# Patient Record
Sex: MALE | Race: WHITE | NOT HISPANIC OR LATINO | URBAN - METROPOLITAN AREA
[De-identification: names, ages, dates, MRNs, and addresses within clinical notes are randomized per-mention and may not be internally consistent; named-entity substitution may affect disease eponyms.]

---

## 2017-03-14 ENCOUNTER — OUTPATIENT (OUTPATIENT)
Dept: OUTPATIENT SERVICES | Facility: HOSPITAL | Age: 77
LOS: 1 days | Discharge: HOME | End: 2017-03-14

## 2017-06-27 DIAGNOSIS — I25.10 ATHEROSCLEROTIC HEART DISEASE OF NATIVE CORONARY ARTERY WITHOUT ANGINA PECTORIS: ICD-10-CM

## 2020-03-09 NOTE — ASU PATIENT PROFILE, ADULT - PMH
CHF (congestive heart failure)    COPD (chronic obstructive pulmonary disease)    Diabetes mellitus    Lymphedema of both lower extremities

## 2020-03-10 ENCOUNTER — OUTPATIENT (OUTPATIENT)
Dept: OUTPATIENT SERVICES | Facility: HOSPITAL | Age: 80
LOS: 1 days | Discharge: HOME | End: 2020-03-10

## 2020-03-10 VITALS
DIASTOLIC BLOOD PRESSURE: 72 MMHG | RESPIRATION RATE: 18 BRPM | WEIGHT: 220.02 LBS | OXYGEN SATURATION: 86 % | TEMPERATURE: 97 F | HEART RATE: 99 BPM | HEIGHT: 68 IN | SYSTOLIC BLOOD PRESSURE: 157 MMHG

## 2020-03-10 VITALS
HEART RATE: 99 BPM | SYSTOLIC BLOOD PRESSURE: 200 MMHG | WEIGHT: 220.02 LBS | HEIGHT: 68 IN | OXYGEN SATURATION: 86 % | TEMPERATURE: 97 F | RESPIRATION RATE: 18 BRPM | DIASTOLIC BLOOD PRESSURE: 96 MMHG

## 2020-03-10 DIAGNOSIS — Z90.5 ACQUIRED ABSENCE OF KIDNEY: Chronic | ICD-10-CM

## 2020-03-10 LAB — GLUCOSE BLDC GLUCOMTR-MCNC: 173 MG/DL — HIGH (ref 70–99)

## 2020-03-17 DIAGNOSIS — Z87.891 PERSONAL HISTORY OF NICOTINE DEPENDENCE: ICD-10-CM

## 2020-03-17 DIAGNOSIS — H25.11 AGE-RELATED NUCLEAR CATARACT, RIGHT EYE: ICD-10-CM

## 2020-03-17 DIAGNOSIS — Z53.09 PROCEDURE AND TREATMENT NOT CARRIED OUT BECAUSE OF OTHER CONTRAINDICATION: ICD-10-CM

## 2020-03-17 DIAGNOSIS — I50.9 HEART FAILURE, UNSPECIFIED: ICD-10-CM

## 2020-03-17 DIAGNOSIS — J44.9 CHRONIC OBSTRUCTIVE PULMONARY DISEASE, UNSPECIFIED: ICD-10-CM

## 2020-03-17 DIAGNOSIS — H40.10X0 UNSPECIFIED OPEN-ANGLE GLAUCOMA, STAGE UNSPECIFIED: ICD-10-CM

## 2020-03-17 DIAGNOSIS — E11.9 TYPE 2 DIABETES MELLITUS WITHOUT COMPLICATIONS: ICD-10-CM

## 2020-05-01 PROBLEM — E11.9 TYPE 2 DIABETES MELLITUS WITHOUT COMPLICATIONS: Chronic | Status: ACTIVE | Noted: 2020-03-10

## 2020-05-01 PROBLEM — J44.9 CHRONIC OBSTRUCTIVE PULMONARY DISEASE, UNSPECIFIED: Chronic | Status: ACTIVE | Noted: 2020-03-10

## 2020-05-01 PROBLEM — Z00.00 ENCOUNTER FOR PREVENTIVE HEALTH EXAMINATION: Status: ACTIVE | Noted: 2020-05-01

## 2020-05-01 PROBLEM — I89.0 LYMPHEDEMA, NOT ELSEWHERE CLASSIFIED: Chronic | Status: ACTIVE | Noted: 2020-03-10

## 2020-05-01 PROBLEM — I50.9 HEART FAILURE, UNSPECIFIED: Chronic | Status: ACTIVE | Noted: 2020-03-10

## 2020-05-05 ENCOUNTER — APPOINTMENT (OUTPATIENT)
Dept: UROLOGY | Facility: CLINIC | Age: 80
End: 2020-05-05
Payer: MEDICARE

## 2020-05-05 DIAGNOSIS — N19 UNSPECIFIED KIDNEY FAILURE: ICD-10-CM

## 2020-05-05 PROCEDURE — 99203 OFFICE O/P NEW LOW 30 MIN: CPT | Mod: 95

## 2020-05-05 NOTE — ASSESSMENT
[FreeTextEntry1] : elevated creatinine can be due to dm nephropathy, vascular compromise, obstruction or combination.  Important do do sonogram to check bladder emptying and to check for hydronephrosis .  I stressed the need to do this and they are agreeable.  Uti clinically improving on cipro

## 2020-05-05 NOTE — REVIEW OF SYSTEMS
[Shortness Of Breath] : shortness of breath [Fever] : no fever [Chest Pain] : no chest pain [Cough] : no cough [Constipation] : no constipation [Diarrhea] : no diarrhea [Dysuria] : no dysuria [Confused] : no confusion [FreeTextEntry1] : on home oxygen

## 2020-05-05 NOTE — HISTORY OF PRESENT ILLNESS
[Home] : at home, [unfilled] , at the time of the visit. [Self] : self [Verbal consent obtained from patient] : the patient, [unfilled] [Other Location: e.g. Home (Enter Location, City,State)___] : at [unfilled] [Family Member] : family member [FreeTextEntry3] : 5/5/20 at 1800 [FreeTextEntry1] : phone number [phone]\par email of [email or web address] Pts daughter translated\par \par 80 year old male with worsening creatinine now 4.1.  He claims good urinary stream, nocturia X 1.  He has urinary frequency and urgency with incontinence.  No hematuria, no dysuria, no flank pain.  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  He take lasix for fluid retention. He is a diabetic on insulin\par \par recently he did have dysuria and foul smelling urine and was treated with cipro with resolution of dysuria.

## 2020-05-05 NOTE — REASON FOR VISIT
[Initial Visit ___] : [unfilled] is here today for an initial visit  for [unfilled] [Family Member] : family member [Source: ______] : History obtained from [unfilled]

## 2020-05-05 NOTE — PHYSICAL EXAM
[General Appearance - In No Acute Distress] : no acute distress [] : no respiratory distress [Costovertebral Angle Tenderness] : no ~M costovertebral angle tenderness [Oriented To Time, Place, And Person] : oriented to person, place, and time [FreeTextEntry1] : poorly ambulatory

## 2020-06-30 ENCOUNTER — APPOINTMENT (OUTPATIENT)
Dept: UROLOGY | Facility: CLINIC | Age: 80
End: 2020-06-30

## 2020-12-22 ENCOUNTER — INPATIENT (INPATIENT)
Facility: HOSPITAL | Age: 80
LOS: 18 days | Discharge: NURSING, REHAB OR LONG-TERM CARE | End: 2021-01-10
Attending: INTERNAL MEDICINE | Admitting: INTERNAL MEDICINE
Payer: MEDICARE

## 2020-12-22 VITALS
OXYGEN SATURATION: 100 % | HEART RATE: 94 BPM | HEIGHT: 68 IN | SYSTOLIC BLOOD PRESSURE: 130 MMHG | DIASTOLIC BLOOD PRESSURE: 60 MMHG | TEMPERATURE: 98 F | RESPIRATION RATE: 16 BRPM

## 2020-12-22 DIAGNOSIS — Z90.5 ACQUIRED ABSENCE OF KIDNEY: Chronic | ICD-10-CM

## 2020-12-22 PROCEDURE — 99285 EMERGENCY DEPT VISIT HI MDM: CPT | Mod: CS

## 2020-12-22 NOTE — ED ADULT TRIAGE NOTE - CHIEF COMPLAINT QUOTE
BIBEMS from [facility] for "Active GI Bleeding." Pt refused to answer questions, and NH reported Maroon colored stooled.

## 2020-12-23 LAB
ALBUMIN SERPL ELPH-MCNC: 3.9 G/DL — SIGNIFICANT CHANGE UP (ref 3.5–5.2)
ALP SERPL-CCNC: 62 U/L — SIGNIFICANT CHANGE UP (ref 30–115)
ALT FLD-CCNC: 7 U/L — SIGNIFICANT CHANGE UP (ref 0–41)
ANION GAP SERPL CALC-SCNC: 18 MMOL/L — HIGH (ref 7–14)
APTT BLD: 26.7 SEC — LOW (ref 27–39.2)
AST SERPL-CCNC: 13 U/L — SIGNIFICANT CHANGE UP (ref 0–41)
BASOPHILS # BLD AUTO: 0.06 K/UL — SIGNIFICANT CHANGE UP (ref 0–0.2)
BASOPHILS # BLD AUTO: 0.08 K/UL — SIGNIFICANT CHANGE UP (ref 0–0.2)
BASOPHILS NFR BLD AUTO: 0.5 % — SIGNIFICANT CHANGE UP (ref 0–1)
BASOPHILS NFR BLD AUTO: 0.5 % — SIGNIFICANT CHANGE UP (ref 0–1)
BILIRUB DIRECT SERPL-MCNC: <0.2 MG/DL — SIGNIFICANT CHANGE UP (ref 0–0.2)
BILIRUB INDIRECT FLD-MCNC: SIGNIFICANT CHANGE UP MG/DL (ref 0.2–1.2)
BILIRUB SERPL-MCNC: <0.2 MG/DL — SIGNIFICANT CHANGE UP (ref 0.2–1.2)
BLD GP AB SCN SERPL QL: SIGNIFICANT CHANGE UP
BUN SERPL-MCNC: 108 MG/DL — CRITICAL HIGH (ref 10–20)
CALCIUM SERPL-MCNC: 9.2 MG/DL — SIGNIFICANT CHANGE UP (ref 8.5–10.1)
CHLORIDE SERPL-SCNC: 101 MMOL/L — SIGNIFICANT CHANGE UP (ref 98–110)
CO2 SERPL-SCNC: 18 MMOL/L — SIGNIFICANT CHANGE UP (ref 17–32)
CREAT SERPL-MCNC: 4.4 MG/DL — CRITICAL HIGH (ref 0.7–1.5)
EOSINOPHIL # BLD AUTO: 0.56 K/UL — SIGNIFICANT CHANGE UP (ref 0–0.7)
EOSINOPHIL # BLD AUTO: 0.89 K/UL — HIGH (ref 0–0.7)
EOSINOPHIL NFR BLD AUTO: 4.2 % — SIGNIFICANT CHANGE UP (ref 0–8)
EOSINOPHIL NFR BLD AUTO: 5.9 % — SIGNIFICANT CHANGE UP (ref 0–8)
GLUCOSE BLDC GLUCOMTR-MCNC: 106 MG/DL — HIGH (ref 70–99)
GLUCOSE BLDC GLUCOMTR-MCNC: 128 MG/DL — HIGH (ref 70–99)
GLUCOSE SERPL-MCNC: 87 MG/DL — SIGNIFICANT CHANGE UP (ref 70–99)
HCT VFR BLD CALC: 22.7 % — LOW (ref 42–52)
HCT VFR BLD CALC: 25.3 % — LOW (ref 42–52)
HGB BLD-MCNC: 7.1 G/DL — LOW (ref 14–18)
HGB BLD-MCNC: 7.7 G/DL — LOW (ref 14–18)
IMM GRANULOCYTES NFR BLD AUTO: 0.5 % — HIGH (ref 0.1–0.3)
IMM GRANULOCYTES NFR BLD AUTO: 0.7 % — HIGH (ref 0.1–0.3)
INR BLD: 1.13 RATIO — SIGNIFICANT CHANGE UP (ref 0.65–1.3)
LACTATE SERPL-SCNC: 1.1 MMOL/L — SIGNIFICANT CHANGE UP (ref 0.7–2)
LIDOCAIN IGE QN: 48 U/L — SIGNIFICANT CHANGE UP (ref 7–60)
LYMPHOCYTES # BLD AUTO: 19.2 % — LOW (ref 20.5–51.1)
LYMPHOCYTES # BLD AUTO: 2.88 K/UL — SIGNIFICANT CHANGE UP (ref 1.2–3.4)
LYMPHOCYTES # BLD AUTO: 2.9 K/UL — SIGNIFICANT CHANGE UP (ref 1.2–3.4)
LYMPHOCYTES # BLD AUTO: 21.7 % — SIGNIFICANT CHANGE UP (ref 20.5–51.1)
MCHC RBC-ENTMCNC: 27.7 PG — SIGNIFICANT CHANGE UP (ref 27–31)
MCHC RBC-ENTMCNC: 28.4 PG — SIGNIFICANT CHANGE UP (ref 27–31)
MCHC RBC-ENTMCNC: 30.4 G/DL — LOW (ref 32–37)
MCHC RBC-ENTMCNC: 31.3 G/DL — LOW (ref 32–37)
MCV RBC AUTO: 90.8 FL — SIGNIFICANT CHANGE UP (ref 80–94)
MCV RBC AUTO: 91 FL — SIGNIFICANT CHANGE UP (ref 80–94)
MONOCYTES # BLD AUTO: 1.1 K/UL — HIGH (ref 0.1–0.6)
MONOCYTES # BLD AUTO: 1.31 K/UL — HIGH (ref 0.1–0.6)
MONOCYTES NFR BLD AUTO: 8.3 % — SIGNIFICANT CHANGE UP (ref 1.7–9.3)
MONOCYTES NFR BLD AUTO: 8.7 % — SIGNIFICANT CHANGE UP (ref 1.7–9.3)
NEUTROPHILS # BLD AUTO: 8.58 K/UL — HIGH (ref 1.4–6.5)
NEUTROPHILS # BLD AUTO: 9.82 K/UL — HIGH (ref 1.4–6.5)
NEUTROPHILS NFR BLD AUTO: 64.6 % — SIGNIFICANT CHANGE UP (ref 42.2–75.2)
NEUTROPHILS NFR BLD AUTO: 65.2 % — SIGNIFICANT CHANGE UP (ref 42.2–75.2)
NRBC # BLD: 0 /100 WBCS — SIGNIFICANT CHANGE UP (ref 0–0)
NRBC # BLD: 0 /100 WBCS — SIGNIFICANT CHANGE UP (ref 0–0)
PLATELET # BLD AUTO: 299 K/UL — SIGNIFICANT CHANGE UP (ref 130–400)
PLATELET # BLD AUTO: 370 K/UL — SIGNIFICANT CHANGE UP (ref 130–400)
POTASSIUM SERPL-MCNC: 5 MMOL/L — SIGNIFICANT CHANGE UP (ref 3.5–5)
POTASSIUM SERPL-SCNC: 5 MMOL/L — SIGNIFICANT CHANGE UP (ref 3.5–5)
PROT SERPL-MCNC: 7.4 G/DL — SIGNIFICANT CHANGE UP (ref 6–8)
PROTHROM AB SERPL-ACNC: 13 SEC — HIGH (ref 9.95–12.87)
RBC # BLD: 2.5 M/UL — LOW (ref 4.7–6.1)
RBC # BLD: 2.78 M/UL — LOW (ref 4.7–6.1)
RBC # FLD: 13.5 % — SIGNIFICANT CHANGE UP (ref 11.5–14.5)
RBC # FLD: 13.5 % — SIGNIFICANT CHANGE UP (ref 11.5–14.5)
SARS-COV-2 RNA SPEC QL NAA+PROBE: SIGNIFICANT CHANGE UP
SODIUM SERPL-SCNC: 137 MMOL/L — SIGNIFICANT CHANGE UP (ref 135–146)
TROPONIN T SERPL-MCNC: 0.06 NG/ML — CRITICAL HIGH
TROPONIN T SERPL-MCNC: 0.07 NG/ML — CRITICAL HIGH
WBC # BLD: 13.27 K/UL — HIGH (ref 4.8–10.8)
WBC # BLD: 15.08 K/UL — HIGH (ref 4.8–10.8)
WBC # FLD AUTO: 13.27 K/UL — HIGH (ref 4.8–10.8)
WBC # FLD AUTO: 15.08 K/UL — HIGH (ref 4.8–10.8)

## 2020-12-23 PROCEDURE — 93010 ELECTROCARDIOGRAM REPORT: CPT

## 2020-12-23 PROCEDURE — 99223 1ST HOSP IP/OBS HIGH 75: CPT

## 2020-12-23 PROCEDURE — 71045 X-RAY EXAM CHEST 1 VIEW: CPT | Mod: 26

## 2020-12-23 RX ORDER — PANTOPRAZOLE SODIUM 20 MG/1
40 TABLET, DELAYED RELEASE ORAL ONCE
Refills: 0 | Status: COMPLETED | OUTPATIENT
Start: 2020-12-23 | End: 2020-12-23

## 2020-12-23 RX ORDER — BUDESONIDE AND FORMOTEROL FUMARATE DIHYDRATE 160; 4.5 UG/1; UG/1
2 AEROSOL RESPIRATORY (INHALATION)
Refills: 0 | Status: DISCONTINUED | OUTPATIENT
Start: 2020-12-23 | End: 2021-01-10

## 2020-12-23 RX ORDER — LANOLIN ALCOHOL/MO/W.PET/CERES
3 CREAM (GRAM) TOPICAL AT BEDTIME
Refills: 0 | Status: DISCONTINUED | OUTPATIENT
Start: 2020-12-23 | End: 2021-01-10

## 2020-12-23 RX ORDER — ASCORBIC ACID 60 MG
500 TABLET,CHEWABLE ORAL DAILY
Refills: 0 | Status: DISCONTINUED | OUTPATIENT
Start: 2020-12-23 | End: 2021-01-10

## 2020-12-23 RX ORDER — PANTOPRAZOLE SODIUM 20 MG/1
8 TABLET, DELAYED RELEASE ORAL
Qty: 80 | Refills: 0 | Status: DISCONTINUED | OUTPATIENT
Start: 2020-12-23 | End: 2021-01-06

## 2020-12-23 RX ORDER — NYSTATIN CREAM 100000 [USP'U]/G
1 CREAM TOPICAL
Refills: 0 | Status: DISCONTINUED | OUTPATIENT
Start: 2020-12-23 | End: 2021-01-10

## 2020-12-23 RX ORDER — SODIUM BICARBONATE 1 MEQ/ML
650 SYRINGE (ML) INTRAVENOUS EVERY 12 HOURS
Refills: 0 | Status: DISCONTINUED | OUTPATIENT
Start: 2020-12-23 | End: 2021-01-10

## 2020-12-23 RX ORDER — HYDRALAZINE HCL 50 MG
0 TABLET ORAL
Qty: 0 | Refills: 0 | DISCHARGE

## 2020-12-23 RX ORDER — FUROSEMIDE 40 MG
20 TABLET ORAL DAILY
Refills: 0 | Status: DISCONTINUED | OUTPATIENT
Start: 2020-12-23 | End: 2020-12-23

## 2020-12-23 RX ORDER — SOD SULF/SODIUM/NAHCO3/KCL/PEG
4000 SOLUTION, RECONSTITUTED, ORAL ORAL ONCE
Refills: 0 | Status: COMPLETED | OUTPATIENT
Start: 2020-12-23 | End: 2020-12-23

## 2020-12-23 RX ORDER — CHLORHEXIDINE GLUCONATE 213 G/1000ML
1 SOLUTION TOPICAL
Refills: 0 | Status: DISCONTINUED | OUTPATIENT
Start: 2020-12-23 | End: 2021-01-10

## 2020-12-23 RX ORDER — ALBUTEROL 90 UG/1
2 AEROSOL, METERED ORAL EVERY 6 HOURS
Refills: 0 | Status: DISCONTINUED | OUTPATIENT
Start: 2020-12-23 | End: 2020-12-25

## 2020-12-23 RX ORDER — ALBUTEROL 90 UG/1
0 AEROSOL, METERED ORAL
Qty: 0 | Refills: 0 | DISCHARGE

## 2020-12-23 RX ORDER — SENNA PLUS 8.6 MG/1
2 TABLET ORAL AT BEDTIME
Refills: 0 | Status: DISCONTINUED | OUTPATIENT
Start: 2020-12-23 | End: 2020-12-23

## 2020-12-23 RX ORDER — DONEPEZIL HYDROCHLORIDE 10 MG/1
5 TABLET, FILM COATED ORAL AT BEDTIME
Refills: 0 | Status: DISCONTINUED | OUTPATIENT
Start: 2020-12-23 | End: 2021-01-10

## 2020-12-23 RX ORDER — INSULIN GLARGINE 100 [IU]/ML
19 INJECTION, SOLUTION SUBCUTANEOUS AT BEDTIME
Refills: 0 | Status: DISCONTINUED | OUTPATIENT
Start: 2020-12-23 | End: 2020-12-25

## 2020-12-23 RX ORDER — MONTELUKAST 4 MG/1
10 TABLET, CHEWABLE ORAL DAILY
Refills: 0 | Status: DISCONTINUED | OUTPATIENT
Start: 2020-12-23 | End: 2021-01-10

## 2020-12-23 RX ORDER — HYDRALAZINE HCL 50 MG
50 TABLET ORAL EVERY 12 HOURS
Refills: 0 | Status: DISCONTINUED | OUTPATIENT
Start: 2020-12-23 | End: 2021-01-09

## 2020-12-23 RX ADMIN — NYSTATIN CREAM 1 APPLICATION(S): 100000 CREAM TOPICAL at 18:19

## 2020-12-23 RX ADMIN — PANTOPRAZOLE SODIUM 10 MG/HR: 20 TABLET, DELAYED RELEASE ORAL at 02:12

## 2020-12-23 RX ADMIN — PANTOPRAZOLE SODIUM 40 MILLIGRAM(S): 20 TABLET, DELAYED RELEASE ORAL at 02:00

## 2020-12-23 RX ADMIN — ALBUTEROL 2 PUFF(S): 90 AEROSOL, METERED ORAL at 20:50

## 2020-12-23 RX ADMIN — MONTELUKAST 10 MILLIGRAM(S): 4 TABLET, CHEWABLE ORAL at 11:29

## 2020-12-23 RX ADMIN — Medication 650 MILLIGRAM(S): at 18:18

## 2020-12-23 RX ADMIN — Medication 4000 MILLILITER(S): at 14:35

## 2020-12-23 RX ADMIN — INSULIN GLARGINE 19 UNIT(S): 100 INJECTION, SOLUTION SUBCUTANEOUS at 21:57

## 2020-12-23 RX ADMIN — Medication 3 MILLIGRAM(S): at 21:58

## 2020-12-23 RX ADMIN — DONEPEZIL HYDROCHLORIDE 5 MILLIGRAM(S): 10 TABLET, FILM COATED ORAL at 21:58

## 2020-12-23 RX ADMIN — Medication 20 MILLIGRAM(S): at 21:58

## 2020-12-23 RX ADMIN — Medication 500 MILLIGRAM(S): at 11:29

## 2020-12-23 RX ADMIN — PANTOPRAZOLE SODIUM 10 MG/HR: 20 TABLET, DELAYED RELEASE ORAL at 14:34

## 2020-12-23 NOTE — ED PROVIDER NOTE - ATTENDING CONTRIBUTION TO CARE
Hx per chart and NH, patient is unable to provide history.     81 yo M, hx of advanced alzheimer's dementia, HTN, COPD, DM II, sent from [facility] for rectal bleeding. Patient was noted to have blood in his diaper today with increasing amounts. Is normally on daily asa, was not given today due to this.     No fever noted, no change in PO. Patient does not provide a hx of abdominal pain however on exam grimaces with abdominal palpation.    Patient looks to be in no distress, no pallor, no tachycardia, hypotension, has gross burgandy colored blood in diaper, no visible hemorrhoids.    Will get labs, CTA given active bleeding, reassess.

## 2020-12-23 NOTE — ED PROVIDER NOTE - OBJECTIVE STATEMENT
81 y/o M with PMH CHF, alzheimer's, COPD, DM on insulin, hx nephrectomy and ESRD on retacrit, anemia of chronic disease, full code presents from [facility] for burgundy stool found in diaper x approximately 14 hrs.  no hx prior. was on asa, was stopped today 2/2 blood in stool   no fever/change in activity/change in mental status/n/v/d.

## 2020-12-23 NOTE — H&P ADULT - ATTENDING COMMENTS
Mr Douglas is a 81 yo Man from [facility] with medical history of HF with unknown EF, Alzheimer's dementia, COPD not on home O2, T2DM on insulin, hx right nephrectomy and ESRD on retacrit, anemia of chronic disease, admitted for melena.    #Acute on chronic anemia secondary to UGIB vs LGIB  Complicated with weight loss of 20lbs and poor appetite; Differentials are PUD vs AVM vs colon/gastric cancer; less likely diverticular bleed  Had two BMs in ED with clots  Hgb 7.7 on admission, baseline ~9; currently hemodynamically stable  NPO for now  Maintain 2 large bore IV  c/w Protonix drip and hold aspirin  Maintain Active type and screen  keep Hgb > 8  GI consult noted   2 units PRBC - repeat CBC   Golytely and Dulcolax  NPO after MN  Plan for Colonoscopy tomorrow     # Leukocytosis  WBC 13.27, immature granulocyte 0.7%, afebrile  CXR has no focal consolidation or opacities  Will continue to trend; no need for abx for now  Check UA and blood culture if patient becomes febrile    #Troponemia  Possibly secondary to ESRD  No chest pain, EKG NSR with incomplete RBBB  Will trend 2 more sets of cardiac biomarkers  No need for tele    #Asthma/COPD not on home O2  CXR no consolidation, no wheezing on exam  c/w albuterol, Symbicort (in lieu of Breo) and Singulair  Keep O2 between 88 - 92%    #T2DM  c/w basal insulin 19 units; hold bolus insulin as patient is NPO    #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    #Acute on chronic anemia secondary to UGIB vs LGIB  Complicated with weight loss of 20lbs and poor appetite; Differentials are PUD vs AVM vs colon/gastric cancer; less likely diverticular bleed  Had two BMs in ED with clots  Hgb 7.7 on admission, baseline ~9; currently hemodynamically stable  NPO for now  Maintain 2 large bore IV  c/w Protonix drip and hold aspirin  Maintain Active type and screen  keep Hgb > 8  GI consult noted   2 units PRBC - repeat CBC   Golytely and Dulcolax  NPO after MN  Plan for Colonoscopy tomorrow     # Leukocytosis  WBC 13.27, immature granulocyte 0.7%, afebrile  CXR has no focal consolidation or opacities  Will continue to trend; no need for abx for now  Check UA and blood culture if patient becomes febrile    #Troponemia  Possibly secondary to ESRD  No chest pain, EKG NSR with incomplete RBBB  Will trend 2 more sets of cardiac biomarkers  No need for tele    #Asthma/COPD not on home O2  CXR no consolidation, no wheezing on exam  c/w albuterol, Symbicort (in lieu of Breo) and Singulair  Keep O2 between 88 - 92%    #T2DM  c/w basal insulin 19 units; hold bolus insulin as patient is NPO    #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  I agree with the above history, physical, and plan which I have reviewed and edited where appropriate.

## 2020-12-23 NOTE — ED PROVIDER NOTE - NS ED ROS FT
Review of Systems    Constitutional: (-) fever   Cardiovascular:  (-) syncope  Respiratory: (-) cough, (-) shortness of breath  Gastrointestinal: (-) vomiting, (-) diarrhea (+)black/bloody stools  Integumentary: (-) rash, (-) edema  Neurological: , (-) altered mental status  Hematologic: (-) easy bruising

## 2020-12-23 NOTE — H&P ADULT - NSHPPHYSICALEXAM_GEN_ALL_CORE
GENERAL: NAD, lying in bed comfortably  HEAD: Atraumatic, Normocephalic  EYES: EOMI, PERRLA, conjunctiva pink and cornea white  ENT: Normal external ears and nose, no discharges; moist mucous membranes, no erythema on posterior oropharynx  NECK: Supple, nontender to palpation; no JVD  CHEST/LUNG: Clear to auscultation bilaterally; No rales, rhonchi, wheezing, or rubs. Unlabored respirations  HEART: Regular rate and rhythm; No murmurs, rubs, or gallops  ABDOMEN: Soft, nontender, nondistended; no rebound tenderness, no guarding; no hepatomegaly; normoactive/hyperactive/hypoactive bowel sounds  EXTREMITIES:  2+ Peripheral Pulses, brisk capillary refill. No clubbing, cyanosis, or petal edema  NERVOUS SYSTEM: Alert and oriented to person, time, place and situation, speech clear. No focal deficits   MSK: FROM all 4 extremities, full and equal strength  SKIN: No rashes or lesions Suboptimal physical exam due to patient being incorporative    GENERAL: NAD, lying in bed comfortably  HEAD: Atraumatic, Normocephalic  EYES: EOMI, PERRLA, conjunctiva pink and cornea white  ENT: Normal external ears and nose, no discharges; moist mucous membranes  NECK: Supple, nontender to palpation; no JVD  CHEST/LUNG: Clear to auscultation bilaterally; No rales, rhonchi, wheezing, or rubs. Unlabored respirations  HEART: Regular rate and rhythm; No murmurs, rubs, or gallops  ABDOMEN: Soft, nondistended, questionable tenderness as patient yelled when I palpate his stomach but he denies pain; no rebound tenderness, mild guarding; no hepatomegaly; normoactive bowel sounds  EXTREMITIES:  2+ Peripheral Pulses, brisk capillary refill. No clubbing, cyanosis, or petal edema; hyperpigmentated skin changes on bilateral LE  NERVOUS SYSTEM: Unable to assess as patient refused to answer questions  MSK: FROM all 4 extremities, full and equal strength  SKIN: No rashes or lesions

## 2020-12-23 NOTE — ED PROVIDER NOTE - PROGRESS NOTE DETAILS
Brian: case signed out to Dr. Hurd spoke to wife to inform her of  being in ER. She requests I speak to her daughter to get questions answered, Carmelina, who will call back.    Spoke to Daughter Carmelina who gives phone consent for blood transfusion. States PMD and Nephrologist is Dr Yanira Vasquez [phone]. Will call and discuss our desire to get a bleeding scan PALEVY: cancelled CTA 2/2 ESRD and hx nephrectomy, with GFR< 15. paged nephro to consult on this, pending callback. alternatively luz maria rpt CBC prior to 1U PRBC, will see if any significant drop. pending GI. JAYSON: cancelled CTA 2/2 ESRD and hx nephrectomy, with GFR< 15. paged nephro to consult on this, pending callback (call back expected from Dr Saba). In addition, luz maria rpt CBC prior to 1U PRBC, will see if any significant drop. pending GI. Vickey: Dr Vasquez called back. Discussed case. She will inform Dr Saba to see patient in hospital

## 2020-12-23 NOTE — ED PROVIDER NOTE - CLINICAL SUMMARY MEDICAL DECISION MAKING FREE TEXT BOX
81 yo M, hx of advanced alzheimer's dementia, HTN, COPD, DM II, anemia secondary to ESRD, nephrectomy,  sent from [facility] for rectal bleeding. Patient was noted to have blood in his diaper today with increasing amounts. Is normally on daily asa, was not given today due to this. CTA canceled given renal issues with poor eGFR. HGB low for him (his baseline is 9). EKG, labs reviewed. T&S/T&C ordered for one unit. Paged patients Nephro to see if they would do dialysis if we did bleeding scan, however did not get call back, and bleeding scan canceled given nephrectomy history. Called GI, discussed results including the repeat CBC results. Pt already given protonix. They will emergently consult on patient. To admit for GI Bleed. 79 yo M, hx of advanced alzheimer's dementia, HTN, COPD, DM II, anemia secondary to ESRD, nephrectomy,  sent from [facility] for rectal bleeding. Patient was noted to have blood in his diaper today with increasing amounts. Is normally on daily asa, was not given today due to this. CTA canceled given renal issues with poor eGFR. HGB low for him (his baseline is 9). EKG, labs reviewed. T&S/T&C ordered for one unit. Paged patients Nephro to see if they would do dialysis if we did bleeding scan, however did not get call back, and bleeding scan canceled given nephrectomy history. Called GI, discussed results including the repeat CBC results. Trop elevated likely demand ischemia. Pt already given protonix. They will emergently consult on patient. To admit for GI Bleed.

## 2020-12-23 NOTE — CONSULT NOTE ADULT - SUBJECTIVE AND OBJECTIVE BOX
Gastroenterology Consultation:    Patient is a 80y old  Male who presents with a chief complaint of blood in stool (23 Dec 2020 08:01)      Admitted on: 12-23-20  HPI:  80 years old male from [facility] with PMHx of HF with unknown EF, HTN, anxiety, Alzheimer's dementia, COPD not on home O2, T2DM on insulin, hx right nephrectomy and ESRD not on HD, anemia of chronic disease, brought in by EMS with a complaint of burgundy stool found in diaper for the past 2 days. Patient is a poor historian so history was taken from chart review and NH nurse Irlanda. Patient has no prior episode. Patient has been on Aspirin and was stopped due to maroon coloured stool. He also has lost 20 lbs in one month and has poor appetite. Patient did not complains of any abdominal pain, nausea/vomiting, diarrhea or constipation. The stool was described as large amount of dark tarry stool. A stool guaiac test was positive. Nurse Irlanda denies any fever/chills, cough, chest pain, shortness of breath, or any urinary symptoms.       T(C): 36.6 (23 Dec 2020 06:36), Max: 36.6 (23 Dec 2020 06:36)  T(F): 97.8 (23 Dec 2020 06:36), Max: 97.8 (23 Dec 2020 06:36)  HR: 81 (23 Dec 2020 06:36) (81 - 94)  BP: 138/63 (23 Dec 2020 06:36) (130/60 - 144/68)  RR: 20 (23 Dec 2020 06:36) (16 - 20)  SpO2: 98% (23 Dec 2020 06:36) (98% - 100%)        Prior records Reviewed (Y/N): Y  History obtained from person other than patient (Y/N): Y, chart    Prior EGD: None in system   Prior Colonoscopy: None in system       PAST MEDICAL & SURGICAL HISTORY:  CHF (congestive heart failure)    Lymphedema of both lower extremities    COPD (chronic obstructive pulmonary disease)    Diabetes mellitus    H/O right nephrectomy  20 yrs ago        FAMILY HISTORY:  No pertinent family history in first degree relatives        Social History:  Tobacco: N  Alcohol: N  Drugs: N    Home Medications:  albuterol 90 mcg/inh inhalation aerosol: 2 puff(s) inhaled every 4 hours (23 Dec 2020 08:48)  Aricept 5 mg oral tablet: 1 tab(s) orally once a day (at bedtime) (23 Dec 2020 08:48)  Breo Ellipta 200 mcg-25 mcg/inh inhalation powder: 1 puff(s) inhaled once a day (23 Dec 2020 08:48)  busPIRone 5 mg oral tablet: 1 tab(s) orally once a day (23 Dec 2020 08:48)  ferrous sulfate 325 mg (65 mg elemental iron) oral tablet: 1 tab(s) orally once a day (23 Dec 2020 08:48)  furosemide 20 mg oral tablet: 1 tab(s) orally once a day (23 Dec 2020 08:48)  hydrALAZINE 50 mg oral tablet: 1 tab(s) orally every 12 hours (23 Dec 2020 08:48)  Lantus 100 units/mL subcutaneous solution: 19 unit(s) subcutaneous once a day (at bedtime) (23 Dec 2020 08:48)  Melatonin 3 mg oral tablet: 1 tab(s) orally once a day (at bedtime) (23 Dec 2020 08:48)  montelukast 10 mg oral tablet: 1 tab(s) orally once a day (23 Dec 2020 08:48)  nitroglycerin 0.3 mg sublingual tablet: 1 tab(s) sublingual every 5 minutes no more than 3 doses (23 Dec 2020 08:48)  NovoLOG FlexPen 100 units/mL injectable solution: 6 unit(s) injectable 3 times a day (before meals) (23 Dec 2020 08:48)  nystatin 100,000 units/g topical cream: Apply topically to affected area 2 times a day (23 Dec 2020 08:48)  Protonix 40 mg oral delayed release tablet: 1 tab(s) orally 2 times a day (23 Dec 2020 08:48)  Retacrit 4000 units/mL preservative-free injectable solution: 4000 unit(s) injectable once a week. hold if Hgb &gt; 10 (23 Dec 2020 08:48)  Senna 8.6 mg oral tablet: 2 tab(s) orally once a day (at bedtime), As Needed (23 Dec 2020 08:48)  Tylenol 325 mg oral tablet: 2 tab(s) orally 2 times a day, As Needed (23 Dec 2020 08:48)  Vitamin C 500 mg oral tablet: 1 tab(s) orally once a day (23 Dec 2020 08:48)    MEDICATIONS  (STANDING):  ALBUTerol    90 MICROgram(s) HFA Inhaler 2 Puff(s) Inhalation every 6 hours  ascorbic acid 500 milliGRAM(s) Oral daily  budesonide 160 MICROgram(s)/formoterol 4.5 MICROgram(s) Inhaler 2 Puff(s) Inhalation two times a day  busPIRone 5 milliGRAM(s) Oral <User Schedule>  chlorhexidine 4% Liquid 1 Application(s) Topical <User Schedule>  donepezil 5 milliGRAM(s) Oral at bedtime  furosemide    Tablet 20 milliGRAM(s) Oral daily  hydrALAZINE 50 milliGRAM(s) Oral every 12 hours  insulin glargine Injectable (LANTUS) 19 Unit(s) SubCutaneous at bedtime  melatonin 3 milliGRAM(s) Oral at bedtime  montelukast 10 milliGRAM(s) Oral daily  nystatin Cream 1 Application(s) Topical two times a day  pantoprazole Infusion 8 mG/Hr (10 mL/Hr) IV Continuous <Continuous>  sodium bicarbonate 650 milliGRAM(s) Oral every 12 hours    MEDICATIONS  (PRN):      Allergies  No Known Allergies      Review of Systems:   Constitutional:  No Fever, No Chills  ENT/Mouth:  No Hearing Changes,  No Difficulty Swallowing  Eyes:  No Eye Pain, No Vision Changes  Cardiovascular:  No Chest Pain, No Palpitations  Respiratory:  No Cough, No Dyspnea  Gastrointestinal:  As described in HPI  Musculoskeletal:  No Joint Swelling, No Back Pain  Skin:  No Skin Lesions, No Jaundice  Neuro:  No Syncope, No Dizziness  Heme/Lymph:  No Bruising, No Bleeding.          Physical Examination:  T(C): 36.6 (12-23-20 @ 06:36), Max: 36.6 (12-23-20 @ 06:36)  HR: 81 (12-23-20 @ 06:36) (81 - 94)  BP: 138/63 (12-23-20 @ 06:36) (130/60 - 144/68)  RR: 20 (12-23-20 @ 06:36) (16 - 20)  SpO2: 98% (12-23-20 @ 06:36) (98% - 100%)  Height (cm): 172.7 (12-22-20 @ 23:56)      Constitutional: No acute distress.  Eyes:. Conjunctivae are clear, Sclera is non-icteric.  Ears Nose and Throat: The external ears are normal appearing,  Oral mucosa is pink and moist.  Respiratory:  No signs of respiratory distress. Lung sounds are clear bilaterally.  Cardiovascular:  S1 S2, Regular rate and rhythm.  GI: Abdomen is soft, symmetric, and non-tender without distention. There are no visible lesions. Bowel sounds are present and normoactive in all four quadrants. No masses, hepatomegaly, or splenomegaly are noted.   Neuro: No Tremor, No involuntary movements  Skin: No rashes, No Jaundice.          Data: (reviewed by attending)                        7.1    13.27 )-----------( 299      ( 23 Dec 2020 04:55 )             22.7     Hgb Trend:  7.1  12-23-20 @ 04:55  7.7  12-23-20 @ 02:00      12-23    137  |  101  |  108<HH>  ----------------------------<  87  5.0   |  18  |  4.4<HH>    Ca    9.2      23 Dec 2020 02:00    TPro  7.4  /  Alb  3.9  /  TBili  <0.2  /  DBili  <0.2  /  AST  13  /  ALT  7   /  AlkPhos  62  12-23    Liver panel trend:  TBili <0.2   /   AST 13   /   ALT 7   /   AlkP 62   /   Tptn 7.4   /   Alb 3.9    /   DBili <0.2      12-23      PT/INR - ( 23 Dec 2020 02:00 )   PT: 13.00 sec;   INR: 1.13 ratio         PTT - ( 23 Dec 2020 02:00 )  PTT:26.7 sec

## 2020-12-23 NOTE — H&P ADULT - NSHPREVIEWOFSYSTEMS_GEN_ALL_CORE
Constitutional: No fever, chills, malaise.  Eyes: No visual changes, eye pain or discharge. No Photophobia  ENMT: No hearing changes, pain, discharge or infections. No neck pain or stiffness.   Cardiac: No SOB, chest pain, palpitations.  Respiratory: No cough or respiratory distress. No hemoptysis.   GI: No nausea, vomiting, diarrhea or abdominal pain.  : No dysuria, frequency or burning. No Discharge  MS: No myalgia, muscle weakness, joint pain or back pain.  Neuro: No headache or weakness. No LOC.  Skin: No skin rash.  Except as documented in the HPI, all other systems are negative.

## 2020-12-23 NOTE — CONSULT NOTE ADULT - SUBJECTIVE AND OBJECTIVE BOX
[place] [facility] INITIAL CONSULT NOTE  --------------------------------------------------------------------------------  HPI:  80 years old male from [facility] with PMHx of HF with unknown EF, HTN, anxiety, Alzheimer's dementia, COPD not on home O2, T2DM on insulin, hx right nephrectomy/CKD V non-dialysis dependent baseline creatinine 3.3-4, anemia of chronic disease on retacrit, brought in by EMS with a complaint of burgundy stool found in diaper for the past 2 days. Patient is a poor historian so history was taken from daughter. Patient has no prior episode. Patient has been on Aspirin and was stopped due to melena. He also has lost 20 lbs in one month and has poor appetite. Patient did not complains of any abdominal pain, nausea/vomiting, diarrhea or constipation. The stool was described as large amount of dark tarry stool. A stool guaiac test was positive.    PAST HISTORY  --------------------------------------------------------------------------------  PAST MEDICAL & SURGICAL HISTORY:  CHF (congestive heart failure)  Lymphedema of both lower extremities  COPD (chronic obstructive pulmonary disease)  Diabetes mellitus  H/O right nephrectomy 20 yrs ago    FAMILY HISTORY:  No pertinent family history in first degree relatives    SOCIAL HISTORY:  No current ETOH, tobacco, or illicit drug use    ALLERGIES & MEDICATIONS  --------------------------------------------------------------------------------  Allergies    No Known Allergies    Standing Inpatient Medications  ALBUTerol    90 MICROgram(s) HFA Inhaler 2 Puff(s) Inhalation every 6 hours  ascorbic acid 500 milliGRAM(s) Oral daily  budesonide 160 MICROgram(s)/formoterol 4.5 MICROgram(s) Inhaler 2 Puff(s) Inhalation two times a day  busPIRone 5 milliGRAM(s) Oral <User Schedule>  chlorhexidine 4% Liquid 1 Application(s) Topical <User Schedule>  donepezil 5 milliGRAM(s) Oral at bedtime  furosemide    Tablet 20 milliGRAM(s) Oral daily  hydrALAZINE 50 milliGRAM(s) Oral every 12 hours  insulin glargine Injectable (LANTUS) 19 Unit(s) SubCutaneous at bedtime  melatonin 3 milliGRAM(s) Oral at bedtime  montelukast 10 milliGRAM(s) Oral daily  nystatin Cream 1 Application(s) Topical two times a day  pantoprazole Infusion 8 mG/Hr IV Continuous <Continuous>  sodium bicarbonate 650 milliGRAM(s) Oral every 12 hours    REVIEW OF SYSTEMS  --------------------------------------------------------------------------------  Gen: No fevers/chills  Skin: No rashes  Head/Eyes/Ears/Mouth: No headache; No sinus pain/discomfort, sore throat  Respiratory: No dyspnea, cough, wheezing, hemoptysis  CV: No chest pain, PND, orthopnea  GI: No abdominal pain, diarrhea, constipation, nausea, vomiting  : No increased frequency, dysuria, hematuria, nocturia  All other systems were reviewed and are negative, except as noted.    VITALS/PHYSICAL EXAM  --------------------------------------------------------------------------------  T(C): 36.6 (12-23-20 @ 06:36), Max: 36.6 (12-23-20 @ 06:36)  HR: 81 (12-23-20 @ 06:36) (81 - 94)  BP: 138/63 (12-23-20 @ 06:36) (130/60 - 144/68)  RR: 20 (12-23-20 @ 06:36) (16 - 20)  SpO2: 98% (12-23-20 @ 06:36) (98% - 100%)  Height (cm): 172.7 (12-22-20 @ 23:56)    Physical Exam:  	Gen: NAD  	Pulm: CTA B/L  	CV: RRR, S1S2  	Back: No CVA tenderness; no sacral edema  	Abd: +BS, soft, nontender/nondistended  	: No suprapubic tenderness  	LE: Warm, trace edema    LABS/STUDIES  --------------------------------------------------------------------------------              7.1    13.27 >-----------<  299      [12-23-20 @ 04:55]              22.7     137  |  101  |  108  ----------------------------<  87      [12-23-20 @ 02:00]  5.0   |  18  |  4.4        Ca     9.2     [12-23-20 @ 02:00]    TPro  7.4  /  Alb  3.9  /  TBili  <0.2  /  DBili  <0.2  /  AST  13  /  ALT  7   /  AlkPhos  62  [12-23-20 @ 02:00]    PT/INR: PT 13.00, INR 1.13       [12-23-20 @ 02:00]  PTT: 26.7       [12-23-20 @ 02:00]    Troponin 0.07      [12-23-20 @ 02:00]    Creatinine Trend:  SCr 4.4 [12-23 @ 02:00]

## 2020-12-23 NOTE — ED PROVIDER NOTE - CARE PLAN
Principal Discharge DX:	GI bleed   Principal Discharge DX:	GI bleed  Secondary Diagnosis:	Anemia  Secondary Diagnosis:	Troponin level elevated

## 2020-12-23 NOTE — CHART NOTE - NSCHARTNOTEFT_GEN_A_CORE
Patient is refusing golytely. I spoke to patient and explained the importance for the colonoscopy tomorrow and patient is still refusing.

## 2020-12-23 NOTE — ED ADULT NURSE REASSESSMENT NOTE - NS ED NURSE REASSESS COMMENT FT1
patient a/ox3 at bedside on cardiac monitor, vitals WNL. patient received 1 unit of PRBC. patient had one bloody dark/maroon soft BM, at beginning of this nurses shift prior to PRBC. patient has protonix drip running .patient denies any complaints or discomfort will continue to assess and monitor
Pt noted with active rectal bleeding dark red in color, denies any pain. MD made aware, awaiting  for CT abd and blood transfusion once blood is ready. As per PA for BT once repet CBC resulted. Will monitor.

## 2020-12-23 NOTE — CONSULT NOTE ADULT - ASSESSMENT
1. Mild ASHLEE in setting of GI bleed, likely pre-renal azotemia  2. CKD V non-dialysis dependent, baseline creatinine 3.3-4  3. GI bleed  4. HTN    Plan:    No urgent indication for renal replacement therapy  Colonoscopy tomorrow  Agree with transfusion  Can give IV Lasix if evidence of volume overload with transfusion  Daily BMP  No NSAIDS  No IV contrast

## 2020-12-23 NOTE — ED ADULT NURSE NOTE - INTERVENTIONS DEFINITIONS
[facility] to call system/Call bell, personal items and telephone within reach/Instruct patient to call for assistance

## 2020-12-23 NOTE — H&P ADULT - NSHPLABSRESULTS_GEN_ALL_CORE
7.1    13.27 )-----------( 299      ( 23 Dec 2020 04:55 )             22.7       12-23    137  |  101  |  108<HH>  ----------------------------<  87  5.0   |  18  |  4.4<HH>    Ca    9.2      23 Dec 2020 02:00    TPro  7.4  /  Alb  3.9  /  TBili  <0.2  /  DBili  <0.2  /  AST  13  /  ALT  7   /  AlkPhos  62  12-23        PT/INR - ( 23 Dec 2020 02:00 )   PT: 13.00 sec;   INR: 1.13 ratio      PTT - ( 23 Dec 2020 02:00 )  PTT:26.7 sec    Lactate Trend  12-23 @ 02:00 Lactate:1.1       CARDIAC MARKERS ( 23 Dec 2020 02:00 )  x     / 0.07 ng/mL / x     / x     / x

## 2020-12-23 NOTE — H&P ADULT - HISTORY OF PRESENT ILLNESS
80 years old male from [facility] with PMHx of HF with unknown EF, HTN, anxiety, Alzheimer's dementia, COPD not on home O2, T2DM on insulin, hx right nephrectomy and ESRD on retacrit, anemia of chronic disease, brought in by EMS with a complaint of burgundy stool found in diaper for the past 2 days. Patient is a poor historian so history was taken from chart review and NH nurse Irlanda. Patient has no prior episode. Patient has been on Aspirin and was stopped due to melena. He also has lost 20 lbs in one month and has poor appetite. Patient did not complains of any abdominal pain, nausea/vomiting, diarrhea or constipation. The stool was described as large amount of dark tarry stool. A stool guaiac test was positive. Nurse Irlanda denies any fever/chills, cough, chest pain, shortness of breath, or any urinary symptoms.       T(C): 36.6 (23 Dec 2020 06:36), Max: 36.6 (23 Dec 2020 06:36)  T(F): 97.8 (23 Dec 2020 06:36), Max: 97.8 (23 Dec 2020 06:36)  HR: 81 (23 Dec 2020 06:36) (81 - 94)  BP: 138/63 (23 Dec 2020 06:36) (130/60 - 144/68)  RR: 20 (23 Dec 2020 06:36) (16 - 20)  SpO2: 98% (23 Dec 2020 06:36) (98% - 100%)

## 2020-12-23 NOTE — CONSULT NOTE ADULT - ASSESSMENT
80 years old male from [facility] with PMHx of HF with unknown EF, HTN, anxiety, Alzheimer's dementia, COPD not on home O2, T2DM on insulin, hx right nephrectomy and ESRD not on HD, anemia of chronic disease, brought in by EMS with a complaint of burgundy stool found in diaper for the past 2 days. Patient is a poor historian so history was taken from chart review and NH nurse Irlanda. Patient has no prior episode. Patient has been on Aspirin and was stopped due to maroon coloured stool. He also has lost 20 lbs in one month and has poor appetite. Patient did not complains of any abdominal pain, nausea/vomiting, diarrhea or constipation. The stool was described as large amount of dark tarry stool. A stool guaiac test was positive. Nurse Irlanda denies any fever/chills, cough, chest pain, shortness of breath, or any urinary symptoms.       # LGIB : diverticular bleed vs AVM   - VS stable  - hgb: 7.1 on admission, baseline hgb: 9.4  - maroon coloured stool    Rec:  - 2 UPRBC  - Monitor CBC  - active type and screen  - keep hgb>8  - monitor electrolyte and correct accordingly  - clear liquid diet  - Golytely 4 L start at 2pm  - Dulcolax 20 mg po at 10 pm  - tap water enema at 10 pm and tomorrow morning  - NPO after midnight  - colonoscopy tomorrow

## 2020-12-23 NOTE — ED PROVIDER NOTE - PHYSICAL EXAMINATION
PHYSICAL EXAM:    GENERAL: Alert, appears stated age, well appearing, non-toxic  SKIN: Warm, pink and dry. MMM.   HEAD: NC, AT, no step offs   EYE: Normal lids/conjunctiva  ENT: Normal hearing, patent oropharynx  NECK: +supple. No meningismus  Pulm: Bilateral BS, normal resp effort, no wheezes, stridor, or retractions  CV: RRR, no M/R/G, 2+and = radial pulses  Abd: soft, non-tender, non-distended, no rebound/guarding.   RECTAL: anorectal area without hemorrhoids/other lesions. +burgundy stool/blood with clots in diaper.   Neuro: Alert cognitively impaired. moving all extremities.

## 2020-12-23 NOTE — H&P ADULT - NSICDXPASTMEDICALHX_GEN_ALL_CORE_FT
PAST MEDICAL HISTORY:  CHF (congestive heart failure)     COPD (chronic obstructive pulmonary disease)     Diabetes mellitus     Lymphedema of both lower extremities

## 2020-12-23 NOTE — H&P ADULT - ASSESSMENT
80 years old male from [facility] with PMHx of HF with unknown EF, Alzheimer's dementia, COPD not on home O2, T2DM on insulin, hx right nephrectomy and ESRD on retacrit, anemia of chronic disease, admitted for melena.    # Acute on chronic anemia secondary to UGIB vs LGIB  - Complicated with weight loss of 20lbs and poor appetite; Differentials are PUD vs AVM vs colon/gastric cancer; less likely diverticular bleed  - Had two BMs in ED with clots  - Hgb 7.7 on admission, baseline ~9; currently hemodynamically stable  - Start Protonix drip and hold aspirin  - Maintain 2 large bore IV, continue gentle IVF  - NPO for now  - Active type and screen, keep Hgb > 8; will give one unit of PRBCs  - GI consult    # Leukocytosis  - WBC 13.27, immature granulocyte 0.7%, afebrile  - CXR has no focal consolidation or opacities  - Will continue to trend; no need for abx for now  - Check UA and blood culture if patient becomes febrile    # COPD not on home O2      # T2DM      # ESRD with left solitary kidney not on HD  - Follow up with Dr. Saba      DVT ppx:  GI ppx:  Diet:  Activity:  Ambulatory status:  Lines:  Code status:  Dispo: 80 years old male from [facility] with PMHx of HF with unknown EF, Alzheimer's dementia, COPD not on home O2, T2DM on insulin, hx right nephrectomy and ESRD on retacrit, anemia of chronic disease, admitted for melena.    # Acute on chronic anemia secondary to UGIB vs LGIB  - Complicated with weight loss of 20lbs and poor appetite; Differentials are PUD vs AVM vs colon/gastric cancer; less likely diverticular bleed  - Had two BMs in ED with clots  - Hgb 7.7 on admission, baseline ~9; currently hemodynamically stable  - Start Protonix drip and hold aspirin  - Maintain 2 large bore IV, continue gentle IVF  - NPO for now  - Active type and screen, keep Hgb > 8; will give one unit of PRBCs  - GI consult    # Leukocytosis  - WBC 13.27, immature granulocyte 0.7%, afebrile  - CXR has no focal consolidation or opacities  - Will continue to trend; no need for abx for now  - Check UA and blood culture if patient becomes febrile    # Troponemia  - Possibly secondary to ESRD  - No chest pain, EKG NSR with incomplete RBBB  - Will trend 2 more sets of cardiac biomarkers  - No need for tele    # Asthma/COPD not on home O2  - CXR no consolidation, no wheezing on exam  - Continue albuterol, Symbicort (in lieu of Breo) and Singulair  - Keep O2 between 88 - 92%    # T2DM  - Continue basal insulin 19 units; hold bolus insulin as patient is NPO    # ESRD with left solitary 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 Saba      DVT ppx: SCDs  GI ppx: Protonix gtt  Diet: NPO for now  Activity: Ambulate as tolerated  Ambulatory status: Ambulate without assistance  Lines: Peripheral IVs  Code status: FULL CODE  Dispo: acute

## 2020-12-24 LAB
ALBUMIN SERPL ELPH-MCNC: 3.6 G/DL — SIGNIFICANT CHANGE UP (ref 3.5–5.2)
ALP SERPL-CCNC: 63 U/L — SIGNIFICANT CHANGE UP (ref 30–115)
ALT FLD-CCNC: 7 U/L — SIGNIFICANT CHANGE UP (ref 0–41)
ANION GAP SERPL CALC-SCNC: 16 MMOL/L — HIGH (ref 7–14)
AST SERPL-CCNC: 12 U/L — SIGNIFICANT CHANGE UP (ref 0–41)
BASOPHILS # BLD AUTO: 0.07 K/UL — SIGNIFICANT CHANGE UP (ref 0–0.2)
BASOPHILS # BLD AUTO: 0.08 K/UL — SIGNIFICANT CHANGE UP (ref 0–0.2)
BASOPHILS NFR BLD AUTO: 0.5 % — SIGNIFICANT CHANGE UP (ref 0–1)
BASOPHILS NFR BLD AUTO: 0.6 % — SIGNIFICANT CHANGE UP (ref 0–1)
BILIRUB SERPL-MCNC: 0.2 MG/DL — SIGNIFICANT CHANGE UP (ref 0.2–1.2)
BUN SERPL-MCNC: 107 MG/DL — CRITICAL HIGH (ref 10–20)
CALCIUM SERPL-MCNC: 8.6 MG/DL — SIGNIFICANT CHANGE UP (ref 8.5–10.1)
CHLORIDE SERPL-SCNC: 104 MMOL/L — SIGNIFICANT CHANGE UP (ref 98–110)
CO2 SERPL-SCNC: 18 MMOL/L — SIGNIFICANT CHANGE UP (ref 17–32)
CREAT SERPL-MCNC: 4.3 MG/DL — CRITICAL HIGH (ref 0.7–1.5)
EOSINOPHIL # BLD AUTO: 0.4 K/UL — SIGNIFICANT CHANGE UP (ref 0–0.7)
EOSINOPHIL # BLD AUTO: 0.89 K/UL — HIGH (ref 0–0.7)
EOSINOPHIL NFR BLD AUTO: 3.2 % — SIGNIFICANT CHANGE UP (ref 0–8)
EOSINOPHIL NFR BLD AUTO: 6.9 % — SIGNIFICANT CHANGE UP (ref 0–8)
GLUCOSE BLDC GLUCOMTR-MCNC: 106 MG/DL — HIGH (ref 70–99)
GLUCOSE BLDC GLUCOMTR-MCNC: 149 MG/DL — HIGH (ref 70–99)
GLUCOSE SERPL-MCNC: 95 MG/DL — SIGNIFICANT CHANGE UP (ref 70–99)
HCT VFR BLD CALC: 25.7 % — LOW (ref 42–52)
HCT VFR BLD CALC: 26 % — LOW (ref 42–52)
HCT VFR BLD CALC: 28.3 % — LOW (ref 42–52)
HGB BLD-MCNC: 8.3 G/DL — LOW (ref 14–18)
HGB BLD-MCNC: 8.4 G/DL — LOW (ref 14–18)
HGB BLD-MCNC: 9.1 G/DL — LOW (ref 14–18)
IMM GRANULOCYTES NFR BLD AUTO: 0.5 % — HIGH (ref 0.1–0.3)
IMM GRANULOCYTES NFR BLD AUTO: 0.5 % — HIGH (ref 0.1–0.3)
LYMPHOCYTES # BLD AUTO: 16.7 % — LOW (ref 20.5–51.1)
LYMPHOCYTES # BLD AUTO: 18.5 % — LOW (ref 20.5–51.1)
LYMPHOCYTES # BLD AUTO: 2.07 K/UL — SIGNIFICANT CHANGE UP (ref 1.2–3.4)
LYMPHOCYTES # BLD AUTO: 2.39 K/UL — SIGNIFICANT CHANGE UP (ref 1.2–3.4)
MAGNESIUM SERPL-MCNC: 2 MG/DL — SIGNIFICANT CHANGE UP (ref 1.8–2.4)
MCHC RBC-ENTMCNC: 27.5 PG — SIGNIFICANT CHANGE UP (ref 27–31)
MCHC RBC-ENTMCNC: 27.6 PG — SIGNIFICANT CHANGE UP (ref 27–31)
MCHC RBC-ENTMCNC: 27.7 PG — SIGNIFICANT CHANGE UP (ref 27–31)
MCHC RBC-ENTMCNC: 31.9 G/DL — LOW (ref 32–37)
MCHC RBC-ENTMCNC: 32.2 G/DL — SIGNIFICANT CHANGE UP (ref 32–37)
MCHC RBC-ENTMCNC: 32.7 G/DL — SIGNIFICANT CHANGE UP (ref 32–37)
MCV RBC AUTO: 84.3 FL — SIGNIFICANT CHANGE UP (ref 80–94)
MCV RBC AUTO: 86.3 FL — SIGNIFICANT CHANGE UP (ref 80–94)
MCV RBC AUTO: 86.4 FL — SIGNIFICANT CHANGE UP (ref 80–94)
MONOCYTES # BLD AUTO: 1.03 K/UL — HIGH (ref 0.1–0.6)
MONOCYTES # BLD AUTO: 1.11 K/UL — HIGH (ref 0.1–0.6)
MONOCYTES NFR BLD AUTO: 8.3 % — SIGNIFICANT CHANGE UP (ref 1.7–9.3)
MONOCYTES NFR BLD AUTO: 8.6 % — SIGNIFICANT CHANGE UP (ref 1.7–9.3)
NEUTROPHILS # BLD AUTO: 8.4 K/UL — HIGH (ref 1.4–6.5)
NEUTROPHILS # BLD AUTO: 8.72 K/UL — HIGH (ref 1.4–6.5)
NEUTROPHILS NFR BLD AUTO: 65 % — SIGNIFICANT CHANGE UP (ref 42.2–75.2)
NEUTROPHILS NFR BLD AUTO: 70.7 % — SIGNIFICANT CHANGE UP (ref 42.2–75.2)
NRBC # BLD: 0 /100 WBCS — SIGNIFICANT CHANGE UP (ref 0–0)
PHOSPHATE SERPL-MCNC: 4.7 MG/DL — SIGNIFICANT CHANGE UP (ref 2.1–4.9)
PLATELET # BLD AUTO: 273 K/UL — SIGNIFICANT CHANGE UP (ref 130–400)
PLATELET # BLD AUTO: 278 K/UL — SIGNIFICANT CHANGE UP (ref 130–400)
PLATELET # BLD AUTO: 302 K/UL — SIGNIFICANT CHANGE UP (ref 130–400)
POTASSIUM SERPL-MCNC: 4.5 MMOL/L — SIGNIFICANT CHANGE UP (ref 3.5–5)
POTASSIUM SERPL-SCNC: 4.5 MMOL/L — SIGNIFICANT CHANGE UP (ref 3.5–5)
PROT SERPL-MCNC: 6.5 G/DL — SIGNIFICANT CHANGE UP (ref 6–8)
RBC # BLD: 3.01 M/UL — LOW (ref 4.7–6.1)
RBC # BLD: 3.05 M/UL — LOW (ref 4.7–6.1)
RBC # BLD: 3.28 M/UL — LOW (ref 4.7–6.1)
RBC # FLD: 14.3 % — SIGNIFICANT CHANGE UP (ref 11.5–14.5)
RBC # FLD: 14.4 % — SIGNIFICANT CHANGE UP (ref 11.5–14.5)
RBC # FLD: 14.7 % — HIGH (ref 11.5–14.5)
SARS-COV-2 IGG SERPL QL IA: NEGATIVE — SIGNIFICANT CHANGE UP
SARS-COV-2 IGM SERPL IA-ACNC: <0.1 INDEX — SIGNIFICANT CHANGE UP
SODIUM SERPL-SCNC: 138 MMOL/L — SIGNIFICANT CHANGE UP (ref 135–146)
TROPONIN T SERPL-MCNC: 0.07 NG/ML — CRITICAL HIGH
WBC # BLD: 12.36 K/UL — HIGH (ref 4.8–10.8)
WBC # BLD: 12.93 K/UL — HIGH (ref 4.8–10.8)
WBC # BLD: 13.94 K/UL — HIGH (ref 4.8–10.8)
WBC # FLD AUTO: 12.36 K/UL — HIGH (ref 4.8–10.8)
WBC # FLD AUTO: 12.93 K/UL — HIGH (ref 4.8–10.8)
WBC # FLD AUTO: 13.94 K/UL — HIGH (ref 4.8–10.8)

## 2020-12-24 PROCEDURE — 99233 SBSQ HOSP IP/OBS HIGH 50: CPT

## 2020-12-24 PROCEDURE — 99232 SBSQ HOSP IP/OBS MODERATE 35: CPT

## 2020-12-24 RX ADMIN — Medication 5 MILLIGRAM(S): at 05:41

## 2020-12-24 RX ADMIN — Medication 650 MILLIGRAM(S): at 05:41

## 2020-12-24 RX ADMIN — Medication 500 MILLIGRAM(S): at 11:46

## 2020-12-24 RX ADMIN — PANTOPRAZOLE SODIUM 10 MG/HR: 20 TABLET, DELAYED RELEASE ORAL at 07:36

## 2020-12-24 RX ADMIN — DONEPEZIL HYDROCHLORIDE 5 MILLIGRAM(S): 10 TABLET, FILM COATED ORAL at 22:05

## 2020-12-24 RX ADMIN — INSULIN GLARGINE 19 UNIT(S): 100 INJECTION, SOLUTION SUBCUTANEOUS at 22:05

## 2020-12-24 RX ADMIN — Medication 50 MILLIGRAM(S): at 17:01

## 2020-12-24 RX ADMIN — Medication 650 MILLIGRAM(S): at 17:01

## 2020-12-24 RX ADMIN — PANTOPRAZOLE SODIUM 10 MG/HR: 20 TABLET, DELAYED RELEASE ORAL at 20:06

## 2020-12-24 RX ADMIN — NYSTATIN CREAM 1 APPLICATION(S): 100000 CREAM TOPICAL at 17:02

## 2020-12-24 RX ADMIN — Medication 50 MILLIGRAM(S): at 05:41

## 2020-12-24 RX ADMIN — MONTELUKAST 10 MILLIGRAM(S): 4 TABLET, CHEWABLE ORAL at 11:46

## 2020-12-24 RX ADMIN — Medication 3 MILLIGRAM(S): at 22:05

## 2020-12-24 RX ADMIN — BUDESONIDE AND FORMOTEROL FUMARATE DIHYDRATE 2 PUFF(S): 160; 4.5 AEROSOL RESPIRATORY (INHALATION) at 08:29

## 2020-12-24 NOTE — PROGRESS NOTE ADULT - ATTENDING COMMENTS
80M with hisotry of CHF, Alzheimer's, COPD, DM, and ESRD not on HD who was sent from [facility] for rectal bleeding.    # Lower GI Bleed   - ASA stopped.   -Hgb o/a 7.1 (baseline 8.4), transfused 2U per GI.  Now Hgb 8.4  Had 2 bloody BMs today (12/24).  Patient agreed for colonscopy when asked this morning, saying - "You are the Doctor. If that's what you think is best"  Family is also on board if it comes to colonscopy.  But Patient refused prep overnight and hence colonoscopy deferred from today.   GI is ok with monitoring Hemoglobin closely for now. Monitor Hb Q12. Goal >7.  If has significant bleeding can reach out to GI over long weekend for an urgent scope.  Clear liquid diet for now?    # DM   - Lantus, SSI    #ESRD (not on HD):  Nephro on board.   Currently stable around 107/4.3    # Alzheimer's   - Continue home donepezil   - Home melatonin    # COPD   - Continue home albuterol, montelukast    #Leukocytosis:  unclear etiology. monitor.    GI PPx: Protonix  VTE PPx: held for bleeding  Diet: NPO - CLD?    Dispo: Acute

## 2020-12-24 NOTE — PROGRESS NOTE ADULT - ASSESSMENT
80 years old male from [facility] with PMHx of HF with unknown EF, HTN, anxiety, Alzheimer's dementia, COPD not on home O2, T2DM on insulin, hx right nephrectomy and ESRD not on HD, anemia of chronic disease, brought in by EMS with a complaint of burgundy stool found in diaper for the past 2 days. Patient is a poor historian so history was taken from chart review and NH nurse Irlanda. Patient has no prior episode. Patient has been on Aspirin and was stopped due to maroon coloured stool. He also has lost 20 lbs in one month and has poor appetite. Patient did not complains of any abdominal pain, nausea/vomiting, diarrhea or constipation. The stool was described as large amount of dark tarry stool. A stool guaiac test was positive. Nurse Irlanda denies any fever/chills, cough, chest pain, shortness of breath, or any urinary symptoms.       # LGIB : diverticular bleed vs AVM   - VS stable  - hgb: 7.1 on admission, baseline hgb: 9.4  - hgb 8.4 today after 2UPRBC  -No BM overnight as per staff  -Patient refused to have the colonoscopy prep, patient also is not cooperative with exam, no BM since last night      Rec:  - Monitor CBC  - active type and screen  - keep hgb>8  - monitor electrolyte and correct accordingly  - Conservative management, as patient is not agreeable with endoscopic procedure

## 2020-12-24 NOTE — PROGRESS NOTE ADULT - SUBJECTIVE AND OBJECTIVE BOX
Gastroenterology progress note:     Patient is a 80y old  Male who presents with a chief complaint of blood in stool (23 Dec 2020 12:11)       Admitted on: 12-23-20    We are following the patient for: LGIB     Interval History:  Patient refused to have the colonoscopy prep, patient also is not cooperative with exam, no BM since last night        PAST MEDICAL & SURGICAL HISTORY:  CHF (congestive heart failure)    Lymphedema of both lower extremities    COPD (chronic obstructive pulmonary disease)    Diabetes mellitus    H/O right nephrectomy  20 yrs ago        MEDICATIONS  (STANDING):  ALBUTerol    90 MICROgram(s) HFA Inhaler 2 Puff(s) Inhalation every 6 hours  ascorbic acid 500 milliGRAM(s) Oral daily  budesonide 160 MICROgram(s)/formoterol 4.5 MICROgram(s) Inhaler 2 Puff(s) Inhalation two times a day  busPIRone 5 milliGRAM(s) Oral <User Schedule>  chlorhexidine 4% Liquid 1 Application(s) Topical <User Schedule>  donepezil 5 milliGRAM(s) Oral at bedtime  hydrALAZINE 50 milliGRAM(s) Oral every 12 hours  insulin glargine Injectable (LANTUS) 19 Unit(s) SubCutaneous at bedtime  melatonin 3 milliGRAM(s) Oral at bedtime  montelukast 10 milliGRAM(s) Oral daily  nystatin Cream 1 Application(s) Topical two times a day  pantoprazole Infusion 8 mG/Hr (10 mL/Hr) IV Continuous <Continuous>  sodium bicarbonate 650 milliGRAM(s) Oral every 12 hours    MEDICATIONS  (PRN):      Allergies  No Known Allergies      Review of Systems:   Constitutional: Ne Fever, No chills, No weakness  ENT: No visual changes, No throat pain  Cardiovascular:  No Chest Pain, No Palpitations  Respiratory:  No Cough, No Dyspnea  Gastrointestinal:  As described in HPI  Neurological: No numbness or weakness  Skin: No rash, no itching    Physical Examination:  T(C): 36.1 (12-24-20 @ 09:00), Max: 36.1 (12-23-20 @ 12:32)  HR: 97 (12-24-20 @ 09:00) (82 - 97)  BP: 132/60 (12-24-20 @ 09:00) (129/65 - 167/76)  RR: 18 (12-24-20 @ 09:00) (18 - 20)  SpO2: 98% (12-23-20 @ 17:30) (97% - 98%)  Weight (kg): 75.2 (12-23-20 @ 12:32)    12-23-20 @ 07:01  -  12-24-20 @ 07:00  --------------------------------------------------------  IN: 0 mL / OUT: 250 mL / NET: -250 mL      Constitutional: No acute distress.  Respiratory:  No signs of respiratory distress. Lung sounds are clear bilaterally.  Cardiovascular:  S1 S2, Regular rate and rhythm.  Abdominal: Abdomen is soft, symmetric, and non-tender without distention. There are no visible lesions. Bowel sounds are present and normoactive in all four quadrants. No masses, hepatomegaly, or splenomegaly are noted.   Skin: No rashes, No Jaundice.  Neurology: Non-focal  Skin: No rash, No excoriation  Vascular: No varicose vein, No cyanosis, No edema        Data: (reviewed by attending)                        8.4    12.36 )-----------( 278      ( 24 Dec 2020 04:30 )             25.7     Hgb trend:  8.4  12-24-20 @ 04:30  9.1  12-23-20 @ 22:21  7.1  12-23-20 @ 04:55  7.7  12-23-20 @ 02:00        12-24    138  |  104  |  107<HH>  ----------------------------<  95  4.5   |  18  |  4.3<HH>    Ca    8.6      24 Dec 2020 04:30  Phos  4.7     12-24  Mg     2.0     12-24    TPro  6.5  /  Alb  3.6  /  TBili  0.2  /  DBili  x   /  AST  12  /  ALT  7   /  AlkPhos  63  12-24    Liver panel trend:  TBili 0.2   /   AST 12   /   ALT 7   /   AlkP 63   /   Tptn 6.5   /   Alb 3.6    /   DBili --      12-24  TBili <0.2   /   AST 13   /   ALT 7   /   AlkP 62   /   Tptn 7.4   /   Alb 3.9    /   DBili <0.2      12-23      PT/INR - ( 23 Dec 2020 02:00 )   PT: 13.00 sec;   INR: 1.13 ratio         PTT - ( 23 Dec 2020 02:00 )  PTT:26.7 sec       Radiology: (reviewed by attending)

## 2020-12-24 NOTE — PROGRESS NOTE ADULT - SUBJECTIVE AND OBJECTIVE BOX
[facility] NEPHROLOGY FOLLOW UP NOTE  --------------------------------------------------------------------------------  24 hour events/subjective: Patient examined. Appears comfortable.    PAST HISTORY  --------------------------------------------------------------------------------  No significant changes to PMH, PSH, FHx, SHx, unless otherwise noted    ALLERGIES & MEDICATIONS  --------------------------------------------------------------------------------  Allergies    No Known Allergies    Intolerances      Standing Inpatient Medications  ALBUTerol    90 MICROgram(s) HFA Inhaler 2 Puff(s) Inhalation every 6 hours  ascorbic acid 500 milliGRAM(s) Oral daily  budesonide 160 MICROgram(s)/formoterol 4.5 MICROgram(s) Inhaler 2 Puff(s) Inhalation two times a day  busPIRone 5 milliGRAM(s) Oral <User Schedule>  chlorhexidine 4% Liquid 1 Application(s) Topical <User Schedule>  donepezil 5 milliGRAM(s) Oral at bedtime  hydrALAZINE 50 milliGRAM(s) Oral every 12 hours  insulin glargine Injectable (LANTUS) 19 Unit(s) SubCutaneous at bedtime  melatonin 3 milliGRAM(s) Oral at bedtime  montelukast 10 milliGRAM(s) Oral daily  nystatin Cream 1 Application(s) Topical two times a day  pantoprazole Infusion 8 mG/Hr IV Continuous <Continuous>  sodium bicarbonate 650 milliGRAM(s) Oral every 12 hours    PRN Inpatient Medications      VITALS/PHYSICAL EXAM  --------------------------------------------------------------------------------  T(C): 36.1 (12-24-20 @ 09:00), Max: 36.1 (12-23-20 @ 12:32)  HR: 97 (12-24-20 @ 09:00) (82 - 97)  BP: 132/60 (12-24-20 @ 09:00) (129/65 - 167/76)  RR: 18 (12-24-20 @ 09:00) (18 - 20)  SpO2: 98% (12-23-20 @ 17:30) (97% - 98%)  Wt(kg): --  Height (cm): 172.7 (12-22-20 @ 23:56)  Weight (kg): 75.2 (12-23-20 @ 12:32)  BMI (kg/m2): 25.2 (12-23-20 @ 12:32)  BSA (m2): 1.89 (12-23-20 @ 12:32)      12-23-20 @ 07:01  -  12-24-20 @ 07:00  --------------------------------------------------------  IN: 0 mL / OUT: 250 mL / NET: -250 mL    12-24-20 @ 07:01  -  12-24-20 @ 11:07  --------------------------------------------------------  IN: 0 mL / OUT: 200 mL / NET: -200 mL      Physical Exam:  	Gen: NAD  	Pulm: CTA B/L  	CV: RRR, S1S2  	Abd: +BS, soft, nontender/nondistended  	: No suprapubic tenderness  	LE: Warm, trace edema    LABS/STUDIES  --------------------------------------------------------------------------------              8.4    12.36 >-----------<  278      [12-24-20 @ 04:30]              25.7     138  |  104  |  107  ----------------------------<  95      [12-24-20 @ 04:30]  4.5   |  18  |  4.3        Ca     8.6     [12-24-20 @ 04:30]      Mg     2.0     [12-24-20 @ 04:30]      Phos  4.7     [12-24-20 @ 04:30]    TPro  6.5  /  Alb  3.6  /  TBili  0.2  /  DBili  x   /  AST  12  /  ALT  7   /  AlkPhos  63  [12-24-20 @ 04:30]    PT/INR: PT 13.00, INR 1.13       [12-23-20 @ 02:00]  PTT: 26.7       [12-23-20 @ 02:00]    Troponin 0.07      [12-23-20 @ 22:21]    Creatinine Trend:  SCr 4.3 [12-24 @ 04:30]  SCr 4.4 [12-23 @ 02:00]

## 2020-12-24 NOTE — PROGRESS NOTE ADULT - ASSESSMENT
1. Mild ASHLEE in setting of GI bleed, likely pre-renal azotemia  2. CKD V non-dialysis dependent, baseline creatinine 3.3-4  3. GI bleed  4. HTN    Plan:    No urgent indication for renal replacement therapy  Colonoscopy cancelled because patient refused prep  Transfuse as needed  Can give IV Lasix if evidence of volume overload with transfusion  Daily BMP  No NSAIDS  No IV contrast

## 2020-12-24 NOTE — PROGRESS NOTE ADULT - SUBJECTIVE AND OBJECTIVE BOX
SUBJECTIVE:    Patient is a 80y old  Male who presents with a chief complaint of blood in stool (24 Dec 2020 09:10)    Currently admitted to medicine with the primary diagnosis of GI bleed    Today is hospital day 1d. This morning he is resting comfortably in bed and reports no new issues or overnight events.     PAST MEDICAL & SURGICAL HISTORY  PAST MEDICAL & SURGICAL HISTORY:  CHF (congestive heart failure)    Lymphedema of both lower extremities    COPD (chronic obstructive pulmonary disease)    Diabetes mellitus    H/O right nephrectomy  20 yrs ago      SOCIAL HISTORY:    ALLERGIES:  No Known Allergies    MEDICATIONS:  STANDING MEDICATIONS  ALBUTerol    90 MICROgram(s) HFA Inhaler 2 Puff(s) Inhalation every 6 hours  ascorbic acid 500 milliGRAM(s) Oral daily  budesonide 160 MICROgram(s)/formoterol 4.5 MICROgram(s) Inhaler 2 Puff(s) Inhalation two times a day  busPIRone 5 milliGRAM(s) Oral <User Schedule>  chlorhexidine 4% Liquid 1 Application(s) Topical <User Schedule>  donepezil 5 milliGRAM(s) Oral at bedtime  hydrALAZINE 50 milliGRAM(s) Oral every 12 hours  insulin glargine Injectable (LANTUS) 19 Unit(s) SubCutaneous at bedtime  melatonin 3 milliGRAM(s) Oral at bedtime  montelukast 10 milliGRAM(s) Oral daily  nystatin Cream 1 Application(s) Topical two times a day  pantoprazole Infusion 8 mG/Hr IV Continuous <Continuous>  sodium bicarbonate 650 milliGRAM(s) Oral every 12 hours    PRN MEDICATIONS    VITALS:   T(F): 97  HR: 97  BP: 132/60  RR: 18  SpO2: 98%    LABS:                        8.4    12.36 )-----------( 278      ( 24 Dec 2020 04:30 )             25.7     12-24    138  |  104  |  107<HH>  ----------------------------<  95  4.5   |  18  |  4.3<HH>    Ca    8.6      24 Dec 2020 04:30  Phos  4.7     12-24  Mg     2.0     12-24    TPro  6.5  /  Alb  3.6  /  TBili  0.2  /  DBili  x   /  AST  12  /  ALT  7   /  AlkPhos  63  12-24    PT/INR - ( 23 Dec 2020 02:00 )   PT: 13.00 sec;   INR: 1.13 ratio         PTT - ( 23 Dec 2020 02:00 )  PTT:26.7 sec      Troponin T, Serum: 0.07 ng/mL <HH> (12-23-20 @ 22:21)  Troponin T, Serum: 0.06 ng/mL <HH> (12-23-20 @ 17:39)      CARDIAC MARKERS ( 23 Dec 2020 22:21 )  x     / 0.07 ng/mL / x     / x     / x      CARDIAC MARKERS ( 23 Dec 2020 17:39 )  x     / 0.06 ng/mL / x     / x     / x      CARDIAC MARKERS ( 23 Dec 2020 02:00 )  x     / 0.07 ng/mL / x     / x     / x          RADIOLOGY:    PHYSICAL EXAM:  GEN: Sitting comfortably in bed. Smiling, in good spirits. No acute distress.  HEENT: Atraumatic, PERRL  LUNGS: Breathing comfortably on room air  HEART: Normal rate, regular rhythm  ABD: Soft, non-tender, non-distended. Bowel sounds present  EXT: Warm and well-perfused  NEURO: AAOX3    ASSESSMENT AND PLAN:  80M with hisotry of CHF, Alzheimer's, COPD, DM, and ESRD not on HD who was [facility].    # GI Bleed - ASA stopped. No fever or mental alteration from baseline. Hgb 7.1 (baseline 8.4), transfused 2U per GI.   - Patient refused prep overnight and colonoscopy postponed.   - Trend CBC.    # Troponinemia - Likely components of demand ischemia from anemia and decreased clearance in setting of ESRD. No acute signs/symptoms.    # DM   - Lantus    # Alzheimer's   - Continue home donepezil   - Home melatonin    # COPD   - Continue home albuterol, montelukast SUBJECTIVE:    Patient is a 80y old  Male who presents with a chief complaint of blood in stool (24 Dec 2020 09:10)    Currently admitted to medicine with the primary diagnosis of GI bleed    Today is hospital day 1d. This morning he is resting comfortably in bed and reports no new issues or overnight events.     PAST MEDICAL & SURGICAL HISTORY  PAST MEDICAL & SURGICAL HISTORY:  CHF (congestive heart failure)    Lymphedema of both lower extremities    COPD (chronic obstructive pulmonary disease)    Diabetes mellitus    H/O right nephrectomy  20 yrs ago      SOCIAL HISTORY:    ALLERGIES:  No Known Allergies    MEDICATIONS:  STANDING MEDICATIONS  ALBUTerol    90 MICROgram(s) HFA Inhaler 2 Puff(s) Inhalation every 6 hours  ascorbic acid 500 milliGRAM(s) Oral daily  budesonide 160 MICROgram(s)/formoterol 4.5 MICROgram(s) Inhaler 2 Puff(s) Inhalation two times a day  busPIRone 5 milliGRAM(s) Oral <User Schedule>  chlorhexidine 4% Liquid 1 Application(s) Topical <User Schedule>  donepezil 5 milliGRAM(s) Oral at bedtime  hydrALAZINE 50 milliGRAM(s) Oral every 12 hours  insulin glargine Injectable (LANTUS) 19 Unit(s) SubCutaneous at bedtime  melatonin 3 milliGRAM(s) Oral at bedtime  montelukast 10 milliGRAM(s) Oral daily  nystatin Cream 1 Application(s) Topical two times a day  pantoprazole Infusion 8 mG/Hr IV Continuous <Continuous>  sodium bicarbonate 650 milliGRAM(s) Oral every 12 hours    PRN MEDICATIONS    VITALS:   T(F): 97  HR: 97  BP: 132/60  RR: 18  SpO2: 98%    LABS:                        8.4    12.36 )-----------( 278      ( 24 Dec 2020 04:30 )             25.7     12-24    138  |  104  |  107<HH>  ----------------------------<  95  4.5   |  18  |  4.3<HH>    Ca    8.6      24 Dec 2020 04:30  Phos  4.7     12-24  Mg     2.0     12-24    TPro  6.5  /  Alb  3.6  /  TBili  0.2  /  DBili  x   /  AST  12  /  ALT  7   /  AlkPhos  63  12-24    PT/INR - ( 23 Dec 2020 02:00 )   PT: 13.00 sec;   INR: 1.13 ratio         PTT - ( 23 Dec 2020 02:00 )  PTT:26.7 sec      Troponin T, Serum: 0.07 ng/mL <HH> (12-23-20 @ 22:21)  Troponin T, Serum: 0.06 ng/mL <HH> (12-23-20 @ 17:39)      CARDIAC MARKERS ( 23 Dec 2020 22:21 )  x     / 0.07 ng/mL / x     / x     / x      CARDIAC MARKERS ( 23 Dec 2020 17:39 )  x     / 0.06 ng/mL / x     / x     / x      CARDIAC MARKERS ( 23 Dec 2020 02:00 )  x     / 0.07 ng/mL / x     / x     / x          RADIOLOGY:    PHYSICAL EXAM:  GEN: Sitting comfortably in bed. Smiling, in good spirits. No acute distress.  HEENT: Atraumatic, PERRL  LUNGS: Breathing comfortably on room air  HEART: Normal rate, regular rhythm  ABD: Soft, non-tender, non-distended. Bowel sounds present  EXT: Warm and well-perfused  NEURO: AAOX3    ASSESSMENT AND PLAN:  80M with hisotry of CHF, Alzheimer's, COPD, DM, and ESRD not on HD who was sent from [facility] for rectal bleeding.    # GI Bleed - ASA stopped. No fever or mental alteration from baseline. Hgb 7.1 (baseline 8.4), transfused 2U per GI.   - Patient refused prep overnight and colonoscopy postponed. Had another bloody bowel movement this morning. JAMAAL by GI negative. Plan to follow up PM CBC, CLD if stable. Plan to be discussed with family.   - Holding ASA.    # Troponinemia - Likely components of demand ischemia from anemia and decreased clearance in setting of ESRD. No acute signs/symptoms.    # DM   - Lantus, SSI    # Alzheimer's   - Continue home donepezil   - Home melatonin    # COPD   - Continue home albuterol, montelukast    GI PPx: Protonix  VTE PPx: held for bleeding  Diet: NPO    Dispo: Acute   SUBJECTIVE:    Patient is a 80y old  Male who presents with a chief complaint of blood in stool (24 Dec 2020 09:10)    Currently admitted to medicine with the primary diagnosis of GI bleed    Today is hospital day 1d. This morning he is resting comfortably in bed and reports no new issues or overnight events.     PAST MEDICAL & SURGICAL HISTORY  PAST MEDICAL & SURGICAL HISTORY:  CHF (congestive heart failure)    Lymphedema of both lower extremities    COPD (chronic obstructive pulmonary disease)    Diabetes mellitus    H/O right nephrectomy  20 yrs ago      SOCIAL HISTORY:    ALLERGIES:  No Known Allergies    MEDICATIONS:  STANDING MEDICATIONS  ALBUTerol    90 MICROgram(s) HFA Inhaler 2 Puff(s) Inhalation every 6 hours  ascorbic acid 500 milliGRAM(s) Oral daily  budesonide 160 MICROgram(s)/formoterol 4.5 MICROgram(s) Inhaler 2 Puff(s) Inhalation two times a day  busPIRone 5 milliGRAM(s) Oral <User Schedule>  chlorhexidine 4% Liquid 1 Application(s) Topical <User Schedule>  donepezil 5 milliGRAM(s) Oral at bedtime  hydrALAZINE 50 milliGRAM(s) Oral every 12 hours  insulin glargine Injectable (LANTUS) 19 Unit(s) SubCutaneous at bedtime  melatonin 3 milliGRAM(s) Oral at bedtime  montelukast 10 milliGRAM(s) Oral daily  nystatin Cream 1 Application(s) Topical two times a day  pantoprazole Infusion 8 mG/Hr IV Continuous <Continuous>  sodium bicarbonate 650 milliGRAM(s) Oral every 12 hours    PRN MEDICATIONS    VITALS:   T(F): 97  HR: 97  BP: 132/60  RR: 18  SpO2: 98%    LABS:                        8.4    12.36 )-----------( 278      ( 24 Dec 2020 04:30 )             25.7     12-24    138  |  104  |  107<HH>  ----------------------------<  95  4.5   |  18  |  4.3<HH>    Ca    8.6      24 Dec 2020 04:30  Phos  4.7     12-24  Mg     2.0     12-24    TPro  6.5  /  Alb  3.6  /  TBili  0.2  /  DBili  x   /  AST  12  /  ALT  7   /  AlkPhos  63  12-24    PT/INR - ( 23 Dec 2020 02:00 )   PT: 13.00 sec;   INR: 1.13 ratio         PTT - ( 23 Dec 2020 02:00 )  PTT:26.7 sec      Troponin T, Serum: 0.07 ng/mL <HH> (12-23-20 @ 22:21)  Troponin T, Serum: 0.06 ng/mL <HH> (12-23-20 @ 17:39)      CARDIAC MARKERS ( 23 Dec 2020 22:21 )  x     / 0.07 ng/mL / x     / x     / x      CARDIAC MARKERS ( 23 Dec 2020 17:39 )  x     / 0.06 ng/mL / x     / x     / x      CARDIAC MARKERS ( 23 Dec 2020 02:00 )  x     / 0.07 ng/mL / x     / x     / x          RADIOLOGY:    PHYSICAL EXAM:  GEN: Sitting comfortably in bed. Smiling, in good spirits. No acute distress.  HEENT: Atraumatic, PERRL  LUNGS: Breathing comfortably on room air  HEART: Normal rate, regular rhythm  ABD: Soft, non-tender, non-distended. Bowel sounds present  EXT: Warm and well-perfused  NEURO: AAOX3    ASSESSMENT AND PLAN:  80M with hisotry of CHF, Alzheimer's, COPD, DM, and ESRD not on HD who was sent from [facility] for rectal bleeding.    # GI Bleed - ASA stopped. No fever or mental alteration from baseline. Hgb 7.1 (baseline 8.4), transfused 2U per GI.   - Patient refused prep overnight and colonoscopy postponed. Had another bloody bowel movement this morning. JAMAAL by GI negative. Plan to follow up PM CBC, CLD if stable. Plan discussed with daughter and healthcare proxy Aron Nguyen and she is amenable.   - Holding ASA.    # Troponinemia - Likely components of demand ischemia from anemia and decreased clearance in setting of ESRD. No acute signs/symptoms.    # DM   - Lantus, SSI    # Alzheimer's   - Continue home donepezil   - Home melatonin    # COPD   - Continue home albuterol, montelukast    GI PPx: Protonix  VTE PPx: held for bleeding  Diet: NPO    Dispo: Acute

## 2020-12-25 LAB
ALBUMIN SERPL ELPH-MCNC: 3.6 G/DL — SIGNIFICANT CHANGE UP (ref 3.5–5.2)
ALP SERPL-CCNC: 63 U/L — SIGNIFICANT CHANGE UP (ref 30–115)
ALT FLD-CCNC: 7 U/L — SIGNIFICANT CHANGE UP (ref 0–41)
ANION GAP SERPL CALC-SCNC: 16 MMOL/L — HIGH (ref 7–14)
AST SERPL-CCNC: 13 U/L — SIGNIFICANT CHANGE UP (ref 0–41)
BASOPHILS # BLD AUTO: 0.06 K/UL — SIGNIFICANT CHANGE UP (ref 0–0.2)
BASOPHILS NFR BLD AUTO: 0.4 % — SIGNIFICANT CHANGE UP (ref 0–1)
BILIRUB SERPL-MCNC: 0.2 MG/DL — SIGNIFICANT CHANGE UP (ref 0.2–1.2)
BUN SERPL-MCNC: 99 MG/DL — CRITICAL HIGH (ref 10–20)
CALCIUM SERPL-MCNC: 8.5 MG/DL — SIGNIFICANT CHANGE UP (ref 8.5–10.1)
CALCIUM SERPL-MCNC: 8.9 MG/DL — SIGNIFICANT CHANGE UP (ref 8.4–10.5)
CHLORIDE SERPL-SCNC: 107 MMOL/L — SIGNIFICANT CHANGE UP (ref 98–110)
CO2 SERPL-SCNC: 18 MMOL/L — SIGNIFICANT CHANGE UP (ref 17–32)
CREAT SERPL-MCNC: 4.1 MG/DL — CRITICAL HIGH (ref 0.7–1.5)
EOSINOPHIL # BLD AUTO: 0.65 K/UL — SIGNIFICANT CHANGE UP (ref 0–0.7)
EOSINOPHIL NFR BLD AUTO: 4.6 % — SIGNIFICANT CHANGE UP (ref 0–8)
GLUCOSE BLDC GLUCOMTR-MCNC: 130 MG/DL — HIGH (ref 70–99)
GLUCOSE BLDC GLUCOMTR-MCNC: 148 MG/DL — HIGH (ref 70–99)
GLUCOSE BLDC GLUCOMTR-MCNC: 81 MG/DL — SIGNIFICANT CHANGE UP (ref 70–99)
GLUCOSE SERPL-MCNC: 65 MG/DL — LOW (ref 70–99)
HCT VFR BLD CALC: 24.1 % — LOW (ref 42–52)
HGB BLD-MCNC: 7.9 G/DL — LOW (ref 14–18)
IMM GRANULOCYTES NFR BLD AUTO: 0.5 % — HIGH (ref 0.1–0.3)
LYMPHOCYTES # BLD AUTO: 17 % — LOW (ref 20.5–51.1)
LYMPHOCYTES # BLD AUTO: 2.41 K/UL — SIGNIFICANT CHANGE UP (ref 1.2–3.4)
MAGNESIUM SERPL-MCNC: 2 MG/DL — SIGNIFICANT CHANGE UP (ref 1.8–2.4)
MCHC RBC-ENTMCNC: 28.3 PG — SIGNIFICANT CHANGE UP (ref 27–31)
MCHC RBC-ENTMCNC: 32.8 G/DL — SIGNIFICANT CHANGE UP (ref 32–37)
MCV RBC AUTO: 86.4 FL — SIGNIFICANT CHANGE UP (ref 80–94)
MONOCYTES # BLD AUTO: 1.52 K/UL — HIGH (ref 0.1–0.6)
MONOCYTES NFR BLD AUTO: 10.7 % — HIGH (ref 1.7–9.3)
NEUTROPHILS # BLD AUTO: 9.45 K/UL — HIGH (ref 1.4–6.5)
NEUTROPHILS NFR BLD AUTO: 66.8 % — SIGNIFICANT CHANGE UP (ref 42.2–75.2)
NRBC # BLD: 0 /100 WBCS — SIGNIFICANT CHANGE UP (ref 0–0)
PLATELET # BLD AUTO: 293 K/UL — SIGNIFICANT CHANGE UP (ref 130–400)
POTASSIUM SERPL-MCNC: 4.5 MMOL/L — SIGNIFICANT CHANGE UP (ref 3.5–5)
POTASSIUM SERPL-SCNC: 4.5 MMOL/L — SIGNIFICANT CHANGE UP (ref 3.5–5)
PROT SERPL-MCNC: 6.4 G/DL — SIGNIFICANT CHANGE UP (ref 6–8)
PTH-INTACT FLD-MCNC: 54 PG/ML — SIGNIFICANT CHANGE UP (ref 15–65)
RBC # BLD: 2.79 M/UL — LOW (ref 4.7–6.1)
RBC # FLD: 14.6 % — HIGH (ref 11.5–14.5)
SODIUM SERPL-SCNC: 141 MMOL/L — SIGNIFICANT CHANGE UP (ref 135–146)
WBC # BLD: 14.16 K/UL — HIGH (ref 4.8–10.8)
WBC # FLD AUTO: 14.16 K/UL — HIGH (ref 4.8–10.8)

## 2020-12-25 PROCEDURE — 99233 SBSQ HOSP IP/OBS HIGH 50: CPT

## 2020-12-25 RX ORDER — INSULIN GLARGINE 100 [IU]/ML
13 INJECTION, SOLUTION SUBCUTANEOUS AT BEDTIME
Refills: 0 | Status: DISCONTINUED | OUTPATIENT
Start: 2020-12-25 | End: 2021-01-03

## 2020-12-25 RX ORDER — ALBUTEROL 90 UG/1
1 AEROSOL, METERED ORAL EVERY 8 HOURS
Refills: 0 | Status: DISCONTINUED | OUTPATIENT
Start: 2020-12-25 | End: 2021-01-10

## 2020-12-25 RX ADMIN — NYSTATIN CREAM 1 APPLICATION(S): 100000 CREAM TOPICAL at 17:10

## 2020-12-25 RX ADMIN — Medication 3 MILLIGRAM(S): at 21:10

## 2020-12-25 RX ADMIN — Medication 650 MILLIGRAM(S): at 17:10

## 2020-12-25 RX ADMIN — NYSTATIN CREAM 1 APPLICATION(S): 100000 CREAM TOPICAL at 05:02

## 2020-12-25 RX ADMIN — DONEPEZIL HYDROCHLORIDE 5 MILLIGRAM(S): 10 TABLET, FILM COATED ORAL at 21:10

## 2020-12-25 RX ADMIN — PANTOPRAZOLE SODIUM 10 MG/HR: 20 TABLET, DELAYED RELEASE ORAL at 21:09

## 2020-12-25 RX ADMIN — Medication 50 MILLIGRAM(S): at 17:10

## 2020-12-25 RX ADMIN — Medication 5 MILLIGRAM(S): at 05:02

## 2020-12-25 RX ADMIN — INSULIN GLARGINE 13 UNIT(S): 100 INJECTION, SOLUTION SUBCUTANEOUS at 21:16

## 2020-12-25 RX ADMIN — BUDESONIDE AND FORMOTEROL FUMARATE DIHYDRATE 2 PUFF(S): 160; 4.5 AEROSOL RESPIRATORY (INHALATION) at 21:10

## 2020-12-25 RX ADMIN — Medication 650 MILLIGRAM(S): at 05:02

## 2020-12-25 RX ADMIN — BUDESONIDE AND FORMOTEROL FUMARATE DIHYDRATE 2 PUFF(S): 160; 4.5 AEROSOL RESPIRATORY (INHALATION) at 07:30

## 2020-12-25 RX ADMIN — PANTOPRAZOLE SODIUM 10 MG/HR: 20 TABLET, DELAYED RELEASE ORAL at 11:53

## 2020-12-25 RX ADMIN — MONTELUKAST 10 MILLIGRAM(S): 4 TABLET, CHEWABLE ORAL at 11:35

## 2020-12-25 RX ADMIN — CHLORHEXIDINE GLUCONATE 1 APPLICATION(S): 213 SOLUTION TOPICAL at 05:02

## 2020-12-25 RX ADMIN — Medication 50 MILLIGRAM(S): at 05:02

## 2020-12-25 RX ADMIN — ALBUTEROL 2 PUFF(S): 90 AEROSOL, METERED ORAL at 08:51

## 2020-12-25 RX ADMIN — Medication 500 MILLIGRAM(S): at 11:34

## 2020-12-25 NOTE — PROGRESS NOTE ADULT - ASSESSMENT
CKD STage 5 - patient clinically appears in good volume state today  GI bleed -   IDDM - low BS   hard of hearing  COPD      plan  no need for HMD at present  monitor K/CO2/bun/creat and volumestate  needs colonoscopy/EGD  may need less insulin - patient not eating much - monitor on decreased insulin dose

## 2020-12-25 NOTE — DIETITIAN INITIAL EVALUATION ADULT. - ORAL INTAKE PTA/DIET HISTORY
Unable to obtain as RN, who is also Setswana speaking, states that pt is confused and wouldn't answer his questions appropriately today. Unable to reach emergency contact via phone at this time, however the following was obtained from NH documentation: on CHO consistent diet, no pork or ham as pt is Judaism and receives Suplena BID. Also, receives 500mg Vit C daily. On 12/22, per documentation pt wt 162#, which compared to CBW of 166#, shows wt gain. ?accuracy of 20# wt loss as mentioned in H&P.

## 2020-12-25 NOTE — DIETITIAN INITIAL EVALUATION ADULT. - PHYSCIAL ASSESSMENT
confused at times. BMI: 25.2 using dosing wt of 166#, IBW: 154#, no edema noted, skin intact./well nourished

## 2020-12-25 NOTE — DIETITIAN INITIAL EVALUATION ADULT. - REASON FOR ADMISSION
Pt sent from [facility] for rectal bleeding. Lower GI Bleed; had 2 bloody bms yesterday (12/24). Patient agreed for colonoscopy when asked 12/24 morning, but refused prep for 12/24 and hence colonoscopy deferred for now. GI is ok with monitoring Hemoglobin closely for now over the weekend. ESRD not on HD; no need for HMD at present per Nephro.

## 2020-12-25 NOTE — DIETITIAN INITIAL EVALUATION ADULT. - OTHER CALCULATIONS
Dosing wt: 166#/75kg-->Calories: 7071-3182 kcal/day (MSJ x 1.2-1.3 AF); Protein: 60-68 gm/day (0.8-0.9 gm/kg CBW)--ESRD not on HD considered, will monitor renal fxn and adjust prn; Fluid: 1 ml/kcal or per LIP

## 2020-12-25 NOTE — DIETITIAN INITIAL EVALUATION ADULT. - ADD RECOMMEND
1. Monitor GI's POC and advance diet as medically feasible per their recs; GOAL diet is GI Soft, CHO Consistent/HS, 60gm protein restriction, 2. Upon diet advancement, order Glucerna once daily

## 2020-12-25 NOTE — PROGRESS NOTE ADULT - SUBJECTIVE AND OBJECTIVE BOX
S: no new events/complaints  tolerating diet  Last BM -     All other pertinent ROS negative.      12-24-20 @ 07:01  -  12-25-20 @ 07:00  --------------------------------------------------------  IN: 110 mL / OUT: 1200 mL / NET: -1090 mL    12-25-20 @ 07:01  -  12-25-20 @ 12:32  --------------------------------------------------------  IN: 490 mL / OUT: 300 mL / NET: 190 mL      Vital Signs Last 24 Hrs  T(C): 36.6 (25 Dec 2020 05:10), Max: 36.6 (25 Dec 2020 05:10)  T(F): 97.8 (25 Dec 2020 05:10), Max: 97.8 (25 Dec 2020 05:10)  HR: 91 (25 Dec 2020 05:10) (84 - 98)  BP: 159/70 (25 Dec 2020 05:10) (142/62 - 160/66)  BP(mean): --  RR: 18 (25 Dec 2020 05:10) (18 - 18)  SpO2: --  PHYSICAL EXAM:    Constitutional: NAD, awake and alert, well-developed  HEENT: PERR, EOMI, Normal Hearing, MMM  Neck: Soft and supple, No LAD, No JVD  Respiratory: Breath sounds are clear bilaterally, No wheezing, rales or rhonchi  Cardiovascular: S1 and S2, regular rate and rhythm, no Murmurs, gallops or rubs  Gastrointestinal: Bowel Sounds present, soft, nontender, nondistended, no guarding, no rebound  Extremities: No peripheral edema      MEDICATIONS:  MEDICATIONS  (STANDING):  ascorbic acid 500 milliGRAM(s) Oral daily  budesonide 160 MICROgram(s)/formoterol 4.5 MICROgram(s) Inhaler 2 Puff(s) Inhalation two times a day  busPIRone 5 milliGRAM(s) Oral <User Schedule>  chlorhexidine 4% Liquid 1 Application(s) Topical <User Schedule>  donepezil 5 milliGRAM(s) Oral at bedtime  hydrALAZINE 50 milliGRAM(s) Oral every 12 hours  insulin glargine Injectable (LANTUS) 13 Unit(s) SubCutaneous at bedtime  melatonin 3 milliGRAM(s) Oral at bedtime  montelukast 10 milliGRAM(s) Oral daily  nystatin Cream 1 Application(s) Topical two times a day  pantoprazole Infusion 8 mG/Hr (10 mL/Hr) IV Continuous <Continuous>  sodium bicarbonate 650 milliGRAM(s) Oral every 12 hours      LABS: All Labs Reviewed:                        7.9    14.16 )-----------( 293      ( 25 Dec 2020 06:03 )             24.1     12-25    141  |  107  |  99<HH>  ----------------------------<  65<L>  4.5   |  18  |  4.1<HH>    Ca    8.5      25 Dec 2020 06:03  Phos  4.7     12-24  Mg     2.0     12-25    TPro  6.4  /  Alb  3.6  /  TBili  0.2  /  DBili  x   /  AST  13  /  ALT  7   /  AlkPhos  63  12-25      CARDIAC MARKERS ( 23 Dec 2020 22:21 )  x     / 0.07 ng/mL / x     / x     / x      CARDIAC MARKERS ( 23 Dec 2020 17:39 )  x     / 0.06 ng/mL / x     / x     / x          Blood Culture:     Radiology: reviewed     S: no new events/complaints  tolerating diet  Last BM - today- melanotic    All other pertinent ROS negative.      12-24-20 @ 07:01  -  12-25-20 @ 07:00  --------------------------------------------------------  IN: 110 mL / OUT: 1200 mL / NET: -1090 mL    12-25-20 @ 07:01  -  12-25-20 @ 12:32  --------------------------------------------------------  IN: 490 mL / OUT: 300 mL / NET: 190 mL      Vital Signs Last 24 Hrs  T(C): 36.6 (25 Dec 2020 05:10), Max: 36.6 (25 Dec 2020 05:10)  T(F): 97.8 (25 Dec 2020 05:10), Max: 97.8 (25 Dec 2020 05:10)  HR: 91 (25 Dec 2020 05:10) (84 - 98)  BP: 159/70 (25 Dec 2020 05:10) (142/62 - 160/66)  BP(mean): --  RR: 18 (25 Dec 2020 05:10) (18 - 18)  SpO2: --  PHYSICAL EXAM:    Constitutional: NAD, awake and alert, well-developed  HEENT: PERR, EOMI, Normal Hearing, MMM  Neck: Soft and supple, No LAD, No JVD  Respiratory: Breath sounds are clear bilaterally, No wheezing, rales or rhonchi  Cardiovascular: S1 and S2, regular rate and rhythm, no Murmurs, gallops or rubs  Gastrointestinal: Bowel Sounds present, soft, nontender, nondistended, no guarding, no rebound  Extremities: No peripheral edema      MEDICATIONS:  MEDICATIONS  (STANDING):  ascorbic acid 500 milliGRAM(s) Oral daily  budesonide 160 MICROgram(s)/formoterol 4.5 MICROgram(s) Inhaler 2 Puff(s) Inhalation two times a day  busPIRone 5 milliGRAM(s) Oral <User Schedule>  chlorhexidine 4% Liquid 1 Application(s) Topical <User Schedule>  donepezil 5 milliGRAM(s) Oral at bedtime  hydrALAZINE 50 milliGRAM(s) Oral every 12 hours  insulin glargine Injectable (LANTUS) 13 Unit(s) SubCutaneous at bedtime  melatonin 3 milliGRAM(s) Oral at bedtime  montelukast 10 milliGRAM(s) Oral daily  nystatin Cream 1 Application(s) Topical two times a day  pantoprazole Infusion 8 mG/Hr (10 mL/Hr) IV Continuous <Continuous>  sodium bicarbonate 650 milliGRAM(s) Oral every 12 hours      LABS: All Labs Reviewed:                        7.9    14.16 )-----------( 293      ( 25 Dec 2020 06:03 )             24.1     12-25    141  |  107  |  99<HH>  ----------------------------<  65<L>  4.5   |  18  |  4.1<HH>    Ca    8.5      25 Dec 2020 06:03  Phos  4.7     12-24  Mg     2.0     12-25    TPro  6.4  /  Alb  3.6  /  TBili  0.2  /  DBili  x   /  AST  13  /  ALT  7   /  AlkPhos  63  12-25      CARDIAC MARKERS ( 23 Dec 2020 22:21 )  x     / 0.07 ng/mL / x     / x     / x      CARDIAC MARKERS ( 23 Dec 2020 17:39 )  x     / 0.06 ng/mL / x     / x     / x          Blood Culture:     Radiology: reviewed

## 2020-12-25 NOTE — PROGRESS NOTE ADULT - SUBJECTIVE AND OBJECTIVE BOX
patient resting comfortably.  no bloody BM over night.    T 97.8 pulse 91 /70 RR 18  conj pale no jaundice  neck supple no JVD  lungs clear, increased AP diameter  heart sounds distant no gallop or murmur noted  abd. Bs pos. soft nontender  extremities skin turgor OK, no cyanosis    Labs:  Hg 7.9 (stable)  platelet count 293 wbc count 14.1    Na 141 K 4.5 Cl 107 CO2 18 BUN 99 creatinine 4.1 BS 65 then 81

## 2020-12-25 NOTE — PROGRESS NOTE ADULT - ASSESSMENT
80M with hisotry of CHF, Alzheimer's, COPD, DM, and ESRD not on HD who was sent from [facility] for rectal bleeding.    # Lower GI Bleed   - ASA stopped.   -Hgb o/a 7.1 (baseline 8.4), transfused 2U per GI.  Now Hgb 7.9  Had 2 bloody BMs yesterday (12/24).  Patient agreed for colonscopy when asked 12/24 morning, saying - "You are the Doctor. If that's what you think is best"  Family is also on board if it comes to colonscopy.  But Patient refused prep for 12/24 and hence colonoscopy deferred for now.  GI is ok with monitoring Hemoglobin closely for now over the weekend. Monitor Hb Q12. Goal >7.  If has significant bleeding can reach out to GI over long weekend for an urgent scope.  Clear liquid diet for now?    # DM   - Decrease to Lantus 13 (was hypo with his home dose 19 since not eating), SSI    #ESRD (not on HD):  Nephro on board.   Currently stable around 107/4.3    # Alzheimer's   - Continue home donepezil   - Home melatonin    # COPD   - Continue home albuterol, montelukast    #Leukocytosis:  unclear etiology. monitor. changed albuterol to PRN. monitor.     GI PPx: Protonix  VTE PPx: held for bleeding. SCDs.   Diet: NPO - CLD?

## 2020-12-26 LAB
ALBUMIN SERPL ELPH-MCNC: 3.5 G/DL — SIGNIFICANT CHANGE UP (ref 3.5–5.2)
ALP SERPL-CCNC: 71 U/L — SIGNIFICANT CHANGE UP (ref 30–115)
ALT FLD-CCNC: 6 U/L — SIGNIFICANT CHANGE UP (ref 0–41)
ANION GAP SERPL CALC-SCNC: 15 MMOL/L — HIGH (ref 7–14)
AST SERPL-CCNC: 12 U/L — SIGNIFICANT CHANGE UP (ref 0–41)
BASOPHILS # BLD AUTO: 0.06 K/UL — SIGNIFICANT CHANGE UP (ref 0–0.2)
BASOPHILS NFR BLD AUTO: 0.4 % — SIGNIFICANT CHANGE UP (ref 0–1)
BILIRUB SERPL-MCNC: 0.3 MG/DL — SIGNIFICANT CHANGE UP (ref 0.2–1.2)
BLD GP AB SCN SERPL QL: SIGNIFICANT CHANGE UP
BUN SERPL-MCNC: 84 MG/DL — CRITICAL HIGH (ref 10–20)
CALCIUM SERPL-MCNC: 8.4 MG/DL — LOW (ref 8.5–10.1)
CHLORIDE SERPL-SCNC: 106 MMOL/L — SIGNIFICANT CHANGE UP (ref 98–110)
CO2 SERPL-SCNC: 19 MMOL/L — SIGNIFICANT CHANGE UP (ref 17–32)
CREAT SERPL-MCNC: 3.7 MG/DL — HIGH (ref 0.7–1.5)
EOSINOPHIL # BLD AUTO: 0.72 K/UL — HIGH (ref 0–0.7)
EOSINOPHIL NFR BLD AUTO: 4.8 % — SIGNIFICANT CHANGE UP (ref 0–8)
GLUCOSE BLDC GLUCOMTR-MCNC: 110 MG/DL — HIGH (ref 70–99)
GLUCOSE BLDC GLUCOMTR-MCNC: 119 MG/DL — HIGH (ref 70–99)
GLUCOSE SERPL-MCNC: 82 MG/DL — SIGNIFICANT CHANGE UP (ref 70–99)
HCT VFR BLD CALC: 24.2 % — LOW (ref 42–52)
HGB BLD-MCNC: 7.7 G/DL — LOW (ref 14–18)
IMM GRANULOCYTES NFR BLD AUTO: 0.3 % — SIGNIFICANT CHANGE UP (ref 0.1–0.3)
LYMPHOCYTES # BLD AUTO: 23.4 % — SIGNIFICANT CHANGE UP (ref 20.5–51.1)
LYMPHOCYTES # BLD AUTO: 3.48 K/UL — HIGH (ref 1.2–3.4)
MAGNESIUM SERPL-MCNC: 1.8 MG/DL — SIGNIFICANT CHANGE UP (ref 1.8–2.4)
MCHC RBC-ENTMCNC: 28.1 PG — SIGNIFICANT CHANGE UP (ref 27–31)
MCHC RBC-ENTMCNC: 31.8 G/DL — LOW (ref 32–37)
MCV RBC AUTO: 88.3 FL — SIGNIFICANT CHANGE UP (ref 80–94)
MONOCYTES # BLD AUTO: 1.75 K/UL — HIGH (ref 0.1–0.6)
MONOCYTES NFR BLD AUTO: 11.8 % — HIGH (ref 1.7–9.3)
NEUTROPHILS # BLD AUTO: 8.82 K/UL — HIGH (ref 1.4–6.5)
NEUTROPHILS NFR BLD AUTO: 59.3 % — SIGNIFICANT CHANGE UP (ref 42.2–75.2)
NRBC # BLD: 0 /100 WBCS — SIGNIFICANT CHANGE UP (ref 0–0)
PLATELET # BLD AUTO: 300 K/UL — SIGNIFICANT CHANGE UP (ref 130–400)
POTASSIUM SERPL-MCNC: 4.3 MMOL/L — SIGNIFICANT CHANGE UP (ref 3.5–5)
POTASSIUM SERPL-SCNC: 4.3 MMOL/L — SIGNIFICANT CHANGE UP (ref 3.5–5)
PROT SERPL-MCNC: 6.5 G/DL — SIGNIFICANT CHANGE UP (ref 6–8)
RBC # BLD: 2.74 M/UL — LOW (ref 4.7–6.1)
RBC # FLD: 14.6 % — HIGH (ref 11.5–14.5)
SODIUM SERPL-SCNC: 140 MMOL/L — SIGNIFICANT CHANGE UP (ref 135–146)
WBC # BLD: 14.88 K/UL — HIGH (ref 4.8–10.8)
WBC # FLD AUTO: 14.88 K/UL — HIGH (ref 4.8–10.8)

## 2020-12-26 PROCEDURE — 99233 SBSQ HOSP IP/OBS HIGH 50: CPT

## 2020-12-26 RX ADMIN — NYSTATIN CREAM 1 APPLICATION(S): 100000 CREAM TOPICAL at 17:09

## 2020-12-26 RX ADMIN — DONEPEZIL HYDROCHLORIDE 5 MILLIGRAM(S): 10 TABLET, FILM COATED ORAL at 21:17

## 2020-12-26 RX ADMIN — PANTOPRAZOLE SODIUM 10 MG/HR: 20 TABLET, DELAYED RELEASE ORAL at 07:43

## 2020-12-26 RX ADMIN — BUDESONIDE AND FORMOTEROL FUMARATE DIHYDRATE 2 PUFF(S): 160; 4.5 AEROSOL RESPIRATORY (INHALATION) at 07:45

## 2020-12-26 RX ADMIN — Medication 650 MILLIGRAM(S): at 05:19

## 2020-12-26 RX ADMIN — BUDESONIDE AND FORMOTEROL FUMARATE DIHYDRATE 2 PUFF(S): 160; 4.5 AEROSOL RESPIRATORY (INHALATION) at 21:17

## 2020-12-26 RX ADMIN — PANTOPRAZOLE SODIUM 10 MG/HR: 20 TABLET, DELAYED RELEASE ORAL at 17:46

## 2020-12-26 RX ADMIN — Medication 5 MILLIGRAM(S): at 05:19

## 2020-12-26 RX ADMIN — Medication 3 MILLIGRAM(S): at 21:17

## 2020-12-26 RX ADMIN — CHLORHEXIDINE GLUCONATE 1 APPLICATION(S): 213 SOLUTION TOPICAL at 06:15

## 2020-12-26 RX ADMIN — Medication 50 MILLIGRAM(S): at 17:09

## 2020-12-26 RX ADMIN — NYSTATIN CREAM 1 APPLICATION(S): 100000 CREAM TOPICAL at 05:19

## 2020-12-26 RX ADMIN — Medication 50 MILLIGRAM(S): at 05:19

## 2020-12-26 RX ADMIN — Medication 650 MILLIGRAM(S): at 17:09

## 2020-12-26 RX ADMIN — PANTOPRAZOLE SODIUM 10 MG/HR: 20 TABLET, DELAYED RELEASE ORAL at 22:12

## 2020-12-26 RX ADMIN — MONTELUKAST 10 MILLIGRAM(S): 4 TABLET, CHEWABLE ORAL at 12:00

## 2020-12-26 RX ADMIN — Medication 500 MILLIGRAM(S): at 12:01

## 2020-12-26 RX ADMIN — INSULIN GLARGINE 13 UNIT(S): 100 INJECTION, SOLUTION SUBCUTANEOUS at 21:17

## 2020-12-26 NOTE — PROGRESS NOTE ADULT - ASSESSMENT
CKD Stage 4 - renal function stable to slightly improved  GI bleed - Hg holding steady for now  IDDM - BS improved and at target with change in insulin  COPD - oxygenating well on room air        Plan:  continue to monitor hg/hct  attempt colonoscopy/EGD (patient refused prep before ?just do EGD given he has black tarry stool suggesting UGI bleed)  monitor renal function - patient in good volume state so no need for IVF or diuretic     I spoke to daughter (Sabrina Nguyen) about above on the phone today.

## 2020-12-26 NOTE — PROGRESS NOTE ADULT - ASSESSMENT
80M with hisotry of CHF, Alzheimer's, COPD, DM, and ESRD not on HD who was [facility].    # Lower GI Bleed   - ASA stopped.   -Hgb o/a 7.1 (baseline 8.4), transfused 2U per GI.  Now Hgb 7.9 > 7.7 TODAY. Monitor daily. Goal > 7.   Had 2 bloody BMs (12/24). Melanotic stools now. Blood stain on perfecto today.     Patient agreed for colonscopy when asked 12/24 morning, saying - "You are the Doctor. If that's what you think is best"  Family is also on board if it comes to colonscopy.  But Patient refused prep for 12/24 and hence colonoscopy deferred for now.  GI is ok with monitoring Hemoglobin closely for now over the weekend. Monitor Hb QD. Goal >7.  If has significant bleeding can reach out to GI over long weekend for an urgent scope.  Clear liquid diet for now?    # DM   - Decrease to Lantus 13 (was hypo with his home dose 19 since not eating), SSI  Sugars holding up ok with this.     #ESRD (not on HD):  Nephro on board.   Currently stable around 84/3.7 and heading down.     # Alzheimer's   - Continue home donepezil   - Home melatonin    # COPD   - Continue home albuterol, montelukast    #Leukocytosis:  unclear etiology. monitor. changed albuterol to PRN. monitor.     GI PPx: Protonix  VTE PPx: held for bleeding. SCDs.   Diet: NPO ->1 CLD?  Dispo: Watch Hgb through the weekend. Scope PRN. Prognosis Guarded.  80M with hisotry of CHF, Alzheimer's, COPD, DM, and ESRD not on HD who [facility].    # Lower GI Bleed   - ASA stopped.   -Hgb o/a 7.1 (baseline 8.4), transfused 2U per GI.  Now Hgb 7.9 > 7.7 TODAY. Monitor daily. Goal > 7.   Had 2 bloody BMs (12/24). Melanotic stools now. Blood stain on perfecto today.     Patient agreed for colonscopy when asked 12/24 morning, saying - "You are the Doctor. If that's what you think is best"  Family is also on board if it comes to colonscopy.  But Patient refused prep for 12/24 and hence colonoscopy deferred for now.  GI is ok with monitoring Hemoglobin closely for now over the weekend. Monitor Hb QD. Goal >7.  If has significant bleeding can reach out to GI over long weekend for an urgent scope.  Clear liquid diet for now?    May advise GI to just do EGD if patient is refusing prep, given the fact that now he only has melanotic stools, to at least r/o UGIB. f/u GI tomorrow.     # DM   - Decrease to Lantus 13 (was hypo with his home dose 19 since not eating), SSI  Sugars holding up ok with this.     #ESRD (not on HD):  Nephro on board.   Currently stable around 84/3.7 and heading down.     # Alzheimer's   - Continue home donepezil   - Home melatonin    # COPD   - Continue home albuterol, montelukast    #Leukocytosis:  unclear etiology. monitor. changed albuterol to PRN. monitor.     GI PPx: Protonix  VTE PPx: held for bleeding. SCDs.   Diet: NPO ->1 CLD?  Dispo: Watch Hgb through the weekend. Scope PRN. Prognosis Guarded.

## 2020-12-26 NOTE — PROGRESS NOTE ADULT - SUBJECTIVE AND OBJECTIVE BOX
patient offers no complaints.  had black stool no BRB     T 96.8 pulse 92 and regulation /63  conj pale no jaundice  neck no JVD supple  lungs - increased AP diameter, decreased BS bilaterally but lcear  heart sounds distant but regular rhythm.  no murmur heard  abd. BS pos. soft nontender  extremities no edema, no cyanosis, skin turgor OK    Labs today  Hg 7.7 (stable) platelet count 300K    Na 140 K 4.3 Cl 106 CO2 19 BUN 84 creatinine 3.7 Ca 8.4 Alb 3.5

## 2020-12-26 NOTE — PROGRESS NOTE ADULT - SUBJECTIVE AND OBJECTIVE BOX
S: No new events/complaints  Black BM today with blood stain on perfecto  No lightheadedness/CP/SOB      All other pertinent ROS negative.      12-25-20 @ 07:01  -  12-26-20 @ 07:00  --------------------------------------------------------  IN: 1096 mL / OUT: 1400 mL / NET: -304 mL      Vital Signs Last 24 Hrs  T(C): 36.2 (26 Dec 2020 12:41), Max: 36.6 (25 Dec 2020 21:00)  T(F): 97.2 (26 Dec 2020 12:41), Max: 97.8 (25 Dec 2020 21:00)  HR: 97 (26 Dec 2020 12:41) (85 - 99)  BP: 142/65 (26 Dec 2020 12:41) (141/63 - 162/69)  BP(mean): --  RR: 18 (26 Dec 2020 12:41) (18 - 18)  SpO2: --  PHYSICAL EXAM:    Constitutional: NAD, awake and alert, well-developed  HEENT: PERR, EOMI, Normal Hearing, MMM  Neck: Soft and supple, No LAD, No JVD  Respiratory: Breath sounds are clear bilaterally, No wheezing, rales or rhonchi  Cardiovascular: S1 and S2, regular rate and rhythm, no Murmurs, gallops or rubs  Gastrointestinal: Bowel Sounds present, soft, nontender, nondistended, no guarding, no rebound  Extremities: No peripheral edema      MEDICATIONS:  MEDICATIONS  (STANDING):  ascorbic acid 500 milliGRAM(s) Oral daily  budesonide 160 MICROgram(s)/formoterol 4.5 MICROgram(s) Inhaler 2 Puff(s) Inhalation two times a day  busPIRone 5 milliGRAM(s) Oral <User Schedule>  chlorhexidine 4% Liquid 1 Application(s) Topical <User Schedule>  donepezil 5 milliGRAM(s) Oral at bedtime  hydrALAZINE 50 milliGRAM(s) Oral every 12 hours  insulin glargine Injectable (LANTUS) 13 Unit(s) SubCutaneous at bedtime  melatonin 3 milliGRAM(s) Oral at bedtime  montelukast 10 milliGRAM(s) Oral daily  nystatin Cream 1 Application(s) Topical two times a day  pantoprazole Infusion 8 mG/Hr (10 mL/Hr) IV Continuous <Continuous>  sodium bicarbonate 650 milliGRAM(s) Oral every 12 hours      LABS: All Labs Reviewed:                        7.7    14.88 )-----------( 300      ( 26 Dec 2020 06:22 )             24.2     12-26    140  |  106  |  84<HH>  ----------------------------<  82  4.3   |  19  |  3.7<H>    Ca    8.4<L>      26 Dec 2020 06:22  Mg     1.8     12-26    TPro  6.5  /  Alb  3.5  /  TBili  0.3  /  DBili  x   /  AST  12  /  ALT  6   /  AlkPhos  71  12-26          Blood Culture:     Radiology: reviewed

## 2020-12-27 LAB
GLUCOSE BLDC GLUCOMTR-MCNC: 104 MG/DL — HIGH (ref 70–99)
GLUCOSE BLDC GLUCOMTR-MCNC: 117 MG/DL — HIGH (ref 70–99)
HCT VFR BLD CALC: 22.7 % — LOW (ref 42–52)
HGB BLD-MCNC: 7.2 G/DL — LOW (ref 14–18)
MCHC RBC-ENTMCNC: 28.5 PG — SIGNIFICANT CHANGE UP (ref 27–31)
MCHC RBC-ENTMCNC: 31.7 G/DL — LOW (ref 32–37)
MCV RBC AUTO: 89.7 FL — SIGNIFICANT CHANGE UP (ref 80–94)
NRBC # BLD: 0 /100 WBCS — SIGNIFICANT CHANGE UP (ref 0–0)
PLATELET # BLD AUTO: 280 K/UL — SIGNIFICANT CHANGE UP (ref 130–400)
RBC # BLD: 2.53 M/UL — LOW (ref 4.7–6.1)
RBC # FLD: 14.4 % — SIGNIFICANT CHANGE UP (ref 11.5–14.5)
WBC # BLD: 10.9 K/UL — HIGH (ref 4.8–10.8)
WBC # FLD AUTO: 10.9 K/UL — HIGH (ref 4.8–10.8)

## 2020-12-27 PROCEDURE — 99233 SBSQ HOSP IP/OBS HIGH 50: CPT

## 2020-12-27 RX ADMIN — CHLORHEXIDINE GLUCONATE 1 APPLICATION(S): 213 SOLUTION TOPICAL at 05:03

## 2020-12-27 RX ADMIN — MONTELUKAST 10 MILLIGRAM(S): 4 TABLET, CHEWABLE ORAL at 11:26

## 2020-12-27 RX ADMIN — PANTOPRAZOLE SODIUM 10 MG/HR: 20 TABLET, DELAYED RELEASE ORAL at 14:35

## 2020-12-27 RX ADMIN — NYSTATIN CREAM 1 APPLICATION(S): 100000 CREAM TOPICAL at 05:03

## 2020-12-27 RX ADMIN — Medication 650 MILLIGRAM(S): at 17:09

## 2020-12-27 RX ADMIN — PANTOPRAZOLE SODIUM 10 MG/HR: 20 TABLET, DELAYED RELEASE ORAL at 02:43

## 2020-12-27 RX ADMIN — Medication 500 MILLIGRAM(S): at 11:26

## 2020-12-27 RX ADMIN — DONEPEZIL HYDROCHLORIDE 5 MILLIGRAM(S): 10 TABLET, FILM COATED ORAL at 21:26

## 2020-12-27 RX ADMIN — BUDESONIDE AND FORMOTEROL FUMARATE DIHYDRATE 2 PUFF(S): 160; 4.5 AEROSOL RESPIRATORY (INHALATION) at 21:27

## 2020-12-27 RX ADMIN — Medication 50 MILLIGRAM(S): at 05:03

## 2020-12-27 RX ADMIN — BUDESONIDE AND FORMOTEROL FUMARATE DIHYDRATE 2 PUFF(S): 160; 4.5 AEROSOL RESPIRATORY (INHALATION) at 11:27

## 2020-12-27 RX ADMIN — Medication 50 MILLIGRAM(S): at 17:09

## 2020-12-27 RX ADMIN — NYSTATIN CREAM 1 APPLICATION(S): 100000 CREAM TOPICAL at 17:09

## 2020-12-27 RX ADMIN — INSULIN GLARGINE 13 UNIT(S): 100 INJECTION, SOLUTION SUBCUTANEOUS at 21:27

## 2020-12-27 RX ADMIN — Medication 650 MILLIGRAM(S): at 05:03

## 2020-12-27 RX ADMIN — Medication 3 MILLIGRAM(S): at 21:26

## 2020-12-27 RX ADMIN — Medication 5 MILLIGRAM(S): at 05:03

## 2020-12-27 NOTE — PROGRESS NOTE ADULT - SUBJECTIVE AND OBJECTIVE BOX
S: No new events/complaints  Black BM today with blood stain on perfecto  No lightheadedness/CP/SOB      All other pertinent ROS negative.      12-25-20 @ 07:01  -  12-26-20 @ 07:00  --------------------------------------------------------  IN: 1096 mL / OUT: 1400 mL / NET: -304 mL    Vital Signs Last 24 Hrs  T(C): 36.3 (27 Dec 2020 13:02), Max: 36.6 (26 Dec 2020 21:28)  T(F): 97.4 (27 Dec 2020 13:02), Max: 97.9 (26 Dec 2020 21:28)  HR: 93 (27 Dec 2020 13:02) (89 - 98)  BP: 151/68 (27 Dec 2020 13:02) (128/77 - 166/67)  RR: 18 (27 Dec 2020 13:02) (18 - 18)  SpO2: 95% (26 Dec 2020 19:44) (95% - 95%)    PHYSICAL EXAM:  Constitutional: NAD, awake and alert, well-developed  HEENT: PERR, EOMI, Normal Hearing, MMM  Neck: Soft and supple, No LAD, No JVD  Respiratory: Breath sounds are clear bilaterally, No wheezing, rales or rhonchi  Cardiovascular: S1 and S2, regular rate and rhythm, no Murmurs, gallops or rubs  Gastrointestinal: Bowel Sounds present, soft, nontender, nondistended, no guarding, no rebound  Extremities: No peripheral edema      MEDICATIONS  (STANDING):  ascorbic acid 500 milliGRAM(s) Oral daily  budesonide 160 MICROgram(s)/formoterol 4.5 MICROgram(s) Inhaler 2 Puff(s) Inhalation two times a day  busPIRone 5 milliGRAM(s) Oral <User Schedule>  chlorhexidine 4% Liquid 1 Application(s) Topical <User Schedule>  donepezil 5 milliGRAM(s) Oral at bedtime  hydrALAZINE 50 milliGRAM(s) Oral every 12 hours  insulin glargine Injectable (LANTUS) 13 Unit(s) SubCutaneous at bedtime  melatonin 3 milliGRAM(s) Oral at bedtime  montelukast 10 milliGRAM(s) Oral daily  nystatin Cream 1 Application(s) Topical two times a day  pantoprazole Infusion 8 mG/Hr (10 mL/Hr) IV Continuous <Continuous>  Sodium bicarbonate 650 milliGRAM(s) Oral every 12 hours      LABS: All Labs Reviewed:                        7.7    14.88 )-----------( 300      ( 26 Dec 2020 06:22 )             24.2       12-26    140  |  106  |  84<HH>  ----------------------------<  82  4.3   |  19  |  3.7<H>    Ca    8.4<L>      26 Dec 2020 06:22  Mg     1.8     12-26    TPro  6.5  /  Alb  3.5  /  TBili  0.3  /  DBili  x   /  AST  12  /  ALT  6   /  AlkPhos  71  12-26    Ca    8.4<L>      26 Dec 2020 06:22    Mg     1.8     12-26    TPro  6.5  /  Alb  3.5  /  TBili  0.3  /  DBili  x   /  AST  12  /  ALT  6   /  AlkPhos  71  12-26

## 2020-12-27 NOTE — PROGRESS NOTE ADULT - ASSESSMENT
80M with hisotry of CHF, Alzheimer's, COPD, DM, and ESRD not on HD who was sent from [facility] for rectal bleeding.    # Lower GI Bleed   - ASA stopped.   - Hgb 7.1 (baseline 8.4), transfused 2U per GI.  Now Hgb 7.7 TODAY. Monitor daily. Goal > 7.   Had 2 bloody BMs (12/24). Melanotic stools now. Blood stain on perfecto today.     Patient agreed for colonoscopy when asked 12/24 morning  Pt declined C-Scope on subsequent days to refusing Colon Prep however pt is confused and HOP  I called a family member on chart and explained that pt is refusing bowel prep and she says to try to encourage Father to get Colon Prep  Family is very much interested in getting colonoscope as re-bleeding has been recurrent. Pt known to [facility] for same issue.    # DM   - Decrease to Lantus 13 (was hypo with his home dose 19 since not eating), SSI   - FS okay eating clear liquid diet which will continue at this time.        #ESRD (not on HD):  Nephro on board.   Currently stable around 84/3.7 and heading down.     # Alzheimer's   - Continue home donepezil   - Home melatonin    # COPD   - Continue home albuterol, montelukast      GI PPx: Protonix  VTE PPx: held for bleeding c/w SCDs.   Diet: Clear Liquid Diet     Prognosis - Poor due to lack of care compliance (PMH: Dementia)     Dispo: acute / need C-scope f/u GI plan / family wants pt to be scoped / f/u CBC / GI f/u / Transfer [facility]?

## 2020-12-28 LAB
ANION GAP SERPL CALC-SCNC: 14 MMOL/L — SIGNIFICANT CHANGE UP (ref 7–14)
BASOPHILS # BLD AUTO: 0.06 K/UL — SIGNIFICANT CHANGE UP (ref 0–0.2)
BASOPHILS NFR BLD AUTO: 0.6 % — SIGNIFICANT CHANGE UP (ref 0–1)
BUN SERPL-MCNC: 71 MG/DL — CRITICAL HIGH (ref 10–20)
CALCIUM SERPL-MCNC: 8.3 MG/DL — LOW (ref 8.5–10.1)
CHLORIDE SERPL-SCNC: 106 MMOL/L — SIGNIFICANT CHANGE UP (ref 98–110)
CO2 SERPL-SCNC: 19 MMOL/L — SIGNIFICANT CHANGE UP (ref 17–32)
CREAT SERPL-MCNC: 4.3 MG/DL — CRITICAL HIGH (ref 0.7–1.5)
EOSINOPHIL # BLD AUTO: 0.97 K/UL — HIGH (ref 0–0.7)
EOSINOPHIL NFR BLD AUTO: 9.1 % — HIGH (ref 0–8)
GLUCOSE BLDC GLUCOMTR-MCNC: 162 MG/DL — HIGH (ref 70–99)
GLUCOSE BLDC GLUCOMTR-MCNC: 89 MG/DL — SIGNIFICANT CHANGE UP (ref 70–99)
GLUCOSE SERPL-MCNC: 88 MG/DL — SIGNIFICANT CHANGE UP (ref 70–99)
HCT VFR BLD CALC: 22.2 % — LOW (ref 42–52)
HCT VFR BLD CALC: 24.1 % — LOW (ref 42–52)
HGB BLD-MCNC: 7.1 G/DL — LOW (ref 14–18)
HGB BLD-MCNC: 7.8 G/DL — LOW (ref 14–18)
IMM GRANULOCYTES NFR BLD AUTO: 0.4 % — HIGH (ref 0.1–0.3)
LYMPHOCYTES # BLD AUTO: 18.9 % — LOW (ref 20.5–51.1)
LYMPHOCYTES # BLD AUTO: 2.01 K/UL — SIGNIFICANT CHANGE UP (ref 1.2–3.4)
MCHC RBC-ENTMCNC: 28.4 PG — SIGNIFICANT CHANGE UP (ref 27–31)
MCHC RBC-ENTMCNC: 28.4 PG — SIGNIFICANT CHANGE UP (ref 27–31)
MCHC RBC-ENTMCNC: 32 G/DL — SIGNIFICANT CHANGE UP (ref 32–37)
MCHC RBC-ENTMCNC: 32.4 G/DL — SIGNIFICANT CHANGE UP (ref 32–37)
MCV RBC AUTO: 87.6 FL — SIGNIFICANT CHANGE UP (ref 80–94)
MCV RBC AUTO: 88.8 FL — SIGNIFICANT CHANGE UP (ref 80–94)
MONOCYTES # BLD AUTO: 1.04 K/UL — HIGH (ref 0.1–0.6)
MONOCYTES NFR BLD AUTO: 9.8 % — HIGH (ref 1.7–9.3)
NEUTROPHILS # BLD AUTO: 6.53 K/UL — HIGH (ref 1.4–6.5)
NEUTROPHILS NFR BLD AUTO: 61.2 % — SIGNIFICANT CHANGE UP (ref 42.2–75.2)
NRBC # BLD: 0 /100 WBCS — SIGNIFICANT CHANGE UP (ref 0–0)
NRBC # BLD: 0 /100 WBCS — SIGNIFICANT CHANGE UP (ref 0–0)
PLATELET # BLD AUTO: 265 K/UL — SIGNIFICANT CHANGE UP (ref 130–400)
PLATELET # BLD AUTO: 277 K/UL — SIGNIFICANT CHANGE UP (ref 130–400)
POTASSIUM SERPL-MCNC: 4.2 MMOL/L — SIGNIFICANT CHANGE UP (ref 3.5–5)
POTASSIUM SERPL-SCNC: 4.2 MMOL/L — SIGNIFICANT CHANGE UP (ref 3.5–5)
RBC # BLD: 2.5 M/UL — LOW (ref 4.7–6.1)
RBC # BLD: 2.75 M/UL — LOW (ref 4.7–6.1)
RBC # FLD: 14.1 % — SIGNIFICANT CHANGE UP (ref 11.5–14.5)
RBC # FLD: 14.4 % — SIGNIFICANT CHANGE UP (ref 11.5–14.5)
SODIUM SERPL-SCNC: 139 MMOL/L — SIGNIFICANT CHANGE UP (ref 135–146)
WBC # BLD: 10.65 K/UL — SIGNIFICANT CHANGE UP (ref 4.8–10.8)
WBC # BLD: 9.42 K/UL — SIGNIFICANT CHANGE UP (ref 4.8–10.8)
WBC # FLD AUTO: 10.65 K/UL — SIGNIFICANT CHANGE UP (ref 4.8–10.8)
WBC # FLD AUTO: 9.42 K/UL — SIGNIFICANT CHANGE UP (ref 4.8–10.8)

## 2020-12-28 PROCEDURE — 99233 SBSQ HOSP IP/OBS HIGH 50: CPT

## 2020-12-28 PROCEDURE — 93971 EXTREMITY STUDY: CPT | Mod: 26,RT

## 2020-12-28 RX ADMIN — Medication 500 MILLIGRAM(S): at 11:03

## 2020-12-28 RX ADMIN — PANTOPRAZOLE SODIUM 10 MG/HR: 20 TABLET, DELAYED RELEASE ORAL at 11:03

## 2020-12-28 RX ADMIN — BUDESONIDE AND FORMOTEROL FUMARATE DIHYDRATE 2 PUFF(S): 160; 4.5 AEROSOL RESPIRATORY (INHALATION) at 11:04

## 2020-12-28 RX ADMIN — CHLORHEXIDINE GLUCONATE 1 APPLICATION(S): 213 SOLUTION TOPICAL at 05:34

## 2020-12-28 RX ADMIN — BUDESONIDE AND FORMOTEROL FUMARATE DIHYDRATE 2 PUFF(S): 160; 4.5 AEROSOL RESPIRATORY (INHALATION) at 20:29

## 2020-12-28 RX ADMIN — DONEPEZIL HYDROCHLORIDE 5 MILLIGRAM(S): 10 TABLET, FILM COATED ORAL at 21:33

## 2020-12-28 RX ADMIN — Medication 3 MILLIGRAM(S): at 21:33

## 2020-12-28 RX ADMIN — NYSTATIN CREAM 1 APPLICATION(S): 100000 CREAM TOPICAL at 17:25

## 2020-12-28 RX ADMIN — Medication 5 MILLIGRAM(S): at 05:33

## 2020-12-28 RX ADMIN — Medication 650 MILLIGRAM(S): at 05:34

## 2020-12-28 RX ADMIN — NYSTATIN CREAM 1 APPLICATION(S): 100000 CREAM TOPICAL at 05:33

## 2020-12-28 RX ADMIN — Medication 50 MILLIGRAM(S): at 17:25

## 2020-12-28 RX ADMIN — MONTELUKAST 10 MILLIGRAM(S): 4 TABLET, CHEWABLE ORAL at 11:03

## 2020-12-28 RX ADMIN — Medication 650 MILLIGRAM(S): at 17:25

## 2020-12-28 RX ADMIN — Medication 50 MILLIGRAM(S): at 05:34

## 2020-12-28 RX ADMIN — INSULIN GLARGINE 13 UNIT(S): 100 INJECTION, SOLUTION SUBCUTANEOUS at 22:27

## 2020-12-28 NOTE — PROGRESS NOTE ADULT - SUBJECTIVE AND OBJECTIVE BOX
S: No new events/complaints  Black BM today with blood stain on perfecto  No lightheadedness/CP/SOB      All other pertinent ROS negative.      12-25-20 @ 07:01  -  12-26-20 @ 07:00  --------------------------------------------------------  IN: 1096 mL / OUT: 1400 mL / NET: -304 mL    Vital Signs Last 24 Hrs  T(C): 37.1 (28 Dec 2020 14:20), Max: 37.1 (28 Dec 2020 14:20)  T(F): 98.7 (28 Dec 2020 14:20), Max: 98.7 (28 Dec 2020 14:20)  HR: 89 (28 Dec 2020 14:20) (85 - 98)  BP: 117/56 (28 Dec 2020 14:20) (117/56 - 143/63)  RR: 18 (28 Dec 2020 14:20) (18 - 18)  SpO2: 95% RA      PHYSICAL EXAM:  Constitutional: NAD, awake and alert, well-developed  HEENT: PERR, EOMI, Normal Hearing, MMM  Neck: Soft and supple, No LAD, No JVD  Respiratory: Breath sounds are clear bilaterally, No wheezing, rales or rhonchi  Cardiovascular: S1 and S2, regular rate and rhythm, no Murmurs, gallops or rubs  Gastrointestinal: Bowel Sounds present, soft, nontender, nondistended, no guarding, no rebound  Extremities: No peripheral edema      MEDICATIONS  (STANDING):  ascorbic acid 500 milliGRAM(s) Oral daily  budesonide 160 MICROgram(s)/formoterol 4.5 MICROgram(s) Inhaler 2 Puff(s) Inhalation two times a day  busPIRone 5 milliGRAM(s) Oral <User Schedule>  chlorhexidine 4% Liquid 1 Application(s) Topical <User Schedule>  donepezil 5 milliGRAM(s) Oral at bedtime  hydrALAZINE 50 milliGRAM(s) Oral every 12 hours  insulin glargine Injectable (LANTUS) 13 Unit(s) SubCutaneous at bedtime  melatonin 3 milliGRAM(s) Oral at bedtime  montelukast 10 milliGRAM(s) Oral daily  nystatin Cream 1 Application(s) Topical two times a day  pantoprazole Infusion 8 mG/Hr (10 mL/Hr) IV Continuous <Continuous>  Sodium bicarbonate 650 milliGRAM(s) Oral every 12 hours      LABS: All Labs Reviewed:                          7.1    9.42  )-----------( 277      ( 28 Dec 2020 05:16 )             22.2                           7.7    14.88 )-----------( 300      ( 26 Dec 2020 06:22 )             24.2       12-28    139  |  106  |  71<HH>  ----------------------------<  88  4.2   |  19  |  4.3<HH>    Ca    8.3<L>      28 Dec 2020 05:16      12-26    140  |  106  |  84<HH>  ----------------------------<  82  4.3   |  19  |  3.7<H>    Ca    8.4<L>      26 Dec 2020 06:22  Mg     1.8     12-26    TPro  6.5  /  Alb  3.5  /  TBili  0.3  /  DBili  x   /  AST  12  /  ALT  6   /  AlkPhos  71  12-26    Ca    8.4<L>      26 Dec 2020 06:22    Mg     1.8     12-26    TPro  6.5  /  Alb  3.5  /  TBili  0.3  /  DBili  x   /  AST  12  /  ALT  6   /  AlkPhos  71  12-26

## 2020-12-28 NOTE — PROGRESS NOTE ADULT - ASSESSMENT
80M with hisotry of CHF, Alzheimer's, COPD, DM, and ESRD not on HD who was sent from [facility] for rectal bleeding.    # Lower GI Bleed   - ASA stopped.   - Hgb 7.1 (baseline 8.4)  - s/p 2Units PRBC on this admission   - no jnena blood today on perfecto   - Pt refuses to drink bowel prop and family still wants C-Scope to be done as this is a recurrent problem with EGDs that have been WNL  - Recommending to Place NGT and Start Bowel Prep w/ Golytely - Family is okay with this plan as long as pt gets C-Scope on this admission.   - Contact GI and start bowel prep as per GI recs     # DM   - Decrease to Lantus 13 (was hypo with his home dose 19 since not eating), SSI   - FS okay eating clear liquid diet which will continue at this time.        #ESRD (not on HD):  - At baseline Cr  - Nephro following     # Alzheimer's   - Continue home donepezil   - Home melatonin    # COPD   - Continue home albuterol, montelukast      GI PPx: Protonix    VTE PPx: held for bleeding c/w SCDs.     Diet: Clear Liquid Diet     Prognosis - Poor due to lack of care compliance (PMH: Dementia)     Dispo: Acute / Start Bowel Prep via NGT (and wrist restraints if needed)

## 2020-12-28 NOTE — PROGRESS NOTE ADULT - SUBJECTIVE AND OBJECTIVE BOX
[facility] FOLLOW UP NOTE  --------------------------------------------------------------------------------  24 hour events/subjective: Patient examined. Appears comfortable.    PAST HISTORY  --------------------------------------------------------------------------------  No significant changes to PMH, PSH, FHx, SHx, unless otherwise noted    ALLERGIES & MEDICATIONS  --------------------------------------------------------------------------------  Allergies    No Known Allergies    Standing Inpatient Medications  ascorbic acid 500 milliGRAM(s) Oral daily  budesonide 160 MICROgram(s)/formoterol 4.5 MICROgram(s) Inhaler 2 Puff(s) Inhalation two times a day  busPIRone 5 milliGRAM(s) Oral <User Schedule>  chlorhexidine 4% Liquid 1 Application(s) Topical <User Schedule>  donepezil 5 milliGRAM(s) Oral at bedtime  hydrALAZINE 50 milliGRAM(s) Oral every 12 hours  insulin glargine Injectable (LANTUS) 13 Unit(s) SubCutaneous at bedtime  melatonin 3 milliGRAM(s) Oral at bedtime  montelukast 10 milliGRAM(s) Oral daily  nystatin Cream 1 Application(s) Topical two times a day  pantoprazole Infusion 8 mG/Hr IV Continuous <Continuous>  sodium bicarbonate 650 milliGRAM(s) Oral every 12 hours    PRN Inpatient Medications  ALBUTerol    90 MICROgram(s) HFA Inhaler 1 Puff(s) Inhalation every 8 hours PRN      VITALS/PHYSICAL EXAM  --------------------------------------------------------------------------------  T(C): 37.1 (12-28-20 @ 14:20), Max: 37.1 (12-28-20 @ 14:20)  HR: 89 (12-28-20 @ 14:20) (85 - 98)  BP: 117/56 (12-28-20 @ 14:20) (117/56 - 143/63)  RR: 18 (12-28-20 @ 14:20) (18 - 18 12-27-20 @ 07:01  -  12-28-20 @ 07:00  --------------------------------------------------------  IN: 0 mL / OUT: 900 mL / NET: -900 mL    Physical Exam:  	Gen: NAD  	Pulm: CTA B/L  	CV: RRR, S1S2  	Abd: +BS, soft, nontender/nondistended  	: No suprapubic tenderness  	LE: Warm, no edema    LABS/STUDIES  --------------------------------------------------------------------------------              7.1    9.42  >-----------<  277      [12-28-20 @ 05:16]              22.2     139  |  106  |  71  ----------------------------<  88      [12-28-20 @ 05:16]  4.2   |  19  |  4.3        Ca     8.3     [12-28-20 @ 05:16]    Creatinine Trend:  SCr 4.3 [12-28 @ 05:16]  SCr 3.7 [12-26 @ 06:22]  SCr 4.1 [12-25 @ 06:03]  SCr 4.3 [12-24 @ 04:30]  SCr 4.4 [12-23 @ 02:00]        PTH -- (Ca 8.9)      [12-24-20 @ 04:30]   54

## 2020-12-29 LAB
ALBUMIN SERPL ELPH-MCNC: 3.1 G/DL — LOW (ref 3.5–5.2)
ALP SERPL-CCNC: 57 U/L — SIGNIFICANT CHANGE UP (ref 30–115)
ALT FLD-CCNC: <5 U/L — SIGNIFICANT CHANGE UP (ref 0–41)
ANION GAP SERPL CALC-SCNC: 14 MMOL/L — SIGNIFICANT CHANGE UP (ref 7–14)
AST SERPL-CCNC: 10 U/L — SIGNIFICANT CHANGE UP (ref 0–41)
BASOPHILS # BLD AUTO: 0.06 K/UL — SIGNIFICANT CHANGE UP (ref 0–0.2)
BASOPHILS NFR BLD AUTO: 0.6 % — SIGNIFICANT CHANGE UP (ref 0–1)
BILIRUB SERPL-MCNC: 1.3 MG/DL — HIGH (ref 0.2–1.2)
BLD GP AB SCN SERPL QL: SIGNIFICANT CHANGE UP
BUN SERPL-MCNC: 63 MG/DL — CRITICAL HIGH (ref 10–20)
CALCIUM SERPL-MCNC: 8.8 MG/DL — SIGNIFICANT CHANGE UP (ref 8.5–10.1)
CEA SERPL-MCNC: 6.2 NG/ML — HIGH (ref 0–3.8)
CHLORIDE SERPL-SCNC: 100 MMOL/L — SIGNIFICANT CHANGE UP (ref 98–110)
CO2 SERPL-SCNC: 20 MMOL/L — SIGNIFICANT CHANGE UP (ref 17–32)
CREAT SERPL-MCNC: 3.9 MG/DL — HIGH (ref 0.7–1.5)
EOSINOPHIL # BLD AUTO: 0.97 K/UL — HIGH (ref 0–0.7)
EOSINOPHIL NFR BLD AUTO: 9.6 % — HIGH (ref 0–8)
GLUCOSE BLDC GLUCOMTR-MCNC: 103 MG/DL — HIGH (ref 70–99)
GLUCOSE BLDC GLUCOMTR-MCNC: 114 MG/DL — HIGH (ref 70–99)
GLUCOSE SERPL-MCNC: 102 MG/DL — HIGH (ref 70–99)
HCT VFR BLD CALC: 25 % — LOW (ref 42–52)
HGB BLD-MCNC: 8.2 G/DL — LOW (ref 14–18)
IMM GRANULOCYTES NFR BLD AUTO: 0.4 % — HIGH (ref 0.1–0.3)
LYMPHOCYTES # BLD AUTO: 2.16 K/UL — SIGNIFICANT CHANGE UP (ref 1.2–3.4)
LYMPHOCYTES # BLD AUTO: 21.4 % — SIGNIFICANT CHANGE UP (ref 20.5–51.1)
MAGNESIUM SERPL-MCNC: 1.8 MG/DL — SIGNIFICANT CHANGE UP (ref 1.8–2.4)
MCHC RBC-ENTMCNC: 28.4 PG — SIGNIFICANT CHANGE UP (ref 27–31)
MCHC RBC-ENTMCNC: 32.8 G/DL — SIGNIFICANT CHANGE UP (ref 32–37)
MCV RBC AUTO: 86.5 FL — SIGNIFICANT CHANGE UP (ref 80–94)
MONOCYTES # BLD AUTO: 1.28 K/UL — HIGH (ref 0.1–0.6)
MONOCYTES NFR BLD AUTO: 12.7 % — HIGH (ref 1.7–9.3)
NEUTROPHILS # BLD AUTO: 5.6 K/UL — SIGNIFICANT CHANGE UP (ref 1.4–6.5)
NEUTROPHILS NFR BLD AUTO: 55.3 % — SIGNIFICANT CHANGE UP (ref 42.2–75.2)
NRBC # BLD: 0 /100 WBCS — SIGNIFICANT CHANGE UP (ref 0–0)
PLATELET # BLD AUTO: 283 K/UL — SIGNIFICANT CHANGE UP (ref 130–400)
POTASSIUM SERPL-MCNC: 4 MMOL/L — SIGNIFICANT CHANGE UP (ref 3.5–5)
POTASSIUM SERPL-SCNC: 4 MMOL/L — SIGNIFICANT CHANGE UP (ref 3.5–5)
PROT SERPL-MCNC: 6.1 G/DL — SIGNIFICANT CHANGE UP (ref 6–8)
RBC # BLD: 2.89 M/UL — LOW (ref 4.7–6.1)
RBC # FLD: 14.6 % — HIGH (ref 11.5–14.5)
SODIUM SERPL-SCNC: 134 MMOL/L — LOW (ref 135–146)
WBC # BLD: 10.11 K/UL — SIGNIFICANT CHANGE UP (ref 4.8–10.8)
WBC # FLD AUTO: 10.11 K/UL — SIGNIFICANT CHANGE UP (ref 4.8–10.8)

## 2020-12-29 PROCEDURE — 99232 SBSQ HOSP IP/OBS MODERATE 35: CPT

## 2020-12-29 RX ORDER — ACETAMINOPHEN 500 MG
650 TABLET ORAL EVERY 6 HOURS
Refills: 0 | Status: DISCONTINUED | OUTPATIENT
Start: 2020-12-29 | End: 2021-01-10

## 2020-12-29 RX ORDER — SOD SULF/SODIUM/NAHCO3/KCL/PEG
4000 SOLUTION, RECONSTITUTED, ORAL ORAL ONCE
Refills: 0 | Status: COMPLETED | OUTPATIENT
Start: 2020-12-29 | End: 2020-12-29

## 2020-12-29 RX ADMIN — BUDESONIDE AND FORMOTEROL FUMARATE DIHYDRATE 2 PUFF(S): 160; 4.5 AEROSOL RESPIRATORY (INHALATION) at 21:18

## 2020-12-29 RX ADMIN — DONEPEZIL HYDROCHLORIDE 5 MILLIGRAM(S): 10 TABLET, FILM COATED ORAL at 21:18

## 2020-12-29 RX ADMIN — Medication 650 MILLIGRAM(S): at 18:04

## 2020-12-29 RX ADMIN — PANTOPRAZOLE SODIUM 10 MG/HR: 20 TABLET, DELAYED RELEASE ORAL at 03:40

## 2020-12-29 RX ADMIN — MONTELUKAST 10 MILLIGRAM(S): 4 TABLET, CHEWABLE ORAL at 11:28

## 2020-12-29 RX ADMIN — Medication 500 MILLIGRAM(S): at 11:28

## 2020-12-29 RX ADMIN — CHLORHEXIDINE GLUCONATE 1 APPLICATION(S): 213 SOLUTION TOPICAL at 05:33

## 2020-12-29 RX ADMIN — Medication 20 MILLIGRAM(S): at 21:20

## 2020-12-29 RX ADMIN — NYSTATIN CREAM 1 APPLICATION(S): 100000 CREAM TOPICAL at 05:31

## 2020-12-29 RX ADMIN — Medication 50 MILLIGRAM(S): at 18:04

## 2020-12-29 RX ADMIN — INSULIN GLARGINE 13 UNIT(S): 100 INJECTION, SOLUTION SUBCUTANEOUS at 21:18

## 2020-12-29 RX ADMIN — BUDESONIDE AND FORMOTEROL FUMARATE DIHYDRATE 2 PUFF(S): 160; 4.5 AEROSOL RESPIRATORY (INHALATION) at 11:30

## 2020-12-29 RX ADMIN — PANTOPRAZOLE SODIUM 10 MG/HR: 20 TABLET, DELAYED RELEASE ORAL at 11:29

## 2020-12-29 RX ADMIN — Medication 650 MILLIGRAM(S): at 05:31

## 2020-12-29 RX ADMIN — Medication 4000 MILLILITER(S): at 18:04

## 2020-12-29 RX ADMIN — Medication 3 MILLIGRAM(S): at 21:18

## 2020-12-29 RX ADMIN — NYSTATIN CREAM 1 APPLICATION(S): 100000 CREAM TOPICAL at 18:05

## 2020-12-29 RX ADMIN — Medication 50 MILLIGRAM(S): at 05:31

## 2020-12-29 RX ADMIN — Medication 5 MILLIGRAM(S): at 05:31

## 2020-12-29 NOTE — PROGRESS NOTE ADULT - SUBJECTIVE AND OBJECTIVE BOX
Patient Information:  DRAKE HO / 80y / Male / MRN#:[id number]    Hospital Day: 6d    Interval History:  Patient seen and examined at bedside. No acute events overnight.    Past Medical History:  CHF (congestive heart failure)    Lymphedema of both lower extremities    COPD (chronic obstructive pulmonary disease)    Diabetes mellitus      Past Surgical History:  H/O right nephrectomy      Allergies:  No Known Allergies    Medications:  PRN:  ALBUTerol    90 MICROgram(s) HFA Inhaler 1 Puff(s) Inhalation every 8 hours PRN Bronchospasm    Standing:  ascorbic acid 500 milliGRAM(s) Oral daily  budesonide 160 MICROgram(s)/formoterol 4.5 MICROgram(s) Inhaler 2 Puff(s) Inhalation two times a day  busPIRone 5 milliGRAM(s) Oral <User Schedule>  chlorhexidine 4% Liquid 1 Application(s) Topical <User Schedule>  donepezil 5 milliGRAM(s) Oral at bedtime  hydrALAZINE 50 milliGRAM(s) Oral every 12 hours  insulin glargine Injectable (LANTUS) 13 Unit(s) SubCutaneous at bedtime  melatonin 3 milliGRAM(s) Oral at bedtime  montelukast 10 milliGRAM(s) Oral daily  nystatin Cream 1 Application(s) Topical two times a day  pantoprazole Infusion 8 mG/Hr (10 mL/Hr) IV Continuous <Continuous>  sodium bicarbonate 650 milliGRAM(s) Oral every 12 hours    Home:  albuterol 90 mcg/inh inhalation aerosol: 2 puff(s) inhaled every 4 hours  Aricept 5 mg oral tablet: 1 tab(s) orally once a day (at bedtime)  Breo Ellipta 200 mcg-25 mcg/inh inhalation powder: 1 puff(s) inhaled once a day  busPIRone 5 mg oral tablet: 1 tab(s) orally once a day  ferrous sulfate 325 mg (65 mg elemental iron) oral tablet: 1 tab(s) orally once a day  furosemide 20 mg oral tablet: 1 tab(s) orally once a day  hydrALAZINE 50 mg oral tablet: 1 tab(s) orally every 12 hours  Lantus 100 units/mL subcutaneous solution: 19 unit(s) subcutaneous once a day (at bedtime)  Melatonin 3 mg oral tablet: 1 tab(s) orally once a day (at bedtime)  montelukast 10 mg oral tablet: 1 tab(s) orally once a day  nitroglycerin 0.3 mg sublingual tablet: 1 tab(s) sublingual every 5 minutes no more than 3 doses  NovoLOG FlexPen 100 units/mL injectable solution: 6 unit(s) injectable 3 times a day (before meals)  nystatin 100,000 units/g topical cream: Apply topically to affected area 2 times a day  Protonix 40 mg oral delayed release tablet: 1 tab(s) orally 2 times a day  Retacrit 4000 units/mL preservative-free injectable solution: 4000 unit(s) injectable once a week. hold if Hgb &gt; 10  Senna 8.6 mg oral tablet: 2 tab(s) orally once a day (at bedtime), As Needed  Tylenol 325 mg oral tablet: 2 tab(s) orally 2 times a day, As Needed  Vitamin C 500 mg oral tablet: 1 tab(s) orally once a day    Vitals:  T(C): 37.1, Max: 37.1 (12-28-20 @ 14:20)  T(F): 98.8, Max: 98.8 (12-29-20 @ 12:45)  HR: 84 (78 - 89)  BP: 155/69 (117/56 - 169/70)  RR: 18 (18 - 18)  SpO2: --    Physical Exam:  General: Awake, alert, NAD, resting comfortably in bed, on RA  HEENT: Head NC/AT  Heart: RRR; S1/S2; no rubs, murmurs appreciated  Lungs: Clear to auscultation bilaterally, no wheezing, rales or rhonchi  Abdomen: Soft, nontender, nondistended, BS+  Extremities: RUE erythema and swelling from superficial thrombophlebitis  Neuro: NFD    Labs:  CBC (12-29 @ 08:09)                        Hgb: 8.2    WBC: 10.11 )-----------------( Plts: 283                              Hct: 25.0     Chem (12-29 @ 08:09)  Na: 134  |     Cl: 100     |  BUN: 63  -----------------------------------------< Gluc: 102    K: 4.0   |    HCO3: 20  |  Cr: 3.9    Ca 8.8 (12-29 @ 08:09)  Mg 1.8 (12-29 @ 08:09)    LFTs (12-29 @ 08:09)  TPro 6.1  /  Alb 3.1  TBili 1.3  /  DBili     AST 10  /  ALT <5  /  AlkPhos 57            Microbiology:    Radiology:

## 2020-12-29 NOTE — PROGRESS NOTE ADULT - ASSESSMENT
1. Mild ASHLEE in setting of GI bleed, likely pre-renal azotemia  2. CKD V non-dialysis dependent, baseline creatinine 3.3-4  3. GI bleed  4. HTN    Plan:    No urgent indication for renal replacement therapy  Plan for colonoscopy   Consider maintenance IVF if prolonged NPO status  Daily BMP  No NSAIDS  No IV contrast

## 2020-12-29 NOTE — PROGRESS NOTE ADULT - ASSESSMENT
80M with hisotry of CHF, Alzheimer's, COPD, DM, and ESRD not on HD who was sent from [facility] for rectal bleeding.    # Lower GI Bleed   - ASA stopped.   - Hgb 8.2 (baseline 8.4)   - s/p 1 PRBC transfusion 12/28/2020  - s/p 2Units PRBC at time of this admission   - no jenna blood today on perfecto   - Pt refuses to drink bowel prop and family still wants C-Scope to be done as this is a recurrent problem with EGDs that have been WNL  - Recommending to Place NGT and Start Bowel Prep w/ Golytely - Family is okay with this plan as long as pt gets C-Scope on this admission.   - Contact GI and start bowel prep as per GI recs     # DM   - Decrease to Lantus 13 (was hypo with his home dose 19 since not eating), SSI   - FS okay eating clear liquid diet which will continue at this time.      #ESRD (not on HD):  - At baseline Cr  - Nephro following     # Alzheimer's   - Continue home donepezil   - Home melatonin    # COPD   - Continue home albuterol, montelukast    GI PPx: Protonix    VTE PPx: held for bleeding c/w SCDs.     Diet: Clear Liquid Diet     Prognosis - Poor due to lack of care compliance (PMH: Dementia)     Dispo: Acute / Start Bowel Prep via NGT (and wrist restraints if needed) - HCP agreable. f/u am CBC / GI f/u

## 2020-12-29 NOTE — PROGRESS NOTE ADULT - ASSESSMENT
80M with hisotry of CHF, Alzheimer's, COPD, DM, and ESRD not on HD who was sent from [facility] for rectal bleeding.    #Lower GI Bleed   - Hb 7.7 on admission; currently 8.2 s/p 3 units pRBCs.  - Previous EGD normal.  - No jenna blood today on perfecto.  - Patient was refusing to drink bowel prep for colonoscopy however he agreed to drink it this morning.  - GI to follow for colonoscopy.  - If patient refuses to drink prep again, can place NGT and give the prep through it as per family's wishes.    #Superficial thrombophlebitis RUE  - Right cephalic vein thrombosis on duplex.  - Given likely GIB, cannot start a/c at this time.  - Heat pads and Tylenol for pain control for now.    #DM  - c/w Lantus 13 units qhs.  - Patient on clear liquids for now.      #ESRD (not on HD)  - Cr at baseline.  - Nephro following--no indication for RRT.    #Alzheimer's dementia  - Continue home donepezil  - Home melatonin    #COPD  - Continue home albuterol, montelukast    GI PPx: Protonix infusion  VTE PPx: held for bleeding c/w SCDs.   Diet: Clear Liquid Diet     Pending: colonoscopy, PT

## 2020-12-29 NOTE — PROGRESS NOTE ADULT - SUBJECTIVE AND OBJECTIVE BOX
Gastroenterology progress note:     Patient is a 80y old  Male who presents with a chief complaint of blood in stool (29 Dec 2020 14:15)       Admitted on: 12-23-20    We are following the patient for: maroon colored stool      Interval History: patient and family agreeable now for colonoscopy     Patient's medical problems are stable    Prior records reviewed (Y/N): Y  History obtained from someone other than patient (Y/N): Y      PAST MEDICAL & SURGICAL HISTORY:  CHF (congestive heart failure)    Lymphedema of both lower extremities    COPD (chronic obstructive pulmonary disease)    Diabetes mellitus    H/O right nephrectomy  20 yrs ago        MEDICATIONS  (STANDING):  ascorbic acid 500 milliGRAM(s) Oral daily  bisacodyl 20 milliGRAM(s) Oral once  budesonide 160 MICROgram(s)/formoterol 4.5 MICROgram(s) Inhaler 2 Puff(s) Inhalation two times a day  busPIRone 5 milliGRAM(s) Oral <User Schedule>  chlorhexidine 4% Liquid 1 Application(s) Topical <User Schedule>  donepezil 5 milliGRAM(s) Oral at bedtime  hydrALAZINE 50 milliGRAM(s) Oral every 12 hours  insulin glargine Injectable (LANTUS) 13 Unit(s) SubCutaneous at bedtime  melatonin 3 milliGRAM(s) Oral at bedtime  montelukast 10 milliGRAM(s) Oral daily  nystatin Cream 1 Application(s) Topical two times a day  pantoprazole Infusion 8 mG/Hr (10 mL/Hr) IV Continuous <Continuous>  polyethylene glycol/electrolyte Solution. 4000 milliLiter(s) Oral once  sodium bicarbonate 650 milliGRAM(s) Oral every 12 hours    MEDICATIONS  (PRN):  acetaminophen   Tablet .. 650 milliGRAM(s) Oral every 6 hours PRN Mild Pain (1 - 3), Moderate Pain (4 - 6)  ALBUTerol    90 MICROgram(s) HFA Inhaler 1 Puff(s) Inhalation every 8 hours PRN Bronchospasm      Allergies  No Known Allergies      Review of Systems:   Cardiovascular:  No Chest Pain, No Palpitations  Respiratory:  No Cough, No Dyspnea  Gastrointestinal:  As described in HPI    Physical Examination:  T(C): 37.1 (12-29-20 @ 12:45), Max: 37.1 (12-29-20 @ 12:45)  HR: 84 (12-29-20 @ 12:45) (78 - 88)  BP: 155/69 (12-29-20 @ 12:45) (135/60 - 169/70)  RR: 18 (12-29-20 @ 12:45) (18 - 18)  SpO2: --      12-28-20 @ 07:01  -  12-29-20 @ 07:00  --------------------------------------------------------  IN: 120 mL / OUT: 802 mL / NET: -682 mL    12-29-20 @ 07:01  -  12-29-20 @ 17:23  --------------------------------------------------------  IN: 300 mL / OUT: 101 mL / NET: 199 mL      Constitutional: No acute distress.  Respiratory:  No signs of respiratory distress. Lung sounds are clear bilaterally.  Cardiovascular:  S1 S2, Regular rate and rhythm.  Abdominal: Abdomen is soft, symmetric, and non-tender without distention. Bowel sounds are present and normoactive in all four quadrants. No masses, hepatomegaly, or splenomegaly are noted.   Skin: No rashes, No Jaundice.        Data: (reviewed by attending)                        8.2    10.11 )-----------( 283      ( 29 Dec 2020 08:09 )             25.0     Hgb trend:  8.2  12-29-20 @ 08:09  7.8  12-28-20 @ 22:16  7.1  12-28-20 @ 05:16  7.2  12-27-20 @ 20:00        12-29    134<L>  |  100  |  63<HH>  ----------------------------<  102<H>  4.0   |  20  |  3.9<H>    Ca    8.8      29 Dec 2020 08:09  Mg     1.8     12-29    TPro  6.1  /  Alb  3.1<L>  /  TBili  1.3<H>  /  DBili  x   /  AST  10  /  ALT  <5  /  AlkPhos  57  12-29    Liver panel trend:  TBili 1.3   /   AST 10   /   ALT <5   /   AlkP 57   /   Tptn 6.1   /   Alb 3.1    /   DBili --      12-29  TBili 0.3   /   AST 12   /   ALT 6   /   AlkP 71   /   Tptn 6.5   /   Alb 3.5    /   DBili --      12-26  TBili 0.2   /   AST 13   /   ALT 7   /   AlkP 63   /   Tptn 6.4   /   Alb 3.6    /   DBili --      12-25  TBili 0.2   /   AST 12   /   ALT 7   /   AlkP 63   /   Tptn 6.5   /   Alb 3.6    /   DBili --      12-24  TBili <0.2   /   AST 13   /   ALT 7   /   AlkP 62   /   Tptn 7.4   /   Alb 3.9    /   DBili <0.2      12-23             Radiology: (reviewed by attending)       Gastroenterology progress note:     Patient is a 80y old  Male who presents with a chief complaint of blood in stool (29 Dec 2020 14:15)       Admitted on: 12-23-20    We are following the patient for: maroon colored stool      Interval History: patient and family agreeable now for colonoscopy.  further history unobtainable    Patient's medical problems are stable    Prior records reviewed (Y/N): Y  History obtained from someone other than patient (Y/N): Y      PAST MEDICAL & SURGICAL HISTORY:  CHF (congestive heart failure)    Lymphedema of both lower extremities    COPD (chronic obstructive pulmonary disease)    Diabetes mellitus    H/O right nephrectomy  20 yrs ago        MEDICATIONS  (STANDING):  ascorbic acid 500 milliGRAM(s) Oral daily  bisacodyl 20 milliGRAM(s) Oral once  budesonide 160 MICROgram(s)/formoterol 4.5 MICROgram(s) Inhaler 2 Puff(s) Inhalation two times a day  busPIRone 5 milliGRAM(s) Oral <User Schedule>  chlorhexidine 4% Liquid 1 Application(s) Topical <User Schedule>  donepezil 5 milliGRAM(s) Oral at bedtime  hydrALAZINE 50 milliGRAM(s) Oral every 12 hours  insulin glargine Injectable (LANTUS) 13 Unit(s) SubCutaneous at bedtime  melatonin 3 milliGRAM(s) Oral at bedtime  montelukast 10 milliGRAM(s) Oral daily  nystatin Cream 1 Application(s) Topical two times a day  pantoprazole Infusion 8 mG/Hr (10 mL/Hr) IV Continuous <Continuous>  polyethylene glycol/electrolyte Solution. 4000 milliLiter(s) Oral once  sodium bicarbonate 650 milliGRAM(s) Oral every 12 hours    MEDICATIONS  (PRN):  acetaminophen   Tablet .. 650 milliGRAM(s) Oral every 6 hours PRN Mild Pain (1 - 3), Moderate Pain (4 - 6)  ALBUTerol    90 MICROgram(s) HFA Inhaler 1 Puff(s) Inhalation every 8 hours PRN Bronchospasm      Allergies  No Known Allergies      Review of Systems:   unobtainable    Physical Examination:  T(C): 37.1 (12-29-20 @ 12:45), Max: 37.1 (12-29-20 @ 12:45)  HR: 84 (12-29-20 @ 12:45) (78 - 88)  BP: 155/69 (12-29-20 @ 12:45) (135/60 - 169/70)  RR: 18 (12-29-20 @ 12:45) (18 - 18)  SpO2: --      12-28-20 @ 07:01  -  12-29-20 @ 07:00  --------------------------------------------------------  IN: 120 mL / OUT: 802 mL / NET: -682 mL    12-29-20 @ 07:01  -  12-29-20 @ 17:23  --------------------------------------------------------  IN: 300 mL / OUT: 101 mL / NET: 199 mL      Constitutional: No acute distress.  Respiratory:  No signs of respiratory distress. Lung sounds are clear bilaterally.  Cardiovascular:  S1 S2, Regular rate and rhythm.  Abdominal: Abdomen is soft, symmetric, and non-tender without distention. Bowel sounds are present and normoactive in all four quadrants. No masses, hepatomegaly, or splenomegaly are noted.   Skin: No rashes, No Jaundice.        Data: (reviewed by attending)                        8.2    10.11 )-----------( 283      ( 29 Dec 2020 08:09 )             25.0     Hgb trend:  8.2  12-29-20 @ 08:09  7.8  12-28-20 @ 22:16  7.1  12-28-20 @ 05:16  7.2  12-27-20 @ 20:00        12-29    134<L>  |  100  |  63<HH>  ----------------------------<  102<H>  4.0   |  20  |  3.9<H>    Ca    8.8      29 Dec 2020 08:09  Mg     1.8     12-29    TPro  6.1  /  Alb  3.1<L>  /  TBili  1.3<H>  /  DBili  x   /  AST  10  /  ALT  <5  /  AlkPhos  57  12-29    Liver panel trend:  TBili 1.3   /   AST 10   /   ALT <5   /   AlkP 57   /   Tptn 6.1   /   Alb 3.1    /   DBili --      12-29  TBili 0.3   /   AST 12   /   ALT 6   /   AlkP 71   /   Tptn 6.5   /   Alb 3.5    /   DBili --      12-26  TBili 0.2   /   AST 13   /   ALT 7   /   AlkP 63   /   Tptn 6.4   /   Alb 3.6    /   DBili --      12-25  TBili 0.2   /   AST 12   /   ALT 7   /   AlkP 63   /   Tptn 6.5   /   Alb 3.6    /   DBili --      12-24  TBili <0.2   /   AST 13   /   ALT 7   /   AlkP 62   /   Tptn 7.4   /   Alb 3.9    /   DBili <0.2      12-23

## 2020-12-29 NOTE — PROGRESS NOTE ADULT - SUBJECTIVE AND OBJECTIVE BOX
[facility] FOLLOW UP NOTE  --------------------------------------------------------------------------------  24 hour events/subjective: Patient examined. Appears comfortable.    PAST HISTORY  --------------------------------------------------------------------------------  No significant changes to PMH, PSH, FHx, SHx, unless otherwise noted    ALLERGIES & MEDICATIONS  --------------------------------------------------------------------------------  Allergies    No Known Allergies    Intolerances      Standing Inpatient Medications  ascorbic acid 500 milliGRAM(s) Oral daily  budesonide 160 MICROgram(s)/formoterol 4.5 MICROgram(s) Inhaler 2 Puff(s) Inhalation two times a day  busPIRone 5 milliGRAM(s) Oral <User Schedule>  chlorhexidine 4% Liquid 1 Application(s) Topical <User Schedule>  donepezil 5 milliGRAM(s) Oral at bedtime  hydrALAZINE 50 milliGRAM(s) Oral every 12 hours  insulin glargine Injectable (LANTUS) 13 Unit(s) SubCutaneous at bedtime  melatonin 3 milliGRAM(s) Oral at bedtime  montelukast 10 milliGRAM(s) Oral daily  nystatin Cream 1 Application(s) Topical two times a day  pantoprazole Infusion 8 mG/Hr IV Continuous <Continuous>  sodium bicarbonate 650 milliGRAM(s) Oral every 12 hours    PRN Inpatient Medications  ALBUTerol    90 MICROgram(s) HFA Inhaler 1 Puff(s) Inhalation every 8 hours PRN      VITALS/PHYSICAL EXAM  --------------------------------------------------------------------------------  T(C): 37.1 (12-29-20 @ 12:45), Max: 37.1 (12-28-20 @ 14:20)  HR: 84 (12-29-20 @ 12:45) (78 - 89)  BP: 155/69 (12-29-20 @ 12:45) (117/56 - 169/70)  RR: 18 (12-29-20 @ 12:45) (18 - 18)    12-28-20 @ 07:01  -  12-29-20 @ 07:00  --------------------------------------------------------  IN: 120 mL / OUT: 802 mL / NET: -682 mL    12-29-20 @ 07:01  -  12-29-20 @ 13:32  --------------------------------------------------------  IN: 300 mL / OUT: 101 mL / NET: 199 mL      Physical Exam:  	Gen: NAD  	Pulm: CTA B/L  	CV: RRR, S1S2  	Abd: +BS, soft, nontender/nondistended  	: No suprapubic tenderness  	LE: Warm, no edema    LABS/STUDIES  --------------------------------------------------------------------------------              8.2    10.11 >-----------<  283      [12-29-20 @ 08:09]              25.0     134  |  100  |  63  ----------------------------<  102      [12-29-20 @ 08:09]  4.0   |  20  |  3.9        Ca     8.8     [12-29-20 @ 08:09]      Mg     1.8     [12-29-20 @ 08:09]    TPro  6.1  /  Alb  3.1  /  TBili  1.3  /  DBili  x   /  AST  10  /  ALT  <5  /  AlkPhos  57  [12-29-20 @ 08:09]    Creatinine Trend:  SCr 3.9 [12-29 @ 08:09]  SCr 4.3 [12-28 @ 05:16]  SCr 3.7 [12-26 @ 06:22]  SCr 4.1 [12-25 @ 06:03]  SCr 4.3 [12-24 @ 04:30]    PTH -- (Ca 8.9)      [12-24-20 @ 04:30]   54

## 2020-12-29 NOTE — CHART NOTE - NSCHARTNOTEFT_GEN_A_CORE
Registered Dietitian Follow-Up     Patient Profile Reviewed                           Yes [x]   No []     Nutrition History Previously Obtained        Yes [x]  No []       Pertinent Medical Interventions: Admitted with blood in stool. GI bleed noted. Per latest GI progress notes, no further bleed. Plan for EGD & colonoscopy in the am (pt & family previously declined, now agreeable).     Diet order: Clear liquid diet.     Anthropometrics:  - Ht. 172.7 cm.  - Wt. 76 kg (12/25) vs 75.2 kg (12/23).  - BMI 25.2 (using dosing wt 75.2 kg)  - IBW 70 kg.     Pertinent Lab Data: 12/29: RBC-2.89, H/H-8.2/25.0, POCT gluc-103 (21:17) vs 114 (8:57), Na-134, BUN-63, creat-3.9, gluc-102     Pertinent Meds: insulin glargine, vit C, protonix     Physical Findings:  - Appearance: disoriented  - GI function: last BM 12/29  - Tubes: no feeding tubes  - Oral/Mouth cavity: no chewing/swallowing difficulty reported  - Skin: excoriation noted     Nutrition Requirements  Weight Used: 75.2 kg per 12/25 RD assessment     Estimated Energy Needs    Continue [x]  Adjust []  0817-0593 kcal/day (MSJ x 1.2-1.3 AF)        Estimated Protein Needs    Continue [x]  Adjust []  60-68 gm/day (0.8-0.9 gm/kg CBW)        Estimated Fluid Needs        Continue [x]  Adjust []  1 mL/kcal or per LIP     Nutrient Intake: Poor po intake on clear liquid diet - consuming 25% of meals d/t weakness & poor appetite + abd discomfort.        [x] Previous Nutrition Diagnosis: Inadequate protein-energy intake            [x] Ongoing          [] Resolved     Nutrition Intervention: Meals & Snacks, Medical food Supplements     Goal/Expected Outcome: Pt to demonstrate tolerance to diet order, with at least 50% po intake over next 3 days.     Indicator/Monitoring: Energy intake, glucose profile, renal profile, nutrition focused physical findings, body composition.    Recommendation: (1) Check HgbA1C. (2) When medically feasible to advance diet, order Glucerna q12hrs. Will monitor po intake & tolerance following diet advance & need for therapeutic diet restrictions. Review with LIP [id number].

## 2020-12-29 NOTE — PROGRESS NOTE ADULT - ASSESSMENT
80 years old male from [facility] with PMHx of HF with unknown EF, HTN, anxiety, Alzheimer's dementia, COPD not on home O2, T2DM on insulin, hx right nephrectomy and ESRD not on HD, anemia of chronic disease, brought in by EMS with a complaint of burgundy stool found in diaper for the past 2 days. Patient is a poor historian so history was taken from chart review and NH nurse Irlanda. Patient has no prior episode. Patient has been on Aspirin and was stopped due to maroon coloured stool. He also has lost 20 lbs in one month and has poor appetite. Patient did not complains of any abdominal pain, nausea/vomiting, diarrhea or constipation.  A stool guaiac test was positive.     #GI bleed / weight loss: upper versus lower GI tract pathology  ( r/o PUD , AVM versus colon caner )   - VS stable  - hgb: 7.1 on admission, baseline hgb: 9.4  - received 3 units of PRBC during hospitalization   - No further GI bleed   - Patient  and family  are agreeable to have GI procedures now, before he and family declined       Rec:  - Monitor CBC  - active type and screen  - keep hgb>8  - monitor electrolyte and correct accordingly  - EGD and colon in am   -family is agreeable for NG tube placement if required to give the prep    80 years old male from [facility] with PMHx of HF with unknown EF, HTN, anxiety, Alzheimer's dementia, COPD not on home O2, T2DM on insulin, hx right nephrectomy and ESRD not on HD, anemia of chronic disease, brought in by EMS with a complaint of burgundy stool found in diaper for the past 2 days. Patient is a poor historian so history was taken from chart review and NH nurse Irlanda. Patient has no prior episode. Patient has been on Aspirin and was stopped due to maroon coloured stool. He also has lost 20 lbs in one month and has poor appetite. Patient did not complains of any abdominal pain, nausea/vomiting, diarrhea or constipation.  A stool guaiac test was positive.     #GI bleed / weight loss: upper versus lower GI tract pathology  ( r/o PUD , AVM versus colon caner )   - VS stable  - hgb: 7.1 on admission, baseline hgb: 9.4  - received 3 units of PRBC during hospitalization   - No further GI bleed   - Patient  and family  are agreeable to have GI procedures now, (previously they declined )      Rec:  - Monitor CBC  - active type and screen  - keep hgb>8  - monitor electrolyte and correct accordingly  - EGD and colon in am   -family is agreeable for NG tube placement if required to give the prep  (golytely and dulcolax)

## 2020-12-30 ENCOUNTER — RESULT REVIEW (OUTPATIENT)
Age: 80
End: 2020-12-30

## 2020-12-30 ENCOUNTER — TRANSCRIPTION ENCOUNTER (OUTPATIENT)
Age: 80
End: 2020-12-30

## 2020-12-30 LAB
ALBUMIN SERPL ELPH-MCNC: 3.4 G/DL — LOW (ref 3.5–5.2)
ALP SERPL-CCNC: 64 U/L — SIGNIFICANT CHANGE UP (ref 30–115)
ALT FLD-CCNC: 5 U/L — SIGNIFICANT CHANGE UP (ref 0–41)
ANION GAP SERPL CALC-SCNC: 16 MMOL/L — HIGH (ref 7–14)
AST SERPL-CCNC: 12 U/L — SIGNIFICANT CHANGE UP (ref 0–41)
BASOPHILS # BLD AUTO: 0.04 K/UL — SIGNIFICANT CHANGE UP (ref 0–0.2)
BASOPHILS NFR BLD AUTO: 0.4 % — SIGNIFICANT CHANGE UP (ref 0–1)
BILIRUB SERPL-MCNC: 0.3 MG/DL — SIGNIFICANT CHANGE UP (ref 0.2–1.2)
BUN SERPL-MCNC: 55 MG/DL — HIGH (ref 10–20)
CALCIUM SERPL-MCNC: 8.5 MG/DL — SIGNIFICANT CHANGE UP (ref 8.5–10.1)
CHLORIDE SERPL-SCNC: 102 MMOL/L — SIGNIFICANT CHANGE UP (ref 98–110)
CO2 SERPL-SCNC: 21 MMOL/L — SIGNIFICANT CHANGE UP (ref 17–32)
CREAT SERPL-MCNC: 3.9 MG/DL — HIGH (ref 0.7–1.5)
EOSINOPHIL # BLD AUTO: 1.31 K/UL — HIGH (ref 0–0.7)
EOSINOPHIL NFR BLD AUTO: 13.7 % — HIGH (ref 0–8)
GLUCOSE BLDC GLUCOMTR-MCNC: 103 MG/DL — HIGH (ref 70–99)
GLUCOSE BLDC GLUCOMTR-MCNC: 69 MG/DL — LOW (ref 70–99)
GLUCOSE BLDC GLUCOMTR-MCNC: 82 MG/DL — SIGNIFICANT CHANGE UP (ref 70–99)
GLUCOSE SERPL-MCNC: 77 MG/DL — SIGNIFICANT CHANGE UP (ref 70–99)
HCT VFR BLD CALC: 27.7 % — LOW (ref 42–52)
HGB BLD-MCNC: 8.9 G/DL — LOW (ref 14–18)
IMM GRANULOCYTES NFR BLD AUTO: 0.5 % — HIGH (ref 0.1–0.3)
LYMPHOCYTES # BLD AUTO: 2.09 K/UL — SIGNIFICANT CHANGE UP (ref 1.2–3.4)
LYMPHOCYTES # BLD AUTO: 21.8 % — SIGNIFICANT CHANGE UP (ref 20.5–51.1)
MAGNESIUM SERPL-MCNC: 1.8 MG/DL — SIGNIFICANT CHANGE UP (ref 1.8–2.4)
MCHC RBC-ENTMCNC: 27.8 PG — SIGNIFICANT CHANGE UP (ref 27–31)
MCHC RBC-ENTMCNC: 32.1 G/DL — SIGNIFICANT CHANGE UP (ref 32–37)
MCV RBC AUTO: 86.6 FL — SIGNIFICANT CHANGE UP (ref 80–94)
MONOCYTES # BLD AUTO: 1.32 K/UL — HIGH (ref 0.1–0.6)
MONOCYTES NFR BLD AUTO: 13.8 % — HIGH (ref 1.7–9.3)
NEUTROPHILS # BLD AUTO: 4.77 K/UL — SIGNIFICANT CHANGE UP (ref 1.4–6.5)
NEUTROPHILS NFR BLD AUTO: 49.8 % — SIGNIFICANT CHANGE UP (ref 42.2–75.2)
NRBC # BLD: 0 /100 WBCS — SIGNIFICANT CHANGE UP (ref 0–0)
PLATELET # BLD AUTO: 325 K/UL — SIGNIFICANT CHANGE UP (ref 130–400)
POTASSIUM SERPL-MCNC: 4.3 MMOL/L — SIGNIFICANT CHANGE UP (ref 3.5–5)
POTASSIUM SERPL-SCNC: 4.3 MMOL/L — SIGNIFICANT CHANGE UP (ref 3.5–5)
PROT SERPL-MCNC: 6.4 G/DL — SIGNIFICANT CHANGE UP (ref 6–8)
RBC # BLD: 3.2 M/UL — LOW (ref 4.7–6.1)
RBC # FLD: 14.3 % — SIGNIFICANT CHANGE UP (ref 11.5–14.5)
SARS-COV-2 RNA SPEC QL NAA+PROBE: SIGNIFICANT CHANGE UP
SODIUM SERPL-SCNC: 139 MMOL/L — SIGNIFICANT CHANGE UP (ref 135–146)
WBC # BLD: 9.58 K/UL — SIGNIFICANT CHANGE UP (ref 4.8–10.8)
WBC # FLD AUTO: 9.58 K/UL — SIGNIFICANT CHANGE UP (ref 4.8–10.8)

## 2020-12-30 PROCEDURE — 88312 SPECIAL STAINS GROUP 1: CPT | Mod: 26

## 2020-12-30 PROCEDURE — 88305 TISSUE EXAM BY PATHOLOGIST: CPT | Mod: 26

## 2020-12-30 PROCEDURE — 88341 IMHCHEM/IMCYTCHM EA ADD ANTB: CPT | Mod: 26

## 2020-12-30 PROCEDURE — 99232 SBSQ HOSP IP/OBS MODERATE 35: CPT

## 2020-12-30 PROCEDURE — 43239 EGD BIOPSY SINGLE/MULTIPLE: CPT | Mod: XS

## 2020-12-30 PROCEDURE — 88342 IMHCHEM/IMCYTCHM 1ST ANTB: CPT | Mod: 26

## 2020-12-30 PROCEDURE — 45330 DIAGNOSTIC SIGMOIDOSCOPY: CPT | Mod: XS

## 2020-12-30 RX ADMIN — Medication 50 MILLIGRAM(S): at 05:28

## 2020-12-30 RX ADMIN — Medication 500 MILLIGRAM(S): at 11:11

## 2020-12-30 RX ADMIN — Medication 50 MILLIGRAM(S): at 17:50

## 2020-12-30 RX ADMIN — Medication 3 MILLIGRAM(S): at 21:08

## 2020-12-30 RX ADMIN — NYSTATIN CREAM 1 APPLICATION(S): 100000 CREAM TOPICAL at 05:28

## 2020-12-30 RX ADMIN — Medication 650 MILLIGRAM(S): at 17:50

## 2020-12-30 RX ADMIN — DONEPEZIL HYDROCHLORIDE 5 MILLIGRAM(S): 10 TABLET, FILM COATED ORAL at 21:08

## 2020-12-30 RX ADMIN — PANTOPRAZOLE SODIUM 10 MG/HR: 20 TABLET, DELAYED RELEASE ORAL at 13:46

## 2020-12-30 RX ADMIN — NYSTATIN CREAM 1 APPLICATION(S): 100000 CREAM TOPICAL at 17:51

## 2020-12-30 RX ADMIN — Medication 5 MILLIGRAM(S): at 05:28

## 2020-12-30 RX ADMIN — INSULIN GLARGINE 13 UNIT(S): 100 INJECTION, SOLUTION SUBCUTANEOUS at 21:08

## 2020-12-30 RX ADMIN — BUDESONIDE AND FORMOTEROL FUMARATE DIHYDRATE 2 PUFF(S): 160; 4.5 AEROSOL RESPIRATORY (INHALATION) at 21:08

## 2020-12-30 RX ADMIN — CHLORHEXIDINE GLUCONATE 1 APPLICATION(S): 213 SOLUTION TOPICAL at 05:27

## 2020-12-30 RX ADMIN — MONTELUKAST 10 MILLIGRAM(S): 4 TABLET, CHEWABLE ORAL at 11:11

## 2020-12-30 RX ADMIN — Medication 650 MILLIGRAM(S): at 05:28

## 2020-12-30 RX ADMIN — PANTOPRAZOLE SODIUM 10 MG/HR: 20 TABLET, DELAYED RELEASE ORAL at 00:54

## 2020-12-30 NOTE — PROGRESS NOTE ADULT - SUBJECTIVE AND OBJECTIVE BOX
Patient Information:  DRAKE HO / 80y / Male / MRN#:[id number]    Hospital Day: 7d    Interval History:  Patient seen and examined at bedside. No acute events overnight. Colonoscopy/EGD today.    Past Medical History:  CHF (congestive heart failure)    Lymphedema of both lower extremities    COPD (chronic obstructive pulmonary disease)    Diabetes mellitus      Past Surgical History:  H/O right nephrectomy      Allergies:  No Known Allergies    Medications:  PRN:  acetaminophen   Tablet .. 650 milliGRAM(s) Oral every 6 hours PRN Mild Pain (1 - 3), Moderate Pain (4 - 6)  ALBUTerol    90 MICROgram(s) HFA Inhaler 1 Puff(s) Inhalation every 8 hours PRN Bronchospasm    Standing:  ascorbic acid 500 milliGRAM(s) Oral daily  budesonide 160 MICROgram(s)/formoterol 4.5 MICROgram(s) Inhaler 2 Puff(s) Inhalation two times a day  busPIRone 5 milliGRAM(s) Oral <User Schedule>  chlorhexidine 4% Liquid 1 Application(s) Topical <User Schedule>  donepezil 5 milliGRAM(s) Oral at bedtime  hydrALAZINE 50 milliGRAM(s) Oral every 12 hours  insulin glargine Injectable (LANTUS) 13 Unit(s) SubCutaneous at bedtime  melatonin 3 milliGRAM(s) Oral at bedtime  montelukast 10 milliGRAM(s) Oral daily  nystatin Cream 1 Application(s) Topical two times a day  pantoprazole Infusion 8 mG/Hr (10 mL/Hr) IV Continuous <Continuous>  sodium bicarbonate 650 milliGRAM(s) Oral every 12 hours    Home:  albuterol 90 mcg/inh inhalation aerosol: 2 puff(s) inhaled every 4 hours  Aricept 5 mg oral tablet: 1 tab(s) orally once a day (at bedtime)  Breo Ellipta 200 mcg-25 mcg/inh inhalation powder: 1 puff(s) inhaled once a day  busPIRone 5 mg oral tablet: 1 tab(s) orally once a day  ferrous sulfate 325 mg (65 mg elemental iron) oral tablet: 1 tab(s) orally once a day  furosemide 20 mg oral tablet: 1 tab(s) orally once a day  hydrALAZINE 50 mg oral tablet: 1 tab(s) orally every 12 hours  Lantus 100 units/mL subcutaneous solution: 19 unit(s) subcutaneous once a day (at bedtime)  Melatonin 3 mg oral tablet: 1 tab(s) orally once a day (at bedtime)  montelukast 10 mg oral tablet: 1 tab(s) orally once a day  nitroglycerin 0.3 mg sublingual tablet: 1 tab(s) sublingual every 5 minutes no more than 3 doses  NovoLOG FlexPen 100 units/mL injectable solution: 6 unit(s) injectable 3 times a day (before meals)  nystatin 100,000 units/g topical cream: Apply topically to affected area 2 times a day  Protonix 40 mg oral delayed release tablet: 1 tab(s) orally 2 times a day  Retacrit 4000 units/mL preservative-free injectable solution: 4000 unit(s) injectable once a week. hold if Hgb &gt; 10  Senna 8.6 mg oral tablet: 2 tab(s) orally once a day (at bedtime), As Needed  Tylenol 325 mg oral tablet: 2 tab(s) orally 2 times a day, As Needed  Vitamin C 500 mg oral tablet: 1 tab(s) orally once a day    Vitals:  T(C): 36.7, Max: 37.3 (12-29-20 @ 17:00)  T(F): 98, Max: 99.1 (12-29-20 @ 17:00)  HR: 92 (84 - 100)  BP: 154/69 (134/73 - 172/75)  RR: 18 (18 - 18)  SpO2: --    Physical Exam:  General: Awake, alert, NAD, resting comfortably in bed, on RA  HEENT: Head NC/AT  Heart: RRR; S1/S2; no rubs, murmurs appreciated  Lungs: Clear to auscultation bilaterally, no wheezing, rales or rhonchi  Abdomen: Soft, nontender, nondistended, BS+  Extremities: RUE erythema and swelling from superficial thrombophlebitis - improved  Neuro: NFD    Labs:  CBC (12-30 @ 06:05)                        Hgb: 8.9    WBC: 9.58  )-----------------( Plts: 325                              Hct: 27.7     Chem (12-30 @ 06:05)  Na: 139  |     Cl: 102     |  BUN: 55  -----------------------------------------< Gluc: 77    K: 4.3   |    HCO3: 21  |  Cr: 3.9    Ca 8.5 (12-30 @ 06:05)  Mg 1.8 (12-30 @ 06:05)    LFTs (12-30 @ 06:05)  TPro 6.4  /  Alb 3.4  TBili 0.3  /  DBili     AST 12  /  ALT 5   /  AlkPhos 64            Microbiology:    Radiology:     Patient Information:  DRAKE HO / 80y / Male / MRN#:[id number]    Hospital Day: 7d    Interval History:  Patient seen and examined at bedside. No acute events overnight. Colonoscopy/EGD today.    Past Medical History:  CHF (congestive heart failure)    Lymphedema of both lower extremities    COPD (chronic obstructive pulmonary disease)    Diabetes mellitus    Past Surgical History:  H/O right nephrectomy    Allergies:  No Known Allergies    Medications:  PRN:  acetaminophen   Tablet .. 650 milliGRAM(s) Oral every 6 hours PRN Mild Pain (1 - 3), Moderate Pain (4 - 6)  ALBUTerol    90 MICROgram(s) HFA Inhaler 1 Puff(s) Inhalation every 8 hours PRN Bronchospasm    Standing:  ascorbic acid 500 milliGRAM(s) Oral daily  budesonide 160 MICROgram(s)/formoterol 4.5 MICROgram(s) Inhaler 2 Puff(s) Inhalation two times a day  busPIRone 5 milliGRAM(s) Oral <User Schedule>  chlorhexidine 4% Liquid 1 Application(s) Topical <User Schedule>  donepezil 5 milliGRAM(s) Oral at bedtime  hydrALAZINE 50 milliGRAM(s) Oral every 12 hours  insulin glargine Injectable (LANTUS) 13 Unit(s) SubCutaneous at bedtime  melatonin 3 milliGRAM(s) Oral at bedtime  montelukast 10 milliGRAM(s) Oral daily  nystatin Cream 1 Application(s) Topical two times a day  pantoprazole Infusion 8 mG/Hr (10 mL/Hr) IV Continuous <Continuous>  sodium bicarbonate 650 milliGRAM(s) Oral every 12 hours    Home:  albuterol 90 mcg/inh inhalation aerosol: 2 puff(s) inhaled every 4 hours  Aricept 5 mg oral tablet: 1 tab(s) orally once a day (at bedtime)  Breo Ellipta 200 mcg-25 mcg/inh inhalation powder: 1 puff(s) inhaled once a day  busPIRone 5 mg oral tablet: 1 tab(s) orally once a day  ferrous sulfate 325 mg (65 mg elemental iron) oral tablet: 1 tab(s) orally once a day  furosemide 20 mg oral tablet: 1 tab(s) orally once a day  hydrALAZINE 50 mg oral tablet: 1 tab(s) orally every 12 hours  Lantus 100 units/mL subcutaneous solution: 19 unit(s) subcutaneous once a day (at bedtime)  Melatonin 3 mg oral tablet: 1 tab(s) orally once a day (at bedtime)  montelukast 10 mg oral tablet: 1 tab(s) orally once a day  nitroglycerin 0.3 mg sublingual tablet: 1 tab(s) sublingual every 5 minutes no more than 3 doses  NovoLOG FlexPen 100 units/mL injectable solution: 6 unit(s) injectable 3 times a day (before meals)  nystatin 100,000 units/g topical cream: Apply topically to affected area 2 times a day  Protonix 40 mg oral delayed release tablet: 1 tab(s) orally 2 times a day  Retacrit 4000 units/mL preservative-free injectable solution: 4000 unit(s) injectable once a week. hold if Hgb &gt; 10  Senna 8.6 mg oral tablet: 2 tab(s) orally once a day (at bedtime), As Needed  Tylenol 325 mg oral tablet: 2 tab(s) orally 2 times a day, As Needed  Vitamin C 500 mg oral tablet: 1 tab(s) orally once a day    Vitals:  T(C): 36.7, Max: 37.3 (12-29-20 @ 17:00)  T(F): 98, Max: 99.1 (12-29-20 @ 17:00)  HR: 92 (84 - 100)  BP: 154/69 (134/73 - 172/75)  RR: 18 (18 - 18)  SpO2: --    Physical Exam:  General: Awake, alert, NAD, resting comfortably in bed, on RA  HEENT: Head NC/AT  Heart: RRR; S1/S2; no rubs, murmurs appreciated  Lungs: Clear to auscultation bilaterally, no wheezing, rales or rhonchi  Abdomen: Soft, nontender, nondistended, BS+  Extremities: RUE erythema and swelling from superficial thrombophlebitis - improved  Neuro: NFD    Labs:  CBC (12-30 @ 06:05)                        Hgb: 8.9    WBC: 9.58  )-----------------( Plts: 325                              Hct: 27.7     Chem (12-30 @ 06:05)  Na: 139  |     Cl: 102     |  BUN: 55  -----------------------------------------< Gluc: 77    K: 4.3   |    HCO3: 21  |  Cr: 3.9    Ca 8.5 (12-30 @ 06:05)  Mg 1.8 (12-30 @ 06:05)    LFTs (12-30 @ 06:05)  TPro 6.4  /  Alb 3.4  TBili 0.3  /  DBili     AST 12  /  ALT 5   /  AlkPhos 64

## 2020-12-30 NOTE — PHYSICAL THERAPY INITIAL EVALUATION ADULT - GENERAL OBSERVATIONS, REHAB EVAL
9:00 PT attempted to see pt for IE, however pt currently doing bowel prep for colonoscopy this AM, actively having bowel movement. PT to f/u.

## 2020-12-30 NOTE — CHART NOTE - NSCHARTNOTEFT_GEN_A_CORE
PACU ANESTHESIA ADMISSION NOTE      Procedure:   Post op diagnosis:      ____  Intubated  TV:______       Rate: ______      FiO2: ______    ____  Patent Airway    ____  Full return of protective reflexes    ____  Full recovery from anesthesia / back to baseline     Vitals:   T:   98f        R:  18                BP: 102/53                 Sat:99                  P: 73      Mental Status:  __x__ Awake   _____ Alert   _____ Drowsy   _____ Sedated    Nausea/Vomiting:  __x__ NO  ______Yes,   See Post - Op Orders          Pain Scale (0-10):  ___0__    Treatment: ____ None    ____ See Post - Op/PCA Orders    Post - Operative Fluids:   ____ Oral   _x___ See Post - Op Orders    Plan: Discharge:   ____Home       __x___Floor     _____Critical Care    _____  Other:_________________    Comments: Patient awake, VS stable, no anesthesia complications.

## 2020-12-30 NOTE — PROGRESS NOTE ADULT - SUBJECTIVE AND OBJECTIVE BOX
[place] [facility] FOLLOW UP NOTE  --------------------------------------------------------------------------------  24 hour events/subjective: Patient examined. Appears comfortable.    PAST HISTORY  --------------------------------------------------------------------------------  No significant changes to PMH, PSH, FHx, SHx, unless otherwise noted    ALLERGIES & MEDICATIONS  --------------------------------------------------------------------------------  Allergies    No Known Allergies    Intolerances      Standing Inpatient Medications  ascorbic acid 500 milliGRAM(s) Oral daily  budesonide 160 MICROgram(s)/formoterol 4.5 MICROgram(s) Inhaler 2 Puff(s) Inhalation two times a day  busPIRone 5 milliGRAM(s) Oral <User Schedule>  chlorhexidine 4% Liquid 1 Application(s) Topical <User Schedule>  donepezil 5 milliGRAM(s) Oral at bedtime  hydrALAZINE 50 milliGRAM(s) Oral every 12 hours  insulin glargine Injectable (LANTUS) 13 Unit(s) SubCutaneous at bedtime  melatonin 3 milliGRAM(s) Oral at bedtime  montelukast 10 milliGRAM(s) Oral daily  nystatin Cream 1 Application(s) Topical two times a day  pantoprazole Infusion 8 mG/Hr IV Continuous <Continuous>  sodium bicarbonate 650 milliGRAM(s) Oral every 12 hours    PRN Inpatient Medications  acetaminophen   Tablet .. 650 milliGRAM(s) Oral every 6 hours PRN  ALBUTerol    90 MICROgram(s) HFA Inhaler 1 Puff(s) Inhalation every 8 hours PRN      VITALS/PHYSICAL EXAM  --------------------------------------------------------------------------------  T(C): 36.7 (12-30-20 @ 10:09), Max: 37.3 (12-29-20 @ 17:00)  HR: 92 (12-30-20 @ 10:09) (88 - 100)  BP: 154/69 (12-30-20 @ 10:09) (134/73 - 172/75)  RR: 18 (12-30-20 @ 10:09) (18 - 18)  SpO2: --  Wt(kg): --        12-29-20 @ 07:01  -  12-30-20 @ 07:00  --------------------------------------------------------  IN: 380 mL / OUT: 801 mL / NET: -421 mL      Physical Exam:  	Gen: NAD  	Pulm: CTA B/L  	CV: RRR, S1S2  	Abd: +BS, soft, nontender/nondistended  	: No suprapubic tenderness  	LE: Warm, trace edema    LABS/STUDIES  --------------------------------------------------------------------------------              8.9    9.58  >-----------<  325      [12-30-20 @ 06:05]              27.7     139  |  102  |  55  ----------------------------<  77      [12-30-20 @ 06:05]  4.3   |  21  |  3.9        Ca     8.5     [12-30-20 @ 06:05]      Mg     1.8     [12-30-20 @ 06:05]    TPro  6.4  /  Alb  3.4  /  TBili  0.3  /  DBili  x   /  AST  12  /  ALT  5   /  AlkPhos  64  [12-30-20 @ 06:05]          Creatinine Trend:  SCr 3.9 [12-30 @ 06:05]  SCr 3.9 [12-29 @ 08:09]  SCr 4.3 [12-28 @ 05:16]  SCr 3.7 [12-26 @ 06:22]  SCr 4.1 [12-25 @ 06:03]        PTH -- (Ca 8.9)      [12-24-20 @ 04:30]   54

## 2020-12-30 NOTE — PROGRESS NOTE ADULT - ASSESSMENT
80M with hisotry of CHF, Alzheimer's, COPD, DM, and ESRD not on HD who was sent from [facility] for rectal bleeding.    #Lower GI Bleed   - Hb 7.7 on admission; currently 8.9 s/p 3 units pRBCs.  - Previous EGD normal.  - No jenna blood today on perfecto.  - EGD/colonoscopy today.    #Superficial thrombophlebitis RUE - improved  - Right cephalic vein thrombosis on duplex.  - Given likely GIB, cannot start a/c at this time.  - Heat pads and Tylenol for pain control for now.    #DM  - c/w Lantus 13 units qhs.  - Patient on clear liquids for now.      #ESRD (not on HD)  - Cr at baseline.  - Nephro following--no indication for RRT.    #Alzheimer's dementia  - Continue home donepezil  - Home melatonin    #COPD  - Continue home albuterol, montelukast    GI PPx: Protonix infusion  VTE PPx: held for bleeding c/w SCDs.   Diet: Clear Liquid Diet     Pending: colonoscopy, PT 80M with hisotry of CHF, Alzheimer's, COPD, DM, and ESRD not on HD who was sent from [facility] for rectal bleeding.    #Lower GI Bleed   - Hb 7.7 on admission; currently 8.9 s/p 3 units pRBCs.  - Previous EGD normal.  - No jenna blood today on perfecto.  - EGD/colonoscopy today.    #Superficial thrombophlebitis RUE - improved  - Right cephalic vein thrombosis on duplex.  - Given likely GIB, cannot start a/c at this time.  - Heat pads and Tylenol for pain control for now.    #DM  - c/w Lantus 13 units qhs.  - Patient on clear liquids for now.      #ESRD (not on HD)  - Cr at baseline.  - Nephro following--no indication for RRT.    #Alzheimer's dementia  - Continue home donepezil  - Home melatonin    #COPD  - Continue home albuterol, montelukast    GI PPx: Protonix infusion  VTE PPx: held for bleeding c/w SCDs.   Diet: Clear Liquid Diet     Pending: colonoscopy, PT    ATTENDING ATTESTATION:  Pt seen and examined. Case and Plan discussed with resident covering and agree with above note as reviewed.  Pt is awaiting Colonoscopy which must be done in house due to recurrent GIB w/ NL EGD per Family - HCP.  Monitor CBC as pt has received Blood Transfusions on this admission.  Pt now drinking go-lytely but slowly and he's s/p Enemas but was not able to retain it.  Pls f/u with GI for further C-Scope Plans timing for now c/w bowel prep    Dx: Advanced Dementia w/ Acute Blood loss anemia 2/2 GIB / ESRD - HD per Routine     Dispo: C-SCOPE likely tomorrow / GI f/u / f/u CBC

## 2020-12-31 LAB
ALBUMIN SERPL ELPH-MCNC: 3.1 G/DL — LOW (ref 3.5–5.2)
ALP SERPL-CCNC: 59 U/L — SIGNIFICANT CHANGE UP (ref 30–115)
ALT FLD-CCNC: <5 U/L — SIGNIFICANT CHANGE UP (ref 0–41)
ANION GAP SERPL CALC-SCNC: 15 MMOL/L — HIGH (ref 7–14)
AST SERPL-CCNC: 12 U/L — SIGNIFICANT CHANGE UP (ref 0–41)
BASOPHILS # BLD AUTO: 0.07 K/UL — SIGNIFICANT CHANGE UP (ref 0–0.2)
BASOPHILS NFR BLD AUTO: 0.8 % — SIGNIFICANT CHANGE UP (ref 0–1)
BILIRUB SERPL-MCNC: 0.9 MG/DL — SIGNIFICANT CHANGE UP (ref 0.2–1.2)
BUN SERPL-MCNC: 47 MG/DL — HIGH (ref 10–20)
CALCIUM SERPL-MCNC: 8.7 MG/DL — SIGNIFICANT CHANGE UP (ref 8.5–10.1)
CHLORIDE SERPL-SCNC: 105 MMOL/L — SIGNIFICANT CHANGE UP (ref 98–110)
CO2 SERPL-SCNC: 21 MMOL/L — SIGNIFICANT CHANGE UP (ref 17–32)
CREAT SERPL-MCNC: 3.7 MG/DL — HIGH (ref 0.7–1.5)
EOSINOPHIL # BLD AUTO: 1.39 K/UL — HIGH (ref 0–0.7)
EOSINOPHIL NFR BLD AUTO: 15.3 % — HIGH (ref 0–8)
GLUCOSE BLDC GLUCOMTR-MCNC: 111 MG/DL — HIGH (ref 70–99)
GLUCOSE BLDC GLUCOMTR-MCNC: 88 MG/DL — SIGNIFICANT CHANGE UP (ref 70–99)
GLUCOSE SERPL-MCNC: 74 MG/DL — SIGNIFICANT CHANGE UP (ref 70–99)
HCT VFR BLD CALC: 26.3 % — LOW (ref 42–52)
HGB BLD-MCNC: 8.5 G/DL — LOW (ref 14–18)
IMM GRANULOCYTES NFR BLD AUTO: 0.1 % — SIGNIFICANT CHANGE UP (ref 0.1–0.3)
LYMPHOCYTES # BLD AUTO: 2.37 K/UL — SIGNIFICANT CHANGE UP (ref 1.2–3.4)
LYMPHOCYTES # BLD AUTO: 26.1 % — SIGNIFICANT CHANGE UP (ref 20.5–51.1)
MAGNESIUM SERPL-MCNC: 1.7 MG/DL — LOW (ref 1.8–2.4)
MCHC RBC-ENTMCNC: 28.3 PG — SIGNIFICANT CHANGE UP (ref 27–31)
MCHC RBC-ENTMCNC: 32.3 G/DL — SIGNIFICANT CHANGE UP (ref 32–37)
MCV RBC AUTO: 87.7 FL — SIGNIFICANT CHANGE UP (ref 80–94)
MONOCYTES # BLD AUTO: 1.31 K/UL — HIGH (ref 0.1–0.6)
MONOCYTES NFR BLD AUTO: 14.4 % — HIGH (ref 1.7–9.3)
NEUTROPHILS # BLD AUTO: 3.93 K/UL — SIGNIFICANT CHANGE UP (ref 1.4–6.5)
NEUTROPHILS NFR BLD AUTO: 43.3 % — SIGNIFICANT CHANGE UP (ref 42.2–75.2)
NRBC # BLD: 0 /100 WBCS — SIGNIFICANT CHANGE UP (ref 0–0)
PLATELET # BLD AUTO: 311 K/UL — SIGNIFICANT CHANGE UP (ref 130–400)
POTASSIUM SERPL-MCNC: 4 MMOL/L — SIGNIFICANT CHANGE UP (ref 3.5–5)
POTASSIUM SERPL-SCNC: 4 MMOL/L — SIGNIFICANT CHANGE UP (ref 3.5–5)
PROT SERPL-MCNC: 6.2 G/DL — SIGNIFICANT CHANGE UP (ref 6–8)
RBC # BLD: 3 M/UL — LOW (ref 4.7–6.1)
RBC # FLD: 14.3 % — SIGNIFICANT CHANGE UP (ref 11.5–14.5)
SODIUM SERPL-SCNC: 141 MMOL/L — SIGNIFICANT CHANGE UP (ref 135–146)
WBC # BLD: 9.08 K/UL — SIGNIFICANT CHANGE UP (ref 4.8–10.8)
WBC # FLD AUTO: 9.08 K/UL — SIGNIFICANT CHANGE UP (ref 4.8–10.8)

## 2020-12-31 PROCEDURE — 99233 SBSQ HOSP IP/OBS HIGH 50: CPT

## 2020-12-31 RX ORDER — SOD SULF/SODIUM/NAHCO3/KCL/PEG
4000 SOLUTION, RECONSTITUTED, ORAL ORAL ONCE
Refills: 0 | Status: COMPLETED | OUTPATIENT
Start: 2021-01-03 | End: 2021-01-04

## 2020-12-31 RX ORDER — SOD SULF/SODIUM/NAHCO3/KCL/PEG
4000 SOLUTION, RECONSTITUTED, ORAL ORAL ONCE
Refills: 0 | Status: DISCONTINUED | OUTPATIENT
Start: 2021-01-02 | End: 2021-01-02

## 2020-12-31 RX ORDER — MULTIVIT WITH MIN/MFOLATE/K2 340-15/3 G
1 POWDER (GRAM) ORAL ONCE
Refills: 0 | Status: COMPLETED | OUTPATIENT
Start: 2020-12-31 | End: 2021-01-01

## 2020-12-31 RX ADMIN — DONEPEZIL HYDROCHLORIDE 5 MILLIGRAM(S): 10 TABLET, FILM COATED ORAL at 21:42

## 2020-12-31 RX ADMIN — NYSTATIN CREAM 1 APPLICATION(S): 100000 CREAM TOPICAL at 05:45

## 2020-12-31 RX ADMIN — INSULIN GLARGINE 13 UNIT(S): 100 INJECTION, SOLUTION SUBCUTANEOUS at 21:41

## 2020-12-31 RX ADMIN — Medication 5 MILLIGRAM(S): at 05:45

## 2020-12-31 RX ADMIN — MONTELUKAST 10 MILLIGRAM(S): 4 TABLET, CHEWABLE ORAL at 11:38

## 2020-12-31 RX ADMIN — PANTOPRAZOLE SODIUM 10 MG/HR: 20 TABLET, DELAYED RELEASE ORAL at 01:47

## 2020-12-31 RX ADMIN — Medication 50 MILLIGRAM(S): at 17:23

## 2020-12-31 RX ADMIN — Medication 500 MILLIGRAM(S): at 11:37

## 2020-12-31 RX ADMIN — Medication 50 MILLIGRAM(S): at 05:45

## 2020-12-31 RX ADMIN — PANTOPRAZOLE SODIUM 10 MG/HR: 20 TABLET, DELAYED RELEASE ORAL at 12:00

## 2020-12-31 RX ADMIN — Medication 650 MILLIGRAM(S): at 05:45

## 2020-12-31 RX ADMIN — Medication 650 MILLIGRAM(S): at 17:24

## 2020-12-31 RX ADMIN — CHLORHEXIDINE GLUCONATE 1 APPLICATION(S): 213 SOLUTION TOPICAL at 05:44

## 2020-12-31 RX ADMIN — Medication 3 MILLIGRAM(S): at 21:42

## 2020-12-31 RX ADMIN — BUDESONIDE AND FORMOTEROL FUMARATE DIHYDRATE 2 PUFF(S): 160; 4.5 AEROSOL RESPIRATORY (INHALATION) at 21:41

## 2020-12-31 RX ADMIN — NYSTATIN CREAM 1 APPLICATION(S): 100000 CREAM TOPICAL at 17:24

## 2020-12-31 NOTE — PROGRESS NOTE ADULT - SUBJECTIVE AND OBJECTIVE BOX
Patient Information:  DRAKE HO / 80y / Male / MRN#:[id number]    Hospital Day: 8d    Interval History:  Patient seen and examined at bedside. No acute events overnight. s/p colonoscopy/EGD yesterday with suboptimal prep, will need repeat colonoscopy Monday.    Past Medical History:  CHF (congestive heart failure)    Lymphedema of both lower extremities    COPD (chronic obstructive pulmonary disease)    Diabetes mellitus      Past Surgical History:  H/O right nephrectomy      Allergies:  No Known Allergies    Medications:  PRN:  acetaminophen   Tablet .. 650 milliGRAM(s) Oral every 6 hours PRN Mild Pain (1 - 3), Moderate Pain (4 - 6)  ALBUTerol    90 MICROgram(s) HFA Inhaler 1 Puff(s) Inhalation every 8 hours PRN Bronchospasm    Standing:  ascorbic acid 500 milliGRAM(s) Oral daily  budesonide 160 MICROgram(s)/formoterol 4.5 MICROgram(s) Inhaler 2 Puff(s) Inhalation two times a day  busPIRone 5 milliGRAM(s) Oral <User Schedule>  chlorhexidine 4% Liquid 1 Application(s) Topical <User Schedule>  donepezil 5 milliGRAM(s) Oral at bedtime  hydrALAZINE 50 milliGRAM(s) Oral every 12 hours  insulin glargine Injectable (LANTUS) 13 Unit(s) SubCutaneous at bedtime  melatonin 3 milliGRAM(s) Oral at bedtime  montelukast 10 milliGRAM(s) Oral daily  nystatin Cream 1 Application(s) Topical two times a day  pantoprazole Infusion 8 mG/Hr (10 mL/Hr) IV Continuous <Continuous>  sodium bicarbonate 650 milliGRAM(s) Oral every 12 hours    Home:  albuterol 90 mcg/inh inhalation aerosol: 2 puff(s) inhaled every 4 hours  Aricept 5 mg oral tablet: 1 tab(s) orally once a day (at bedtime)  Breo Ellipta 200 mcg-25 mcg/inh inhalation powder: 1 puff(s) inhaled once a day  busPIRone 5 mg oral tablet: 1 tab(s) orally once a day  ferrous sulfate 325 mg (65 mg elemental iron) oral tablet: 1 tab(s) orally once a day  furosemide 20 mg oral tablet: 1 tab(s) orally once a day  hydrALAZINE 50 mg oral tablet: 1 tab(s) orally every 12 hours  Lantus 100 units/mL subcutaneous solution: 19 unit(s) subcutaneous once a day (at bedtime)  Melatonin 3 mg oral tablet: 1 tab(s) orally once a day (at bedtime)  montelukast 10 mg oral tablet: 1 tab(s) orally once a day  nitroglycerin 0.3 mg sublingual tablet: 1 tab(s) sublingual every 5 minutes no more than 3 doses  NovoLOG FlexPen 100 units/mL injectable solution: 6 unit(s) injectable 3 times a day (before meals)  nystatin 100,000 units/g topical cream: Apply topically to affected area 2 times a day  Protonix 40 mg oral delayed release tablet: 1 tab(s) orally 2 times a day  Retacrit 4000 units/mL preservative-free injectable solution: 4000 unit(s) injectable once a week. hold if Hgb &gt; 10  Senna 8.6 mg oral tablet: 2 tab(s) orally once a day (at bedtime), As Needed  Tylenol 325 mg oral tablet: 2 tab(s) orally 2 times a day, As Needed  Vitamin C 500 mg oral tablet: 1 tab(s) orally once a day    Vitals:  T(C): 36, Max: 36.8 (12-30-20 @ 14:48)  T(F): 96.8, Max: 98.3 (12-30-20 @ 14:48)  HR: 89 (74 - 93)  BP: 149/66 (89/80 - 162/70)  RR: 18 (18 - 20)  SpO2: 95% (95% - 98%)    Physical Exam:  General: Awake, alert, NAD, resting comfortably in bed, on RA  HEENT: Head NC/AT  Heart: RRR; S1/S2; no rubs, murmurs appreciated  Lungs: Clear to auscultation bilaterally, no wheezing, rales or rhonchi  Abdomen: Soft, nontender, nondistended, BS+  Extremities: RUE erythema and swelling from superficial thrombophlebitis - improved  Neuro: NFD    Labs:  CBC (12-30 @ 06:05)                        Hgb: 8.9    WBC: 9.58  )-----------------( Plts: 325                              Hct: 27.7     Chem (12-30 @ 06:05)  Na: 139  |     Cl: 102     |  BUN: 55  -----------------------------------------< Gluc: 77    K: 4.3   |    HCO3: 21  |  Cr: 3.9    Ca 8.5 (12-30 @ 06:05)  Mg 1.8 (12-30 @ 06:05)    LFTs (12-30 @ 06:05)  TPro 6.4  /  Alb 3.4  TBili 0.3  /  DBili     AST 12  /  ALT 5   /  AlkPhos 64            Microbiology:    Radiology:     Patient Information:  DRAKE HO / 80y / Male / MRN#:[id number]    Hospital Day: 8d    Interval History:  Patient seen and examined at bedside. No acute events overnight. s/p colonoscopy/EGD yesterday with suboptimal prep, will need repeat colonoscopy Monday.    Past Medical History:  CHF (congestive heart failure)    Lymphedema of both lower extremities    COPD (chronic obstructive pulmonary disease)    Diabetes mellitus      Past Surgical History:  H/O right nephrectomy      Allergies:  No Known Allergies    Medications:  PRN:  acetaminophen   Tablet .. 650 milliGRAM(s) Oral every 6 hours PRN Mild Pain (1 - 3), Moderate Pain (4 - 6)  ALBUTerol    90 MICROgram(s) HFA Inhaler 1 Puff(s) Inhalation every 8 hours PRN Bronchospasm    Standing:  ascorbic acid 500 milliGRAM(s) Oral daily  budesonide 160 MICROgram(s)/formoterol 4.5 MICROgram(s) Inhaler 2 Puff(s) Inhalation two times a day  busPIRone 5 milliGRAM(s) Oral <User Schedule>  chlorhexidine 4% Liquid 1 Application(s) Topical <User Schedule>  donepezil 5 milliGRAM(s) Oral at bedtime  hydrALAZINE 50 milliGRAM(s) Oral every 12 hours  insulin glargine Injectable (LANTUS) 13 Unit(s) SubCutaneous at bedtime  melatonin 3 milliGRAM(s) Oral at bedtime  montelukast 10 milliGRAM(s) Oral daily  nystatin Cream 1 Application(s) Topical two times a day  pantoprazole Infusion 8 mG/Hr (10 mL/Hr) IV Continuous <Continuous>  sodium bicarbonate 650 milliGRAM(s) Oral every 12 hours    Home:  albuterol 90 mcg/inh inhalation aerosol: 2 puff(s) inhaled every 4 hours  Aricept 5 mg oral tablet: 1 tab(s) orally once a day (at bedtime)  Breo Ellipta 200 mcg-25 mcg/inh inhalation powder: 1 puff(s) inhaled once a day  busPIRone 5 mg oral tablet: 1 tab(s) orally once a day  ferrous sulfate 325 mg (65 mg elemental iron) oral tablet: 1 tab(s) orally once a day  furosemide 20 mg oral tablet: 1 tab(s) orally once a day  hydrALAZINE 50 mg oral tablet: 1 tab(s) orally every 12 hours  Lantus 100 units/mL subcutaneous solution: 19 unit(s) subcutaneous once a day (at bedtime)  Melatonin 3 mg oral tablet: 1 tab(s) orally once a day (at bedtime)  montelukast 10 mg oral tablet: 1 tab(s) orally once a day  nitroglycerin 0.3 mg sublingual tablet: 1 tab(s) sublingual every 5 minutes no more than 3 doses  NovoLOG FlexPen 100 units/mL injectable solution: 6 unit(s) injectable 3 times a day (before meals)  nystatin 100,000 units/g topical cream: Apply topically to affected area 2 times a day  Protonix 40 mg oral delayed release tablet: 1 tab(s) orally 2 times a day  Retacrit 4000 units/mL preservative-free injectable solution: 4000 unit(s) injectable once a week. hold if Hgb &gt; 10  Senna 8.6 mg oral tablet: 2 tab(s) orally once a day (at bedtime), As Needed  Tylenol 325 mg oral tablet: 2 tab(s) orally 2 times a day, As Needed  Vitamin C 500 mg oral tablet: 1 tab(s) orally once a day    Vitals:  T(C): 36, Max: 36.8 (12-30-20 @ 14:48)  T(F): 96.8, Max: 98.3 (12-30-20 @ 14:48)  HR: 89 (74 - 93)  BP: 149/66 (89/80 - 162/70)  RR: 18 (18 - 20)  SpO2: 95% (95% - 98%) on RA    Physical Exam:  General: Awake, alert, NAD, resting comfortably in bed, on RA  HEENT: Head NC/AT  Heart: RRR; S1/S2; no rubs, murmurs appreciated  Lungs: Clear to auscultation bilaterally, no wheezing, rales or rhonchi  Abdomen: Soft, nontender, nondistended, BS+  Extremities: RUE erythema and swelling from superficial thrombophlebitis - improved  Neuro: NFD    Labs:  CBC (12-30 @ 06:05)                        Hgb: 8.9    WBC: 9.58  )-----------------( Plts: 325                              Hct: 27.7     Chem (12-30 @ 06:05)  Na: 139  |     Cl: 102     |  BUN: 55  -----------------------------------------< Gluc: 77    K: 4.3   |    HCO3: 21  |  Cr: 3.9    Ca 8.5 (12-30 @ 06:05)  Mg 1.8 (12-30 @ 06:05)    LFTs (12-30 @ 06:05)  TPro 6.4  /  Alb 3.4  TBili 0.3  /  DBili     AST 12  /  ALT 5   /  AlkPhos 64

## 2020-12-31 NOTE — PROGRESS NOTE ADULT - ASSESSMENT
80M with hisotry of CHF, Alzheimer's, COPD, DM, and ESRD not on HD who was sent from [facility] for rectal bleeding.    #Lower GI Bleed   - Hb 7.7 on admission; currently 8.9 s/p 3 units pRBCs.  - EGD: duodenum nodule, f/u pathology.  - Colonoscopy with suboptimal prep, stool found in rectum.  - Will need repeat colonoscopy on Monday.  - Patient will need to be prepped Saturday and Sunday for colonoscopy on Monday.  - ****Please switch to clear liquid diet on Saturday 1/2/2021.  - Two days of clear liquid diet and two days of Golytely, each day 4 L in addition to 20 mg of dulcolax on Saturday.  - Patient will likely need NG tube on Saturday for prep as he refused to drink it earlier and later drank only 2/3 of his Golytely which was inadequate; family is agreeable to this.    #Superficial thrombophlebitis RUE - improved  - Right cephalic vein thrombosis on duplex.  - Given likely GIB, cannot start a/c at this time.  - Heat pads and Tylenol for pain control for now.    #DM  - c/w Lantus 13 units qhs.      #ESRD (not on HD)  - Cr at baseline.  - Nephro following--no indication for RRT.    #Alzheimer's dementia  - Continue home donepezil  - Home melatonin    #COPD  - Continue home albuterol, montelukast    GI PPx: Protonix infusion  VTE PPx: held for bleeding c/w SCDs.     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    #Lower GI Bleed   - Hb 7.7 on admission; currently 8.9 s/p 3 units pRBCs.  - EGD: duodenum nodule, f/u pathology.  - Colonoscopy with suboptimal prep, stool found in rectum.  - Will need repeat colonoscopy on Monday.  - Patient will need to be prepped Saturday and Sunday for colonoscopy on Monday.  - ****Please switch to clear liquid diet on Saturday 1/2/2021.  - Two days of clear liquid diet and two days of Golytely, each day 4 L in addition to 20 mg of dulcolax on Saturday.  - Patient will likely need NG tube on Saturday for prep as he refused to drink it earlier and later drank only 2/3 of his Golytely which was inadequate; family is agreeable to this.    #Superficial thrombophlebitis RUE - improved  - Right cephalic vein thrombosis on duplex.  - Heat pads and Tylenol for pain control for now.    #DM  - c/w Lantus 13 units qhs.      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  - Nephro following--no indication for RRT.    #Alzheimer's dementia  - Continue home donepezil  - Home melatonin    #COPD  - Continue home albuterol, montelukast    GI PPx: Protonix infusion  VTE PPx: held for bleeding   c/w SCDs.     full code status    Pending: colonoscopy on Monday  Disposition: SNF

## 2020-12-31 NOTE — PROGRESS NOTE ADULT - ATTENDING COMMENTS
Pt seen and examined independently. Comfortable in bed. No complaints.  Colonoscopy could not be completed yesterday due to poor prep.  Now pt will be prepped for 2 days and colonoscopy rescheduled for Monday.  Plan as above.  Hgb remains stable  monitor for bleeding  on PPI infusion for now  guarded prognosis  hemodynamically stable    I discussed the case with the resident and I reviewed his note.  Agree with the physical exam, assessment and plan with additions and corrections as above.

## 2020-12-31 NOTE — PROGRESS NOTE ADULT - SUBJECTIVE AND OBJECTIVE BOX
[place] NEPHROLOGY FOLLOW UP NOTE  --------------------------------------------------------------------------------  24 hour events/subjective: Patient examined. Appears comfortable.    PAST HISTORY  --------------------------------------------------------------------------------  No significant changes to PMH, PSH, FHx, SHx, unless otherwise noted    ALLERGIES & MEDICATIONS  --------------------------------------------------------------------------------  Allergies    No Known Allergies    Standing Inpatient Medications  ascorbic acid 500 milliGRAM(s) Oral daily  budesonide 160 MICROgram(s)/formoterol 4.5 MICROgram(s) Inhaler 2 Puff(s) Inhalation two times a day  busPIRone 5 milliGRAM(s) Oral <User Schedule>  chlorhexidine 4% Liquid 1 Application(s) Topical <User Schedule>  donepezil 5 milliGRAM(s) Oral at bedtime  hydrALAZINE 50 milliGRAM(s) Oral every 12 hours  insulin glargine Injectable (LANTUS) 13 Unit(s) SubCutaneous at bedtime  melatonin 3 milliGRAM(s) Oral at bedtime  montelukast 10 milliGRAM(s) Oral daily  nystatin Cream 1 Application(s) Topical two times a day  pantoprazole Infusion 8 mG/Hr IV Continuous <Continuous>  sodium bicarbonate 650 milliGRAM(s) Oral every 12 hours    PRN Inpatient Medications  acetaminophen   Tablet .. 650 milliGRAM(s) Oral every 6 hours PRN  ALBUTerol    90 MICROgram(s) HFA Inhaler 1 Puff(s) Inhalation every 8 hours PRN    VITALS/PHYSICAL EXAM  --------------------------------------------------------------------------------  T(C): 36.1 (12-31-20 @ 13:10), Max: 36.8 (12-30-20 @ 14:48)  HR: 99 (12-31-20 @ 13:10) (80 - 99)  BP: 150/68 (12-31-20 @ 13:10) (89/80 - 162/70)  RR: 20 (12-31-20 @ 13:10) (18 - 20)  SpO2: 95% (12-30-20 @ 16:23) (95% - 98%)  Wt(kg): --  Height (cm): 172.7 (12-30-20 @ 15:12)  Weight (kg): 75.2 (12-30-20 @ 15:12)  BMI (kg/m2): 25.2 (12-30-20 @ 15:12)  BSA (m2): 1.89 (12-30-20 @ 15:12)      12-30-20 @ 07:01  -  12-31-20 @ 07:00  --------------------------------------------------------  IN: 210 mL / OUT: 0 mL / NET: 210 mL    12-31-20 @ 07:01  -  12-31-20 @ 14:36  --------------------------------------------------------  IN: 60 mL / OUT: 0 mL / NET: 60 mL      Physical Exam:  	Gen: NAD  	Pulm: CTA B/L  	CV: RRR, S1S2  	Abd: +BS, soft, nontender/nondistended  	: No suprapubic tenderness  	LE: Warm, trace edema    LABS/STUDIES  --------------------------------------------------------------------------------              8.5    9.08  >-----------<  311      [12-31-20 @ 11:26]              26.3     141  |  105  |  47  ----------------------------<  74      [12-31-20 @ 11:26]  4.0   |  21  |  3.7        Ca     8.7     [12-31-20 @ 11:26]      Mg     1.7     [12-31-20 @ 11:26]    TPro  6.2  /  Alb  3.1  /  TBili  0.9  /  DBili  x   /  AST  12  /  ALT  <5  /  AlkPhos  59  [12-31-20 @ 11:26]    Creatinine Trend:  SCr 3.7 [12-31 @ 11:26]  SCr 3.9 [12-30 @ 06:05]  SCr 3.9 [12-29 @ 08:09]  SCr 4.3 [12-28 @ 05:16]  SCr 3.7 [12-26 @ 06:22]    PTH -- (Ca 8.9)      [12-24-20 @ 04:30]   54

## 2020-12-31 NOTE — PROGRESS NOTE ADULT - ASSESSMENT
1. Mild ASHLEE in setting of GI bleed, likely pre-renal azotemia  2. CKD V non-dialysis dependent, baseline creatinine 3.3-4  3. GI bleed  4. HTN    Plan:    No urgent indication for renal replacement therapy  Colonoscopy poor prep, repeat colonoscopy Monady  Consider maintenance IVF if prolonged NPO status  Daily BMP  No NSAIDS  No IV contrast

## 2021-01-01 ENCOUNTER — APPOINTMENT (OUTPATIENT)
Dept: CARDIOLOGY | Facility: CLINIC | Age: 81
End: 2021-01-01

## 2021-01-01 ENCOUNTER — RESULT REVIEW (OUTPATIENT)
Age: 81
End: 2021-01-01

## 2021-01-01 ENCOUNTER — TRANSCRIPTION ENCOUNTER (OUTPATIENT)
Age: 81
End: 2021-01-01

## 2021-01-01 ENCOUNTER — EMERGENCY (EMERGENCY)
Facility: HOSPITAL | Age: 81
LOS: 0 days | Discharge: HOME | End: 2021-07-25
Attending: STUDENT IN AN ORGANIZED HEALTH CARE EDUCATION/TRAINING PROGRAM
Payer: MEDICARE

## 2021-01-01 ENCOUNTER — APPOINTMENT (OUTPATIENT)
Dept: CARDIOTHORACIC SURGERY | Facility: CLINIC | Age: 81
End: 2021-01-01

## 2021-01-01 ENCOUNTER — INPATIENT (INPATIENT)
Facility: HOSPITAL | Age: 81
LOS: 6 days | Discharge: NURSING, REHAB OR LONG-TERM CARE | End: 2021-09-01
Attending: INTERNAL MEDICINE | Admitting: INTERNAL MEDICINE
Payer: MEDICARE

## 2021-01-01 ENCOUNTER — INPATIENT (INPATIENT)
Facility: HOSPITAL | Age: 81
LOS: 7 days | Discharge: NURSING, REHAB OR LONG-TERM CARE | End: 2021-07-20
Attending: INTERNAL MEDICINE | Admitting: INTERNAL MEDICINE
Payer: MEDICARE

## 2021-01-01 ENCOUNTER — EMERGENCY (EMERGENCY)
Facility: HOSPITAL | Age: 81
LOS: 0 days | Discharge: HOME | End: 2021-09-02
Attending: STUDENT IN AN ORGANIZED HEALTH CARE EDUCATION/TRAINING PROGRAM | Admitting: STUDENT IN AN ORGANIZED HEALTH CARE EDUCATION/TRAINING PROGRAM
Payer: MEDICARE

## 2021-01-01 ENCOUNTER — INPATIENT (INPATIENT)
Facility: HOSPITAL | Age: 81
LOS: 4 days | Discharge: NURSING, REHAB OR LONG-TERM CARE | End: 2021-07-31
Attending: STUDENT IN AN ORGANIZED HEALTH CARE EDUCATION/TRAINING PROGRAM | Admitting: STUDENT IN AN ORGANIZED HEALTH CARE EDUCATION/TRAINING PROGRAM
Payer: MEDICARE

## 2021-01-01 ENCOUNTER — INPATIENT (INPATIENT)
Facility: HOSPITAL | Age: 81
LOS: 11 days | Discharge: NURSING, REHAB OR LONG-TERM CARE | End: 2021-08-20
Attending: INTERNAL MEDICINE | Admitting: INTERNAL MEDICINE
Payer: MEDICARE

## 2021-01-01 ENCOUNTER — INPATIENT (INPATIENT)
Facility: HOSPITAL | Age: 81
LOS: 10 days | Discharge: HOME | End: 2021-10-02
Attending: STUDENT IN AN ORGANIZED HEALTH CARE EDUCATION/TRAINING PROGRAM | Admitting: STUDENT IN AN ORGANIZED HEALTH CARE EDUCATION/TRAINING PROGRAM
Payer: MEDICARE

## 2021-01-01 VITALS
HEIGHT: 68 IN | DIASTOLIC BLOOD PRESSURE: 70 MMHG | SYSTOLIC BLOOD PRESSURE: 153 MMHG | TEMPERATURE: 100 F | HEART RATE: 83 BPM | RESPIRATION RATE: 20 BRPM | OXYGEN SATURATION: 100 %

## 2021-01-01 VITALS
OXYGEN SATURATION: 100 % | RESPIRATION RATE: 24 BRPM | HEIGHT: 68 IN | SYSTOLIC BLOOD PRESSURE: 138 MMHG | DIASTOLIC BLOOD PRESSURE: 63 MMHG | HEART RATE: 88 BPM | TEMPERATURE: 98 F

## 2021-01-01 VITALS
OXYGEN SATURATION: 88 % | SYSTOLIC BLOOD PRESSURE: 145 MMHG | RESPIRATION RATE: 24 BRPM | TEMPERATURE: 99 F | WEIGHT: 179.9 LBS | HEIGHT: 68 IN | HEART RATE: 83 BPM | DIASTOLIC BLOOD PRESSURE: 86 MMHG

## 2021-01-01 VITALS
RESPIRATION RATE: 18 BRPM | DIASTOLIC BLOOD PRESSURE: 74 MMHG | TEMPERATURE: 99 F | HEART RATE: 100 BPM | SYSTOLIC BLOOD PRESSURE: 122 MMHG

## 2021-01-01 VITALS
SYSTOLIC BLOOD PRESSURE: 133 MMHG | DIASTOLIC BLOOD PRESSURE: 75 MMHG | OXYGEN SATURATION: 97 % | TEMPERATURE: 100 F | HEART RATE: 108 BPM | RESPIRATION RATE: 18 BRPM | HEIGHT: 68 IN

## 2021-01-01 VITALS
HEART RATE: 85 BPM | TEMPERATURE: 98 F | OXYGEN SATURATION: 100 % | RESPIRATION RATE: 20 BRPM | DIASTOLIC BLOOD PRESSURE: 65 MMHG | SYSTOLIC BLOOD PRESSURE: 156 MMHG

## 2021-01-01 VITALS
SYSTOLIC BLOOD PRESSURE: 116 MMHG | TEMPERATURE: 98 F | OXYGEN SATURATION: 96 % | WEIGHT: 145.06 LBS | RESPIRATION RATE: 16 BRPM | HEIGHT: 68 IN | HEART RATE: 96 BPM | DIASTOLIC BLOOD PRESSURE: 56 MMHG

## 2021-01-01 VITALS — RESPIRATION RATE: 18 BRPM | SYSTOLIC BLOOD PRESSURE: 141 MMHG | DIASTOLIC BLOOD PRESSURE: 72 MMHG | HEART RATE: 67 BPM

## 2021-01-01 VITALS
HEART RATE: 75 BPM | TEMPERATURE: 99 F | DIASTOLIC BLOOD PRESSURE: 61 MMHG | RESPIRATION RATE: 18 BRPM | SYSTOLIC BLOOD PRESSURE: 130 MMHG

## 2021-01-01 VITALS
RESPIRATION RATE: 18 BRPM | TEMPERATURE: 98 F | DIASTOLIC BLOOD PRESSURE: 62 MMHG | SYSTOLIC BLOOD PRESSURE: 134 MMHG | HEART RATE: 78 BPM

## 2021-01-01 VITALS
DIASTOLIC BLOOD PRESSURE: 57 MMHG | HEART RATE: 88 BPM | RESPIRATION RATE: 18 BRPM | SYSTOLIC BLOOD PRESSURE: 112 MMHG | TEMPERATURE: 98 F

## 2021-01-01 VITALS
SYSTOLIC BLOOD PRESSURE: 140 MMHG | TEMPERATURE: 96 F | HEART RATE: 96 BPM | DIASTOLIC BLOOD PRESSURE: 62 MMHG | HEIGHT: 68 IN | RESPIRATION RATE: 21 BRPM | OXYGEN SATURATION: 100 %

## 2021-01-01 VITALS
SYSTOLIC BLOOD PRESSURE: 136 MMHG | RESPIRATION RATE: 22 BRPM | OXYGEN SATURATION: 98 % | DIASTOLIC BLOOD PRESSURE: 63 MMHG | TEMPERATURE: 98 F | WEIGHT: 153 LBS | HEART RATE: 82 BPM | HEIGHT: 68 IN

## 2021-01-01 DIAGNOSIS — Z66 DO NOT RESUSCITATE: ICD-10-CM

## 2021-01-01 DIAGNOSIS — I13.2 HYPERTENSIVE HEART AND CHRONIC KIDNEY DISEASE WITH HEART FAILURE AND WITH STAGE 5 CHRONIC KIDNEY DISEASE, OR END STAGE RENAL DISEASE: ICD-10-CM

## 2021-01-01 DIAGNOSIS — N39.0 URINARY TRACT INFECTION, SITE NOT SPECIFIED: ICD-10-CM

## 2021-01-01 DIAGNOSIS — L89.626 PRESSURE-INDUCED DEEP TISSUE DAMAGE OF LEFT HEEL: ICD-10-CM

## 2021-01-01 DIAGNOSIS — I35.0 NONRHEUMATIC AORTIC (VALVE) STENOSIS: ICD-10-CM

## 2021-01-01 DIAGNOSIS — E78.5 HYPERLIPIDEMIA, UNSPECIFIED: ICD-10-CM

## 2021-01-01 DIAGNOSIS — I21.A1 MYOCARDIAL INFARCTION TYPE 2: ICD-10-CM

## 2021-01-01 DIAGNOSIS — I08.3 COMBINED RHEUMATIC DISORDERS OF MITRAL, AORTIC AND TRICUSPID VALVES: ICD-10-CM

## 2021-01-01 DIAGNOSIS — Z99.2 DEPENDENCE ON RENAL DIALYSIS: ICD-10-CM

## 2021-01-01 DIAGNOSIS — Z90.89 ACQUIRED ABSENCE OF OTHER ORGANS: ICD-10-CM

## 2021-01-01 DIAGNOSIS — R65.21 SEVERE SEPSIS WITH SEPTIC SHOCK: ICD-10-CM

## 2021-01-01 DIAGNOSIS — J44.9 CHRONIC OBSTRUCTIVE PULMONARY DISEASE, UNSPECIFIED: ICD-10-CM

## 2021-01-01 DIAGNOSIS — I27.20 PULMONARY HYPERTENSION, UNSPECIFIED: ICD-10-CM

## 2021-01-01 DIAGNOSIS — R06.02 SHORTNESS OF BREATH: ICD-10-CM

## 2021-01-01 DIAGNOSIS — N40.0 BENIGN PROSTATIC HYPERPLASIA WITHOUT LOWER URINARY TRACT SYMPTOMS: ICD-10-CM

## 2021-01-01 DIAGNOSIS — Z51.5 ENCOUNTER FOR PALLIATIVE CARE: ICD-10-CM

## 2021-01-01 DIAGNOSIS — G30.9 ALZHEIMER'S DISEASE, UNSPECIFIED: ICD-10-CM

## 2021-01-01 DIAGNOSIS — A41.9 SEPSIS, UNSPECIFIED ORGANISM: ICD-10-CM

## 2021-01-01 DIAGNOSIS — J44.0 CHRONIC OBSTRUCTIVE PULMONARY DISEASE WITH (ACUTE) LOWER RESPIRATORY INFECTION: ICD-10-CM

## 2021-01-01 DIAGNOSIS — E87.5 HYPERKALEMIA: ICD-10-CM

## 2021-01-01 DIAGNOSIS — D63.8 ANEMIA IN OTHER CHRONIC DISEASES CLASSIFIED ELSEWHERE: ICD-10-CM

## 2021-01-01 DIAGNOSIS — Z79.82 LONG TERM (CURRENT) USE OF ASPIRIN: ICD-10-CM

## 2021-01-01 DIAGNOSIS — Z79.4 LONG TERM (CURRENT) USE OF INSULIN: ICD-10-CM

## 2021-01-01 DIAGNOSIS — R33.9 RETENTION OF URINE, UNSPECIFIED: ICD-10-CM

## 2021-01-01 DIAGNOSIS — Z90.5 ACQUIRED ABSENCE OF KIDNEY: Chronic | ICD-10-CM

## 2021-01-01 DIAGNOSIS — I11.0 HYPERTENSIVE HEART DISEASE WITH HEART FAILURE: ICD-10-CM

## 2021-01-01 DIAGNOSIS — R77.8 OTHER SPECIFIED ABNORMALITIES OF PLASMA PROTEINS: ICD-10-CM

## 2021-01-01 DIAGNOSIS — R41.0 DISORIENTATION, UNSPECIFIED: ICD-10-CM

## 2021-01-01 DIAGNOSIS — N18.6 END STAGE RENAL DISEASE: ICD-10-CM

## 2021-01-01 DIAGNOSIS — Z79.02 LONG TERM (CURRENT) USE OF ANTITHROMBOTICS/ANTIPLATELETS: ICD-10-CM
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
## 2021-01-01 DIAGNOSIS — Z86.2 PERSONAL HISTORY OF DISEASES OF THE BLOOD AND BLOOD-FORMING ORGANS AND CERTAIN DISORDERS INVOLVING THE IMMUNE MECHANISM: ICD-10-CM

## 2021-01-01 DIAGNOSIS — Z87.891 PERSONAL HISTORY OF NICOTINE DEPENDENCE: ICD-10-CM

## 2021-01-01 DIAGNOSIS — E11.22 TYPE 2 DIABETES MELLITUS WITH DIABETIC CHRONIC KIDNEY DISEASE: ICD-10-CM

## 2021-01-01 DIAGNOSIS — Z79.899 OTHER LONG TERM (CURRENT) DRUG THERAPY: ICD-10-CM

## 2021-01-01 DIAGNOSIS — F02.80 DEMENTIA IN OTHER DISEASES CLASSIFIED ELSEWHERE, UNSPECIFIED SEVERITY, WITHOUT BEHAVIORAL DISTURBANCE, PSYCHOTIC DISTURBANCE, MOOD DISTURBANCE, AND ANXIETY: ICD-10-CM

## 2021-01-01 DIAGNOSIS — K29.70 GASTRITIS, UNSPECIFIED, WITHOUT BLEEDING: ICD-10-CM

## 2021-01-01 DIAGNOSIS — Z99.81 DEPENDENCE ON SUPPLEMENTAL OXYGEN: ICD-10-CM

## 2021-01-01 DIAGNOSIS — N18.5 CHRONIC KIDNEY DISEASE, STAGE 5: ICD-10-CM

## 2021-01-01 DIAGNOSIS — G93.41 METABOLIC ENCEPHALOPATHY: ICD-10-CM

## 2021-01-01 DIAGNOSIS — Z90.5 ACQUIRED ABSENCE OF KIDNEY: ICD-10-CM

## 2021-01-01 DIAGNOSIS — F03.90 UNSPECIFIED DEMENTIA WITHOUT BEHAVIORAL DISTURBANCE: ICD-10-CM

## 2021-01-01 DIAGNOSIS — R09.89 OTHER SPECIFIED SYMPTOMS AND SIGNS INVOLVING THE CIRCULATORY AND RESPIRATORY SYSTEMS: ICD-10-CM

## 2021-01-01 DIAGNOSIS — I50.22 CHRONIC SYSTOLIC (CONGESTIVE) HEART FAILURE: ICD-10-CM

## 2021-01-01 DIAGNOSIS — J98.11 ATELECTASIS: ICD-10-CM

## 2021-01-01 DIAGNOSIS — A48.1 LEGIONNAIRES' DISEASE: ICD-10-CM

## 2021-01-01 DIAGNOSIS — F41.9 ANXIETY DISORDER, UNSPECIFIED: ICD-10-CM

## 2021-01-01 DIAGNOSIS — E87.1 HYPO-OSMOLALITY AND HYPONATREMIA: ICD-10-CM

## 2021-01-01 DIAGNOSIS — R45.1 RESTLESSNESS AND AGITATION: ICD-10-CM

## 2021-01-01 DIAGNOSIS — J91.8 PLEURAL EFFUSION IN OTHER CONDITIONS CLASSIFIED ELSEWHERE: ICD-10-CM

## 2021-01-01 DIAGNOSIS — I95.9 HYPOTENSION, UNSPECIFIED: ICD-10-CM

## 2021-01-01 DIAGNOSIS — I50.23 ACUTE ON CHRONIC SYSTOLIC (CONGESTIVE) HEART FAILURE: ICD-10-CM

## 2021-01-01 DIAGNOSIS — I67.82 CEREBRAL ISCHEMIA: ICD-10-CM

## 2021-01-01 DIAGNOSIS — T17.990A OTHER FOREIGN OBJECT IN RESPIRATORY TRACT, PART UNSPECIFIED IN CAUSING ASPHYXIATION, INITIAL ENCOUNTER: ICD-10-CM

## 2021-01-01 DIAGNOSIS — I80.00 PHLEBITIS AND THROMBOPHLEBITIS OF SUPERFICIAL VESSELS OF UNSPECIFIED LOWER EXTREMITY: ICD-10-CM

## 2021-01-01 DIAGNOSIS — I50.9 HEART FAILURE, UNSPECIFIED: ICD-10-CM

## 2021-01-01 DIAGNOSIS — E11.621 TYPE 2 DIABETES MELLITUS WITH FOOT ULCER: ICD-10-CM

## 2021-01-01 DIAGNOSIS — I89.0 LYMPHEDEMA, NOT ELSEWHERE CLASSIFIED: ICD-10-CM

## 2021-01-01 DIAGNOSIS — Z79.84 LONG TERM (CURRENT) USE OF ORAL HYPOGLYCEMIC DRUGS: ICD-10-CM
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
## 2021-01-01 DIAGNOSIS — L97.429 NON-PRESSURE CHRONIC ULCER OF LEFT HEEL AND MIDFOOT WITH UNSPECIFIED SEVERITY: ICD-10-CM

## 2021-01-01 DIAGNOSIS — J96.21 ACUTE AND CHRONIC RESPIRATORY FAILURE WITH HYPOXIA: ICD-10-CM

## 2021-01-01 DIAGNOSIS — R91.1 SOLITARY PULMONARY NODULE: ICD-10-CM

## 2021-01-01 DIAGNOSIS — D50.9 IRON DEFICIENCY ANEMIA, UNSPECIFIED: ICD-10-CM
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
## 2021-01-01 DIAGNOSIS — I95.3 HYPOTENSION OF HEMODIALYSIS: ICD-10-CM

## 2021-01-01 DIAGNOSIS — B96.4 PROTEUS (MIRABILIS) (MORGANII) AS THE CAUSE OF DISEASES CLASSIFIED ELSEWHERE: ICD-10-CM

## 2021-01-01 DIAGNOSIS — J96.22 ACUTE AND CHRONIC RESPIRATORY FAILURE WITH HYPERCAPNIA: ICD-10-CM

## 2021-01-01 DIAGNOSIS — J96.01 ACUTE RESPIRATORY FAILURE WITH HYPOXIA: ICD-10-CM

## 2021-01-01 DIAGNOSIS — K92.1 MELENA: ICD-10-CM

## 2021-01-01 DIAGNOSIS — K59.00 CONSTIPATION, UNSPECIFIED: ICD-10-CM

## 2021-01-01 DIAGNOSIS — D63.1 ANEMIA IN CHRONIC KIDNEY DISEASE: ICD-10-CM

## 2021-01-01 DIAGNOSIS — E87.70 FLUID OVERLOAD, UNSPECIFIED: ICD-10-CM

## 2021-01-01 DIAGNOSIS — I24.8 OTHER FORMS OF ACUTE ISCHEMIC HEART DISEASE: ICD-10-CM

## 2021-01-01 DIAGNOSIS — L89.152 PRESSURE ULCER OF SACRAL REGION, STAGE 2: ICD-10-CM

## 2021-01-01 DIAGNOSIS — F01.50 VASCULAR DEMENTIA WITHOUT BEHAVIORAL DISTURBANCE: ICD-10-CM

## 2021-01-01 DIAGNOSIS — A41.89 OTHER SPECIFIED SEPSIS: ICD-10-CM

## 2021-01-01 DIAGNOSIS — J44.1 CHRONIC OBSTRUCTIVE PULMONARY DISEASE WITH (ACUTE) EXACERBATION: ICD-10-CM

## 2021-01-01 DIAGNOSIS — D62 ACUTE POSTHEMORRHAGIC ANEMIA: ICD-10-CM

## 2021-01-01 DIAGNOSIS — I25.10 ATHEROSCLEROTIC HEART DISEASE OF NATIVE CORONARY ARTERY WITHOUT ANGINA PECTORIS: ICD-10-CM

## 2021-01-01 DIAGNOSIS — R06.00 DYSPNEA, UNSPECIFIED: ICD-10-CM

## 2021-01-01 DIAGNOSIS — Z79.51 LONG TERM (CURRENT) USE OF INHALED STEROIDS: ICD-10-CM

## 2021-01-01 DIAGNOSIS — L30.9 DERMATITIS, UNSPECIFIED: ICD-10-CM

## 2021-01-01 DIAGNOSIS — F03.90 UNSPECIFIED DEMENTIA, UNSPECIFIED SEVERITY, WITHOUT BEHAVIORAL DISTURBANCE, PSYCHOTIC DISTURBANCE, MOOD DISTURBANCE, AND ANXIETY: ICD-10-CM

## 2021-01-01 DIAGNOSIS — E87.2 ACIDOSIS: ICD-10-CM

## 2021-01-01 DIAGNOSIS — Z74.01 BED CONFINEMENT STATUS: ICD-10-CM

## 2021-01-01 DIAGNOSIS — Z79.01 LONG TERM (CURRENT) USE OF ANTICOAGULANTS: ICD-10-CM

## 2021-01-01 DIAGNOSIS — J96.11 CHRONIC RESPIRATORY FAILURE WITH HYPOXIA: ICD-10-CM

## 2021-01-01 DIAGNOSIS — Z86.718 PERSONAL HISTORY OF OTHER VENOUS THROMBOSIS AND EMBOLISM: ICD-10-CM

## 2021-01-01 DIAGNOSIS — J18.9 PNEUMONIA, UNSPECIFIED ORGANISM: ICD-10-CM

## 2021-01-01 DIAGNOSIS — N17.0 ACUTE KIDNEY FAILURE WITH TUBULAR NECROSIS: ICD-10-CM

## 2021-01-01 DIAGNOSIS — K63.5 POLYP OF COLON: ICD-10-CM

## 2021-01-01 DIAGNOSIS — N30.00 ACUTE CYSTITIS WITHOUT HEMATURIA: ICD-10-CM

## 2021-01-01 DIAGNOSIS — E11.9 TYPE 2 DIABETES MELLITUS WITHOUT COMPLICATIONS: ICD-10-CM

## 2021-01-01 DIAGNOSIS — J96.00 ACUTE RESPIRATORY FAILURE, UNSPECIFIED WHETHER WITH HYPOXIA OR HYPERCAPNIA: ICD-10-CM

## 2021-01-01 DIAGNOSIS — K29.61 OTHER GASTRITIS WITH BLEEDING: ICD-10-CM

## 2021-01-01 DIAGNOSIS — Z02.9 ENCOUNTER FOR ADMINISTRATIVE EXAMINATIONS, UNSPECIFIED: ICD-10-CM

## 2021-01-01 DIAGNOSIS — D64.9 ANEMIA, UNSPECIFIED: ICD-10-CM

## 2021-01-01 DIAGNOSIS — N17.9 ACUTE KIDNEY FAILURE, UNSPECIFIED: ICD-10-CM

## 2021-01-01 DIAGNOSIS — J69.0 PNEUMONITIS DUE TO INHALATION OF FOOD AND VOMIT: ICD-10-CM
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
## 2021-01-01 DIAGNOSIS — E11.51 TYPE 2 DIABETES MELLITUS WITH DIABETIC PERIPHERAL ANGIOPATHY WITHOUT GANGRENE: ICD-10-CM

## 2021-01-01 DIAGNOSIS — Z22.322 CARRIER OR SUSPECTED CARRIER OF METHICILLIN RESISTANT STAPHYLOCOCCUS AUREUS: ICD-10-CM

## 2021-01-01 LAB
-  AMIKACIN: SIGNIFICANT CHANGE UP
-  AMOXICILLIN/CLAVULANIC ACID: SIGNIFICANT CHANGE UP
-  AMPICILLIN/SULBACTAM: SIGNIFICANT CHANGE UP
-  AMPICILLIN: SIGNIFICANT CHANGE UP
-  AZTREONAM: SIGNIFICANT CHANGE UP
-  CEFAZOLIN: SIGNIFICANT CHANGE UP
-  CEFEPIME: SIGNIFICANT CHANGE UP
-  CEFOXITIN: SIGNIFICANT CHANGE UP
-  CEFTRIAXONE: SIGNIFICANT CHANGE UP
-  CIPROFLOXACIN: SIGNIFICANT CHANGE UP
-  ERTAPENEM: SIGNIFICANT CHANGE UP
-  GENTAMICIN: SIGNIFICANT CHANGE UP
-  LEVOFLOXACIN: SIGNIFICANT CHANGE UP
-  MEROPENEM: SIGNIFICANT CHANGE UP
-  NITROFURANTOIN: SIGNIFICANT CHANGE UP
-  PIPERACILLIN/TAZOBACTAM: SIGNIFICANT CHANGE UP
-  STAPHYLOCOCCUS EPIDERMIDIS, METHICILLIN RESISTANT: SIGNIFICANT CHANGE UP
-  TOBRAMYCIN: SIGNIFICANT CHANGE UP
-  TRIMETHOPRIM/SULFAMETHOXAZOLE: SIGNIFICANT CHANGE UP
A1C WITH ESTIMATED AVERAGE GLUCOSE RESULT: 5 % — SIGNIFICANT CHANGE UP (ref 4–5.6)
A1C WITH ESTIMATED AVERAGE GLUCOSE RESULT: 5.2 % — SIGNIFICANT CHANGE UP (ref 4–5.6)
ACANTHOCYTES BLD QL SMEAR: SIGNIFICANT CHANGE UP
ALBUMIN FLD-MCNC: 1.9 G/DL — SIGNIFICANT CHANGE UP
ALBUMIN SERPL ELPH-MCNC: 3 G/DL — LOW (ref 3.5–5.2)
ALBUMIN SERPL ELPH-MCNC: 3.1 G/DL — LOW (ref 3.5–5.2)
ALBUMIN SERPL ELPH-MCNC: 3.2 G/DL — LOW (ref 3.5–5.2)
ALBUMIN SERPL ELPH-MCNC: 3.3 G/DL — LOW (ref 3.5–5.2)
ALBUMIN SERPL ELPH-MCNC: 3.4 G/DL — LOW (ref 3.5–5.2)
ALBUMIN SERPL ELPH-MCNC: 3.5 G/DL — SIGNIFICANT CHANGE UP (ref 3.5–5.2)
ALBUMIN SERPL ELPH-MCNC: 3.6 G/DL — SIGNIFICANT CHANGE UP (ref 3.5–5.2)
ALBUMIN SERPL ELPH-MCNC: 3.7 G/DL — SIGNIFICANT CHANGE UP (ref 3.5–5.2)
ALBUMIN SERPL ELPH-MCNC: 3.8 G/DL — SIGNIFICANT CHANGE UP (ref 3.5–5.2)
ALBUMIN SERPL ELPH-MCNC: 3.8 G/DL — SIGNIFICANT CHANGE UP (ref 3.5–5.2)
ALBUMIN SERPL ELPH-MCNC: 4.1 G/DL — SIGNIFICANT CHANGE UP (ref 3.5–5.2)
ALP SERPL-CCNC: 102 U/L — SIGNIFICANT CHANGE UP (ref 30–115)
ALP SERPL-CCNC: 105 U/L — SIGNIFICANT CHANGE UP (ref 30–115)
ALP SERPL-CCNC: 116 U/L — HIGH (ref 30–115)
ALP SERPL-CCNC: 53 U/L — SIGNIFICANT CHANGE UP (ref 30–115)
ALP SERPL-CCNC: 54 U/L — SIGNIFICANT CHANGE UP (ref 30–115)
ALP SERPL-CCNC: 56 U/L — SIGNIFICANT CHANGE UP (ref 30–115)
ALP SERPL-CCNC: 57 U/L — SIGNIFICANT CHANGE UP (ref 30–115)
ALP SERPL-CCNC: 57 U/L — SIGNIFICANT CHANGE UP (ref 30–115)
ALP SERPL-CCNC: 58 U/L — SIGNIFICANT CHANGE UP (ref 30–115)
ALP SERPL-CCNC: 58 U/L — SIGNIFICANT CHANGE UP (ref 30–115)
ALP SERPL-CCNC: 59 U/L — SIGNIFICANT CHANGE UP (ref 30–115)
ALP SERPL-CCNC: 60 U/L — SIGNIFICANT CHANGE UP (ref 30–115)
ALP SERPL-CCNC: 61 U/L — SIGNIFICANT CHANGE UP (ref 30–115)
ALP SERPL-CCNC: 62 U/L — SIGNIFICANT CHANGE UP (ref 30–115)
ALP SERPL-CCNC: 63 U/L — SIGNIFICANT CHANGE UP (ref 30–115)
ALP SERPL-CCNC: 64 U/L — SIGNIFICANT CHANGE UP (ref 30–115)
ALP SERPL-CCNC: 65 U/L — SIGNIFICANT CHANGE UP (ref 30–115)
ALP SERPL-CCNC: 65 U/L — SIGNIFICANT CHANGE UP (ref 30–115)
ALP SERPL-CCNC: 66 U/L — SIGNIFICANT CHANGE UP (ref 30–115)
ALP SERPL-CCNC: 66 U/L — SIGNIFICANT CHANGE UP (ref 30–115)
ALP SERPL-CCNC: 67 U/L — SIGNIFICANT CHANGE UP (ref 30–115)
ALP SERPL-CCNC: 68 U/L — SIGNIFICANT CHANGE UP (ref 30–115)
ALP SERPL-CCNC: 68 U/L — SIGNIFICANT CHANGE UP (ref 30–115)
ALP SERPL-CCNC: 69 U/L — SIGNIFICANT CHANGE UP (ref 30–115)
ALP SERPL-CCNC: 71 U/L — SIGNIFICANT CHANGE UP (ref 30–115)
ALP SERPL-CCNC: 75 U/L — SIGNIFICANT CHANGE UP (ref 30–115)
ALP SERPL-CCNC: 77 U/L — SIGNIFICANT CHANGE UP (ref 30–115)
ALP SERPL-CCNC: 80 U/L — SIGNIFICANT CHANGE UP (ref 30–115)
ALP SERPL-CCNC: 81 U/L — SIGNIFICANT CHANGE UP (ref 30–115)
ALP SERPL-CCNC: 82 U/L — SIGNIFICANT CHANGE UP (ref 30–115)
ALP SERPL-CCNC: 85 U/L — SIGNIFICANT CHANGE UP (ref 30–115)
ALP SERPL-CCNC: 86 U/L — SIGNIFICANT CHANGE UP (ref 30–115)
ALP SERPL-CCNC: 86 U/L — SIGNIFICANT CHANGE UP (ref 30–115)
ALP SERPL-CCNC: 92 U/L — SIGNIFICANT CHANGE UP (ref 30–115)
ALP SERPL-CCNC: 94 U/L — SIGNIFICANT CHANGE UP (ref 30–115)
ALT FLD-CCNC: 10 U/L — SIGNIFICANT CHANGE UP (ref 0–41)
ALT FLD-CCNC: 12 U/L — SIGNIFICANT CHANGE UP (ref 0–41)
ALT FLD-CCNC: 13 U/L — SIGNIFICANT CHANGE UP (ref 0–41)
ALT FLD-CCNC: 14 U/L — SIGNIFICANT CHANGE UP (ref 0–41)
ALT FLD-CCNC: 14 U/L — SIGNIFICANT CHANGE UP (ref 0–41)
ALT FLD-CCNC: 15 U/L — SIGNIFICANT CHANGE UP (ref 0–41)
ALT FLD-CCNC: 5 U/L — SIGNIFICANT CHANGE UP (ref 0–41)
ALT FLD-CCNC: 6 U/L — SIGNIFICANT CHANGE UP (ref 0–41)
ALT FLD-CCNC: 7 U/L — SIGNIFICANT CHANGE UP (ref 0–41)
ALT FLD-CCNC: 8 U/L — SIGNIFICANT CHANGE UP (ref 0–41)
ALT FLD-CCNC: 9 U/L — SIGNIFICANT CHANGE UP (ref 0–41)
ALT FLD-CCNC: 9 U/L — SIGNIFICANT CHANGE UP (ref 0–41)
ALT FLD-CCNC: <5 U/L — SIGNIFICANT CHANGE UP (ref 0–41)
ANION GAP SERPL CALC-SCNC: 10 MMOL/L — SIGNIFICANT CHANGE UP (ref 7–14)
ANION GAP SERPL CALC-SCNC: 11 MMOL/L — SIGNIFICANT CHANGE UP (ref 7–14)
ANION GAP SERPL CALC-SCNC: 12 MMOL/L — SIGNIFICANT CHANGE UP (ref 7–14)
ANION GAP SERPL CALC-SCNC: 13 MMOL/L — SIGNIFICANT CHANGE UP (ref 7–14)
ANION GAP SERPL CALC-SCNC: 14 MMOL/L — SIGNIFICANT CHANGE UP (ref 7–14)
ANION GAP SERPL CALC-SCNC: 15 MMOL/L — HIGH (ref 7–14)
ANION GAP SERPL CALC-SCNC: 16 MMOL/L — HIGH (ref 7–14)
ANION GAP SERPL CALC-SCNC: 17 MMOL/L — HIGH (ref 7–14)
ANION GAP SERPL CALC-SCNC: 18 MMOL/L — HIGH (ref 7–14)
ANION GAP SERPL CALC-SCNC: 18 MMOL/L — HIGH (ref 7–14)
ANION GAP SERPL CALC-SCNC: 19 MMOL/L — HIGH (ref 7–14)
ANION GAP SERPL CALC-SCNC: 20 MMOL/L — HIGH (ref 7–14)
ANION GAP SERPL CALC-SCNC: 20 MMOL/L — HIGH (ref 7–14)
ANION GAP SERPL CALC-SCNC: 21 MMOL/L — HIGH (ref 7–14)
ANION GAP SERPL CALC-SCNC: 25 MMOL/L — HIGH (ref 7–14)
ANION GAP SERPL CALC-SCNC: 26 MMOL/L — HIGH (ref 7–14)
ANION GAP SERPL CALC-SCNC: 27 MMOL/L — HIGH (ref 7–14)
ANION GAP SERPL CALC-SCNC: 28 MMOL/L — HIGH (ref 7–14)
ANION GAP SERPL CALC-SCNC: 9 MMOL/L — SIGNIFICANT CHANGE UP (ref 7–14)
ANISOCYTOSIS BLD QL: SLIGHT — SIGNIFICANT CHANGE UP
APPEARANCE UR: ABNORMAL
APPEARANCE UR: ABNORMAL
APTT BLD: 20.8 SEC — CRITICAL LOW (ref 27–39.2)
APTT BLD: 21.6 SEC — CRITICAL LOW (ref 27–39.2)
APTT BLD: 27.5 SEC — SIGNIFICANT CHANGE UP (ref 27–39.2)
APTT BLD: 28.5 SEC — SIGNIFICANT CHANGE UP (ref 27–39.2)
APTT BLD: 30.9 SEC — SIGNIFICANT CHANGE UP (ref 27–39.2)
APTT BLD: 31.3 SEC — SIGNIFICANT CHANGE UP (ref 27–39.2)
APTT BLD: 31.6 SEC — SIGNIFICANT CHANGE UP (ref 27–39.2)
AST SERPL-CCNC: 10 U/L — SIGNIFICANT CHANGE UP (ref 0–41)
AST SERPL-CCNC: 11 U/L — SIGNIFICANT CHANGE UP (ref 0–41)
AST SERPL-CCNC: 11 U/L — SIGNIFICANT CHANGE UP (ref 0–41)
AST SERPL-CCNC: 12 U/L — SIGNIFICANT CHANGE UP (ref 0–41)
AST SERPL-CCNC: 13 U/L — SIGNIFICANT CHANGE UP (ref 0–41)
AST SERPL-CCNC: 14 U/L — SIGNIFICANT CHANGE UP (ref 0–41)
AST SERPL-CCNC: 16 U/L — SIGNIFICANT CHANGE UP (ref 0–41)
AST SERPL-CCNC: 17 U/L — SIGNIFICANT CHANGE UP (ref 0–41)
AST SERPL-CCNC: 18 U/L — SIGNIFICANT CHANGE UP (ref 0–41)
AST SERPL-CCNC: 19 U/L — SIGNIFICANT CHANGE UP (ref 0–41)
AST SERPL-CCNC: 22 U/L — SIGNIFICANT CHANGE UP (ref 0–41)
AST SERPL-CCNC: 23 U/L — SIGNIFICANT CHANGE UP (ref 0–41)
AST SERPL-CCNC: 24 U/L — SIGNIFICANT CHANGE UP (ref 0–41)
AST SERPL-CCNC: 27 U/L — SIGNIFICANT CHANGE UP (ref 0–41)
AST SERPL-CCNC: 28 U/L — SIGNIFICANT CHANGE UP (ref 0–41)
AST SERPL-CCNC: 34 U/L — SIGNIFICANT CHANGE UP (ref 0–41)
AST SERPL-CCNC: 39 U/L — SIGNIFICANT CHANGE UP (ref 0–41)
AST SERPL-CCNC: 40 U/L — SIGNIFICANT CHANGE UP (ref 0–41)
AST SERPL-CCNC: 42 U/L — HIGH (ref 0–41)
AST SERPL-CCNC: 53 U/L — HIGH (ref 0–41)
AST SERPL-CCNC: 9 U/L — SIGNIFICANT CHANGE UP (ref 0–41)
B PERT IGG+IGM PNL SER: ABNORMAL
B-OH-BUTYR SERPL-SCNC: 0.9 MMOL/L — HIGH
BACTERIA # UR AUTO: ABNORMAL
BASE EXCESS BLDA CALC-SCNC: 3.7 MMOL/L — HIGH (ref -2–2)
BASE EXCESS BLDV CALC-SCNC: -0.2 MMOL/L — SIGNIFICANT CHANGE UP (ref -2–3)
BASE EXCESS BLDV CALC-SCNC: -2.2 MMOL/L — LOW (ref -2–2)
BASE EXCESS BLDV CALC-SCNC: -20.6 MMOL/L — LOW (ref -2–2)
BASE EXCESS BLDV CALC-SCNC: -3 MMOL/L — LOW (ref -2–3)
BASE EXCESS BLDV CALC-SCNC: -7.4 MMOL/L — LOW (ref -2–2)
BASE EXCESS BLDV CALC-SCNC: 0.4 MMOL/L — SIGNIFICANT CHANGE UP (ref -2–3)
BASOPHILS # BLD AUTO: 0 K/UL — SIGNIFICANT CHANGE UP (ref 0–0.2)
BASOPHILS # BLD AUTO: 0.01 K/UL — SIGNIFICANT CHANGE UP (ref 0–0.2)
BASOPHILS # BLD AUTO: 0.02 K/UL — SIGNIFICANT CHANGE UP (ref 0–0.2)
BASOPHILS # BLD AUTO: 0.03 K/UL — SIGNIFICANT CHANGE UP (ref 0–0.2)
BASOPHILS # BLD AUTO: 0.04 K/UL — SIGNIFICANT CHANGE UP (ref 0–0.2)
BASOPHILS # BLD AUTO: 0.05 K/UL — SIGNIFICANT CHANGE UP (ref 0–0.2)
BASOPHILS # BLD AUTO: 0.06 K/UL — SIGNIFICANT CHANGE UP (ref 0–0.2)
BASOPHILS # BLD AUTO: 0.06 K/UL — SIGNIFICANT CHANGE UP (ref 0–0.2)
BASOPHILS # BLD AUTO: 0.07 K/UL — SIGNIFICANT CHANGE UP (ref 0–0.2)
BASOPHILS # BLD AUTO: 0.08 K/UL — SIGNIFICANT CHANGE UP (ref 0–0.2)
BASOPHILS # BLD AUTO: 0.08 K/UL — SIGNIFICANT CHANGE UP (ref 0–0.2)
BASOPHILS # BLD AUTO: 0.09 K/UL — SIGNIFICANT CHANGE UP (ref 0–0.2)
BASOPHILS # BLD AUTO: 0.1 K/UL — SIGNIFICANT CHANGE UP (ref 0–0.2)
BASOPHILS # BLD AUTO: 0.1 K/UL — SIGNIFICANT CHANGE UP (ref 0–0.2)
BASOPHILS # BLD AUTO: 0.11 K/UL — SIGNIFICANT CHANGE UP (ref 0–0.2)
BASOPHILS # BLD AUTO: 0.12 K/UL — SIGNIFICANT CHANGE UP (ref 0–0.2)
BASOPHILS # BLD AUTO: 0.13 K/UL — SIGNIFICANT CHANGE UP (ref 0–0.2)
BASOPHILS # BLD AUTO: 0.13 K/UL — SIGNIFICANT CHANGE UP (ref 0–0.2)
BASOPHILS # BLD AUTO: 0.14 K/UL — SIGNIFICANT CHANGE UP (ref 0–0.2)
BASOPHILS # BLD AUTO: 0.14 K/UL — SIGNIFICANT CHANGE UP (ref 0–0.2)
BASOPHILS NFR BLD AUTO: 0 % — SIGNIFICANT CHANGE UP (ref 0–1)
BASOPHILS NFR BLD AUTO: 0.1 % — SIGNIFICANT CHANGE UP (ref 0–1)
BASOPHILS NFR BLD AUTO: 0.2 % — SIGNIFICANT CHANGE UP (ref 0–1)
BASOPHILS NFR BLD AUTO: 0.3 % — SIGNIFICANT CHANGE UP (ref 0–1)
BASOPHILS NFR BLD AUTO: 0.4 % — SIGNIFICANT CHANGE UP (ref 0–1)
BASOPHILS NFR BLD AUTO: 0.5 % — SIGNIFICANT CHANGE UP (ref 0–1)
BASOPHILS NFR BLD AUTO: 0.6 % — SIGNIFICANT CHANGE UP (ref 0–1)
BASOPHILS NFR BLD AUTO: 0.7 % — SIGNIFICANT CHANGE UP (ref 0–1)
BASOPHILS NFR BLD AUTO: 0.8 % — SIGNIFICANT CHANGE UP (ref 0–1)
BASOPHILS NFR BLD AUTO: 0.9 % — SIGNIFICANT CHANGE UP (ref 0–1)
BASOPHILS NFR BLD AUTO: 1 % — SIGNIFICANT CHANGE UP (ref 0–1)
BILIRUB SERPL-MCNC: 0.2 MG/DL — SIGNIFICANT CHANGE UP (ref 0.2–1.2)
BILIRUB SERPL-MCNC: 0.3 MG/DL — SIGNIFICANT CHANGE UP (ref 0.2–1.2)
BILIRUB SERPL-MCNC: <0.2 MG/DL — SIGNIFICANT CHANGE UP (ref 0.2–1.2)
BILIRUB UR-MCNC: NEGATIVE — SIGNIFICANT CHANGE UP
BILIRUB UR-MCNC: NEGATIVE — SIGNIFICANT CHANGE UP
BLD GP AB SCN SERPL QL: SIGNIFICANT CHANGE UP
BUN SERPL-MCNC: 104 MG/DL — CRITICAL HIGH (ref 10–20)
BUN SERPL-MCNC: 104 MG/DL — CRITICAL HIGH (ref 10–20)
BUN SERPL-MCNC: 121 MG/DL — CRITICAL HIGH (ref 10–20)
BUN SERPL-MCNC: 123 MG/DL — CRITICAL HIGH (ref 10–20)
BUN SERPL-MCNC: 124 MG/DL — CRITICAL HIGH (ref 10–20)
BUN SERPL-MCNC: 129 MG/DL — CRITICAL HIGH (ref 10–20)
BUN SERPL-MCNC: 198 MG/DL — CRITICAL HIGH (ref 10–20)
BUN SERPL-MCNC: 26 MG/DL — HIGH (ref 10–20)
BUN SERPL-MCNC: 28 MG/DL — HIGH (ref 10–20)
BUN SERPL-MCNC: 30 MG/DL — HIGH (ref 10–20)
BUN SERPL-MCNC: 35 MG/DL — HIGH (ref 10–20)
BUN SERPL-MCNC: 35 MG/DL — HIGH (ref 10–20)
BUN SERPL-MCNC: 38 MG/DL — HIGH (ref 10–20)
BUN SERPL-MCNC: 40 MG/DL — HIGH (ref 10–20)
BUN SERPL-MCNC: 40 MG/DL — HIGH (ref 10–20)
BUN SERPL-MCNC: 42 MG/DL — HIGH (ref 10–20)
BUN SERPL-MCNC: 43 MG/DL — HIGH (ref 10–20)
BUN SERPL-MCNC: 44 MG/DL — HIGH (ref 10–20)
BUN SERPL-MCNC: 44 MG/DL — HIGH (ref 10–20)
BUN SERPL-MCNC: 46 MG/DL — HIGH (ref 10–20)
BUN SERPL-MCNC: 46 MG/DL — HIGH (ref 10–20)
BUN SERPL-MCNC: 47 MG/DL — HIGH (ref 10–20)
BUN SERPL-MCNC: 47 MG/DL — HIGH (ref 10–20)
BUN SERPL-MCNC: 48 MG/DL — HIGH (ref 10–20)
BUN SERPL-MCNC: 49 MG/DL — HIGH (ref 10–20)
BUN SERPL-MCNC: 51 MG/DL — HIGH (ref 10–20)
BUN SERPL-MCNC: 52 MG/DL — HIGH (ref 10–20)
BUN SERPL-MCNC: 53 MG/DL — HIGH (ref 10–20)
BUN SERPL-MCNC: 53 MG/DL — HIGH (ref 10–20)
BUN SERPL-MCNC: 54 MG/DL — HIGH (ref 10–20)
BUN SERPL-MCNC: 54 MG/DL — HIGH (ref 10–20)
BUN SERPL-MCNC: 57 MG/DL — HIGH (ref 10–20)
BUN SERPL-MCNC: 60 MG/DL — HIGH (ref 10–20)
BUN SERPL-MCNC: 61 MG/DL — CRITICAL HIGH (ref 10–20)
BUN SERPL-MCNC: 64 MG/DL — CRITICAL HIGH (ref 10–20)
BUN SERPL-MCNC: 65 MG/DL — CRITICAL HIGH (ref 10–20)
BUN SERPL-MCNC: 66 MG/DL — CRITICAL HIGH (ref 10–20)
BUN SERPL-MCNC: 67 MG/DL — CRITICAL HIGH (ref 10–20)
BUN SERPL-MCNC: 70 MG/DL — CRITICAL HIGH (ref 10–20)
BUN SERPL-MCNC: 70 MG/DL — CRITICAL HIGH (ref 10–20)
BUN SERPL-MCNC: 71 MG/DL — CRITICAL HIGH (ref 10–20)
BUN SERPL-MCNC: 71 MG/DL — CRITICAL HIGH (ref 10–20)
BUN SERPL-MCNC: 73 MG/DL — CRITICAL HIGH (ref 10–20)
BUN SERPL-MCNC: 74 MG/DL — CRITICAL HIGH (ref 10–20)
BUN SERPL-MCNC: 74 MG/DL — CRITICAL HIGH (ref 10–20)
BUN SERPL-MCNC: 78 MG/DL — CRITICAL HIGH (ref 10–20)
BUN SERPL-MCNC: 81 MG/DL — CRITICAL HIGH (ref 10–20)
BUN SERPL-MCNC: 83 MG/DL — CRITICAL HIGH (ref 10–20)
BUN SERPL-MCNC: 88 MG/DL — CRITICAL HIGH (ref 10–20)
BUN SERPL-MCNC: 90 MG/DL — CRITICAL HIGH (ref 10–20)
BUN SERPL-MCNC: 92 MG/DL — CRITICAL HIGH (ref 10–20)
BUN SERPL-MCNC: 96 MG/DL — CRITICAL HIGH (ref 10–20)
BURR CELLS BLD QL SMEAR: PRESENT — SIGNIFICANT CHANGE UP
BURR CELLS BLD QL SMEAR: PRESENT — SIGNIFICANT CHANGE UP
CA-I SERPL-SCNC: 1.04 MMOL/L — LOW (ref 1.12–1.3)
CA-I SERPL-SCNC: 1.06 MMOL/L — LOW (ref 1.15–1.33)
CA-I SERPL-SCNC: 1.12 MMOL/L — LOW (ref 1.15–1.33)
CA-I SERPL-SCNC: 1.15 MMOL/L — SIGNIFICANT CHANGE UP (ref 1.12–1.3)
CA-I SERPL-SCNC: 1.21 MMOL/L — SIGNIFICANT CHANGE UP (ref 1.12–1.3)
CA-I SERPL-SCNC: 1.27 MMOL/L — SIGNIFICANT CHANGE UP (ref 1.15–1.33)
CALCIUM SERPL-MCNC: 7.5 MG/DL — LOW (ref 8.5–10.1)
CALCIUM SERPL-MCNC: 7.5 MG/DL — LOW (ref 8.5–10.1)
CALCIUM SERPL-MCNC: 7.6 MG/DL — LOW (ref 8.5–10.1)
CALCIUM SERPL-MCNC: 7.6 MG/DL — LOW (ref 8.5–10.1)
CALCIUM SERPL-MCNC: 7.9 MG/DL — LOW (ref 8.5–10.1)
CALCIUM SERPL-MCNC: 8 MG/DL — LOW (ref 8.5–10.1)
CALCIUM SERPL-MCNC: 8.1 MG/DL — LOW (ref 8.5–10.1)
CALCIUM SERPL-MCNC: 8.2 MG/DL — LOW (ref 8.5–10.1)
CALCIUM SERPL-MCNC: 8.3 MG/DL — LOW (ref 8.5–10.1)
CALCIUM SERPL-MCNC: 8.4 MG/DL — LOW (ref 8.5–10.1)
CALCIUM SERPL-MCNC: 8.4 MG/DL — LOW (ref 8.5–10.1)
CALCIUM SERPL-MCNC: 8.5 MG/DL — SIGNIFICANT CHANGE UP (ref 8.5–10.1)
CALCIUM SERPL-MCNC: 8.5 MG/DL — SIGNIFICANT CHANGE UP (ref 8.5–10.1)
CALCIUM SERPL-MCNC: 8.6 MG/DL — SIGNIFICANT CHANGE UP (ref 8.5–10.1)
CALCIUM SERPL-MCNC: 8.8 MG/DL — SIGNIFICANT CHANGE UP (ref 8.5–10.1)
CALCIUM SERPL-MCNC: 8.9 MG/DL — SIGNIFICANT CHANGE UP (ref 8.5–10.1)
CALCIUM SERPL-MCNC: 9 MG/DL — SIGNIFICANT CHANGE UP (ref 8.5–10.1)
CALCIUM SERPL-MCNC: 9.1 MG/DL — SIGNIFICANT CHANGE UP (ref 8.5–10.1)
CALCIUM SERPL-MCNC: 9.3 MG/DL — SIGNIFICANT CHANGE UP (ref 8.5–10.1)
CALCIUM SERPL-MCNC: 9.3 MG/DL — SIGNIFICANT CHANGE UP (ref 8.5–10.1)
CHLORIDE SERPL-SCNC: 100 MMOL/L — SIGNIFICANT CHANGE UP (ref 98–110)
CHLORIDE SERPL-SCNC: 101 MMOL/L — SIGNIFICANT CHANGE UP (ref 98–110)
CHLORIDE SERPL-SCNC: 102 MMOL/L — SIGNIFICANT CHANGE UP (ref 98–110)
CHLORIDE SERPL-SCNC: 104 MMOL/L — SIGNIFICANT CHANGE UP (ref 98–110)
CHLORIDE SERPL-SCNC: 104 MMOL/L — SIGNIFICANT CHANGE UP (ref 98–110)
CHLORIDE SERPL-SCNC: 105 MMOL/L — SIGNIFICANT CHANGE UP (ref 98–110)
CHLORIDE SERPL-SCNC: 93 MMOL/L — LOW (ref 98–110)
CHLORIDE SERPL-SCNC: 93 MMOL/L — LOW (ref 98–110)
CHLORIDE SERPL-SCNC: 94 MMOL/L — LOW (ref 98–110)
CHLORIDE SERPL-SCNC: 94 MMOL/L — LOW (ref 98–110)
CHLORIDE SERPL-SCNC: 95 MMOL/L — LOW (ref 98–110)
CHLORIDE SERPL-SCNC: 96 MMOL/L — LOW (ref 98–110)
CHLORIDE SERPL-SCNC: 97 MMOL/L — LOW (ref 98–110)
CHLORIDE SERPL-SCNC: 98 MMOL/L — SIGNIFICANT CHANGE UP (ref 98–110)
CHLORIDE SERPL-SCNC: 99 MMOL/L — SIGNIFICANT CHANGE UP (ref 98–110)
CHOLEST SERPL-MCNC: 114 MG/DL — SIGNIFICANT CHANGE UP
CO2 SERPL-SCNC: 11 MMOL/L — LOW (ref 17–32)
CO2 SERPL-SCNC: 13 MMOL/L — LOW (ref 17–32)
CO2 SERPL-SCNC: 15 MMOL/L — LOW (ref 17–32)
CO2 SERPL-SCNC: 16 MMOL/L — LOW (ref 17–32)
CO2 SERPL-SCNC: 18 MMOL/L — SIGNIFICANT CHANGE UP (ref 17–32)
CO2 SERPL-SCNC: 19 MMOL/L — SIGNIFICANT CHANGE UP (ref 17–32)
CO2 SERPL-SCNC: 20 MMOL/L — SIGNIFICANT CHANGE UP (ref 17–32)
CO2 SERPL-SCNC: 21 MMOL/L — SIGNIFICANT CHANGE UP (ref 17–32)
CO2 SERPL-SCNC: 22 MMOL/L — SIGNIFICANT CHANGE UP (ref 17–32)
CO2 SERPL-SCNC: 23 MMOL/L — SIGNIFICANT CHANGE UP (ref 17–32)
CO2 SERPL-SCNC: 24 MMOL/L — SIGNIFICANT CHANGE UP (ref 17–32)
CO2 SERPL-SCNC: 25 MMOL/L — SIGNIFICANT CHANGE UP (ref 17–32)
CO2 SERPL-SCNC: 25 MMOL/L — SIGNIFICANT CHANGE UP (ref 17–32)
CO2 SERPL-SCNC: 26 MMOL/L — SIGNIFICANT CHANGE UP (ref 17–32)
CO2 SERPL-SCNC: 27 MMOL/L — SIGNIFICANT CHANGE UP (ref 17–32)
CO2 SERPL-SCNC: 28 MMOL/L — SIGNIFICANT CHANGE UP (ref 17–32)
CO2 SERPL-SCNC: 29 MMOL/L — SIGNIFICANT CHANGE UP (ref 17–32)
CO2 SERPL-SCNC: 30 MMOL/L — SIGNIFICANT CHANGE UP (ref 17–32)
CO2 SERPL-SCNC: 6 MMOL/L — CRITICAL LOW (ref 17–32)
COLOR FLD: SIGNIFICANT CHANGE UP
COLOR SPEC: YELLOW — SIGNIFICANT CHANGE UP
COLOR SPEC: YELLOW — SIGNIFICANT CHANGE UP
COVID-19 SPIKE DOMAIN AB INTERP: POSITIVE
COVID-19 SPIKE DOMAIN ANTIBODY RESULT: 34.9 U/ML — HIGH
COVID-19 SPIKE DOMAIN ANTIBODY RESULT: 39.6 U/ML — HIGH
COVID-19 SPIKE DOMAIN ANTIBODY RESULT: 42.6 U/ML — HIGH
COVID-19 SPIKE DOMAIN ANTIBODY RESULT: 64.5 U/ML — HIGH
COVID-19 SPIKE DOMAIN ANTIBODY RESULT: 74.9 U/ML — HIGH
CREAT SERPL-MCNC: 11.3 MG/DL — CRITICAL HIGH (ref 0.7–1.5)
CREAT SERPL-MCNC: 3.7 MG/DL — HIGH (ref 0.7–1.5)
CREAT SERPL-MCNC: 4 MG/DL — HIGH (ref 0.7–1.5)
CREAT SERPL-MCNC: 4.3 MG/DL — CRITICAL HIGH (ref 0.7–1.5)
CREAT SERPL-MCNC: 4.8 MG/DL — CRITICAL HIGH (ref 0.7–1.5)
CREAT SERPL-MCNC: 4.9 MG/DL — CRITICAL HIGH (ref 0.7–1.5)
CREAT SERPL-MCNC: 4.9 MG/DL — CRITICAL HIGH (ref 0.7–1.5)
CREAT SERPL-MCNC: 5.1 MG/DL — CRITICAL HIGH (ref 0.7–1.5)
CREAT SERPL-MCNC: 5.1 MG/DL — CRITICAL HIGH (ref 0.7–1.5)
CREAT SERPL-MCNC: 5.2 MG/DL — CRITICAL HIGH (ref 0.7–1.5)
CREAT SERPL-MCNC: 5.3 MG/DL — CRITICAL HIGH (ref 0.7–1.5)
CREAT SERPL-MCNC: 5.4 MG/DL — CRITICAL HIGH (ref 0.7–1.5)
CREAT SERPL-MCNC: 5.6 MG/DL — CRITICAL HIGH (ref 0.7–1.5)
CREAT SERPL-MCNC: 5.8 MG/DL — CRITICAL HIGH (ref 0.7–1.5)
CREAT SERPL-MCNC: 5.9 MG/DL — CRITICAL HIGH (ref 0.7–1.5)
CREAT SERPL-MCNC: 6 MG/DL — CRITICAL HIGH (ref 0.7–1.5)
CREAT SERPL-MCNC: 6.1 MG/DL — CRITICAL HIGH (ref 0.7–1.5)
CREAT SERPL-MCNC: 6.1 MG/DL — CRITICAL HIGH (ref 0.7–1.5)
CREAT SERPL-MCNC: 6.2 MG/DL — CRITICAL HIGH (ref 0.7–1.5)
CREAT SERPL-MCNC: 6.3 MG/DL — CRITICAL HIGH (ref 0.7–1.5)
CREAT SERPL-MCNC: 6.4 MG/DL — CRITICAL HIGH (ref 0.7–1.5)
CREAT SERPL-MCNC: 6.4 MG/DL — CRITICAL HIGH (ref 0.7–1.5)
CREAT SERPL-MCNC: 6.5 MG/DL — CRITICAL HIGH (ref 0.7–1.5)
CREAT SERPL-MCNC: 6.5 MG/DL — CRITICAL HIGH (ref 0.7–1.5)
CREAT SERPL-MCNC: 6.6 MG/DL — CRITICAL HIGH (ref 0.7–1.5)
CREAT SERPL-MCNC: 6.6 MG/DL — CRITICAL HIGH (ref 0.7–1.5)
CREAT SERPL-MCNC: 6.8 MG/DL — CRITICAL HIGH (ref 0.7–1.5)
CREAT SERPL-MCNC: 7 MG/DL — CRITICAL HIGH (ref 0.7–1.5)
CREAT SERPL-MCNC: 7 MG/DL — CRITICAL HIGH (ref 0.7–1.5)
CREAT SERPL-MCNC: 7.1 MG/DL — CRITICAL HIGH (ref 0.7–1.5)
CREAT SERPL-MCNC: 7.1 MG/DL — CRITICAL HIGH (ref 0.7–1.5)
CREAT SERPL-MCNC: 7.2 MG/DL — CRITICAL HIGH (ref 0.7–1.5)
CREAT SERPL-MCNC: 7.3 MG/DL — CRITICAL HIGH (ref 0.7–1.5)
CREAT SERPL-MCNC: 7.4 MG/DL — CRITICAL HIGH (ref 0.7–1.5)
CREAT SERPL-MCNC: 7.5 MG/DL — CRITICAL HIGH (ref 0.7–1.5)
CREAT SERPL-MCNC: 7.6 MG/DL — CRITICAL HIGH (ref 0.7–1.5)
CREAT SERPL-MCNC: 7.7 MG/DL — CRITICAL HIGH (ref 0.7–1.5)
CREAT SERPL-MCNC: 7.8 MG/DL — CRITICAL HIGH (ref 0.7–1.5)
CREAT SERPL-MCNC: 7.8 MG/DL — CRITICAL HIGH (ref 0.7–1.5)
CREAT SERPL-MCNC: 8 MG/DL — CRITICAL HIGH (ref 0.7–1.5)
CREAT SERPL-MCNC: 8.2 MG/DL — CRITICAL HIGH (ref 0.7–1.5)
CREAT SERPL-MCNC: 8.3 MG/DL — CRITICAL HIGH (ref 0.7–1.5)
CREAT SERPL-MCNC: 8.4 MG/DL — CRITICAL HIGH (ref 0.7–1.5)
CREAT SERPL-MCNC: 8.6 MG/DL — CRITICAL HIGH (ref 0.7–1.5)
CREAT SERPL-MCNC: 9.5 MG/DL — CRITICAL HIGH (ref 0.7–1.5)
CRP SERPL-MCNC: 209 MG/L — HIGH
CRP SERPL-MCNC: 67 MG/L — HIGH
CULTURE RESULTS: NO GROWTH — SIGNIFICANT CHANGE UP
CULTURE RESULTS: SIGNIFICANT CHANGE UP
D DIMER BLD IA.RAPID-MCNC: 2012 NG/ML DDU — HIGH (ref 0–230)
D DIMER BLD IA.RAPID-MCNC: 732 NG/ML DDU — HIGH (ref 0–230)
DIFF PNL FLD: ABNORMAL
DIFF PNL FLD: ABNORMAL
ELLIPTOCYTES BLD QL SMEAR: SLIGHT — SIGNIFICANT CHANGE UP
EOSINOPHIL # BLD AUTO: 0 K/UL — SIGNIFICANT CHANGE UP (ref 0–0.7)
EOSINOPHIL # BLD AUTO: 0.01 K/UL — SIGNIFICANT CHANGE UP (ref 0–0.7)
EOSINOPHIL # BLD AUTO: 0.02 K/UL — SIGNIFICANT CHANGE UP (ref 0–0.7)
EOSINOPHIL # BLD AUTO: 0.05 K/UL — SIGNIFICANT CHANGE UP (ref 0–0.7)
EOSINOPHIL # BLD AUTO: 0.05 K/UL — SIGNIFICANT CHANGE UP (ref 0–0.7)
EOSINOPHIL # BLD AUTO: 0.06 K/UL — SIGNIFICANT CHANGE UP (ref 0–0.7)
EOSINOPHIL # BLD AUTO: 0.19 K/UL — SIGNIFICANT CHANGE UP (ref 0–0.7)
EOSINOPHIL # BLD AUTO: 0.28 K/UL — SIGNIFICANT CHANGE UP (ref 0–0.7)
EOSINOPHIL # BLD AUTO: 0.52 K/UL — SIGNIFICANT CHANGE UP (ref 0–0.7)
EOSINOPHIL # BLD AUTO: 0.73 K/UL — HIGH (ref 0–0.7)
EOSINOPHIL # BLD AUTO: 0.76 K/UL — HIGH (ref 0–0.7)
EOSINOPHIL # BLD AUTO: 0.96 K/UL — HIGH (ref 0–0.7)
EOSINOPHIL # BLD AUTO: 1.11 K/UL — HIGH (ref 0–0.7)
EOSINOPHIL # BLD AUTO: 1.15 K/UL — HIGH (ref 0–0.7)
EOSINOPHIL # BLD AUTO: 1.16 K/UL — HIGH (ref 0–0.7)
EOSINOPHIL # BLD AUTO: 1.18 K/UL — HIGH (ref 0–0.7)
EOSINOPHIL # BLD AUTO: 1.2 K/UL — HIGH (ref 0–0.7)
EOSINOPHIL # BLD AUTO: 1.2 K/UL — HIGH (ref 0–0.7)
EOSINOPHIL # BLD AUTO: 1.27 K/UL — HIGH (ref 0–0.7)
EOSINOPHIL # BLD AUTO: 1.34 K/UL — HIGH (ref 0–0.7)
EOSINOPHIL # BLD AUTO: 1.41 K/UL — HIGH (ref 0–0.7)
EOSINOPHIL # BLD AUTO: 1.48 K/UL — HIGH (ref 0–0.7)
EOSINOPHIL # BLD AUTO: 1.48 K/UL — HIGH (ref 0–0.7)
EOSINOPHIL # BLD AUTO: 1.6 K/UL — HIGH (ref 0–0.7)
EOSINOPHIL # BLD AUTO: 1.61 K/UL — HIGH (ref 0–0.7)
EOSINOPHIL # BLD AUTO: 1.61 K/UL — HIGH (ref 0–0.7)
EOSINOPHIL # BLD AUTO: 1.67 K/UL — HIGH (ref 0–0.7)
EOSINOPHIL # BLD AUTO: 1.68 K/UL — HIGH (ref 0–0.7)
EOSINOPHIL # BLD AUTO: 1.72 K/UL — HIGH (ref 0–0.7)
EOSINOPHIL # BLD AUTO: 1.86 K/UL — HIGH (ref 0–0.7)
EOSINOPHIL # BLD AUTO: 1.92 K/UL — HIGH (ref 0–0.7)
EOSINOPHIL # BLD AUTO: 2.15 K/UL — HIGH (ref 0–0.7)
EOSINOPHIL # BLD AUTO: 2.29 K/UL — HIGH (ref 0–0.7)
EOSINOPHIL # BLD AUTO: 2.31 K/UL — HIGH (ref 0–0.7)
EOSINOPHIL # BLD AUTO: 2.71 K/UL — HIGH (ref 0–0.7)
EOSINOPHIL # FLD: 1 % — SIGNIFICANT CHANGE UP
EOSINOPHIL NFR BLD AUTO: 0 % — SIGNIFICANT CHANGE UP (ref 0–8)
EOSINOPHIL NFR BLD AUTO: 0.1 % — SIGNIFICANT CHANGE UP (ref 0–8)
EOSINOPHIL NFR BLD AUTO: 0.1 % — SIGNIFICANT CHANGE UP (ref 0–8)
EOSINOPHIL NFR BLD AUTO: 0.4 % — SIGNIFICANT CHANGE UP (ref 0–8)
EOSINOPHIL NFR BLD AUTO: 0.4 % — SIGNIFICANT CHANGE UP (ref 0–8)
EOSINOPHIL NFR BLD AUTO: 0.5 % — SIGNIFICANT CHANGE UP (ref 0–8)
EOSINOPHIL NFR BLD AUTO: 10.3 % — HIGH (ref 0–8)
EOSINOPHIL NFR BLD AUTO: 10.4 % — HIGH (ref 0–8)
EOSINOPHIL NFR BLD AUTO: 11.2 % — HIGH (ref 0–8)
EOSINOPHIL NFR BLD AUTO: 11.3 % — HIGH (ref 0–8)
EOSINOPHIL NFR BLD AUTO: 11.5 % — HIGH (ref 0–8)
EOSINOPHIL NFR BLD AUTO: 12.1 % — HIGH (ref 0–8)
EOSINOPHIL NFR BLD AUTO: 12.2 % — HIGH (ref 0–8)
EOSINOPHIL NFR BLD AUTO: 12.3 % — HIGH (ref 0–8)
EOSINOPHIL NFR BLD AUTO: 12.3 % — HIGH (ref 0–8)
EOSINOPHIL NFR BLD AUTO: 12.8 % — HIGH (ref 0–8)
EOSINOPHIL NFR BLD AUTO: 13 % — HIGH (ref 0–8)
EOSINOPHIL NFR BLD AUTO: 13.1 % — HIGH (ref 0–8)
EOSINOPHIL NFR BLD AUTO: 13.1 % — HIGH (ref 0–8)
EOSINOPHIL NFR BLD AUTO: 13.5 % — HIGH (ref 0–8)
EOSINOPHIL NFR BLD AUTO: 13.6 % — HIGH (ref 0–8)
EOSINOPHIL NFR BLD AUTO: 13.9 % — HIGH (ref 0–8)
EOSINOPHIL NFR BLD AUTO: 14.5 % — HIGH (ref 0–8)
EOSINOPHIL NFR BLD AUTO: 14.7 % — HIGH (ref 0–8)
EOSINOPHIL NFR BLD AUTO: 16 % — HIGH (ref 0–8)
EOSINOPHIL NFR BLD AUTO: 16.1 % — HIGH (ref 0–8)
EOSINOPHIL NFR BLD AUTO: 18.5 % — HIGH (ref 0–8)
EOSINOPHIL NFR BLD AUTO: 19.6 % — HIGH (ref 0–8)
EOSINOPHIL NFR BLD AUTO: 19.7 % — HIGH (ref 0–8)
EOSINOPHIL NFR BLD AUTO: 2 % — SIGNIFICANT CHANGE UP (ref 0–8)
EOSINOPHIL NFR BLD AUTO: 2.5 % — SIGNIFICANT CHANGE UP (ref 0–8)
EOSINOPHIL NFR BLD AUTO: 21.1 % — HIGH (ref 0–8)
EOSINOPHIL NFR BLD AUTO: 5.3 % — SIGNIFICANT CHANGE UP (ref 0–8)
EOSINOPHIL NFR BLD AUTO: 6.3 % — SIGNIFICANT CHANGE UP (ref 0–8)
EOSINOPHIL NFR BLD AUTO: 7 % — SIGNIFICANT CHANGE UP (ref 0–8)
EOSINOPHIL NFR BLD AUTO: 9.3 % — HIGH (ref 0–8)
EOSINOPHIL NFR BLD AUTO: 9.6 % — HIGH (ref 0–8)
EOSINOPHIL NFR BLD AUTO: 9.7 % — HIGH (ref 0–8)
EOSINOPHIL NFR BLD AUTO: 9.9 % — HIGH (ref 0–8)
EPI CELLS # UR: 1 /HPF — SIGNIFICANT CHANGE UP (ref 0–5)
ERYTHROCYTE [SEDIMENTATION RATE] IN BLOOD: 29 MM/HR — HIGH (ref 0–10)
ERYTHROCYTE [SEDIMENTATION RATE] IN BLOOD: 41 MM/HR — HIGH (ref 0–10)
ESTIMATED AVERAGE GLUCOSE: 103 MG/DL — SIGNIFICANT CHANGE UP (ref 68–114)
ESTIMATED AVERAGE GLUCOSE: 97 MG/DL — SIGNIFICANT CHANGE UP (ref 68–114)
ETHANOL SERPL-MCNC: <10 MG/DL — SIGNIFICANT CHANGE UP
FERRITIN SERPL-MCNC: 938 NG/ML — HIGH (ref 30–400)
FIBRINOGEN AG PPP IA-MCNC: 571 MG/DL — HIGH (ref 180–350)
FLUID INTAKE SUBSTANCE CLASS: SIGNIFICANT CHANGE UP
FLUID SEGMENTED GRANULOCYTES: 1 % — SIGNIFICANT CHANGE UP
FUNGITELL: 47 PG/ML — SIGNIFICANT CHANGE UP
GAS PNL BLDA: SIGNIFICANT CHANGE UP
GAS PNL BLDA: SIGNIFICANT CHANGE UP
GAS PNL BLDV: 126 MMOL/L — LOW (ref 136–145)
GAS PNL BLDV: 126 MMOL/L — LOW (ref 136–145)
GAS PNL BLDV: 137 MMOL/L — SIGNIFICANT CHANGE UP (ref 136–145)
GAS PNL BLDV: 138 MMOL/L — SIGNIFICANT CHANGE UP (ref 136–145)
GAS PNL BLDV: 139 MMOL/L — SIGNIFICANT CHANGE UP (ref 136–145)
GAS PNL BLDV: 139 MMOL/L — SIGNIFICANT CHANGE UP (ref 136–145)
GAS PNL BLDV: SIGNIFICANT CHANGE UP
GIANT PLATELETS BLD QL SMEAR: PRESENT — SIGNIFICANT CHANGE UP
GIANT PLATELETS BLD QL SMEAR: PRESENT — SIGNIFICANT CHANGE UP
GLUCOSE BLDC GLUCOMTR-MCNC: 101 MG/DL — HIGH (ref 70–99)
GLUCOSE BLDC GLUCOMTR-MCNC: 102 MG/DL — HIGH (ref 70–99)
GLUCOSE BLDC GLUCOMTR-MCNC: 102 MG/DL — HIGH (ref 70–99)
GLUCOSE BLDC GLUCOMTR-MCNC: 106 MG/DL — HIGH (ref 70–99)
GLUCOSE BLDC GLUCOMTR-MCNC: 106 MG/DL — HIGH (ref 70–99)
GLUCOSE BLDC GLUCOMTR-MCNC: 108 MG/DL — HIGH (ref 70–99)
GLUCOSE BLDC GLUCOMTR-MCNC: 108 MG/DL — HIGH (ref 70–99)
GLUCOSE BLDC GLUCOMTR-MCNC: 109 MG/DL — HIGH (ref 70–99)
GLUCOSE BLDC GLUCOMTR-MCNC: 111 MG/DL — HIGH (ref 70–99)
GLUCOSE BLDC GLUCOMTR-MCNC: 112 MG/DL — HIGH (ref 70–99)
GLUCOSE BLDC GLUCOMTR-MCNC: 112 MG/DL — HIGH (ref 70–99)
GLUCOSE BLDC GLUCOMTR-MCNC: 113 MG/DL — HIGH (ref 70–99)
GLUCOSE BLDC GLUCOMTR-MCNC: 114 MG/DL — HIGH (ref 70–99)
GLUCOSE BLDC GLUCOMTR-MCNC: 114 MG/DL — HIGH (ref 70–99)
GLUCOSE BLDC GLUCOMTR-MCNC: 115 MG/DL — HIGH (ref 70–99)
GLUCOSE BLDC GLUCOMTR-MCNC: 116 MG/DL — HIGH (ref 70–99)
GLUCOSE BLDC GLUCOMTR-MCNC: 117 MG/DL — HIGH (ref 70–99)
GLUCOSE BLDC GLUCOMTR-MCNC: 118 MG/DL — HIGH (ref 70–99)
GLUCOSE BLDC GLUCOMTR-MCNC: 119 MG/DL — HIGH (ref 70–99)
GLUCOSE BLDC GLUCOMTR-MCNC: 119 MG/DL — HIGH (ref 70–99)
GLUCOSE BLDC GLUCOMTR-MCNC: 120 MG/DL — HIGH (ref 70–99)
GLUCOSE BLDC GLUCOMTR-MCNC: 121 MG/DL — HIGH (ref 70–99)
GLUCOSE BLDC GLUCOMTR-MCNC: 122 MG/DL — HIGH (ref 70–99)
GLUCOSE BLDC GLUCOMTR-MCNC: 124 MG/DL — HIGH (ref 70–99)
GLUCOSE BLDC GLUCOMTR-MCNC: 125 MG/DL — HIGH (ref 70–99)
GLUCOSE BLDC GLUCOMTR-MCNC: 125 MG/DL — HIGH (ref 70–99)
GLUCOSE BLDC GLUCOMTR-MCNC: 126 MG/DL — HIGH (ref 70–99)
GLUCOSE BLDC GLUCOMTR-MCNC: 127 MG/DL — HIGH (ref 70–99)
GLUCOSE BLDC GLUCOMTR-MCNC: 128 MG/DL — HIGH (ref 70–99)
GLUCOSE BLDC GLUCOMTR-MCNC: 129 MG/DL — HIGH (ref 70–99)
GLUCOSE BLDC GLUCOMTR-MCNC: 130 MG/DL — HIGH (ref 70–99)
GLUCOSE BLDC GLUCOMTR-MCNC: 132 MG/DL — HIGH (ref 70–99)
GLUCOSE BLDC GLUCOMTR-MCNC: 133 MG/DL — HIGH (ref 70–99)
GLUCOSE BLDC GLUCOMTR-MCNC: 134 MG/DL — HIGH (ref 70–99)
GLUCOSE BLDC GLUCOMTR-MCNC: 135 MG/DL — HIGH (ref 70–99)
GLUCOSE BLDC GLUCOMTR-MCNC: 135 MG/DL — HIGH (ref 70–99)
GLUCOSE BLDC GLUCOMTR-MCNC: 137 MG/DL — HIGH (ref 70–99)
GLUCOSE BLDC GLUCOMTR-MCNC: 137 MG/DL — HIGH (ref 70–99)
GLUCOSE BLDC GLUCOMTR-MCNC: 138 MG/DL — HIGH (ref 70–99)
GLUCOSE BLDC GLUCOMTR-MCNC: 139 MG/DL — HIGH (ref 70–99)
GLUCOSE BLDC GLUCOMTR-MCNC: 140 MG/DL — HIGH (ref 70–99)
GLUCOSE BLDC GLUCOMTR-MCNC: 141 MG/DL — HIGH (ref 70–99)
GLUCOSE BLDC GLUCOMTR-MCNC: 143 MG/DL — HIGH (ref 70–99)
GLUCOSE BLDC GLUCOMTR-MCNC: 143 MG/DL — HIGH (ref 70–99)
GLUCOSE BLDC GLUCOMTR-MCNC: 144 MG/DL — HIGH (ref 70–99)
GLUCOSE BLDC GLUCOMTR-MCNC: 146 MG/DL — HIGH (ref 70–99)
GLUCOSE BLDC GLUCOMTR-MCNC: 147 MG/DL — HIGH (ref 70–99)
GLUCOSE BLDC GLUCOMTR-MCNC: 147 MG/DL — HIGH (ref 70–99)
GLUCOSE BLDC GLUCOMTR-MCNC: 148 MG/DL — HIGH (ref 70–99)
GLUCOSE BLDC GLUCOMTR-MCNC: 149 MG/DL — HIGH (ref 70–99)
GLUCOSE BLDC GLUCOMTR-MCNC: 150 MG/DL — HIGH (ref 70–99)
GLUCOSE BLDC GLUCOMTR-MCNC: 151 MG/DL — HIGH (ref 70–99)
GLUCOSE BLDC GLUCOMTR-MCNC: 152 MG/DL — HIGH (ref 70–99)
GLUCOSE BLDC GLUCOMTR-MCNC: 153 MG/DL — HIGH (ref 70–99)
GLUCOSE BLDC GLUCOMTR-MCNC: 154 MG/DL — HIGH (ref 70–99)
GLUCOSE BLDC GLUCOMTR-MCNC: 155 MG/DL — HIGH (ref 70–99)
GLUCOSE BLDC GLUCOMTR-MCNC: 157 MG/DL — HIGH (ref 70–99)
GLUCOSE BLDC GLUCOMTR-MCNC: 158 MG/DL — HIGH (ref 70–99)
GLUCOSE BLDC GLUCOMTR-MCNC: 159 MG/DL — HIGH (ref 70–99)
GLUCOSE BLDC GLUCOMTR-MCNC: 160 MG/DL — HIGH (ref 70–99)
GLUCOSE BLDC GLUCOMTR-MCNC: 161 MG/DL — HIGH (ref 70–99)
GLUCOSE BLDC GLUCOMTR-MCNC: 163 MG/DL — HIGH (ref 70–99)
GLUCOSE BLDC GLUCOMTR-MCNC: 164 MG/DL — HIGH (ref 70–99)
GLUCOSE BLDC GLUCOMTR-MCNC: 165 MG/DL — HIGH (ref 70–99)
GLUCOSE BLDC GLUCOMTR-MCNC: 165 MG/DL — HIGH (ref 70–99)
GLUCOSE BLDC GLUCOMTR-MCNC: 166 MG/DL — HIGH (ref 70–99)
GLUCOSE BLDC GLUCOMTR-MCNC: 167 MG/DL — HIGH (ref 70–99)
GLUCOSE BLDC GLUCOMTR-MCNC: 167 MG/DL — HIGH (ref 70–99)
GLUCOSE BLDC GLUCOMTR-MCNC: 168 MG/DL — HIGH (ref 70–99)
GLUCOSE BLDC GLUCOMTR-MCNC: 168 MG/DL — HIGH (ref 70–99)
GLUCOSE BLDC GLUCOMTR-MCNC: 170 MG/DL — HIGH (ref 70–99)
GLUCOSE BLDC GLUCOMTR-MCNC: 170 MG/DL — HIGH (ref 70–99)
GLUCOSE BLDC GLUCOMTR-MCNC: 171 MG/DL — HIGH (ref 70–99)
GLUCOSE BLDC GLUCOMTR-MCNC: 172 MG/DL — HIGH (ref 70–99)
GLUCOSE BLDC GLUCOMTR-MCNC: 174 MG/DL — HIGH (ref 70–99)
GLUCOSE BLDC GLUCOMTR-MCNC: 175 MG/DL — HIGH (ref 70–99)
GLUCOSE BLDC GLUCOMTR-MCNC: 175 MG/DL — HIGH (ref 70–99)
GLUCOSE BLDC GLUCOMTR-MCNC: 181 MG/DL — HIGH (ref 70–99)
GLUCOSE BLDC GLUCOMTR-MCNC: 182 MG/DL — HIGH (ref 70–99)
GLUCOSE BLDC GLUCOMTR-MCNC: 189 MG/DL — HIGH (ref 70–99)
GLUCOSE BLDC GLUCOMTR-MCNC: 190 MG/DL — HIGH (ref 70–99)
GLUCOSE BLDC GLUCOMTR-MCNC: 193 MG/DL — HIGH (ref 70–99)
GLUCOSE BLDC GLUCOMTR-MCNC: 195 MG/DL — HIGH (ref 70–99)
GLUCOSE BLDC GLUCOMTR-MCNC: 197 MG/DL — HIGH (ref 70–99)
GLUCOSE BLDC GLUCOMTR-MCNC: 201 MG/DL — HIGH (ref 70–99)
GLUCOSE BLDC GLUCOMTR-MCNC: 203 MG/DL — HIGH (ref 70–99)
GLUCOSE BLDC GLUCOMTR-MCNC: 207 MG/DL — HIGH (ref 70–99)
GLUCOSE BLDC GLUCOMTR-MCNC: 214 MG/DL — HIGH (ref 70–99)
GLUCOSE BLDC GLUCOMTR-MCNC: 215 MG/DL — HIGH (ref 70–99)
GLUCOSE BLDC GLUCOMTR-MCNC: 216 MG/DL — HIGH (ref 70–99)
GLUCOSE BLDC GLUCOMTR-MCNC: 221 MG/DL — HIGH (ref 70–99)
GLUCOSE BLDC GLUCOMTR-MCNC: 226 MG/DL — HIGH (ref 70–99)
GLUCOSE BLDC GLUCOMTR-MCNC: 237 MG/DL — HIGH (ref 70–99)
GLUCOSE BLDC GLUCOMTR-MCNC: 239 MG/DL — HIGH (ref 70–99)
GLUCOSE BLDC GLUCOMTR-MCNC: 240 MG/DL — HIGH (ref 70–99)
GLUCOSE BLDC GLUCOMTR-MCNC: 242 MG/DL — HIGH (ref 70–99)
GLUCOSE BLDC GLUCOMTR-MCNC: 242 MG/DL — HIGH (ref 70–99)
GLUCOSE BLDC GLUCOMTR-MCNC: 243 MG/DL — HIGH (ref 70–99)
GLUCOSE BLDC GLUCOMTR-MCNC: 246 MG/DL — HIGH (ref 70–99)
GLUCOSE BLDC GLUCOMTR-MCNC: 251 MG/DL — HIGH (ref 70–99)
GLUCOSE BLDC GLUCOMTR-MCNC: 262 MG/DL — HIGH (ref 70–99)
GLUCOSE BLDC GLUCOMTR-MCNC: 268 MG/DL — HIGH (ref 70–99)
GLUCOSE BLDC GLUCOMTR-MCNC: 271 MG/DL — HIGH (ref 70–99)
GLUCOSE BLDC GLUCOMTR-MCNC: 271 MG/DL — HIGH (ref 70–99)
GLUCOSE BLDC GLUCOMTR-MCNC: 281 MG/DL — HIGH (ref 70–99)
GLUCOSE BLDC GLUCOMTR-MCNC: 285 MG/DL — HIGH (ref 70–99)
GLUCOSE BLDC GLUCOMTR-MCNC: 297 MG/DL — HIGH (ref 70–99)
GLUCOSE BLDC GLUCOMTR-MCNC: 303 MG/DL — HIGH (ref 70–99)
GLUCOSE BLDC GLUCOMTR-MCNC: 306 MG/DL — HIGH (ref 70–99)
GLUCOSE BLDC GLUCOMTR-MCNC: 56 MG/DL — LOW (ref 70–99)
GLUCOSE BLDC GLUCOMTR-MCNC: 65 MG/DL — LOW (ref 70–99)
GLUCOSE BLDC GLUCOMTR-MCNC: 69 MG/DL — LOW (ref 70–99)
GLUCOSE BLDC GLUCOMTR-MCNC: 77 MG/DL — SIGNIFICANT CHANGE UP (ref 70–99)
GLUCOSE BLDC GLUCOMTR-MCNC: 83 MG/DL — SIGNIFICANT CHANGE UP (ref 70–99)
GLUCOSE BLDC GLUCOMTR-MCNC: 89 MG/DL — SIGNIFICANT CHANGE UP (ref 70–99)
GLUCOSE BLDC GLUCOMTR-MCNC: 89 MG/DL — SIGNIFICANT CHANGE UP (ref 70–99)
GLUCOSE BLDC GLUCOMTR-MCNC: 92 MG/DL — SIGNIFICANT CHANGE UP (ref 70–99)
GLUCOSE BLDC GLUCOMTR-MCNC: 96 MG/DL — SIGNIFICANT CHANGE UP (ref 70–99)
GLUCOSE BLDC GLUCOMTR-MCNC: 97 MG/DL — SIGNIFICANT CHANGE UP (ref 70–99)
GLUCOSE BLDC GLUCOMTR-MCNC: 98 MG/DL — SIGNIFICANT CHANGE UP (ref 70–99)
GLUCOSE BLDC GLUCOMTR-MCNC: 98 MG/DL — SIGNIFICANT CHANGE UP (ref 70–99)
GLUCOSE BLDC GLUCOMTR-MCNC: 99 MG/DL — SIGNIFICANT CHANGE UP (ref 70–99)
GLUCOSE FLD-MCNC: 146 MG/DL — SIGNIFICANT CHANGE UP
GLUCOSE SERPL-MCNC: 103 MG/DL — HIGH (ref 70–99)
GLUCOSE SERPL-MCNC: 104 MG/DL — HIGH (ref 70–99)
GLUCOSE SERPL-MCNC: 106 MG/DL — HIGH (ref 70–99)
GLUCOSE SERPL-MCNC: 106 MG/DL — HIGH (ref 70–99)
GLUCOSE SERPL-MCNC: 108 MG/DL — HIGH (ref 70–99)
GLUCOSE SERPL-MCNC: 109 MG/DL — HIGH (ref 70–99)
GLUCOSE SERPL-MCNC: 110 MG/DL — HIGH (ref 70–99)
GLUCOSE SERPL-MCNC: 111 MG/DL — HIGH (ref 70–99)
GLUCOSE SERPL-MCNC: 112 MG/DL — HIGH (ref 70–99)
GLUCOSE SERPL-MCNC: 113 MG/DL — HIGH (ref 70–99)
GLUCOSE SERPL-MCNC: 114 MG/DL — HIGH (ref 70–99)
GLUCOSE SERPL-MCNC: 115 MG/DL — HIGH (ref 70–99)
GLUCOSE SERPL-MCNC: 116 MG/DL — HIGH (ref 70–99)
GLUCOSE SERPL-MCNC: 121 MG/DL — HIGH (ref 70–99)
GLUCOSE SERPL-MCNC: 126 MG/DL — HIGH (ref 70–99)
GLUCOSE SERPL-MCNC: 126 MG/DL — HIGH (ref 70–99)
GLUCOSE SERPL-MCNC: 130 MG/DL — HIGH (ref 70–99)
GLUCOSE SERPL-MCNC: 132 MG/DL — HIGH (ref 70–99)
GLUCOSE SERPL-MCNC: 132 MG/DL — HIGH (ref 70–99)
GLUCOSE SERPL-MCNC: 134 MG/DL — HIGH (ref 70–99)
GLUCOSE SERPL-MCNC: 136 MG/DL — HIGH (ref 70–99)
GLUCOSE SERPL-MCNC: 137 MG/DL — HIGH (ref 70–99)
GLUCOSE SERPL-MCNC: 140 MG/DL — HIGH (ref 70–99)
GLUCOSE SERPL-MCNC: 144 MG/DL — HIGH (ref 70–99)
GLUCOSE SERPL-MCNC: 145 MG/DL — HIGH (ref 70–99)
GLUCOSE SERPL-MCNC: 148 MG/DL — HIGH (ref 70–99)
GLUCOSE SERPL-MCNC: 163 MG/DL — HIGH (ref 70–99)
GLUCOSE SERPL-MCNC: 164 MG/DL — HIGH (ref 70–99)
GLUCOSE SERPL-MCNC: 165 MG/DL — HIGH (ref 70–99)
GLUCOSE SERPL-MCNC: 169 MG/DL — HIGH (ref 70–99)
GLUCOSE SERPL-MCNC: 170 MG/DL — HIGH (ref 70–99)
GLUCOSE SERPL-MCNC: 171 MG/DL — HIGH (ref 70–99)
GLUCOSE SERPL-MCNC: 184 MG/DL — HIGH (ref 70–99)
GLUCOSE SERPL-MCNC: 189 MG/DL — HIGH (ref 70–99)
GLUCOSE SERPL-MCNC: 190 MG/DL — HIGH (ref 70–99)
GLUCOSE SERPL-MCNC: 192 MG/DL — HIGH (ref 70–99)
GLUCOSE SERPL-MCNC: 197 MG/DL — HIGH (ref 70–99)
GLUCOSE SERPL-MCNC: 199 MG/DL — HIGH (ref 70–99)
GLUCOSE SERPL-MCNC: 216 MG/DL — HIGH (ref 70–99)
GLUCOSE SERPL-MCNC: 247 MG/DL — HIGH (ref 70–99)
GLUCOSE SERPL-MCNC: 43 MG/DL — CRITICAL LOW (ref 70–99)
GLUCOSE SERPL-MCNC: 79 MG/DL — SIGNIFICANT CHANGE UP (ref 70–99)
GLUCOSE SERPL-MCNC: 80 MG/DL — SIGNIFICANT CHANGE UP (ref 70–99)
GLUCOSE SERPL-MCNC: 82 MG/DL — SIGNIFICANT CHANGE UP (ref 70–99)
GLUCOSE SERPL-MCNC: 86 MG/DL — SIGNIFICANT CHANGE UP (ref 70–99)
GLUCOSE SERPL-MCNC: 90 MG/DL — SIGNIFICANT CHANGE UP (ref 70–99)
GLUCOSE SERPL-MCNC: 93 MG/DL — SIGNIFICANT CHANGE UP (ref 70–99)
GLUCOSE SERPL-MCNC: 93 MG/DL — SIGNIFICANT CHANGE UP (ref 70–99)
GLUCOSE SERPL-MCNC: 94 MG/DL — SIGNIFICANT CHANGE UP (ref 70–99)
GLUCOSE SERPL-MCNC: 94 MG/DL — SIGNIFICANT CHANGE UP (ref 70–99)
GLUCOSE SERPL-MCNC: 95 MG/DL — SIGNIFICANT CHANGE UP (ref 70–99)
GLUCOSE SERPL-MCNC: 99 MG/DL — SIGNIFICANT CHANGE UP (ref 70–99)
GLUCOSE UR QL: NEGATIVE — SIGNIFICANT CHANGE UP
GLUCOSE UR QL: NEGATIVE — SIGNIFICANT CHANGE UP
GRAM STN FLD: SIGNIFICANT CHANGE UP
HAV IGM SER-ACNC: SIGNIFICANT CHANGE UP
HBV CORE IGM SER-ACNC: SIGNIFICANT CHANGE UP
HBV SURFACE AB SER-ACNC: SIGNIFICANT CHANGE UP
HBV SURFACE AG SER-ACNC: SIGNIFICANT CHANGE UP
HCO3 BLDA-SCNC: 30 MMOL/L — HIGH (ref 23–27)
HCO3 BLDV-SCNC: 20 MMOL/L — LOW (ref 22–29)
HCO3 BLDV-SCNC: 25 MMOL/L — SIGNIFICANT CHANGE UP (ref 22–29)
HCO3 BLDV-SCNC: 27 MMOL/L — SIGNIFICANT CHANGE UP (ref 22–29)
HCO3 BLDV-SCNC: 30 MMOL/L — HIGH (ref 22–29)
HCO3 BLDV-SCNC: 30 MMOL/L — HIGH (ref 22–29)
HCO3 BLDV-SCNC: 9 MMOL/L — LOW (ref 22–29)
HCT VFR BLD CALC: 21.6 % — LOW (ref 42–52)
HCT VFR BLD CALC: 22 % — LOW (ref 42–52)
HCT VFR BLD CALC: 22.6 % — LOW (ref 42–52)
HCT VFR BLD CALC: 22.8 % — LOW (ref 42–52)
HCT VFR BLD CALC: 23 % — LOW (ref 42–52)
HCT VFR BLD CALC: 23.5 % — LOW (ref 42–52)
HCT VFR BLD CALC: 23.8 % — LOW (ref 42–52)
HCT VFR BLD CALC: 24.6 % — LOW (ref 42–52)
HCT VFR BLD CALC: 24.6 % — LOW (ref 42–52)
HCT VFR BLD CALC: 24.8 % — LOW (ref 42–52)
HCT VFR BLD CALC: 25 % — LOW (ref 42–52)
HCT VFR BLD CALC: 25.4 % — LOW (ref 42–52)
HCT VFR BLD CALC: 25.8 % — LOW (ref 42–52)
HCT VFR BLD CALC: 26.2 % — LOW (ref 42–52)
HCT VFR BLD CALC: 26.4 % — LOW (ref 42–52)
HCT VFR BLD CALC: 26.5 % — LOW (ref 42–52)
HCT VFR BLD CALC: 26.5 % — LOW (ref 42–52)
HCT VFR BLD CALC: 26.6 % — LOW (ref 42–52)
HCT VFR BLD CALC: 27.4 % — LOW (ref 42–52)
HCT VFR BLD CALC: 27.8 % — LOW (ref 42–52)
HCT VFR BLD CALC: 28.1 % — LOW (ref 42–52)
HCT VFR BLD CALC: 28.2 % — LOW (ref 42–52)
HCT VFR BLD CALC: 28.6 % — LOW (ref 42–52)
HCT VFR BLD CALC: 28.6 % — LOW (ref 42–52)
HCT VFR BLD CALC: 29.9 % — LOW (ref 42–52)
HCT VFR BLD CALC: 30 % — LOW (ref 42–52)
HCT VFR BLD CALC: 30.6 % — LOW (ref 42–52)
HCT VFR BLD CALC: 30.8 % — LOW (ref 42–52)
HCT VFR BLD CALC: 30.9 % — LOW (ref 42–52)
HCT VFR BLD CALC: 31.2 % — LOW (ref 42–52)
HCT VFR BLD CALC: 31.5 % — LOW (ref 42–52)
HCT VFR BLD CALC: 32.2 % — LOW (ref 42–52)
HCT VFR BLD CALC: 32.5 % — LOW (ref 42–52)
HCT VFR BLD CALC: 32.7 % — LOW (ref 42–52)
HCT VFR BLD CALC: 33 % — LOW (ref 42–52)
HCT VFR BLD CALC: 33.2 % — LOW (ref 42–52)
HCT VFR BLD CALC: 33.2 % — LOW (ref 42–52)
HCT VFR BLD CALC: 33.3 % — LOW (ref 42–52)
HCT VFR BLD CALC: 33.4 % — LOW (ref 42–52)
HCT VFR BLD CALC: 33.5 % — LOW (ref 42–52)
HCT VFR BLD CALC: 33.5 % — LOW (ref 42–52)
HCT VFR BLD CALC: 33.9 % — LOW (ref 42–52)
HCT VFR BLD CALC: 34.7 % — LOW (ref 42–52)
HCT VFR BLD CALC: 34.7 % — LOW (ref 42–52)
HCT VFR BLD CALC: 34.8 % — LOW (ref 42–52)
HCT VFR BLD CALC: 34.9 % — LOW (ref 42–52)
HCT VFR BLD CALC: 35.2 % — LOW (ref 42–52)
HCT VFR BLD CALC: 35.3 % — LOW (ref 42–52)
HCT VFR BLD CALC: 35.4 % — LOW (ref 42–52)
HCT VFR BLD CALC: 35.9 % — LOW (ref 42–52)
HCT VFR BLD CALC: 36.4 % — LOW (ref 42–52)
HCT VFR BLD CALC: 36.7 % — LOW (ref 42–52)
HCT VFR BLD CALC: 36.7 % — LOW (ref 42–52)
HCT VFR BLD CALC: 38.6 % — LOW (ref 42–52)
HCT VFR BLDA CALC: 25.5 % — LOW (ref 34–44)
HCT VFR BLDA CALC: 27.8 % — LOW (ref 34–44)
HCT VFR BLDA CALC: 29 % — LOW (ref 39–51)
HCT VFR BLDA CALC: 30 % — LOW (ref 39–51)
HCT VFR BLDA CALC: 32 % — LOW (ref 39–51)
HCT VFR BLDA CALC: 40.7 % — SIGNIFICANT CHANGE UP (ref 34–44)
HCV AB S/CO SERPL IA: 0.15 S/CO — SIGNIFICANT CHANGE UP (ref 0–0.99)
HCV AB S/CO SERPL IA: 0.15 S/CO — SIGNIFICANT CHANGE UP (ref 0–0.99)
HCV AB S/CO SERPL IA: 0.17 S/CO — SIGNIFICANT CHANGE UP (ref 0–0.99)
HCV AB SERPL-IMP: SIGNIFICANT CHANGE UP
HDLC SERPL-MCNC: 38 MG/DL — LOW
HGB BLD CALC-MCNC: 10.5 G/DL — LOW (ref 12.6–17.4)
HGB BLD CALC-MCNC: 13.3 G/DL — LOW (ref 14–18)
HGB BLD CALC-MCNC: 8.3 G/DL — LOW (ref 14–18)
HGB BLD CALC-MCNC: 9.1 G/DL — LOW (ref 14–18)
HGB BLD CALC-MCNC: 9.6 G/DL — LOW (ref 12.6–17.4)
HGB BLD CALC-MCNC: 9.9 G/DL — LOW (ref 12.6–17.4)
HGB BLD-MCNC: 10 G/DL — LOW (ref 14–18)
HGB BLD-MCNC: 10.1 G/DL — LOW (ref 14–18)
HGB BLD-MCNC: 10.1 G/DL — LOW (ref 14–18)
HGB BLD-MCNC: 10.3 G/DL — LOW (ref 14–18)
HGB BLD-MCNC: 10.4 G/DL — LOW (ref 14–18)
HGB BLD-MCNC: 10.4 G/DL — LOW (ref 14–18)
HGB BLD-MCNC: 10.5 G/DL — LOW (ref 14–18)
HGB BLD-MCNC: 10.6 G/DL — LOW (ref 14–18)
HGB BLD-MCNC: 10.9 G/DL — LOW (ref 14–18)
HGB BLD-MCNC: 10.9 G/DL — LOW (ref 14–18)
HGB BLD-MCNC: 11 G/DL — LOW (ref 14–18)
HGB BLD-MCNC: 11.8 G/DL — LOW (ref 14–18)
HGB BLD-MCNC: 6.9 G/DL — CRITICAL LOW (ref 14–18)
HGB BLD-MCNC: 7.1 G/DL — LOW (ref 14–18)
HGB BLD-MCNC: 7.3 G/DL — LOW (ref 14–18)
HGB BLD-MCNC: 7.4 G/DL — LOW (ref 14–18)
HGB BLD-MCNC: 7.6 G/DL — LOW (ref 14–18)
HGB BLD-MCNC: 7.8 G/DL — LOW (ref 14–18)
HGB BLD-MCNC: 7.9 G/DL — LOW (ref 14–18)
HGB BLD-MCNC: 8.1 G/DL — LOW (ref 14–18)
HGB BLD-MCNC: 8.1 G/DL — LOW (ref 14–18)
HGB BLD-MCNC: 8.2 G/DL — LOW (ref 14–18)
HGB BLD-MCNC: 8.3 G/DL — LOW (ref 14–18)
HGB BLD-MCNC: 8.4 G/DL — LOW (ref 14–18)
HGB BLD-MCNC: 8.7 G/DL — LOW (ref 14–18)
HGB BLD-MCNC: 8.8 G/DL — LOW (ref 14–18)
HGB BLD-MCNC: 8.9 G/DL — LOW (ref 14–18)
HGB BLD-MCNC: 8.9 G/DL — LOW (ref 14–18)
HGB BLD-MCNC: 9 G/DL — LOW (ref 14–18)
HGB BLD-MCNC: 9.1 G/DL — LOW (ref 14–18)
HGB BLD-MCNC: 9.1 G/DL — LOW (ref 14–18)
HGB BLD-MCNC: 9.2 G/DL — LOW (ref 14–18)
HGB BLD-MCNC: 9.2 G/DL — LOW (ref 14–18)
HGB BLD-MCNC: 9.4 G/DL — LOW (ref 14–18)
HGB BLD-MCNC: 9.4 G/DL — LOW (ref 14–18)
HGB BLD-MCNC: 9.6 G/DL — LOW (ref 14–18)
HGB BLD-MCNC: 9.7 G/DL — LOW (ref 14–18)
HGB BLD-MCNC: 9.9 G/DL — LOW (ref 14–18)
HGB BLD-MCNC: 9.9 G/DL — LOW (ref 14–18)
HYALINE CASTS # UR AUTO: 3 /LPF — SIGNIFICANT CHANGE UP (ref 0–7)
IMM GRANULOCYTES NFR BLD AUTO: 0.3 % — SIGNIFICANT CHANGE UP (ref 0.1–0.3)
IMM GRANULOCYTES NFR BLD AUTO: 0.5 % — HIGH (ref 0.1–0.3)
IMM GRANULOCYTES NFR BLD AUTO: 0.7 % — HIGH (ref 0.1–0.3)
IMM GRANULOCYTES NFR BLD AUTO: 0.8 % — HIGH (ref 0.1–0.3)
IMM GRANULOCYTES NFR BLD AUTO: 0.9 % — HIGH (ref 0.1–0.3)
IMM GRANULOCYTES NFR BLD AUTO: 1 % — HIGH (ref 0.1–0.3)
IMM GRANULOCYTES NFR BLD AUTO: 1.1 % — HIGH (ref 0.1–0.3)
IMM GRANULOCYTES NFR BLD AUTO: 1.2 % — HIGH (ref 0.1–0.3)
IMM GRANULOCYTES NFR BLD AUTO: 1.2 % — HIGH (ref 0.1–0.3)
IMM GRANULOCYTES NFR BLD AUTO: 1.3 % — HIGH (ref 0.1–0.3)
IMM GRANULOCYTES NFR BLD AUTO: 1.4 % — HIGH (ref 0.1–0.3)
IMM GRANULOCYTES NFR BLD AUTO: 1.6 % — HIGH (ref 0.1–0.3)
IMM GRANULOCYTES NFR BLD AUTO: 1.8 % — HIGH (ref 0.1–0.3)
IMM GRANULOCYTES NFR BLD AUTO: 1.8 % — HIGH (ref 0.1–0.3)
IMM GRANULOCYTES NFR BLD AUTO: 2 % — HIGH (ref 0.1–0.3)
IMM GRANULOCYTES NFR BLD AUTO: 2.2 % — HIGH (ref 0.1–0.3)
IMM GRANULOCYTES NFR BLD AUTO: 2.3 % — HIGH (ref 0.1–0.3)
IMM GRANULOCYTES NFR BLD AUTO: 2.4 % — HIGH (ref 0.1–0.3)
IMM GRANULOCYTES NFR BLD AUTO: 3 % — HIGH (ref 0.1–0.3)
IMM GRANULOCYTES NFR BLD AUTO: 4.6 % — HIGH (ref 0.1–0.3)
IMM GRANULOCYTES NFR BLD AUTO: 5 % — HIGH (ref 0.1–0.3)
IMM GRANULOCYTES NFR BLD AUTO: 5.3 % — HIGH (ref 0.1–0.3)
IMM GRANULOCYTES NFR BLD AUTO: 6 % — HIGH (ref 0.1–0.3)
IMM GRANULOCYTES NFR BLD AUTO: 6.8 % — HIGH (ref 0.1–0.3)
IMM GRANULOCYTES NFR BLD AUTO: 7.7 % — HIGH (ref 0.1–0.3)
INR BLD: 1.07 RATIO — SIGNIFICANT CHANGE UP (ref 0.65–1.3)
INR BLD: 1.09 RATIO — SIGNIFICANT CHANGE UP (ref 0.65–1.3)
INR BLD: 1.1 RATIO — SIGNIFICANT CHANGE UP (ref 0.65–1.3)
INR BLD: 1.1 RATIO — SIGNIFICANT CHANGE UP (ref 0.65–1.3)
INR BLD: 1.15 RATIO — SIGNIFICANT CHANGE UP (ref 0.65–1.3)
INR BLD: 1.18 RATIO — SIGNIFICANT CHANGE UP (ref 0.65–1.3)
INR BLD: 1.18 RATIO — SIGNIFICANT CHANGE UP (ref 0.65–1.3)
IRON SATN MFR SERPL: 10 UG/DL — LOW (ref 35–150)
IRON SATN MFR SERPL: 8 % — LOW (ref 15–50)
KETONES UR-MCNC: ABNORMAL
KETONES UR-MCNC: NEGATIVE — SIGNIFICANT CHANGE UP
LACTATE BLDV-MCNC: 0.7 MMOL/L — SIGNIFICANT CHANGE UP (ref 0.5–2)
LACTATE BLDV-MCNC: 1 MMOL/L — SIGNIFICANT CHANGE UP (ref 0.5–1.6)
LACTATE BLDV-MCNC: 1 MMOL/L — SIGNIFICANT CHANGE UP (ref 0.5–1.6)
LACTATE BLDV-MCNC: 1.3 MMOL/L — SIGNIFICANT CHANGE UP (ref 0.5–1.6)
LACTATE BLDV-MCNC: 1.6 MMOL/L — SIGNIFICANT CHANGE UP (ref 0.5–2)
LACTATE BLDV-MCNC: 1.7 MMOL/L — SIGNIFICANT CHANGE UP (ref 0.5–2)
LACTATE SERPL-SCNC: 0.8 MMOL/L — SIGNIFICANT CHANGE UP (ref 0.7–2)
LACTATE SERPL-SCNC: 2.3 MMOL/L — HIGH (ref 0.7–2)
LDH SERPL L TO P-CCNC: 136 U/L — SIGNIFICANT CHANGE UP
LDH SERPL L TO P-CCNC: 182 U/L — SIGNIFICANT CHANGE UP (ref 50–242)
LDH SERPL L TO P-CCNC: 184 U/L — SIGNIFICANT CHANGE UP (ref 50–242)
LEGIONELLA AG UR QL: POSITIVE
LEGIONELLA AG UR QL: POSITIVE
LEUKOCYTE ESTERASE UR-ACNC: ABNORMAL
LEUKOCYTE ESTERASE UR-ACNC: ABNORMAL
LIDOCAIN IGE QN: 143 U/L — HIGH (ref 7–60)
LIDOCAIN IGE QN: 40 U/L — SIGNIFICANT CHANGE UP (ref 7–60)
LIPID PNL WITH DIRECT LDL SERPL: 52 MG/DL — SIGNIFICANT CHANGE UP
LYMPHOCYTES # BLD AUTO: 0.14 K/UL — LOW (ref 1.2–3.4)
LYMPHOCYTES # BLD AUTO: 0.39 K/UL — LOW (ref 1.2–3.4)
LYMPHOCYTES # BLD AUTO: 0.53 K/UL — LOW (ref 1.2–3.4)
LYMPHOCYTES # BLD AUTO: 0.56 K/UL — LOW (ref 1.2–3.4)
LYMPHOCYTES # BLD AUTO: 0.62 K/UL — LOW (ref 1.2–3.4)
LYMPHOCYTES # BLD AUTO: 0.7 K/UL — LOW (ref 1.2–3.4)
LYMPHOCYTES # BLD AUTO: 0.71 K/UL — LOW (ref 1.2–3.4)
LYMPHOCYTES # BLD AUTO: 0.89 K/UL — LOW (ref 1.2–3.4)
LYMPHOCYTES # BLD AUTO: 0.9 % — LOW (ref 20.5–51.1)
LYMPHOCYTES # BLD AUTO: 0.94 K/UL — LOW (ref 1.2–3.4)
LYMPHOCYTES # BLD AUTO: 0.98 K/UL — LOW (ref 1.2–3.4)
LYMPHOCYTES # BLD AUTO: 1.08 K/UL — LOW (ref 1.2–3.4)
LYMPHOCYTES # BLD AUTO: 1.09 K/UL — LOW (ref 1.2–3.4)
LYMPHOCYTES # BLD AUTO: 1.15 K/UL — LOW (ref 1.2–3.4)
LYMPHOCYTES # BLD AUTO: 1.19 K/UL — LOW (ref 1.2–3.4)
LYMPHOCYTES # BLD AUTO: 1.21 K/UL — SIGNIFICANT CHANGE UP (ref 1.2–3.4)
LYMPHOCYTES # BLD AUTO: 1.25 K/UL — SIGNIFICANT CHANGE UP (ref 1.2–3.4)
LYMPHOCYTES # BLD AUTO: 1.28 K/UL — SIGNIFICANT CHANGE UP (ref 1.2–3.4)
LYMPHOCYTES # BLD AUTO: 1.29 K/UL — SIGNIFICANT CHANGE UP (ref 1.2–3.4)
LYMPHOCYTES # BLD AUTO: 1.43 K/UL — SIGNIFICANT CHANGE UP (ref 1.2–3.4)
LYMPHOCYTES # BLD AUTO: 1.43 K/UL — SIGNIFICANT CHANGE UP (ref 1.2–3.4)
LYMPHOCYTES # BLD AUTO: 1.48 K/UL — SIGNIFICANT CHANGE UP (ref 1.2–3.4)
LYMPHOCYTES # BLD AUTO: 1.5 K/UL — SIGNIFICANT CHANGE UP (ref 1.2–3.4)
LYMPHOCYTES # BLD AUTO: 1.5 K/UL — SIGNIFICANT CHANGE UP (ref 1.2–3.4)
LYMPHOCYTES # BLD AUTO: 1.53 K/UL — SIGNIFICANT CHANGE UP (ref 1.2–3.4)
LYMPHOCYTES # BLD AUTO: 1.6 K/UL — SIGNIFICANT CHANGE UP (ref 1.2–3.4)
LYMPHOCYTES # BLD AUTO: 1.63 K/UL — SIGNIFICANT CHANGE UP (ref 1.2–3.4)
LYMPHOCYTES # BLD AUTO: 1.64 K/UL — SIGNIFICANT CHANGE UP (ref 1.2–3.4)
LYMPHOCYTES # BLD AUTO: 1.64 K/UL — SIGNIFICANT CHANGE UP (ref 1.2–3.4)
LYMPHOCYTES # BLD AUTO: 1.71 K/UL — SIGNIFICANT CHANGE UP (ref 1.2–3.4)
LYMPHOCYTES # BLD AUTO: 1.72 K/UL — SIGNIFICANT CHANGE UP (ref 1.2–3.4)
LYMPHOCYTES # BLD AUTO: 1.72 K/UL — SIGNIFICANT CHANGE UP (ref 1.2–3.4)
LYMPHOCYTES # BLD AUTO: 1.85 K/UL — SIGNIFICANT CHANGE UP (ref 1.2–3.4)
LYMPHOCYTES # BLD AUTO: 1.95 K/UL — SIGNIFICANT CHANGE UP (ref 1.2–3.4)
LYMPHOCYTES # BLD AUTO: 10.6 % — LOW (ref 20.5–51.1)
LYMPHOCYTES # BLD AUTO: 11 % — LOW (ref 20.5–51.1)
LYMPHOCYTES # BLD AUTO: 11 % — LOW (ref 20.5–51.1)
LYMPHOCYTES # BLD AUTO: 11.7 % — LOW (ref 20.5–51.1)
LYMPHOCYTES # BLD AUTO: 11.8 % — LOW (ref 20.5–51.1)
LYMPHOCYTES # BLD AUTO: 12 % — LOW (ref 20.5–51.1)
LYMPHOCYTES # BLD AUTO: 12.3 % — LOW (ref 20.5–51.1)
LYMPHOCYTES # BLD AUTO: 12.4 % — LOW (ref 20.5–51.1)
LYMPHOCYTES # BLD AUTO: 12.4 % — LOW (ref 20.5–51.1)
LYMPHOCYTES # BLD AUTO: 12.5 % — LOW (ref 20.5–51.1)
LYMPHOCYTES # BLD AUTO: 14 % — LOW (ref 20.5–51.1)
LYMPHOCYTES # BLD AUTO: 14 % — LOW (ref 20.5–51.1)
LYMPHOCYTES # BLD AUTO: 14.3 % — LOW (ref 20.5–51.1)
LYMPHOCYTES # BLD AUTO: 14.4 % — LOW (ref 20.5–51.1)
LYMPHOCYTES # BLD AUTO: 14.8 % — LOW (ref 20.5–51.1)
LYMPHOCYTES # BLD AUTO: 14.8 % — LOW (ref 20.5–51.1)
LYMPHOCYTES # BLD AUTO: 15 % — LOW (ref 20.5–51.1)
LYMPHOCYTES # BLD AUTO: 15.1 % — LOW (ref 20.5–51.1)
LYMPHOCYTES # BLD AUTO: 15.4 % — LOW (ref 20.5–51.1)
LYMPHOCYTES # BLD AUTO: 15.5 % — LOW (ref 20.5–51.1)
LYMPHOCYTES # BLD AUTO: 15.7 % — LOW (ref 20.5–51.1)
LYMPHOCYTES # BLD AUTO: 16 % — LOW (ref 20.5–51.1)
LYMPHOCYTES # BLD AUTO: 16.4 % — LOW (ref 20.5–51.1)
LYMPHOCYTES # BLD AUTO: 16.9 % — LOW (ref 20.5–51.1)
LYMPHOCYTES # BLD AUTO: 17.1 % — LOW (ref 20.5–51.1)
LYMPHOCYTES # BLD AUTO: 17.2 % — LOW (ref 20.5–51.1)
LYMPHOCYTES # BLD AUTO: 17.6 % — LOW (ref 20.5–51.1)
LYMPHOCYTES # BLD AUTO: 17.6 % — LOW (ref 20.5–51.1)
LYMPHOCYTES # BLD AUTO: 17.7 % — LOW (ref 20.5–51.1)
LYMPHOCYTES # BLD AUTO: 18.3 % — LOW (ref 20.5–51.1)
LYMPHOCYTES # BLD AUTO: 19.4 % — LOW (ref 20.5–51.1)
LYMPHOCYTES # BLD AUTO: 2.02 K/UL — SIGNIFICANT CHANGE UP (ref 1.2–3.4)
LYMPHOCYTES # BLD AUTO: 2.07 K/UL — SIGNIFICANT CHANGE UP (ref 1.2–3.4)
LYMPHOCYTES # BLD AUTO: 2.17 K/UL — SIGNIFICANT CHANGE UP (ref 1.2–3.4)
LYMPHOCYTES # BLD AUTO: 2.27 K/UL — SIGNIFICANT CHANGE UP (ref 1.2–3.4)
LYMPHOCYTES # BLD AUTO: 2.28 K/UL — SIGNIFICANT CHANGE UP (ref 1.2–3.4)
LYMPHOCYTES # BLD AUTO: 2.4 K/UL — SIGNIFICANT CHANGE UP (ref 1.2–3.4)
LYMPHOCYTES # BLD AUTO: 2.6 % — LOW (ref 20.5–51.1)
LYMPHOCYTES # BLD AUTO: 2.62 K/UL — SIGNIFICANT CHANGE UP (ref 1.2–3.4)
LYMPHOCYTES # BLD AUTO: 2.67 K/UL — SIGNIFICANT CHANGE UP (ref 1.2–3.4)
LYMPHOCYTES # BLD AUTO: 2.74 K/UL — SIGNIFICANT CHANGE UP (ref 1.2–3.4)
LYMPHOCYTES # BLD AUTO: 2.88 K/UL — SIGNIFICANT CHANGE UP (ref 1.2–3.4)
LYMPHOCYTES # BLD AUTO: 22.1 % — SIGNIFICANT CHANGE UP (ref 20.5–51.1)
LYMPHOCYTES # BLD AUTO: 26.2 % — SIGNIFICANT CHANGE UP (ref 20.5–51.1)
LYMPHOCYTES # BLD AUTO: 3.5 % — LOW (ref 20.5–51.1)
LYMPHOCYTES # BLD AUTO: 4.4 % — LOW (ref 20.5–51.1)
LYMPHOCYTES # BLD AUTO: 4.8 % — LOW (ref 20.5–51.1)
LYMPHOCYTES # BLD AUTO: 6.5 % — LOW (ref 20.5–51.1)
LYMPHOCYTES # BLD AUTO: 7.8 % — LOW (ref 20.5–51.1)
LYMPHOCYTES # BLD AUTO: 8 % — LOW (ref 20.5–51.1)
LYMPHOCYTES # BLD AUTO: 8.5 % — LOW (ref 20.5–51.1)
LYMPHOCYTES # BLD AUTO: 8.5 % — LOW (ref 20.5–51.1)
LYMPHOCYTES # FLD: 53 — SIGNIFICANT CHANGE UP
MACROCYTES BLD QL: SLIGHT — SIGNIFICANT CHANGE UP
MACROCYTES BLD QL: SLIGHT — SIGNIFICANT CHANGE UP
MAGNESIUM SERPL-MCNC: 1.7 MG/DL — LOW (ref 1.8–2.4)
MAGNESIUM SERPL-MCNC: 1.7 MG/DL — LOW (ref 1.8–2.4)
MAGNESIUM SERPL-MCNC: 1.8 MG/DL — SIGNIFICANT CHANGE UP (ref 1.8–2.4)
MAGNESIUM SERPL-MCNC: 1.9 MG/DL — SIGNIFICANT CHANGE UP (ref 1.8–2.4)
MAGNESIUM SERPL-MCNC: 2 MG/DL — SIGNIFICANT CHANGE UP (ref 1.8–2.4)
MAGNESIUM SERPL-MCNC: 2.1 MG/DL — SIGNIFICANT CHANGE UP (ref 1.8–2.4)
MAGNESIUM SERPL-MCNC: 2.2 MG/DL — SIGNIFICANT CHANGE UP (ref 1.8–2.4)
MAGNESIUM SERPL-MCNC: 2.3 MG/DL — SIGNIFICANT CHANGE UP (ref 1.8–2.4)
MAGNESIUM SERPL-MCNC: 2.4 MG/DL — SIGNIFICANT CHANGE UP (ref 1.8–2.4)
MAGNESIUM SERPL-MCNC: 2.4 MG/DL — SIGNIFICANT CHANGE UP (ref 1.8–2.4)
MANUAL SMEAR VERIFICATION: SIGNIFICANT CHANGE UP
MCHC RBC-ENTMCNC: 26.8 PG — LOW (ref 27–31)
MCHC RBC-ENTMCNC: 27.1 PG — SIGNIFICANT CHANGE UP (ref 27–31)
MCHC RBC-ENTMCNC: 27.2 PG — SIGNIFICANT CHANGE UP (ref 27–31)
MCHC RBC-ENTMCNC: 27.3 PG — SIGNIFICANT CHANGE UP (ref 27–31)
MCHC RBC-ENTMCNC: 27.3 PG — SIGNIFICANT CHANGE UP (ref 27–31)
MCHC RBC-ENTMCNC: 27.4 PG — SIGNIFICANT CHANGE UP (ref 27–31)
MCHC RBC-ENTMCNC: 27.5 PG — SIGNIFICANT CHANGE UP (ref 27–31)
MCHC RBC-ENTMCNC: 27.5 PG — SIGNIFICANT CHANGE UP (ref 27–31)
MCHC RBC-ENTMCNC: 27.6 PG — SIGNIFICANT CHANGE UP (ref 27–31)
MCHC RBC-ENTMCNC: 27.6 PG — SIGNIFICANT CHANGE UP (ref 27–31)
MCHC RBC-ENTMCNC: 27.7 PG — SIGNIFICANT CHANGE UP (ref 27–31)
MCHC RBC-ENTMCNC: 27.8 PG — SIGNIFICANT CHANGE UP (ref 27–31)
MCHC RBC-ENTMCNC: 28 PG — SIGNIFICANT CHANGE UP (ref 27–31)
MCHC RBC-ENTMCNC: 28.1 PG — SIGNIFICANT CHANGE UP (ref 27–31)
MCHC RBC-ENTMCNC: 28.2 PG — SIGNIFICANT CHANGE UP (ref 27–31)
MCHC RBC-ENTMCNC: 28.3 PG — SIGNIFICANT CHANGE UP (ref 27–31)
MCHC RBC-ENTMCNC: 28.3 PG — SIGNIFICANT CHANGE UP (ref 27–31)
MCHC RBC-ENTMCNC: 28.4 PG — SIGNIFICANT CHANGE UP (ref 27–31)
MCHC RBC-ENTMCNC: 28.4 PG — SIGNIFICANT CHANGE UP (ref 27–31)
MCHC RBC-ENTMCNC: 28.6 PG — SIGNIFICANT CHANGE UP (ref 27–31)
MCHC RBC-ENTMCNC: 28.7 G/DL — LOW (ref 32–37)
MCHC RBC-ENTMCNC: 28.7 PG — SIGNIFICANT CHANGE UP (ref 27–31)
MCHC RBC-ENTMCNC: 28.8 PG — SIGNIFICANT CHANGE UP (ref 27–31)
MCHC RBC-ENTMCNC: 28.9 G/DL — LOW (ref 32–37)
MCHC RBC-ENTMCNC: 28.9 PG — SIGNIFICANT CHANGE UP (ref 27–31)
MCHC RBC-ENTMCNC: 29 G/DL — LOW (ref 32–37)
MCHC RBC-ENTMCNC: 29.1 G/DL — LOW (ref 32–37)
MCHC RBC-ENTMCNC: 29.1 G/DL — LOW (ref 32–37)
MCHC RBC-ENTMCNC: 29.1 PG — SIGNIFICANT CHANGE UP (ref 27–31)
MCHC RBC-ENTMCNC: 29.1 PG — SIGNIFICANT CHANGE UP (ref 27–31)
MCHC RBC-ENTMCNC: 29.2 G/DL — LOW (ref 32–37)
MCHC RBC-ENTMCNC: 29.2 PG — SIGNIFICANT CHANGE UP (ref 27–31)
MCHC RBC-ENTMCNC: 29.3 G/DL — LOW (ref 32–37)
MCHC RBC-ENTMCNC: 29.3 PG — SIGNIFICANT CHANGE UP (ref 27–31)
MCHC RBC-ENTMCNC: 29.4 G/DL — LOW (ref 32–37)
MCHC RBC-ENTMCNC: 29.4 PG — SIGNIFICANT CHANGE UP (ref 27–31)
MCHC RBC-ENTMCNC: 29.4 PG — SIGNIFICANT CHANGE UP (ref 27–31)
MCHC RBC-ENTMCNC: 29.5 G/DL — LOW (ref 32–37)
MCHC RBC-ENTMCNC: 29.6 G/DL — LOW (ref 32–37)
MCHC RBC-ENTMCNC: 29.6 G/DL — LOW (ref 32–37)
MCHC RBC-ENTMCNC: 29.6 PG — SIGNIFICANT CHANGE UP (ref 27–31)
MCHC RBC-ENTMCNC: 29.7 G/DL — LOW (ref 32–37)
MCHC RBC-ENTMCNC: 29.7 G/DL — LOW (ref 32–37)
MCHC RBC-ENTMCNC: 29.8 G/DL — LOW (ref 32–37)
MCHC RBC-ENTMCNC: 29.9 G/DL — LOW (ref 32–37)
MCHC RBC-ENTMCNC: 29.9 G/DL — LOW (ref 32–37)
MCHC RBC-ENTMCNC: 30 G/DL — LOW (ref 32–37)
MCHC RBC-ENTMCNC: 30.1 G/DL — LOW (ref 32–37)
MCHC RBC-ENTMCNC: 30.1 G/DL — LOW (ref 32–37)
MCHC RBC-ENTMCNC: 30.2 G/DL — LOW (ref 32–37)
MCHC RBC-ENTMCNC: 30.2 G/DL — LOW (ref 32–37)
MCHC RBC-ENTMCNC: 30.3 G/DL — LOW (ref 32–37)
MCHC RBC-ENTMCNC: 30.4 G/DL — LOW (ref 32–37)
MCHC RBC-ENTMCNC: 30.6 G/DL — LOW (ref 32–37)
MCHC RBC-ENTMCNC: 30.6 G/DL — LOW (ref 32–37)
MCHC RBC-ENTMCNC: 30.7 G/DL — LOW (ref 32–37)
MCHC RBC-ENTMCNC: 31.1 G/DL — LOW (ref 32–37)
MCHC RBC-ENTMCNC: 31.3 G/DL — LOW (ref 32–37)
MCHC RBC-ENTMCNC: 31.3 G/DL — LOW (ref 32–37)
MCHC RBC-ENTMCNC: 31.4 G/DL — LOW (ref 32–37)
MCHC RBC-ENTMCNC: 31.4 G/DL — LOW (ref 32–37)
MCHC RBC-ENTMCNC: 31.7 G/DL — LOW (ref 32–37)
MCHC RBC-ENTMCNC: 31.9 G/DL — LOW (ref 32–37)
MCHC RBC-ENTMCNC: 32.1 G/DL — SIGNIFICANT CHANGE UP (ref 32–37)
MCHC RBC-ENTMCNC: 32.1 G/DL — SIGNIFICANT CHANGE UP (ref 32–37)
MCHC RBC-ENTMCNC: 32.2 G/DL — SIGNIFICANT CHANGE UP (ref 32–37)
MCHC RBC-ENTMCNC: 32.3 G/DL — SIGNIFICANT CHANGE UP (ref 32–37)
MCHC RBC-ENTMCNC: 32.5 G/DL — SIGNIFICANT CHANGE UP (ref 32–37)
MCHC RBC-ENTMCNC: 32.9 G/DL — SIGNIFICANT CHANGE UP (ref 32–37)
MCHC RBC-ENTMCNC: 33.2 G/DL — SIGNIFICANT CHANGE UP (ref 32–37)
MCV RBC AUTO: 100 FL — HIGH (ref 80–94)
MCV RBC AUTO: 100.3 FL — HIGH (ref 80–94)
MCV RBC AUTO: 100.7 FL — HIGH (ref 80–94)
MCV RBC AUTO: 100.9 FL — HIGH (ref 80–94)
MCV RBC AUTO: 84 FL — SIGNIFICANT CHANGE UP (ref 80–94)
MCV RBC AUTO: 84.4 FL — SIGNIFICANT CHANGE UP (ref 80–94)
MCV RBC AUTO: 84.8 FL — SIGNIFICANT CHANGE UP (ref 80–94)
MCV RBC AUTO: 85.3 FL — SIGNIFICANT CHANGE UP (ref 80–94)
MCV RBC AUTO: 85.5 FL — SIGNIFICANT CHANGE UP (ref 80–94)
MCV RBC AUTO: 85.6 FL — SIGNIFICANT CHANGE UP (ref 80–94)
MCV RBC AUTO: 85.7 FL — SIGNIFICANT CHANGE UP (ref 80–94)
MCV RBC AUTO: 85.7 FL — SIGNIFICANT CHANGE UP (ref 80–94)
MCV RBC AUTO: 85.8 FL — SIGNIFICANT CHANGE UP (ref 80–94)
MCV RBC AUTO: 87.5 FL — SIGNIFICANT CHANGE UP (ref 80–94)
MCV RBC AUTO: 87.9 FL — SIGNIFICANT CHANGE UP (ref 80–94)
MCV RBC AUTO: 88.6 FL — SIGNIFICANT CHANGE UP (ref 80–94)
MCV RBC AUTO: 88.6 FL — SIGNIFICANT CHANGE UP (ref 80–94)
MCV RBC AUTO: 89.6 FL — SIGNIFICANT CHANGE UP (ref 80–94)
MCV RBC AUTO: 90.1 FL — SIGNIFICANT CHANGE UP (ref 80–94)
MCV RBC AUTO: 90.2 FL — SIGNIFICANT CHANGE UP (ref 80–94)
MCV RBC AUTO: 90.2 FL — SIGNIFICANT CHANGE UP (ref 80–94)
MCV RBC AUTO: 91.1 FL — SIGNIFICANT CHANGE UP (ref 80–94)
MCV RBC AUTO: 91.4 FL — SIGNIFICANT CHANGE UP (ref 80–94)
MCV RBC AUTO: 91.7 FL — SIGNIFICANT CHANGE UP (ref 80–94)
MCV RBC AUTO: 91.9 FL — SIGNIFICANT CHANGE UP (ref 80–94)
MCV RBC AUTO: 92 FL — SIGNIFICANT CHANGE UP (ref 80–94)
MCV RBC AUTO: 92.1 FL — SIGNIFICANT CHANGE UP (ref 80–94)
MCV RBC AUTO: 92.2 FL — SIGNIFICANT CHANGE UP (ref 80–94)
MCV RBC AUTO: 92.5 FL — SIGNIFICANT CHANGE UP (ref 80–94)
MCV RBC AUTO: 92.7 FL — SIGNIFICANT CHANGE UP (ref 80–94)
MCV RBC AUTO: 93.5 FL — SIGNIFICANT CHANGE UP (ref 80–94)
MCV RBC AUTO: 93.7 FL — SIGNIFICANT CHANGE UP (ref 80–94)
MCV RBC AUTO: 94.1 FL — HIGH (ref 80–94)
MCV RBC AUTO: 94.3 FL — HIGH (ref 80–94)
MCV RBC AUTO: 94.6 FL — HIGH (ref 80–94)
MCV RBC AUTO: 94.6 FL — HIGH (ref 80–94)
MCV RBC AUTO: 94.9 FL — HIGH (ref 80–94)
MCV RBC AUTO: 95.3 FL — HIGH (ref 80–94)
MCV RBC AUTO: 95.3 FL — HIGH (ref 80–94)
MCV RBC AUTO: 95.7 FL — HIGH (ref 80–94)
MCV RBC AUTO: 96.5 FL — HIGH (ref 80–94)
MCV RBC AUTO: 96.5 FL — HIGH (ref 80–94)
MCV RBC AUTO: 96.6 FL — HIGH (ref 80–94)
MCV RBC AUTO: 96.9 FL — HIGH (ref 80–94)
MCV RBC AUTO: 97.2 FL — HIGH (ref 80–94)
MCV RBC AUTO: 97.3 FL — HIGH (ref 80–94)
MCV RBC AUTO: 97.3 FL — HIGH (ref 80–94)
MCV RBC AUTO: 97.4 FL — HIGH (ref 80–94)
MCV RBC AUTO: 97.4 FL — HIGH (ref 80–94)
MCV RBC AUTO: 97.6 FL — HIGH (ref 80–94)
MCV RBC AUTO: 97.8 FL — HIGH (ref 80–94)
MCV RBC AUTO: 97.9 FL — HIGH (ref 80–94)
MCV RBC AUTO: 97.9 FL — HIGH (ref 80–94)
MCV RBC AUTO: 98 FL — HIGH (ref 80–94)
MCV RBC AUTO: 98.4 FL — HIGH (ref 80–94)
METAMYELOCYTES # FLD: 0.9 % — HIGH (ref 0–0)
METAMYELOCYTES # FLD: 2.6 % — HIGH (ref 0–0)
METHOD TYPE: SIGNIFICANT CHANGE UP
METHOD TYPE: SIGNIFICANT CHANGE UP
MICROCYTES BLD QL: SLIGHT — SIGNIFICANT CHANGE UP
MICROCYTES BLD QL: SLIGHT — SIGNIFICANT CHANGE UP
MONOCYTES # BLD AUTO: 0.47 K/UL — SIGNIFICANT CHANGE UP (ref 0.1–0.6)
MONOCYTES # BLD AUTO: 0.5 K/UL — SIGNIFICANT CHANGE UP (ref 0.1–0.6)
MONOCYTES # BLD AUTO: 0.59 K/UL — SIGNIFICANT CHANGE UP (ref 0.1–0.6)
MONOCYTES # BLD AUTO: 0.6 K/UL — SIGNIFICANT CHANGE UP (ref 0.1–0.6)
MONOCYTES # BLD AUTO: 0.66 K/UL — HIGH (ref 0.1–0.6)
MONOCYTES # BLD AUTO: 0.7 K/UL — HIGH (ref 0.1–0.6)
MONOCYTES # BLD AUTO: 0.91 K/UL — HIGH (ref 0.1–0.6)
MONOCYTES # BLD AUTO: 0.92 K/UL — HIGH (ref 0.1–0.6)
MONOCYTES # BLD AUTO: 0.93 K/UL — HIGH (ref 0.1–0.6)
MONOCYTES # BLD AUTO: 0.95 K/UL — HIGH (ref 0.1–0.6)
MONOCYTES # BLD AUTO: 1.02 K/UL — HIGH (ref 0.1–0.6)
MONOCYTES # BLD AUTO: 1.1 K/UL — HIGH (ref 0.1–0.6)
MONOCYTES # BLD AUTO: 1.12 K/UL — HIGH (ref 0.1–0.6)
MONOCYTES # BLD AUTO: 1.14 K/UL — HIGH (ref 0.1–0.6)
MONOCYTES # BLD AUTO: 1.15 K/UL — HIGH (ref 0.1–0.6)
MONOCYTES # BLD AUTO: 1.16 K/UL — HIGH (ref 0.1–0.6)
MONOCYTES # BLD AUTO: 1.22 K/UL — HIGH (ref 0.1–0.6)
MONOCYTES # BLD AUTO: 1.22 K/UL — HIGH (ref 0.1–0.6)
MONOCYTES # BLD AUTO: 1.23 K/UL — HIGH (ref 0.1–0.6)
MONOCYTES # BLD AUTO: 1.25 K/UL — HIGH (ref 0.1–0.6)
MONOCYTES # BLD AUTO: 1.25 K/UL — HIGH (ref 0.1–0.6)
MONOCYTES # BLD AUTO: 1.3 K/UL — HIGH (ref 0.1–0.6)
MONOCYTES # BLD AUTO: 1.33 K/UL — HIGH (ref 0.1–0.6)
MONOCYTES # BLD AUTO: 1.36 K/UL — HIGH (ref 0.1–0.6)
MONOCYTES # BLD AUTO: 1.42 K/UL — HIGH (ref 0.1–0.6)
MONOCYTES # BLD AUTO: 1.45 K/UL — HIGH (ref 0.1–0.6)
MONOCYTES # BLD AUTO: 1.47 K/UL — HIGH (ref 0.1–0.6)
MONOCYTES # BLD AUTO: 1.61 K/UL — HIGH (ref 0.1–0.6)
MONOCYTES # BLD AUTO: 1.61 K/UL — HIGH (ref 0.1–0.6)
MONOCYTES # BLD AUTO: 1.63 K/UL — HIGH (ref 0.1–0.6)
MONOCYTES # BLD AUTO: 1.84 K/UL — HIGH (ref 0.1–0.6)
MONOCYTES # BLD AUTO: 1.85 K/UL — HIGH (ref 0.1–0.6)
MONOCYTES # BLD AUTO: 1.86 K/UL — HIGH (ref 0.1–0.6)
MONOCYTES # BLD AUTO: 1.9 K/UL — HIGH (ref 0.1–0.6)
MONOCYTES # BLD AUTO: 2.01 K/UL — HIGH (ref 0.1–0.6)
MONOCYTES # BLD AUTO: 2.07 K/UL — HIGH (ref 0.1–0.6)
MONOCYTES # BLD AUTO: 2.09 K/UL — HIGH (ref 0.1–0.6)
MONOCYTES # BLD AUTO: 2.13 K/UL — HIGH (ref 0.1–0.6)
MONOCYTES # BLD AUTO: 2.17 K/UL — HIGH (ref 0.1–0.6)
MONOCYTES # BLD AUTO: 2.21 K/UL — HIGH (ref 0.1–0.6)
MONOCYTES # BLD AUTO: 2.21 K/UL — HIGH (ref 0.1–0.6)
MONOCYTES # BLD AUTO: 2.22 K/UL — HIGH (ref 0.1–0.6)
MONOCYTES # BLD AUTO: 2.89 K/UL — HIGH (ref 0.1–0.6)
MONOCYTES NFR BLD AUTO: 10.5 % — HIGH (ref 1.7–9.3)
MONOCYTES NFR BLD AUTO: 10.6 % — HIGH (ref 1.7–9.3)
MONOCYTES NFR BLD AUTO: 10.8 % — HIGH (ref 1.7–9.3)
MONOCYTES NFR BLD AUTO: 11.1 % — HIGH (ref 1.7–9.3)
MONOCYTES NFR BLD AUTO: 11.3 % — HIGH (ref 1.7–9.3)
MONOCYTES NFR BLD AUTO: 11.5 % — HIGH (ref 1.7–9.3)
MONOCYTES NFR BLD AUTO: 11.6 % — HIGH (ref 1.7–9.3)
MONOCYTES NFR BLD AUTO: 12.2 % — HIGH (ref 1.7–9.3)
MONOCYTES NFR BLD AUTO: 12.2 % — HIGH (ref 1.7–9.3)
MONOCYTES NFR BLD AUTO: 12.3 % — HIGH (ref 1.7–9.3)
MONOCYTES NFR BLD AUTO: 12.3 % — HIGH (ref 1.7–9.3)
MONOCYTES NFR BLD AUTO: 12.4 % — HIGH (ref 1.7–9.3)
MONOCYTES NFR BLD AUTO: 12.4 % — HIGH (ref 1.7–9.3)
MONOCYTES NFR BLD AUTO: 12.9 % — HIGH (ref 1.7–9.3)
MONOCYTES NFR BLD AUTO: 13 % — HIGH (ref 1.7–9.3)
MONOCYTES NFR BLD AUTO: 13.1 % — HIGH (ref 1.7–9.3)
MONOCYTES NFR BLD AUTO: 13.4 % — HIGH (ref 1.7–9.3)
MONOCYTES NFR BLD AUTO: 13.5 % — HIGH (ref 1.7–9.3)
MONOCYTES NFR BLD AUTO: 13.7 % — HIGH (ref 1.7–9.3)
MONOCYTES NFR BLD AUTO: 14 % — HIGH (ref 1.7–9.3)
MONOCYTES NFR BLD AUTO: 14.5 % — HIGH (ref 1.7–9.3)
MONOCYTES NFR BLD AUTO: 14.6 % — HIGH (ref 1.7–9.3)
MONOCYTES NFR BLD AUTO: 14.6 % — HIGH (ref 1.7–9.3)
MONOCYTES NFR BLD AUTO: 15 % — HIGH (ref 1.7–9.3)
MONOCYTES NFR BLD AUTO: 15.5 % — HIGH (ref 1.7–9.3)
MONOCYTES NFR BLD AUTO: 15.5 % — HIGH (ref 1.7–9.3)
MONOCYTES NFR BLD AUTO: 15.7 % — HIGH (ref 1.7–9.3)
MONOCYTES NFR BLD AUTO: 15.7 % — HIGH (ref 1.7–9.3)
MONOCYTES NFR BLD AUTO: 16.1 % — HIGH (ref 1.7–9.3)
MONOCYTES NFR BLD AUTO: 17.1 % — HIGH (ref 1.7–9.3)
MONOCYTES NFR BLD AUTO: 18.3 % — HIGH (ref 1.7–9.3)
MONOCYTES NFR BLD AUTO: 18.5 % — HIGH (ref 1.7–9.3)
MONOCYTES NFR BLD AUTO: 4.3 % — SIGNIFICANT CHANGE UP (ref 1.7–9.3)
MONOCYTES NFR BLD AUTO: 4.4 % — SIGNIFICANT CHANGE UP (ref 1.7–9.3)
MONOCYTES NFR BLD AUTO: 5.3 % — SIGNIFICANT CHANGE UP (ref 1.7–9.3)
MONOCYTES NFR BLD AUTO: 6.1 % — SIGNIFICANT CHANGE UP (ref 1.7–9.3)
MONOCYTES NFR BLD AUTO: 6.1 % — SIGNIFICANT CHANGE UP (ref 1.7–9.3)
MONOCYTES NFR BLD AUTO: 6.3 % — SIGNIFICANT CHANGE UP (ref 1.7–9.3)
MONOCYTES NFR BLD AUTO: 6.5 % — SIGNIFICANT CHANGE UP (ref 1.7–9.3)
MONOCYTES NFR BLD AUTO: 8 % — SIGNIFICANT CHANGE UP (ref 1.7–9.3)
MONOCYTES NFR BLD AUTO: 9.4 % — HIGH (ref 1.7–9.3)
MONOCYTES NFR BLD AUTO: 9.6 % — HIGH (ref 1.7–9.3)
MONOCYTES NFR BLD AUTO: 9.9 % — HIGH (ref 1.7–9.3)
MONOS+MACROS # FLD: 45 % — SIGNIFICANT CHANGE UP
MRSA PCR RESULT.: NEGATIVE — SIGNIFICANT CHANGE UP
MRSA PCR RESULT.: POSITIVE
MRSA PCR RESULT.: POSITIVE
MYELOCYTES NFR BLD: 1.8 % — HIGH (ref 0–0)
MYELOCYTES NFR BLD: 3.5 % — HIGH (ref 0–0)
MYELOCYTES NFR BLD: 5.2 % — HIGH (ref 0–0)
NEUTROPHILS # BLD AUTO: 10.33 K/UL — HIGH (ref 1.4–6.5)
NEUTROPHILS # BLD AUTO: 10.79 K/UL — HIGH (ref 1.4–6.5)
NEUTROPHILS # BLD AUTO: 12.27 K/UL — HIGH (ref 1.4–6.5)
NEUTROPHILS # BLD AUTO: 12.35 K/UL — HIGH (ref 1.4–6.5)
NEUTROPHILS # BLD AUTO: 13.18 K/UL — HIGH (ref 1.4–6.5)
NEUTROPHILS # BLD AUTO: 13.35 K/UL — HIGH (ref 1.4–6.5)
NEUTROPHILS # BLD AUTO: 3.45 K/UL — SIGNIFICANT CHANGE UP (ref 1.4–6.5)
NEUTROPHILS # BLD AUTO: 3.57 K/UL — SIGNIFICANT CHANGE UP (ref 1.4–6.5)
NEUTROPHILS # BLD AUTO: 4.45 K/UL — SIGNIFICANT CHANGE UP (ref 1.4–6.5)
NEUTROPHILS # BLD AUTO: 4.55 K/UL — SIGNIFICANT CHANGE UP (ref 1.4–6.5)
NEUTROPHILS # BLD AUTO: 4.99 K/UL — SIGNIFICANT CHANGE UP (ref 1.4–6.5)
NEUTROPHILS # BLD AUTO: 5.16 K/UL — SIGNIFICANT CHANGE UP (ref 1.4–6.5)
NEUTROPHILS # BLD AUTO: 5.27 K/UL — SIGNIFICANT CHANGE UP (ref 1.4–6.5)
NEUTROPHILS # BLD AUTO: 5.59 K/UL — SIGNIFICANT CHANGE UP (ref 1.4–6.5)
NEUTROPHILS # BLD AUTO: 5.66 K/UL — SIGNIFICANT CHANGE UP (ref 1.4–6.5)
NEUTROPHILS # BLD AUTO: 5.93 K/UL — SIGNIFICANT CHANGE UP (ref 1.4–6.5)
NEUTROPHILS # BLD AUTO: 6.01 K/UL — SIGNIFICANT CHANGE UP (ref 1.4–6.5)
NEUTROPHILS # BLD AUTO: 6.13 K/UL — SIGNIFICANT CHANGE UP (ref 1.4–6.5)
NEUTROPHILS # BLD AUTO: 6.14 K/UL — SIGNIFICANT CHANGE UP (ref 1.4–6.5)
NEUTROPHILS # BLD AUTO: 6.15 K/UL — SIGNIFICANT CHANGE UP (ref 1.4–6.5)
NEUTROPHILS # BLD AUTO: 6.32 K/UL — SIGNIFICANT CHANGE UP (ref 1.4–6.5)
NEUTROPHILS # BLD AUTO: 6.33 K/UL — SIGNIFICANT CHANGE UP (ref 1.4–6.5)
NEUTROPHILS # BLD AUTO: 6.57 K/UL — HIGH (ref 1.4–6.5)
NEUTROPHILS # BLD AUTO: 6.69 K/UL — HIGH (ref 1.4–6.5)
NEUTROPHILS # BLD AUTO: 6.9 K/UL — HIGH (ref 1.4–6.5)
NEUTROPHILS # BLD AUTO: 6.95 K/UL — HIGH (ref 1.4–6.5)
NEUTROPHILS # BLD AUTO: 7.08 K/UL — HIGH (ref 1.4–6.5)
NEUTROPHILS # BLD AUTO: 7.14 K/UL — HIGH (ref 1.4–6.5)
NEUTROPHILS # BLD AUTO: 7.21 K/UL — HIGH (ref 1.4–6.5)
NEUTROPHILS # BLD AUTO: 7.22 K/UL — HIGH (ref 1.4–6.5)
NEUTROPHILS # BLD AUTO: 7.35 K/UL — HIGH (ref 1.4–6.5)
NEUTROPHILS # BLD AUTO: 7.47 K/UL — HIGH (ref 1.4–6.5)
NEUTROPHILS # BLD AUTO: 7.49 K/UL — HIGH (ref 1.4–6.5)
NEUTROPHILS # BLD AUTO: 7.52 K/UL — HIGH (ref 1.4–6.5)
NEUTROPHILS # BLD AUTO: 7.82 K/UL — HIGH (ref 1.4–6.5)
NEUTROPHILS # BLD AUTO: 7.9 K/UL — HIGH (ref 1.4–6.5)
NEUTROPHILS # BLD AUTO: 8.08 K/UL — HIGH (ref 1.4–6.5)
NEUTROPHILS # BLD AUTO: 8.2 K/UL — HIGH (ref 1.4–6.5)
NEUTROPHILS # BLD AUTO: 9.34 K/UL — HIGH (ref 1.4–6.5)
NEUTROPHILS # BLD AUTO: 9.48 K/UL — HIGH (ref 1.4–6.5)
NEUTROPHILS # BLD AUTO: 9.51 K/UL — HIGH (ref 1.4–6.5)
NEUTROPHILS # BLD AUTO: 9.55 K/UL — HIGH (ref 1.4–6.5)
NEUTROPHILS # BLD AUTO: 9.92 K/UL — HIGH (ref 1.4–6.5)
NEUTROPHILS NFR BLD AUTO: 43.6 % — SIGNIFICANT CHANGE UP (ref 42.2–75.2)
NEUTROPHILS NFR BLD AUTO: 47.3 % — SIGNIFICANT CHANGE UP (ref 42.2–75.2)
NEUTROPHILS NFR BLD AUTO: 48.7 % — SIGNIFICANT CHANGE UP (ref 42.2–75.2)
NEUTROPHILS NFR BLD AUTO: 49.5 % — SIGNIFICANT CHANGE UP (ref 42.2–75.2)
NEUTROPHILS NFR BLD AUTO: 49.9 % — SIGNIFICANT CHANGE UP (ref 42.2–75.2)
NEUTROPHILS NFR BLD AUTO: 50 % — SIGNIFICANT CHANGE UP (ref 42.2–75.2)
NEUTROPHILS NFR BLD AUTO: 50.3 % — SIGNIFICANT CHANGE UP (ref 42.2–75.2)
NEUTROPHILS NFR BLD AUTO: 51.4 % — SIGNIFICANT CHANGE UP (ref 42.2–75.2)
NEUTROPHILS NFR BLD AUTO: 53 % — SIGNIFICANT CHANGE UP (ref 42.2–75.2)
NEUTROPHILS NFR BLD AUTO: 53.3 % — SIGNIFICANT CHANGE UP (ref 42.2–75.2)
NEUTROPHILS NFR BLD AUTO: 53.9 % — SIGNIFICANT CHANGE UP (ref 42.2–75.2)
NEUTROPHILS NFR BLD AUTO: 53.9 % — SIGNIFICANT CHANGE UP (ref 42.2–75.2)
NEUTROPHILS NFR BLD AUTO: 54 % — SIGNIFICANT CHANGE UP (ref 42.2–75.2)
NEUTROPHILS NFR BLD AUTO: 55.3 % — SIGNIFICANT CHANGE UP (ref 42.2–75.2)
NEUTROPHILS NFR BLD AUTO: 56.7 % — SIGNIFICANT CHANGE UP (ref 42.2–75.2)
NEUTROPHILS NFR BLD AUTO: 57.7 % — SIGNIFICANT CHANGE UP (ref 42.2–75.2)
NEUTROPHILS NFR BLD AUTO: 58.4 % — SIGNIFICANT CHANGE UP (ref 42.2–75.2)
NEUTROPHILS NFR BLD AUTO: 58.8 % — SIGNIFICANT CHANGE UP (ref 42.2–75.2)
NEUTROPHILS NFR BLD AUTO: 59.2 % — SIGNIFICANT CHANGE UP (ref 42.2–75.2)
NEUTROPHILS NFR BLD AUTO: 59.3 % — SIGNIFICANT CHANGE UP (ref 42.2–75.2)
NEUTROPHILS NFR BLD AUTO: 59.3 % — SIGNIFICANT CHANGE UP (ref 42.2–75.2)
NEUTROPHILS NFR BLD AUTO: 59.6 % — SIGNIFICANT CHANGE UP (ref 42.2–75.2)
NEUTROPHILS NFR BLD AUTO: 60.1 % — SIGNIFICANT CHANGE UP (ref 42.2–75.2)
NEUTROPHILS NFR BLD AUTO: 60.2 % — SIGNIFICANT CHANGE UP (ref 42.2–75.2)
NEUTROPHILS NFR BLD AUTO: 62.1 % — SIGNIFICANT CHANGE UP (ref 42.2–75.2)
NEUTROPHILS NFR BLD AUTO: 63.1 % — SIGNIFICANT CHANGE UP (ref 42.2–75.2)
NEUTROPHILS NFR BLD AUTO: 63.3 % — SIGNIFICANT CHANGE UP (ref 42.2–75.2)
NEUTROPHILS NFR BLD AUTO: 64 % — SIGNIFICANT CHANGE UP (ref 42.2–75.2)
NEUTROPHILS NFR BLD AUTO: 64.6 % — SIGNIFICANT CHANGE UP (ref 42.2–75.2)
NEUTROPHILS NFR BLD AUTO: 66 % — SIGNIFICANT CHANGE UP (ref 42.2–75.2)
NEUTROPHILS NFR BLD AUTO: 66.3 % — SIGNIFICANT CHANGE UP (ref 42.2–75.2)
NEUTROPHILS NFR BLD AUTO: 68.6 % — SIGNIFICANT CHANGE UP (ref 42.2–75.2)
NEUTROPHILS NFR BLD AUTO: 69.6 % — SIGNIFICANT CHANGE UP (ref 42.2–75.2)
NEUTROPHILS NFR BLD AUTO: 72.4 % — SIGNIFICANT CHANGE UP (ref 42.2–75.2)
NEUTROPHILS NFR BLD AUTO: 78.6 % — HIGH (ref 42.2–75.2)
NEUTROPHILS NFR BLD AUTO: 78.7 % — HIGH (ref 42.2–75.2)
NEUTROPHILS NFR BLD AUTO: 80.6 % — HIGH (ref 42.2–75.2)
NEUTROPHILS NFR BLD AUTO: 82.6 % — HIGH (ref 42.2–75.2)
NEUTROPHILS NFR BLD AUTO: 83.5 % — HIGH (ref 42.2–75.2)
NEUTROPHILS NFR BLD AUTO: 85.4 % — HIGH (ref 42.2–75.2)
NEUTROPHILS NFR BLD AUTO: 87.3 % — HIGH (ref 42.2–75.2)
NEUTROPHILS NFR BLD AUTO: 88.6 % — HIGH (ref 42.2–75.2)
NEUTROPHILS NFR BLD AUTO: 88.6 % — HIGH (ref 42.2–75.2)
NEUTS BAND # BLD: 1 % — SIGNIFICANT CHANGE UP (ref 0–6)
NEUTS HYPERSEG # BLD: PRESENT — SIGNIFICANT CHANGE UP
NITRITE UR-MCNC: NEGATIVE — SIGNIFICANT CHANGE UP
NITRITE UR-MCNC: NEGATIVE — SIGNIFICANT CHANGE UP
NON HDL CHOLESTEROL: 76 MG/DL — SIGNIFICANT CHANGE UP
NON-GYNECOLOGICAL CYTOLOGY STUDY: SIGNIFICANT CHANGE UP
NRBC # BLD: 0 /100 WBCS — SIGNIFICANT CHANGE UP (ref 0–0)
NRBC # BLD: 0 /100 — SIGNIFICANT CHANGE UP (ref 0–0)
NRBC # BLD: SIGNIFICANT CHANGE UP /100 WBCS (ref 0–0)
NT-PROBNP SERPL-SCNC: HIGH PG/ML (ref 0–300)
ORGANISM # SPEC MICROSCOPIC CNT: SIGNIFICANT CHANGE UP
OSMOLALITY SERPL: 292 MOS/KG — SIGNIFICANT CHANGE UP (ref 280–301)
OSMOLALITY UR: 395 MOS/KG — SIGNIFICANT CHANGE UP (ref 50–1200)
OVALOCYTES BLD QL SMEAR: SLIGHT — SIGNIFICANT CHANGE UP
PCO2 BLDA: 58 MMHG — HIGH (ref 38–42)
PCO2 BLDV: 36 MMHG — LOW (ref 41–51)
PCO2 BLDV: 47 MMHG — SIGNIFICANT CHANGE UP (ref 41–51)
PCO2 BLDV: 56 MMHG — HIGH (ref 42–55)
PCO2 BLDV: 56 MMHG — HIGH (ref 42–55)
PCO2 BLDV: 74 MMHG — HIGH (ref 42–55)
PCO2 BLDV: 90 MMHG — HIGH (ref 41–51)
PH BLDA: 7.33 — LOW (ref 7.38–7.42)
PH BLDV: 7.01 — LOW (ref 7.26–7.43)
PH BLDV: 7.12 — LOW (ref 7.26–7.43)
PH BLDV: 7.21 — LOW (ref 7.32–7.43)
PH BLDV: 7.23 — LOW (ref 7.26–7.43)
PH BLDV: 7.25 — LOW (ref 7.32–7.43)
PH BLDV: 7.29 — LOW (ref 7.32–7.43)
PH FLD: 7.7 — SIGNIFICANT CHANGE UP
PH UR: 6 — SIGNIFICANT CHANGE UP (ref 5–8)
PH UR: 6.5 — SIGNIFICANT CHANGE UP (ref 5–8)
PHOSPHATE SERPL-MCNC: 10 MG/DL — HIGH (ref 2.1–4.9)
PHOSPHATE SERPL-MCNC: 10.8 MG/DL — HIGH (ref 2.1–4.9)
PHOSPHATE SERPL-MCNC: 2.5 MG/DL — SIGNIFICANT CHANGE UP (ref 2.1–4.9)
PHOSPHATE SERPL-MCNC: 2.6 MG/DL — SIGNIFICANT CHANGE UP (ref 2.1–4.9)
PHOSPHATE SERPL-MCNC: 3.2 MG/DL — SIGNIFICANT CHANGE UP (ref 2.1–4.9)
PHOSPHATE SERPL-MCNC: 3.2 MG/DL — SIGNIFICANT CHANGE UP (ref 2.1–4.9)
PHOSPHATE SERPL-MCNC: 3.3 MG/DL — SIGNIFICANT CHANGE UP (ref 2.1–4.9)
PHOSPHATE SERPL-MCNC: 4.1 MG/DL — SIGNIFICANT CHANGE UP (ref 2.1–4.9)
PHOSPHATE SERPL-MCNC: 4.6 MG/DL — SIGNIFICANT CHANGE UP (ref 2.1–4.9)
PHOSPHATE SERPL-MCNC: 4.7 MG/DL — SIGNIFICANT CHANGE UP (ref 2.1–4.9)
PHOSPHATE SERPL-MCNC: 4.8 MG/DL — SIGNIFICANT CHANGE UP (ref 2.1–4.9)
PHOSPHATE SERPL-MCNC: 4.9 MG/DL — SIGNIFICANT CHANGE UP (ref 2.1–4.9)
PHOSPHATE SERPL-MCNC: 5 MG/DL — HIGH (ref 2.1–4.9)
PHOSPHATE SERPL-MCNC: 6.3 MG/DL — HIGH (ref 2.1–4.9)
PHOSPHATE SERPL-MCNC: 6.9 MG/DL — HIGH (ref 2.1–4.9)
PHOSPHATE SERPL-MCNC: 7.2 MG/DL — HIGH (ref 2.1–4.9)
PHOSPHATE SERPL-MCNC: 8.2 MG/DL — HIGH (ref 2.1–4.9)
PHOSPHATE SERPL-MCNC: 9.8 MG/DL — HIGH (ref 2.1–4.9)
PLAT MORPH BLD: ABNORMAL
PLAT MORPH BLD: ABNORMAL
PLAT MORPH BLD: NORMAL — SIGNIFICANT CHANGE UP
PLATELET # BLD AUTO: 142 K/UL — SIGNIFICANT CHANGE UP (ref 130–400)
PLATELET # BLD AUTO: 142 K/UL — SIGNIFICANT CHANGE UP (ref 130–400)
PLATELET # BLD AUTO: 154 K/UL — SIGNIFICANT CHANGE UP (ref 130–400)
PLATELET # BLD AUTO: 154 K/UL — SIGNIFICANT CHANGE UP (ref 130–400)
PLATELET # BLD AUTO: 159 K/UL — SIGNIFICANT CHANGE UP (ref 130–400)
PLATELET # BLD AUTO: 160 K/UL — SIGNIFICANT CHANGE UP (ref 130–400)
PLATELET # BLD AUTO: 160 K/UL — SIGNIFICANT CHANGE UP (ref 130–400)
PLATELET # BLD AUTO: 163 K/UL — SIGNIFICANT CHANGE UP (ref 130–400)
PLATELET # BLD AUTO: 164 K/UL — SIGNIFICANT CHANGE UP (ref 130–400)
PLATELET # BLD AUTO: 164 K/UL — SIGNIFICANT CHANGE UP (ref 130–400)
PLATELET # BLD AUTO: 167 K/UL — SIGNIFICANT CHANGE UP (ref 130–400)
PLATELET # BLD AUTO: 168 K/UL — SIGNIFICANT CHANGE UP (ref 130–400)
PLATELET # BLD AUTO: 170 K/UL — SIGNIFICANT CHANGE UP (ref 130–400)
PLATELET # BLD AUTO: 173 K/UL — SIGNIFICANT CHANGE UP (ref 130–400)
PLATELET # BLD AUTO: 179 K/UL — SIGNIFICANT CHANGE UP (ref 130–400)
PLATELET # BLD AUTO: 182 K/UL — SIGNIFICANT CHANGE UP (ref 130–400)
PLATELET # BLD AUTO: 183 K/UL — SIGNIFICANT CHANGE UP (ref 130–400)
PLATELET # BLD AUTO: 187 K/UL — SIGNIFICANT CHANGE UP (ref 130–400)
PLATELET # BLD AUTO: 188 K/UL — SIGNIFICANT CHANGE UP (ref 130–400)
PLATELET # BLD AUTO: 188 K/UL — SIGNIFICANT CHANGE UP (ref 130–400)
PLATELET # BLD AUTO: 190 K/UL — SIGNIFICANT CHANGE UP (ref 130–400)
PLATELET # BLD AUTO: 196 K/UL — SIGNIFICANT CHANGE UP (ref 130–400)
PLATELET # BLD AUTO: 200 K/UL — SIGNIFICANT CHANGE UP (ref 130–400)
PLATELET # BLD AUTO: 204 K/UL — SIGNIFICANT CHANGE UP (ref 130–400)
PLATELET # BLD AUTO: 206 K/UL — SIGNIFICANT CHANGE UP (ref 130–400)
PLATELET # BLD AUTO: 209 K/UL — SIGNIFICANT CHANGE UP (ref 130–400)
PLATELET # BLD AUTO: 210 K/UL — SIGNIFICANT CHANGE UP (ref 130–400)
PLATELET # BLD AUTO: 211 K/UL — SIGNIFICANT CHANGE UP (ref 130–400)
PLATELET # BLD AUTO: 229 K/UL — SIGNIFICANT CHANGE UP (ref 130–400)
PLATELET # BLD AUTO: 231 K/UL — SIGNIFICANT CHANGE UP (ref 130–400)
PLATELET # BLD AUTO: 231 K/UL — SIGNIFICANT CHANGE UP (ref 130–400)
PLATELET # BLD AUTO: 234 K/UL — SIGNIFICANT CHANGE UP (ref 130–400)
PLATELET # BLD AUTO: 237 K/UL — SIGNIFICANT CHANGE UP (ref 130–400)
PLATELET # BLD AUTO: 238 K/UL — SIGNIFICANT CHANGE UP (ref 130–400)
PLATELET # BLD AUTO: 239 K/UL — SIGNIFICANT CHANGE UP (ref 130–400)
PLATELET # BLD AUTO: 247 K/UL — SIGNIFICANT CHANGE UP (ref 130–400)
PLATELET # BLD AUTO: 247 K/UL — SIGNIFICANT CHANGE UP (ref 130–400)
PLATELET # BLD AUTO: 250 K/UL — SIGNIFICANT CHANGE UP (ref 130–400)
PLATELET # BLD AUTO: 251 K/UL — SIGNIFICANT CHANGE UP (ref 130–400)
PLATELET # BLD AUTO: 251 K/UL — SIGNIFICANT CHANGE UP (ref 130–400)
PLATELET # BLD AUTO: 254 K/UL — SIGNIFICANT CHANGE UP (ref 130–400)
PLATELET # BLD AUTO: 258 K/UL — SIGNIFICANT CHANGE UP (ref 130–400)
PLATELET # BLD AUTO: 260 K/UL — SIGNIFICANT CHANGE UP (ref 130–400)
PLATELET # BLD AUTO: 264 K/UL — SIGNIFICANT CHANGE UP (ref 130–400)
PLATELET # BLD AUTO: 277 K/UL — SIGNIFICANT CHANGE UP (ref 130–400)
PLATELET # BLD AUTO: 279 K/UL — SIGNIFICANT CHANGE UP (ref 130–400)
PLATELET # BLD AUTO: 287 K/UL — SIGNIFICANT CHANGE UP (ref 130–400)
PLATELET # BLD AUTO: 288 K/UL — SIGNIFICANT CHANGE UP (ref 130–400)
PLATELET # BLD AUTO: 290 K/UL — SIGNIFICANT CHANGE UP (ref 130–400)
PLATELET # BLD AUTO: 294 K/UL — SIGNIFICANT CHANGE UP (ref 130–400)
PLATELET # BLD AUTO: 296 K/UL — SIGNIFICANT CHANGE UP (ref 130–400)
PLATELET # BLD AUTO: 303 K/UL — SIGNIFICANT CHANGE UP (ref 130–400)
PLATELET # BLD AUTO: 311 K/UL — SIGNIFICANT CHANGE UP (ref 130–400)
PLATELET # BLD AUTO: 311 K/UL — SIGNIFICANT CHANGE UP (ref 130–400)
PLATELET # BLD AUTO: 314 K/UL — SIGNIFICANT CHANGE UP (ref 130–400)
PLATELET # BLD AUTO: 314 K/UL — SIGNIFICANT CHANGE UP (ref 130–400)
PLATELET # BLD AUTO: 315 K/UL — SIGNIFICANT CHANGE UP (ref 130–400)
PO2 BLDA: 104 MMHG — HIGH (ref 78–95)
PO2 BLDV: 32 MMHG — SIGNIFICANT CHANGE UP (ref 20–40)
PO2 BLDV: 35 MMHG — SIGNIFICANT CHANGE UP
PO2 BLDV: 35 MMHG — SIGNIFICANT CHANGE UP
PO2 BLDV: 40 MMHG — SIGNIFICANT CHANGE UP (ref 20–40)
PO2 BLDV: 41 MMHG — SIGNIFICANT CHANGE UP
PO2 BLDV: 47 MMHG — HIGH (ref 20–40)
POIKILOCYTOSIS BLD QL AUTO: SIGNIFICANT CHANGE UP
POIKILOCYTOSIS BLD QL AUTO: SLIGHT — SIGNIFICANT CHANGE UP
POIKILOCYTOSIS BLD QL AUTO: SLIGHT — SIGNIFICANT CHANGE UP
POLYCHROMASIA BLD QL SMEAR: SLIGHT — SIGNIFICANT CHANGE UP
POTASSIUM BLDV-SCNC: 4.2 MMOL/L — SIGNIFICANT CHANGE UP (ref 3.5–5.1)
POTASSIUM BLDV-SCNC: 4.3 MMOL/L — SIGNIFICANT CHANGE UP (ref 3.3–5.6)
POTASSIUM BLDV-SCNC: 4.9 MMOL/L — SIGNIFICANT CHANGE UP (ref 3.5–5.1)
POTASSIUM BLDV-SCNC: 4.9 MMOL/L — SIGNIFICANT CHANGE UP (ref 3.5–5.1)
POTASSIUM BLDV-SCNC: 5.1 MMOL/L — SIGNIFICANT CHANGE UP (ref 3.3–5.6)
POTASSIUM BLDV-SCNC: 6.4 MMOL/L — HIGH (ref 3.3–5.6)
POTASSIUM SERPL-MCNC: 3.6 MMOL/L — SIGNIFICANT CHANGE UP (ref 3.5–5)
POTASSIUM SERPL-MCNC: 3.7 MMOL/L — SIGNIFICANT CHANGE UP (ref 3.5–5)
POTASSIUM SERPL-MCNC: 3.8 MMOL/L — SIGNIFICANT CHANGE UP (ref 3.5–5)
POTASSIUM SERPL-MCNC: 3.9 MMOL/L — SIGNIFICANT CHANGE UP (ref 3.5–5)
POTASSIUM SERPL-MCNC: 4 MMOL/L — SIGNIFICANT CHANGE UP (ref 3.5–5)
POTASSIUM SERPL-MCNC: 4.1 MMOL/L — SIGNIFICANT CHANGE UP (ref 3.5–5)
POTASSIUM SERPL-MCNC: 4.2 MMOL/L — SIGNIFICANT CHANGE UP (ref 3.5–5)
POTASSIUM SERPL-MCNC: 4.3 MMOL/L — SIGNIFICANT CHANGE UP (ref 3.5–5)
POTASSIUM SERPL-MCNC: 4.4 MMOL/L — SIGNIFICANT CHANGE UP (ref 3.5–5)
POTASSIUM SERPL-MCNC: 4.4 MMOL/L — SIGNIFICANT CHANGE UP (ref 3.5–5)
POTASSIUM SERPL-MCNC: 4.5 MMOL/L — SIGNIFICANT CHANGE UP (ref 3.5–5)
POTASSIUM SERPL-MCNC: 4.6 MMOL/L — SIGNIFICANT CHANGE UP (ref 3.5–5)
POTASSIUM SERPL-MCNC: 4.7 MMOL/L — SIGNIFICANT CHANGE UP (ref 3.5–5)
POTASSIUM SERPL-MCNC: 4.8 MMOL/L — SIGNIFICANT CHANGE UP (ref 3.5–5)
POTASSIUM SERPL-MCNC: 4.8 MMOL/L — SIGNIFICANT CHANGE UP (ref 3.5–5)
POTASSIUM SERPL-MCNC: 4.9 MMOL/L — SIGNIFICANT CHANGE UP (ref 3.5–5)
POTASSIUM SERPL-MCNC: 5 MMOL/L — SIGNIFICANT CHANGE UP (ref 3.5–5)
POTASSIUM SERPL-MCNC: 5.1 MMOL/L — HIGH (ref 3.5–5)
POTASSIUM SERPL-MCNC: 5.2 MMOL/L — HIGH (ref 3.5–5)
POTASSIUM SERPL-MCNC: 5.3 MMOL/L — HIGH (ref 3.5–5)
POTASSIUM SERPL-MCNC: 5.4 MMOL/L — HIGH (ref 3.5–5)
POTASSIUM SERPL-MCNC: 5.5 MMOL/L — HIGH (ref 3.5–5)
POTASSIUM SERPL-MCNC: 5.9 MMOL/L — HIGH (ref 3.5–5)
POTASSIUM SERPL-MCNC: 5.9 MMOL/L — HIGH (ref 3.5–5)
POTASSIUM SERPL-MCNC: 6.3 MMOL/L — CRITICAL HIGH (ref 3.5–5)
POTASSIUM SERPL-MCNC: 6.5 MMOL/L — CRITICAL HIGH (ref 3.5–5)
POTASSIUM SERPL-MCNC: 6.7 MMOL/L — CRITICAL HIGH (ref 3.5–5)
POTASSIUM SERPL-SCNC: 3.6 MMOL/L — SIGNIFICANT CHANGE UP (ref 3.5–5)
POTASSIUM SERPL-SCNC: 3.7 MMOL/L — SIGNIFICANT CHANGE UP (ref 3.5–5)
POTASSIUM SERPL-SCNC: 3.8 MMOL/L — SIGNIFICANT CHANGE UP (ref 3.5–5)
POTASSIUM SERPL-SCNC: 3.9 MMOL/L — SIGNIFICANT CHANGE UP (ref 3.5–5)
POTASSIUM SERPL-SCNC: 4 MMOL/L — SIGNIFICANT CHANGE UP (ref 3.5–5)
POTASSIUM SERPL-SCNC: 4.1 MMOL/L — SIGNIFICANT CHANGE UP (ref 3.5–5)
POTASSIUM SERPL-SCNC: 4.2 MMOL/L — SIGNIFICANT CHANGE UP (ref 3.5–5)
POTASSIUM SERPL-SCNC: 4.3 MMOL/L — SIGNIFICANT CHANGE UP (ref 3.5–5)
POTASSIUM SERPL-SCNC: 4.4 MMOL/L — SIGNIFICANT CHANGE UP (ref 3.5–5)
POTASSIUM SERPL-SCNC: 4.4 MMOL/L — SIGNIFICANT CHANGE UP (ref 3.5–5)
POTASSIUM SERPL-SCNC: 4.5 MMOL/L — SIGNIFICANT CHANGE UP (ref 3.5–5)
POTASSIUM SERPL-SCNC: 4.6 MMOL/L — SIGNIFICANT CHANGE UP (ref 3.5–5)
POTASSIUM SERPL-SCNC: 4.7 MMOL/L — SIGNIFICANT CHANGE UP (ref 3.5–5)
POTASSIUM SERPL-SCNC: 4.8 MMOL/L — SIGNIFICANT CHANGE UP (ref 3.5–5)
POTASSIUM SERPL-SCNC: 4.8 MMOL/L — SIGNIFICANT CHANGE UP (ref 3.5–5)
POTASSIUM SERPL-SCNC: 4.9 MMOL/L — SIGNIFICANT CHANGE UP (ref 3.5–5)
POTASSIUM SERPL-SCNC: 5 MMOL/L — SIGNIFICANT CHANGE UP (ref 3.5–5)
POTASSIUM SERPL-SCNC: 5.1 MMOL/L — HIGH (ref 3.5–5)
POTASSIUM SERPL-SCNC: 5.2 MMOL/L — HIGH (ref 3.5–5)
POTASSIUM SERPL-SCNC: 5.3 MMOL/L — HIGH (ref 3.5–5)
POTASSIUM SERPL-SCNC: 5.4 MMOL/L — HIGH (ref 3.5–5)
POTASSIUM SERPL-SCNC: 5.5 MMOL/L — HIGH (ref 3.5–5)
POTASSIUM SERPL-SCNC: 5.9 MMOL/L — HIGH (ref 3.5–5)
POTASSIUM SERPL-SCNC: 5.9 MMOL/L — HIGH (ref 3.5–5)
POTASSIUM SERPL-SCNC: 6.3 MMOL/L — CRITICAL HIGH (ref 3.5–5)
POTASSIUM SERPL-SCNC: 6.5 MMOL/L — CRITICAL HIGH (ref 3.5–5)
POTASSIUM SERPL-SCNC: 6.7 MMOL/L — CRITICAL HIGH (ref 3.5–5)
PROCALCITONIN SERPL-MCNC: 0.21 NG/ML — HIGH (ref 0.02–0.1)
PROCALCITONIN SERPL-MCNC: 0.31 NG/ML — HIGH (ref 0.02–0.1)
PROCALCITONIN SERPL-MCNC: 0.37 NG/ML — HIGH (ref 0.02–0.1)
PROCALCITONIN SERPL-MCNC: 0.46 NG/ML — HIGH (ref 0.02–0.1)
PROCALCITONIN SERPL-MCNC: 0.69 NG/ML — HIGH (ref 0.02–0.1)
PROT FLD-MCNC: 3 G/DL — SIGNIFICANT CHANGE UP
PROT SERPL-MCNC: 5.3 G/DL — LOW (ref 6–8)
PROT SERPL-MCNC: 5.3 G/DL — LOW (ref 6–8)
PROT SERPL-MCNC: 5.4 G/DL — LOW (ref 6–8)
PROT SERPL-MCNC: 5.4 G/DL — LOW (ref 6–8)
PROT SERPL-MCNC: 5.5 G/DL — LOW (ref 6–8)
PROT SERPL-MCNC: 5.5 G/DL — LOW (ref 6–8)
PROT SERPL-MCNC: 5.6 G/DL — LOW (ref 6–8)
PROT SERPL-MCNC: 5.7 G/DL — LOW (ref 6–8)
PROT SERPL-MCNC: 5.8 G/DL — LOW (ref 6–8)
PROT SERPL-MCNC: 5.8 G/DL — LOW (ref 6–8)
PROT SERPL-MCNC: 5.9 G/DL — LOW (ref 6–8)
PROT SERPL-MCNC: 6 G/DL — SIGNIFICANT CHANGE UP (ref 6–8)
PROT SERPL-MCNC: 6.1 G/DL — SIGNIFICANT CHANGE UP (ref 6–8)
PROT SERPL-MCNC: 6.2 G/DL — SIGNIFICANT CHANGE UP (ref 6–8)
PROT SERPL-MCNC: 6.2 G/DL — SIGNIFICANT CHANGE UP (ref 6–8)
PROT SERPL-MCNC: 6.3 G/DL — SIGNIFICANT CHANGE UP (ref 6–8)
PROT SERPL-MCNC: 6.4 G/DL — SIGNIFICANT CHANGE UP (ref 6–8)
PROT SERPL-MCNC: 6.7 G/DL — SIGNIFICANT CHANGE UP (ref 6–8)
PROT SERPL-MCNC: 6.8 G/DL — SIGNIFICANT CHANGE UP (ref 6–8)
PROT SERPL-MCNC: 6.9 G/DL — SIGNIFICANT CHANGE UP (ref 6–8)
PROT SERPL-MCNC: 6.9 G/DL — SIGNIFICANT CHANGE UP (ref 6–8)
PROT SERPL-MCNC: 7.3 G/DL — SIGNIFICANT CHANGE UP (ref 6–8)
PROT UR-MCNC: ABNORMAL
PROT UR-MCNC: ABNORMAL
PROTHROM AB SERPL-ACNC: 12.3 SEC — SIGNIFICANT CHANGE UP (ref 9.95–12.87)
PROTHROM AB SERPL-ACNC: 12.5 SEC — SIGNIFICANT CHANGE UP (ref 9.95–12.87)
PROTHROM AB SERPL-ACNC: 12.6 SEC — SIGNIFICANT CHANGE UP (ref 9.95–12.87)
PROTHROM AB SERPL-ACNC: 12.6 SEC — SIGNIFICANT CHANGE UP (ref 9.95–12.87)
PROTHROM AB SERPL-ACNC: 13.2 SEC — HIGH (ref 9.95–12.87)
PROTHROM AB SERPL-ACNC: 13.6 SEC — HIGH (ref 9.95–12.87)
PROTHROM AB SERPL-ACNC: 13.6 SEC — HIGH (ref 9.95–12.87)
RBC # BLD: 2.52 M/UL — LOW (ref 4.7–6.1)
RBC # BLD: 2.57 M/UL — LOW (ref 4.7–6.1)
RBC # BLD: 2.63 M/UL — LOW (ref 4.7–6.1)
RBC # BLD: 2.64 M/UL — LOW (ref 4.7–6.1)
RBC # BLD: 2.69 M/UL — LOW (ref 4.7–6.1)
RBC # BLD: 2.7 M/UL — LOW (ref 4.7–6.1)
RBC # BLD: 2.73 M/UL — LOW (ref 4.7–6.1)
RBC # BLD: 2.74 M/UL — LOW (ref 4.7–6.1)
RBC # BLD: 2.76 M/UL — LOW (ref 4.7–6.1)
RBC # BLD: 2.8 M/UL — LOW (ref 4.7–6.1)
RBC # BLD: 2.82 M/UL — LOW (ref 4.7–6.1)
RBC # BLD: 2.87 M/UL — LOW (ref 4.7–6.1)
RBC # BLD: 2.88 M/UL — LOW (ref 4.7–6.1)
RBC # BLD: 2.9 M/UL — LOW (ref 4.7–6.1)
RBC # BLD: 2.93 M/UL — LOW (ref 4.7–6.1)
RBC # BLD: 2.93 M/UL — LOW (ref 4.7–6.1)
RBC # BLD: 2.94 M/UL — LOW (ref 4.7–6.1)
RBC # BLD: 2.94 M/UL — LOW (ref 4.7–6.1)
RBC # BLD: 2.96 M/UL — LOW (ref 4.7–6.1)
RBC # BLD: 2.97 M/UL — LOW (ref 4.7–6.1)
RBC # BLD: 2.98 M/UL — LOW (ref 4.7–6.1)
RBC # BLD: 2.99 M/UL — LOW (ref 4.7–6.1)
RBC # BLD: 2.99 M/UL — LOW (ref 4.7–6.1)
RBC # BLD: 3.06 M/UL — LOW (ref 4.7–6.1)
RBC # BLD: 3.1 M/UL — LOW (ref 4.7–6.1)
RBC # BLD: 3.11 M/UL — LOW (ref 4.7–6.1)
RBC # BLD: 3.11 M/UL — LOW (ref 4.7–6.1)
RBC # BLD: 3.13 M/UL — LOW (ref 4.7–6.1)
RBC # BLD: 3.14 M/UL — LOW (ref 4.7–6.1)
RBC # BLD: 3.17 M/UL — LOW (ref 4.7–6.1)
RBC # BLD: 3.2 M/UL — LOW (ref 4.7–6.1)
RBC # BLD: 3.21 M/UL — LOW (ref 4.7–6.1)
RBC # BLD: 3.31 M/UL — LOW (ref 4.7–6.1)
RBC # BLD: 3.34 M/UL — LOW (ref 4.7–6.1)
RBC # BLD: 3.39 M/UL — LOW (ref 4.7–6.1)
RBC # BLD: 3.4 M/UL — LOW (ref 4.7–6.1)
RBC # BLD: 3.42 M/UL — LOW (ref 4.7–6.1)
RBC # BLD: 3.44 M/UL — LOW (ref 4.7–6.1)
RBC # BLD: 3.51 M/UL — LOW (ref 4.7–6.1)
RBC # BLD: 3.52 M/UL — LOW (ref 4.7–6.1)
RBC # BLD: 3.59 M/UL — LOW (ref 4.7–6.1)
RBC # BLD: 3.59 M/UL — LOW (ref 4.7–6.1)
RBC # BLD: 3.61 M/UL — LOW (ref 4.7–6.1)
RBC # BLD: 3.75 M/UL — LOW (ref 4.7–6.1)
RBC # BLD: 3.76 M/UL — LOW (ref 4.7–6.1)
RBC # BLD: 3.79 M/UL — LOW (ref 4.7–6.1)
RBC # BLD: 3.81 M/UL — LOW (ref 4.7–6.1)
RBC # BLD: 3.83 M/UL — LOW (ref 4.7–6.1)
RBC # BLD: 3.85 M/UL — LOW (ref 4.7–6.1)
RBC # BLD: 3.87 M/UL — LOW (ref 4.7–6.1)
RBC # BLD: 3.96 M/UL — LOW (ref 4.7–6.1)
RBC # BLD: 3.99 M/UL — LOW (ref 4.7–6.1)
RBC # BLD: 4.21 M/UL — LOW (ref 4.7–6.1)
RBC # FLD: 14.1 % — SIGNIFICANT CHANGE UP (ref 11.5–14.5)
RBC # FLD: 15.2 % — HIGH (ref 11.5–14.5)
RBC # FLD: 15.3 % — HIGH (ref 11.5–14.5)
RBC # FLD: 15.4 % — HIGH (ref 11.5–14.5)
RBC # FLD: 15.5 % — HIGH (ref 11.5–14.5)
RBC # FLD: 15.6 % — HIGH (ref 11.5–14.5)
RBC # FLD: 15.7 % — HIGH (ref 11.5–14.5)
RBC # FLD: 15.8 % — HIGH (ref 11.5–14.5)
RBC # FLD: 15.9 % — HIGH (ref 11.5–14.5)
RBC # FLD: 15.9 % — HIGH (ref 11.5–14.5)
RBC # FLD: 16.1 % — HIGH (ref 11.5–14.5)
RBC # FLD: 16.1 % — HIGH (ref 11.5–14.5)
RBC # FLD: 16.4 % — HIGH (ref 11.5–14.5)
RBC # FLD: 16.5 % — HIGH (ref 11.5–14.5)
RBC # FLD: 16.5 % — HIGH (ref 11.5–14.5)
RBC # FLD: 16.6 % — HIGH (ref 11.5–14.5)
RBC # FLD: 16.7 % — HIGH (ref 11.5–14.5)
RBC # FLD: 16.8 % — HIGH (ref 11.5–14.5)
RBC # FLD: 16.8 % — HIGH (ref 11.5–14.5)
RBC # FLD: 16.9 % — HIGH (ref 11.5–14.5)
RBC # FLD: 17 % — HIGH (ref 11.5–14.5)
RBC # FLD: 17 % — HIGH (ref 11.5–14.5)
RBC # FLD: 17.1 % — HIGH (ref 11.5–14.5)
RBC # FLD: 17.2 % — HIGH (ref 11.5–14.5)
RBC # FLD: 17.3 % — HIGH (ref 11.5–14.5)
RBC # FLD: 17.4 % — HIGH (ref 11.5–14.5)
RBC # FLD: 17.5 % — HIGH (ref 11.5–14.5)
RBC # FLD: 17.5 % — HIGH (ref 11.5–14.5)
RBC # FLD: 17.6 % — HIGH (ref 11.5–14.5)
RBC # FLD: 17.8 % — HIGH (ref 11.5–14.5)
RBC # FLD: 17.9 % — HIGH (ref 11.5–14.5)
RBC # FLD: 18 % — HIGH (ref 11.5–14.5)
RBC # FLD: 18.3 % — HIGH (ref 11.5–14.5)
RBC BLD AUTO: ABNORMAL
RBC BLD AUTO: SIGNIFICANT CHANGE UP
RBC CASTS # UR COMP ASSIST: 14 /HPF — HIGH (ref 0–4)
RBC CASTS # UR COMP ASSIST: >720 /HPF — HIGH (ref 0–4)
RCV VOL RI: 6000 /UL — HIGH (ref 0–0)
S PNEUM AG UR QL: NEGATIVE — SIGNIFICANT CHANGE UP
S PNEUM AG UR QL: NEGATIVE — SIGNIFICANT CHANGE UP
SAO2 % BLDA: 98 % — SIGNIFICANT CHANGE UP (ref 94–98)
SAO2 % BLDV: 52 % — SIGNIFICANT CHANGE UP
SAO2 % BLDV: 61 % — SIGNIFICANT CHANGE UP
SAO2 % BLDV: 61.1 % — SIGNIFICANT CHANGE UP
SAO2 % BLDV: 63 % — SIGNIFICANT CHANGE UP
SAO2 % BLDV: 70.2 % — SIGNIFICANT CHANGE UP
SAO2 % BLDV: 78 % — SIGNIFICANT CHANGE UP
SARS-COV-2 IGG+IGM SERPL QL IA: 34.9 U/ML — HIGH
SARS-COV-2 IGG+IGM SERPL QL IA: 39.6 U/ML — HIGH
SARS-COV-2 IGG+IGM SERPL QL IA: 42.6 U/ML — HIGH
SARS-COV-2 IGG+IGM SERPL QL IA: 64.5 U/ML — HIGH
SARS-COV-2 IGG+IGM SERPL QL IA: 74.9 U/ML — HIGH
SARS-COV-2 IGG+IGM SERPL QL IA: POSITIVE
SARS-COV-2 RNA SPEC QL NAA+PROBE: SIGNIFICANT CHANGE UP
SCHISTOCYTES BLD QL AUTO: SLIGHT — SIGNIFICANT CHANGE UP
SMUDGE CELLS # BLD: PRESENT — SIGNIFICANT CHANGE UP
SODIUM SERPL-SCNC: 131 MMOL/L — LOW (ref 135–146)
SODIUM SERPL-SCNC: 131 MMOL/L — LOW (ref 135–146)
SODIUM SERPL-SCNC: 132 MMOL/L — LOW (ref 135–146)
SODIUM SERPL-SCNC: 132 MMOL/L — LOW (ref 135–146)
SODIUM SERPL-SCNC: 133 MMOL/L — LOW (ref 135–146)
SODIUM SERPL-SCNC: 134 MMOL/L — LOW (ref 135–146)
SODIUM SERPL-SCNC: 135 MMOL/L — SIGNIFICANT CHANGE UP (ref 135–146)
SODIUM SERPL-SCNC: 136 MMOL/L — SIGNIFICANT CHANGE UP (ref 135–146)
SODIUM SERPL-SCNC: 137 MMOL/L — SIGNIFICANT CHANGE UP (ref 135–146)
SODIUM SERPL-SCNC: 138 MMOL/L — SIGNIFICANT CHANGE UP (ref 135–146)
SODIUM SERPL-SCNC: 139 MMOL/L — SIGNIFICANT CHANGE UP (ref 135–146)
SODIUM SERPL-SCNC: 140 MMOL/L — SIGNIFICANT CHANGE UP (ref 135–146)
SODIUM SERPL-SCNC: 141 MMOL/L — SIGNIFICANT CHANGE UP (ref 135–146)
SODIUM SERPL-SCNC: 142 MMOL/L — SIGNIFICANT CHANGE UP (ref 135–146)
SODIUM SERPL-SCNC: 143 MMOL/L — SIGNIFICANT CHANGE UP (ref 135–146)
SODIUM SERPL-SCNC: 144 MMOL/L — SIGNIFICANT CHANGE UP (ref 135–146)
SP GR SPEC: 1.01 — SIGNIFICANT CHANGE UP (ref 1.01–1.03)
SP GR SPEC: 1.01 — SIGNIFICANT CHANGE UP (ref 1.01–1.03)
SPECIMEN SOURCE: SIGNIFICANT CHANGE UP
TIBC SERPL-MCNC: 119 UG/DL — LOW (ref 220–430)
TOTAL NUCLEATED CELL COUNT, BODY FLUID: 886 /UL — SIGNIFICANT CHANGE UP
TRIGL SERPL-MCNC: 151 MG/DL — HIGH
TROPONIN T SERPL-MCNC: 0.11 NG/ML — CRITICAL HIGH
TROPONIN T SERPL-MCNC: 0.11 NG/ML — CRITICAL HIGH
TROPONIN T SERPL-MCNC: 0.12 NG/ML — CRITICAL HIGH
TROPONIN T SERPL-MCNC: 0.13 NG/ML — CRITICAL HIGH
TROPONIN T SERPL-MCNC: 0.16 NG/ML — CRITICAL HIGH
TROPONIN T SERPL-MCNC: 0.2 NG/ML — CRITICAL HIGH
TROPONIN T SERPL-MCNC: 0.57 NG/ML — CRITICAL HIGH
TROPONIN T SERPL-MCNC: 0.95 NG/ML — CRITICAL HIGH
TROPONIN T SERPL-MCNC: 1.18 NG/ML — CRITICAL HIGH
TROPONIN T SERPL-MCNC: 1.69 NG/ML — CRITICAL HIGH
TROPONIN T SERPL-MCNC: 2.11 NG/ML — CRITICAL HIGH
TROPONIN T SERPL-MCNC: 2.19 NG/ML — CRITICAL HIGH
TROPONIN T SERPL-MCNC: 4.47 NG/ML — CRITICAL HIGH
TUBE TYPE: SIGNIFICANT CHANGE UP
UIBC SERPL-MCNC: 109 UG/DL — LOW (ref 110–370)
UROBILINOGEN FLD QL: SIGNIFICANT CHANGE UP
UROBILINOGEN FLD QL: SIGNIFICANT CHANGE UP
VANCOMYCIN TROUGH SERPL-MCNC: <4 UG/ML — LOW (ref 5–10)
WBC # BLD: 10 K/UL — SIGNIFICANT CHANGE UP (ref 4.8–10.8)
WBC # BLD: 10 K/UL — SIGNIFICANT CHANGE UP (ref 4.8–10.8)
WBC # BLD: 10.07 K/UL — SIGNIFICANT CHANGE UP (ref 4.8–10.8)
WBC # BLD: 10.19 K/UL — SIGNIFICANT CHANGE UP (ref 4.8–10.8)
WBC # BLD: 10.25 K/UL — SIGNIFICANT CHANGE UP (ref 4.8–10.8)
WBC # BLD: 10.36 K/UL — SIGNIFICANT CHANGE UP (ref 4.8–10.8)
WBC # BLD: 10.41 K/UL — SIGNIFICANT CHANGE UP (ref 4.8–10.8)
WBC # BLD: 10.43 K/UL — SIGNIFICANT CHANGE UP (ref 4.8–10.8)
WBC # BLD: 10.48 K/UL — SIGNIFICANT CHANGE UP (ref 4.8–10.8)
WBC # BLD: 10.5 K/UL — SIGNIFICANT CHANGE UP (ref 4.8–10.8)
WBC # BLD: 10.85 K/UL — HIGH (ref 4.8–10.8)
WBC # BLD: 10.93 K/UL — HIGH (ref 4.8–10.8)
WBC # BLD: 11.12 K/UL — HIGH (ref 4.8–10.8)
WBC # BLD: 11.18 K/UL — HIGH (ref 4.8–10.8)
WBC # BLD: 11.37 K/UL — HIGH (ref 4.8–10.8)
WBC # BLD: 11.48 K/UL — HIGH (ref 4.8–10.8)
WBC # BLD: 11.53 K/UL — HIGH (ref 4.8–10.8)
WBC # BLD: 11.71 K/UL — HIGH (ref 4.8–10.8)
WBC # BLD: 12.03 K/UL — HIGH (ref 4.8–10.8)
WBC # BLD: 12.06 K/UL — HIGH (ref 4.8–10.8)
WBC # BLD: 12.11 K/UL — HIGH (ref 4.8–10.8)
WBC # BLD: 12.18 K/UL — HIGH (ref 4.8–10.8)
WBC # BLD: 12.51 K/UL — HIGH (ref 4.8–10.8)
WBC # BLD: 12.82 K/UL — HIGH (ref 4.8–10.8)
WBC # BLD: 12.82 K/UL — HIGH (ref 4.8–10.8)
WBC # BLD: 13.45 K/UL — HIGH (ref 4.8–10.8)
WBC # BLD: 13.78 K/UL — HIGH (ref 4.8–10.8)
WBC # BLD: 13.83 K/UL — HIGH (ref 4.8–10.8)
WBC # BLD: 13.84 K/UL — HIGH (ref 4.8–10.8)
WBC # BLD: 14.14 K/UL — HIGH (ref 4.8–10.8)
WBC # BLD: 14.19 K/UL — HIGH (ref 4.8–10.8)
WBC # BLD: 14.32 K/UL — HIGH (ref 4.8–10.8)
WBC # BLD: 14.66 K/UL — HIGH (ref 4.8–10.8)
WBC # BLD: 14.68 K/UL — HIGH (ref 4.8–10.8)
WBC # BLD: 14.88 K/UL — HIGH (ref 4.8–10.8)
WBC # BLD: 14.98 K/UL — HIGH (ref 4.8–10.8)
WBC # BLD: 15.07 K/UL — HIGH (ref 4.8–10.8)
WBC # BLD: 15.16 K/UL — HIGH (ref 4.8–10.8)
WBC # BLD: 15.51 K/UL — HIGH (ref 4.8–10.8)
WBC # BLD: 17.64 K/UL — HIGH (ref 4.8–10.8)
WBC # BLD: 18.65 K/UL — HIGH (ref 4.8–10.8)
WBC # BLD: 4.93 K/UL — SIGNIFICANT CHANGE UP (ref 4.8–10.8)
WBC # BLD: 7.52 K/UL — SIGNIFICANT CHANGE UP (ref 4.8–10.8)
WBC # BLD: 7.61 K/UL — SIGNIFICANT CHANGE UP (ref 4.8–10.8)
WBC # BLD: 7.64 K/UL — SIGNIFICANT CHANGE UP (ref 4.8–10.8)
WBC # BLD: 7.9 K/UL — SIGNIFICANT CHANGE UP (ref 4.8–10.8)
WBC # BLD: 8.13 K/UL — SIGNIFICANT CHANGE UP (ref 4.8–10.8)
WBC # BLD: 8.45 K/UL — SIGNIFICANT CHANGE UP (ref 4.8–10.8)
WBC # BLD: 8.78 K/UL — SIGNIFICANT CHANGE UP (ref 4.8–10.8)
WBC # BLD: 8.84 K/UL — SIGNIFICANT CHANGE UP (ref 4.8–10.8)
WBC # BLD: 9.13 K/UL — SIGNIFICANT CHANGE UP (ref 4.8–10.8)
WBC # BLD: 9.34 K/UL — SIGNIFICANT CHANGE UP (ref 4.8–10.8)
WBC # BLD: 9.48 K/UL — SIGNIFICANT CHANGE UP (ref 4.8–10.8)
WBC # BLD: 9.73 K/UL — SIGNIFICANT CHANGE UP (ref 4.8–10.8)
WBC # BLD: 9.77 K/UL — SIGNIFICANT CHANGE UP (ref 4.8–10.8)
WBC # BLD: 9.81 K/UL — SIGNIFICANT CHANGE UP (ref 4.8–10.8)
WBC # BLD: 9.96 K/UL — SIGNIFICANT CHANGE UP (ref 4.8–10.8)
WBC # FLD AUTO: 10 K/UL — SIGNIFICANT CHANGE UP (ref 4.8–10.8)
WBC # FLD AUTO: 10 K/UL — SIGNIFICANT CHANGE UP (ref 4.8–10.8)
WBC # FLD AUTO: 10.07 K/UL — SIGNIFICANT CHANGE UP (ref 4.8–10.8)
WBC # FLD AUTO: 10.19 K/UL — SIGNIFICANT CHANGE UP (ref 4.8–10.8)
WBC # FLD AUTO: 10.25 K/UL — SIGNIFICANT CHANGE UP (ref 4.8–10.8)
WBC # FLD AUTO: 10.36 K/UL — SIGNIFICANT CHANGE UP (ref 4.8–10.8)
WBC # FLD AUTO: 10.41 K/UL — SIGNIFICANT CHANGE UP (ref 4.8–10.8)
WBC # FLD AUTO: 10.43 K/UL — SIGNIFICANT CHANGE UP (ref 4.8–10.8)
WBC # FLD AUTO: 10.48 K/UL — SIGNIFICANT CHANGE UP (ref 4.8–10.8)
WBC # FLD AUTO: 10.5 K/UL — SIGNIFICANT CHANGE UP (ref 4.8–10.8)
WBC # FLD AUTO: 10.85 K/UL — HIGH (ref 4.8–10.8)
WBC # FLD AUTO: 10.93 K/UL — HIGH (ref 4.8–10.8)
WBC # FLD AUTO: 11.12 K/UL — HIGH (ref 4.8–10.8)
WBC # FLD AUTO: 11.18 K/UL — HIGH (ref 4.8–10.8)
WBC # FLD AUTO: 11.37 K/UL — HIGH (ref 4.8–10.8)
WBC # FLD AUTO: 11.48 K/UL — HIGH (ref 4.8–10.8)
WBC # FLD AUTO: 11.53 K/UL — HIGH (ref 4.8–10.8)
WBC # FLD AUTO: 11.71 K/UL — HIGH (ref 4.8–10.8)
WBC # FLD AUTO: 12.03 K/UL — HIGH (ref 4.8–10.8)
WBC # FLD AUTO: 12.06 K/UL — HIGH (ref 4.8–10.8)
WBC # FLD AUTO: 12.11 K/UL — HIGH (ref 4.8–10.8)
WBC # FLD AUTO: 12.18 K/UL — HIGH (ref 4.8–10.8)
WBC # FLD AUTO: 12.51 K/UL — HIGH (ref 4.8–10.8)
WBC # FLD AUTO: 12.82 K/UL — HIGH (ref 4.8–10.8)
WBC # FLD AUTO: 12.82 K/UL — HIGH (ref 4.8–10.8)
WBC # FLD AUTO: 13.45 K/UL — HIGH (ref 4.8–10.8)
WBC # FLD AUTO: 13.78 K/UL — HIGH (ref 4.8–10.8)
WBC # FLD AUTO: 13.83 K/UL — HIGH (ref 4.8–10.8)
WBC # FLD AUTO: 13.84 K/UL — HIGH (ref 4.8–10.8)
WBC # FLD AUTO: 14.14 K/UL — HIGH (ref 4.8–10.8)
WBC # FLD AUTO: 14.19 K/UL — HIGH (ref 4.8–10.8)
WBC # FLD AUTO: 14.32 K/UL — HIGH (ref 4.8–10.8)
WBC # FLD AUTO: 14.66 K/UL — HIGH (ref 4.8–10.8)
WBC # FLD AUTO: 14.68 K/UL — HIGH (ref 4.8–10.8)
WBC # FLD AUTO: 14.88 K/UL — HIGH (ref 4.8–10.8)
WBC # FLD AUTO: 14.98 K/UL — HIGH (ref 4.8–10.8)
WBC # FLD AUTO: 15.07 K/UL — HIGH (ref 4.8–10.8)
WBC # FLD AUTO: 15.16 K/UL — HIGH (ref 4.8–10.8)
WBC # FLD AUTO: 15.51 K/UL — HIGH (ref 4.8–10.8)
WBC # FLD AUTO: 17.64 K/UL — HIGH (ref 4.8–10.8)
WBC # FLD AUTO: 18.65 K/UL — HIGH (ref 4.8–10.8)
WBC # FLD AUTO: 4.93 K/UL — SIGNIFICANT CHANGE UP (ref 4.8–10.8)
WBC # FLD AUTO: 7.52 K/UL — SIGNIFICANT CHANGE UP (ref 4.8–10.8)
WBC # FLD AUTO: 7.61 K/UL — SIGNIFICANT CHANGE UP (ref 4.8–10.8)
WBC # FLD AUTO: 7.64 K/UL — SIGNIFICANT CHANGE UP (ref 4.8–10.8)
WBC # FLD AUTO: 7.9 K/UL — SIGNIFICANT CHANGE UP (ref 4.8–10.8)
WBC # FLD AUTO: 8.13 K/UL — SIGNIFICANT CHANGE UP (ref 4.8–10.8)
WBC # FLD AUTO: 8.45 K/UL — SIGNIFICANT CHANGE UP (ref 4.8–10.8)
WBC # FLD AUTO: 8.78 K/UL — SIGNIFICANT CHANGE UP (ref 4.8–10.8)
WBC # FLD AUTO: 8.84 K/UL — SIGNIFICANT CHANGE UP (ref 4.8–10.8)
WBC # FLD AUTO: 9.13 K/UL — SIGNIFICANT CHANGE UP (ref 4.8–10.8)
WBC # FLD AUTO: 9.34 K/UL — SIGNIFICANT CHANGE UP (ref 4.8–10.8)
WBC # FLD AUTO: 9.48 K/UL — SIGNIFICANT CHANGE UP (ref 4.8–10.8)
WBC # FLD AUTO: 9.73 K/UL — SIGNIFICANT CHANGE UP (ref 4.8–10.8)
WBC # FLD AUTO: 9.77 K/UL — SIGNIFICANT CHANGE UP (ref 4.8–10.8)
WBC # FLD AUTO: 9.81 K/UL — SIGNIFICANT CHANGE UP (ref 4.8–10.8)
WBC # FLD AUTO: 9.96 K/UL — SIGNIFICANT CHANGE UP (ref 4.8–10.8)
WBC UR QL: 73 /HPF — HIGH (ref 0–5)
WBC UR QL: >720 /HPF — HIGH (ref 0–5)

## 2021-01-01 PROCEDURE — 99222 1ST HOSP IP/OBS MODERATE 55: CPT

## 2021-01-01 PROCEDURE — 99233 SBSQ HOSP IP/OBS HIGH 50: CPT

## 2021-01-01 PROCEDURE — 99239 HOSP IP/OBS DSCHRG MGMT >30: CPT

## 2021-01-01 PROCEDURE — 71045 X-RAY EXAM CHEST 1 VIEW: CPT | Mod: 26

## 2021-01-01 PROCEDURE — 36561 INSERT TUNNELED CV CATH: CPT

## 2021-01-01 PROCEDURE — 74176 CT ABD & PELVIS W/O CONTRAST: CPT | Mod: 26,MG

## 2021-01-01 PROCEDURE — 99232 SBSQ HOSP IP/OBS MODERATE 35: CPT

## 2021-01-01 PROCEDURE — 99285 EMERGENCY DEPT VISIT HI MDM: CPT | Mod: GC

## 2021-01-01 PROCEDURE — 71250 CT THORAX DX C-: CPT | Mod: 26,MA

## 2021-01-01 PROCEDURE — 93010 ELECTROCARDIOGRAM REPORT: CPT

## 2021-01-01 PROCEDURE — 70450 CT HEAD/BRAIN W/O DYE: CPT | Mod: 26,MA

## 2021-01-01 PROCEDURE — 99291 CRITICAL CARE FIRST HOUR: CPT

## 2021-01-01 PROCEDURE — 99497 ADVNCD CARE PLAN 30 MIN: CPT | Mod: 25

## 2021-01-01 PROCEDURE — 36556 INSERT NON-TUNNEL CV CATH: CPT

## 2021-01-01 PROCEDURE — 99285 EMERGENCY DEPT VISIT HI MDM: CPT

## 2021-01-01 PROCEDURE — 72170 X-RAY EXAM OF PELVIS: CPT | Mod: 26

## 2021-01-01 PROCEDURE — 93306 TTE W/DOPPLER COMPLETE: CPT | Mod: 26

## 2021-01-01 PROCEDURE — 99223 1ST HOSP IP/OBS HIGH 75: CPT

## 2021-01-01 PROCEDURE — 88342 IMHCHEM/IMCYTCHM 1ST ANTB: CPT | Mod: 26

## 2021-01-01 PROCEDURE — 88341 IMHCHEM/IMCYTCHM EA ADD ANTB: CPT | Mod: 26

## 2021-01-01 PROCEDURE — 74018 RADEX ABDOMEN 1 VIEW: CPT | Mod: 26

## 2021-01-01 PROCEDURE — 99285 EMERGENCY DEPT VISIT HI MDM: CPT | Mod: CS,GC

## 2021-01-01 PROCEDURE — G1004: CPT

## 2021-01-01 PROCEDURE — 74177 CT ABD & PELVIS W/CONTRAST: CPT | Mod: 26

## 2021-01-01 PROCEDURE — 99284 EMERGENCY DEPT VISIT MOD MDM: CPT

## 2021-01-01 PROCEDURE — 71260 CT THORAX DX C+: CPT | Mod: 26

## 2021-01-01 PROCEDURE — 73620 X-RAY EXAM OF FOOT: CPT | Mod: 26,LT

## 2021-01-01 PROCEDURE — 74176 CT ABD & PELVIS W/O CONTRAST: CPT | Mod: 26,MA

## 2021-01-01 PROCEDURE — 71045 X-RAY EXAM CHEST 1 VIEW: CPT | Mod: 26,77

## 2021-01-01 PROCEDURE — 88112 CYTOPATH CELL ENHANCE TECH: CPT | Mod: 26

## 2021-01-01 PROCEDURE — 36558 INSERT TUNNELED CV CATH: CPT | Mod: GC,LT

## 2021-01-01 PROCEDURE — 93970 EXTREMITY STUDY: CPT | Mod: 26

## 2021-01-01 PROCEDURE — 93010 ELECTROCARDIOGRAM REPORT: CPT | Mod: 77

## 2021-01-01 PROCEDURE — 99291 CRITICAL CARE FIRST HOUR: CPT | Mod: CS,25

## 2021-01-01 PROCEDURE — 72125 CT NECK SPINE W/O DYE: CPT | Mod: 26,MA

## 2021-01-01 PROCEDURE — 88305 TISSUE EXAM BY PATHOLOGIST: CPT | Mod: 26

## 2021-01-01 PROCEDURE — 99497 ADVNCD CARE PLAN 30 MIN: CPT

## 2021-01-01 PROCEDURE — 93925 LOWER EXTREMITY STUDY: CPT | Mod: 26

## 2021-01-01 PROCEDURE — 76857 US EXAM PELVIC LIMITED: CPT | Mod: 26

## 2021-01-01 RX ORDER — DEXTROSE 50 % IN WATER 50 %
25 SYRINGE (ML) INTRAVENOUS ONCE
Refills: 0 | Status: DISCONTINUED | OUTPATIENT
Start: 2021-01-01 | End: 2021-01-01

## 2021-01-01 RX ORDER — ASPIRIN/CALCIUM CARB/MAGNESIUM 324 MG
1 TABLET ORAL
Qty: 0 | Refills: 0 | DISCHARGE
Start: 2021-01-01

## 2021-01-01 RX ORDER — MEROPENEM 1 G/30ML
500 INJECTION INTRAVENOUS EVERY 24 HOURS
Refills: 0 | Status: DISCONTINUED | OUTPATIENT
Start: 2021-01-01 | End: 2021-01-01

## 2021-01-01 RX ORDER — CALCIUM ACETATE 667 MG
1 TABLET ORAL
Qty: 0 | Refills: 0 | DISCHARGE
Start: 2021-01-01

## 2021-01-01 RX ORDER — METOPROLOL TARTRATE 50 MG
25 TABLET ORAL
Refills: 0 | Status: DISCONTINUED | OUTPATIENT
Start: 2021-01-01 | End: 2021-01-01

## 2021-01-01 RX ORDER — VANCOMYCIN HCL 1 G
1000 VIAL (EA) INTRAVENOUS
Refills: 0 | Status: DISCONTINUED | OUTPATIENT
Start: 2021-01-01 | End: 2021-01-01

## 2021-01-01 RX ORDER — GLUCAGON INJECTION, SOLUTION 0.5 MG/.1ML
1 INJECTION, SOLUTION SUBCUTANEOUS ONCE
Refills: 0 | Status: DISCONTINUED | OUTPATIENT
Start: 2021-01-01 | End: 2021-01-01

## 2021-01-01 RX ORDER — CALCIUM ACETATE 667 MG
667 TABLET ORAL
Refills: 0 | Status: DISCONTINUED | OUTPATIENT
Start: 2021-01-01 | End: 2021-01-01

## 2021-01-01 RX ORDER — IRON SUCROSE 20 MG/ML
50 INJECTION, SOLUTION INTRAVENOUS
Refills: 0 | Status: DISCONTINUED | OUTPATIENT
Start: 2021-01-01 | End: 2021-01-01

## 2021-01-01 RX ORDER — HALOPERIDOL DECANOATE 100 MG/ML
1 INJECTION INTRAMUSCULAR ONCE
Refills: 0 | Status: COMPLETED | OUTPATIENT
Start: 2021-01-01 | End: 2021-01-01

## 2021-01-01 RX ORDER — FUROSEMIDE 40 MG
1 TABLET ORAL
Qty: 0 | Refills: 0 | DISCHARGE
Start: 2021-01-01

## 2021-01-01 RX ORDER — DONEPEZIL HYDROCHLORIDE 10 MG/1
5 TABLET, FILM COATED ORAL AT BEDTIME
Refills: 0 | Status: DISCONTINUED | OUTPATIENT
Start: 2021-01-01 | End: 2021-01-01

## 2021-01-01 RX ORDER — DEXTROSE 50 % IN WATER 50 %
15 SYRINGE (ML) INTRAVENOUS ONCE
Refills: 0 | Status: DISCONTINUED | OUTPATIENT
Start: 2021-01-01 | End: 2021-01-01

## 2021-01-01 RX ORDER — FUROSEMIDE 40 MG
1 TABLET ORAL
Qty: 0 | Refills: 0 | DISCHARGE

## 2021-01-01 RX ORDER — HEPARIN SODIUM 5000 [USP'U]/ML
5000 INJECTION INTRAVENOUS; SUBCUTANEOUS EVERY 12 HOURS
Refills: 0 | Status: DISCONTINUED | OUTPATIENT
Start: 2021-01-01 | End: 2021-01-01

## 2021-01-01 RX ORDER — ALBUTEROL 90 UG/1
2 AEROSOL, METERED ORAL EVERY 4 HOURS
Refills: 0 | Status: DISCONTINUED | OUTPATIENT
Start: 2021-01-01 | End: 2021-01-01

## 2021-01-01 RX ORDER — MAGNESIUM SULFATE 500 MG/ML
2 VIAL (ML) INJECTION ONCE
Refills: 0 | Status: DISCONTINUED | OUTPATIENT
Start: 2021-01-01 | End: 2021-01-01

## 2021-01-01 RX ORDER — TAMSULOSIN HYDROCHLORIDE 0.4 MG/1
0.4 CAPSULE ORAL AT BEDTIME
Refills: 0 | Status: DISCONTINUED | OUTPATIENT
Start: 2021-01-01 | End: 2021-01-01

## 2021-01-01 RX ORDER — PANTOPRAZOLE SODIUM 20 MG/1
1 TABLET, DELAYED RELEASE ORAL
Qty: 0 | Refills: 0 | DISCHARGE

## 2021-01-01 RX ORDER — ASCORBIC ACID 60 MG
500 TABLET,CHEWABLE ORAL DAILY
Refills: 0 | Status: DISCONTINUED | OUTPATIENT
Start: 2021-01-01 | End: 2021-01-01

## 2021-01-01 RX ORDER — HYDROMORPHONE HYDROCHLORIDE 2 MG/ML
0.5 INJECTION INTRAMUSCULAR; INTRAVENOUS; SUBCUTANEOUS ONCE
Refills: 0 | Status: DISCONTINUED | OUTPATIENT
Start: 2021-01-01 | End: 2021-01-01

## 2021-01-01 RX ORDER — PANTOPRAZOLE SODIUM 20 MG/1
40 TABLET, DELAYED RELEASE ORAL
Refills: 0 | Status: DISCONTINUED | OUTPATIENT
Start: 2021-01-01 | End: 2021-01-01

## 2021-01-01 RX ORDER — METOPROLOL TARTRATE 50 MG
25 TABLET ORAL AT BEDTIME
Refills: 0 | Status: DISCONTINUED | OUTPATIENT
Start: 2021-01-01 | End: 2021-01-01

## 2021-01-01 RX ORDER — ERYTHROPOIETIN 10000 [IU]/ML
10000 INJECTION, SOLUTION INTRAVENOUS; SUBCUTANEOUS
Refills: 0 | Status: DISCONTINUED | OUTPATIENT
Start: 2021-01-01 | End: 2021-01-01

## 2021-01-01 RX ORDER — SODIUM BICARBONATE 1 MEQ/ML
650 SYRINGE (ML) INTRAVENOUS THREE TIMES A DAY
Refills: 0 | Status: DISCONTINUED | OUTPATIENT
Start: 2021-01-01 | End: 2021-01-01

## 2021-01-01 RX ORDER — BUDESONIDE AND FORMOTEROL FUMARATE DIHYDRATE 160; 4.5 UG/1; UG/1
2 AEROSOL RESPIRATORY (INHALATION)
Refills: 0 | Status: DISCONTINUED | OUTPATIENT
Start: 2021-01-01 | End: 2021-01-01

## 2021-01-01 RX ORDER — ERYTHROPOIETIN 10000 [IU]/ML
5000 INJECTION, SOLUTION INTRAVENOUS; SUBCUTANEOUS
Refills: 0 | Status: DISCONTINUED | OUTPATIENT
Start: 2021-01-01 | End: 2021-01-01

## 2021-01-01 RX ORDER — LANOLIN ALCOHOL/MO/W.PET/CERES
1 CREAM (GRAM) TOPICAL
Qty: 0 | Refills: 0 | DISCHARGE

## 2021-01-01 RX ORDER — POLYETHYLENE GLYCOL 3350 17 G/17G
17 POWDER, FOR SOLUTION ORAL
Refills: 0 | Status: DISCONTINUED | OUTPATIENT
Start: 2021-01-01 | End: 2021-01-01

## 2021-01-01 RX ORDER — CLOPIDOGREL BISULFATE 75 MG/1
1 TABLET, FILM COATED ORAL
Qty: 0 | Refills: 0 | DISCHARGE
Start: 2021-01-01

## 2021-01-01 RX ORDER — FERROUS SULFATE 325(65) MG
325 TABLET ORAL DAILY
Refills: 0 | Status: DISCONTINUED | OUTPATIENT
Start: 2021-01-01 | End: 2021-01-01

## 2021-01-01 RX ORDER — ALBUTEROL 90 UG/1
2 AEROSOL, METERED ORAL EVERY 6 HOURS
Refills: 0 | Status: DISCONTINUED | OUTPATIENT
Start: 2021-01-01 | End: 2021-01-01

## 2021-01-01 RX ORDER — SUCRALFATE 1 G
1 TABLET ORAL
Refills: 0 | Status: DISCONTINUED | OUTPATIENT
Start: 2021-01-01 | End: 2021-01-01

## 2021-01-01 RX ORDER — CEFEPIME 1 G/1
1000 INJECTION, POWDER, FOR SOLUTION INTRAMUSCULAR; INTRAVENOUS EVERY 24 HOURS
Refills: 0 | Status: DISCONTINUED | OUTPATIENT
Start: 2021-01-01 | End: 2021-01-01

## 2021-01-01 RX ORDER — POLYETHYLENE GLYCOL 3350 17 G/17G
17 POWDER, FOR SOLUTION ORAL DAILY
Refills: 0 | Status: DISCONTINUED | OUTPATIENT
Start: 2021-01-01 | End: 2021-01-01

## 2021-01-01 RX ORDER — MEROPENEM 1 G/30ML
500 INJECTION INTRAVENOUS
Qty: 0 | Refills: 0 | DISCHARGE
Start: 2021-01-01

## 2021-01-01 RX ORDER — SUCRALFATE 1 G
10 TABLET ORAL
Qty: 0 | Refills: 0 | DISCHARGE
Start: 2021-01-01

## 2021-01-01 RX ORDER — VANCOMYCIN HCL 1 G
1000 VIAL (EA) INTRAVENOUS ONCE
Refills: 0 | Status: DISCONTINUED | OUTPATIENT
Start: 2021-01-01 | End: 2021-01-01

## 2021-01-01 RX ORDER — MAGNESIUM SULFATE 500 MG/ML
2 VIAL (ML) INJECTION ONCE
Refills: 0 | Status: COMPLETED | OUTPATIENT
Start: 2021-01-01 | End: 2021-01-01

## 2021-01-01 RX ORDER — ATORVASTATIN CALCIUM 80 MG/1
1 TABLET, FILM COATED ORAL
Qty: 0 | Refills: 0 | DISCHARGE
Start: 2021-01-01

## 2021-01-01 RX ORDER — SERTRALINE 25 MG/1
25 TABLET, FILM COATED ORAL DAILY
Refills: 0 | Status: DISCONTINUED | OUTPATIENT
Start: 2021-01-01 | End: 2021-01-01

## 2021-01-01 RX ORDER — HYDROMORPHONE HYDROCHLORIDE 2 MG/ML
1 INJECTION INTRAMUSCULAR; INTRAVENOUS; SUBCUTANEOUS
Qty: 0 | Refills: 0 | DISCHARGE
Start: 2021-01-01

## 2021-01-01 RX ORDER — SODIUM CHLORIDE 9 MG/ML
1000 INJECTION, SOLUTION INTRAVENOUS
Refills: 0 | Status: DISCONTINUED | OUTPATIENT
Start: 2021-01-01 | End: 2021-01-01

## 2021-01-01 RX ORDER — TAMSULOSIN HYDROCHLORIDE 0.4 MG/1
1 CAPSULE ORAL
Qty: 0 | Refills: 0 | DISCHARGE
Start: 2021-01-01

## 2021-01-01 RX ORDER — NITROGLYCERIN 6.5 MG
1 CAPSULE, EXTENDED RELEASE ORAL
Qty: 0 | Refills: 0 | DISCHARGE

## 2021-01-01 RX ORDER — MIRTAZAPINE 45 MG/1
15 TABLET, ORALLY DISINTEGRATING ORAL AT BEDTIME
Refills: 0 | Status: DISCONTINUED | OUTPATIENT
Start: 2021-01-01 | End: 2021-01-01

## 2021-01-01 RX ORDER — MIRTAZAPINE 45 MG/1
1 TABLET, ORALLY DISINTEGRATING ORAL
Qty: 0 | Refills: 0 | DISCHARGE
Start: 2021-01-01

## 2021-01-01 RX ORDER — MONTELUKAST 4 MG/1
1 TABLET, CHEWABLE ORAL
Qty: 0 | Refills: 0 | DISCHARGE

## 2021-01-01 RX ORDER — MIDODRINE HYDROCHLORIDE 2.5 MG/1
1 TABLET ORAL
Qty: 0 | Refills: 0 | DISCHARGE
Start: 2021-01-01

## 2021-01-01 RX ORDER — ASPIRIN/CALCIUM CARB/MAGNESIUM 324 MG
81 TABLET ORAL DAILY
Refills: 0 | Status: DISCONTINUED | OUTPATIENT
Start: 2021-01-01 | End: 2021-01-01

## 2021-01-01 RX ORDER — ACETYLCYSTEINE 200 MG/ML
4 VIAL (ML) MISCELLANEOUS THREE TIMES A DAY
Refills: 0 | Status: DISCONTINUED | OUTPATIENT
Start: 2021-01-01 | End: 2021-01-01

## 2021-01-01 RX ORDER — AMPICILLIN SODIUM AND SULBACTAM SODIUM 250; 125 MG/ML; MG/ML
3 INJECTION, POWDER, FOR SUSPENSION INTRAMUSCULAR; INTRAVENOUS EVERY 12 HOURS
Refills: 0 | Status: DISCONTINUED | OUTPATIENT
Start: 2021-01-01 | End: 2021-01-01

## 2021-01-01 RX ORDER — SODIUM CHLORIDE 9 MG/ML
1000 INJECTION, SOLUTION INTRAVENOUS ONCE
Refills: 0 | Status: COMPLETED | OUTPATIENT
Start: 2021-01-01 | End: 2021-01-01

## 2021-01-01 RX ORDER — CALCIUM GLUCONATE 100 MG/ML
2 VIAL (ML) INTRAVENOUS ONCE
Refills: 0 | Status: COMPLETED | OUTPATIENT
Start: 2021-01-01 | End: 2021-01-01

## 2021-01-01 RX ORDER — INSULIN ASPART 100 [IU]/ML
5 INJECTION, SOLUTION SUBCUTANEOUS
Qty: 0 | Refills: 0 | DISCHARGE

## 2021-01-01 RX ORDER — NOREPINEPHRINE BITARTRATE/D5W 8 MG/250ML
0.03 PLASTIC BAG, INJECTION (ML) INTRAVENOUS
Qty: 8 | Refills: 0 | Status: DISCONTINUED | OUTPATIENT
Start: 2021-01-01 | End: 2021-01-01

## 2021-01-01 RX ORDER — PANTOPRAZOLE SODIUM 20 MG/1
1 TABLET, DELAYED RELEASE ORAL
Qty: 0 | Refills: 0 | DISCHARGE
Start: 2021-01-01

## 2021-01-01 RX ORDER — IPRATROPIUM/ALBUTEROL SULFATE 18-103MCG
3 AEROSOL WITH ADAPTER (GRAM) INHALATION
Refills: 0 | Status: COMPLETED | OUTPATIENT
Start: 2021-01-01 | End: 2021-01-01

## 2021-01-01 RX ORDER — CALCIUM ACETATE 667 MG
0 TABLET ORAL
Qty: 0 | Refills: 0 | DISCHARGE
Start: 2021-01-01

## 2021-01-01 RX ORDER — MEROPENEM 1 G/30ML
INJECTION INTRAVENOUS
Refills: 0 | Status: DISCONTINUED | OUTPATIENT
Start: 2021-01-01 | End: 2021-01-01

## 2021-01-01 RX ORDER — INSULIN LISPRO 100/ML
VIAL (ML) SUBCUTANEOUS
Refills: 0 | Status: DISCONTINUED | OUTPATIENT
Start: 2021-01-01 | End: 2021-01-01

## 2021-01-01 RX ORDER — CEFEPIME 1 G/1
2000 INJECTION, POWDER, FOR SOLUTION INTRAMUSCULAR; INTRAVENOUS ONCE
Refills: 0 | Status: COMPLETED | OUTPATIENT
Start: 2021-01-01 | End: 2021-01-01

## 2021-01-01 RX ORDER — FERROUS SULFATE 325(65) MG
1 TABLET ORAL
Qty: 0 | Refills: 0 | DISCHARGE

## 2021-01-01 RX ORDER — ONDANSETRON 8 MG/1
4 TABLET, FILM COATED ORAL ONCE
Refills: 0 | Status: COMPLETED | OUTPATIENT
Start: 2021-01-01 | End: 2021-01-01

## 2021-01-01 RX ORDER — ATORVASTATIN CALCIUM 80 MG/1
40 TABLET, FILM COATED ORAL AT BEDTIME
Refills: 0 | Status: DISCONTINUED | OUTPATIENT
Start: 2021-01-01 | End: 2021-01-01

## 2021-01-01 RX ORDER — FUROSEMIDE 40 MG
40 TABLET ORAL ONCE
Refills: 0 | Status: COMPLETED | OUTPATIENT
Start: 2021-01-01 | End: 2021-01-01

## 2021-01-01 RX ORDER — IRON SUCROSE 20 MG/ML
2.5 INJECTION, SOLUTION INTRAVENOUS
Qty: 0 | Refills: 0 | DISCHARGE
Start: 2021-01-01

## 2021-01-01 RX ORDER — ASCORBIC ACID 60 MG
1 TABLET,CHEWABLE ORAL
Qty: 0 | Refills: 0 | DISCHARGE

## 2021-01-01 RX ORDER — DONEPEZIL HYDROCHLORIDE 10 MG/1
1 TABLET, FILM COATED ORAL
Qty: 0 | Refills: 0 | DISCHARGE
Start: 2021-01-01

## 2021-01-01 RX ORDER — DEXTROSE 50 % IN WATER 50 %
12.5 SYRINGE (ML) INTRAVENOUS ONCE
Refills: 0 | Status: DISCONTINUED | OUTPATIENT
Start: 2021-01-01 | End: 2021-01-01

## 2021-01-01 RX ORDER — POVIDONE-IODINE 5 %
1 AEROSOL (ML) TOPICAL DAILY
Refills: 0 | Status: DISCONTINUED | OUTPATIENT
Start: 2021-01-01 | End: 2021-01-01

## 2021-01-01 RX ORDER — ACETAMINOPHEN 500 MG
650 TABLET ORAL EVERY 6 HOURS
Refills: 0 | Status: DISCONTINUED | OUTPATIENT
Start: 2021-01-01 | End: 2021-01-01

## 2021-01-01 RX ORDER — LINEZOLID 600 MG/300ML
600 INJECTION, SOLUTION INTRAVENOUS EVERY 12 HOURS
Refills: 0 | Status: DISCONTINUED | OUTPATIENT
Start: 2021-01-01 | End: 2021-01-01

## 2021-01-01 RX ORDER — AZITHROMYCIN 500 MG/1
500 TABLET, FILM COATED ORAL ONCE
Refills: 0 | Status: COMPLETED | OUTPATIENT
Start: 2021-01-01 | End: 2021-01-01

## 2021-01-01 RX ORDER — VANCOMYCIN HCL 1 G
1000 VIAL (EA) INTRAVENOUS ONCE
Refills: 0 | Status: COMPLETED | OUTPATIENT
Start: 2021-01-01 | End: 2021-01-01

## 2021-01-01 RX ORDER — MUPIROCIN 20 MG/G
1 OINTMENT TOPICAL
Refills: 0 | Status: COMPLETED | OUTPATIENT
Start: 2021-01-01 | End: 2021-01-01

## 2021-01-01 RX ORDER — METOPROLOL TARTRATE 50 MG
1 TABLET ORAL
Qty: 0 | Refills: 0 | DISCHARGE
Start: 2021-01-01

## 2021-01-01 RX ORDER — ALBUTEROL 90 UG/1
2 AEROSOL, METERED ORAL
Qty: 0 | Refills: 0 | DISCHARGE

## 2021-01-01 RX ORDER — NYSTATIN CREAM 100000 [USP'U]/G
1 CREAM TOPICAL
Qty: 0 | Refills: 0 | DISCHARGE

## 2021-01-01 RX ORDER — ZINC OXIDE 200 MG/G
1 OINTMENT TOPICAL
Qty: 0 | Refills: 0 | DISCHARGE
Start: 2021-01-01

## 2021-01-01 RX ORDER — CHLORHEXIDINE GLUCONATE 213 G/1000ML
1 SOLUTION TOPICAL
Refills: 0 | Status: DISCONTINUED | OUTPATIENT
Start: 2021-01-01 | End: 2021-01-01

## 2021-01-01 RX ORDER — FUROSEMIDE 40 MG
80 TABLET ORAL
Refills: 0 | Status: DISCONTINUED | OUTPATIENT
Start: 2021-01-01 | End: 2021-01-01

## 2021-01-01 RX ORDER — ONDANSETRON 8 MG/1
4 TABLET, FILM COATED ORAL ONCE
Refills: 0 | Status: DISCONTINUED | OUTPATIENT
Start: 2021-01-01 | End: 2021-01-01

## 2021-01-01 RX ORDER — HYDROMORPHONE HYDROCHLORIDE 2 MG/ML
2 INJECTION INTRAMUSCULAR; INTRAVENOUS; SUBCUTANEOUS ONCE
Refills: 0 | Status: DISCONTINUED | OUTPATIENT
Start: 2021-01-01 | End: 2021-01-01

## 2021-01-01 RX ORDER — SODIUM CHLORIDE 9 MG/ML
1000 INJECTION INTRAMUSCULAR; INTRAVENOUS; SUBCUTANEOUS
Refills: 0 | Status: DISCONTINUED | OUTPATIENT
Start: 2021-01-01 | End: 2021-01-01

## 2021-01-01 RX ORDER — INSULIN LISPRO 100/ML
0 VIAL (ML) SUBCUTANEOUS
Qty: 0 | Refills: 0 | DISCHARGE
Start: 2021-01-01

## 2021-01-01 RX ORDER — CLOPIDOGREL BISULFATE 75 MG/1
75 TABLET, FILM COATED ORAL DAILY
Refills: 0 | Status: DISCONTINUED | OUTPATIENT
Start: 2021-01-01 | End: 2021-01-01

## 2021-01-01 RX ORDER — LACTULOSE 10 G/15ML
15 SOLUTION ORAL
Qty: 0 | Refills: 0 | DISCHARGE
Start: 2021-01-01

## 2021-01-01 RX ORDER — ERYTHROPOIETIN 10000 [IU]/ML
5000 INJECTION, SOLUTION INTRAVENOUS; SUBCUTANEOUS ONCE
Refills: 0 | Status: COMPLETED | OUTPATIENT
Start: 2021-01-01 | End: 2021-01-01

## 2021-01-01 RX ORDER — DIPHENHYDRAMINE HCL 50 MG
25 CAPSULE ORAL ONCE
Refills: 0 | Status: COMPLETED | OUTPATIENT
Start: 2021-01-01 | End: 2021-01-01

## 2021-01-01 RX ORDER — FUROSEMIDE 40 MG
40 TABLET ORAL
Refills: 0 | Status: DISCONTINUED | OUTPATIENT
Start: 2021-01-01 | End: 2021-01-01

## 2021-01-01 RX ORDER — MUPIROCIN 20 MG/G
1 OINTMENT TOPICAL
Refills: 0 | Status: DISCONTINUED | OUTPATIENT
Start: 2021-01-01 | End: 2021-01-01

## 2021-01-01 RX ORDER — INSULIN LISPRO 100/ML
5 VIAL (ML) SUBCUTANEOUS
Refills: 0 | Status: DISCONTINUED | OUTPATIENT
Start: 2021-01-01 | End: 2021-01-01

## 2021-01-01 RX ORDER — FUROSEMIDE 40 MG
80 TABLET ORAL DAILY
Refills: 0 | Status: DISCONTINUED | OUTPATIENT
Start: 2021-01-01 | End: 2021-01-01

## 2021-01-01 RX ORDER — PANTOPRAZOLE SODIUM 20 MG/1
40 TABLET, DELAYED RELEASE ORAL EVERY 12 HOURS
Refills: 0 | Status: DISCONTINUED | OUTPATIENT
Start: 2021-01-01 | End: 2021-01-01

## 2021-01-01 RX ORDER — FUROSEMIDE 40 MG
40 TABLET ORAL DAILY
Refills: 0 | Status: DISCONTINUED | OUTPATIENT
Start: 2021-01-01 | End: 2021-01-01

## 2021-01-01 RX ORDER — IRON SUCROSE 20 MG/ML
100 INJECTION, SOLUTION INTRAVENOUS
Refills: 0 | Status: DISCONTINUED | OUTPATIENT
Start: 2021-01-01 | End: 2021-01-01

## 2021-01-01 RX ORDER — FUROSEMIDE 40 MG
80 TABLET ORAL THREE TIMES A DAY
Refills: 0 | Status: DISCONTINUED | OUTPATIENT
Start: 2021-01-01 | End: 2021-01-01

## 2021-01-01 RX ORDER — NOREPINEPHRINE BITARTRATE/D5W 8 MG/250ML
0.05 PLASTIC BAG, INJECTION (ML) INTRAVENOUS
Qty: 8 | Refills: 0 | Status: DISCONTINUED | OUTPATIENT
Start: 2021-01-01 | End: 2021-01-01

## 2021-01-01 RX ORDER — MEROPENEM 1 G/30ML
500 INJECTION INTRAVENOUS ONCE
Refills: 0 | Status: COMPLETED | OUTPATIENT
Start: 2021-01-01 | End: 2021-01-01

## 2021-01-01 RX ORDER — DEXTROSE 50 % IN WATER 50 %
50 SYRINGE (ML) INTRAVENOUS ONCE
Refills: 0 | Status: COMPLETED | OUTPATIENT
Start: 2021-01-01 | End: 2021-01-01

## 2021-01-01 RX ORDER — SERTRALINE 25 MG/1
1 TABLET, FILM COATED ORAL
Qty: 0 | Refills: 0 | DISCHARGE

## 2021-01-01 RX ORDER — POVIDONE-IODINE 5 %
1 AEROSOL (ML) TOPICAL
Refills: 0 | Status: DISCONTINUED | OUTPATIENT
Start: 2021-01-01 | End: 2021-01-01

## 2021-01-01 RX ORDER — VANCOMYCIN HCL 1 G
1 VIAL (EA) INTRAVENOUS
Qty: 0 | Refills: 0 | DISCHARGE
Start: 2021-01-01

## 2021-01-01 RX ORDER — MORPHINE SULFATE 50 MG/1
2 CAPSULE, EXTENDED RELEASE ORAL
Refills: 0 | Status: DISCONTINUED | OUTPATIENT
Start: 2021-01-01 | End: 2021-01-01

## 2021-01-01 RX ORDER — ERYTHROPOIETIN 10000 [IU]/ML
5000 INJECTION, SOLUTION INTRAVENOUS; SUBCUTANEOUS
Qty: 0 | Refills: 0 | DISCHARGE
Start: 2021-01-01

## 2021-01-01 RX ORDER — SENNA PLUS 8.6 MG/1
2 TABLET ORAL AT BEDTIME
Refills: 0 | Status: DISCONTINUED | OUTPATIENT
Start: 2021-01-01 | End: 2021-01-01

## 2021-01-01 RX ORDER — CEFEPIME 1 G/1
500 INJECTION, POWDER, FOR SOLUTION INTRAMUSCULAR; INTRAVENOUS EVERY 24 HOURS
Refills: 0 | Status: DISCONTINUED | OUTPATIENT
Start: 2021-01-01 | End: 2021-01-01

## 2021-01-01 RX ORDER — AMPICILLIN SODIUM AND SULBACTAM SODIUM 250; 125 MG/ML; MG/ML
3 INJECTION, POWDER, FOR SUSPENSION INTRAMUSCULAR; INTRAVENOUS EVERY 24 HOURS
Refills: 0 | Status: DISCONTINUED | OUTPATIENT
Start: 2021-01-01 | End: 2021-01-01

## 2021-01-01 RX ORDER — MIDODRINE HYDROCHLORIDE 2.5 MG/1
10 TABLET ORAL THREE TIMES A DAY
Refills: 0 | Status: DISCONTINUED | OUTPATIENT
Start: 2021-01-01 | End: 2021-01-01

## 2021-01-01 RX ORDER — CEFEPIME 1 G/1
500 INJECTION, POWDER, FOR SOLUTION INTRAMUSCULAR; INTRAVENOUS ONCE
Refills: 0 | Status: COMPLETED | OUTPATIENT
Start: 2021-01-01 | End: 2021-01-01

## 2021-01-01 RX ORDER — POLYETHYLENE GLYCOL 3350 17 G/17G
17 POWDER, FOR SOLUTION ORAL
Qty: 0 | Refills: 0 | DISCHARGE
Start: 2021-01-01

## 2021-01-01 RX ORDER — INSULIN HUMAN 100 [IU]/ML
5 INJECTION, SOLUTION SUBCUTANEOUS ONCE
Refills: 0 | Status: COMPLETED | OUTPATIENT
Start: 2021-01-01 | End: 2021-01-01

## 2021-01-01 RX ORDER — DEXMEDETOMIDINE HYDROCHLORIDE IN 0.9% SODIUM CHLORIDE 4 UG/ML
0.2 INJECTION INTRAVENOUS
Qty: 200 | Refills: 0 | Status: DISCONTINUED | OUTPATIENT
Start: 2021-01-01 | End: 2021-01-01

## 2021-01-01 RX ORDER — ACETAMINOPHEN 500 MG
2 TABLET ORAL
Qty: 0 | Refills: 0 | DISCHARGE
Start: 2021-01-01

## 2021-01-01 RX ORDER — IPRATROPIUM/ALBUTEROL SULFATE 18-103MCG
3 AEROSOL WITH ADAPTER (GRAM) INHALATION EVERY 6 HOURS
Refills: 0 | Status: DISCONTINUED | OUTPATIENT
Start: 2021-01-01 | End: 2021-01-01

## 2021-01-01 RX ORDER — HYDROMORPHONE HYDROCHLORIDE 2 MG/ML
2 INJECTION INTRAMUSCULAR; INTRAVENOUS; SUBCUTANEOUS ONCE
Refills: 0 | Status: COMPLETED | OUTPATIENT
Start: 2021-01-01 | End: 2021-01-01

## 2021-01-01 RX ORDER — AZITHROMYCIN 500 MG/1
500 TABLET, FILM COATED ORAL
Refills: 0 | Status: DISCONTINUED | OUTPATIENT
Start: 2021-01-01 | End: 2021-01-01

## 2021-01-01 RX ORDER — ERYTHROPOIETIN 10000 [IU]/ML
4000 INJECTION, SOLUTION INTRAVENOUS; SUBCUTANEOUS
Qty: 0 | Refills: 0 | DISCHARGE

## 2021-01-01 RX ORDER — FUROSEMIDE 40 MG
20 TABLET ORAL DAILY
Refills: 0 | Status: DISCONTINUED | OUTPATIENT
Start: 2021-01-01 | End: 2021-01-01

## 2021-01-01 RX ORDER — AZITHROMYCIN 500 MG/1
500 TABLET, FILM COATED ORAL EVERY 24 HOURS
Refills: 0 | Status: DISCONTINUED | OUTPATIENT
Start: 2021-01-01 | End: 2021-01-01

## 2021-01-01 RX ORDER — SERTRALINE 25 MG/1
1 TABLET, FILM COATED ORAL
Qty: 0 | Refills: 0 | DISCHARGE
Start: 2021-01-01

## 2021-01-01 RX ORDER — IPRATROPIUM/ALBUTEROL SULFATE 18-103MCG
3 AEROSOL WITH ADAPTER (GRAM) INHALATION
Refills: 0 | Status: DISCONTINUED | OUTPATIENT
Start: 2021-01-01 | End: 2021-01-01

## 2021-01-01 RX ORDER — ALBUTEROL 90 UG/1
4 AEROSOL, METERED ORAL EVERY 4 HOURS
Refills: 0 | Status: DISCONTINUED | OUTPATIENT
Start: 2021-01-01 | End: 2021-01-01

## 2021-01-01 RX ORDER — DONEPEZIL HYDROCHLORIDE 10 MG/1
1 TABLET, FILM COATED ORAL
Qty: 0 | Refills: 0 | DISCHARGE

## 2021-01-01 RX ORDER — HYDRALAZINE HCL 50 MG
1 TABLET ORAL
Qty: 0 | Refills: 0 | DISCHARGE

## 2021-01-01 RX ORDER — INSULIN GLARGINE 100 [IU]/ML
8 INJECTION, SOLUTION SUBCUTANEOUS AT BEDTIME
Refills: 0 | Status: DISCONTINUED | OUTPATIENT
Start: 2021-01-01 | End: 2021-01-01

## 2021-01-01 RX ORDER — MIDODRINE HYDROCHLORIDE 2.5 MG/1
10 TABLET ORAL
Refills: 0 | Status: DISCONTINUED | OUTPATIENT
Start: 2021-01-01 | End: 2021-01-01

## 2021-01-01 RX ORDER — AMPICILLIN SODIUM AND SULBACTAM SODIUM 250; 125 MG/ML; MG/ML
3 INJECTION, POWDER, FOR SUSPENSION INTRAMUSCULAR; INTRAVENOUS EVERY 6 HOURS
Refills: 0 | Status: DISCONTINUED | OUTPATIENT
Start: 2021-01-01 | End: 2021-01-01

## 2021-01-01 RX ORDER — HEPARIN SODIUM 5000 [USP'U]/ML
5000 INJECTION INTRAVENOUS; SUBCUTANEOUS EVERY 8 HOURS
Refills: 0 | Status: DISCONTINUED | OUTPATIENT
Start: 2021-01-01 | End: 2021-01-01

## 2021-01-01 RX ORDER — ALBUTEROL 90 UG/1
2 AEROSOL, METERED ORAL
Qty: 0 | Refills: 0 | DISCHARGE
Start: 2021-01-01

## 2021-01-01 RX ORDER — LACTULOSE 10 G/15ML
10 SOLUTION ORAL DAILY
Refills: 0 | Status: DISCONTINUED | OUTPATIENT
Start: 2021-01-01 | End: 2021-01-01

## 2021-01-01 RX ORDER — VANCOMYCIN HCL 1 G
500 VIAL (EA) INTRAVENOUS
Refills: 0 | Status: DISCONTINUED | OUTPATIENT
Start: 2021-01-01 | End: 2021-01-01

## 2021-01-01 RX ORDER — ZINC OXIDE 200 MG/G
1 OINTMENT TOPICAL DAILY
Refills: 0 | Status: DISCONTINUED | OUTPATIENT
Start: 2021-01-01 | End: 2021-01-01

## 2021-01-01 RX ORDER — ALPRAZOLAM 0.25 MG
0.25 TABLET ORAL ONCE
Refills: 0 | Status: DISCONTINUED | OUTPATIENT
Start: 2021-01-01 | End: 2021-01-01

## 2021-01-01 RX ORDER — HYDROMORPHONE HYDROCHLORIDE 2 MG/ML
2 INJECTION INTRAMUSCULAR; INTRAVENOUS; SUBCUTANEOUS EVERY 4 HOURS
Refills: 0 | Status: DISCONTINUED | OUTPATIENT
Start: 2021-01-01 | End: 2021-01-01

## 2021-01-01 RX ORDER — MINERAL OIL
133 OIL (ML) MISCELLANEOUS ONCE
Refills: 0 | Status: COMPLETED | OUTPATIENT
Start: 2021-01-01 | End: 2021-01-01

## 2021-01-01 RX ORDER — MEROPENEM 1 G/30ML
750 INJECTION INTRAVENOUS ONCE
Refills: 0 | Status: COMPLETED | OUTPATIENT
Start: 2021-01-01 | End: 2021-01-01

## 2021-01-01 RX ORDER — CEFEPIME 1 G/1
1000 INJECTION, POWDER, FOR SOLUTION INTRAMUSCULAR; INTRAVENOUS DAILY
Refills: 0 | Status: DISCONTINUED | OUTPATIENT
Start: 2021-01-01 | End: 2021-01-01

## 2021-01-01 RX ORDER — INSULIN GLARGINE 100 [IU]/ML
8 INJECTION, SOLUTION SUBCUTANEOUS
Qty: 0 | Refills: 0 | DISCHARGE

## 2021-01-01 RX ORDER — INSULIN ASPART 100 [IU]/ML
6 INJECTION, SOLUTION SUBCUTANEOUS
Qty: 0 | Refills: 0 | DISCHARGE

## 2021-01-01 RX ORDER — OXYCODONE AND ACETAMINOPHEN 5; 325 MG/1; MG/1
1 TABLET ORAL ONCE
Refills: 0 | Status: DISCONTINUED | OUTPATIENT
Start: 2021-01-01 | End: 2021-01-01

## 2021-01-01 RX ORDER — SODIUM BICARBONATE 1 MEQ/ML
0.18 SYRINGE (ML) INTRAVENOUS
Qty: 150 | Refills: 0 | Status: DISCONTINUED | OUTPATIENT
Start: 2021-01-01 | End: 2021-01-01

## 2021-01-01 RX ORDER — ASPIRIN/CALCIUM CARB/MAGNESIUM 324 MG
162 TABLET ORAL DAILY
Refills: 0 | Status: DISCONTINUED | OUTPATIENT
Start: 2021-01-01 | End: 2021-01-01

## 2021-01-01 RX ORDER — INSULIN GLARGINE 100 [IU]/ML
19 INJECTION, SOLUTION SUBCUTANEOUS
Qty: 0 | Refills: 0 | DISCHARGE

## 2021-01-01 RX ORDER — ASCORBIC ACID 60 MG
1 TABLET,CHEWABLE ORAL
Qty: 0 | Refills: 0 | DISCHARGE
Start: 2021-01-01

## 2021-01-01 RX ORDER — ACETAMINOPHEN 500 MG
2 TABLET ORAL
Qty: 0 | Refills: 0 | DISCHARGE

## 2021-01-01 RX ORDER — NOREPINEPHRINE BITARTRATE/D5W 8 MG/250ML
0.04 PLASTIC BAG, INJECTION (ML) INTRAVENOUS
Qty: 8 | Refills: 0 | Status: DISCONTINUED | OUTPATIENT
Start: 2021-01-01 | End: 2021-01-01

## 2021-01-01 RX ORDER — MIDAZOLAM HYDROCHLORIDE 1 MG/ML
4 INJECTION, SOLUTION INTRAMUSCULAR; INTRAVENOUS ONCE
Refills: 0 | Status: DISCONTINUED | OUTPATIENT
Start: 2021-01-01 | End: 2021-01-01

## 2021-01-01 RX ORDER — ACETAMINOPHEN 500 MG
650 TABLET ORAL ONCE
Refills: 0 | Status: COMPLETED | OUTPATIENT
Start: 2021-01-01 | End: 2021-01-01

## 2021-01-01 RX ORDER — METRONIDAZOLE 500 MG
500 TABLET ORAL EVERY 8 HOURS
Refills: 0 | Status: DISCONTINUED | OUTPATIENT
Start: 2021-01-01 | End: 2021-01-01

## 2021-01-01 RX ORDER — VANCOMYCIN HCL 1 G
750 VIAL (EA) INTRAVENOUS
Refills: 0 | Status: DISCONTINUED | OUTPATIENT
Start: 2021-01-01 | End: 2021-01-01

## 2021-01-01 RX ORDER — MIRTAZAPINE 45 MG/1
1 TABLET, ORALLY DISINTEGRATING ORAL
Qty: 0 | Refills: 0 | DISCHARGE

## 2021-01-01 RX ORDER — FUROSEMIDE 40 MG
80 TABLET ORAL ONCE
Refills: 0 | Status: COMPLETED | OUTPATIENT
Start: 2021-01-01 | End: 2021-01-01

## 2021-01-01 RX ORDER — HEPARIN SODIUM 5000 [USP'U]/ML
5000 INJECTION INTRAVENOUS; SUBCUTANEOUS
Qty: 0 | Refills: 0 | DISCHARGE
Start: 2021-01-01

## 2021-01-01 RX ORDER — POVIDONE-IODINE 5 %
1 AEROSOL (ML) TOPICAL
Qty: 0 | Refills: 0 | DISCHARGE
Start: 2021-01-01

## 2021-01-01 RX ADMIN — HEPARIN SODIUM 5000 UNIT(S): 5000 INJECTION INTRAVENOUS; SUBCUTANEOUS at 05:56

## 2021-01-01 RX ADMIN — Medication 1 APPLICATION(S): at 17:10

## 2021-01-01 RX ADMIN — Medication 667 MILLIGRAM(S): at 08:27

## 2021-01-01 RX ADMIN — Medication 1 GRAM(S): at 17:37

## 2021-01-01 RX ADMIN — SENNA PLUS 2 TABLET(S): 8.6 TABLET ORAL at 21:23

## 2021-01-01 RX ADMIN — Medication 125 MILLIGRAM(S): at 06:43

## 2021-01-01 RX ADMIN — HEPARIN SODIUM 5000 UNIT(S): 5000 INJECTION INTRAVENOUS; SUBCUTANEOUS at 14:40

## 2021-01-01 RX ADMIN — SERTRALINE 25 MILLIGRAM(S): 25 TABLET, FILM COATED ORAL at 11:44

## 2021-01-01 RX ADMIN — CLOPIDOGREL BISULFATE 75 MILLIGRAM(S): 75 TABLET, FILM COATED ORAL at 11:44

## 2021-01-01 RX ADMIN — Medication 667 MILLIGRAM(S): at 08:25

## 2021-01-01 RX ADMIN — Medication 500 MILLIGRAM(S): at 11:23

## 2021-01-01 RX ADMIN — SERTRALINE 25 MILLIGRAM(S): 25 TABLET, FILM COATED ORAL at 11:02

## 2021-01-01 RX ADMIN — ERYTHROPOIETIN 5000 UNIT(S): 10000 INJECTION, SOLUTION INTRAVENOUS; SUBCUTANEOUS at 15:51

## 2021-01-01 RX ADMIN — Medication 81 MILLIGRAM(S): at 11:45

## 2021-01-01 RX ADMIN — ATORVASTATIN CALCIUM 40 MILLIGRAM(S): 80 TABLET, FILM COATED ORAL at 21:25

## 2021-01-01 RX ADMIN — Medication 1 TABLET(S): at 11:18

## 2021-01-01 RX ADMIN — Medication 80 MILLIGRAM(S): at 21:50

## 2021-01-01 RX ADMIN — Medication 3 MILLILITER(S): at 21:01

## 2021-01-01 RX ADMIN — ALBUTEROL 2 PUFF(S): 90 AEROSOL, METERED ORAL at 15:01

## 2021-01-01 RX ADMIN — Medication 80 MEQ/KG/HR: at 05:14

## 2021-01-01 RX ADMIN — PANTOPRAZOLE SODIUM 40 MILLIGRAM(S): 20 TABLET, DELAYED RELEASE ORAL at 05:37

## 2021-01-01 RX ADMIN — Medication 3 MILLILITER(S): at 20:15

## 2021-01-01 RX ADMIN — DONEPEZIL HYDROCHLORIDE 5 MILLIGRAM(S): 10 TABLET, FILM COATED ORAL at 21:54

## 2021-01-01 RX ADMIN — Medication 50 MILLIGRAM(S): at 17:17

## 2021-01-01 RX ADMIN — Medication 40 MILLIGRAM(S): at 06:06

## 2021-01-01 RX ADMIN — HEPARIN SODIUM 5000 UNIT(S): 5000 INJECTION INTRAVENOUS; SUBCUTANEOUS at 17:20

## 2021-01-01 RX ADMIN — Medication 1: at 08:08

## 2021-01-01 RX ADMIN — CHLORHEXIDINE GLUCONATE 1 APPLICATION(S): 213 SOLUTION TOPICAL at 05:45

## 2021-01-01 RX ADMIN — Medication 25 MILLIGRAM(S): at 17:21

## 2021-01-01 RX ADMIN — BUDESONIDE AND FORMOTEROL FUMARATE DIHYDRATE 2 PUFF(S): 160; 4.5 AEROSOL RESPIRATORY (INHALATION) at 19:43

## 2021-01-01 RX ADMIN — Medication 500 MILLIGRAM(S): at 11:36

## 2021-01-01 RX ADMIN — Medication 667 MILLIGRAM(S): at 17:52

## 2021-01-01 RX ADMIN — Medication 81 MILLIGRAM(S): at 11:18

## 2021-01-01 RX ADMIN — HEPARIN SODIUM 5000 UNIT(S): 5000 INJECTION INTRAVENOUS; SUBCUTANEOUS at 17:13

## 2021-01-01 RX ADMIN — SERTRALINE 25 MILLIGRAM(S): 25 TABLET, FILM COATED ORAL at 12:24

## 2021-01-01 RX ADMIN — Medication 667 MILLIGRAM(S): at 18:00

## 2021-01-01 RX ADMIN — Medication 667 MILLIGRAM(S): at 08:07

## 2021-01-01 RX ADMIN — SERTRALINE 25 MILLIGRAM(S): 25 TABLET, FILM COATED ORAL at 11:18

## 2021-01-01 RX ADMIN — PANTOPRAZOLE SODIUM 40 MILLIGRAM(S): 20 TABLET, DELAYED RELEASE ORAL at 10:20

## 2021-01-01 RX ADMIN — ERYTHROPOIETIN 5000 UNIT(S): 10000 INJECTION, SOLUTION INTRAVENOUS; SUBCUTANEOUS at 10:00

## 2021-01-01 RX ADMIN — Medication 80 MILLIGRAM(S): at 06:29

## 2021-01-01 RX ADMIN — Medication 667 MILLIGRAM(S): at 17:36

## 2021-01-01 RX ADMIN — POLYETHYLENE GLYCOL 3350 17 GRAM(S): 17 POWDER, FOR SOLUTION ORAL at 05:43

## 2021-01-01 RX ADMIN — PANTOPRAZOLE SODIUM 40 MILLIGRAM(S): 20 TABLET, DELAYED RELEASE ORAL at 05:29

## 2021-01-01 RX ADMIN — HEPARIN SODIUM 5000 UNIT(S): 5000 INJECTION INTRAVENOUS; SUBCUTANEOUS at 17:09

## 2021-01-01 RX ADMIN — SENNA PLUS 2 TABLET(S): 8.6 TABLET ORAL at 21:29

## 2021-01-01 RX ADMIN — CLOPIDOGREL BISULFATE 75 MILLIGRAM(S): 75 TABLET, FILM COATED ORAL at 12:47

## 2021-01-01 RX ADMIN — CLOPIDOGREL BISULFATE 75 MILLIGRAM(S): 75 TABLET, FILM COATED ORAL at 11:38

## 2021-01-01 RX ADMIN — Medication 1 TABLET(S): at 14:39

## 2021-01-01 RX ADMIN — Medication 25 MILLIGRAM(S): at 05:20

## 2021-01-01 RX ADMIN — DONEPEZIL HYDROCHLORIDE 5 MILLIGRAM(S): 10 TABLET, FILM COATED ORAL at 21:25

## 2021-01-01 RX ADMIN — HEPARIN SODIUM 5000 UNIT(S): 5000 INJECTION INTRAVENOUS; SUBCUTANEOUS at 12:49

## 2021-01-01 RX ADMIN — POLYETHYLENE GLYCOL 3350 17 GRAM(S): 17 POWDER, FOR SOLUTION ORAL at 05:49

## 2021-01-01 RX ADMIN — Medication 500 MILLIGRAM(S): at 12:04

## 2021-01-01 RX ADMIN — TAMSULOSIN HYDROCHLORIDE 0.4 MILLIGRAM(S): 0.4 CAPSULE ORAL at 21:29

## 2021-01-01 RX ADMIN — MIRTAZAPINE 15 MILLIGRAM(S): 45 TABLET, ORALLY DISINTEGRATING ORAL at 21:28

## 2021-01-01 RX ADMIN — CLOPIDOGREL BISULFATE 75 MILLIGRAM(S): 75 TABLET, FILM COATED ORAL at 11:02

## 2021-01-01 RX ADMIN — Medication 667 MILLIGRAM(S): at 11:38

## 2021-01-01 RX ADMIN — CHLORHEXIDINE GLUCONATE 1 APPLICATION(S): 213 SOLUTION TOPICAL at 05:19

## 2021-01-01 RX ADMIN — Medication 81 MILLIGRAM(S): at 11:33

## 2021-01-01 RX ADMIN — PANTOPRAZOLE SODIUM 40 MILLIGRAM(S): 20 TABLET, DELAYED RELEASE ORAL at 17:52

## 2021-01-01 RX ADMIN — ERYTHROPOIETIN 5000 UNIT(S): 10000 INJECTION, SOLUTION INTRAVENOUS; SUBCUTANEOUS at 11:49

## 2021-01-01 RX ADMIN — MEROPENEM 100 MILLIGRAM(S): 1 INJECTION INTRAVENOUS at 12:23

## 2021-01-01 RX ADMIN — MIRTAZAPINE 15 MILLIGRAM(S): 45 TABLET, ORALLY DISINTEGRATING ORAL at 21:13

## 2021-01-01 RX ADMIN — HEPARIN SODIUM 5000 UNIT(S): 5000 INJECTION INTRAVENOUS; SUBCUTANEOUS at 05:33

## 2021-01-01 RX ADMIN — MIRTAZAPINE 15 MILLIGRAM(S): 45 TABLET, ORALLY DISINTEGRATING ORAL at 21:19

## 2021-01-01 RX ADMIN — DONEPEZIL HYDROCHLORIDE 5 MILLIGRAM(S): 10 TABLET, FILM COATED ORAL at 22:14

## 2021-01-01 RX ADMIN — POLYETHYLENE GLYCOL 3350 17 GRAM(S): 17 POWDER, FOR SOLUTION ORAL at 17:41

## 2021-01-01 RX ADMIN — Medication 25 MILLIGRAM(S): at 06:02

## 2021-01-01 RX ADMIN — Medication 81 MILLIGRAM(S): at 11:38

## 2021-01-01 RX ADMIN — ATORVASTATIN CALCIUM 40 MILLIGRAM(S): 80 TABLET, FILM COATED ORAL at 21:56

## 2021-01-01 RX ADMIN — Medication 667 MILLIGRAM(S): at 11:37

## 2021-01-01 RX ADMIN — Medication 25 MILLIGRAM(S): at 18:16

## 2021-01-01 RX ADMIN — Medication 80 MILLIGRAM(S): at 05:54

## 2021-01-01 RX ADMIN — LINEZOLID 300 MILLIGRAM(S): 600 INJECTION, SOLUTION INTRAVENOUS at 05:54

## 2021-01-01 RX ADMIN — ALBUTEROL 2 PUFF(S): 90 AEROSOL, METERED ORAL at 20:11

## 2021-01-01 RX ADMIN — MUPIROCIN 1 APPLICATION(S): 20 OINTMENT TOPICAL at 17:20

## 2021-01-01 RX ADMIN — CLOPIDOGREL BISULFATE 75 MILLIGRAM(S): 75 TABLET, FILM COATED ORAL at 12:07

## 2021-01-01 RX ADMIN — INSULIN GLARGINE 8 UNIT(S): 100 INJECTION, SOLUTION SUBCUTANEOUS at 21:25

## 2021-01-01 RX ADMIN — Medication 81 MILLIGRAM(S): at 11:58

## 2021-01-01 RX ADMIN — MEROPENEM 100 MILLIGRAM(S): 1 INJECTION INTRAVENOUS at 05:15

## 2021-01-01 RX ADMIN — Medication 1: at 17:28

## 2021-01-01 RX ADMIN — ATORVASTATIN CALCIUM 40 MILLIGRAM(S): 80 TABLET, FILM COATED ORAL at 22:44

## 2021-01-01 RX ADMIN — IRON SUCROSE 205 MILLIGRAM(S): 20 INJECTION, SOLUTION INTRAVENOUS at 12:28

## 2021-01-01 RX ADMIN — Medication 250 MILLIGRAM(S): at 10:24

## 2021-01-01 RX ADMIN — Medication 81 MILLIGRAM(S): at 12:24

## 2021-01-01 RX ADMIN — IRON SUCROSE 210 MILLIGRAM(S): 20 INJECTION, SOLUTION INTRAVENOUS at 10:36

## 2021-01-01 RX ADMIN — TAMSULOSIN HYDROCHLORIDE 0.4 MILLIGRAM(S): 0.4 CAPSULE ORAL at 21:26

## 2021-01-01 RX ADMIN — MIRTAZAPINE 15 MILLIGRAM(S): 45 TABLET, ORALLY DISINTEGRATING ORAL at 21:48

## 2021-01-01 RX ADMIN — ERYTHROPOIETIN 10000 UNIT(S): 10000 INJECTION, SOLUTION INTRAVENOUS; SUBCUTANEOUS at 17:08

## 2021-01-01 RX ADMIN — HEPARIN SODIUM 5000 UNIT(S): 5000 INJECTION INTRAVENOUS; SUBCUTANEOUS at 05:53

## 2021-01-01 RX ADMIN — Medication 81 MILLIGRAM(S): at 11:27

## 2021-01-01 RX ADMIN — POLYETHYLENE GLYCOL 3350 17 GRAM(S): 17 POWDER, FOR SOLUTION ORAL at 12:04

## 2021-01-01 RX ADMIN — MIRTAZAPINE 15 MILLIGRAM(S): 45 TABLET, ORALLY DISINTEGRATING ORAL at 21:23

## 2021-01-01 RX ADMIN — Medication 650 MILLIGRAM(S): at 00:04

## 2021-01-01 RX ADMIN — Medication 500 MILLIGRAM(S): at 12:23

## 2021-01-01 RX ADMIN — POLYETHYLENE GLYCOL 3350 17 GRAM(S): 17 POWDER, FOR SOLUTION ORAL at 18:27

## 2021-01-01 RX ADMIN — HEPARIN SODIUM 5000 UNIT(S): 5000 INJECTION INTRAVENOUS; SUBCUTANEOUS at 05:50

## 2021-01-01 RX ADMIN — ATORVASTATIN CALCIUM 40 MILLIGRAM(S): 80 TABLET, FILM COATED ORAL at 21:42

## 2021-01-01 RX ADMIN — Medication 40 MILLIGRAM(S): at 06:05

## 2021-01-01 RX ADMIN — Medication 81 MILLIGRAM(S): at 12:05

## 2021-01-01 RX ADMIN — Medication 2: at 11:44

## 2021-01-01 RX ADMIN — Medication 667 MILLIGRAM(S): at 08:12

## 2021-01-01 RX ADMIN — ALBUTEROL 2 PUFF(S): 90 AEROSOL, METERED ORAL at 14:05

## 2021-01-01 RX ADMIN — TAMSULOSIN HYDROCHLORIDE 0.4 MILLIGRAM(S): 0.4 CAPSULE ORAL at 22:30

## 2021-01-01 RX ADMIN — CHLORHEXIDINE GLUCONATE 1 APPLICATION(S): 213 SOLUTION TOPICAL at 05:51

## 2021-01-01 RX ADMIN — Medication 667 MILLIGRAM(S): at 07:54

## 2021-01-01 RX ADMIN — INSULIN HUMAN 5 UNIT(S): 100 INJECTION, SOLUTION SUBCUTANEOUS at 12:03

## 2021-01-01 RX ADMIN — BUDESONIDE AND FORMOTEROL FUMARATE DIHYDRATE 2 PUFF(S): 160; 4.5 AEROSOL RESPIRATORY (INHALATION) at 21:14

## 2021-01-01 RX ADMIN — DONEPEZIL HYDROCHLORIDE 5 MILLIGRAM(S): 10 TABLET, FILM COATED ORAL at 21:13

## 2021-01-01 RX ADMIN — HEPARIN SODIUM 5000 UNIT(S): 5000 INJECTION INTRAVENOUS; SUBCUTANEOUS at 22:25

## 2021-01-01 RX ADMIN — Medication 667 MILLIGRAM(S): at 17:39

## 2021-01-01 RX ADMIN — HEPARIN SODIUM 5000 UNIT(S): 5000 INJECTION INTRAVENOUS; SUBCUTANEOUS at 21:42

## 2021-01-01 RX ADMIN — Medication 3 MILLILITER(S): at 13:00

## 2021-01-01 RX ADMIN — Medication 81 MILLIGRAM(S): at 13:50

## 2021-01-01 RX ADMIN — Medication 650 MILLIGRAM(S): at 05:46

## 2021-01-01 RX ADMIN — SERTRALINE 25 MILLIGRAM(S): 25 TABLET, FILM COATED ORAL at 11:41

## 2021-01-01 RX ADMIN — SERTRALINE 25 MILLIGRAM(S): 25 TABLET, FILM COATED ORAL at 11:38

## 2021-01-01 RX ADMIN — MEROPENEM 100 MILLIGRAM(S): 1 INJECTION INTRAVENOUS at 18:28

## 2021-01-01 RX ADMIN — Medication 3 MILLILITER(S): at 20:19

## 2021-01-01 RX ADMIN — ALBUTEROL 2 PUFF(S): 90 AEROSOL, METERED ORAL at 13:22

## 2021-01-01 RX ADMIN — IRON SUCROSE 205 MILLIGRAM(S): 20 INJECTION, SOLUTION INTRAVENOUS at 15:39

## 2021-01-01 RX ADMIN — Medication 667 MILLIGRAM(S): at 07:37

## 2021-01-01 RX ADMIN — SODIUM CHLORIDE 1000 MILLILITER(S): 9 INJECTION, SOLUTION INTRAVENOUS at 16:50

## 2021-01-01 RX ADMIN — PANTOPRAZOLE SODIUM 40 MILLIGRAM(S): 20 TABLET, DELAYED RELEASE ORAL at 05:56

## 2021-01-01 RX ADMIN — Medication 25 MILLIGRAM(S): at 17:38

## 2021-01-01 RX ADMIN — MIDODRINE HYDROCHLORIDE 10 MILLIGRAM(S): 2.5 TABLET ORAL at 11:50

## 2021-01-01 RX ADMIN — ERYTHROPOIETIN 10000 UNIT(S): 10000 INJECTION, SOLUTION INTRAVENOUS; SUBCUTANEOUS at 13:02

## 2021-01-01 RX ADMIN — Medication 667 MILLIGRAM(S): at 11:44

## 2021-01-01 RX ADMIN — Medication 1 APPLICATION(S): at 05:56

## 2021-01-01 RX ADMIN — Medication 667 MILLIGRAM(S): at 11:45

## 2021-01-01 RX ADMIN — TAMSULOSIN HYDROCHLORIDE 0.4 MILLIGRAM(S): 0.4 CAPSULE ORAL at 21:56

## 2021-01-01 RX ADMIN — Medication 650 MILLIGRAM(S): at 21:41

## 2021-01-01 RX ADMIN — Medication 1 APPLICATION(S): at 05:49

## 2021-01-01 RX ADMIN — Medication 81 MILLIGRAM(S): at 11:50

## 2021-01-01 RX ADMIN — HEPARIN SODIUM 5000 UNIT(S): 5000 INJECTION INTRAVENOUS; SUBCUTANEOUS at 17:42

## 2021-01-01 RX ADMIN — SENNA PLUS 2 TABLET(S): 8.6 TABLET ORAL at 22:13

## 2021-01-01 RX ADMIN — Medication 667 MILLIGRAM(S): at 13:50

## 2021-01-01 RX ADMIN — HEPARIN SODIUM 5000 UNIT(S): 5000 INJECTION INTRAVENOUS; SUBCUTANEOUS at 05:43

## 2021-01-01 RX ADMIN — Medication 50 GRAM(S): at 15:10

## 2021-01-01 RX ADMIN — ALBUTEROL 2 PUFF(S): 90 AEROSOL, METERED ORAL at 19:58

## 2021-01-01 RX ADMIN — HEPARIN SODIUM 5000 UNIT(S): 5000 INJECTION INTRAVENOUS; SUBCUTANEOUS at 15:09

## 2021-01-01 RX ADMIN — Medication 1 APPLICATION(S): at 17:16

## 2021-01-01 RX ADMIN — Medication 80 MILLIGRAM(S): at 06:11

## 2021-01-01 RX ADMIN — HEPARIN SODIUM 5000 UNIT(S): 5000 INJECTION INTRAVENOUS; SUBCUTANEOUS at 05:14

## 2021-01-01 RX ADMIN — Medication 80 MILLIGRAM(S): at 17:43

## 2021-01-01 RX ADMIN — Medication 650 MILLIGRAM(S): at 22:14

## 2021-01-01 RX ADMIN — DONEPEZIL HYDROCHLORIDE 5 MILLIGRAM(S): 10 TABLET, FILM COATED ORAL at 23:16

## 2021-01-01 RX ADMIN — MIRTAZAPINE 15 MILLIGRAM(S): 45 TABLET, ORALLY DISINTEGRATING ORAL at 21:56

## 2021-01-01 RX ADMIN — Medication 500 MILLIGRAM(S): at 12:34

## 2021-01-01 RX ADMIN — Medication 25 MILLIGRAM(S): at 17:08

## 2021-01-01 RX ADMIN — MIRTAZAPINE 15 MILLIGRAM(S): 45 TABLET, ORALLY DISINTEGRATING ORAL at 21:29

## 2021-01-01 RX ADMIN — DONEPEZIL HYDROCHLORIDE 5 MILLIGRAM(S): 10 TABLET, FILM COATED ORAL at 21:09

## 2021-01-01 RX ADMIN — LINEZOLID 300 MILLIGRAM(S): 600 INJECTION, SOLUTION INTRAVENOUS at 18:06

## 2021-01-01 RX ADMIN — Medication 3 MILLIGRAM(S): at 21:52

## 2021-01-01 RX ADMIN — HEPARIN SODIUM 5000 UNIT(S): 5000 INJECTION INTRAVENOUS; SUBCUTANEOUS at 17:22

## 2021-01-01 RX ADMIN — Medication 40 MILLIGRAM(S): at 05:44

## 2021-01-01 RX ADMIN — DONEPEZIL HYDROCHLORIDE 5 MILLIGRAM(S): 10 TABLET, FILM COATED ORAL at 21:56

## 2021-01-01 RX ADMIN — HEPARIN SODIUM 5000 UNIT(S): 5000 INJECTION INTRAVENOUS; SUBCUTANEOUS at 21:19

## 2021-01-01 RX ADMIN — CLOPIDOGREL BISULFATE 75 MILLIGRAM(S): 75 TABLET, FILM COATED ORAL at 11:29

## 2021-01-01 RX ADMIN — MEROPENEM 100 MILLIGRAM(S): 1 INJECTION INTRAVENOUS at 12:21

## 2021-01-01 RX ADMIN — MIDODRINE HYDROCHLORIDE 10 MILLIGRAM(S): 2.5 TABLET ORAL at 06:02

## 2021-01-01 RX ADMIN — Medication 1 TABLET(S): at 11:27

## 2021-01-01 RX ADMIN — ZINC OXIDE 1 APPLICATION(S): 200 OINTMENT TOPICAL at 11:41

## 2021-01-01 RX ADMIN — CLOPIDOGREL BISULFATE 75 MILLIGRAM(S): 75 TABLET, FILM COATED ORAL at 12:22

## 2021-01-01 RX ADMIN — ATORVASTATIN CALCIUM 40 MILLIGRAM(S): 80 TABLET, FILM COATED ORAL at 21:57

## 2021-01-01 RX ADMIN — Medication 1 GRAM(S): at 17:22

## 2021-01-01 RX ADMIN — Medication 25 MILLIGRAM(S): at 17:26

## 2021-01-01 RX ADMIN — DONEPEZIL HYDROCHLORIDE 5 MILLIGRAM(S): 10 TABLET, FILM COATED ORAL at 21:28

## 2021-01-01 RX ADMIN — PANTOPRAZOLE SODIUM 40 MILLIGRAM(S): 20 TABLET, DELAYED RELEASE ORAL at 17:38

## 2021-01-01 RX ADMIN — Medication 650 MILLIGRAM(S): at 21:33

## 2021-01-01 RX ADMIN — Medication 667 MILLIGRAM(S): at 14:27

## 2021-01-01 RX ADMIN — Medication 667 MILLIGRAM(S): at 17:23

## 2021-01-01 RX ADMIN — BUDESONIDE AND FORMOTEROL FUMARATE DIHYDRATE 2 PUFF(S): 160; 4.5 AEROSOL RESPIRATORY (INHALATION) at 22:21

## 2021-01-01 RX ADMIN — TAMSULOSIN HYDROCHLORIDE 0.4 MILLIGRAM(S): 0.4 CAPSULE ORAL at 21:39

## 2021-01-01 RX ADMIN — CLOPIDOGREL BISULFATE 75 MILLIGRAM(S): 75 TABLET, FILM COATED ORAL at 11:36

## 2021-01-01 RX ADMIN — Medication 667 MILLIGRAM(S): at 10:16

## 2021-01-01 RX ADMIN — TAMSULOSIN HYDROCHLORIDE 0.4 MILLIGRAM(S): 0.4 CAPSULE ORAL at 22:10

## 2021-01-01 RX ADMIN — MIDODRINE HYDROCHLORIDE 10 MILLIGRAM(S): 2.5 TABLET ORAL at 11:59

## 2021-01-01 RX ADMIN — HEPARIN SODIUM 5000 UNIT(S): 5000 INJECTION INTRAVENOUS; SUBCUTANEOUS at 13:17

## 2021-01-01 RX ADMIN — CHLORHEXIDINE GLUCONATE 1 APPLICATION(S): 213 SOLUTION TOPICAL at 05:26

## 2021-01-01 RX ADMIN — DONEPEZIL HYDROCHLORIDE 5 MILLIGRAM(S): 10 TABLET, FILM COATED ORAL at 21:22

## 2021-01-01 RX ADMIN — Medication 3 MILLILITER(S): at 20:04

## 2021-01-01 RX ADMIN — PANTOPRAZOLE SODIUM 40 MILLIGRAM(S): 20 TABLET, DELAYED RELEASE ORAL at 06:05

## 2021-01-01 RX ADMIN — PANTOPRAZOLE SODIUM 40 MILLIGRAM(S): 20 TABLET, DELAYED RELEASE ORAL at 05:25

## 2021-01-01 RX ADMIN — Medication 25 MILLIGRAM(S): at 05:53

## 2021-01-01 RX ADMIN — Medication 25 MILLIGRAM(S): at 06:28

## 2021-01-01 RX ADMIN — ALBUTEROL 4 PUFF(S): 90 AEROSOL, METERED ORAL at 08:10

## 2021-01-01 RX ADMIN — HYDROMORPHONE HYDROCHLORIDE 0.5 MILLIGRAM(S): 2 INJECTION INTRAMUSCULAR; INTRAVENOUS; SUBCUTANEOUS at 04:06

## 2021-01-01 RX ADMIN — CHLORHEXIDINE GLUCONATE 1 APPLICATION(S): 213 SOLUTION TOPICAL at 05:52

## 2021-01-01 RX ADMIN — DONEPEZIL HYDROCHLORIDE 5 MILLIGRAM(S): 10 TABLET, FILM COATED ORAL at 21:49

## 2021-01-01 RX ADMIN — Medication 3 MILLILITER(S): at 07:35

## 2021-01-01 RX ADMIN — Medication 667 MILLIGRAM(S): at 12:26

## 2021-01-01 RX ADMIN — HEPARIN SODIUM 5000 UNIT(S): 5000 INJECTION INTRAVENOUS; SUBCUTANEOUS at 21:12

## 2021-01-01 RX ADMIN — HEPARIN SODIUM 5000 UNIT(S): 5000 INJECTION INTRAVENOUS; SUBCUTANEOUS at 05:47

## 2021-01-01 RX ADMIN — Medication 40 MILLIGRAM(S): at 13:46

## 2021-01-01 RX ADMIN — PANTOPRAZOLE SODIUM 40 MILLIGRAM(S): 20 TABLET, DELAYED RELEASE ORAL at 08:14

## 2021-01-01 RX ADMIN — LACTULOSE 10 GRAM(S): 10 SOLUTION ORAL at 11:27

## 2021-01-01 RX ADMIN — SERTRALINE 25 MILLIGRAM(S): 25 TABLET, FILM COATED ORAL at 11:28

## 2021-01-01 RX ADMIN — Medication 500 MILLIGRAM(S): at 12:00

## 2021-01-01 RX ADMIN — Medication 500 MILLIGRAM(S): at 11:50

## 2021-01-01 RX ADMIN — PANTOPRAZOLE SODIUM 40 MILLIGRAM(S): 20 TABLET, DELAYED RELEASE ORAL at 17:22

## 2021-01-01 RX ADMIN — TAMSULOSIN HYDROCHLORIDE 0.4 MILLIGRAM(S): 0.4 CAPSULE ORAL at 00:06

## 2021-01-01 RX ADMIN — MIDODRINE HYDROCHLORIDE 10 MILLIGRAM(S): 2.5 TABLET ORAL at 05:46

## 2021-01-01 RX ADMIN — MIDODRINE HYDROCHLORIDE 10 MILLIGRAM(S): 2.5 TABLET ORAL at 18:00

## 2021-01-01 RX ADMIN — Medication 500 MILLIGRAM(S): at 11:18

## 2021-01-01 RX ADMIN — MIDODRINE HYDROCHLORIDE 10 MILLIGRAM(S): 2.5 TABLET ORAL at 05:33

## 2021-01-01 RX ADMIN — ATORVASTATIN CALCIUM 40 MILLIGRAM(S): 80 TABLET, FILM COATED ORAL at 21:18

## 2021-01-01 RX ADMIN — ONDANSETRON 4 MILLIGRAM(S): 8 TABLET, FILM COATED ORAL at 11:59

## 2021-01-01 RX ADMIN — Medication 1 TABLET(S): at 12:34

## 2021-01-01 RX ADMIN — Medication 3 MILLILITER(S): at 08:56

## 2021-01-01 RX ADMIN — Medication 667 MILLIGRAM(S): at 17:07

## 2021-01-01 RX ADMIN — Medication 500 MILLIGRAM(S): at 11:38

## 2021-01-01 RX ADMIN — CHLORHEXIDINE GLUCONATE 1 APPLICATION(S): 213 SOLUTION TOPICAL at 05:25

## 2021-01-01 RX ADMIN — PANTOPRAZOLE SODIUM 40 MILLIGRAM(S): 20 TABLET, DELAYED RELEASE ORAL at 11:54

## 2021-01-01 RX ADMIN — MIRTAZAPINE 15 MILLIGRAM(S): 45 TABLET, ORALLY DISINTEGRATING ORAL at 22:13

## 2021-01-01 RX ADMIN — Medication 40 MILLIGRAM(S): at 18:16

## 2021-01-01 RX ADMIN — MIRTAZAPINE 15 MILLIGRAM(S): 45 TABLET, ORALLY DISINTEGRATING ORAL at 21:25

## 2021-01-01 RX ADMIN — CHLORHEXIDINE GLUCONATE 1 APPLICATION(S): 213 SOLUTION TOPICAL at 05:28

## 2021-01-01 RX ADMIN — Medication 6.17 MICROGRAM(S)/KG/MIN: at 00:49

## 2021-01-01 RX ADMIN — POLYETHYLENE GLYCOL 3350 17 GRAM(S): 17 POWDER, FOR SOLUTION ORAL at 11:50

## 2021-01-01 RX ADMIN — MIRTAZAPINE 15 MILLIGRAM(S): 45 TABLET, ORALLY DISINTEGRATING ORAL at 22:10

## 2021-01-01 RX ADMIN — TAMSULOSIN HYDROCHLORIDE 0.4 MILLIGRAM(S): 0.4 CAPSULE ORAL at 22:13

## 2021-01-01 RX ADMIN — ALBUTEROL 2 PUFF(S): 90 AEROSOL, METERED ORAL at 00:06

## 2021-01-01 RX ADMIN — IRON SUCROSE 205 MILLIGRAM(S): 20 INJECTION, SOLUTION INTRAVENOUS at 08:01

## 2021-01-01 RX ADMIN — Medication 1 TABLET(S): at 12:24

## 2021-01-01 RX ADMIN — MIRTAZAPINE 15 MILLIGRAM(S): 45 TABLET, ORALLY DISINTEGRATING ORAL at 21:42

## 2021-01-01 RX ADMIN — PANTOPRAZOLE SODIUM 40 MILLIGRAM(S): 20 TABLET, DELAYED RELEASE ORAL at 11:48

## 2021-01-01 RX ADMIN — Medication 650 MILLIGRAM(S): at 07:20

## 2021-01-01 RX ADMIN — Medication 50 MILLILITER(S): at 12:01

## 2021-01-01 RX ADMIN — SENNA PLUS 2 TABLET(S): 8.6 TABLET ORAL at 21:14

## 2021-01-01 RX ADMIN — Medication 650 MILLIGRAM(S): at 11:15

## 2021-01-01 RX ADMIN — MIRTAZAPINE 15 MILLIGRAM(S): 45 TABLET, ORALLY DISINTEGRATING ORAL at 22:55

## 2021-01-01 RX ADMIN — Medication 3 MILLILITER(S): at 05:30

## 2021-01-01 RX ADMIN — DONEPEZIL HYDROCHLORIDE 5 MILLIGRAM(S): 10 TABLET, FILM COATED ORAL at 21:50

## 2021-01-01 RX ADMIN — Medication 3 MILLILITER(S): at 07:51

## 2021-01-01 RX ADMIN — Medication 25 MILLIGRAM(S): at 17:49

## 2021-01-01 RX ADMIN — CLOPIDOGREL BISULFATE 75 MILLIGRAM(S): 75 TABLET, FILM COATED ORAL at 14:27

## 2021-01-01 RX ADMIN — Medication 3 MILLILITER(S): at 07:32

## 2021-01-01 RX ADMIN — Medication 667 MILLIGRAM(S): at 12:47

## 2021-01-01 RX ADMIN — Medication 80 MILLIGRAM(S): at 05:19

## 2021-01-01 RX ADMIN — INSULIN GLARGINE 8 UNIT(S): 100 INJECTION, SOLUTION SUBCUTANEOUS at 00:09

## 2021-01-01 RX ADMIN — MIRTAZAPINE 15 MILLIGRAM(S): 45 TABLET, ORALLY DISINTEGRATING ORAL at 21:00

## 2021-01-01 RX ADMIN — Medication 667 MILLIGRAM(S): at 07:55

## 2021-01-01 RX ADMIN — Medication 250 MILLIGRAM(S): at 14:12

## 2021-01-01 RX ADMIN — Medication 5 UNIT(S): at 18:06

## 2021-01-01 RX ADMIN — Medication 667 MILLIGRAM(S): at 08:04

## 2021-01-01 RX ADMIN — HEPARIN SODIUM 5000 UNIT(S): 5000 INJECTION INTRAVENOUS; SUBCUTANEOUS at 11:34

## 2021-01-01 RX ADMIN — HEPARIN SODIUM 5000 UNIT(S): 5000 INJECTION INTRAVENOUS; SUBCUTANEOUS at 05:03

## 2021-01-01 RX ADMIN — ATORVASTATIN CALCIUM 40 MILLIGRAM(S): 80 TABLET, FILM COATED ORAL at 21:48

## 2021-01-01 RX ADMIN — IRON SUCROSE 205 MILLIGRAM(S): 20 INJECTION, SOLUTION INTRAVENOUS at 15:51

## 2021-01-01 RX ADMIN — HEPARIN SODIUM 5000 UNIT(S): 5000 INJECTION INTRAVENOUS; SUBCUTANEOUS at 22:05

## 2021-01-01 RX ADMIN — Medication 3 MILLILITER(S): at 07:47

## 2021-01-01 RX ADMIN — Medication 1 TABLET(S): at 17:26

## 2021-01-01 RX ADMIN — Medication 667 MILLIGRAM(S): at 17:11

## 2021-01-01 RX ADMIN — SENNA PLUS 2 TABLET(S): 8.6 TABLET ORAL at 21:03

## 2021-01-01 RX ADMIN — TAMSULOSIN HYDROCHLORIDE 0.4 MILLIGRAM(S): 0.4 CAPSULE ORAL at 21:41

## 2021-01-01 RX ADMIN — NYSTATIN CREAM 1 APPLICATION(S): 100000 CREAM TOPICAL at 05:26

## 2021-01-01 RX ADMIN — LINEZOLID 300 MILLIGRAM(S): 600 INJECTION, SOLUTION INTRAVENOUS at 05:26

## 2021-01-01 RX ADMIN — Medication 500 MILLIGRAM(S): at 14:27

## 2021-01-01 RX ADMIN — MIDODRINE HYDROCHLORIDE 10 MILLIGRAM(S): 2.5 TABLET ORAL at 17:23

## 2021-01-01 RX ADMIN — ZINC OXIDE 1 APPLICATION(S): 200 OINTMENT TOPICAL at 14:28

## 2021-01-01 RX ADMIN — Medication 3 MILLILITER(S): at 19:41

## 2021-01-01 RX ADMIN — PANTOPRAZOLE SODIUM 40 MILLIGRAM(S): 20 TABLET, DELAYED RELEASE ORAL at 05:45

## 2021-01-01 RX ADMIN — Medication 500 MILLIGRAM(S): at 11:44

## 2021-01-01 RX ADMIN — Medication 3 MILLILITER(S): at 13:55

## 2021-01-01 RX ADMIN — HEPARIN SODIUM 5000 UNIT(S): 5000 INJECTION INTRAVENOUS; SUBCUTANEOUS at 21:39

## 2021-01-01 RX ADMIN — CEFEPIME 100 MILLIGRAM(S): 1 INJECTION, POWDER, FOR SOLUTION INTRAMUSCULAR; INTRAVENOUS at 17:42

## 2021-01-01 RX ADMIN — Medication 25 MILLIGRAM(S): at 21:26

## 2021-01-01 RX ADMIN — ZINC OXIDE 1 APPLICATION(S): 200 OINTMENT TOPICAL at 11:44

## 2021-01-01 RX ADMIN — HEPARIN SODIUM 5000 UNIT(S): 5000 INJECTION INTRAVENOUS; SUBCUTANEOUS at 05:15

## 2021-01-01 RX ADMIN — ZINC OXIDE 1 APPLICATION(S): 200 OINTMENT TOPICAL at 13:47

## 2021-01-01 RX ADMIN — HEPARIN SODIUM 5000 UNIT(S): 5000 INJECTION INTRAVENOUS; SUBCUTANEOUS at 06:48

## 2021-01-01 RX ADMIN — ATORVASTATIN CALCIUM 40 MILLIGRAM(S): 80 TABLET, FILM COATED ORAL at 21:47

## 2021-01-01 RX ADMIN — DONEPEZIL HYDROCHLORIDE 5 MILLIGRAM(S): 10 TABLET, FILM COATED ORAL at 21:23

## 2021-01-01 RX ADMIN — Medication 5 MILLIGRAM(S): at 05:24

## 2021-01-01 RX ADMIN — HEPARIN SODIUM 5000 UNIT(S): 5000 INJECTION INTRAVENOUS; SUBCUTANEOUS at 21:09

## 2021-01-01 RX ADMIN — Medication 81 MILLIGRAM(S): at 11:28

## 2021-01-01 RX ADMIN — Medication 25 MILLIGRAM(S): at 16:38

## 2021-01-01 RX ADMIN — HEPARIN SODIUM 5000 UNIT(S): 5000 INJECTION INTRAVENOUS; SUBCUTANEOUS at 05:12

## 2021-01-01 RX ADMIN — Medication 40 MILLIGRAM(S): at 05:09

## 2021-01-01 RX ADMIN — Medication 3 MILLILITER(S): at 13:22

## 2021-01-01 RX ADMIN — Medication 3 MILLILITER(S): at 19:57

## 2021-01-01 RX ADMIN — Medication 667 MILLIGRAM(S): at 12:05

## 2021-01-01 RX ADMIN — DEXMEDETOMIDINE HYDROCHLORIDE IN 0.9% SODIUM CHLORIDE 4.08 MICROGRAM(S)/KG/HR: 4 INJECTION INTRAVENOUS at 21:14

## 2021-01-01 RX ADMIN — Medication 650 MILLIGRAM(S): at 21:42

## 2021-01-01 RX ADMIN — AMPICILLIN SODIUM AND SULBACTAM SODIUM 200 GRAM(S): 250; 125 INJECTION, POWDER, FOR SUSPENSION INTRAMUSCULAR; INTRAVENOUS at 17:30

## 2021-01-01 RX ADMIN — Medication 6.17 MICROGRAM(S)/KG/MIN: at 05:18

## 2021-01-01 RX ADMIN — ATORVASTATIN CALCIUM 40 MILLIGRAM(S): 80 TABLET, FILM COATED ORAL at 21:50

## 2021-01-01 RX ADMIN — MUPIROCIN 1 APPLICATION(S): 20 OINTMENT TOPICAL at 17:52

## 2021-01-01 RX ADMIN — Medication 667 MILLIGRAM(S): at 11:48

## 2021-01-01 RX ADMIN — Medication 500 MILLIGRAM(S): at 11:45

## 2021-01-01 RX ADMIN — AMPICILLIN SODIUM AND SULBACTAM SODIUM 200 GRAM(S): 250; 125 INJECTION, POWDER, FOR SUSPENSION INTRAMUSCULAR; INTRAVENOUS at 05:14

## 2021-01-01 RX ADMIN — IRON SUCROSE 205 MILLIGRAM(S): 20 INJECTION, SOLUTION INTRAVENOUS at 09:59

## 2021-01-01 RX ADMIN — Medication 650 MILLIGRAM(S): at 05:54

## 2021-01-01 RX ADMIN — BUDESONIDE AND FORMOTEROL FUMARATE DIHYDRATE 2 PUFF(S): 160; 4.5 AEROSOL RESPIRATORY (INHALATION) at 08:08

## 2021-01-01 RX ADMIN — SODIUM CHLORIDE 1000 MILLILITER(S): 9 INJECTION, SOLUTION INTRAVENOUS at 11:35

## 2021-01-01 RX ADMIN — POLYETHYLENE GLYCOL 3350 17 GRAM(S): 17 POWDER, FOR SOLUTION ORAL at 05:32

## 2021-01-01 RX ADMIN — TAMSULOSIN HYDROCHLORIDE 0.4 MILLIGRAM(S): 0.4 CAPSULE ORAL at 21:22

## 2021-01-01 RX ADMIN — Medication 250 MILLIGRAM(S): at 13:43

## 2021-01-01 RX ADMIN — Medication 6.17 MICROGRAM(S)/KG/MIN: at 12:50

## 2021-01-01 RX ADMIN — PANTOPRAZOLE SODIUM 40 MILLIGRAM(S): 20 TABLET, DELAYED RELEASE ORAL at 05:28

## 2021-01-01 RX ADMIN — Medication 667 MILLIGRAM(S): at 11:31

## 2021-01-01 RX ADMIN — Medication 25 MILLIGRAM(S): at 06:05

## 2021-01-01 RX ADMIN — HEPARIN SODIUM 5000 UNIT(S): 5000 INJECTION INTRAVENOUS; SUBCUTANEOUS at 18:07

## 2021-01-01 RX ADMIN — HEPARIN SODIUM 5000 UNIT(S): 5000 INJECTION INTRAVENOUS; SUBCUTANEOUS at 05:19

## 2021-01-01 RX ADMIN — Medication 667 MILLIGRAM(S): at 14:26

## 2021-01-01 RX ADMIN — Medication 325 MILLIGRAM(S): at 14:27

## 2021-01-01 RX ADMIN — CLOPIDOGREL BISULFATE 75 MILLIGRAM(S): 75 TABLET, FILM COATED ORAL at 12:25

## 2021-01-01 RX ADMIN — CLOPIDOGREL BISULFATE 75 MILLIGRAM(S): 75 TABLET, FILM COATED ORAL at 11:18

## 2021-01-01 RX ADMIN — Medication 667 MILLIGRAM(S): at 12:07

## 2021-01-01 RX ADMIN — INSULIN GLARGINE 8 UNIT(S): 100 INJECTION, SOLUTION SUBCUTANEOUS at 21:22

## 2021-01-01 RX ADMIN — MIRTAZAPINE 15 MILLIGRAM(S): 45 TABLET, ORALLY DISINTEGRATING ORAL at 22:25

## 2021-01-01 RX ADMIN — Medication 25 MILLIGRAM(S): at 17:11

## 2021-01-01 RX ADMIN — Medication 500 MILLIGRAM(S): at 12:24

## 2021-01-01 RX ADMIN — ALBUTEROL 2 PUFF(S): 90 AEROSOL, METERED ORAL at 22:06

## 2021-01-01 RX ADMIN — Medication 1 APPLICATION(S): at 05:42

## 2021-01-01 RX ADMIN — Medication 25 MILLIGRAM(S): at 17:03

## 2021-01-01 RX ADMIN — BUDESONIDE AND FORMOTEROL FUMARATE DIHYDRATE 2 PUFF(S): 160; 4.5 AEROSOL RESPIRATORY (INHALATION) at 08:33

## 2021-01-01 RX ADMIN — Medication 667 MILLIGRAM(S): at 14:12

## 2021-01-01 RX ADMIN — Medication 25 GRAM(S): at 17:23

## 2021-01-01 RX ADMIN — Medication 667 MILLIGRAM(S): at 11:59

## 2021-01-01 RX ADMIN — Medication 125 MILLIGRAM(S): at 13:22

## 2021-01-01 RX ADMIN — Medication 667 MILLIGRAM(S): at 12:41

## 2021-01-01 RX ADMIN — Medication 667 MILLIGRAM(S): at 09:51

## 2021-01-01 RX ADMIN — Medication 25 MILLIGRAM(S): at 05:33

## 2021-01-01 RX ADMIN — ERYTHROPOIETIN 5000 UNIT(S): 10000 INJECTION, SOLUTION INTRAVENOUS; SUBCUTANEOUS at 13:47

## 2021-01-01 RX ADMIN — Medication 81 MILLIGRAM(S): at 14:12

## 2021-01-01 RX ADMIN — Medication 500 MILLIGRAM(S): at 12:32

## 2021-01-01 RX ADMIN — SERTRALINE 25 MILLIGRAM(S): 25 TABLET, FILM COATED ORAL at 11:27

## 2021-01-01 RX ADMIN — CEFEPIME 100 MILLIGRAM(S): 1 INJECTION, POWDER, FOR SOLUTION INTRAMUSCULAR; INTRAVENOUS at 11:35

## 2021-01-01 RX ADMIN — MUPIROCIN 1 APPLICATION(S): 20 OINTMENT TOPICAL at 17:38

## 2021-01-01 RX ADMIN — PANTOPRAZOLE SODIUM 40 MILLIGRAM(S): 20 TABLET, DELAYED RELEASE ORAL at 05:27

## 2021-01-01 RX ADMIN — MIRTAZAPINE 15 MILLIGRAM(S): 45 TABLET, ORALLY DISINTEGRATING ORAL at 22:14

## 2021-01-01 RX ADMIN — Medication 25 MILLIGRAM(S): at 05:14

## 2021-01-01 RX ADMIN — Medication 667 MILLIGRAM(S): at 17:18

## 2021-01-01 RX ADMIN — HEPARIN SODIUM 5000 UNIT(S): 5000 INJECTION INTRAVENOUS; SUBCUTANEOUS at 13:48

## 2021-01-01 RX ADMIN — PANTOPRAZOLE SODIUM 40 MILLIGRAM(S): 20 TABLET, DELAYED RELEASE ORAL at 05:09

## 2021-01-01 RX ADMIN — Medication 2: at 12:06

## 2021-01-01 RX ADMIN — Medication 500 MILLIGRAM(S): at 14:12

## 2021-01-01 RX ADMIN — Medication 500 MILLIGRAM(S): at 11:49

## 2021-01-01 RX ADMIN — Medication 25 MILLIGRAM(S): at 05:58

## 2021-01-01 RX ADMIN — ATORVASTATIN CALCIUM 40 MILLIGRAM(S): 80 TABLET, FILM COATED ORAL at 21:00

## 2021-01-01 RX ADMIN — Medication 40 MILLIGRAM(S): at 09:52

## 2021-01-01 RX ADMIN — Medication 325 MILLIGRAM(S): at 11:38

## 2021-01-01 RX ADMIN — Medication 650 MILLIGRAM(S): at 04:37

## 2021-01-01 RX ADMIN — POLYETHYLENE GLYCOL 3350 17 GRAM(S): 17 POWDER, FOR SOLUTION ORAL at 05:22

## 2021-01-01 RX ADMIN — HEPARIN SODIUM 5000 UNIT(S): 5000 INJECTION INTRAVENOUS; SUBCUTANEOUS at 22:30

## 2021-01-01 RX ADMIN — IRON SUCROSE 210 MILLIGRAM(S): 20 INJECTION, SOLUTION INTRAVENOUS at 13:02

## 2021-01-01 RX ADMIN — Medication 667 MILLIGRAM(S): at 08:08

## 2021-01-01 RX ADMIN — Medication 81 MILLIGRAM(S): at 14:26

## 2021-01-01 RX ADMIN — ERYTHROPOIETIN 5000 UNIT(S): 10000 INJECTION, SOLUTION INTRAVENOUS; SUBCUTANEOUS at 12:28

## 2021-01-01 RX ADMIN — ERYTHROPOIETIN 10000 UNIT(S): 10000 INJECTION, SOLUTION INTRAVENOUS; SUBCUTANEOUS at 10:38

## 2021-01-01 RX ADMIN — Medication 650 MILLIGRAM(S): at 21:00

## 2021-01-01 RX ADMIN — HEPARIN SODIUM 5000 UNIT(S): 5000 INJECTION INTRAVENOUS; SUBCUTANEOUS at 21:55

## 2021-01-01 RX ADMIN — Medication 650 MILLIGRAM(S): at 10:48

## 2021-01-01 RX ADMIN — MIDODRINE HYDROCHLORIDE 10 MILLIGRAM(S): 2.5 TABLET ORAL at 06:28

## 2021-01-01 RX ADMIN — TAMSULOSIN HYDROCHLORIDE 0.4 MILLIGRAM(S): 0.4 CAPSULE ORAL at 22:55

## 2021-01-01 RX ADMIN — TAMSULOSIN HYDROCHLORIDE 0.4 MILLIGRAM(S): 0.4 CAPSULE ORAL at 21:19

## 2021-01-01 RX ADMIN — HEPARIN SODIUM 5000 UNIT(S): 5000 INJECTION INTRAVENOUS; SUBCUTANEOUS at 21:25

## 2021-01-01 RX ADMIN — MIRTAZAPINE 15 MILLIGRAM(S): 45 TABLET, ORALLY DISINTEGRATING ORAL at 21:39

## 2021-01-01 RX ADMIN — MUPIROCIN 1 APPLICATION(S): 20 OINTMENT TOPICAL at 17:11

## 2021-01-01 RX ADMIN — BUDESONIDE AND FORMOTEROL FUMARATE DIHYDRATE 2 PUFF(S): 160; 4.5 AEROSOL RESPIRATORY (INHALATION) at 08:00

## 2021-01-01 RX ADMIN — MIRTAZAPINE 15 MILLIGRAM(S): 45 TABLET, ORALLY DISINTEGRATING ORAL at 21:47

## 2021-01-01 RX ADMIN — Medication 667 MILLIGRAM(S): at 08:02

## 2021-01-01 RX ADMIN — Medication 25 MILLIGRAM(S): at 21:01

## 2021-01-01 RX ADMIN — HEPARIN SODIUM 5000 UNIT(S): 5000 INJECTION INTRAVENOUS; SUBCUTANEOUS at 21:47

## 2021-01-01 RX ADMIN — CHLORHEXIDINE GLUCONATE 1 APPLICATION(S): 213 SOLUTION TOPICAL at 05:47

## 2021-01-01 RX ADMIN — Medication 667 MILLIGRAM(S): at 17:21

## 2021-01-01 RX ADMIN — SERTRALINE 25 MILLIGRAM(S): 25 TABLET, FILM COATED ORAL at 11:29

## 2021-01-01 RX ADMIN — HEPARIN SODIUM 5000 UNIT(S): 5000 INJECTION INTRAVENOUS; SUBCUTANEOUS at 00:05

## 2021-01-01 RX ADMIN — SENNA PLUS 2 TABLET(S): 8.6 TABLET ORAL at 21:22

## 2021-01-01 RX ADMIN — Medication 40 MILLIGRAM(S): at 05:12

## 2021-01-01 RX ADMIN — Medication 1 TABLET(S): at 11:59

## 2021-01-01 RX ADMIN — Medication 80 MILLIGRAM(S): at 12:42

## 2021-01-01 RX ADMIN — HYDROMORPHONE HYDROCHLORIDE 2 MILLIGRAM(S): 2 INJECTION INTRAMUSCULAR; INTRAVENOUS; SUBCUTANEOUS at 11:00

## 2021-01-01 RX ADMIN — SERTRALINE 25 MILLIGRAM(S): 25 TABLET, FILM COATED ORAL at 12:41

## 2021-01-01 RX ADMIN — Medication 667 MILLIGRAM(S): at 17:20

## 2021-01-01 RX ADMIN — Medication 667 MILLIGRAM(S): at 07:45

## 2021-01-01 RX ADMIN — TAMSULOSIN HYDROCHLORIDE 0.4 MILLIGRAM(S): 0.4 CAPSULE ORAL at 21:47

## 2021-01-01 RX ADMIN — DONEPEZIL HYDROCHLORIDE 5 MILLIGRAM(S): 10 TABLET, FILM COATED ORAL at 21:52

## 2021-01-01 RX ADMIN — HEPARIN SODIUM 5000 UNIT(S): 5000 INJECTION INTRAVENOUS; SUBCUTANEOUS at 17:18

## 2021-01-01 RX ADMIN — PANTOPRAZOLE SODIUM 40 MILLIGRAM(S): 20 TABLET, DELAYED RELEASE ORAL at 05:12

## 2021-01-01 RX ADMIN — DONEPEZIL HYDROCHLORIDE 5 MILLIGRAM(S): 10 TABLET, FILM COATED ORAL at 22:00

## 2021-01-01 RX ADMIN — Medication 81 MILLIGRAM(S): at 11:41

## 2021-01-01 RX ADMIN — ALBUTEROL 2 PUFF(S): 90 AEROSOL, METERED ORAL at 08:12

## 2021-01-01 RX ADMIN — SERTRALINE 25 MILLIGRAM(S): 25 TABLET, FILM COATED ORAL at 11:36

## 2021-01-01 RX ADMIN — ERYTHROPOIETIN 5000 UNIT(S): 10000 INJECTION, SOLUTION INTRAVENOUS; SUBCUTANEOUS at 10:16

## 2021-01-01 RX ADMIN — Medication 667 MILLIGRAM(S): at 12:34

## 2021-01-01 RX ADMIN — ATORVASTATIN CALCIUM 40 MILLIGRAM(S): 80 TABLET, FILM COATED ORAL at 21:13

## 2021-01-01 RX ADMIN — HEPARIN SODIUM 5000 UNIT(S): 5000 INJECTION INTRAVENOUS; SUBCUTANEOUS at 17:52

## 2021-01-01 RX ADMIN — Medication 40 MILLIGRAM(S): at 05:33

## 2021-01-01 RX ADMIN — DONEPEZIL HYDROCHLORIDE 5 MILLIGRAM(S): 10 TABLET, FILM COATED ORAL at 22:52

## 2021-01-01 RX ADMIN — ALBUTEROL 2 PUFF(S): 90 AEROSOL, METERED ORAL at 16:30

## 2021-01-01 RX ADMIN — Medication 2: at 16:48

## 2021-01-01 RX ADMIN — HEPARIN SODIUM 5000 UNIT(S): 5000 INJECTION INTRAVENOUS; SUBCUTANEOUS at 05:58

## 2021-01-01 RX ADMIN — Medication 25 GRAM(S): at 12:08

## 2021-01-01 RX ADMIN — Medication 500 MILLIGRAM(S): at 12:07

## 2021-01-01 RX ADMIN — ATORVASTATIN CALCIUM 40 MILLIGRAM(S): 80 TABLET, FILM COATED ORAL at 21:19

## 2021-01-01 RX ADMIN — Medication 6.17 MICROGRAM(S)/KG/MIN: at 02:38

## 2021-01-01 RX ADMIN — PANTOPRAZOLE SODIUM 40 MILLIGRAM(S): 20 TABLET, DELAYED RELEASE ORAL at 05:58

## 2021-01-01 RX ADMIN — CHLORHEXIDINE GLUCONATE 1 APPLICATION(S): 213 SOLUTION TOPICAL at 05:27

## 2021-01-01 RX ADMIN — HEPARIN SODIUM 5000 UNIT(S): 5000 INJECTION INTRAVENOUS; SUBCUTANEOUS at 17:23

## 2021-01-01 RX ADMIN — SERTRALINE 25 MILLIGRAM(S): 25 TABLET, FILM COATED ORAL at 12:59

## 2021-01-01 RX ADMIN — Medication 500 MILLIGRAM(S): at 12:47

## 2021-01-01 RX ADMIN — Medication 3 MILLILITER(S): at 20:59

## 2021-01-01 RX ADMIN — Medication 1: at 14:25

## 2021-01-01 RX ADMIN — Medication 3 MILLILITER(S): at 08:02

## 2021-01-01 RX ADMIN — Medication 250 MILLIGRAM(S): at 15:03

## 2021-01-01 RX ADMIN — Medication 500 MILLIGRAM(S): at 11:28

## 2021-01-01 RX ADMIN — Medication 1 TABLET(S): at 12:23

## 2021-01-01 RX ADMIN — Medication 667 MILLIGRAM(S): at 12:04

## 2021-01-01 RX ADMIN — HEPARIN SODIUM 5000 UNIT(S): 5000 INJECTION INTRAVENOUS; SUBCUTANEOUS at 17:40

## 2021-01-01 RX ADMIN — Medication 500 MILLIGRAM(S): at 13:50

## 2021-01-01 RX ADMIN — CLOPIDOGREL BISULFATE 75 MILLIGRAM(S): 75 TABLET, FILM COATED ORAL at 11:39

## 2021-01-01 RX ADMIN — PANTOPRAZOLE SODIUM 40 MILLIGRAM(S): 20 TABLET, DELAYED RELEASE ORAL at 05:51

## 2021-01-01 RX ADMIN — Medication 667 MILLIGRAM(S): at 18:58

## 2021-01-01 RX ADMIN — HYDROMORPHONE HYDROCHLORIDE 2 MILLIGRAM(S): 2 INJECTION INTRAMUSCULAR; INTRAVENOUS; SUBCUTANEOUS at 21:30

## 2021-01-01 RX ADMIN — ATORVASTATIN CALCIUM 40 MILLIGRAM(S): 80 TABLET, FILM COATED ORAL at 21:28

## 2021-01-01 RX ADMIN — TAMSULOSIN HYDROCHLORIDE 0.4 MILLIGRAM(S): 0.4 CAPSULE ORAL at 21:14

## 2021-01-01 RX ADMIN — Medication 3 MILLILITER(S): at 19:55

## 2021-01-01 RX ADMIN — HEPARIN SODIUM 5000 UNIT(S): 5000 INJECTION INTRAVENOUS; SUBCUTANEOUS at 22:00

## 2021-01-01 RX ADMIN — MUPIROCIN 1 APPLICATION(S): 20 OINTMENT TOPICAL at 05:38

## 2021-01-01 RX ADMIN — HEPARIN SODIUM 5000 UNIT(S): 5000 INJECTION INTRAVENOUS; SUBCUTANEOUS at 17:10

## 2021-01-01 RX ADMIN — Medication 80 MILLIGRAM(S): at 05:17

## 2021-01-01 RX ADMIN — HEPARIN SODIUM 5000 UNIT(S): 5000 INJECTION INTRAVENOUS; SUBCUTANEOUS at 21:01

## 2021-01-01 RX ADMIN — Medication 81 MILLIGRAM(S): at 12:22

## 2021-01-01 RX ADMIN — CLOPIDOGREL BISULFATE 75 MILLIGRAM(S): 75 TABLET, FILM COATED ORAL at 11:50

## 2021-01-01 RX ADMIN — HYDROMORPHONE HYDROCHLORIDE 2 MILLIGRAM(S): 2 INJECTION INTRAMUSCULAR; INTRAVENOUS; SUBCUTANEOUS at 15:24

## 2021-01-01 RX ADMIN — MIRTAZAPINE 15 MILLIGRAM(S): 45 TABLET, ORALLY DISINTEGRATING ORAL at 21:31

## 2021-01-01 RX ADMIN — HEPARIN SODIUM 5000 UNIT(S): 5000 INJECTION INTRAVENOUS; SUBCUTANEOUS at 22:52

## 2021-01-01 RX ADMIN — Medication 162 MILLIGRAM(S): at 05:23

## 2021-01-01 RX ADMIN — Medication 667 MILLIGRAM(S): at 18:09

## 2021-01-01 RX ADMIN — SERTRALINE 25 MILLIGRAM(S): 25 TABLET, FILM COATED ORAL at 11:23

## 2021-01-01 RX ADMIN — MUPIROCIN 1 APPLICATION(S): 20 OINTMENT TOPICAL at 18:45

## 2021-01-01 RX ADMIN — ERYTHROPOIETIN 5000 UNIT(S): 10000 INJECTION, SOLUTION INTRAVENOUS; SUBCUTANEOUS at 14:13

## 2021-01-01 RX ADMIN — Medication 667 MILLIGRAM(S): at 11:36

## 2021-01-01 RX ADMIN — ATORVASTATIN CALCIUM 40 MILLIGRAM(S): 80 TABLET, FILM COATED ORAL at 22:14

## 2021-01-01 RX ADMIN — HEPARIN SODIUM 5000 UNIT(S): 5000 INJECTION INTRAVENOUS; SUBCUTANEOUS at 21:18

## 2021-01-01 RX ADMIN — Medication 667 MILLIGRAM(S): at 17:10

## 2021-01-01 RX ADMIN — Medication 1 TABLET(S): at 12:08

## 2021-01-01 RX ADMIN — BUDESONIDE AND FORMOTEROL FUMARATE DIHYDRATE 2 PUFF(S): 160; 4.5 AEROSOL RESPIRATORY (INHALATION) at 20:22

## 2021-01-01 RX ADMIN — Medication 650 MILLIGRAM(S): at 17:17

## 2021-01-01 RX ADMIN — PANTOPRAZOLE SODIUM 40 MILLIGRAM(S): 20 TABLET, DELAYED RELEASE ORAL at 17:32

## 2021-01-01 RX ADMIN — Medication 667 MILLIGRAM(S): at 08:14

## 2021-01-01 RX ADMIN — CHLORHEXIDINE GLUCONATE 1 APPLICATION(S): 213 SOLUTION TOPICAL at 05:43

## 2021-01-01 RX ADMIN — POLYETHYLENE GLYCOL 3350 17 GRAM(S): 17 POWDER, FOR SOLUTION ORAL at 06:02

## 2021-01-01 RX ADMIN — Medication 667 MILLIGRAM(S): at 11:28

## 2021-01-01 RX ADMIN — Medication 4: at 12:34

## 2021-01-01 RX ADMIN — MIDODRINE HYDROCHLORIDE 10 MILLIGRAM(S): 2.5 TABLET ORAL at 11:27

## 2021-01-01 RX ADMIN — Medication 3 MILLILITER(S): at 20:21

## 2021-01-01 RX ADMIN — Medication 0.25 MILLIGRAM(S): at 03:34

## 2021-01-01 RX ADMIN — Medication 81 MILLIGRAM(S): at 12:34

## 2021-01-01 RX ADMIN — ERYTHROPOIETIN 5000 UNIT(S): 10000 INJECTION, SOLUTION INTRAVENOUS; SUBCUTANEOUS at 09:42

## 2021-01-01 RX ADMIN — IRON SUCROSE 210 MILLIGRAM(S): 20 INJECTION, SOLUTION INTRAVENOUS at 10:12

## 2021-01-01 RX ADMIN — Medication 50 MILLILITER(S): at 11:51

## 2021-01-01 RX ADMIN — CEFEPIME 100 MILLIGRAM(S): 1 INJECTION, POWDER, FOR SOLUTION INTRAMUSCULAR; INTRAVENOUS at 17:59

## 2021-01-01 RX ADMIN — DONEPEZIL HYDROCHLORIDE 5 MILLIGRAM(S): 10 TABLET, FILM COATED ORAL at 00:09

## 2021-01-01 RX ADMIN — DONEPEZIL HYDROCHLORIDE 5 MILLIGRAM(S): 10 TABLET, FILM COATED ORAL at 21:29

## 2021-01-01 RX ADMIN — ALBUTEROL 2 PUFF(S): 90 AEROSOL, METERED ORAL at 14:44

## 2021-01-01 RX ADMIN — Medication 667 MILLIGRAM(S): at 08:32

## 2021-01-01 RX ADMIN — HEPARIN SODIUM 5000 UNIT(S): 5000 INJECTION INTRAVENOUS; SUBCUTANEOUS at 14:18

## 2021-01-01 RX ADMIN — MIRTAZAPINE 15 MILLIGRAM(S): 45 TABLET, ORALLY DISINTEGRATING ORAL at 23:16

## 2021-01-01 RX ADMIN — DONEPEZIL HYDROCHLORIDE 5 MILLIGRAM(S): 10 TABLET, FILM COATED ORAL at 21:30

## 2021-01-01 RX ADMIN — BUDESONIDE AND FORMOTEROL FUMARATE DIHYDRATE 2 PUFF(S): 160; 4.5 AEROSOL RESPIRATORY (INHALATION) at 21:31

## 2021-01-01 RX ADMIN — HEPARIN SODIUM 5000 UNIT(S): 5000 INJECTION INTRAVENOUS; SUBCUTANEOUS at 14:09

## 2021-01-01 RX ADMIN — ATORVASTATIN CALCIUM 40 MILLIGRAM(S): 80 TABLET, FILM COATED ORAL at 21:22

## 2021-01-01 RX ADMIN — HEPARIN SODIUM 5000 UNIT(S): 5000 INJECTION INTRAVENOUS; SUBCUTANEOUS at 22:15

## 2021-01-01 RX ADMIN — Medication 0.25 MILLIGRAM(S): at 23:43

## 2021-01-01 RX ADMIN — PANTOPRAZOLE SODIUM 40 MILLIGRAM(S): 20 TABLET, DELAYED RELEASE ORAL at 05:03

## 2021-01-01 RX ADMIN — Medication 667 MILLIGRAM(S): at 17:38

## 2021-01-01 RX ADMIN — Medication 25 MILLIGRAM(S): at 11:49

## 2021-01-01 RX ADMIN — SERTRALINE 25 MILLIGRAM(S): 25 TABLET, FILM COATED ORAL at 12:23

## 2021-01-01 RX ADMIN — Medication 81 MILLIGRAM(S): at 11:02

## 2021-01-01 RX ADMIN — Medication 40 MILLIGRAM(S): at 05:37

## 2021-01-01 RX ADMIN — PANTOPRAZOLE SODIUM 40 MILLIGRAM(S): 20 TABLET, DELAYED RELEASE ORAL at 19:25

## 2021-01-01 RX ADMIN — TAMSULOSIN HYDROCHLORIDE 0.4 MILLIGRAM(S): 0.4 CAPSULE ORAL at 21:58

## 2021-01-01 RX ADMIN — Medication 25 MILLIGRAM(S): at 17:30

## 2021-01-01 RX ADMIN — ATORVASTATIN CALCIUM 40 MILLIGRAM(S): 80 TABLET, FILM COATED ORAL at 21:30

## 2021-01-01 RX ADMIN — HEPARIN SODIUM 5000 UNIT(S): 5000 INJECTION INTRAVENOUS; SUBCUTANEOUS at 05:26

## 2021-01-01 RX ADMIN — TAMSULOSIN HYDROCHLORIDE 0.4 MILLIGRAM(S): 0.4 CAPSULE ORAL at 21:00

## 2021-01-01 RX ADMIN — Medication 1 GRAM(S): at 05:52

## 2021-01-01 RX ADMIN — BUDESONIDE AND FORMOTEROL FUMARATE DIHYDRATE 2 PUFF(S): 160; 4.5 AEROSOL RESPIRATORY (INHALATION) at 22:16

## 2021-01-01 RX ADMIN — Medication 81 MILLIGRAM(S): at 14:37

## 2021-01-01 RX ADMIN — Medication 25 MILLIGRAM(S): at 18:58

## 2021-01-01 RX ADMIN — HEPARIN SODIUM 5000 UNIT(S): 5000 INJECTION INTRAVENOUS; SUBCUTANEOUS at 05:44

## 2021-01-01 RX ADMIN — CHLORHEXIDINE GLUCONATE 1 APPLICATION(S): 213 SOLUTION TOPICAL at 05:37

## 2021-01-01 RX ADMIN — Medication 2: at 17:38

## 2021-01-01 RX ADMIN — HEPARIN SODIUM 5000 UNIT(S): 5000 INJECTION INTRAVENOUS; SUBCUTANEOUS at 06:06

## 2021-01-01 RX ADMIN — PANTOPRAZOLE SODIUM 40 MILLIGRAM(S): 20 TABLET, DELAYED RELEASE ORAL at 05:42

## 2021-01-01 RX ADMIN — Medication 500 MILLIGRAM(S): at 11:33

## 2021-01-01 RX ADMIN — SENNA PLUS 2 TABLET(S): 8.6 TABLET ORAL at 21:56

## 2021-01-01 RX ADMIN — HEPARIN SODIUM 5000 UNIT(S): 5000 INJECTION INTRAVENOUS; SUBCUTANEOUS at 13:45

## 2021-01-01 RX ADMIN — MUPIROCIN 1 APPLICATION(S): 20 OINTMENT TOPICAL at 05:28

## 2021-01-01 RX ADMIN — POLYETHYLENE GLYCOL 3350 17 GRAM(S): 17 POWDER, FOR SOLUTION ORAL at 14:26

## 2021-01-01 RX ADMIN — MIRTAZAPINE 15 MILLIGRAM(S): 45 TABLET, ORALLY DISINTEGRATING ORAL at 21:41

## 2021-01-01 RX ADMIN — MIRTAZAPINE 15 MILLIGRAM(S): 45 TABLET, ORALLY DISINTEGRATING ORAL at 22:44

## 2021-01-01 RX ADMIN — SERTRALINE 25 MILLIGRAM(S): 25 TABLET, FILM COATED ORAL at 11:50

## 2021-01-01 RX ADMIN — Medication 1 TABLET(S): at 18:58

## 2021-01-01 RX ADMIN — Medication 20 MILLIGRAM(S): at 05:37

## 2021-01-01 RX ADMIN — HEPARIN SODIUM 5000 UNIT(S): 5000 INJECTION INTRAVENOUS; SUBCUTANEOUS at 13:09

## 2021-01-01 RX ADMIN — CLOPIDOGREL BISULFATE 75 MILLIGRAM(S): 75 TABLET, FILM COATED ORAL at 11:33

## 2021-01-01 RX ADMIN — TAMSULOSIN HYDROCHLORIDE 0.4 MILLIGRAM(S): 0.4 CAPSULE ORAL at 21:31

## 2021-01-01 RX ADMIN — POLYETHYLENE GLYCOL 3350 17 GRAM(S): 17 POWDER, FOR SOLUTION ORAL at 17:13

## 2021-01-01 RX ADMIN — Medication 25 MILLIGRAM(S): at 05:26

## 2021-01-01 RX ADMIN — Medication 25 MILLIGRAM(S): at 05:51

## 2021-01-01 RX ADMIN — Medication 650 MILLIGRAM(S): at 20:44

## 2021-01-01 RX ADMIN — Medication 667 MILLIGRAM(S): at 17:30

## 2021-01-01 RX ADMIN — ATORVASTATIN CALCIUM 40 MILLIGRAM(S): 80 TABLET, FILM COATED ORAL at 21:41

## 2021-01-01 RX ADMIN — SERTRALINE 25 MILLIGRAM(S): 25 TABLET, FILM COATED ORAL at 11:33

## 2021-01-01 RX ADMIN — Medication 80 MILLIGRAM(S): at 17:18

## 2021-01-01 RX ADMIN — LINEZOLID 300 MILLIGRAM(S): 600 INJECTION, SOLUTION INTRAVENOUS at 18:46

## 2021-01-01 RX ADMIN — Medication 81 MILLIGRAM(S): at 11:23

## 2021-01-01 RX ADMIN — CHLORHEXIDINE GLUCONATE 1 APPLICATION(S): 213 SOLUTION TOPICAL at 05:33

## 2021-01-01 RX ADMIN — MIRTAZAPINE 15 MILLIGRAM(S): 45 TABLET, ORALLY DISINTEGRATING ORAL at 22:04

## 2021-01-01 RX ADMIN — BUDESONIDE AND FORMOTEROL FUMARATE DIHYDRATE 2 PUFF(S): 160; 4.5 AEROSOL RESPIRATORY (INHALATION) at 08:35

## 2021-01-01 RX ADMIN — SERTRALINE 25 MILLIGRAM(S): 25 TABLET, FILM COATED ORAL at 12:34

## 2021-01-01 RX ADMIN — HEPARIN SODIUM 5000 UNIT(S): 5000 INJECTION INTRAVENOUS; SUBCUTANEOUS at 05:08

## 2021-01-01 RX ADMIN — TAMSULOSIN HYDROCHLORIDE 0.4 MILLIGRAM(S): 0.4 CAPSULE ORAL at 22:14

## 2021-01-01 RX ADMIN — MIDODRINE HYDROCHLORIDE 10 MILLIGRAM(S): 2.5 TABLET ORAL at 06:48

## 2021-01-01 RX ADMIN — Medication 667 MILLIGRAM(S): at 11:50

## 2021-01-01 RX ADMIN — ALBUTEROL 2 PUFF(S): 90 AEROSOL, METERED ORAL at 17:45

## 2021-01-01 RX ADMIN — BUDESONIDE AND FORMOTEROL FUMARATE DIHYDRATE 2 PUFF(S): 160; 4.5 AEROSOL RESPIRATORY (INHALATION) at 21:32

## 2021-01-01 RX ADMIN — Medication 667 MILLIGRAM(S): at 17:49

## 2021-01-01 RX ADMIN — Medication 1 TABLET(S): at 11:36

## 2021-01-01 RX ADMIN — BUDESONIDE AND FORMOTEROL FUMARATE DIHYDRATE 2 PUFF(S): 160; 4.5 AEROSOL RESPIRATORY (INHALATION) at 10:20

## 2021-01-01 RX ADMIN — ALBUTEROL 2 PUFF(S): 90 AEROSOL, METERED ORAL at 14:31

## 2021-01-01 RX ADMIN — HEPARIN SODIUM 5000 UNIT(S): 5000 INJECTION INTRAVENOUS; SUBCUTANEOUS at 05:27

## 2021-01-01 RX ADMIN — HYDROMORPHONE HYDROCHLORIDE 2 MILLIGRAM(S): 2 INJECTION INTRAMUSCULAR; INTRAVENOUS; SUBCUTANEOUS at 10:35

## 2021-01-01 RX ADMIN — HEPARIN SODIUM 5000 UNIT(S): 5000 INJECTION INTRAVENOUS; SUBCUTANEOUS at 13:51

## 2021-01-01 RX ADMIN — ZINC OXIDE 1 APPLICATION(S): 200 OINTMENT TOPICAL at 11:39

## 2021-01-01 RX ADMIN — SERTRALINE 25 MILLIGRAM(S): 25 TABLET, FILM COATED ORAL at 11:45

## 2021-01-01 RX ADMIN — DONEPEZIL HYDROCHLORIDE 5 MILLIGRAM(S): 10 TABLET, FILM COATED ORAL at 21:19

## 2021-01-01 RX ADMIN — DONEPEZIL HYDROCHLORIDE 5 MILLIGRAM(S): 10 TABLET, FILM COATED ORAL at 21:03

## 2021-01-01 RX ADMIN — SENNA PLUS 2 TABLET(S): 8.6 TABLET ORAL at 21:26

## 2021-01-01 RX ADMIN — DONEPEZIL HYDROCHLORIDE 5 MILLIGRAM(S): 10 TABLET, FILM COATED ORAL at 00:06

## 2021-01-01 RX ADMIN — ATORVASTATIN CALCIUM 40 MILLIGRAM(S): 80 TABLET, FILM COATED ORAL at 21:24

## 2021-01-01 RX ADMIN — PANTOPRAZOLE SODIUM 40 MILLIGRAM(S): 20 TABLET, DELAYED RELEASE ORAL at 05:13

## 2021-01-01 RX ADMIN — Medication 81 MILLIGRAM(S): at 11:30

## 2021-01-01 RX ADMIN — Medication 250 MILLIGRAM(S): at 21:30

## 2021-01-01 RX ADMIN — TAMSULOSIN HYDROCHLORIDE 0.4 MILLIGRAM(S): 0.4 CAPSULE ORAL at 21:55

## 2021-01-01 RX ADMIN — PANTOPRAZOLE SODIUM 40 MILLIGRAM(S): 20 TABLET, DELAYED RELEASE ORAL at 05:44

## 2021-01-01 RX ADMIN — HEPARIN SODIUM 5000 UNIT(S): 5000 INJECTION INTRAVENOUS; SUBCUTANEOUS at 11:24

## 2021-01-01 RX ADMIN — HEPARIN SODIUM 5000 UNIT(S): 5000 INJECTION INTRAVENOUS; SUBCUTANEOUS at 06:02

## 2021-01-01 RX ADMIN — Medication 3 MILLILITER(S): at 02:36

## 2021-01-01 RX ADMIN — Medication 81 MILLIGRAM(S): at 11:29

## 2021-01-01 RX ADMIN — CLOPIDOGREL BISULFATE 75 MILLIGRAM(S): 75 TABLET, FILM COATED ORAL at 12:01

## 2021-01-01 RX ADMIN — BUDESONIDE AND FORMOTEROL FUMARATE DIHYDRATE 2 PUFF(S): 160; 4.5 AEROSOL RESPIRATORY (INHALATION) at 21:09

## 2021-01-01 RX ADMIN — MIRTAZAPINE 15 MILLIGRAM(S): 45 TABLET, ORALLY DISINTEGRATING ORAL at 21:21

## 2021-01-01 RX ADMIN — CHLORHEXIDINE GLUCONATE 1 APPLICATION(S): 213 SOLUTION TOPICAL at 05:13

## 2021-01-01 RX ADMIN — Medication 1 TABLET(S): at 11:39

## 2021-01-01 RX ADMIN — BUDESONIDE AND FORMOTEROL FUMARATE DIHYDRATE 2 PUFF(S): 160; 4.5 AEROSOL RESPIRATORY (INHALATION) at 21:54

## 2021-01-01 RX ADMIN — Medication 325 MILLIGRAM(S): at 11:18

## 2021-01-01 RX ADMIN — LACTULOSE 10 GRAM(S): 10 SOLUTION ORAL at 11:50

## 2021-01-01 RX ADMIN — AZITHROMYCIN 255 MILLIGRAM(S): 500 TABLET, FILM COATED ORAL at 12:01

## 2021-01-01 RX ADMIN — Medication 81 MILLIGRAM(S): at 14:42

## 2021-01-01 RX ADMIN — ATORVASTATIN CALCIUM 40 MILLIGRAM(S): 80 TABLET, FILM COATED ORAL at 22:05

## 2021-01-01 RX ADMIN — DONEPEZIL HYDROCHLORIDE 5 MILLIGRAM(S): 10 TABLET, FILM COATED ORAL at 22:05

## 2021-01-01 RX ADMIN — POLYETHYLENE GLYCOL 3350 17 GRAM(S): 17 POWDER, FOR SOLUTION ORAL at 17:29

## 2021-01-01 RX ADMIN — MIRTAZAPINE 15 MILLIGRAM(S): 45 TABLET, ORALLY DISINTEGRATING ORAL at 21:58

## 2021-01-01 RX ADMIN — Medication 5 UNIT(S): at 08:26

## 2021-01-01 RX ADMIN — Medication 667 MILLIGRAM(S): at 17:24

## 2021-01-01 RX ADMIN — TAMSULOSIN HYDROCHLORIDE 0.4 MILLIGRAM(S): 0.4 CAPSULE ORAL at 22:25

## 2021-01-01 RX ADMIN — TAMSULOSIN HYDROCHLORIDE 0.4 MILLIGRAM(S): 0.4 CAPSULE ORAL at 21:18

## 2021-01-01 RX ADMIN — Medication 100 MILLIGRAM(S): at 05:48

## 2021-01-01 RX ADMIN — PANTOPRAZOLE SODIUM 40 MILLIGRAM(S): 20 TABLET, DELAYED RELEASE ORAL at 05:14

## 2021-01-01 RX ADMIN — BUDESONIDE AND FORMOTEROL FUMARATE DIHYDRATE 2 PUFF(S): 160; 4.5 AEROSOL RESPIRATORY (INHALATION) at 14:26

## 2021-01-01 RX ADMIN — Medication 500 MILLIGRAM(S): at 12:05

## 2021-01-01 RX ADMIN — Medication 1: at 13:08

## 2021-01-01 RX ADMIN — CHLORHEXIDINE GLUCONATE 1 APPLICATION(S): 213 SOLUTION TOPICAL at 05:23

## 2021-01-01 RX ADMIN — MIDODRINE HYDROCHLORIDE 10 MILLIGRAM(S): 2.5 TABLET ORAL at 17:20

## 2021-01-01 RX ADMIN — POLYETHYLENE GLYCOL 3350 17 GRAM(S): 17 POWDER, FOR SOLUTION ORAL at 11:31

## 2021-01-01 RX ADMIN — TAMSULOSIN HYDROCHLORIDE 0.4 MILLIGRAM(S): 0.4 CAPSULE ORAL at 21:09

## 2021-01-01 RX ADMIN — HYDROMORPHONE HYDROCHLORIDE 2 MILLIGRAM(S): 2 INJECTION INTRAMUSCULAR; INTRAVENOUS; SUBCUTANEOUS at 20:08

## 2021-01-01 RX ADMIN — ALBUTEROL 2 PUFF(S): 90 AEROSOL, METERED ORAL at 08:39

## 2021-01-01 RX ADMIN — TAMSULOSIN HYDROCHLORIDE 0.4 MILLIGRAM(S): 0.4 CAPSULE ORAL at 21:23

## 2021-01-01 RX ADMIN — SENNA PLUS 2 TABLET(S): 8.6 TABLET ORAL at 22:44

## 2021-01-01 RX ADMIN — Medication 40 MILLIGRAM(S): at 06:11

## 2021-01-01 RX ADMIN — Medication 667 MILLIGRAM(S): at 08:51

## 2021-01-01 RX ADMIN — Medication 667 MILLIGRAM(S): at 12:00

## 2021-01-01 RX ADMIN — SERTRALINE 25 MILLIGRAM(S): 25 TABLET, FILM COATED ORAL at 11:30

## 2021-01-01 RX ADMIN — ALBUTEROL 2 PUFF(S): 90 AEROSOL, METERED ORAL at 14:19

## 2021-01-01 RX ADMIN — SERTRALINE 25 MILLIGRAM(S): 25 TABLET, FILM COATED ORAL at 12:31

## 2021-01-01 RX ADMIN — Medication 50 MILLIGRAM(S): at 05:24

## 2021-01-01 RX ADMIN — MIRTAZAPINE 15 MILLIGRAM(S): 45 TABLET, ORALLY DISINTEGRATING ORAL at 21:22

## 2021-01-01 RX ADMIN — SERTRALINE 25 MILLIGRAM(S): 25 TABLET, FILM COATED ORAL at 12:07

## 2021-01-01 RX ADMIN — MIRTAZAPINE 15 MILLIGRAM(S): 45 TABLET, ORALLY DISINTEGRATING ORAL at 21:01

## 2021-01-01 RX ADMIN — Medication 3 MILLILITER(S): at 19:46

## 2021-01-01 RX ADMIN — MEROPENEM 100 MILLIGRAM(S): 1 INJECTION INTRAVENOUS at 10:40

## 2021-01-01 RX ADMIN — Medication 650 MILLIGRAM(S): at 05:07

## 2021-01-01 RX ADMIN — SENNA PLUS 2 TABLET(S): 8.6 TABLET ORAL at 22:14

## 2021-01-01 RX ADMIN — TAMSULOSIN HYDROCHLORIDE 0.4 MILLIGRAM(S): 0.4 CAPSULE ORAL at 21:28

## 2021-01-01 RX ADMIN — CHLORHEXIDINE GLUCONATE 1 APPLICATION(S): 213 SOLUTION TOPICAL at 06:06

## 2021-01-01 RX ADMIN — POLYETHYLENE GLYCOL 3350 17 GRAM(S): 17 POWDER, FOR SOLUTION ORAL at 17:40

## 2021-01-01 RX ADMIN — PANTOPRAZOLE SODIUM 40 MILLIGRAM(S): 20 TABLET, DELAYED RELEASE ORAL at 05:39

## 2021-01-01 RX ADMIN — Medication 80 MILLIGRAM(S): at 17:48

## 2021-01-01 RX ADMIN — Medication 80 MILLIGRAM(S): at 15:33

## 2021-01-01 RX ADMIN — Medication 25 MILLIGRAM(S): at 17:18

## 2021-01-01 RX ADMIN — PANTOPRAZOLE SODIUM 40 MILLIGRAM(S): 20 TABLET, DELAYED RELEASE ORAL at 05:49

## 2021-01-01 RX ADMIN — Medication 500 MILLIGRAM(S): at 14:37

## 2021-01-01 RX ADMIN — HEPARIN SODIUM 5000 UNIT(S): 5000 INJECTION INTRAVENOUS; SUBCUTANEOUS at 06:12

## 2021-01-01 RX ADMIN — ATORVASTATIN CALCIUM 40 MILLIGRAM(S): 80 TABLET, FILM COATED ORAL at 21:29

## 2021-01-01 RX ADMIN — Medication 4: at 17:23

## 2021-01-01 RX ADMIN — HEPARIN SODIUM 5000 UNIT(S): 5000 INJECTION INTRAVENOUS; SUBCUTANEOUS at 13:18

## 2021-01-01 RX ADMIN — Medication 25 MILLIGRAM(S): at 06:27

## 2021-01-01 RX ADMIN — Medication 80 MILLIGRAM(S): at 17:21

## 2021-01-01 RX ADMIN — Medication 325 MILLIGRAM(S): at 11:50

## 2021-01-01 RX ADMIN — Medication 3 MILLILITER(S): at 12:10

## 2021-01-01 RX ADMIN — POLYETHYLENE GLYCOL 3350 17 GRAM(S): 17 POWDER, FOR SOLUTION ORAL at 11:30

## 2021-01-01 RX ADMIN — Medication 500 MILLIGRAM(S): at 11:30

## 2021-01-01 RX ADMIN — CHLORHEXIDINE GLUCONATE 1 APPLICATION(S): 213 SOLUTION TOPICAL at 06:03

## 2021-01-01 RX ADMIN — Medication 25 MILLIGRAM(S): at 17:23

## 2021-01-01 RX ADMIN — MIRTAZAPINE 15 MILLIGRAM(S): 45 TABLET, ORALLY DISINTEGRATING ORAL at 21:30

## 2021-01-01 RX ADMIN — DONEPEZIL HYDROCHLORIDE 5 MILLIGRAM(S): 10 TABLET, FILM COATED ORAL at 21:00

## 2021-01-01 RX ADMIN — CHLORHEXIDINE GLUCONATE 1 APPLICATION(S): 213 SOLUTION TOPICAL at 05:15

## 2021-01-01 RX ADMIN — ALBUTEROL 2 PUFF(S): 90 AEROSOL, METERED ORAL at 08:33

## 2021-01-01 RX ADMIN — POLYETHYLENE GLYCOL 3350 17 GRAM(S): 17 POWDER, FOR SOLUTION ORAL at 05:45

## 2021-01-01 RX ADMIN — Medication 667 MILLIGRAM(S): at 07:58

## 2021-01-01 RX ADMIN — HEPARIN SODIUM 5000 UNIT(S): 5000 INJECTION INTRAVENOUS; SUBCUTANEOUS at 17:01

## 2021-01-01 RX ADMIN — Medication 1 APPLICATION(S): at 18:57

## 2021-01-01 RX ADMIN — PANTOPRAZOLE SODIUM 40 MILLIGRAM(S): 20 TABLET, DELAYED RELEASE ORAL at 06:02

## 2021-01-01 RX ADMIN — SERTRALINE 25 MILLIGRAM(S): 25 TABLET, FILM COATED ORAL at 12:22

## 2021-01-01 RX ADMIN — ERYTHROPOIETIN 10000 UNIT(S): 10000 INJECTION, SOLUTION INTRAVENOUS; SUBCUTANEOUS at 15:09

## 2021-01-01 RX ADMIN — POLYETHYLENE GLYCOL 3350 17 GRAM(S): 17 POWDER, FOR SOLUTION ORAL at 06:29

## 2021-01-01 RX ADMIN — Medication 1 APPLICATION(S): at 02:14

## 2021-01-01 RX ADMIN — Medication 667 MILLIGRAM(S): at 17:09

## 2021-01-01 RX ADMIN — SERTRALINE 25 MILLIGRAM(S): 25 TABLET, FILM COATED ORAL at 11:15

## 2021-01-01 RX ADMIN — Medication 125 MILLIGRAM(S): at 14:26

## 2021-01-01 RX ADMIN — SERTRALINE 25 MILLIGRAM(S): 25 TABLET, FILM COATED ORAL at 12:05

## 2021-01-01 RX ADMIN — Medication 25 MILLIGRAM(S): at 21:42

## 2021-01-01 RX ADMIN — ALBUTEROL 2 PUFF(S): 90 AEROSOL, METERED ORAL at 01:03

## 2021-01-01 RX ADMIN — Medication 81 MILLIGRAM(S): at 12:08

## 2021-01-01 RX ADMIN — Medication 667 MILLIGRAM(S): at 17:31

## 2021-01-01 RX ADMIN — PANTOPRAZOLE SODIUM 40 MILLIGRAM(S): 20 TABLET, DELAYED RELEASE ORAL at 05:17

## 2021-01-01 RX ADMIN — Medication 667 MILLIGRAM(S): at 13:18

## 2021-01-01 RX ADMIN — ATORVASTATIN CALCIUM 40 MILLIGRAM(S): 80 TABLET, FILM COATED ORAL at 21:03

## 2021-01-01 RX ADMIN — Medication 81 MILLIGRAM(S): at 11:39

## 2021-01-01 RX ADMIN — PANTOPRAZOLE SODIUM 40 MILLIGRAM(S): 20 TABLET, DELAYED RELEASE ORAL at 05:22

## 2021-01-01 RX ADMIN — ERYTHROPOIETIN 5000 UNIT(S): 10000 INJECTION, SOLUTION INTRAVENOUS; SUBCUTANEOUS at 11:46

## 2021-01-01 RX ADMIN — Medication 1 TABLET(S): at 14:12

## 2021-01-01 RX ADMIN — Medication 3 MILLILITER(S): at 08:16

## 2021-01-01 RX ADMIN — TAMSULOSIN HYDROCHLORIDE 0.4 MILLIGRAM(S): 0.4 CAPSULE ORAL at 22:44

## 2021-01-01 RX ADMIN — Medication 3 MILLILITER(S): at 13:35

## 2021-01-01 RX ADMIN — Medication 20 MILLIGRAM(S): at 05:33

## 2021-01-01 RX ADMIN — Medication 667 MILLIGRAM(S): at 08:05

## 2021-01-01 RX ADMIN — Medication 1 TABLET(S): at 11:44

## 2021-01-01 RX ADMIN — PANTOPRAZOLE SODIUM 40 MILLIGRAM(S): 20 TABLET, DELAYED RELEASE ORAL at 06:12

## 2021-01-01 RX ADMIN — Medication 325 MILLIGRAM(S): at 11:28

## 2021-01-01 RX ADMIN — Medication 500 MILLIGRAM(S): at 12:22

## 2021-01-01 RX ADMIN — MIDAZOLAM HYDROCHLORIDE 4 MILLIGRAM(S): 1 INJECTION, SOLUTION INTRAMUSCULAR; INTRAVENOUS at 11:30

## 2021-01-01 RX ADMIN — Medication 81 MILLIGRAM(S): at 12:01

## 2021-01-01 RX ADMIN — MIRTAZAPINE 15 MILLIGRAM(S): 45 TABLET, ORALLY DISINTEGRATING ORAL at 21:18

## 2021-01-01 RX ADMIN — MIDODRINE HYDROCHLORIDE 10 MILLIGRAM(S): 2.5 TABLET ORAL at 06:29

## 2021-01-01 RX ADMIN — TAMSULOSIN HYDROCHLORIDE 0.4 MILLIGRAM(S): 0.4 CAPSULE ORAL at 23:16

## 2021-01-01 RX ADMIN — DONEPEZIL HYDROCHLORIDE 5 MILLIGRAM(S): 10 TABLET, FILM COATED ORAL at 21:47

## 2021-01-01 RX ADMIN — ALBUTEROL 2 PUFF(S): 90 AEROSOL, METERED ORAL at 08:00

## 2021-01-01 RX ADMIN — SERTRALINE 25 MILLIGRAM(S): 25 TABLET, FILM COATED ORAL at 12:47

## 2021-01-01 RX ADMIN — Medication 25 MILLIGRAM(S): at 05:56

## 2021-01-01 RX ADMIN — Medication 25 MILLIGRAM(S): at 05:12

## 2021-01-01 RX ADMIN — DONEPEZIL HYDROCHLORIDE 5 MILLIGRAM(S): 10 TABLET, FILM COATED ORAL at 21:42

## 2021-01-01 RX ADMIN — DONEPEZIL HYDROCHLORIDE 5 MILLIGRAM(S): 10 TABLET, FILM COATED ORAL at 21:48

## 2021-01-01 RX ADMIN — ATORVASTATIN CALCIUM 40 MILLIGRAM(S): 80 TABLET, FILM COATED ORAL at 21:55

## 2021-01-01 RX ADMIN — ALBUTEROL 2 PUFF(S): 90 AEROSOL, METERED ORAL at 09:38

## 2021-01-01 RX ADMIN — Medication 500 MILLIGRAM(S): at 11:58

## 2021-01-01 RX ADMIN — Medication 3: at 17:27

## 2021-01-01 RX ADMIN — Medication 25 MILLIGRAM(S): at 06:12

## 2021-01-01 RX ADMIN — CLOPIDOGREL BISULFATE 75 MILLIGRAM(S): 75 TABLET, FILM COATED ORAL at 13:50

## 2021-01-01 RX ADMIN — Medication 0.25 MILLIGRAM(S): at 21:14

## 2021-01-01 RX ADMIN — TAMSULOSIN HYDROCHLORIDE 0.4 MILLIGRAM(S): 0.4 CAPSULE ORAL at 21:42

## 2021-01-01 RX ADMIN — Medication 40 MILLIGRAM(S): at 05:53

## 2021-01-01 RX ADMIN — TAMSULOSIN HYDROCHLORIDE 0.4 MILLIGRAM(S): 0.4 CAPSULE ORAL at 21:25

## 2021-01-01 RX ADMIN — Medication 1 BOTTLE: at 17:17

## 2021-01-01 RX ADMIN — Medication 250 MILLIGRAM(S): at 12:03

## 2021-01-01 RX ADMIN — DONEPEZIL HYDROCHLORIDE 5 MILLIGRAM(S): 10 TABLET, FILM COATED ORAL at 21:31

## 2021-01-01 RX ADMIN — ALBUTEROL 2 PUFF(S): 90 AEROSOL, METERED ORAL at 14:58

## 2021-01-01 RX ADMIN — POLYETHYLENE GLYCOL 3350 17 GRAM(S): 17 POWDER, FOR SOLUTION ORAL at 17:01

## 2021-01-01 RX ADMIN — Medication 667 MILLIGRAM(S): at 17:13

## 2021-01-01 RX ADMIN — MUPIROCIN 1 APPLICATION(S): 20 OINTMENT TOPICAL at 05:40

## 2021-01-01 RX ADMIN — Medication 25 MILLIGRAM(S): at 05:37

## 2021-01-01 RX ADMIN — MIRTAZAPINE 15 MILLIGRAM(S): 45 TABLET, ORALLY DISINTEGRATING ORAL at 21:03

## 2021-01-01 RX ADMIN — Medication 25 MILLIGRAM(S): at 05:44

## 2021-01-01 RX ADMIN — Medication 500 MILLIGRAM(S): at 11:02

## 2021-01-01 RX ADMIN — TAMSULOSIN HYDROCHLORIDE 0.4 MILLIGRAM(S): 0.4 CAPSULE ORAL at 21:13

## 2021-01-01 RX ADMIN — ATORVASTATIN CALCIUM 40 MILLIGRAM(S): 80 TABLET, FILM COATED ORAL at 21:21

## 2021-01-01 RX ADMIN — POLYETHYLENE GLYCOL 3350 17 GRAM(S): 17 POWDER, FOR SOLUTION ORAL at 17:24

## 2021-01-01 RX ADMIN — HEPARIN SODIUM 5000 UNIT(S): 5000 INJECTION INTRAVENOUS; SUBCUTANEOUS at 05:18

## 2021-01-01 RX ADMIN — Medication 667 MILLIGRAM(S): at 08:58

## 2021-01-01 RX ADMIN — ATORVASTATIN CALCIUM 40 MILLIGRAM(S): 80 TABLET, FILM COATED ORAL at 00:05

## 2021-01-01 RX ADMIN — HALOPERIDOL DECANOATE 1 MILLIGRAM(S): 100 INJECTION INTRAMUSCULAR at 00:22

## 2021-01-01 RX ADMIN — HEPARIN SODIUM 5000 UNIT(S): 5000 INJECTION INTRAVENOUS; SUBCUTANEOUS at 05:23

## 2021-01-01 RX ADMIN — Medication 3 MILLILITER(S): at 08:52

## 2021-01-01 RX ADMIN — DONEPEZIL HYDROCHLORIDE 5 MILLIGRAM(S): 10 TABLET, FILM COATED ORAL at 21:02

## 2021-01-01 RX ADMIN — MUPIROCIN 1 APPLICATION(S): 20 OINTMENT TOPICAL at 05:45

## 2021-01-01 RX ADMIN — Medication 25 MILLIGRAM(S): at 17:32

## 2021-01-01 RX ADMIN — Medication 500 MILLIGRAM(S): at 11:29

## 2021-01-01 RX ADMIN — Medication 667 MILLIGRAM(S): at 08:16

## 2021-01-01 RX ADMIN — Medication 667 MILLIGRAM(S): at 17:43

## 2021-01-01 RX ADMIN — ATORVASTATIN CALCIUM 40 MILLIGRAM(S): 80 TABLET, FILM COATED ORAL at 21:09

## 2021-01-01 RX ADMIN — POLYETHYLENE GLYCOL 3350 17 GRAM(S): 17 POWDER, FOR SOLUTION ORAL at 05:29

## 2021-01-01 RX ADMIN — Medication 667 MILLIGRAM(S): at 17:01

## 2021-01-01 RX ADMIN — PANTOPRAZOLE SODIUM 40 MILLIGRAM(S): 20 TABLET, DELAYED RELEASE ORAL at 06:48

## 2021-01-01 RX ADMIN — Medication 3 MILLILITER(S): at 14:01

## 2021-01-01 RX ADMIN — SENNA PLUS 2 TABLET(S): 8.6 TABLET ORAL at 21:09

## 2021-01-01 RX ADMIN — POLYETHYLENE GLYCOL 3350 17 GRAM(S): 17 POWDER, FOR SOLUTION ORAL at 11:39

## 2021-01-01 RX ADMIN — CHLORHEXIDINE GLUCONATE 1 APPLICATION(S): 213 SOLUTION TOPICAL at 05:57

## 2021-01-01 RX ADMIN — DONEPEZIL HYDROCHLORIDE 5 MILLIGRAM(S): 10 TABLET, FILM COATED ORAL at 21:41

## 2021-01-01 RX ADMIN — Medication 133 MILLILITER(S): at 16:28

## 2021-01-01 RX ADMIN — SERTRALINE 25 MILLIGRAM(S): 25 TABLET, FILM COATED ORAL at 14:26

## 2021-01-01 RX ADMIN — Medication 1: at 11:38

## 2021-01-01 RX ADMIN — Medication 667 MILLIGRAM(S): at 17:26

## 2021-01-01 RX ADMIN — Medication 81 MILLIGRAM(S): at 12:23

## 2021-01-01 RX ADMIN — Medication 81 MILLIGRAM(S): at 11:36

## 2021-01-01 RX ADMIN — ALBUTEROL 2 PUFF(S): 90 AEROSOL, METERED ORAL at 09:23

## 2021-01-01 RX ADMIN — Medication 81 MILLIGRAM(S): at 11:15

## 2021-01-01 RX ADMIN — Medication 81 MILLIGRAM(S): at 11:44

## 2021-01-01 RX ADMIN — BUDESONIDE AND FORMOTEROL FUMARATE DIHYDRATE 2 PUFF(S): 160; 4.5 AEROSOL RESPIRATORY (INHALATION) at 08:21

## 2021-01-01 RX ADMIN — MIRTAZAPINE 15 MILLIGRAM(S): 45 TABLET, ORALLY DISINTEGRATING ORAL at 00:10

## 2021-01-01 RX ADMIN — Medication 650 MILLIGRAM(S): at 21:56

## 2021-01-01 RX ADMIN — MIRTAZAPINE 15 MILLIGRAM(S): 45 TABLET, ORALLY DISINTEGRATING ORAL at 21:50

## 2021-01-01 RX ADMIN — Medication 1 APPLICATION(S): at 05:27

## 2021-01-01 RX ADMIN — Medication 667 MILLIGRAM(S): at 18:16

## 2021-01-01 RX ADMIN — ZINC OXIDE 1 APPLICATION(S): 200 OINTMENT TOPICAL at 14:26

## 2021-01-01 RX ADMIN — Medication 40 MILLIGRAM(S): at 05:17

## 2021-01-01 RX ADMIN — Medication 80 MILLIGRAM(S): at 05:08

## 2021-01-01 RX ADMIN — DONEPEZIL HYDROCHLORIDE 5 MILLIGRAM(S): 10 TABLET, FILM COATED ORAL at 22:30

## 2021-01-01 RX ADMIN — BUDESONIDE AND FORMOTEROL FUMARATE DIHYDRATE 2 PUFF(S): 160; 4.5 AEROSOL RESPIRATORY (INHALATION) at 08:10

## 2021-01-01 RX ADMIN — Medication 40 MILLIGRAM(S): at 05:58

## 2021-01-01 RX ADMIN — ATORVASTATIN CALCIUM 40 MILLIGRAM(S): 80 TABLET, FILM COATED ORAL at 23:16

## 2021-01-01 RX ADMIN — IRON SUCROSE 205 MILLIGRAM(S): 20 INJECTION, SOLUTION INTRAVENOUS at 11:30

## 2021-01-01 RX ADMIN — Medication 650 MILLIGRAM(S): at 17:00

## 2021-01-01 RX ADMIN — TAMSULOSIN HYDROCHLORIDE 0.4 MILLIGRAM(S): 0.4 CAPSULE ORAL at 22:04

## 2021-01-01 RX ADMIN — Medication 80 MILLIGRAM(S): at 05:26

## 2021-01-01 RX ADMIN — Medication 250 MILLIGRAM(S): at 17:42

## 2021-01-01 RX ADMIN — CHLORHEXIDINE GLUCONATE 1 APPLICATION(S): 213 SOLUTION TOPICAL at 05:21

## 2021-01-01 RX ADMIN — HEPARIN SODIUM 5000 UNIT(S): 5000 INJECTION INTRAVENOUS; SUBCUTANEOUS at 06:29

## 2021-01-01 RX ADMIN — Medication 25 MILLIGRAM(S): at 18:09

## 2021-01-01 RX ADMIN — Medication 3 MILLILITER(S): at 19:36

## 2021-01-01 RX ADMIN — MUPIROCIN 1 APPLICATION(S): 20 OINTMENT TOPICAL at 05:33

## 2021-01-01 RX ADMIN — POLYETHYLENE GLYCOL 3350 17 GRAM(S): 17 POWDER, FOR SOLUTION ORAL at 11:41

## 2021-01-01 RX ADMIN — Medication 60 MILLIGRAM(S): at 06:27

## 2021-01-01 RX ADMIN — PANTOPRAZOLE SODIUM 40 MILLIGRAM(S): 20 TABLET, DELAYED RELEASE ORAL at 05:54

## 2021-01-01 RX ADMIN — HEPARIN SODIUM 5000 UNIT(S): 5000 INJECTION INTRAVENOUS; SUBCUTANEOUS at 21:50

## 2021-01-01 RX ADMIN — ALBUTEROL 2 PUFF(S): 90 AEROSOL, METERED ORAL at 19:54

## 2021-01-01 RX ADMIN — Medication 650 MILLIGRAM(S): at 06:49

## 2021-01-01 RX ADMIN — DONEPEZIL HYDROCHLORIDE 5 MILLIGRAM(S): 10 TABLET, FILM COATED ORAL at 21:57

## 2021-01-01 RX ADMIN — Medication 3 MILLILITER(S): at 20:44

## 2021-01-01 RX ADMIN — PANTOPRAZOLE SODIUM 40 MILLIGRAM(S): 20 TABLET, DELAYED RELEASE ORAL at 06:28

## 2021-01-01 RX ADMIN — SERTRALINE 25 MILLIGRAM(S): 25 TABLET, FILM COATED ORAL at 11:39

## 2021-01-01 RX ADMIN — ALBUTEROL 2 PUFF(S): 90 AEROSOL, METERED ORAL at 09:34

## 2021-01-01 RX ADMIN — Medication 667 MILLIGRAM(S): at 18:25

## 2021-01-01 RX ADMIN — HEPARIN SODIUM 5000 UNIT(S): 5000 INJECTION INTRAVENOUS; SUBCUTANEOUS at 21:30

## 2021-01-01 RX ADMIN — Medication 667 MILLIGRAM(S): at 08:37

## 2021-01-01 RX ADMIN — ATORVASTATIN CALCIUM 40 MILLIGRAM(S): 80 TABLET, FILM COATED ORAL at 21:39

## 2021-01-01 RX ADMIN — HYDROMORPHONE HYDROCHLORIDE 2 MILLIGRAM(S): 2 INJECTION INTRAMUSCULAR; INTRAVENOUS; SUBCUTANEOUS at 20:50

## 2021-01-01 RX ADMIN — ATORVASTATIN CALCIUM 40 MILLIGRAM(S): 80 TABLET, FILM COATED ORAL at 22:00

## 2021-01-01 RX ADMIN — Medication 3 MILLILITER(S): at 14:35

## 2021-01-01 RX ADMIN — MUPIROCIN 1 APPLICATION(S): 20 OINTMENT TOPICAL at 05:55

## 2021-01-01 RX ADMIN — CEFEPIME 100 MILLIGRAM(S): 1 INJECTION, POWDER, FOR SOLUTION INTRAMUSCULAR; INTRAVENOUS at 21:29

## 2021-01-01 RX ADMIN — Medication 3 MILLILITER(S): at 13:20

## 2021-01-01 RX ADMIN — Medication 1 GRAM(S): at 14:25

## 2021-01-01 RX ADMIN — Medication 325 MILLIGRAM(S): at 13:50

## 2021-01-01 RX ADMIN — IRON SUCROSE 205 MILLIGRAM(S): 20 INJECTION, SOLUTION INTRAVENOUS at 10:36

## 2021-01-01 RX ADMIN — Medication 3 MILLILITER(S): at 09:02

## 2021-01-01 RX ADMIN — Medication 250 MILLIGRAM(S): at 13:48

## 2021-01-01 RX ADMIN — HEPARIN SODIUM 5000 UNIT(S): 5000 INJECTION INTRAVENOUS; SUBCUTANEOUS at 06:27

## 2021-01-01 RX ADMIN — LACTULOSE 10 GRAM(S): 10 SOLUTION ORAL at 12:04

## 2021-01-01 RX ADMIN — INSULIN GLARGINE 8 UNIT(S): 100 INJECTION, SOLUTION SUBCUTANEOUS at 21:43

## 2021-01-01 RX ADMIN — Medication 81 MILLIGRAM(S): at 05:15

## 2021-01-01 RX ADMIN — ALBUTEROL 2 PUFF(S): 90 AEROSOL, METERED ORAL at 13:10

## 2021-01-01 RX ADMIN — Medication 667 MILLIGRAM(S): at 11:18

## 2021-01-01 RX ADMIN — MEROPENEM 100 MILLIGRAM(S): 1 INJECTION INTRAVENOUS at 16:17

## 2021-01-01 RX ADMIN — Medication 81 MILLIGRAM(S): at 12:04

## 2021-01-01 RX ADMIN — TAMSULOSIN HYDROCHLORIDE 0.4 MILLIGRAM(S): 0.4 CAPSULE ORAL at 21:50

## 2021-01-01 RX ADMIN — SERTRALINE 25 MILLIGRAM(S): 25 TABLET, FILM COATED ORAL at 12:00

## 2021-01-01 RX ADMIN — Medication 1 TABLET(S): at 12:06

## 2021-01-01 RX ADMIN — PANTOPRAZOLE SODIUM 40 MILLIGRAM(S): 20 TABLET, DELAYED RELEASE ORAL at 05:20

## 2021-01-01 RX ADMIN — POLYETHYLENE GLYCOL 3350 17 GRAM(S): 17 POWDER, FOR SOLUTION ORAL at 17:09

## 2021-01-01 RX ADMIN — Medication 667 MILLIGRAM(S): at 14:38

## 2021-01-01 RX ADMIN — PANTOPRAZOLE SODIUM 40 MILLIGRAM(S): 20 TABLET, DELAYED RELEASE ORAL at 17:11

## 2021-01-01 RX ADMIN — Medication 81 MILLIGRAM(S): at 11:19

## 2021-01-01 RX ADMIN — Medication 25 MILLIGRAM(S): at 05:17

## 2021-01-01 RX ADMIN — POLYETHYLENE GLYCOL 3350 17 GRAM(S): 17 POWDER, FOR SOLUTION ORAL at 05:26

## 2021-01-01 RX ADMIN — SENNA PLUS 2 TABLET(S): 8.6 TABLET ORAL at 21:30

## 2021-01-01 RX ADMIN — TAMSULOSIN HYDROCHLORIDE 0.4 MILLIGRAM(S): 0.4 CAPSULE ORAL at 21:03

## 2021-01-01 RX ADMIN — MUPIROCIN 1 APPLICATION(S): 20 OINTMENT TOPICAL at 19:25

## 2021-01-01 RX ADMIN — Medication 325 MILLIGRAM(S): at 12:07

## 2021-01-01 RX ADMIN — CLOPIDOGREL BISULFATE 75 MILLIGRAM(S): 75 TABLET, FILM COATED ORAL at 12:41

## 2021-01-01 RX ADMIN — Medication 650 MILLIGRAM(S): at 05:24

## 2021-01-01 RX ADMIN — CEFEPIME 100 MILLIGRAM(S): 1 INJECTION, POWDER, FOR SOLUTION INTRAMUSCULAR; INTRAVENOUS at 17:24

## 2021-01-01 RX ADMIN — Medication 3 MILLILITER(S): at 06:44

## 2021-01-01 RX ADMIN — HEPARIN SODIUM 5000 UNIT(S): 5000 INJECTION INTRAVENOUS; SUBCUTANEOUS at 18:28

## 2021-01-01 RX ADMIN — DONEPEZIL HYDROCHLORIDE 5 MILLIGRAM(S): 10 TABLET, FILM COATED ORAL at 21:18

## 2021-01-01 RX ADMIN — PANTOPRAZOLE SODIUM 40 MILLIGRAM(S): 20 TABLET, DELAYED RELEASE ORAL at 17:37

## 2021-01-01 RX ADMIN — SERTRALINE 25 MILLIGRAM(S): 25 TABLET, FILM COATED ORAL at 14:12

## 2021-01-01 RX ADMIN — DONEPEZIL HYDROCHLORIDE 5 MILLIGRAM(S): 10 TABLET, FILM COATED ORAL at 21:39

## 2021-01-01 RX ADMIN — PANTOPRAZOLE SODIUM 10 MG/HR: 20 TABLET, DELAYED RELEASE ORAL at 16:31

## 2021-01-01 RX ADMIN — PANTOPRAZOLE SODIUM 40 MILLIGRAM(S): 20 TABLET, DELAYED RELEASE ORAL at 05:33

## 2021-01-01 RX ADMIN — DONEPEZIL HYDROCHLORIDE 5 MILLIGRAM(S): 10 TABLET, FILM COATED ORAL at 22:24

## 2021-01-01 RX ADMIN — Medication 2: at 17:10

## 2021-01-01 RX ADMIN — PANTOPRAZOLE SODIUM 40 MILLIGRAM(S): 20 TABLET, DELAYED RELEASE ORAL at 05:26

## 2021-01-01 RX ADMIN — Medication 1 TABLET(S): at 11:45

## 2021-01-01 RX ADMIN — Medication 650 MILLIGRAM(S): at 21:23

## 2021-01-01 RX ADMIN — Medication 667 MILLIGRAM(S): at 16:40

## 2021-01-01 RX ADMIN — ALBUTEROL 2 PUFF(S): 90 AEROSOL, METERED ORAL at 12:01

## 2021-01-01 RX ADMIN — ALBUTEROL 4 PUFF(S): 90 AEROSOL, METERED ORAL at 13:40

## 2021-01-01 RX ADMIN — SENNA PLUS 2 TABLET(S): 8.6 TABLET ORAL at 22:10

## 2021-01-01 RX ADMIN — Medication 500 MILLIGRAM(S): at 12:41

## 2021-01-01 RX ADMIN — Medication 200 GRAM(S): at 12:01

## 2021-01-01 RX ADMIN — MIRTAZAPINE 15 MILLIGRAM(S): 45 TABLET, ORALLY DISINTEGRATING ORAL at 21:14

## 2021-01-01 RX ADMIN — Medication 500 MILLIGRAM(S): at 11:41

## 2021-01-01 RX ADMIN — Medication 1 GRAM(S): at 11:41

## 2021-01-01 RX ADMIN — ALBUTEROL 2 PUFF(S): 90 AEROSOL, METERED ORAL at 22:16

## 2021-01-01 RX ADMIN — HEPARIN SODIUM 5000 UNIT(S): 5000 INJECTION INTRAVENOUS; SUBCUTANEOUS at 17:41

## 2021-01-01 RX ADMIN — SERTRALINE 25 MILLIGRAM(S): 25 TABLET, FILM COATED ORAL at 11:59

## 2021-01-01 RX ADMIN — POLYETHYLENE GLYCOL 3350 17 GRAM(S): 17 POWDER, FOR SOLUTION ORAL at 11:52

## 2021-01-01 RX ADMIN — Medication 6.56 MICROGRAM(S)/KG/MIN: at 18:00

## 2021-01-01 RX ADMIN — ALBUTEROL 2 PUFF(S): 90 AEROSOL, METERED ORAL at 08:09

## 2021-01-01 RX ADMIN — HEPARIN SODIUM 5000 UNIT(S): 5000 INJECTION INTRAVENOUS; SUBCUTANEOUS at 13:10

## 2021-01-01 RX ADMIN — Medication 5 UNIT(S): at 08:35

## 2021-01-01 RX ADMIN — TAMSULOSIN HYDROCHLORIDE 0.4 MILLIGRAM(S): 0.4 CAPSULE ORAL at 22:01

## 2021-01-01 RX ADMIN — Medication 1 TABLET(S): at 11:38

## 2021-01-01 RX ADMIN — Medication 1: at 17:06

## 2021-01-01 RX ADMIN — ERYTHROPOIETIN 5000 UNIT(S): 10000 INJECTION, SOLUTION INTRAVENOUS; SUBCUTANEOUS at 10:25

## 2021-01-01 RX ADMIN — Medication 4: at 16:38

## 2021-01-01 RX ADMIN — Medication 6.17 MICROGRAM(S)/KG/MIN: at 07:04

## 2021-01-01 RX ADMIN — ATORVASTATIN CALCIUM 40 MILLIGRAM(S): 80 TABLET, FILM COATED ORAL at 22:24

## 2021-01-01 RX ADMIN — CEFEPIME 100 MILLIGRAM(S): 1 INJECTION, POWDER, FOR SOLUTION INTRAMUSCULAR; INTRAVENOUS at 06:43

## 2021-01-01 RX ADMIN — SERTRALINE 25 MILLIGRAM(S): 25 TABLET, FILM COATED ORAL at 13:50

## 2021-01-01 RX ADMIN — MEROPENEM 100 MILLIGRAM(S): 1 INJECTION INTRAVENOUS at 20:00

## 2021-01-01 RX ADMIN — SENNA PLUS 2 TABLET(S): 8.6 TABLET ORAL at 21:31

## 2021-01-01 RX ADMIN — HEPARIN SODIUM 5000 UNIT(S): 5000 INJECTION INTRAVENOUS; SUBCUTANEOUS at 14:42

## 2021-01-01 RX ADMIN — HEPARIN SODIUM 5000 UNIT(S): 5000 INJECTION INTRAVENOUS; SUBCUTANEOUS at 05:29

## 2021-01-01 RX ADMIN — BUDESONIDE AND FORMOTEROL FUMARATE DIHYDRATE 2 PUFF(S): 160; 4.5 AEROSOL RESPIRATORY (INHALATION) at 21:26

## 2021-01-01 RX ADMIN — LACTULOSE 10 GRAM(S): 10 SOLUTION ORAL at 14:26

## 2021-01-01 RX ADMIN — HEPARIN SODIUM 5000 UNIT(S): 5000 INJECTION INTRAVENOUS; SUBCUTANEOUS at 15:26

## 2021-01-01 RX ADMIN — CLOPIDOGREL BISULFATE 75 MILLIGRAM(S): 75 TABLET, FILM COATED ORAL at 12:23

## 2021-01-01 RX ADMIN — ERYTHROPOIETIN 10000 UNIT(S): 10000 INJECTION, SOLUTION INTRAVENOUS; SUBCUTANEOUS at 10:12

## 2021-01-01 RX ADMIN — ERYTHROPOIETIN 5000 UNIT(S): 10000 INJECTION, SOLUTION INTRAVENOUS; SUBCUTANEOUS at 12:02

## 2021-01-01 RX ADMIN — Medication 25 MILLIGRAM(S): at 05:09

## 2021-01-01 RX ADMIN — Medication 2: at 11:57

## 2021-01-01 RX ADMIN — Medication 667 MILLIGRAM(S): at 18:26

## 2021-01-01 RX ADMIN — MIRTAZAPINE 15 MILLIGRAM(S): 45 TABLET, ORALLY DISINTEGRATING ORAL at 21:09

## 2021-01-01 RX ADMIN — Medication 500 MILLIGRAM(S): at 11:19

## 2021-01-01 RX ADMIN — HEPARIN SODIUM 5000 UNIT(S): 5000 INJECTION INTRAVENOUS; SUBCUTANEOUS at 17:16

## 2021-01-01 RX ADMIN — Medication 1: at 12:22

## 2021-01-01 RX ADMIN — Medication 667 MILLIGRAM(S): at 11:42

## 2021-01-01 RX ADMIN — MIRTAZAPINE 15 MILLIGRAM(S): 45 TABLET, ORALLY DISINTEGRATING ORAL at 22:01

## 2021-01-01 RX ADMIN — HEPARIN SODIUM 5000 UNIT(S): 5000 INJECTION INTRAVENOUS; SUBCUTANEOUS at 05:38

## 2021-01-01 RX ADMIN — Medication 500 MILLIGRAM(S): at 11:27

## 2021-01-01 RX ADMIN — ATORVASTATIN CALCIUM 40 MILLIGRAM(S): 80 TABLET, FILM COATED ORAL at 22:30

## 2021-01-01 RX ADMIN — Medication 1 TABLET(S): at 12:41

## 2021-01-01 RX ADMIN — MIRTAZAPINE 15 MILLIGRAM(S): 45 TABLET, ORALLY DISINTEGRATING ORAL at 22:30

## 2021-01-01 RX ADMIN — PANTOPRAZOLE SODIUM 40 MILLIGRAM(S): 20 TABLET, DELAYED RELEASE ORAL at 17:20

## 2021-01-01 RX ADMIN — CHLORHEXIDINE GLUCONATE 1 APPLICATION(S): 213 SOLUTION TOPICAL at 05:38

## 2021-01-01 RX ADMIN — Medication 667 MILLIGRAM(S): at 08:21

## 2021-01-01 RX ADMIN — Medication 667 MILLIGRAM(S): at 07:56

## 2021-01-01 RX ADMIN — AZITHROMYCIN 255 MILLIGRAM(S): 500 TABLET, FILM COATED ORAL at 08:06

## 2021-01-01 RX ADMIN — MIDODRINE HYDROCHLORIDE 10 MILLIGRAM(S): 2.5 TABLET ORAL at 17:39

## 2021-01-01 RX ADMIN — ATORVASTATIN CALCIUM 40 MILLIGRAM(S): 80 TABLET, FILM COATED ORAL at 22:54

## 2021-01-01 RX ADMIN — CLOPIDOGREL BISULFATE 75 MILLIGRAM(S): 75 TABLET, FILM COATED ORAL at 12:31

## 2021-01-01 RX ADMIN — TAMSULOSIN HYDROCHLORIDE 0.4 MILLIGRAM(S): 0.4 CAPSULE ORAL at 21:30

## 2021-01-01 RX ADMIN — Medication 1 GRAM(S): at 23:37

## 2021-01-01 RX ADMIN — Medication 25 MILLIGRAM(S): at 17:43

## 2021-01-01 RX ADMIN — MIDODRINE HYDROCHLORIDE 10 MILLIGRAM(S): 2.5 TABLET ORAL at 05:12

## 2021-01-01 RX ADMIN — HEPARIN SODIUM 5000 UNIT(S): 5000 INJECTION INTRAVENOUS; SUBCUTANEOUS at 23:16

## 2021-01-01 RX ADMIN — MEROPENEM 100 MILLIGRAM(S): 1 INJECTION INTRAVENOUS at 12:05

## 2021-01-01 RX ADMIN — Medication 81 MILLIGRAM(S): at 12:41

## 2021-01-01 RX ADMIN — DONEPEZIL HYDROCHLORIDE 5 MILLIGRAM(S): 10 TABLET, FILM COATED ORAL at 22:54

## 2021-01-01 RX ADMIN — SERTRALINE 25 MILLIGRAM(S): 25 TABLET, FILM COATED ORAL at 12:04

## 2021-01-01 RX ADMIN — NYSTATIN CREAM 1 APPLICATION(S): 100000 CREAM TOPICAL at 17:17

## 2021-01-01 RX ADMIN — SERTRALINE 25 MILLIGRAM(S): 25 TABLET, FILM COATED ORAL at 14:27

## 2021-01-01 RX ADMIN — CEFEPIME 100 MILLIGRAM(S): 1 INJECTION, POWDER, FOR SOLUTION INTRAMUSCULAR; INTRAVENOUS at 17:00

## 2021-01-01 RX ADMIN — MIRTAZAPINE 15 MILLIGRAM(S): 45 TABLET, ORALLY DISINTEGRATING ORAL at 00:06

## 2021-01-01 RX ADMIN — MONTELUKAST 10 MILLIGRAM(S): 4 TABLET, CHEWABLE ORAL at 11:19

## 2021-01-01 RX ADMIN — Medication 3 MILLILITER(S): at 01:30

## 2021-01-01 RX ADMIN — Medication 500 MILLIGRAM(S): at 14:26

## 2021-01-01 RX ADMIN — Medication 667 MILLIGRAM(S): at 11:51

## 2021-01-01 RX ADMIN — Medication 3 MILLILITER(S): at 14:44

## 2021-01-01 RX ADMIN — ZINC OXIDE 1 APPLICATION(S): 200 OINTMENT TOPICAL at 11:40

## 2021-01-01 RX ADMIN — Medication 667 MILLIGRAM(S): at 12:23

## 2021-01-01 RX ADMIN — HEPARIN SODIUM 5000 UNIT(S): 5000 INJECTION INTRAVENOUS; SUBCUTANEOUS at 05:48

## 2021-01-01 RX ADMIN — SERTRALINE 25 MILLIGRAM(S): 25 TABLET, FILM COATED ORAL at 14:39

## 2021-01-01 RX ADMIN — Medication 667 MILLIGRAM(S): at 12:22

## 2021-01-01 RX ADMIN — ATORVASTATIN CALCIUM 40 MILLIGRAM(S): 80 TABLET, FILM COATED ORAL at 21:32

## 2021-01-01 RX ADMIN — INSULIN GLARGINE 13 UNIT(S): 100 INJECTION, SOLUTION SUBCUTANEOUS at 21:53

## 2021-01-01 RX ADMIN — HEPARIN SODIUM 5000 UNIT(S): 5000 INJECTION INTRAVENOUS; SUBCUTANEOUS at 18:01

## 2021-01-01 RX ADMIN — HEPARIN SODIUM 5000 UNIT(S): 5000 INJECTION INTRAVENOUS; SUBCUTANEOUS at 13:12

## 2021-01-01 RX ADMIN — ALBUTEROL 2 PUFF(S): 90 AEROSOL, METERED ORAL at 19:48

## 2021-01-01 RX ADMIN — ALBUTEROL 2 PUFF(S): 90 AEROSOL, METERED ORAL at 07:47

## 2021-01-01 RX ADMIN — Medication 1 TABLET(S): at 11:50

## 2021-01-01 RX ADMIN — Medication 1 TABLET(S): at 12:48

## 2021-01-01 RX ADMIN — Medication 80 MILLIGRAM(S): at 05:50

## 2021-01-01 RX ADMIN — Medication 40 MILLIGRAM(S): at 06:27

## 2021-01-01 RX ADMIN — Medication 81 MILLIGRAM(S): at 12:47

## 2021-01-01 RX ADMIN — LACTULOSE 10 GRAM(S): 10 SOLUTION ORAL at 11:29

## 2021-01-01 RX ADMIN — CLOPIDOGREL BISULFATE 75 MILLIGRAM(S): 75 TABLET, FILM COATED ORAL at 14:38

## 2021-01-01 RX ADMIN — Medication 667 MILLIGRAM(S): at 12:24

## 2021-01-01 RX ADMIN — ZINC OXIDE 1 APPLICATION(S): 200 OINTMENT TOPICAL at 11:21

## 2021-01-01 RX ADMIN — Medication 80 MILLIGRAM(S): at 17:23

## 2021-01-01 RX ADMIN — MIRTAZAPINE 15 MILLIGRAM(S): 45 TABLET, ORALLY DISINTEGRATING ORAL at 21:55

## 2021-01-01 RX ADMIN — CHLORHEXIDINE GLUCONATE 1 APPLICATION(S): 213 SOLUTION TOPICAL at 05:34

## 2021-01-01 NOTE — PROGRESS NOTE ADULT - ASSESSMENT
resolving ASHLEE  CKD stage 4-5  anemia  GI bleed  HTN  DM    Plan:    await repeat colonoscopy  bowel prep  on liquid diet now  cont po na bicarb  cont hydralazine  lantus QHS  cont holding lasix  check iron stores  outpt epogen qweek

## 2021-01-01 NOTE — PROGRESS NOTE ADULT - SUBJECTIVE AND OBJECTIVE BOX
NEPHROLOGY FOLLOW UP NOTE    pt seen and examined  chart reviewed  awaiting repeat colonoscopy  no active bleeding  poor historian    PAST MEDICAL & SURGICAL HISTORY:  CHF (congestive heart failure)    Lymphedema of both lower extremities    COPD (chronic obstructive pulmonary disease)    Diabetes mellitus    H/O right nephrectomy  20 yrs ago      Allergies:  No Known Allergies    Home Medications Reviewed    SOCIAL HISTORY:  Denies ETOH,Smoking,   FAMILY HISTORY:  No pertinent family history in first degree relatives      REVIEW OF SYSTEMS:  All other review of systems is negative unless indicated above.    PHYSICAL EXAM:  Constitutional: NAD  HEENT: anicteric sclera, oropharynx clear, MMM  Neck: No JVD  Respiratory: CTAB, no wheezes, rales or rhonchi  Cardiovascular: S1, S2, RRR  Gastrointestinal: BS+, soft, NT/ND  Extremities: No cyanosis or clubbing. No peripheral edema  Neurological: no focal deficits  Psychiatric: Normal mood, normal affect  : No CVA tenderness. No [facility] Medications:   MEDICATIONS  (STANDING):  ascorbic acid 500 milliGRAM(s) Oral daily  budesonide 160 MICROgram(s)/formoterol 4.5 MICROgram(s) Inhaler 2 Puff(s) Inhalation two times a day  busPIRone 5 milliGRAM(s) Oral <User Schedule>  chlorhexidine 4% Liquid 1 Application(s) Topical <User Schedule>  donepezil 5 milliGRAM(s) Oral at bedtime  hydrALAZINE 50 milliGRAM(s) Oral every 12 hours  insulin glargine Injectable (LANTUS) 13 Unit(s) SubCutaneous at bedtime  magnesium citrate Oral Solution 1 Bottle Oral once  melatonin 3 milliGRAM(s) Oral at bedtime  montelukast 10 milliGRAM(s) Oral daily  nystatin Cream 1 Application(s) Topical two times a day  pantoprazole Infusion 8 mG/Hr (10 mL/Hr) IV Continuous <Continuous>  sodium bicarbonate 650 milliGRAM(s) Oral every 12 hours        VITALS:  T(F): 97.4 (01-01-21 @ 04:45), Max: 97.5 (12-31-20 @ 20:45)  HR: 80 (01-01-21 @ 04:45)  BP: 163/69 (01-01-21 @ 04:45)  RR: 18 (01-01-21 @ 04:45)  SpO2: --  Wt(kg): --    12-30 @ 07:01  -  12-31 @ 07:00  --------------------------------------------------------  IN: 210 mL / OUT: 0 mL / NET: 210 mL    12-31 @ 07:01 - 01-01 @ 07:00  --------------------------------------------------------  IN: 60 mL / OUT: 450 mL / NET: -390 mL          LABS:  01-01    141  |  105  |  42<H>  ----------------------------<  82  4.0   |  23  |  3.7<H>    Ca    8.5      01 Jan 2021 07:25  Mg     1.7     01-01    TPro  6.0  /  Alb  3.3<L>  /  TBili  0.3  /  DBili      /  AST  13  /  ALT  <5  /  AlkPhos  57  01-01                          8.4    7.90  )-----------( 303      ( 01 Jan 2021 07:25 )             26.5       Urine Studies:        RADIOLOGY & ADDITIONAL STUDIES:        Skin: No rashes

## 2021-01-01 NOTE — CHART NOTE - NSCHARTNOTEFT_GEN_A_CORE
Registered Dietitian Follow-Up     Patient Profile Reviewed                           Yes []   No []     Nutrition History Previously Obtained        Yes []  No []       Pertinent Subjective Information: RN reported pt is eating good >75%, however will be transitioned to clear over the weekend.      Pertinent Medical Interventions: Lower GI Bleed - Colonoscopy with suboptimal prep, stool found in rectum -- Please switch to clear liquid diet on Saturday 1/2/2021 - Two days of clear liquid diet and two days of Golytely, each day 4 L in addition to 20 mg of dulcolax on Saturday- Patient will likely need NG tube on Saturday for prep as he refused to drink it earlier and later drank only 2/3 of his Golytely which was inadequate; family is agreeable to this; Superficial thrombophlebitis RUE -improved per progress notes 12/31; CKD 5 - not on HD; resolving ASHLEE; No urgent indication for renal replacement therapy per nephro 1/1.       Diet order: Diet, Consistent Carbohydrate Clear Liquid (12-31-20 @ 09:36) [Ordered]  Diet, Consistent Carbohydrate w/Evening Snack (12-31-20 @ 09:36) [Active]    Anthropometrics:  Height (cm): 172.7 (12-30-20 @ 15:12)  Weight (kg): 75.2 (12-30-20 @ 15:12) - no new wts   BMI (kg/m2): 25.2 (12-30-20 @ 15:12)  IBW: 70 kg.    MEDICATIONS  (STANDING):  ascorbic acid 500 milliGRAM(s) Oral daily  budesonide 160 MICROgram(s)/formoterol 4.5 MICROgram(s) Inhaler 2 Puff(s) Inhalation two times a day  busPIRone 5 milliGRAM(s) Oral <User Schedule>  chlorhexidine 4% Liquid 1 Application(s) Topical <User Schedule>  donepezil 5 milliGRAM(s) Oral at bedtime  hydrALAZINE 50 milliGRAM(s) Oral every 12 hours  insulin glargine Injectable (LANTUS) 13 Unit(s) SubCutaneous at bedtime  magnesium citrate Oral Solution 1 Bottle Oral once  melatonin 3 milliGRAM(s) Oral at bedtime  montelukast 10 milliGRAM(s) Oral daily  nystatin Cream 1 Application(s) Topical two times a day  pantoprazole Infusion 8 mG/Hr (10 mL/Hr) IV Continuous <Continuous>  sodium bicarbonate 650 milliGRAM(s) Oral every 12 hours    MEDICATIONS  (PRN):  acetaminophen   Tablet .. 650 milliGRAM(s) Oral every 6 hours PRN Mild Pain (1 - 3), Moderate Pain (4 - 6)  ALBUTerol    90 MICROgram(s) HFA Inhaler 1 Puff(s) Inhalation every 8 hours PRN Bronchospasm    Pertinent Labs: 01-01 @ 07:25: Na 141, BUN 42<H>, Cr 3.7<H>, BG 82, K+ 4.0, Phos --, Mg 1.7<L>, Alk Phos 57, ALT/SGPT <5, AST/SGOT 13, HbA1c --    Finger Sticks:  POCT Blood Glucose.: 89 mg/dL (01-01 @ 08:57)    Physical Findings:  - Appearance: WDL/confused @ times; pt not alert during Rd assessment   - GI function: EMR LBM 12/30 - RN unaware of recnt BM's  - Tubes: n/a  - Oral/Mouth cavity: no chewing/swallowing difficulty reported per RN  - Skin: excoriation noted per RN flowsheets      Nutrition Requirements: per 12/29 RD assessment  Weight Used: 75.2 kg      Estimated Energy Needs    Continue [x]  Adjust []  0188-2289 kcal/day (MSJ x 1.2-1.3 AF)     Estimated Protein Needs    Continue [x]  Adjust []  60-68 gm/day (0.8-0.9 gm/kg CBW)     Estimated Fluid Needs        Continue [x]  Adjust []  1 mL/kcal or per LIP    Nutrient Intake: >75% per RN      [] Previous Nutrition Diagnosis: Inadequate protein-energy intake            [] Ongoing          [x] Resolved     Nutrition Intervention: Meals & Snacks, Medical food Supplements     Goal/Expected Outcome: Pt to demonstrate tolerance to diet order, with at least 75% po intake over next 3 days.     Indicator/Monitoring: Energy intake, glucose profile, renal profile, nutrition focused physical findings, body composition.    Recommendation:   1. Check HgbA1C.  2. Cont CHO consistent diet order post procedure if medically feasible, and add Glucerna PRN if appetite/po to fluctuate.   3. Obtain new wts    [id number]  Pt not @ risk, RD to f/u in 7 days.   RD remains available: Katya Duenas [phone]     RD remains available: Katya Duenas [phone] Registered Dietitian Follow-Up     Patient Profile Reviewed                           Yes []   No []     Nutrition History Previously Obtained        Yes []  No []       Pertinent Subjective Information: RN reported pt is eating good >75%, however will be transitioned to clear over the weekend.      Pertinent Medical Interventions: Lower GI Bleed - Colonoscopy with suboptimal prep, stool found in rectum -- Please switch to clear liquid diet on Saturday 1/2/2021 - Two days of clear liquid diet and two days of Golytely, each day 4 L in addition to 20 mg of dulcolax on Saturday- Patient will likely need NG tube on Saturday for prep as he refused to drink it earlier and later drank only 2/3 of his Golytely which was inadequate; family is agreeable to this; Superficial thrombophlebitis RUE -improved per progress notes 12/31; CKD 5 - not on HD; resolving ASHLEE; No urgent indication for renal replacement therapy per nephro 1/1.       Diet order: Diet, Consistent Carbohydrate Clear Liquid (12-31-20 @ 09:36) [Ordered]  Diet, Consistent Carbohydrate w/Evening Snack (12-31-20 @ 09:36) [Active]    Anthropometrics:  Height (cm): 172.7 (12-30-20 @ 15:12)  Weight (kg): 75.2 (12-30-20 @ 15:12) - no new wts   BMI (kg/m2): 25.2 (12-30-20 @ 15:12)  IBW: 70 kg.    MEDICATIONS  (STANDING):  ascorbic acid 500 milliGRAM(s) Oral daily  budesonide 160 MICROgram(s)/formoterol 4.5 MICROgram(s) Inhaler 2 Puff(s) Inhalation two times a day  busPIRone 5 milliGRAM(s) Oral <User Schedule>  chlorhexidine 4% Liquid 1 Application(s) Topical <User Schedule>  donepezil 5 milliGRAM(s) Oral at bedtime  hydrALAZINE 50 milliGRAM(s) Oral every 12 hours  insulin glargine Injectable (LANTUS) 13 Unit(s) SubCutaneous at bedtime  magnesium citrate Oral Solution 1 Bottle Oral once  melatonin 3 milliGRAM(s) Oral at bedtime  montelukast 10 milliGRAM(s) Oral daily  nystatin Cream 1 Application(s) Topical two times a day  pantoprazole Infusion 8 mG/Hr (10 mL/Hr) IV Continuous <Continuous>  sodium bicarbonate 650 milliGRAM(s) Oral every 12 hours    MEDICATIONS  (PRN):  acetaminophen   Tablet .. 650 milliGRAM(s) Oral every 6 hours PRN Mild Pain (1 - 3), Moderate Pain (4 - 6)  ALBUTerol    90 MICROgram(s) HFA Inhaler 1 Puff(s) Inhalation every 8 hours PRN Bronchospasm    Pertinent Labs: 01-01 @ 07:25: Na 141, BUN 42<H>, Cr 3.7<H>, BG 82, K+ 4.0, Phos --, Mg 1.7<L>, Alk Phos 57, ALT/SGPT <5, AST/SGOT 13, HbA1c --    Finger Sticks:  POCT Blood Glucose.: 89 mg/dL (01-01 @ 08:57)    Physical Findings:  - Appearance: WDL/confused @ times; pt not alert during Rd assessment; 2+ r arm edema 12/29  - GI function: EMR LBM 12/30 - RN unaware of recnt BM's  - Tubes: n/a  - Oral/Mouth cavity: no chewing/swallowing difficulty reported per RN  - Skin: excoriation noted per RN flowsheets      Nutrition Requirements: per 12/29 RD assessment  Weight Used: 75.2 kg      Estimated Energy Needs    Continue [x]  Adjust []  5551-8503 kcal/day (MSJ x 1.2-1.3 AF)     Estimated Protein Needs    Continue [x]  Adjust []  60-68 gm/day (0.8-0.9 gm/kg CBW)     Estimated Fluid Needs        Continue [x]  Adjust []  1 mL/kcal or per LIP    Nutrient Intake: >75% per RN      [] Previous Nutrition Diagnosis: Inadequate protein-energy intake            [] Ongoing          [x] Resolved     Nutrition Intervention: Meals & Snacks, Medical food Supplements     Goal/Expected Outcome: Pt to demonstrate tolerance to diet order, with at least 75% po intake over next 3 days.     Indicator/Monitoring: Energy intake, glucose profile, renal profile, nutrition focused physical findings, body composition.    Recommendation:   1. Check HgbA1C.  2. Cont CHO consistent diet order post procedure if medically feasible, and add Glucerna PRN if appetite/po to fluctuate.   3. Obtain new wts    [id number]  Pt not @ risk, RD to f/u in 7 days.   RD remains available: Katya Duenas [phone]     RD remains available: Katya Duenas [phone]

## 2021-01-01 NOTE — PROGRESS NOTE ADULT - SUBJECTIVE AND OBJECTIVE BOX
DRAKE HO  80y Male    INTERVAL HPI/OVERNIGHT EVENTS:    Pt lying in bed - no complaints.  Ate a banana when it was offered to him    T(F): 97.4 (01-01-21 @ 13:06), Max: 97.5 (12-31-20 @ 20:45)  HR: 79 (01-01-21 @ 13:06) (79 - 95)  BP: 132/70 (01-01-21 @ 13:06) (132/70 - 163/69)  RR: 20 (01-01-21 @ 13:06) (18 - 20)  SpO2: --  I&O's Summary    31 Dec 2020 07:01  -  01 Jan 2021 07:00  --------------------------------------------------------  IN: 70 mL / OUT: 450 mL / NET: -380 mL    01 Jan 2021 07:01  -  01 Jan 2021 14:58  --------------------------------------------------------  IN: 460 mL / OUT: 550 mL / NET: -90 mL      CAPILLARY BLOOD GLUCOSE      POCT Blood Glucose.: 89 mg/dL (01 Jan 2021 08:57)        PHYSICAL EXAM:  GENERAL: NAD  HEAD:  Normocephalic  EYES:  conjunctiva and sclera clear  ENMT: Moist mucous membranes  NECK: Supple  NERVOUS SYSTEM:  Alert, awake, Good concentration  CHEST/LUNG: Clear to percussion bilaterally; No rales, rhonchi, wheezing  HEART: Regular rate and rhythm; 3/6 SHEN  ABDOMEN: Soft, Nontender, Nondistended; Bowel sounds present  EXTREMITIES:   No edema       Consultant(s) Notes Reviewed:  [x ] YES  [ ] NO  Care Discussed with Consultants/Other Providers [ x] YES  [ ] NO    MEDICATIONS  (STANDING):  ascorbic acid 500 milliGRAM(s) Oral daily  budesonide 160 MICROgram(s)/formoterol 4.5 MICROgram(s) Inhaler 2 Puff(s) Inhalation two times a day  busPIRone 5 milliGRAM(s) Oral <User Schedule>  chlorhexidine 4% Liquid 1 Application(s) Topical <User Schedule>  donepezil 5 milliGRAM(s) Oral at bedtime  hydrALAZINE 50 milliGRAM(s) Oral every 12 hours  insulin glargine Injectable (LANTUS) 13 Unit(s) SubCutaneous at bedtime  magnesium citrate Oral Solution 1 Bottle Oral once  melatonin 3 milliGRAM(s) Oral at bedtime  montelukast 10 milliGRAM(s) Oral daily  nystatin Cream 1 Application(s) Topical two times a day  pantoprazole Infusion 8 mG/Hr (10 mL/Hr) IV Continuous <Continuous>  sodium bicarbonate 650 milliGRAM(s) Oral every 12 hours    MEDICATIONS  (PRN):  acetaminophen   Tablet .. 650 milliGRAM(s) Oral every 6 hours PRN Mild Pain (1 - 3), Moderate Pain (4 - 6)  ALBUTerol    90 MICROgram(s) HFA Inhaler 1 Puff(s) Inhalation every 8 hours PRN Bronchospasm      LABS:                        8.4    7.90  )-----------( 303      ( 01 Jan 2021 07:25 )             26.5     01-01    141  |  105  |  42<H>  ----------------------------<  82  4.0   |  23  |  3.7<H>    Ca    8.5      01 Jan 2021 07:25  Mg     1.7     01-01    TPro  6.0  /  Alb  3.3<L>  /  TBili  0.3  /  DBili  x   /  AST  13  /  ALT  <5  /  AlkPhos  57  01-01

## 2021-01-01 NOTE — PROGRESS NOTE ADULT - ASSESSMENT
81 y/o man with PMH of CHF (unknown EF) - chronic, Alzheimer's, COPD, DM and CKD 5 not on HD who was [facility].    1. Lower GI Bleed   - Hb 7.7 on admission; currently 8.5 s/p 3 units PRBCs.  - EGD: duodenum nodule, f/u pathology.  - Colonoscopy with suboptimal prep, stool found in rectum.  - Will need repeat colonoscopy on Monday.  - Patient will need to be prepped Saturday and Sunday for colonoscopy on Monday per GI  - ****Please switch to clear liquid diet on Saturday 1/2/2021.  - Two days of clear liquid diet and two days of Golytely, each day 4 L in addition to 20 mg of dulcolax on Saturday.  - Patient will likely need NG tube on Saturday for prep as he refused to drink it earlier and later drank only 2/3 of his Golytely which was inadequate; family is agreeable to this.    2. Superficial thrombophlebitis RUE - improved  - Right cephalic vein thrombosis on duplex.  - Heat pads and Tylenol for pain control for now.    3. DM - controlled  - c/w Lantus 13 units qhs.      4. 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  - Nephro following--no indication for RRT.    5. Alzheimer's dementia  - Continue home donepezil  - Home melatonin  - reorient as needed    6. COPD - stable  - Continue home albuterol, montelukast    7. DVT prophylaxis - SCD    full code status    Pending: clear liquid diet and bowel prep on Sunday, colonoscopy on Monday  Disposition: SNF

## 2021-01-02 LAB
ALBUMIN SERPL ELPH-MCNC: 3.4 G/DL — LOW (ref 3.5–5.2)
ALP SERPL-CCNC: 60 U/L — SIGNIFICANT CHANGE UP (ref 30–115)
ALT FLD-CCNC: <5 U/L — SIGNIFICANT CHANGE UP (ref 0–41)
ANION GAP SERPL CALC-SCNC: 14 MMOL/L — SIGNIFICANT CHANGE UP (ref 7–14)
AST SERPL-CCNC: 12 U/L — SIGNIFICANT CHANGE UP (ref 0–41)
BASOPHILS # BLD AUTO: 0.06 K/UL — SIGNIFICANT CHANGE UP (ref 0–0.2)
BASOPHILS NFR BLD AUTO: 0.7 % — SIGNIFICANT CHANGE UP (ref 0–1)
BILIRUB SERPL-MCNC: 0.3 MG/DL — SIGNIFICANT CHANGE UP (ref 0.2–1.2)
BUN SERPL-MCNC: 42 MG/DL — HIGH (ref 10–20)
CALCIUM SERPL-MCNC: 8.7 MG/DL — SIGNIFICANT CHANGE UP (ref 8.5–10.1)
CHLORIDE SERPL-SCNC: 104 MMOL/L — SIGNIFICANT CHANGE UP (ref 98–110)
CO2 SERPL-SCNC: 22 MMOL/L — SIGNIFICANT CHANGE UP (ref 17–32)
CREAT SERPL-MCNC: 3.7 MG/DL — HIGH (ref 0.7–1.5)
EOSINOPHIL # BLD AUTO: 1.03 K/UL — HIGH (ref 0–0.7)
EOSINOPHIL NFR BLD AUTO: 11.5 % — HIGH (ref 0–8)
GLUCOSE BLDC GLUCOMTR-MCNC: 109 MG/DL — HIGH (ref 70–99)
GLUCOSE BLDC GLUCOMTR-MCNC: 147 MG/DL — HIGH (ref 70–99)
GLUCOSE SERPL-MCNC: 101 MG/DL — HIGH (ref 70–99)
HCT VFR BLD CALC: 28.8 % — LOW (ref 42–52)
HGB BLD-MCNC: 9 G/DL — LOW (ref 14–18)
IMM GRANULOCYTES NFR BLD AUTO: 0.3 % — SIGNIFICANT CHANGE UP (ref 0.1–0.3)
LYMPHOCYTES # BLD AUTO: 2.22 K/UL — SIGNIFICANT CHANGE UP (ref 1.2–3.4)
LYMPHOCYTES # BLD AUTO: 24.7 % — SIGNIFICANT CHANGE UP (ref 20.5–51.1)
MAGNESIUM SERPL-MCNC: 2.1 MG/DL — SIGNIFICANT CHANGE UP (ref 1.8–2.4)
MCHC RBC-ENTMCNC: 27.9 PG — SIGNIFICANT CHANGE UP (ref 27–31)
MCHC RBC-ENTMCNC: 31.3 G/DL — LOW (ref 32–37)
MCV RBC AUTO: 89.2 FL — SIGNIFICANT CHANGE UP (ref 80–94)
MONOCYTES # BLD AUTO: 1.03 K/UL — HIGH (ref 0.1–0.6)
MONOCYTES NFR BLD AUTO: 11.5 % — HIGH (ref 1.7–9.3)
NEUTROPHILS # BLD AUTO: 4.61 K/UL — SIGNIFICANT CHANGE UP (ref 1.4–6.5)
NEUTROPHILS NFR BLD AUTO: 51.3 % — SIGNIFICANT CHANGE UP (ref 42.2–75.2)
NRBC # BLD: 0 /100 WBCS — SIGNIFICANT CHANGE UP (ref 0–0)
PLATELET # BLD AUTO: 314 K/UL — SIGNIFICANT CHANGE UP (ref 130–400)
POTASSIUM SERPL-MCNC: 4 MMOL/L — SIGNIFICANT CHANGE UP (ref 3.5–5)
POTASSIUM SERPL-SCNC: 4 MMOL/L — SIGNIFICANT CHANGE UP (ref 3.5–5)
PROT SERPL-MCNC: 6.6 G/DL — SIGNIFICANT CHANGE UP (ref 6–8)
RBC # BLD: 3.23 M/UL — LOW (ref 4.7–6.1)
RBC # FLD: 14.1 % — SIGNIFICANT CHANGE UP (ref 11.5–14.5)
SODIUM SERPL-SCNC: 140 MMOL/L — SIGNIFICANT CHANGE UP (ref 135–146)
WBC # BLD: 8.98 K/UL — SIGNIFICANT CHANGE UP (ref 4.8–10.8)
WBC # FLD AUTO: 8.98 K/UL — SIGNIFICANT CHANGE UP (ref 4.8–10.8)

## 2021-01-02 PROCEDURE — 99233 SBSQ HOSP IP/OBS HIGH 50: CPT

## 2021-01-02 RX ORDER — POLYETHYLENE GLYCOL 3350 17 G/17G
17 POWDER, FOR SOLUTION ORAL
Refills: 0 | Status: DISCONTINUED | OUTPATIENT
Start: 2021-01-02 | End: 2021-01-10

## 2021-01-02 RX ORDER — SENNA PLUS 8.6 MG/1
2 TABLET ORAL AT BEDTIME
Refills: 0 | Status: DISCONTINUED | OUTPATIENT
Start: 2021-01-02 | End: 2021-01-10

## 2021-01-02 RX ADMIN — Medication 3 MILLIGRAM(S): at 21:21

## 2021-01-02 RX ADMIN — DONEPEZIL HYDROCHLORIDE 5 MILLIGRAM(S): 10 TABLET, FILM COATED ORAL at 21:21

## 2021-01-02 RX ADMIN — INSULIN GLARGINE 13 UNIT(S): 100 INJECTION, SOLUTION SUBCUTANEOUS at 21:24

## 2021-01-02 RX ADMIN — BUDESONIDE AND FORMOTEROL FUMARATE DIHYDRATE 2 PUFF(S): 160; 4.5 AEROSOL RESPIRATORY (INHALATION) at 08:17

## 2021-01-02 RX ADMIN — MONTELUKAST 10 MILLIGRAM(S): 4 TABLET, CHEWABLE ORAL at 11:07

## 2021-01-02 RX ADMIN — Medication 50 MILLIGRAM(S): at 05:47

## 2021-01-02 RX ADMIN — SENNA PLUS 2 TABLET(S): 8.6 TABLET ORAL at 21:21

## 2021-01-02 RX ADMIN — CHLORHEXIDINE GLUCONATE 1 APPLICATION(S): 213 SOLUTION TOPICAL at 05:47

## 2021-01-02 RX ADMIN — Medication 500 MILLIGRAM(S): at 11:07

## 2021-01-02 RX ADMIN — Medication 650 MILLIGRAM(S): at 05:47

## 2021-01-02 RX ADMIN — CHLORHEXIDINE GLUCONATE 1 APPLICATION(S): 213 SOLUTION TOPICAL at 06:09

## 2021-01-02 RX ADMIN — Medication 50 MILLIGRAM(S): at 17:05

## 2021-01-02 RX ADMIN — NYSTATIN CREAM 1 APPLICATION(S): 100000 CREAM TOPICAL at 17:06

## 2021-01-02 RX ADMIN — NYSTATIN CREAM 1 APPLICATION(S): 100000 CREAM TOPICAL at 05:46

## 2021-01-02 RX ADMIN — Medication 650 MILLIGRAM(S): at 17:05

## 2021-01-02 RX ADMIN — Medication 5 MILLIGRAM(S): at 05:47

## 2021-01-02 RX ADMIN — POLYETHYLENE GLYCOL 3350 17 GRAM(S): 17 POWDER, FOR SOLUTION ORAL at 17:05

## 2021-01-02 RX ADMIN — BUDESONIDE AND FORMOTEROL FUMARATE DIHYDRATE 2 PUFF(S): 160; 4.5 AEROSOL RESPIRATORY (INHALATION) at 21:21

## 2021-01-02 NOTE — PROGRESS NOTE ADULT - ASSESSMENT
resolving ASHLEE  CKD stage 4-5  anemia  GI bleed  HTN  DM    Plan:    await colonoscopy  bowel prep  on liquid diet now  cont po na bicarb  cont hydralazine  lantus QHS  cont holding lasix  check iron stores  outpt epogen qweek  full code  will follow

## 2021-01-02 NOTE — PROGRESS NOTE ADULT - SUBJECTIVE AND OBJECTIVE BOX
DRAKE HO  80y Male    INTERVAL HPI/OVERNIGHT EVENTS:    Pt is lying in bed and comfortable. Wants to eat.  Explained to the pt that he can only drink liquids today and tomorrow.  He is drinking juice and ate jello for breakfast.  He wants to drink the Golytely for now  Will re-evaluate if he needs NGT placement     T(F): 97.9 (01-02-21 @ 04:35), Max: 97.9 (01-02-21 @ 04:35)  HR: 80 (01-02-21 @ 04:35) (79 - 97)  BP: 128/60 (01-02-21 @ 04:35) (128/60 - 153/66)  RR: 18 (01-02-21 @ 04:35) (18 - 20)  SpO2: 99% (01-02-21 @ 04:35) (99% - 99%) on RA  I&O's Summary    01 Jan 2021 07:01  -  02 Jan 2021 07:00  --------------------------------------------------------  IN: 530 mL / OUT: 901 mL / NET: -371 mL      CAPILLARY BLOOD GLUCOSE      POCT Blood Glucose.: 109 mg/dL (02 Jan 2021 09:05)  POCT Blood Glucose.: 96 mg/dL (01 Jan 2021 21:00)        PHYSICAL EXAM:  GENERAL: NAD  HEAD:  Normocephalic  EYES:  conjunctiva and sclera clear  ENMT: Moist mucous membranes  NECK: Supple  NERVOUS SYSTEM:  Alert, awake, Good concentration, [place]  CHEST/LUNG: Clear to percussion bilaterally  HEART: Regular rate and rhythm, + SHEN  ABDOMEN: Soft, Nontender, Nondistended  EXTREMITIES:   No edema    Consultant(s) Notes Reviewed:  [x ] YES  [ ] NO  Care Discussed with Consultants/Other Providers [ x] YES  [ ] NO    MEDICATIONS  (STANDING):  ascorbic acid 500 milliGRAM(s) Oral daily  bisacodyl 20 milliGRAM(s) Oral once  budesonide 160 MICROgram(s)/formoterol 4.5 MICROgram(s) Inhaler 2 Puff(s) Inhalation two times a day  busPIRone 5 milliGRAM(s) Oral <User Schedule>  chlorhexidine 4% Liquid 1 Application(s) Topical <User Schedule>  donepezil 5 milliGRAM(s) Oral at bedtime  hydrALAZINE 50 milliGRAM(s) Oral every 12 hours  insulin glargine Injectable (LANTUS) 13 Unit(s) SubCutaneous at bedtime  melatonin 3 milliGRAM(s) Oral at bedtime  montelukast 10 milliGRAM(s) Oral daily  nystatin Cream 1 Application(s) Topical two times a day  pantoprazole Infusion 8 mG/Hr (10 mL/Hr) IV Continuous <Continuous>  polyethylene glycol/electrolyte Solution. 4000 milliLiter(s) Oral once  sodium bicarbonate 650 milliGRAM(s) Oral every 12 hours    MEDICATIONS  (PRN):  acetaminophen   Tablet .. 650 milliGRAM(s) Oral every 6 hours PRN Mild Pain (1 - 3), Moderate Pain (4 - 6)  ALBUTerol    90 MICROgram(s) HFA Inhaler 1 Puff(s) Inhalation every 8 hours PRN Bronchospasm      LABS:                        9.0    8.98  )-----------( 314      ( 02 Jan 2021 07:22 )             28.8     01-02    140  |  104  |  42<H>  ----------------------------<  101<H>  4.0   |  22  |  3.7<H>    Ca    8.7      02 Jan 2021 07:22  Mg     2.1     01-02    TPro  6.6  /  Alb  3.4<L>  /  TBili  0.3  /  DBili  x   /  AST  12  /  ALT  <5  /  AlkPhos  60  01-02            Case discussed with resident    Care discussed with pt

## 2021-01-02 NOTE — PROGRESS NOTE ADULT - SUBJECTIVE AND OBJECTIVE BOX
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    advance care planning reviewed     PHYSICAL EXAM:  Constitutional: NAD  HEENT: anicteric sclera, oropharynx clear, MMM  Neck: No JVD  Respiratory: CTAB, no wheezes, rales or rhonchi  Cardiovascular: S1, S2, RRR  Gastrointestinal: BS+, soft, NT/ND  Extremities: No cyanosis or clubbing. No peripheral edema  Neurological: no focal deficits  Psychiatric: Normal mood, normal affect  : No CVA tenderness. No [facility] Medications:   MEDICATIONS  (STANDING):  ascorbic acid 500 milliGRAM(s) Oral daily  budesonide 160 MICROgram(s)/formoterol 4.5 MICROgram(s) Inhaler 2 Puff(s) Inhalation two times a day  busPIRone 5 milliGRAM(s) Oral <User Schedule>  chlorhexidine 4% Liquid 1 Application(s) Topical <User Schedule>  donepezil 5 milliGRAM(s) Oral at bedtime  hydrALAZINE 50 milliGRAM(s) Oral every 12 hours  insulin glargine Injectable (LANTUS) 13 Unit(s) SubCutaneous at bedtime  melatonin 3 milliGRAM(s) Oral at bedtime  montelukast 10 milliGRAM(s) Oral daily  nystatin Cream 1 Application(s) Topical two times a day  pantoprazole Infusion 8 mG/Hr (10 mL/Hr) IV Continuous <Continuous>  sodium bicarbonate 650 milliGRAM(s) Oral every 12 hours        VITALS:  T(F): 97.1 (01-02-21 @ 13:11), Max: 97.9 (01-02-21 @ 04:35)  HR: 87 (01-02-21 @ 13:11)  BP: 136/60 (01-02-21 @ 13:11)  RR: 18 (01-02-21 @ 13:11)  SpO2: 99% (01-02-21 @ 04:35)  Wt(kg): --    12-31 @ 07:01  -  01-01 @ 07:00  --------------------------------------------------------  IN: 70 mL / OUT: 450 mL / NET: -380 mL    01-01 @ 07:01 - 01-02 @ 07:00  --------------------------------------------------------  IN: 530 mL / OUT: 901 mL / NET: -371 mL          LABS:  01-02    140  |  104  |  42<H>  ----------------------------<  101<H>  4.0   |  22  |  3.7<H>    Ca    8.7      02 Jan 2021 07:22  Mg     2.1     01-02    TPro  6.6  /  Alb  3.4<L>  /  TBili  0.3  /  DBili      /  AST  12  /  ALT  <5  /  AlkPhos  60  01-02                          9.0    8.98  )-----------( 314      ( 02 Jan 2021 07:22 )             28.8       Urine Studies:        RADIOLOGY & ADDITIONAL STUDIES:

## 2021-01-02 NOTE — PHYSICAL THERAPY INITIAL EVALUATION ADULT - GENERAL OBSERVATIONS, REHAB EVAL
11:12-11:30  utilized during session, however pt appears confused, pt refused to use amplifiers @ b/s stating in English, "I I give them to you I don't want them" Attempted to see pt for b/s PT IE this AM, however pt adamantly refusing. "please, I'm sick, leave me alone" Despite encouragement & education pt continued to decline. RN Romana aware. PT to f/u when pt agreeable. 11:12-11:30 Tunisian  utilized during session, however pt appears confused, pt refused to use amplifiers @ b/s stating in English, "I I give them to you I don't want them" Attempted to see pt for b/s PT IE this AM, however pt adamantly refusing. "please, I'm sick, leave me alone" Despite encouragement & education pt continued to decline. RN Romana aware. PT to f/u when pt agreeable. 11:12-11:30 Pakistani  utilized during session, however pt appears confused, pt refused to use amplifiers @ b/s stating in English, "I I give them to you I don't want them" Attempted to see pt for b/s PT IE this AM, however pt adamantly refusing. "please, I'm sick, leave me alone" Despite encouragement & education pt continued to decline. RN Romana & covering resident (#[id number]) aware. PT to f/u when pt agreeable.

## 2021-01-02 NOTE — PROVIDER CONTACT NOTE (MEDICATION) - SITUATION
Pt refusing golytely despite education.  Explained to pt med is necessary for surgery prep  Pt refusing and was only agreeble to drink one cup  Pt refusing and states "leave me alone, I am fine"
Pt refusing golytely

## 2021-01-02 NOTE — PHYSICAL THERAPY INITIAL EVALUATION ADULT - PATIENT PROFILE REVIEW, REHAB EVAL
Chart reviewed. [place]  ID# [id number] utilized during session./yes Chart reviewed. Bengali [employer]  ID# [id number] utilized during session./yes

## 2021-01-02 NOTE — PROGRESS NOTE ADULT - ASSESSMENT
81 y/o man with PMH of CHF (unknown EF) - chronic, Alzheimer's, COPD, DM and CKD 5 not on HD who was sent from [facility] for rectal bleeding.    1. Lower GI Bleed   - Hb 7.7 on admission; currently 9 s/p 3 units PRBCs.  - EGD: duodenum nodule, f/u pathology.  - Colonoscopy with suboptimal prep, stool found in rectum.  - Will need repeat colonoscopy on Monday.  - Patient will need to be prepped Saturday and Sunday for colonoscopy on Monday per GI  - clear liquid diet starting today  - Two days of clear liquid diet and two days of Golytely, each day 4 L in addition to 20 mg of dulcolax on Saturday.  - Patient may need NG tube for prep if he is unable to tolerate the Golytely - he wants to try to drink it for now  - family is agreeable to NGT placement if needed    2. Superficial thrombophlebitis RUE - improved  - Right cephalic vein thrombosis on duplex.  - Heat pads and Tylenol for pain control for now.    3. DM - controlled  - c/w Lantus 13 units qhs.      4. 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  - Nephro following--no indication for RRT.    5. Alzheimer's dementia  - Continue home donepezil  - Home melatonin  - reorient as needed    6. COPD - stable  - Continue home albuterol, montelukast    7. DVT prophylaxis - SCD    full code status    Pending: clear liquid diet and bowel prep on Saturday and Sunday, colonoscopy on Monday  Disposition: SNF 79 y/o man with PMH of CHF (unknown EF) - chronic, Alzheimer's, COPD, DM and CKD 5 not on HD who was sent from [facility] for rectal bleeding.    1. Lower GI Bleed   - Hb 7.7 on admission; currently 9 s/p 3 units PRBCs.  - EGD: duodenum nodule, f/u pathology.  - Colonoscopy with suboptimal prep, stool found in rectum.  - Will need repeat colonoscopy on Monday.  - Patient will need to be prepped Saturday and Sunday for colonoscopy on Monday per GI  - clear liquid diet starting today  - Two days of clear liquid diet and two days of Golytely, each day 4 L in addition to 20 mg of dulcolax on Saturday.  - Patient may need NG tube for prep if he is unable to tolerate the Golytely - he wants to try to drink it for now  - family is agreeable to NGT placement if needed    2. Superficial thrombophlebitis RUE - improved  - Right cephalic vein thrombosis on duplex.  - Heat pads and Tylenol for pain control for now.    3. DM - controlled  - c/w Lantus 13 units qhs.      4. 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  - Nephro following--no indication for RRT.    5. Alzheimer's dementia  - Continue home donepezil  - Home melatonin  - reorient as needed    6. COPD - stable  - Continue home albuterol, montelukast    7. DVT prophylaxis - SCD    full code status    Pending: clear liquid diet and bowel prep on Saturday and Sunday, colonoscopy on Monday  Disposition: SNF      Addendum: Case discussed with GI fellow today.  Pt will now be scheduled for colonoscopy on Tuesday.  He can resume regular diet today.  Clear liquid diet on Sunday and Monday  Bowel prep on Sunday and Monday  NPO after midnight Monday    Spoke to his daughter Shin and updated her on the plan of care.   She states that the team should call her and she will speak to the pt to encourage him to drink the Golytely.

## 2021-01-03 LAB
GLUCOSE BLDC GLUCOMTR-MCNC: 109 MG/DL — HIGH (ref 70–99)
GLUCOSE BLDC GLUCOMTR-MCNC: 49 MG/DL — CRITICAL LOW (ref 70–99)
GLUCOSE BLDC GLUCOMTR-MCNC: 70 MG/DL — SIGNIFICANT CHANGE UP (ref 70–99)

## 2021-01-03 PROCEDURE — 99232 SBSQ HOSP IP/OBS MODERATE 35: CPT

## 2021-01-03 RX ORDER — INSULIN GLARGINE 100 [IU]/ML
8 INJECTION, SOLUTION SUBCUTANEOUS AT BEDTIME
Refills: 0 | Status: DISCONTINUED | OUTPATIENT
Start: 2021-01-03 | End: 2021-01-06

## 2021-01-03 RX ADMIN — Medication 5 MILLIGRAM(S): at 06:05

## 2021-01-03 RX ADMIN — SENNA PLUS 2 TABLET(S): 8.6 TABLET ORAL at 21:55

## 2021-01-03 RX ADMIN — CHLORHEXIDINE GLUCONATE 1 APPLICATION(S): 213 SOLUTION TOPICAL at 06:07

## 2021-01-03 RX ADMIN — NYSTATIN CREAM 1 APPLICATION(S): 100000 CREAM TOPICAL at 06:06

## 2021-01-03 RX ADMIN — Medication 50 MILLIGRAM(S): at 06:05

## 2021-01-03 RX ADMIN — Medication 3 MILLIGRAM(S): at 21:55

## 2021-01-03 RX ADMIN — Medication 650 MILLIGRAM(S): at 06:05

## 2021-01-03 RX ADMIN — POLYETHYLENE GLYCOL 3350 17 GRAM(S): 17 POWDER, FOR SOLUTION ORAL at 06:07

## 2021-01-03 RX ADMIN — Medication 650 MILLIGRAM(S): at 17:16

## 2021-01-03 RX ADMIN — INSULIN GLARGINE 8 UNIT(S): 100 INJECTION, SOLUTION SUBCUTANEOUS at 21:55

## 2021-01-03 RX ADMIN — Medication 500 MILLIGRAM(S): at 11:06

## 2021-01-03 RX ADMIN — NYSTATIN CREAM 1 APPLICATION(S): 100000 CREAM TOPICAL at 17:17

## 2021-01-03 RX ADMIN — BUDESONIDE AND FORMOTEROL FUMARATE DIHYDRATE 2 PUFF(S): 160; 4.5 AEROSOL RESPIRATORY (INHALATION) at 21:55

## 2021-01-03 RX ADMIN — BUDESONIDE AND FORMOTEROL FUMARATE DIHYDRATE 2 PUFF(S): 160; 4.5 AEROSOL RESPIRATORY (INHALATION) at 07:23

## 2021-01-03 RX ADMIN — DONEPEZIL HYDROCHLORIDE 5 MILLIGRAM(S): 10 TABLET, FILM COATED ORAL at 21:55

## 2021-01-03 RX ADMIN — MONTELUKAST 10 MILLIGRAM(S): 4 TABLET, CHEWABLE ORAL at 11:06

## 2021-01-03 RX ADMIN — POLYETHYLENE GLYCOL 3350 17 GRAM(S): 17 POWDER, FOR SOLUTION ORAL at 17:17

## 2021-01-03 RX ADMIN — Medication 50 MILLIGRAM(S): at 17:17

## 2021-01-03 NOTE — PROGRESS NOTE ADULT - SUBJECTIVE AND OBJECTIVE BOX
DRAKE HO  80y Male    INTERVAL HPI/OVERNIGHT EVENTS:    Pt wants to be left alone. He is drinking liquids per RN.  She called daughter to speak to the pt to encourage him to drink the Golytely.  Doubt pt will allow NGT placement    T(F): 97.1 (01-02-21 @ 13:11), Max: 97.1 (01-02-21 @ 13:11)  HR: 83 (01-02-21 @ 17:00) (83 - 87)  BP: 139/63 (01-02-21 @ 17:00) (136/60 - 139/63)  RR: 18 (01-02-21 @ 17:00) (18 - 18)  SpO2: --  I&O's Summary    02 Jan 2021 07:01  -  03 Jan 2021 07:00  --------------------------------------------------------  IN: 120 mL / OUT: 0 mL / NET: 120 mL    03 Jan 2021 07:01  -  03 Jan 2021 11:09  --------------------------------------------------------  IN: 0 mL / OUT: 150 mL / NET: -150 mL      CAPILLARY BLOOD GLUCOSE      POCT Blood Glucose.: 70 mg/dL (03 Jan 2021 09:22)  POCT Blood Glucose.: 49 mg/dL (03 Jan 2021 09:01)  POCT Blood Glucose.: 147 mg/dL (02 Jan 2021 21:22)        PHYSICAL EXAM:  GENERAL: NAD  HEAD:  Normocephalic  EYES:  closed  ENMT: Moist mucous membranes  NERVOUS SYSTEM:  Alert, awake, [place]  CHEST/LUNG: Clear to percussion bilaterally  HEART: Regular rate and rhythm; 3/6 SHEN  ABDOMEN: Soft, Nontender, Nondistended  EXTREMITIES:   No edema    Consultant(s) Notes Reviewed:  [x ] YES  [ ] NO  Care Discussed with Consultants/Other Providers [ x] YES  [ ] NO    MEDICATIONS  (STANDING):  ascorbic acid 500 milliGRAM(s) Oral daily  budesonide 160 MICROgram(s)/formoterol 4.5 MICROgram(s) Inhaler 2 Puff(s) Inhalation two times a day  busPIRone 5 milliGRAM(s) Oral <User Schedule>  chlorhexidine 4% Liquid 1 Application(s) Topical <User Schedule>  donepezil 5 milliGRAM(s) Oral at bedtime  hydrALAZINE 50 milliGRAM(s) Oral every 12 hours  insulin glargine Injectable (LANTUS) 8 Unit(s) SubCutaneous at bedtime  melatonin 3 milliGRAM(s) Oral at bedtime  montelukast 10 milliGRAM(s) Oral daily  nystatin Cream 1 Application(s) Topical two times a day  pantoprazole Infusion 8 mG/Hr (10 mL/Hr) IV Continuous <Continuous>  polyethylene glycol 3350 17 Gram(s) Oral two times a day  polyethylene glycol/electrolyte Solution. 4000 milliLiter(s) Oral once  senna 2 Tablet(s) Oral at bedtime  sodium bicarbonate 650 milliGRAM(s) Oral every 12 hours    MEDICATIONS  (PRN):  acetaminophen   Tablet .. 650 milliGRAM(s) Oral every 6 hours PRN Mild Pain (1 - 3), Moderate Pain (4 - 6)  ALBUTerol    90 MICROgram(s) HFA Inhaler 1 Puff(s) Inhalation every 8 hours PRN Bronchospasm      LABS:                        9.0    8.98  )-----------( 314      ( 02 Jan 2021 07:22 )             28.8     01-02    140  |  104  |  42<H>  ----------------------------<  101<H>  4.0   |  22  |  3.7<H>    Ca    8.7      02 Jan 2021 07:22  Mg     2.1     01-02    TPro  6.6  /  Alb  3.4<L>  /  TBili  0.3  /  DBili  x   /  AST  12  /  ALT  <5  /  AlkPhos  60  01-02              Case discussed with RN    Care discussed with pt

## 2021-01-03 NOTE — PROGRESS NOTE ADULT - ASSESSMENT
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 for rectal bleeding.    1. Lower GI Bleed   - Hb 7.7 on admission; currently 9 s/p 3 units PRBCs.  - EGD: duodenum nodule, f/u pathology.  - Colonoscopy with suboptimal prep, stool found in rectum.  - Will need repeat colonoscopy on Tuesday.  - Patient will need to be prepped Sunday and Monday for colonoscopy on Tuesday per GI  - clear liquid diet starting today  - Two days of clear liquid diet and two days of Golytely, each day 4 L in addition to 20 mg of dulcolax   - Patient may need NG tube for prep if he is unable to tolerate the Golytely - but he may refuse or pull it out  - family is agreeable to NGT placement if needed    2. Superficial thrombophlebitis RUE - improved  - Right cephalic vein thrombosis on duplex.  - Heat pads and Tylenol for pain control for now.    3. DM - low FS this AM - reduced dose of lantus and monitor      4. 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  - Nephro following--no indication for RRT.    5. Alzheimer's dementia  - Continue home donepezil  - Home melatonin  - reorient as needed    6. COPD - stable  - Continue home albuterol, montelukast    7. DVT prophylaxis - SCD    full code status    Pending: clear liquid diet and bowel prep Sunday and Monday, colonoscopy on Tuesday  Disposition: SNF

## 2021-01-03 NOTE — PROGRESS NOTE ADULT - SUBJECTIVE AND OBJECTIVE BOX
NEPHROLOGY FOLLOW UP NOTE    pt seen and examined this AM  awaiting   colonoscopy for tomorrow  no active bleeding or melena  cr stable   + void  poor historian      PAST MEDICAL & SURGICAL HISTORY:  CHF (congestive heart failure)    Lymphedema of both lower extremities    COPD (chronic obstructive pulmonary disease)    Diabetes mellitus    H/O right nephrectomy  20 yrs ago      Allergies:  No Known Allergies    Home Medications Reviewed    SOCIAL HISTORY:  Denies ETOH,Smoking,   FAMILY HISTORY:  No pertinent family history in first degree relatives      REVIEW OF SYSTEMS:  All other review of systems is negative unless indicated above.      PHYSICAL EXAM:  Constitutional: NAD  HEENT: anicteric sclera, oropharynx clear, MMM  Neck: No JVD  Respiratory: CTAB, no wheezes, rales or rhonchi  Cardiovascular: S1, S2, RRR  Gastrointestinal: BS+, soft, NT/ND  Extremities: No cyanosis or clubbing. No peripheral edema  Neurological: no focal deficits  Psychiatric: Normal mood, normal affect  : No CVA tenderness. No [facility] Medications:   MEDICATIONS  (STANDING):  ascorbic acid 500 milliGRAM(s) Oral daily  budesonide 160 MICROgram(s)/formoterol 4.5 MICROgram(s) Inhaler 2 Puff(s) Inhalation two times a day  busPIRone 5 milliGRAM(s) Oral <User Schedule>  chlorhexidine 4% Liquid 1 Application(s) Topical <User Schedule>  donepezil 5 milliGRAM(s) Oral at bedtime  hydrALAZINE 50 milliGRAM(s) Oral every 12 hours  insulin glargine Injectable (LANTUS) 8 Unit(s) SubCutaneous at bedtime  melatonin 3 milliGRAM(s) Oral at bedtime  montelukast 10 milliGRAM(s) Oral daily  nystatin Cream 1 Application(s) Topical two times a day  pantoprazole Infusion 8 mG/Hr (10 mL/Hr) IV Continuous <Continuous>  polyethylene glycol 3350 17 Gram(s) Oral two times a day  polyethylene glycol/electrolyte Solution. 4000 milliLiter(s) Oral once  senna 2 Tablet(s) Oral at bedtime  sodium bicarbonate 650 milliGRAM(s) Oral every 12 hours        VITALS:  T(F): 96.4 (01-03-21 @ 13:00), Max: 96.4 (01-03-21 @ 13:00)  HR: 79 (01-03-21 @ 13:00)  BP: 133/61 (01-03-21 @ 13:00)  RR: 18 (01-03-21 @ 13:00)  SpO2: --  Wt(kg): --    01-01 @ 07:01  -  01-02 @ 07:00  --------------------------------------------------------  IN: 530 mL / OUT: 901 mL / NET: -371 mL    01-02 @ 07:01  -  01-03 @ 07:00  --------------------------------------------------------  IN: 120 mL / OUT: 0 mL / NET: 120 mL    01-03 @ 07:01  -  01-03 @ 14:49  --------------------------------------------------------  IN: 0 mL / OUT: 150 mL / NET: -150 mL          LABS:  01-02    140  |  104  |  42<H>  ----------------------------<  101<H>  4.0   |  22  |  3.7<H>    Ca    8.7      02 Jan 2021 07:22  Mg     2.1     01-02    TPro  6.6  /  Alb  3.4<L>  /  TBili  0.3  /  DBili      /  AST  12  /  ALT  <5  /  AlkPhos  60  01-02                          9.0    8.98  )-----------( 314      ( 02 Jan 2021 07:22 )             28.8       Urine Studies:        RADIOLOGY & ADDITIONAL STUDIES:

## 2021-01-03 NOTE — PROGRESS NOTE ADULT - ASSESSMENT
resolving ASHLEE  CKD stage 4-5  anemia  GI bleed  HTN  DM    Plan:    await colonoscopy tomorrow  bowel prep  on liquid diet now  cont po na bicarb  cont hydralazine  lantus QHS  cont holding lasix, resume after colonoscopy  check iron stores, may benefit from iv iron  outpt epogen qweek

## 2021-01-04 LAB
ANION GAP SERPL CALC-SCNC: 9 MMOL/L — SIGNIFICANT CHANGE UP (ref 7–14)
BUN SERPL-MCNC: 37 MG/DL — HIGH (ref 10–20)
CALCIUM SERPL-MCNC: 8.7 MG/DL — SIGNIFICANT CHANGE UP (ref 8.5–10.1)
CHLORIDE SERPL-SCNC: 100 MMOL/L — SIGNIFICANT CHANGE UP (ref 98–110)
CO2 SERPL-SCNC: 26 MMOL/L — SIGNIFICANT CHANGE UP (ref 17–32)
CREAT SERPL-MCNC: 3.7 MG/DL — HIGH (ref 0.7–1.5)
FERRITIN SERPL-MCNC: 332 NG/ML — SIGNIFICANT CHANGE UP (ref 30–400)
GLUCOSE BLDC GLUCOMTR-MCNC: 110 MG/DL — HIGH (ref 70–99)
GLUCOSE BLDC GLUCOMTR-MCNC: 140 MG/DL — HIGH (ref 70–99)
GLUCOSE BLDC GLUCOMTR-MCNC: 56 MG/DL — LOW (ref 70–99)
GLUCOSE SERPL-MCNC: 99 MG/DL — SIGNIFICANT CHANGE UP (ref 70–99)
HCT VFR BLD CALC: 28.2 % — LOW (ref 42–52)
HGB BLD-MCNC: 8.7 G/DL — LOW (ref 14–18)
IRON SATN MFR SERPL: 19 % — SIGNIFICANT CHANGE UP (ref 15–50)
IRON SATN MFR SERPL: 34 UG/DL — LOW (ref 35–150)
MAGNESIUM SERPL-MCNC: 2.5 MG/DL — HIGH (ref 1.8–2.4)
MCHC RBC-ENTMCNC: 27.8 PG — SIGNIFICANT CHANGE UP (ref 27–31)
MCHC RBC-ENTMCNC: 30.9 G/DL — LOW (ref 32–37)
MCV RBC AUTO: 90.1 FL — SIGNIFICANT CHANGE UP (ref 80–94)
NRBC # BLD: 0 /100 WBCS — SIGNIFICANT CHANGE UP (ref 0–0)
PLATELET # BLD AUTO: 306 K/UL — SIGNIFICANT CHANGE UP (ref 130–400)
POTASSIUM SERPL-MCNC: 4.4 MMOL/L — SIGNIFICANT CHANGE UP (ref 3.5–5)
POTASSIUM SERPL-SCNC: 4.4 MMOL/L — SIGNIFICANT CHANGE UP (ref 3.5–5)
RBC # BLD: 3.13 M/UL — LOW (ref 4.7–6.1)
RBC # FLD: 13.9 % — SIGNIFICANT CHANGE UP (ref 11.5–14.5)
SODIUM SERPL-SCNC: 135 MMOL/L — SIGNIFICANT CHANGE UP (ref 135–146)
TIBC SERPL-MCNC: 178 UG/DL — LOW (ref 220–430)
UIBC SERPL-MCNC: 144 UG/DL — SIGNIFICANT CHANGE UP (ref 110–370)
WBC # BLD: 9.32 K/UL — SIGNIFICANT CHANGE UP (ref 4.8–10.8)
WBC # FLD AUTO: 9.32 K/UL — SIGNIFICANT CHANGE UP (ref 4.8–10.8)

## 2021-01-04 PROCEDURE — 99232 SBSQ HOSP IP/OBS MODERATE 35: CPT

## 2021-01-04 RX ORDER — SOD SULF/SODIUM/NAHCO3/KCL/PEG
4000 SOLUTION, RECONSTITUTED, ORAL ORAL ONCE
Refills: 0 | Status: COMPLETED | OUTPATIENT
Start: 2021-01-04 | End: 2021-01-09

## 2021-01-04 RX ADMIN — Medication 650 MILLIGRAM(S): at 17:53

## 2021-01-04 RX ADMIN — Medication 650 MILLIGRAM(S): at 05:21

## 2021-01-04 RX ADMIN — Medication 500 MILLIGRAM(S): at 13:19

## 2021-01-04 RX ADMIN — BUDESONIDE AND FORMOTEROL FUMARATE DIHYDRATE 2 PUFF(S): 160; 4.5 AEROSOL RESPIRATORY (INHALATION) at 21:14

## 2021-01-04 RX ADMIN — Medication 5 MILLIGRAM(S): at 05:21

## 2021-01-04 RX ADMIN — BUDESONIDE AND FORMOTEROL FUMARATE DIHYDRATE 2 PUFF(S): 160; 4.5 AEROSOL RESPIRATORY (INHALATION) at 13:19

## 2021-01-04 RX ADMIN — PANTOPRAZOLE SODIUM 10 MG/HR: 20 TABLET, DELAYED RELEASE ORAL at 13:19

## 2021-01-04 RX ADMIN — POLYETHYLENE GLYCOL 3350 17 GRAM(S): 17 POWDER, FOR SOLUTION ORAL at 17:53

## 2021-01-04 RX ADMIN — NYSTATIN CREAM 1 APPLICATION(S): 100000 CREAM TOPICAL at 05:21

## 2021-01-04 RX ADMIN — DONEPEZIL HYDROCHLORIDE 5 MILLIGRAM(S): 10 TABLET, FILM COATED ORAL at 21:13

## 2021-01-04 RX ADMIN — SENNA PLUS 2 TABLET(S): 8.6 TABLET ORAL at 21:14

## 2021-01-04 RX ADMIN — Medication 50 MILLIGRAM(S): at 05:21

## 2021-01-04 RX ADMIN — CHLORHEXIDINE GLUCONATE 1 APPLICATION(S): 213 SOLUTION TOPICAL at 05:22

## 2021-01-04 RX ADMIN — Medication 4000 MILLILITER(S): at 20:51

## 2021-01-04 RX ADMIN — POLYETHYLENE GLYCOL 3350 17 GRAM(S): 17 POWDER, FOR SOLUTION ORAL at 05:22

## 2021-01-04 RX ADMIN — INSULIN GLARGINE 8 UNIT(S): 100 INJECTION, SOLUTION SUBCUTANEOUS at 21:20

## 2021-01-04 RX ADMIN — MONTELUKAST 10 MILLIGRAM(S): 4 TABLET, CHEWABLE ORAL at 13:19

## 2021-01-04 RX ADMIN — Medication 3 MILLIGRAM(S): at 21:13

## 2021-01-04 RX ADMIN — Medication 50 MILLIGRAM(S): at 17:53

## 2021-01-04 NOTE — PROGRESS NOTE ADULT - ASSESSMENT
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    1. Lower GI Bleed   - Hb 7.7 on admission; currently 8- 9 s/p 3 units PRBCs.  - EGD: duodenum nodule, f/u pathology.  - Colonoscopy with suboptimal prep, stool found in rectum.  - Will need repeat colonoscopy on Tuesday.  - Patient will need to be prepped today for colonoscopy on Tuesday per GI  - continue clear liquid diet   - pt refused golytely yesterday - if he refuses today, then GI f/u re: another bowel prep  - doubt pt will allow NGT placement for bowel prep    2. Superficial thrombophlebitis RUE - resolved  - Right cephalic vein thrombosis on duplex.    3. DM - on lower dose of lantus and monitor FS      4. 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  - Nephro following--no indication for RRT.    5. Alzheimer's dementia  - Continue home donepezil  - Home melatonin  - reorient as needed    6. COPD - stable  - Continue home albuterol, montelukast    7. 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    1. Lower GI Bleed   - Hb 7.7 on admission; currently 8- 9 s/p 3 units PRBCs.  - EGD: duodenum nodule, f/u pathology.  - Colonoscopy with suboptimal prep, stool found in rectum.  - case discussed with GI fellow today - pt only drank about 1/4 of Golytely today per resident  - colonoscopy rescheduled for Wednesday to ensure better prep  - continue clear liquid diet   - Golytely x 1 today and x 1 tomorrow (pt should be encouraged to drink at least 50% each day)  - doubt pt will allow NGT placement for bowel prep    2. Superficial thrombophlebitis RUE - resolved  - Right cephalic vein thrombosis on duplex.    3. DM - on lower dose of lantus and monitor FS      4. 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  - Nephro following--no indication for RRT.    5. Alzheimer's dementia  - Continue home donepezil  - Home melatonin  - reorient as needed    6. COPD - stable  - Continue home albuterol, montelukast    7. DVT prophylaxis - SCD    full code status    Pending: clear liquid diet and bowel prep today and tomorrow, colonoscopy on Wednesday    Disposition: SNF

## 2021-01-04 NOTE — PROGRESS NOTE ADULT - SUBJECTIVE AND OBJECTIVE BOX
DRAKE HO  80y Male    INTERVAL HPI/OVERNIGHT EVENTS:    Pt wants to eat. Refusing golytely.    T(F): 97 (01-04-21 @ 05:07), Max: 97 (01-04-21 @ 05:07)  HR: 81 (01-04-21 @ 05:07) (79 - 82)  BP: 160/70 (01-04-21 @ 05:07) (125/59 - 160/70)  RR: 18 (01-04-21 @ 05:07) (18 - 18)  SpO2: --  I&O's Summary    03 Jan 2021 07:01  -  04 Jan 2021 07:00  --------------------------------------------------------  IN: 510 mL / OUT: 550 mL / NET: -40 mL      CAPILLARY BLOOD GLUCOSE      POCT Blood Glucose.: 109 mg/dL (03 Jan 2021 21:06)        PHYSICAL EXAM:  GENERAL: NAD  HEAD:  Normocephalic  EYES:  conjunctiva and sclera clear  ENMT: Moist mucous membranes  NECK: Supple  NERVOUS SYSTEM:  Alert, awake, [place], Good concentration  CHEST/LUNG: Clear to percussion bilaterally; No rales, rhonchi, wheezing  HEART: Regular rate and rhythm; 3/6 SHEN  ABDOMEN: Soft, Nontender, Nondistended  EXTREMITIES:   No edema      Consultant(s) Notes Reviewed:  [x ] YES  [ ] NO  Care Discussed with Consultants/Other Providers [ x] YES  [ ] NO    MEDICATIONS  (STANDING):  ascorbic acid 500 milliGRAM(s) Oral daily  budesonide 160 MICROgram(s)/formoterol 4.5 MICROgram(s) Inhaler 2 Puff(s) Inhalation two times a day  busPIRone 5 milliGRAM(s) Oral <User Schedule>  chlorhexidine 4% Liquid 1 Application(s) Topical <User Schedule>  donepezil 5 milliGRAM(s) Oral at bedtime  hydrALAZINE 50 milliGRAM(s) Oral every 12 hours  insulin glargine Injectable (LANTUS) 8 Unit(s) SubCutaneous at bedtime  melatonin 3 milliGRAM(s) Oral at bedtime  montelukast 10 milliGRAM(s) Oral daily  nystatin Cream 1 Application(s) Topical two times a day  pantoprazole Infusion 8 mG/Hr (10 mL/Hr) IV Continuous <Continuous>  polyethylene glycol 3350 17 Gram(s) Oral two times a day  polyethylene glycol/electrolyte Solution. 4000 milliLiter(s) Oral once  senna 2 Tablet(s) Oral at bedtime  sodium bicarbonate 650 milliGRAM(s) Oral every 12 hours    MEDICATIONS  (PRN):  acetaminophen   Tablet .. 650 milliGRAM(s) Oral every 6 hours PRN Mild Pain (1 - 3), Moderate Pain (4 - 6)  ALBUTerol    90 MICROgram(s) HFA Inhaler 1 Puff(s) Inhalation every 8 hours PRN Bronchospasm      LABS:                        8.7    9.32  )-----------( 306      ( 04 Jan 2021 06:40 )             28.2     01-04    135  |  100  |  37<H>  ----------------------------<  99  4.4   |  26  |  3.7<H>    Ca    8.7      04 Jan 2021 06:40  Mg     2.5     01-04              Case discussed with resident    Care discussed with pt

## 2021-01-04 NOTE — PROGRESS NOTE ADULT - SUBJECTIVE AND OBJECTIVE BOX
[facility] NEPHROLOGY FOLLOW UP NOTE  --------------------------------------------------------------------------------  24 hour events/subjective: Patient examined. Appears comfortable.    PAST HISTORY  --------------------------------------------------------------------------------  No significant changes to PMH, PSH, FHx, SHx, unless otherwise noted    ALLERGIES & MEDICATIONS  --------------------------------------------------------------------------------  Allergies    No Known Allergies    Intolerances      Standing Inpatient Medications  ascorbic acid 500 milliGRAM(s) Oral daily  budesonide 160 MICROgram(s)/formoterol 4.5 MICROgram(s) Inhaler 2 Puff(s) Inhalation two times a day  busPIRone 5 milliGRAM(s) Oral <User Schedule>  chlorhexidine 4% Liquid 1 Application(s) Topical <User Schedule>  donepezil 5 milliGRAM(s) Oral at bedtime  hydrALAZINE 50 milliGRAM(s) Oral every 12 hours  insulin glargine Injectable (LANTUS) 8 Unit(s) SubCutaneous at bedtime  melatonin 3 milliGRAM(s) Oral at bedtime  montelukast 10 milliGRAM(s) Oral daily  nystatin Cream 1 Application(s) Topical two times a day  pantoprazole Infusion 8 mG/Hr IV Continuous <Continuous>  polyethylene glycol 3350 17 Gram(s) Oral two times a day  polyethylene glycol/electrolyte Solution. 4000 milliLiter(s) Oral once  senna 2 Tablet(s) Oral at bedtime  sodium bicarbonate 650 milliGRAM(s) Oral every 12 hours    PRN Inpatient Medications  acetaminophen   Tablet .. 650 milliGRAM(s) Oral every 6 hours PRN  ALBUTerol    90 MICROgram(s) HFA Inhaler 1 Puff(s) Inhalation every 8 hours PRN      VITALS/PHYSICAL EXAM  --------------------------------------------------------------------------------  T(C): 36.9 (01-04-21 @ 13:41), Max: 36.9 (01-04-21 @ 13:41)  HR: 87 (01-04-21 @ 17:50) (80 - 87)  BP: 140/69 (01-04-21 @ 17:50) (125/59 - 160/70)  RR: 17 (01-04-21 @ 17:50) (17 - 20)  SpO2: 98% (01-04-21 @ 17:50) (98% - 98%)  Wt(kg): --        01-03-21 @ 07:01  -  01-04-21 @ 07:00  --------------------------------------------------------  IN: 510 mL / OUT: 550 mL / NET: -40 mL      Physical Exam:  	Gen: NAD  	Pulm: CTA B/L  	CV: RRR, S1S2  	Abd: +BS, soft, nontender/nondistended  	: No suprapubic tenderness  	LE: Warm, trace edema    LABS/STUDIES  --------------------------------------------------------------------------------              8.7    9.32  >-----------<  306      [01-04-21 @ 06:40]              28.2     135  |  100  |  37  ----------------------------<  99      [01-04-21 @ 06:40]  4.4   |  26  |  3.7        Ca     8.7     [01-04-21 @ 06:40]      Mg     2.5     [01-04-21 @ 06:40]            Creatinine Trend:  SCr 3.7 [01-04 @ 06:40]  SCr 3.7 [01-02 @ 07:22]  SCr 3.7 [01-01 @ 07:25]  SCr 3.7 [12-31 @ 11:26]  SCr 3.9 [12-30 @ 06:05]        Iron 34, TIBC 178, %sat 19      [01-04-21 @ 06:40]  Ferritin 332      [01-04-21 @ 06:40]  PTH -- (Ca 8.9)      [12-24-20 @ 04:30]   54

## 2021-01-04 NOTE — PROGRESS NOTE ADULT - ASSESSMENT
resolving ASHLEE  CKD stage 4-5  anemia  GI bleed  HTN  DM    Plan:    awaiting colonoscopy   bowel prep  on liquid diet now  cont po na bicarb  cont hydralazine  lantus QHS  cont holding lasix, resume after colonoscopy

## 2021-01-04 NOTE — PROGRESS NOTE ADULT - SUBJECTIVE AND OBJECTIVE BOX
HPI:  80 years old male from [facility] with PMHx of HF with unknown EF, HTN, anxiety, Alzheimer's dementia, COPD not on home O2, T2DM on insulin, hx right nephrectomy and ESRD on retacrit, anemia of chronic disease, brought in by EMS with a complaint of burgundy stool found in diaper for the past 2 days. Patient is a poor historian so history was taken from chart review and NH nurse Irlanda. Patient has no prior episode. Patient has been on Aspirin and was stopped due to melena. He also has lost 20 lbs in one month and has poor appetite. Patient did not complains of any abdominal pain, nausea/vomiting, diarrhea or constipation. The stool was described as large amount of dark tarry stool. A stool guaiac test was positive. Nurse Irlanda denies any fever/chills, cough, chest pain, shortness of breath, or any urinary symptoms.       T(C): 36.6 (23 Dec 2020 06:36), Max: 36.6 (23 Dec 2020 06:36)  T(F): 97.8 (23 Dec 2020 06:36), Max: 97.8 (23 Dec 2020 06:36)  HR: 81 (23 Dec 2020 06:36) (81 - 94)  BP: 138/63 (23 Dec 2020 06:36) (130/60 - 144/68)  RR: 20 (23 Dec 2020 06:36) (16 - 20)  SpO2: 98% (23 Dec 2020 06:36) (98% - 100%)   (23 Dec 2020 08:01)    Currently admitted to medicine with the primary diagnosis of GI bleed       Today is hospital day 12d.     INTERVAL HPI / OVERNIGHT EVENTS:  Patient was examined and seen at bedside. This morning he is resting comfortably in bed and only complaint is hunger. Patient has been on clear liquid diet in anticipation of colonoscopy, refusing to drink GoLytely.    ROS: Otherwise unremarkable     PAST MEDICAL & SURGICAL HISTORY  CHF (congestive heart failure)    Lymphedema of both lower extremities    COPD (chronic obstructive pulmonary disease)    Diabetes mellitus    H/O right nephrectomy  20 yrs ago      ALLERGIES  No Known Allergies    MEDICATIONS  STANDING MEDICATIONS  ascorbic acid 500 milliGRAM(s) Oral daily  budesonide 160 MICROgram(s)/formoterol 4.5 MICROgram(s) Inhaler 2 Puff(s) Inhalation two times a day  busPIRone 5 milliGRAM(s) Oral <User Schedule>  chlorhexidine 4% Liquid 1 Application(s) Topical <User Schedule>  donepezil 5 milliGRAM(s) Oral at bedtime  hydrALAZINE 50 milliGRAM(s) Oral every 12 hours  insulin glargine Injectable (LANTUS) 8 Unit(s) SubCutaneous at bedtime  melatonin 3 milliGRAM(s) Oral at bedtime  montelukast 10 milliGRAM(s) Oral daily  nystatin Cream 1 Application(s) Topical two times a day  pantoprazole Infusion 8 mG/Hr IV Continuous <Continuous>  polyethylene glycol 3350 17 Gram(s) Oral two times a day  polyethylene glycol/electrolyte Solution. 4000 milliLiter(s) Oral once  senna 2 Tablet(s) Oral at bedtime  sodium bicarbonate 650 milliGRAM(s) Oral every 12 hours    PRN MEDICATIONS  acetaminophen   Tablet .. 650 milliGRAM(s) Oral every 6 hours PRN  ALBUTerol    90 MICROgram(s) HFA Inhaler 1 Puff(s) Inhalation every 8 hours PRN    VITALS:  T(F): 98.4  HR: 83  BP: 137/78  RR: 20  SpO2: --    PHYSICAL EXAM  GEN: NAD, Resting comfortably in bed  PULM: Clear to auscultation bilaterally, No wheezes  CVS: Regular rate and rhythm, S1-S2, no murmurs  ABD: Soft, non-tender, non-distended, no guarding  EXT: No edema  NEURO: A&Ox3, no focal deficits    LABS                        8.7    9.32  )-----------( 306      ( 04 Jan 2021 06:40 )             28.2     01-04    135  |  100  |  37<H>  ----------------------------<  99  4.4   |  26  |  3.7<H>    Ca    8.7      04 Jan 2021 06:40  Mg     2.5     01-04                    RADIOLOGY

## 2021-01-04 NOTE — PHYSICAL THERAPY INITIAL EVALUATION ADULT - GENERAL OBSERVATIONS, REHAB EVAL
Pt was approached for PT IE. Pt is agitated at times, [place] speaking, Call [place]  ID. [id number]. Pt refuse PT, don't was to participate. PT will follow up.

## 2021-01-04 NOTE — PROGRESS NOTE ADULT - ASSESSMENT
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 Patient complaining of hunger, currently Disposition: SNF, PT recommendationsing colonoscopy. Encouraged to drink GoLytely.    1. Lower GI Bleed   - Hb 7.7 on admission; currently 8- 9 s/p 3 units PRBCs.  - EGD: duodenum nodule, f/u pathology.  - Colonoscopy with suboptimal prep, stool found in rectum.  - case discussed with GI fellow today - pt only drank about 1/4 of Golytely today per resident  - colonoscopy rescheduled for Wednesday to ensure better prep  - continue clear liquid diet   - Golytely x 1 today and x 1 tomorrow (pt should be encouraged to drink at least 50% each day)  - doubt pt will allow NGT placement for bowel prep    2. Superficial thrombophlebitis RUE - resolved  - Right cephalic vein thrombosis on duplex.    3. DM - on lower dose of lantus and monitor FS      4. 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  - Nephro following--no indication for RRT.    5. Alzheimer's dementia  - Continue home donepezil  - Home melatonin  - reorient as needed    6. COPD - stable  - Continue home albuterol, montelukast    7. DVT prophylaxis - SCD    code status: full  Diet: clear liquid  Disposition: SNF, PT recs

## 2021-01-05 LAB
ALBUMIN SERPL ELPH-MCNC: 3.1 G/DL — LOW (ref 3.5–5.2)
ALP SERPL-CCNC: 59 U/L — SIGNIFICANT CHANGE UP (ref 30–115)
ALT FLD-CCNC: <5 U/L — SIGNIFICANT CHANGE UP (ref 0–41)
ANION GAP SERPL CALC-SCNC: 11 MMOL/L — SIGNIFICANT CHANGE UP (ref 7–14)
AST SERPL-CCNC: 11 U/L — SIGNIFICANT CHANGE UP (ref 0–41)
BASOPHILS # BLD AUTO: 0.06 K/UL — SIGNIFICANT CHANGE UP (ref 0–0.2)
BASOPHILS NFR BLD AUTO: 0.7 % — SIGNIFICANT CHANGE UP (ref 0–1)
BILIRUB SERPL-MCNC: 0.2 MG/DL — SIGNIFICANT CHANGE UP (ref 0.2–1.2)
BUN SERPL-MCNC: 44 MG/DL — HIGH (ref 10–20)
CALCIUM SERPL-MCNC: 8.8 MG/DL — SIGNIFICANT CHANGE UP (ref 8.5–10.1)
CHLORIDE SERPL-SCNC: 101 MMOL/L — SIGNIFICANT CHANGE UP (ref 98–110)
CO2 SERPL-SCNC: 25 MMOL/L — SIGNIFICANT CHANGE UP (ref 17–32)
CREAT SERPL-MCNC: 3.3 MG/DL — HIGH (ref 0.7–1.5)
EOSINOPHIL # BLD AUTO: 0.67 K/UL — SIGNIFICANT CHANGE UP (ref 0–0.7)
EOSINOPHIL NFR BLD AUTO: 8.1 % — HIGH (ref 0–8)
GLUCOSE BLDC GLUCOMTR-MCNC: 122 MG/DL — HIGH (ref 70–99)
GLUCOSE BLDC GLUCOMTR-MCNC: 123 MG/DL — HIGH (ref 70–99)
GLUCOSE SERPL-MCNC: 118 MG/DL — HIGH (ref 70–99)
HCT VFR BLD CALC: 27 % — LOW (ref 42–52)
HGB BLD-MCNC: 8.6 G/DL — LOW (ref 14–18)
IMM GRANULOCYTES NFR BLD AUTO: 0.2 % — SIGNIFICANT CHANGE UP (ref 0.1–0.3)
LYMPHOCYTES # BLD AUTO: 1.93 K/UL — SIGNIFICANT CHANGE UP (ref 1.2–3.4)
LYMPHOCYTES # BLD AUTO: 23.3 % — SIGNIFICANT CHANGE UP (ref 20.5–51.1)
MCHC RBC-ENTMCNC: 28.4 PG — SIGNIFICANT CHANGE UP (ref 27–31)
MCHC RBC-ENTMCNC: 31.9 G/DL — LOW (ref 32–37)
MCV RBC AUTO: 89.1 FL — SIGNIFICANT CHANGE UP (ref 80–94)
MONOCYTES # BLD AUTO: 0.9 K/UL — HIGH (ref 0.1–0.6)
MONOCYTES NFR BLD AUTO: 10.9 % — HIGH (ref 1.7–9.3)
NEUTROPHILS # BLD AUTO: 4.71 K/UL — SIGNIFICANT CHANGE UP (ref 1.4–6.5)
NEUTROPHILS NFR BLD AUTO: 56.8 % — SIGNIFICANT CHANGE UP (ref 42.2–75.2)
NRBC # BLD: 0 /100 WBCS — SIGNIFICANT CHANGE UP (ref 0–0)
PLATELET # BLD AUTO: 287 K/UL — SIGNIFICANT CHANGE UP (ref 130–400)
POTASSIUM SERPL-MCNC: 4.1 MMOL/L — SIGNIFICANT CHANGE UP (ref 3.5–5)
POTASSIUM SERPL-SCNC: 4.1 MMOL/L — SIGNIFICANT CHANGE UP (ref 3.5–5)
PROT SERPL-MCNC: 6.4 G/DL — SIGNIFICANT CHANGE UP (ref 6–8)
RBC # BLD: 3.03 M/UL — LOW (ref 4.7–6.1)
RBC # FLD: 13.6 % — SIGNIFICANT CHANGE UP (ref 11.5–14.5)
SODIUM SERPL-SCNC: 137 MMOL/L — SIGNIFICANT CHANGE UP (ref 135–146)
WBC # BLD: 8.29 K/UL — SIGNIFICANT CHANGE UP (ref 4.8–10.8)
WBC # FLD AUTO: 8.29 K/UL — SIGNIFICANT CHANGE UP (ref 4.8–10.8)

## 2021-01-05 PROCEDURE — 99232 SBSQ HOSP IP/OBS MODERATE 35: CPT

## 2021-01-05 RX ADMIN — SENNA PLUS 2 TABLET(S): 8.6 TABLET ORAL at 21:32

## 2021-01-05 RX ADMIN — Medication 50 MILLIGRAM(S): at 17:22

## 2021-01-05 RX ADMIN — MONTELUKAST 10 MILLIGRAM(S): 4 TABLET, CHEWABLE ORAL at 11:24

## 2021-01-05 RX ADMIN — Medication 500 MILLIGRAM(S): at 11:23

## 2021-01-05 RX ADMIN — NYSTATIN CREAM 1 APPLICATION(S): 100000 CREAM TOPICAL at 17:23

## 2021-01-05 RX ADMIN — INSULIN GLARGINE 8 UNIT(S): 100 INJECTION, SOLUTION SUBCUTANEOUS at 21:31

## 2021-01-05 RX ADMIN — Medication 50 MILLIGRAM(S): at 05:22

## 2021-01-05 RX ADMIN — PANTOPRAZOLE SODIUM 10 MG/HR: 20 TABLET, DELAYED RELEASE ORAL at 05:22

## 2021-01-05 RX ADMIN — DONEPEZIL HYDROCHLORIDE 5 MILLIGRAM(S): 10 TABLET, FILM COATED ORAL at 21:32

## 2021-01-05 RX ADMIN — BUDESONIDE AND FORMOTEROL FUMARATE DIHYDRATE 2 PUFF(S): 160; 4.5 AEROSOL RESPIRATORY (INHALATION) at 08:10

## 2021-01-05 RX ADMIN — NYSTATIN CREAM 1 APPLICATION(S): 100000 CREAM TOPICAL at 05:23

## 2021-01-05 RX ADMIN — Medication 5 MILLIGRAM(S): at 05:22

## 2021-01-05 RX ADMIN — CHLORHEXIDINE GLUCONATE 1 APPLICATION(S): 213 SOLUTION TOPICAL at 05:24

## 2021-01-05 RX ADMIN — Medication 650 MILLIGRAM(S): at 17:22

## 2021-01-05 RX ADMIN — Medication 650 MILLIGRAM(S): at 05:24

## 2021-01-05 RX ADMIN — POLYETHYLENE GLYCOL 3350 17 GRAM(S): 17 POWDER, FOR SOLUTION ORAL at 05:23

## 2021-01-05 RX ADMIN — BUDESONIDE AND FORMOTEROL FUMARATE DIHYDRATE 2 PUFF(S): 160; 4.5 AEROSOL RESPIRATORY (INHALATION) at 21:31

## 2021-01-05 RX ADMIN — Medication 3 MILLIGRAM(S): at 21:32

## 2021-01-05 NOTE — PROVIDER CONTACT NOTE (OTHER) - BACKGROUND
pt scheduled for colonoscopy tomorrow
Pt here for GI bleed
patient has history of diabetes
Pt here for GI bleed
patient admitted for GI bleed

## 2021-01-05 NOTE — PROVIDER CONTACT NOTE (OTHER) - ASSESSMENT
Latest hgb 7.1, pt received 1 unit PRBCs and no repeat labs done yet
patient asymptomatic
BM was large and black tarry stool
Pt given juice  Rechecked FS repeat 70
patient tolerating
pt noted to also be [place] speaking;  services utilized; pt does not seem to understand some of the interpreters questions and responds with "Good night" when explanation is given as to the need for the drink and the scheduled procedure tomorrow (colonoscopy)

## 2021-01-05 NOTE — PROGRESS NOTE ADULT - ASSESSMENT
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 for rectal bleeding. No complaints today, encouraged to drink GoLytely for Wednesday colonoscopy prep. NPO after midnight expect for medications.    1. Lower GI Bleed   - Hb 7.7 on admission; currently 8- 9 s/p 3 units PRBCs.  - EGD: duodenum nodule, f/u pathology.  - Colonoscopy with suboptimal prep, stool found in rectum.  - colonoscopy rescheduled for Wednesday to ensure better prep, GI on board with plan.  - continue clear liquid diet   - Golytely x 1 today  (pt should be encouraged to drink hourly)  - doubt pt will allow NGT placement for bowel prep    2. Superficial thrombophlebitis RUE - resolved  - Right cephalic vein thrombosis on duplex.    3. DM - c/w lantus and monitor FS      4. 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  - Nephro following--no indication for RRT.    5. Alzheimer's dementia  - Continue home donepezil  - Home melatonin  - reorient as needed    6. COPD - stable  - Continue home albuterol, montelukast    7. DVT prophylaxis - SCD    code status: full  Diet: clear liquid  Disposition: SNF, PT recommendations, NPO after midnight except medications.

## 2021-01-05 NOTE — PROGRESS NOTE ADULT - SUBJECTIVE AND OBJECTIVE BOX
HPI:  80 years old male from [facility] with PMHx of HF with unknown EF, HTN, anxiety, Alzheimer's dementia, COPD not on home O2, T2DM on insulin, hx right nephrectomy and ESRD on retacrit, anemia of chronic disease, brought in by EMS with a complaint of burgundy stool found in diaper for the past 2 days. Patient is a poor historian so history was taken from chart review and NH nurse Irlanda. Patient has no prior episode. Patient has been on Aspirin and was stopped due to melena. He also has lost 20 lbs in one month and has poor appetite. Patient did not complains of any abdominal pain, nausea/vomiting, diarrhea or constipation. The stool was described as large amount of dark tarry stool. A stool guaiac test was positive. Nurse Irlanda denies any fever/chills, cough, chest pain, shortness of breath, or any urinary symptoms.       T(C): 36.6 (23 Dec 2020 06:36), Max: 36.6 (23 Dec 2020 06:36)  T(F): 97.8 (23 Dec 2020 06:36), Max: 97.8 (23 Dec 2020 06:36)  HR: 81 (23 Dec 2020 06:36) (81 - 94)  BP: 138/63 (23 Dec 2020 06:36) (130/60 - 144/68)  RR: 20 (23 Dec 2020 06:36) (16 - 20)  SpO2: 98% (23 Dec 2020 06:36) (98% - 100%)   (23 Dec 2020 08:01)    Currently admitted to medicine with the primary diagnosis of GI bleed       Today is hospital day 13d.     INTERVAL HPI / OVERNIGHT EVENTS:  Patient was examined and seen at bedside. This morning he is resting comfortably in bed and reports no new issues or overnight events. Was eating broth this morning and encouraged to continue having the [employer] Lytely bowel prep, which he indicated he was doing.    ROS: Otherwise unremarkable     PAST MEDICAL & SURGICAL HISTORY  CHF (congestive heart failure)    Lymphedema of both lower extremities    COPD (chronic obstructive pulmonary disease)    Diabetes mellitus    H/O right nephrectomy  20 yrs ago      ALLERGIES  No Known Allergies    MEDICATIONS  STANDING MEDICATIONS  ascorbic acid 500 milliGRAM(s) Oral daily  budesonide 160 MICROgram(s)/formoterol 4.5 MICROgram(s) Inhaler 2 Puff(s) Inhalation two times a day  busPIRone 5 milliGRAM(s) Oral <User Schedule>  chlorhexidine 4% Liquid 1 Application(s) Topical <User Schedule>  donepezil 5 milliGRAM(s) Oral at bedtime  hydrALAZINE 50 milliGRAM(s) Oral every 12 hours  insulin glargine Injectable (LANTUS) 8 Unit(s) SubCutaneous at bedtime  melatonin 3 milliGRAM(s) Oral at bedtime  montelukast 10 milliGRAM(s) Oral daily  nystatin Cream 1 Application(s) Topical two times a day  pantoprazole Infusion 8 mG/Hr IV Continuous <Continuous>  polyethylene glycol 3350 17 Gram(s) Oral two times a day  polyethylene glycol/electrolyte Solution. 4000 milliLiter(s) Oral once  senna 2 Tablet(s) Oral at bedtime  sodium bicarbonate 650 milliGRAM(s) Oral every 12 hours    PRN MEDICATIONS  acetaminophen   Tablet .. 650 milliGRAM(s) Oral every 6 hours PRN  ALBUTerol    90 MICROgram(s) HFA Inhaler 1 Puff(s) Inhalation every 8 hours PRN    VITALS:  T(F): 99.7  HR: 104  BP: 136/71  RR: 18  SpO2: 97%    PHYSICAL EXAM  GEN: NAD, Resting in bed  PULM: Clear to auscultation bilaterally, No wheezes  CVS: Regular rate and rhythm, S1-S2, no murmurs  ABD: Soft, non-tender, non-distended, no guarding  EXT: No edema  NEURO: A&Ox3, no focal deficits, [place] speaking     LABS                        8.6    8.29  )-----------( 287      ( 05 Jan 2021 06:46 )             27.0     01-05    137  |  101  |  44<H>  ----------------------------<  118<H>  4.1   |  25  |  3.3<H>    Ca    8.8      05 Jan 2021 06:46  Mg     2.5     01-04    TPro  6.4  /  Alb  3.1<L>  /  TBili  0.2  /  DBili  x   /  AST  11  /  ALT  <5  /  AlkPhos  59  01-05          RADIOLOGY

## 2021-01-05 NOTE — PROGRESS NOTE ADULT - SUBJECTIVE AND OBJECTIVE BOX
DRAKE HO  80y Male    INTERVAL HPI/OVERNIGHT EVENTS:    Pt c/o right lower quadrant pain today when asked.  Comfortable in bed  Encouraged pt to drink Golytely for colonoscopy tomorrow  he drank one cup of it in my presence but no more    T(F): 98.6 (01-05-21 @ 12:54), Max: 99.7 (01-05-21 @ 09:34)  HR: 91 (01-05-21 @ 12:54) (87 - 104)  BP: 129/68 (01-05-21 @ 12:54) (129/68 - 176/77)  RR: 20 (01-05-21 @ 12:54) (17 - 20)  SpO2: 97% (01-05-21 @ 06:51) (97% - 98%) on RA    I&O's Summary    04 Jan 2021 07:01  -  05 Jan 2021 07:00  --------------------------------------------------------  IN: 1310 mL / OUT: 1200 mL / NET: 110 mL      CAPILLARY BLOOD GLUCOSE      POCT Blood Glucose.: 122 mg/dL (05 Jan 2021 09:26)  POCT Blood Glucose.: 140 mg/dL (04 Jan 2021 21:19)        PHYSICAL EXAM:  GENERAL: NAD  HEAD:  Normocephalic  EYES:  conjunctiva and sclera clear  ENMT: Moist mucous membranes  NECK: Supple  NERVOUS SYSTEM:  Alert, awake, Good concentration  CHEST/LUNG: Clear to percussion bilaterally; No rales, rhonchi, wheezing  HEART: Regular rate and rhythm; 3/6 SHEN  ABDOMEN: Soft, Nontender, Nondistended; Bowel sounds present  EXTREMITIES:   No edema      Consultant(s) Notes Reviewed:  [x ] YES  [ ] NO  Care Discussed with Consultants/Other Providers [ x] YES  [ ] NO    MEDICATIONS  (STANDING):  ascorbic acid 500 milliGRAM(s) Oral daily  budesonide 160 MICROgram(s)/formoterol 4.5 MICROgram(s) Inhaler 2 Puff(s) Inhalation two times a day  busPIRone 5 milliGRAM(s) Oral <User Schedule>  chlorhexidine 4% Liquid 1 Application(s) Topical <User Schedule>  donepezil 5 milliGRAM(s) Oral at bedtime  hydrALAZINE 50 milliGRAM(s) Oral every 12 hours  insulin glargine Injectable (LANTUS) 8 Unit(s) SubCutaneous at bedtime  melatonin 3 milliGRAM(s) Oral at bedtime  montelukast 10 milliGRAM(s) Oral daily  nystatin Cream 1 Application(s) Topical two times a day  pantoprazole Infusion 8 mG/Hr (10 mL/Hr) IV Continuous <Continuous>  polyethylene glycol 3350 17 Gram(s) Oral two times a day  polyethylene glycol/electrolyte Solution. 4000 milliLiter(s) Oral once  senna 2 Tablet(s) Oral at bedtime  sodium bicarbonate 650 milliGRAM(s) Oral every 12 hours    MEDICATIONS  (PRN):  acetaminophen   Tablet .. 650 milliGRAM(s) Oral every 6 hours PRN Mild Pain (1 - 3), Moderate Pain (4 - 6)  ALBUTerol    90 MICROgram(s) HFA Inhaler 1 Puff(s) Inhalation every 8 hours PRN Bronchospasm      LABS:                        8.6    8.29  )-----------( 287      ( 05 Jan 2021 06:46 )             27.0     01-05    137  |  101  |  44<H>  ----------------------------<  118<H>  4.1   |  25  |  3.3<H>    Ca    8.8      05 Jan 2021 06:46  Mg     2.5     01-04    TPro  6.4  /  Alb  3.1<L>  /  TBili  0.2  /  DBili  x   /  AST  11  /  ALT  <5  /  AlkPhos  59  01-05              Case discussed with resident    Care discussed with pt and family

## 2021-01-05 NOTE — PROGRESS NOTE ADULT - ASSESSMENT
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    1. Lower GI Bleed   - Hb 7.7 on admission; currently 8- 9 s/p 3 units PRBCs.  - EGD: duodenum nodule, f/u pathology.  - Colonoscopy with suboptimal prep, stool found in rectum.  - GI f/u appreciated today  - colonoscopy scheduled for Wednesday   - NPO after midnight  - continue clear liquid diet today  - Golytely and pt should be encouraged to drink all of it today  - doubt pt will allow NGT placement for bowel prep    2. Superficial thrombophlebitis RUE - resolved  - Right cephalic vein thrombosis on duplex.    3. DM - on lower dose of lantus and FS are better now      4. 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  - Nephro following--no indication for RRT.    5. Alzheimer's dementia  - Continue home donepezil  - Home melatonin  - reorient as needed    6. COPD - stable  - Continue home albuterol, montelukast    7. DVT prophylaxis - SCD    full code status        Pending: clear liquid diet and bowel prep today, colonoscopy on Wednesday    Disposition: SNF    Family discussion: left message for daughter today

## 2021-01-05 NOTE — CHART NOTE - NSCHARTNOTEFT_GEN_A_CORE
patient will be scheduled for colonoscopy in am   patient with poor prep during last procedure   GI recommended two days prep , patient so far took 30 % of the two days prep   is on clear liquid diet   REC:   keep NPO after midnight   Clear liquid diet today   if patient did not drink the prep , NG tube to be tried and if patient is  resistant to NG tube , then discussion to be done with family about the current situation and limited options we have ( patient can not benefit from  mag citrate given his renal failure )

## 2021-01-05 NOTE — PHYSICAL THERAPY INITIAL EVALUATION ADULT - SPECIFY REASON(S)
958 am Attempted to see pt for PT however pt declined despite max encouragement. Cat  used (code [id number]). Pt c/o left abdominal pain. Will notify the RN in unit. Will f/u.

## 2021-01-05 NOTE — PROVIDER CONTACT NOTE (OTHER) - RECOMMENDATIONS
offer patient Golytely frequently
stat CBC
Medical team speak to pt about need for golytely
DO Sinjose alberto [phone] made aware
Hold until repeat CBC

## 2021-01-06 LAB
ALBUMIN SERPL ELPH-MCNC: 3.2 G/DL — LOW (ref 3.5–5.2)
ALP SERPL-CCNC: 60 U/L — SIGNIFICANT CHANGE UP (ref 30–115)
ALT FLD-CCNC: 5 U/L — SIGNIFICANT CHANGE UP (ref 0–41)
ANION GAP SERPL CALC-SCNC: 15 MMOL/L — HIGH (ref 7–14)
AST SERPL-CCNC: 15 U/L — SIGNIFICANT CHANGE UP (ref 0–41)
BASOPHILS # BLD AUTO: 0.08 K/UL — SIGNIFICANT CHANGE UP (ref 0–0.2)
BASOPHILS NFR BLD AUTO: 1 % — SIGNIFICANT CHANGE UP (ref 0–1)
BILIRUB SERPL-MCNC: 0.2 MG/DL — SIGNIFICANT CHANGE UP (ref 0.2–1.2)
BUN SERPL-MCNC: 30 MG/DL — HIGH (ref 10–20)
CALCIUM SERPL-MCNC: 8.2 MG/DL — LOW (ref 8.5–10.1)
CHLORIDE SERPL-SCNC: 100 MMOL/L — SIGNIFICANT CHANGE UP (ref 98–110)
CO2 SERPL-SCNC: 23 MMOL/L — SIGNIFICANT CHANGE UP (ref 17–32)
CREAT SERPL-MCNC: 3.2 MG/DL — HIGH (ref 0.7–1.5)
EOSINOPHIL # BLD AUTO: 0.62 K/UL — SIGNIFICANT CHANGE UP (ref 0–0.7)
EOSINOPHIL NFR BLD AUTO: 7.8 % — SIGNIFICANT CHANGE UP (ref 0–8)
GLUCOSE BLDC GLUCOMTR-MCNC: 112 MG/DL — HIGH (ref 70–99)
GLUCOSE BLDC GLUCOMTR-MCNC: 141 MG/DL — HIGH (ref 70–99)
GLUCOSE SERPL-MCNC: 99 MG/DL — SIGNIFICANT CHANGE UP (ref 70–99)
HCT VFR BLD CALC: 30.2 % — LOW (ref 42–52)
HGB BLD-MCNC: 9 G/DL — LOW (ref 14–18)
IMM GRANULOCYTES NFR BLD AUTO: 0.4 % — HIGH (ref 0.1–0.3)
LYMPHOCYTES # BLD AUTO: 2.31 K/UL — SIGNIFICANT CHANGE UP (ref 1.2–3.4)
LYMPHOCYTES # BLD AUTO: 29 % — SIGNIFICANT CHANGE UP (ref 20.5–51.1)
MCHC RBC-ENTMCNC: 27.5 PG — SIGNIFICANT CHANGE UP (ref 27–31)
MCHC RBC-ENTMCNC: 29.8 G/DL — LOW (ref 32–37)
MCV RBC AUTO: 92.4 FL — SIGNIFICANT CHANGE UP (ref 80–94)
MONOCYTES # BLD AUTO: 0.97 K/UL — HIGH (ref 0.1–0.6)
MONOCYTES NFR BLD AUTO: 12.2 % — HIGH (ref 1.7–9.3)
NEUTROPHILS # BLD AUTO: 3.96 K/UL — SIGNIFICANT CHANGE UP (ref 1.4–6.5)
NEUTROPHILS NFR BLD AUTO: 49.6 % — SIGNIFICANT CHANGE UP (ref 42.2–75.2)
NRBC # BLD: 0 /100 WBCS — SIGNIFICANT CHANGE UP (ref 0–0)
PLATELET # BLD AUTO: 292 K/UL — SIGNIFICANT CHANGE UP (ref 130–400)
POTASSIUM SERPL-MCNC: 4.5 MMOL/L — SIGNIFICANT CHANGE UP (ref 3.5–5)
POTASSIUM SERPL-SCNC: 4.5 MMOL/L — SIGNIFICANT CHANGE UP (ref 3.5–5)
PROT SERPL-MCNC: 6.1 G/DL — SIGNIFICANT CHANGE UP (ref 6–8)
RBC # BLD: 3.27 M/UL — LOW (ref 4.7–6.1)
RBC # FLD: 13.7 % — SIGNIFICANT CHANGE UP (ref 11.5–14.5)
SARS-COV-2 RNA SPEC QL NAA+PROBE: SIGNIFICANT CHANGE UP
SODIUM SERPL-SCNC: 138 MMOL/L — SIGNIFICANT CHANGE UP (ref 135–146)
SURGICAL PATHOLOGY STUDY: SIGNIFICANT CHANGE UP
WBC # BLD: 7.97 K/UL — SIGNIFICANT CHANGE UP (ref 4.8–10.8)
WBC # FLD AUTO: 7.97 K/UL — SIGNIFICANT CHANGE UP (ref 4.8–10.8)

## 2021-01-06 PROCEDURE — 99232 SBSQ HOSP IP/OBS MODERATE 35: CPT

## 2021-01-06 RX ORDER — SOD SULF/SODIUM/NAHCO3/KCL/PEG
4000 SOLUTION, RECONSTITUTED, ORAL ORAL ONCE
Refills: 0 | Status: COMPLETED | OUTPATIENT
Start: 2021-01-06 | End: 2021-01-06

## 2021-01-06 RX ORDER — INSULIN GLARGINE 100 [IU]/ML
4 INJECTION, SOLUTION SUBCUTANEOUS AT BEDTIME
Refills: 0 | Status: DISCONTINUED | OUTPATIENT
Start: 2021-01-06 | End: 2021-01-07

## 2021-01-06 RX ORDER — PANTOPRAZOLE SODIUM 20 MG/1
40 TABLET, DELAYED RELEASE ORAL
Refills: 0 | Status: DISCONTINUED | OUTPATIENT
Start: 2021-01-06 | End: 2021-01-10

## 2021-01-06 RX ADMIN — CHLORHEXIDINE GLUCONATE 1 APPLICATION(S): 213 SOLUTION TOPICAL at 05:28

## 2021-01-06 RX ADMIN — Medication 650 MILLIGRAM(S): at 17:54

## 2021-01-06 RX ADMIN — MONTELUKAST 10 MILLIGRAM(S): 4 TABLET, CHEWABLE ORAL at 11:48

## 2021-01-06 RX ADMIN — NYSTATIN CREAM 1 APPLICATION(S): 100000 CREAM TOPICAL at 17:54

## 2021-01-06 RX ADMIN — Medication 5 MILLIGRAM(S): at 05:26

## 2021-01-06 RX ADMIN — Medication 500 MILLIGRAM(S): at 11:48

## 2021-01-06 RX ADMIN — POLYETHYLENE GLYCOL 3350 17 GRAM(S): 17 POWDER, FOR SOLUTION ORAL at 17:55

## 2021-01-06 RX ADMIN — Medication 50 MILLIGRAM(S): at 17:54

## 2021-01-06 RX ADMIN — BUDESONIDE AND FORMOTEROL FUMARATE DIHYDRATE 2 PUFF(S): 160; 4.5 AEROSOL RESPIRATORY (INHALATION) at 08:10

## 2021-01-06 RX ADMIN — INSULIN GLARGINE 4 UNIT(S): 100 INJECTION, SOLUTION SUBCUTANEOUS at 22:22

## 2021-01-06 RX ADMIN — SENNA PLUS 2 TABLET(S): 8.6 TABLET ORAL at 22:22

## 2021-01-06 RX ADMIN — Medication 650 MILLIGRAM(S): at 05:26

## 2021-01-06 RX ADMIN — Medication 50 MILLIGRAM(S): at 05:26

## 2021-01-06 RX ADMIN — DONEPEZIL HYDROCHLORIDE 5 MILLIGRAM(S): 10 TABLET, FILM COATED ORAL at 22:22

## 2021-01-06 RX ADMIN — NYSTATIN CREAM 1 APPLICATION(S): 100000 CREAM TOPICAL at 05:27

## 2021-01-06 RX ADMIN — Medication 3 MILLIGRAM(S): at 22:22

## 2021-01-06 RX ADMIN — Medication 4000 MILLILITER(S): at 18:55

## 2021-01-06 NOTE — PROGRESS NOTE ADULT - SUBJECTIVE AND OBJECTIVE BOX
HPI:  80 years old male from [facility] with PMHx of HF with unknown EF, HTN, anxiety, Alzheimer's dementia, COPD not on home O2, T2DM on insulin, hx right nephrectomy and ESRD on retacrit, anemia of chronic disease, brought in by EMS with a complaint of burgundy stool found in diaper for the past 2 days. Patient is a poor historian so history was taken from chart review and NH nurse Irlanda. Patient has no prior episode. Patient has been on Aspirin and was stopped due to melena. He also has lost 20 lbs in one month and has poor appetite. Patient did not complains of any abdominal pain, nausea/vomiting, diarrhea or constipation. The stool was described as large amount of dark tarry stool. A stool guaiac test was positive. Nurse Irlanda denies any fever/chills, cough, chest pain, shortness of breath, or any urinary symptoms.       T(C): 36.6 (23 Dec 2020 06:36), Max: 36.6 (23 Dec 2020 06:36)  T(F): 97.8 (23 Dec 2020 06:36), Max: 97.8 (23 Dec 2020 06:36)  HR: 81 (23 Dec 2020 06:36) (81 - 94)  BP: 138/63 (23 Dec 2020 06:36) (130/60 - 144/68)  RR: 20 (23 Dec 2020 06:36) (16 - 20)  SpO2: 98% (23 Dec 2020 06:36) (98% - 100%)   (23 Dec 2020 08:01)    Currently admitted to medicine with the primary diagnosis of GI bleed       Today is hospital day 14d.     INTERVAL HPI / OVERNIGHT EVENTS:  Patient was examined and seen at bedside. This morning he is resting comfortably in bed and reports no new issues or overnight events.     ROS: Otherwise unremarkable     PAST MEDICAL & SURGICAL HISTORY  CHF (congestive heart failure)    Lymphedema of both lower extremities    COPD (chronic obstructive pulmonary disease)    Diabetes mellitus    H/O right nephrectomy  20 yrs ago      ALLERGIES  No Known Allergies    MEDICATIONS  STANDING MEDICATIONS  ascorbic acid 500 milliGRAM(s) Oral daily  budesonide 160 MICROgram(s)/formoterol 4.5 MICROgram(s) Inhaler 2 Puff(s) Inhalation two times a day  busPIRone 5 milliGRAM(s) Oral <User Schedule>  chlorhexidine 4% Liquid 1 Application(s) Topical <User Schedule>  donepezil 5 milliGRAM(s) Oral at bedtime  hydrALAZINE 50 milliGRAM(s) Oral every 12 hours  insulin glargine Injectable (LANTUS) 8 Unit(s) SubCutaneous at bedtime  melatonin 3 milliGRAM(s) Oral at bedtime  montelukast 10 milliGRAM(s) Oral daily  nystatin Cream 1 Application(s) Topical two times a day  pantoprazole Infusion 8 mG/Hr IV Continuous <Continuous>  polyethylene glycol 3350 17 Gram(s) Oral two times a day  polyethylene glycol/electrolyte Solution. 4000 milliLiter(s) Oral once  senna 2 Tablet(s) Oral at bedtime  sodium bicarbonate 650 milliGRAM(s) Oral every 12 hours    PRN MEDICATIONS  acetaminophen   Tablet .. 650 milliGRAM(s) Oral every 6 hours PRN  ALBUTerol    90 MICROgram(s) HFA Inhaler 1 Puff(s) Inhalation every 8 hours PRN    VITALS:  T(F): 97.2  HR: 80  BP: 145/63  RR: 18  SpO2: --    PHYSICAL EXAM  GEN: NAD, Resting comfortably in bed  PULM: Clear to auscultation bilaterally, No wheezes  CVS: Regular rate and rhythm, S1-S2, no murmurs  ABD: Soft, non-tender, non-distended, no guarding  EXT: No edema  NEURO: A&Ox3, no focal deficits    LABS                        8.6    8.29  )-----------( 287      ( 05 Jan 2021 06:46 )             27.0     01-05    137  |  101  |  44<H>  ----------------------------<  118<H>  4.1   |  25  |  3.3<H>    Ca    8.8      05 Jan 2021 06:46    TPro  6.4  /  Alb  3.1<L>  /  TBili  0.2  /  DBili  x   /  AST  11  /  ALT  <5  /  AlkPhos  59  01-05                  RADIOLOGY     HPI:  80 years old male from [facility] with PMHx of HF with unknown EF, HTN, anxiety, Alzheimer's dementia, COPD not on home O2, T2DM on insulin, hx right nephrectomy and ESRD on retacrit, anemia of chronic disease, brought in by EMS with a complaint of burgundy stool found in diaper for the past 2 days. Patient is a poor historian so history was taken from chart review and NH nurse Irlanda. Patient has no prior episode. Patient has been on Aspirin and was stopped due to melena. He also has lost 20 lbs in one month and has poor appetite. Patient did not complains of any abdominal pain, nausea/vomiting, diarrhea or constipation. The stool was described as large amount of dark tarry stool. A stool guaiac test was positive. Nurse Irlanda denies any fever/chills, cough, chest pain, shortness of breath, or any urinary symptoms.       T(C): 36.6 (23 Dec 2020 06:36), Max: 36.6 (23 Dec 2020 06:36)  T(F): 97.8 (23 Dec 2020 06:36), Max: 97.8 (23 Dec 2020 06:36)  HR: 81 (23 Dec 2020 06:36) (81 - 94)  BP: 138/63 (23 Dec 2020 06:36) (130/60 - 144/68)  RR: 20 (23 Dec 2020 06:36) (16 - 20)  SpO2: 98% (23 Dec 2020 06:36) (98% - 100%)   (23 Dec 2020 08:01)    Currently admitted to medicine with the primary diagnosis of GI bleed       Today is hospital day 14d.     INTERVAL HPI / OVERNIGHT EVENTS:  Patient was examined and seen at bedside. This morning he is resting comfortably in bed and reports no new issues or overnight events. Reporting hunger and continues to refuse to take Golytely. Patient needs strong encouragement. GI fellow delaying colonoscopy to tomorrow, 1/7, due to poor prep over last 2 days.    ROS: Otherwise unremarkable     PAST MEDICAL & SURGICAL HISTORY  CHF (congestive heart failure)    Lymphedema of both lower extremities    COPD (chronic obstructive pulmonary disease)    Diabetes mellitus    H/O right nephrectomy  20 yrs ago      ALLERGIES  No Known Allergies    MEDICATIONS  STANDING MEDICATIONS  ascorbic acid 500 milliGRAM(s) Oral daily  budesonide 160 MICROgram(s)/formoterol 4.5 MICROgram(s) Inhaler 2 Puff(s) Inhalation two times a day  busPIRone 5 milliGRAM(s) Oral <User Schedule>  chlorhexidine 4% Liquid 1 Application(s) Topical <User Schedule>  donepezil 5 milliGRAM(s) Oral at bedtime  hydrALAZINE 50 milliGRAM(s) Oral every 12 hours  insulin glargine Injectable (LANTUS) 8 Unit(s) SubCutaneous at bedtime  melatonin 3 milliGRAM(s) Oral at bedtime  montelukast 10 milliGRAM(s) Oral daily  nystatin Cream 1 Application(s) Topical two times a day  pantoprazole Infusion 8 mG/Hr IV Continuous <Continuous>  polyethylene glycol 3350 17 Gram(s) Oral two times a day  polyethylene glycol/electrolyte Solution. 4000 milliLiter(s) Oral once  senna 2 Tablet(s) Oral at bedtime  sodium bicarbonate 650 milliGRAM(s) Oral every 12 hours    PRN MEDICATIONS  acetaminophen   Tablet .. 650 milliGRAM(s) Oral every 6 hours PRN  ALBUTerol    90 MICROgram(s) HFA Inhaler 1 Puff(s) Inhalation every 8 hours PRN    VITALS:  T(F): 97.2  HR: 80  BP: 145/63  RR: 18  SpO2: --    PHYSICAL EXAM  GEN: NAD, Resting comfortably in bed  PULM: Clear to auscultation bilaterally, No wheezes  CVS: Regular rate and rhythm, S1-S2, no murmurs  ABD: Soft, non-tender, non-distended, no guarding  EXT: No edema, warm  NEURO: A&Ox3, no focal deficits, [place] speaking    LABS                        8.6    8.29  )-----------( 287      ( 05 Jan 2021 06:46 )             27.0     01-05    137  |  101  |  44<H>  ----------------------------<  118<H>  4.1   |  25  |  3.3<H>    Ca    8.8      05 Jan 2021 06:46    TPro  6.4  /  Alb  3.1<L>  /  TBili  0.2  /  DBili  x   /  AST  11  /  ALT  <5  /  AlkPhos  59  01-05                  RADIOLOGY

## 2021-01-06 NOTE — PROGRESS NOTE ADULT - ASSESSMENT
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    #LGIB  s/p 3U PRBC, Hg has been stable around 8-9  EGD/Colonoscopy (12/30): non-erosive gastritis, normal esophagus. Poor prep for colonoscopy  - Repeat colonoscopy tomorrow 01/07  - NPO after midnight  - Bowel prep ongoing    #Superficial thrombophlebitis RUE - resolved  - Right cephalic vein thrombosis on duplex.    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  - Cont sodium bicarb BID  - Nephro following--no indication for RRT    #Alzheimer's dementia  - Continue home donepezil  - Home melatonin  - reorient as needed  - Cont buspirone 5mg qD    #COPD - stable  - Albuterol PRN  - Cont montelukast 10mg qD    #HTN  - Cont hydralazine 50mg BID    DVT PPX, SCD

## 2021-01-06 NOTE — CHART NOTE - NSCHARTNOTEFT_GEN_A_CORE
patient was supposed to have 2 days prep , he only got 4 l of Golytely over 2 days , rectal exam this am showed hard stool in rectum     REC:   clear liquid diet   at least 2 L of Golytely to be given today , more then 2 L is highly recommended if possible   Colonoscopy in am   keep NPO after midnight

## 2021-01-06 NOTE — PROGRESS NOTE ADULT - SUBJECTIVE AND OBJECTIVE BOX
[facility] FOLLOW UP NOTE  --------------------------------------------------------------------------------  24 hour events/subjective: Patient examined. Appears comfortable.    PAST HISTORY  --------------------------------------------------------------------------------  No significant changes to PMH, PSH, FHx, SHx, unless otherwise noted    ALLERGIES & MEDICATIONS  --------------------------------------------------------------------------------  Allergies    No Known Allergies    Standing Inpatient Medications  ascorbic acid 500 milliGRAM(s) Oral daily  budesonide 160 MICROgram(s)/formoterol 4.5 MICROgram(s) Inhaler 2 Puff(s) Inhalation two times a day  busPIRone 5 milliGRAM(s) Oral <User Schedule>  chlorhexidine 4% Liquid 1 Application(s) Topical <User Schedule>  donepezil 5 milliGRAM(s) Oral at bedtime  hydrALAZINE 50 milliGRAM(s) Oral every 12 hours  insulin glargine Injectable (LANTUS) 8 Unit(s) SubCutaneous at bedtime  melatonin 3 milliGRAM(s) Oral at bedtime  montelukast 10 milliGRAM(s) Oral daily  nystatin Cream 1 Application(s) Topical two times a day  pantoprazole    Tablet 40 milliGRAM(s) Oral before breakfast  polyethylene glycol 3350 17 Gram(s) Oral two times a day  polyethylene glycol/electrolyte Solution. 4000 milliLiter(s) Oral once  polyethylene glycol/electrolyte Solution. 4000 milliLiter(s) Oral once  senna 2 Tablet(s) Oral at bedtime  sodium bicarbonate 650 milliGRAM(s) Oral every 12 hours    PRN Inpatient Medications  acetaminophen   Tablet .. 650 milliGRAM(s) Oral every 6 hours PRN  ALBUTerol    90 MICROgram(s) HFA Inhaler 1 Puff(s) Inhalation every 8 hours PRN    VITALS/PHYSICAL EXAM  --------------------------------------------------------------------------------  T(C): 36.2 (01-06-21 @ 05:29), Max: 37 (01-05-21 @ 12:54)  HR: 80 (01-06-21 @ 05:29) (80 - 102)  BP: 145/63 (01-06-21 @ 05:29) (129/68 - 175/79)  RR: 18 (01-06-21 @ 05:29) (18 - 20)    01-05-21 @ 07:01  -  01-06-21 @ 07:00  --------------------------------------------------------  IN: 0 mL / OUT: 450 mL / NET: -450 mL    Physical Exam:  	Gen: NAD  	Pulm: CTA B/L  	CV: RRR, S1S2  	Abd: +BS, soft, nontender/nondistended  	: No suprapubic tenderness  	LE: Warm, no edema    LABS/STUDIES  --------------------------------------------------------------------------------              9.0    7.97  >-----------<  292      [01-06-21 @ 06:47]              30.2     138  |  100  |  30  ----------------------------<  99      [01-06-21 @ 06:47]  4.5   |  23  |  3.2        Ca     8.2     [01-06-21 @ 06:47]    TPro  6.1  /  Alb  3.2  /  TBili  0.2  /  DBili  x   /  AST  15  /  ALT  5   /  AlkPhos  60  [01-06-21 @ 06:47]    Creatinine Trend:  SCr 3.2 [01-06 @ 06:47]  SCr 3.3 [01-05 @ 06:46]  SCr 3.7 [01-04 @ 06:40]  SCr 3.7 [01-02 @ 07:22]  SCr 3.7 [01-01 @ 07:25]    Iron 34, TIBC 178, %sat 19      [01-04-21 @ 06:40]  Ferritin 332      [01-04-21 @ 06:40]  PTH -- (Ca 8.9)      [12-24-20 @ 04:30]   54

## 2021-01-06 NOTE — PROGRESS NOTE ADULT - ASSESSMENT
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 No complaints today, encouraged to drink GoLytely for Wednesday colonoscopy prep. NPO after midnight expect for medications.    1. Lower GI Bleed   - Hb 7.7 on admission; currently 8- 9 s/p 3 units PRBCs.  - EGD: duodenum nodule, f/u pathology.  - Colonoscopy with suboptimal prep, stool found in rectum on 1/6 AM  - colonoscopy rescheduled for Thursday to ensure better prep, GI on board with plan.  - continue clear liquid diet   - Golytely 2L today  (pt should be encouraged to drink hourly)  - doubt pt will allow NGT placement for bowel prep    2. Superficial thrombophlebitis RUE - resolved  - Right cephalic vein thrombosis on duplex.    3. DM - c/w lantus and monitor FS      4. 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  - Nephro following--no indication for RRT.    5. Alzheimer's dementia  - Continue home donepezil  - Home melatonin  - reorient as needed    6. COPD - stable  - Continue home albuterol, montelukast    7. DVT prophylaxis - SCD    code status: full  Diet: clear liquid  Disposition: SNF, PT recommendations, NPO after midnight except medications.

## 2021-01-06 NOTE — PROGRESS NOTE ADULT - SUBJECTIVE AND OBJECTIVE BOX
DRAKE HO  80y, Male  Allergy: No Known Allergies    Hospital Day: 14d    Patient seen and examined earlier today. No acute events overnight.    PMH/PSH:  PAST MEDICAL & SURGICAL HISTORY:  CHF (congestive heart failure)    Lymphedema of both lower extremities    COPD (chronic obstructive pulmonary disease)    Diabetes mellitus    H/O right nephrectomy  20 yrs ago    VITALS:  T(F): 96.9 (01-06-21 @ 13:03), Max: 97.2 (01-06-21 @ 05:29)  HR: 84 (01-06-21 @ 13:03)  BP: 138/67 (01-06-21 @ 13:03) (138/67 - 175/79)  RR: 20 (01-06-21 @ 13:03)  SpO2: --    TESTS & MEASUREMENTS:  Weight (Kg):       01-04-21 @ 07:01  -  01-05-21 @ 07:00  --------------------------------------------------------  IN: 1310 mL / OUT: 1200 mL / NET: 110 mL    01-05-21 @ 07:01  -  01-06-21 @ 07:00  --------------------------------------------------------  IN: 0 mL / OUT: 450 mL / NET: -450 mL                        9.0    7.97  )-----------( 292      ( 06 Jan 2021 06:47 )             30.2     01-06    138  |  100  |  30<H>  ----------------------------<  99  4.5   |  23  |  3.2<H>    Ca    8.2<L>      06 Jan 2021 06:47    TPro  6.1  /  Alb  3.2<L>  /  TBili  0.2  /  DBili  x   /  AST  15  /  ALT  5   /  AlkPhos  60  01-06    LIVER FUNCTIONS - ( 06 Jan 2021 06:47 )  Alb: 3.2 g/dL / Pro: 6.1 g/dL / ALK PHOS: 60 U/L / ALT: 5 U/L / AST: 15 U/L / GGT: x           RECENT DIAGNOSTIC ORDERS:  Diet, NPO after Midnight:      NPO Start Date: 06-Jan-2021,   NPO Start Time: 23:59 (01-06-21 @ 14:26)  Comprehensive Metabolic Panel: AM Sched. Collection: 07-Jan-2021 04:30 (01-06-21 @ 08:33)  Complete Blood Count + Automated Diff: AM Sched. Collection: 07-Jan-2021 04:30 (01-06-21 @ 08:33)    MEDICATIONS:  MEDICATIONS  (STANDING):  ascorbic acid 500 milliGRAM(s) Oral daily  budesonide 160 MICROgram(s)/formoterol 4.5 MICROgram(s) Inhaler 2 Puff(s) Inhalation two times a day  busPIRone 5 milliGRAM(s) Oral <User Schedule>  chlorhexidine 4% Liquid 1 Application(s) Topical <User Schedule>  donepezil 5 milliGRAM(s) Oral at bedtime  hydrALAZINE 50 milliGRAM(s) Oral every 12 hours  insulin glargine Injectable (LANTUS) 8 Unit(s) SubCutaneous at bedtime  melatonin 3 milliGRAM(s) Oral at bedtime  montelukast 10 milliGRAM(s) Oral daily  nystatin Cream 1 Application(s) Topical two times a day  pantoprazole    Tablet 40 milliGRAM(s) Oral before breakfast  polyethylene glycol 3350 17 Gram(s) Oral two times a day  polyethylene glycol/electrolyte Solution. 4000 milliLiter(s) Oral once  polyethylene glycol/electrolyte Solution. 4000 milliLiter(s) Oral once  senna 2 Tablet(s) Oral at bedtime  sodium bicarbonate 650 milliGRAM(s) Oral every 12 hours    MEDICATIONS  (PRN):  acetaminophen   Tablet .. 650 milliGRAM(s) Oral every 6 hours PRN Mild Pain (1 - 3), Moderate Pain (4 - 6)  ALBUTerol    90 MICROgram(s) HFA Inhaler 1 Puff(s) Inhalation every 8 hours PRN Bronchospasm    HOME MEDICATIONS:  albuterol 90 mcg/inh inhalation aerosol (12-23)  Aricept 5 mg oral tablet (12-23)  Breo Ellipta 200 mcg-25 mcg/inh inhalation powder (12-23)  busPIRone 5 mg oral tablet (12-23)  ferrous sulfate 325 mg (65 mg elemental iron) oral tablet (12-23)  furosemide 20 mg oral tablet (12-23)  hydrALAZINE 50 mg oral tablet (12-23)  Lantus 100 units/mL subcutaneous solution (12-23)  Melatonin 3 mg oral tablet (12-23)  montelukast 10 mg oral tablet (12-23)  nitroglycerin 0.3 mg sublingual tablet (12-23)  NovoLOG FlexPen 100 units/mL injectable solution (12-23)  nystatin 100,000 units/g topical cream (12-23)  Protonix 40 mg oral delayed release tablet (12-23)  Retacrit 4000 units/mL preservative-free injectable solution (12-23)  Senna 8.6 mg oral tablet (12-23)  Tylenol 325 mg oral tablet (12-23)  Vitamin C 500 mg oral tablet (12-23)      REVIEW OF SYSTEMS:  All other review of systems is negative unless indicated above.

## 2021-01-06 NOTE — PROGRESS NOTE ADULT - ASSESSMENT
resolving ASHLEE  CKD stage 4-5  anemia  GI bleed  HTN  DM    Plan:    awaiting colonoscopy   bowel prep  on liquid diet now  cont po na bicarb  cont hydralazine  cont holding lasix, resume after colonoscopy

## 2021-01-07 ENCOUNTER — RESULT REVIEW (OUTPATIENT)
Age: 81
End: 2021-01-07

## 2021-01-07 ENCOUNTER — TRANSCRIPTION ENCOUNTER (OUTPATIENT)
Age: 81
End: 2021-01-07

## 2021-01-07 LAB
GLUCOSE BLDC GLUCOMTR-MCNC: 254 MG/DL — HIGH (ref 70–99)
GLUCOSE BLDC GLUCOMTR-MCNC: 96 MG/DL — SIGNIFICANT CHANGE UP (ref 70–99)

## 2021-01-07 PROCEDURE — 99232 SBSQ HOSP IP/OBS MODERATE 35: CPT

## 2021-01-07 PROCEDURE — 88305 TISSUE EXAM BY PATHOLOGIST: CPT | Mod: 26

## 2021-01-07 PROCEDURE — 45380 COLONOSCOPY AND BIOPSY: CPT | Mod: XU

## 2021-01-07 PROCEDURE — 45385 COLONOSCOPY W/LESION REMOVAL: CPT | Mod: XU

## 2021-01-07 PROCEDURE — 45390 COLONOSCOPY W/RESECTION: CPT | Mod: 59

## 2021-01-07 RX ORDER — INSULIN GLARGINE 100 [IU]/ML
8 INJECTION, SOLUTION SUBCUTANEOUS AT BEDTIME
Refills: 0 | Status: DISCONTINUED | OUTPATIENT
Start: 2021-01-07 | End: 2021-01-10

## 2021-01-07 RX ADMIN — Medication 500 MILLIGRAM(S): at 18:07

## 2021-01-07 RX ADMIN — MONTELUKAST 10 MILLIGRAM(S): 4 TABLET, CHEWABLE ORAL at 18:09

## 2021-01-07 RX ADMIN — INSULIN GLARGINE 8 UNIT(S): 100 INJECTION, SOLUTION SUBCUTANEOUS at 21:21

## 2021-01-07 RX ADMIN — DONEPEZIL HYDROCHLORIDE 5 MILLIGRAM(S): 10 TABLET, FILM COATED ORAL at 21:21

## 2021-01-07 RX ADMIN — BUDESONIDE AND FORMOTEROL FUMARATE DIHYDRATE 2 PUFF(S): 160; 4.5 AEROSOL RESPIRATORY (INHALATION) at 08:28

## 2021-01-07 RX ADMIN — Medication 3 MILLIGRAM(S): at 21:21

## 2021-01-07 RX ADMIN — NYSTATIN CREAM 1 APPLICATION(S): 100000 CREAM TOPICAL at 05:24

## 2021-01-07 RX ADMIN — Medication 650 MILLIGRAM(S): at 18:08

## 2021-01-07 RX ADMIN — CHLORHEXIDINE GLUCONATE 1 APPLICATION(S): 213 SOLUTION TOPICAL at 05:26

## 2021-01-07 RX ADMIN — POLYETHYLENE GLYCOL 3350 17 GRAM(S): 17 POWDER, FOR SOLUTION ORAL at 05:24

## 2021-01-07 RX ADMIN — Medication 5 MILLIGRAM(S): at 05:25

## 2021-01-07 RX ADMIN — Medication 50 MILLIGRAM(S): at 05:25

## 2021-01-07 RX ADMIN — POLYETHYLENE GLYCOL 3350 17 GRAM(S): 17 POWDER, FOR SOLUTION ORAL at 18:08

## 2021-01-07 RX ADMIN — SENNA PLUS 2 TABLET(S): 8.6 TABLET ORAL at 21:23

## 2021-01-07 RX ADMIN — Medication 650 MILLIGRAM(S): at 05:25

## 2021-01-07 RX ADMIN — NYSTATIN CREAM 1 APPLICATION(S): 100000 CREAM TOPICAL at 18:11

## 2021-01-07 RX ADMIN — Medication 50 MILLIGRAM(S): at 18:07

## 2021-01-07 RX ADMIN — PANTOPRAZOLE SODIUM 40 MILLIGRAM(S): 20 TABLET, DELAYED RELEASE ORAL at 05:25

## 2021-01-07 NOTE — PROGRESS NOTE ADULT - SUBJECTIVE AND OBJECTIVE BOX
HPI:  80 years old male from [facility] with PMHx of HF with unknown EF, HTN, anxiety, Alzheimer's dementia, COPD not on home O2, T2DM on insulin, hx right nephrectomy and ESRD on retacrit, anemia of chronic disease, brought in by EMS with a complaint of burgundy stool found in diaper for the past 2 days. Patient is a poor historian so history was taken from chart review and NH nurse Irlanda. Patient has no prior episode. Patient has been on Aspirin and was stopped due to melena. He also has lost 20 lbs in one month and has poor appetite. Patient did not complains of any abdominal pain, nausea/vomiting, diarrhea or constipation. The stool was described as large amount of dark tarry stool. A stool guaiac test was positive. Nurse Irlanda denies any fever/chills, cough, chest pain, shortness of breath, or any urinary symptoms.       T(C): 36.6 (23 Dec 2020 06:36), Max: 36.6 (23 Dec 2020 06:36)  T(F): 97.8 (23 Dec 2020 06:36), Max: 97.8 (23 Dec 2020 06:36)  HR: 81 (23 Dec 2020 06:36) (81 - 94)  BP: 138/63 (23 Dec 2020 06:36) (130/60 - 144/68)  RR: 20 (23 Dec 2020 06:36) (16 - 20)  SpO2: 98% (23 Dec 2020 06:36) (98% - 100%)   (23 Dec 2020 08:01)    Currently admitted to medicine with the primary diagnosis of GI bleed       Today is hospital day 15d.     INTERVAL HPI / OVERNIGHT EVENTS:  Patient was examined and seen at bedside. This morning he is resting comfortably in bed and reports no new issues or overnight events. Patient will go for colonoscopy today, GI aware that prep liklely suboptimal. Has not been able to participate with PT yet and will encourage with help of daughter 1/8 AM.     ROS: Otherwise unremarkable     PAST MEDICAL & SURGICAL HISTORY  CHF (congestive heart failure)    Lymphedema of both lower extremities    COPD (chronic obstructive pulmonary disease)    Diabetes mellitus    H/O right nephrectomy  20 yrs ago      ALLERGIES  No Known Allergies    MEDICATIONS  STANDING MEDICATIONS  ascorbic acid 500 milliGRAM(s) Oral daily  budesonide 160 MICROgram(s)/formoterol 4.5 MICROgram(s) Inhaler 2 Puff(s) Inhalation two times a day  busPIRone 5 milliGRAM(s) Oral <User Schedule>  chlorhexidine 4% Liquid 1 Application(s) Topical <User Schedule>  donepezil 5 milliGRAM(s) Oral at bedtime  hydrALAZINE 50 milliGRAM(s) Oral every 12 hours  insulin glargine Injectable (LANTUS) 8 Unit(s) SubCutaneous at bedtime  melatonin 3 milliGRAM(s) Oral at bedtime  montelukast 10 milliGRAM(s) Oral daily  nystatin Cream 1 Application(s) Topical two times a day  pantoprazole    Tablet 40 milliGRAM(s) Oral before breakfast  polyethylene glycol 3350 17 Gram(s) Oral two times a day  polyethylene glycol/electrolyte Solution. 4000 milliLiter(s) Oral once  senna 2 Tablet(s) Oral at bedtime  sodium bicarbonate 650 milliGRAM(s) Oral every 12 hours    PRN MEDICATIONS  acetaminophen   Tablet .. 650 milliGRAM(s) Oral every 6 hours PRN  ALBUTerol    90 MICROgram(s) HFA Inhaler 1 Puff(s) Inhalation every 8 hours PRN    VITALS:  T(F): 97.8  HR: 89  BP: 140/67  RR: 20  SpO2: --    PHYSICAL EXAM  GEN: NAD, Resting comfortably in bed  PULM: Clear to auscultation bilaterally, No wheezes  CVS: Regular rate and rhythm, S1-S2, no murmurs  ABD: Soft, non-tender, non-distended, no guarding  EXT: No edema, warm  NEURO: A&Ox3, no focal deficits, Citizen of [place] speaking, hearing aid bedside    LABS                        9.0    7.97  )-----------( 292      ( 06 Jan 2021 06:47 )             30.2     01-06    138  |  100  |  30<H>  ----------------------------<  99  4.5   |  23  |  3.2<H>    Ca    8.2<L>      06 Jan 2021 06:47    TPro  6.1  /  Alb  3.2<L>  /  TBili  0.2  /  DBili  x   /  AST  15  /  ALT  5   /  AlkPhos  60  01-06                  RADIOLOGY

## 2021-01-07 NOTE — PRE-ANESTHESIA EVALUATION ADULT - NSANTHOSAYNRD_GEN_A_CORE
No. ROSA screening performed.  STOP BANG Legend: 0-2 = LOW Risk; 3-4 = INTERMEDIATE Risk; 5-8 = HIGH Risk
No. ROSA screening performed.  STOP BANG Legend: 0-2 = LOW Risk; 3-4 = INTERMEDIATE Risk; 5-8 = HIGH Risk
auscultated w/ stethoscope

## 2021-01-07 NOTE — PROGRESS NOTE ADULT - ASSESSMENT
79 y/o man with PMH of CHF (unknown EF) - chronic, Alzheimer's, COPD, DM and CKD 5 not on HD who [facility].    #LGIB  s/p 3U PRBC, Hg has been stable around 8-9  EGD/Colonoscopy (12/30): non-erosive gastritis, normal esophagus. Poor prep for colonoscopy  - Repeat colonoscopy tomorrow 01/07  - NPO after midnight  - Bowel prep ongoing    #Superficial thrombophlebitis RUE - resolved  - Right cephalic vein thrombosis on duplex.    #DM2  - Cont lantus 8 qHs      #CKD 5 - not on HD  - Cr at baseline.  - Cont sodium bicarb BID  - Nephro following--no indication for RRT    #Alzheimer's dementia  - Continue home donepezil  - Home melatonin  - reorient as needed  - Cont buspirone 5mg qD    #COPD - stable  - Albuterol PRN  - Cont montelukast 10mg qD    #HTN  - Cont hydralazine 50mg BID    DVT PPX, SCD

## 2021-01-07 NOTE — CHART NOTE - NSCHARTNOTEFT_GEN_A_CORE
PACU ANESTHESIA ADMISSION NOTE      Procedure: colonoscopy   Post op diagnosis: polyps         __x__  Patent Airway    __x__  Full return of protective reflexes    _x___  Full recovery from anesthesia / back to baseline     Vitals:   T: N/A          R:     16             BP:     112/63             Sat:    100%               P: 83      Mental Status:  ___x_ Awake   __x___ Alert   _____ Drowsy   _____ Sedated    Nausea/Vomiting:  ____ NO  ______Yes,   x   See Post - Op Orders          Pain Scale (0-10):  _____    Treatment: ____ None    _x___ See Post - Op/PCA Orders    Post - Operative Fluids:   ____ Oral   ___x_ See Post - Op Orders    Plan: Discharge:   ____Home       _X____Floor     _____Critical Care    _____  Other:_________________    Comments:    Pt tolerated procedure well, no anesthesia related complications. Care of pt endorsed to PACU, report given to PACU RN. Discharge when criteria are met.

## 2021-01-07 NOTE — CHART NOTE - NSCHARTNOTEFT_GEN_A_CORE
patient is S/P colonoscopy showing multiple polyps s/p polypectomy  REC;  advance diet as tolerated   no further inpatient GI workup  f/u with GI as outpatient for capsule endoscopy

## 2021-01-07 NOTE — PROGRESS NOTE ADULT - SUBJECTIVE AND OBJECTIVE BOX
[place] [facility] NEPHROLOGY FOLLOW UP NOTE  --------------------------------------------------------------------------------  24 hour events/subjective: Patient examined. Appears comfortable.    PAST HISTORY  --------------------------------------------------------------------------------  No significant changes to PMH, PSH, FHx, SHx, unless otherwise noted    ALLERGIES & MEDICATIONS  --------------------------------------------------------------------------------  Allergies    No Known Allergies    Standing Inpatient Medications  ascorbic acid 500 milliGRAM(s) Oral daily  budesonide 160 MICROgram(s)/formoterol 4.5 MICROgram(s) Inhaler 2 Puff(s) Inhalation two times a day  busPIRone 5 milliGRAM(s) Oral <User Schedule>  chlorhexidine 4% Liquid 1 Application(s) Topical <User Schedule>  donepezil 5 milliGRAM(s) Oral at bedtime  hydrALAZINE 50 milliGRAM(s) Oral every 12 hours  insulin glargine Injectable (LANTUS) 4 Unit(s) SubCutaneous at bedtime  melatonin 3 milliGRAM(s) Oral at bedtime  montelukast 10 milliGRAM(s) Oral daily  nystatin Cream 1 Application(s) Topical two times a day  pantoprazole    Tablet 40 milliGRAM(s) Oral before breakfast  polyethylene glycol 3350 17 Gram(s) Oral two times a day  polyethylene glycol/electrolyte Solution. 4000 milliLiter(s) Oral once  senna 2 Tablet(s) Oral at bedtime  sodium bicarbonate 650 milliGRAM(s) Oral every 12 hours    PRN Inpatient Medications  acetaminophen   Tablet .. 650 milliGRAM(s) Oral every 6 hours PRN  ALBUTerol    90 MICROgram(s) HFA Inhaler 1 Puff(s) Inhalation every 8 hours PRN    VITALS/PHYSICAL EXAM  --------------------------------------------------------------------------------  T(C): 36.6 (01-07-21 @ 05:18), Max: 36.8 (01-06-21 @ 21:29)  HR: 82 (01-07-21 @ 05:18) (82 - 94)  BP: 164/74 (01-07-21 @ 05:18) (138/67 - 164/74)  RR: 18 (01-07-21 @ 05:18) (18 - 20)    01-06-21 @ 07:01  -  01-07-21 @ 07:00  --------------------------------------------------------  IN: 0 mL / OUT: 450 mL / NET: -450 mL    Physical Exam:  	Gen: NAD  	Pulm: CTA B/L  	CV: RRR, S1S2  	Abd: +BS, soft, nontender/nondistended  	: No suprapubic tenderness  	LE: Warm, no edema    LABS/STUDIES  --------------------------------------------------------------------------------              9.0    7.97  >-----------<  292      [01-06-21 @ 06:47]              30.2     138  |  100  |  30  ----------------------------<  99      [01-06-21 @ 06:47]  4.5   |  23  |  3.2        Ca     8.2     [01-06-21 @ 06:47]    TPro  6.1  /  Alb  3.2  /  TBili  0.2  /  DBili  x   /  AST  15  /  ALT  5   /  AlkPhos  60  [01-06-21 @ 06:47]    Creatinine Trend:  SCr 3.2 [01-06 @ 06:47]  SCr 3.3 [01-05 @ 06:46]  SCr 3.7 [01-04 @ 06:40]  SCr 3.7 [01-02 @ 07:22]  SCr 3.7 [01-01 @ 07:25]    Iron 34, TIBC 178, %sat 19      [01-04-21 @ 06:40]  Ferritin 332      [01-04-21 @ 06:40]  PTH -- (Ca 8.9)      [12-24-20 @ 04:30]   54

## 2021-01-07 NOTE — PROGRESS NOTE ADULT - SUBJECTIVE AND OBJECTIVE BOX
DRAKE HO  80y, Male  Allergy: No Known Allergies    Hospital Day: 15d    Patient seen and examined earlier today. No acute events overnight.    PMH/PSH:  PAST MEDICAL & SURGICAL HISTORY:  CHF (congestive heart failure)    Lymphedema of both lower extremities    COPD (chronic obstructive pulmonary disease)    Diabetes mellitus    H/O right nephrectomy  20 yrs ago    VITALS:  T(F): 98 (01-07-21 @ 13:04), Max: 98.2 (01-06-21 @ 21:29)  HR: 89 (01-07-21 @ 13:04)  BP: 140/67 (01-07-21 @ 13:04) (140/67 - 164/74)  RR: 20 (01-07-21 @ 13:04)  SpO2: --    TESTS & MEASUREMENTS:  Weight (Kg):       01-05-21 @ 07:01  -  01-06-21 @ 07:00  --------------------------------------------------------  IN: 0 mL / OUT: 450 mL / NET: -450 mL    01-06-21 @ 07:01  -  01-07-21 @ 07:00  --------------------------------------------------------  IN: 0 mL / OUT: 450 mL / NET: -450 mL                        9.0    7.97  )-----------( 292      ( 06 Jan 2021 06:47 )             30.2     01-06    138  |  100  |  30<H>  ----------------------------<  99  4.5   |  23  |  3.2<H>    Ca    8.2<L>      06 Jan 2021 06:47    TPro  6.1  /  Alb  3.2<L>  /  TBili  0.2  /  DBili  x   /  AST  15  /  ALT  5   /  AlkPhos  60  01-06    LIVER FUNCTIONS - ( 06 Jan 2021 06:47 )  Alb: 3.2 g/dL / Pro: 6.1 g/dL / ALK PHOS: 60 U/L / ALT: 5 U/L / AST: 15 U/L / GGT: x           RECENT DIAGNOSTIC ORDERS:  Diet, NPO after Midnight:      NPO Start Date: 06-Jan-2021,   NPO Start Time: 23:59 (01-06-21 @ 14:26)    MEDICATIONS:  MEDICATIONS  (STANDING):  ascorbic acid 500 milliGRAM(s) Oral daily  budesonide 160 MICROgram(s)/formoterol 4.5 MICROgram(s) Inhaler 2 Puff(s) Inhalation two times a day  busPIRone 5 milliGRAM(s) Oral <User Schedule>  chlorhexidine 4% Liquid 1 Application(s) Topical <User Schedule>  donepezil 5 milliGRAM(s) Oral at bedtime  hydrALAZINE 50 milliGRAM(s) Oral every 12 hours  insulin glargine Injectable (LANTUS) 4 Unit(s) SubCutaneous at bedtime  melatonin 3 milliGRAM(s) Oral at bedtime  montelukast 10 milliGRAM(s) Oral daily  nystatin Cream 1 Application(s) Topical two times a day  pantoprazole    Tablet 40 milliGRAM(s) Oral before breakfast  polyethylene glycol 3350 17 Gram(s) Oral two times a day  polyethylene glycol/electrolyte Solution. 4000 milliLiter(s) Oral once  senna 2 Tablet(s) Oral at bedtime  sodium bicarbonate 650 milliGRAM(s) Oral every 12 hours    MEDICATIONS  (PRN):  acetaminophen   Tablet .. 650 milliGRAM(s) Oral every 6 hours PRN Mild Pain (1 - 3), Moderate Pain (4 - 6)  ALBUTerol    90 MICROgram(s) HFA Inhaler 1 Puff(s) Inhalation every 8 hours PRN Bronchospasm    HOME MEDICATIONS:  albuterol 90 mcg/inh inhalation aerosol (12-23)  Aricept 5 mg oral tablet (12-23)  Breo Ellipta 200 mcg-25 mcg/inh inhalation powder (12-23)  busPIRone 5 mg oral tablet (12-23)  ferrous sulfate 325 mg (65 mg elemental iron) oral tablet (12-23)  furosemide 20 mg oral tablet (12-23)  hydrALAZINE 50 mg oral tablet (12-23)  Lantus 100 units/mL subcutaneous solution (12-23)  Melatonin 3 mg oral tablet (12-23)  montelukast 10 mg oral tablet (12-23)  nitroglycerin 0.3 mg sublingual tablet (12-23)  NovoLOG FlexPen 100 units/mL injectable solution (12-23)  nystatin 100,000 units/g topical cream (12-23)  Protonix 40 mg oral delayed release tablet (12-23)  Retacrit 4000 units/mL preservative-free injectable solution (12-23)  Senna 8.6 mg oral tablet (12-23)  Tylenol 325 mg oral tablet (12-23)  Vitamin C 500 mg oral tablet (12-23)      REVIEW OF SYSTEMS:  All other review of systems is negative unless indicated above.     PHYSICAL EXAM:  GENERAL: NAD  HEENT: No Swelling  CHEST/LUNG: Good air entry, No wheezing  HEART: RRR, No murmurs  ABDOMEN: Soft, Bowel sounds present  EXTREMITIES:  No clubbing

## 2021-01-07 NOTE — PROGRESS NOTE ADULT - ASSESSMENT
resolving ASHLEE  CKD stage 4-5  anemia  GI bleed  HTN  DM    Plan:    awaiting colonoscopy   bowel prep  cont po na bicarb  cont hydralazine  cont holding lasix, resume after colonoscopy

## 2021-01-08 ENCOUNTER — TRANSCRIPTION ENCOUNTER (OUTPATIENT)
Age: 81
End: 2021-01-08

## 2021-01-08 LAB
GLUCOSE BLDC GLUCOMTR-MCNC: 159 MG/DL — HIGH (ref 70–99)
GLUCOSE BLDC GLUCOMTR-MCNC: 94 MG/DL — SIGNIFICANT CHANGE UP (ref 70–99)
SARS-COV-2 RNA SPEC QL NAA+PROBE: SIGNIFICANT CHANGE UP

## 2021-01-08 PROCEDURE — 99232 SBSQ HOSP IP/OBS MODERATE 35: CPT

## 2021-01-08 RX ORDER — FUROSEMIDE 40 MG
20 TABLET ORAL DAILY
Refills: 0 | Status: DISCONTINUED | OUTPATIENT
Start: 2021-01-08 | End: 2021-01-08

## 2021-01-08 RX ORDER — SODIUM CHLORIDE 9 MG/ML
1000 INJECTION INTRAMUSCULAR; INTRAVENOUS; SUBCUTANEOUS ONCE
Refills: 0 | Status: COMPLETED | OUTPATIENT
Start: 2021-01-08 | End: 2021-01-08

## 2021-01-08 RX ADMIN — CHLORHEXIDINE GLUCONATE 1 APPLICATION(S): 213 SOLUTION TOPICAL at 05:22

## 2021-01-08 RX ADMIN — INSULIN GLARGINE 8 UNIT(S): 100 INJECTION, SOLUTION SUBCUTANEOUS at 21:43

## 2021-01-08 RX ADMIN — BUDESONIDE AND FORMOTEROL FUMARATE DIHYDRATE 2 PUFF(S): 160; 4.5 AEROSOL RESPIRATORY (INHALATION) at 21:44

## 2021-01-08 RX ADMIN — Medication 500 MILLIGRAM(S): at 11:21

## 2021-01-08 RX ADMIN — POLYETHYLENE GLYCOL 3350 17 GRAM(S): 17 POWDER, FOR SOLUTION ORAL at 05:23

## 2021-01-08 RX ADMIN — Medication 50 MILLIGRAM(S): at 05:22

## 2021-01-08 RX ADMIN — Medication 5 MILLIGRAM(S): at 05:21

## 2021-01-08 RX ADMIN — MONTELUKAST 10 MILLIGRAM(S): 4 TABLET, CHEWABLE ORAL at 11:21

## 2021-01-08 RX ADMIN — DONEPEZIL HYDROCHLORIDE 5 MILLIGRAM(S): 10 TABLET, FILM COATED ORAL at 21:43

## 2021-01-08 RX ADMIN — NYSTATIN CREAM 1 APPLICATION(S): 100000 CREAM TOPICAL at 05:27

## 2021-01-08 RX ADMIN — BUDESONIDE AND FORMOTEROL FUMARATE DIHYDRATE 2 PUFF(S): 160; 4.5 AEROSOL RESPIRATORY (INHALATION) at 00:24

## 2021-01-08 RX ADMIN — Medication 650 MILLIGRAM(S): at 17:06

## 2021-01-08 RX ADMIN — Medication 50 MILLIGRAM(S): at 17:06

## 2021-01-08 RX ADMIN — SENNA PLUS 2 TABLET(S): 8.6 TABLET ORAL at 21:43

## 2021-01-08 RX ADMIN — Medication 650 MILLIGRAM(S): at 05:21

## 2021-01-08 RX ADMIN — SODIUM CHLORIDE 1000 MILLILITER(S): 9 INJECTION INTRAMUSCULAR; INTRAVENOUS; SUBCUTANEOUS at 17:06

## 2021-01-08 RX ADMIN — NYSTATIN CREAM 1 APPLICATION(S): 100000 CREAM TOPICAL at 17:07

## 2021-01-08 RX ADMIN — Medication 3 MILLIGRAM(S): at 21:43

## 2021-01-08 RX ADMIN — PANTOPRAZOLE SODIUM 40 MILLIGRAM(S): 20 TABLET, DELAYED RELEASE ORAL at 05:22

## 2021-01-08 NOTE — DISCHARGE NOTE PROVIDER - HOSPITAL COURSE
HPI:  80 years old male from [facility] with PMHx of HF with unknown EF, HTN, anxiety, Alzheimer's dementia, COPD not on home O2, T2DM on insulin, hx right nephrectomy and ESRD on retacrit, anemia of chronic disease, brought in by EMS with a complaint of burgundy stool found in diaper for the past 2 days. Patient is a poor historian so history was taken from chart review and NH nurse Irlanda. Patient has no prior episode. Patient has been on Aspirin and was stopped due to melena. He also has lost 20 lbs in one month and has poor appetite. Patient did not complains of any abdominal pain, nausea/vomiting, diarrhea or constipation. The stool was described as large amount of dark tarry stool. A stool guaiac test was positive. Nurse Irlanda denies any fever/chills, cough, chest pain, shortness of breath, or any urinary symptoms.       T(C): 36.6 (23 Dec 2020 06:36), Max: 36.6 (23 Dec 2020 06:36)  T(F): 97.8 (23 Dec 2020 06:36), Max: 97.8 (23 Dec 2020 06:36)  HR: 81 (23 Dec 2020 06:36) (81 - 94)  BP: 138/63 (23 Dec 2020 06:36) (130/60 - 144/68)  RR: 20 (23 Dec 2020 06:36) (16 - 20)  SpO2: 98% (23 Dec 2020 06:36) (98% - 100%)   (23 Dec 2020 08:01)    Currently admitted to medicine with the primary diagnosis of GI bleed        HPI:  80 years old male from [facility] with PMHx of HF with unknown EF, HTN, anxiety, Alzheimer's dementia, COPD not on home O2, T2DM on insulin, hx right nephrectomy and ESRD on retacrit, anemia of chronic disease, brought in by EMS with a complaint of burgundy stool found in diaper for the past 2 days. Patient is a poor historian so history was taken from chart review and NH nurse Irlanda. Patient has no prior episode. Patient has been on Aspirin and was stopped due to melena. He also has lost 20 lbs in one month and has poor appetite. Patient did not complains of any abdominal pain, nausea/vomiting, diarrhea or constipation. The stool was described as large amount of dark tarry stool. A stool guaiac test was positive. Nurse Irlanda denies any fever/chills, cough, chest pain, shortness of breath, or any urinary symptoms. Patient admitted to medicine with the primary diagnosis of GI bleed. While admitted, the patient underwent a colonoscopy which showed multiple polyps. Polyps were resected and patient will require follow up with GI team as outpatient for capsule endoscopy. Patient's diet was advanced with good effect. Patient is now stable for discharge.

## 2021-01-08 NOTE — DISCHARGE NOTE PROVIDER - NSDCMRMEDTOKEN_GEN_ALL_CORE_FT
albuterol 90 mcg/inh inhalation aerosol: 2 puff(s) inhaled every 4 hours  Aricept 5 mg oral tablet: 1 tab(s) orally once a day (at bedtime)  Breo Ellipta 200 mcg-25 mcg/inh inhalation powder: 1 puff(s) inhaled once a day  busPIRone 5 mg oral tablet: 1 tab(s) orally once a day  ferrous sulfate 325 mg (65 mg elemental iron) oral tablet: 1 tab(s) orally once a day  furosemide 20 mg oral tablet: 1 tab(s) orally once a day  hydrALAZINE 50 mg oral tablet: 1 tab(s) orally every 12 hours  Lantus 100 units/mL subcutaneous solution: 19 unit(s) subcutaneous once a day (at bedtime)  Melatonin 3 mg oral tablet: 1 tab(s) orally once a day (at bedtime)  montelukast 10 mg oral tablet: 1 tab(s) orally once a day  nitroglycerin 0.3 mg sublingual tablet: 1 tab(s) sublingual every 5 minutes no more than 3 doses  NovoLOG FlexPen 100 units/mL injectable solution: 6 unit(s) injectable 3 times a day (before meals)  nystatin 100,000 units/g topical cream: Apply topically to affected area 2 times a day  Protonix 40 mg oral delayed release tablet: 1 tab(s) orally 2 times a day  Retacrit 4000 units/mL preservative-free injectable solution: 4000 unit(s) injectable once a week. hold if Hgb &gt; 10  Senna 8.6 mg oral tablet: 2 tab(s) orally once a day (at bedtime), As Needed  Tylenol 325 mg oral tablet: 2 tab(s) orally 2 times a day, As Needed  Vitamin C 500 mg oral tablet: 1 tab(s) orally once a day

## 2021-01-08 NOTE — PROGRESS NOTE ADULT - SUBJECTIVE AND OBJECTIVE BOX
Gastroenterology progress note:     Patient is a 80y old  Male who presents with a chief complaint of blood in stool (08 Jan 2021 10:34)       Admitted on: 12-23-20    We are following the patient for: anemia      Interval History: patient is s/p Colonoscopy yesterday . Multiple polyps removed. No abdominal pain . No GI bleed. Comfortable in BED. HD stable     Patient's medical problems are stable    Prior records reviewed (Y/N): Y  History obtained from someone other than patient (Y/N): Y      PAST MEDICAL & SURGICAL HISTORY:  CHF (congestive heart failure)    Lymphedema of both lower extremities    COPD (chronic obstructive pulmonary disease)    Diabetes mellitus    H/O right nephrectomy  20 yrs ago        MEDICATIONS  (STANDING):  ascorbic acid 500 milliGRAM(s) Oral daily  budesonide 160 MICROgram(s)/formoterol 4.5 MICROgram(s) Inhaler 2 Puff(s) Inhalation two times a day  busPIRone 5 milliGRAM(s) Oral <User Schedule>  chlorhexidine 4% Liquid 1 Application(s) Topical <User Schedule>  donepezil 5 milliGRAM(s) Oral at bedtime  furosemide    Tablet 20 milliGRAM(s) Oral daily  hydrALAZINE 50 milliGRAM(s) Oral every 12 hours  insulin glargine Injectable (LANTUS) 8 Unit(s) SubCutaneous at bedtime  melatonin 3 milliGRAM(s) Oral at bedtime  montelukast 10 milliGRAM(s) Oral daily  nystatin Cream 1 Application(s) Topical two times a day  pantoprazole    Tablet 40 milliGRAM(s) Oral before breakfast  polyethylene glycol 3350 17 Gram(s) Oral two times a day  polyethylene glycol/electrolyte Solution. 4000 milliLiter(s) Oral once  senna 2 Tablet(s) Oral at bedtime  sodium bicarbonate 650 milliGRAM(s) Oral every 12 hours    MEDICATIONS  (PRN):  acetaminophen   Tablet .. 650 milliGRAM(s) Oral every 6 hours PRN Mild Pain (1 - 3), Moderate Pain (4 - 6)  ALBUTerol    90 MICROgram(s) HFA Inhaler 1 Puff(s) Inhalation every 8 hours PRN Bronchospasm      Allergies  No Known Allergies      Review of Systems:   Cardiovascular:  No Chest Pain, No Palpitations  Respiratory:  No Cough, No Dyspnea  Gastrointestinal:  As described in HPI    Physical Examination:  T(C): 36.2 (01-08-21 @ 05:08), Max: 36.7 (01-07-21 @ 13:04)  HR: 79 (01-08-21 @ 05:08) (78 - 95)  BP: 153/68 (01-08-21 @ 05:08) (130/61 - 173/83)  RR: 18 (01-08-21 @ 05:08) (18 - 20)  SpO2: 97% (01-07-21 @ 16:23) (97% - 97%)  Weight (kg): 75.2 (01-07-21 @ 13:51)    01-08-21 @ 07:01  -  01-08-21 @ 12:15  --------------------------------------------------------  IN: 230 mL / OUT: 0 mL / NET: 230 mL      Constitutional: No acute distress.  Respiratory:  No signs of respiratory distress. Lung sounds are clear bilaterally.  Cardiovascular:  S1 S2, Regular rate and rhythm.  Abdominal: Abdomen is soft, symmetric, and non-tender without distention.  Bowel sounds are present and normoactive in all four quadrants. No masses, hepatomegaly, or splenomegaly are noted.   Skin: No rashes, No Jaundice.        Data: (reviewed by attending)    Hgb trend:  9.0  01-06-21 @ 06:47              Liver panel trend:  TBili 0.2   /   AST 15   /   ALT 5   /   AlkP 60   /   Tptn 6.1   /   Alb 3.2    /   DBili --      01-06  TBili 0.2   /   AST 11   /   ALT <5   /   AlkP 59   /   Tptn 6.4   /   Alb 3.1    /   DBili --      01-05  TBili 0.3   /   AST 12   /   ALT <5   /   AlkP 60   /   Tptn 6.6   /   Alb 3.4    /   DBili --      01-02  TBili 0.3   /   AST 13   /   ALT <5   /   AlkP 57   /   Tptn 6.0   /   Alb 3.3    /   DBili --      01-01  TBili 0.9   /   AST 12   /   ALT <5   /   AlkP 59   /   Tptn 6.2   /   Alb 3.1    /   DBili --      12-31  TBili 0.3   /   AST 12   /   ALT 5   /   AlkP 64   /   Tptn 6.4   /   Alb 3.4    /   DBili --      12-30             Radiology: (reviewed by attending)

## 2021-01-08 NOTE — PROGRESS NOTE ADULT - ATTENDING COMMENTS
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 s/p colonoscopy     #Lower GI Bleed   Hb 7.7 on admission; currently 8- 9 s/p 3 units PRBCs.  EGD: duodenum nodule, f/u pathology.  s/p colonoscopy showing multiple polyps s/p polypectomy - GI recommendations:  advance diet as tolerated   no further inpatient GI workup  f/u with GI as outpatient for capsule endoscopy and outpatient CT A/P     #Orthostasis   SBP drop from 174 laying to 98 standing   also feeling dizziness   start NS     #Superficial thrombophlebitis RUE - resolved  Right cephalic vein thrombosis on duplex.    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  Nephro following--no indication for RRT.  Hold lasix severely orthostatic      #Alzheimer's dementia  c/w home donepezil  Home melatonin  reorient as needed    #COPD - stable  c/w home albuterol, montelukast    DVT prophylaxis - SCD    I saw and evaluated the patient on the above date of service.   I agree with the above history, physical, and plan which I have reviewed and edited where appropriate.

## 2021-01-08 NOTE — PROGRESS NOTE ADULT - ASSESSMENT
resolving ASHLEE  CKD stage 4-5  anemia  GI bleed  HTN  DM    Plan:    colonoscopy unrevealing  outpatient capsule endoscopy  bowel prep  cont po na bicarb  cont hydralazine  resume lasix on discharge

## 2021-01-08 NOTE — PROGRESS NOTE ADULT - SUBJECTIVE AND OBJECTIVE BOX
HPI:  80 years old male from [facility] with PMHx of HF with unknown EF, HTN, anxiety, Alzheimer's dementia, COPD not on home O2, T2DM on insulin, hx right nephrectomy and ESRD on retacrit, anemia of chronic disease, brought in by EMS with a complaint of burgundy stool found in diaper for the past 2 days. Patient is a poor historian so history was taken from chart review and NH nurse Irlanda. Patient has no prior episode. Patient has been on Aspirin and was stopped due to melena. He also has lost 20 lbs in one month and has poor appetite. Patient did not complains of any abdominal pain, nausea/vomiting, diarrhea or constipation. The stool was described as large amount of dark tarry stool. A stool guaiac test was positive. Nurse Irlanda denies any fever/chills, cough, chest pain, shortness of breath, or any urinary symptoms.       T(C): 36.6 (23 Dec 2020 06:36), Max: 36.6 (23 Dec 2020 06:36)  T(F): 97.8 (23 Dec 2020 06:36), Max: 97.8 (23 Dec 2020 06:36)  HR: 81 (23 Dec 2020 06:36) (81 - 94)  BP: 138/63 (23 Dec 2020 06:36) (130/60 - 144/68)  RR: 20 (23 Dec 2020 06:36) (16 - 20)  SpO2: 98% (23 Dec 2020 06:36) (98% - 100%)   (23 Dec 2020 08:01)    Currently admitted to medicine with the primary diagnosis of GI bleed       Today is hospital day 16d.     INTERVAL HPI / OVERNIGHT EVENTS:  Patient was examined and seen at bedside. This morning he is resting comfortably in bed and reports no new issues or overnight events.     ROS: Otherwise unremarkable     PAST MEDICAL & SURGICAL HISTORY  CHF (congestive heart failure)    Lymphedema of both lower extremities    COPD (chronic obstructive pulmonary disease)    Diabetes mellitus    H/O right nephrectomy  20 yrs ago      ALLERGIES  No Known Allergies    MEDICATIONS  STANDING MEDICATIONS  ascorbic acid 500 milliGRAM(s) Oral daily  budesonide 160 MICROgram(s)/formoterol 4.5 MICROgram(s) Inhaler 2 Puff(s) Inhalation two times a day  busPIRone 5 milliGRAM(s) Oral <User Schedule>  chlorhexidine 4% Liquid 1 Application(s) Topical <User Schedule>  donepezil 5 milliGRAM(s) Oral at bedtime  hydrALAZINE 50 milliGRAM(s) Oral every 12 hours  insulin glargine Injectable (LANTUS) 8 Unit(s) SubCutaneous at bedtime  melatonin 3 milliGRAM(s) Oral at bedtime  montelukast 10 milliGRAM(s) Oral daily  nystatin Cream 1 Application(s) Topical two times a day  pantoprazole    Tablet 40 milliGRAM(s) Oral before breakfast  polyethylene glycol 3350 17 Gram(s) Oral two times a day  polyethylene glycol/electrolyte Solution. 4000 milliLiter(s) Oral once  senna 2 Tablet(s) Oral at bedtime  sodium bicarbonate 650 milliGRAM(s) Oral every 12 hours    PRN MEDICATIONS  acetaminophen   Tablet .. 650 milliGRAM(s) Oral every 6 hours PRN  ALBUTerol    90 MICROgram(s) HFA Inhaler 1 Puff(s) Inhalation every 8 hours PRN    VITALS:  T(F): 97.2  HR: 79  BP: 153/68  RR: 18  SpO2: 97%    PHYSICAL EXAM  GEN: NAD, Resting comfortably in bed  PULM: Clear to auscultation bilaterally, No wheezes  CVS: Regular rate and rhythm, S1-S2, no murmurs  ABD: Soft, non-tender, non-distended, no guarding  EXT: No edema  NEURO: A&Ox3, no focal deficits    LABS        RADIOLOGY

## 2021-01-08 NOTE — PHYSICAL THERAPY INITIAL EVALUATION ADULT - BED MOBILITY TRAINING, PT EVAL
Pt will participate in supine to sit and reverse using side rails with CGA  by discharge to facilitate return to PLOF.

## 2021-01-08 NOTE — PROGRESS NOTE ADULT - ASSESSMENT
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 s/p colonoscopy     Patient was prepared for discharge 1/8 but orthostatic when working with PT. Reassess orthostatics, COVID test negative > return to [place].    1. Lower GI Bleed   - Hb 7.7 on admission; currently 8- 9 s/p 3 units PRBCs.  - EGD: duodenum nodule, f/u pathology.  - s/p colonoscopy showing multiple polyps s/p polypectomy - GI recommendations: tolerating regular diet, f/u with GI as outpatient for capsule endoscopy.    2. Superficial thrombophlebitis RUE - resolved  - Right cephalic vein thrombosis on duplex.    3. DM - c/w lantus and monitor FS      4. 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  - Nephro following--no indication for RRT.  - will restart lasix     5. Alzheimer's dementia  - Continue home donepezil  - Home melatonin  - reorient as needed    6. COPD - stable  - Continue home albuterol, montelukast    7. DVT prophylaxis - SCD    code status: full  Diet: advance diet as tolerated  Disposition: reassess orthostatics, COVID test negative > return to [place]

## 2021-01-08 NOTE — PHYSICAL THERAPY INITIAL EVALUATION ADULT - PERTINENT HX OF CURRENT PROBLEM, REHAB EVAL
80 years old male from [facility] with PMHx of HF with unknown EF, HTN, anxiety, Alzheimer's dementia, COPD not on home O2, T2DM on insulin, hx right nephrectomy and ESRD not on HD, anemia of chronic disease, brought in by EMS with a complaint of burgundy stool found in diaper for the past 2 days.

## 2021-01-08 NOTE — PROGRESS NOTE ADULT - ASSESSMENT
80 years old male from [facility] with PMHx of HF with unknown EF, HTN, anxiety, Alzheimer's dementia, COPD not on home O2, T2DM on insulin, hx right nephrectomy and ESRD not on HD, anemia of chronic disease, brought in by EMS with a complaint of burgundy stool found in diaper for the past 2 days. Patient is a poor historian so history was taken from chart review and NH nurse Irlanda. Patient has no prior episode. Patient has been on Aspirin and was stopped due to maroon coloured stool. He also has lost 20 lbs in one month and has poor appetite. Patient did not complains of any abdominal pain, nausea/vomiting, diarrhea or constipation.  A stool guaiac test was positive.     # questionable GI bleed  / weight loss:    No evidence of GI bleed in hospital . Brown stool   HD stable   HGB stable during hospitalization  S/P EGD showing non erosive gastritis , H. pylori negative                                             Rec:  - Monitor CBC  - active type and screen  - keep hgb>8  - monitor electrolyte and correct accordingly  - EGD and colon in am   -family is agreeable for NG tube placement if required to give the prep  (golytely and dulcolax)   80 years old male from [facility] with PMHx of HF with unknown EF, HTN, anxiety, Alzheimer's dementia, COPD not on home O2, T2DM on insulin, hx right nephrectomy and ESRD not on HD, anemia of chronic disease, brought in by EMS with a complaint of burgundy stool found in diaper for the past 2 days. Patient is a poor historian so history was taken from chart review and NH nurse Irlanda. Patient has no prior episode. Patient has been on Aspirin and was stopped due to maroon coloured stool. He also has lost 20 lbs in one month and has poor appetite. Patient did not complains of any abdominal pain, nausea/vomiting, diarrhea or constipation.  A stool guaiac test was positive.     # questionable GI bleed  / weight loss:    No evidence of GI bleed in hospital . Brown stool   HD stable   HGB stable during hospitalization  S/P EGD showing non erosive gastritis , H. pylori negative     Colonoscopy :    Polyp (10 mm) in the ascending colon. (Polypectomy, Injection, Endoclip).    Polyp (4 mm) in the ascending colon. (Polypectomy).    Polyp (6 mm) in the descending colon. (Polypectomy).    Polyps (4 mm) in the sigmoid colon. (Polypectomy).    External hemorrhoids.    Moderate diverticulosis of the  left side of the colon.     REC:   outpatient capsule endoscopy  outpatient CT abdomen and pelvis   await pathology results  f/u with GI as outpatient

## 2021-01-08 NOTE — DISCHARGE NOTE PROVIDER - CARE PROVIDER_API CALL
Giancarlo Bertrand  GASTROENTEROLOGY  [address] [place] [address]  Phone: [phone]  Fax: [phone]  Follow Up Time: 2 weeks

## 2021-01-08 NOTE — CHART NOTE - NSCHARTNOTEFT_GEN_A_CORE
Registered Dietitian Limited Follow Up--this was charted remotely     Patient was prepared for discharge 1/8 but orthostatic when working with PT. Reassess orthostatics, COVID test negative, then return to [place]. Meds and labs reviewed; no GI distress noted, LBM 1/5. CBW 78.6kg (1/7) vs 75.2kg (12/30), likely bed-scale error as pt with no edema noted. Skin intact. Spoke w/ pt's PCA, who reports that pt consuming most of his meals w/o any issues. No further nutritional interventions at this time; RD to reassess pt again in 7 days.

## 2021-01-08 NOTE — PROGRESS NOTE ADULT - SUBJECTIVE AND OBJECTIVE BOX
[facility] NEPHROLOGY FOLLOW UP NOTE  --------------------------------------------------------------------------------  24 hour events/subjective: Patient examined. Appears comfortable.    PAST HISTORY  --------------------------------------------------------------------------------  No significant changes to PMH, PSH, FHx, SHx, unless otherwise noted    ALLERGIES & MEDICATIONS  --------------------------------------------------------------------------------  Allergies    No Known Allergies    Intolerances      Standing Inpatient Medications  ascorbic acid 500 milliGRAM(s) Oral daily  budesonide 160 MICROgram(s)/formoterol 4.5 MICROgram(s) Inhaler 2 Puff(s) Inhalation two times a day  busPIRone 5 milliGRAM(s) Oral <User Schedule>  chlorhexidine 4% Liquid 1 Application(s) Topical <User Schedule>  donepezil 5 milliGRAM(s) Oral at bedtime  furosemide    Tablet 20 milliGRAM(s) Oral daily  hydrALAZINE 50 milliGRAM(s) Oral every 12 hours  insulin glargine Injectable (LANTUS) 8 Unit(s) SubCutaneous at bedtime  melatonin 3 milliGRAM(s) Oral at bedtime  montelukast 10 milliGRAM(s) Oral daily  nystatin Cream 1 Application(s) Topical two times a day  pantoprazole    Tablet 40 milliGRAM(s) Oral before breakfast  polyethylene glycol 3350 17 Gram(s) Oral two times a day  polyethylene glycol/electrolyte Solution. 4000 milliLiter(s) Oral once  senna 2 Tablet(s) Oral at bedtime  sodium bicarbonate 650 milliGRAM(s) Oral every 12 hours    PRN Inpatient Medications  acetaminophen   Tablet .. 650 milliGRAM(s) Oral every 6 hours PRN  ALBUTerol    90 MICROgram(s) HFA Inhaler 1 Puff(s) Inhalation every 8 hours PRN      VITALS/PHYSICAL EXAM  --------------------------------------------------------------------------------  T(C): 36.9 (01-08-21 @ 12:42), Max: 36.9 (01-08-21 @ 12:42)  HR: 87 (01-08-21 @ 12:42) (78 - 95)  BP: 149/66 (01-08-21 @ 12:42) (130/61 - 153/68)  RR: 18 (01-08-21 @ 12:42) (18 - 18)  SpO2: 97% (01-07-21 @ 16:23) (97% - 97%)  Wt(kg): --  Height (cm): 172.7 (01-07-21 @ 13:51)  Weight (kg): 75.2 (01-07-21 @ 13:51)  BMI (kg/m2): 25.2 (01-07-21 @ 13:51)  BSA (m2): 1.89 (01-07-21 @ 13:51)      01-08-21 @ 07:01  -  01-08-21 @ 15:22  --------------------------------------------------------  IN: 230 mL / OUT: 0 mL / NET: 230 mL      Physical Exam:  	Gen: NAD  	Pulm: CTA B/L  	CV: RRR, S1S2  	Abd: +BS, soft, nontender/nondistended  	: No suprapubic tenderness  	LE: Warm, no edema    LABS/STUDIES  --------------------------------------------------------------------------------    Creatinine Trend:  SCr 3.2 [01-06 @ 06:47]  SCr 3.3 [01-05 @ 06:46]  SCr 3.7 [01-04 @ 06:40]  SCr 3.7 [01-02 @ 07:22]  SCr 3.7 [01-01 @ 07:25]        Iron 34, TIBC 178, %sat 19      [01-04-21 @ 06:40]  Ferritin 332      [01-04-21 @ 06:40]  PTH -- (Ca 8.9)      [12-24-20 @ 04:30]   54 unable to obtain

## 2021-01-08 NOTE — PHYSICAL THERAPY INITIAL EVALUATION ADULT - GAIT TRAINING, PT EVAL
Pt will ambulate using RW or least restrictive AD for 50 ft with CGA by discharge to facilitate return to PLOF.

## 2021-01-08 NOTE — CHART NOTE - NSCHARTNOTESELECT_GEN_ALL_CORE
Event Note
ANESTHESIA/Event Note
Event Note
GI fellow/Event Note
PACU Anesthesia
RD @ risk f/u/Event Note
RD Follow-Up/Event Note

## 2021-01-08 NOTE — PHYSICAL THERAPY INITIAL EVALUATION ADULT - GENERAL OBSERVATIONS, REHAB EVAL
13:30-14:00. chart reviewed. Pt received semi-smith at B/S, alert, oriented X 2, able to follow one step instructions and agreeable to PT evaluation. able to speak English, pleasant, cooperative, Resident Simón/Nurse Richards was present during evaluation. Pt denies pain or discomfort, initial vitals 174/75 , sitting 116/60 , standing 98/54 , complaining of dizziness, buckling while standing, able to transfer to a B/S chair. /55 .

## 2021-01-08 NOTE — PROGRESS NOTE ADULT - SUBJECTIVE AND OBJECTIVE BOX
HPI:  80 years old male from [facility] with PMHx of HF with unknown EF, HTN, anxiety, Alzheimer's dementia, COPD not on home O2, T2DM on insulin, hx right nephrectomy and ESRD on retacrit, anemia of chronic disease, brought in by EMS with a complaint of burgundy stool found in diaper for the past 2 days. Patient is a poor historian so history was taken from chart review and NH nurse Irlanda. Patient has no prior episode. Patient has been on Aspirin and was stopped due to melena. He also has lost 20 lbs in one month and has poor appetite. Patient did not complains of any abdominal pain, nausea/vomiting, diarrhea or constipation. The stool was described as large amount of dark tarry stool. A stool guaiac test was positive. Nurse Irlanda denies any fever/chills, cough, chest pain, shortness of breath, or any urinary symptoms.       T(C): 36.6 (23 Dec 2020 06:36), Max: 36.6 (23 Dec 2020 06:36)  T(F): 97.8 (23 Dec 2020 06:36), Max: 97.8 (23 Dec 2020 06:36)  HR: 81 (23 Dec 2020 06:36) (81 - 94)  BP: 138/63 (23 Dec 2020 06:36) (130/60 - 144/68)  RR: 20 (23 Dec 2020 06:36) (16 - 20)  SpO2: 98% (23 Dec 2020 06:36) (98% - 100%)   (23 Dec 2020 08:01)    Currently admitted to medicine with the primary diagnosis of GI bleed       Today is hospital day 16d.     INTERVAL HPI / OVERNIGHT EVENTS:  Patient was examined and seen at bedside. This morning he is resting comfortably in bed and reports no new issues or overnight events. He is tolerating a regular diet and was able to participate with PT today. Patient was orthostatic during PT. Will be given 1 L normal saline. Reassess prior to discharge.    ROS: Otherwise unremarkable     PAST MEDICAL & SURGICAL HISTORY  CHF (congestive heart failure)    Lymphedema of both lower extremities    COPD (chronic obstructive pulmonary disease)    Diabetes mellitus    H/O right nephrectomy  20 yrs ago      ALLERGIES  No Known Allergies    MEDICATIONS  STANDING MEDICATIONS  ascorbic acid 500 milliGRAM(s) Oral daily  budesonide 160 MICROgram(s)/formoterol 4.5 MICROgram(s) Inhaler 2 Puff(s) Inhalation two times a day  busPIRone 5 milliGRAM(s) Oral <User Schedule>  chlorhexidine 4% Liquid 1 Application(s) Topical <User Schedule>  donepezil 5 milliGRAM(s) Oral at bedtime  hydrALAZINE 50 milliGRAM(s) Oral every 12 hours  insulin glargine Injectable (LANTUS) 8 Unit(s) SubCutaneous at bedtime  melatonin 3 milliGRAM(s) Oral at bedtime  montelukast 10 milliGRAM(s) Oral daily  nystatin Cream 1 Application(s) Topical two times a day  pantoprazole    Tablet 40 milliGRAM(s) Oral before breakfast  polyethylene glycol 3350 17 Gram(s) Oral two times a day  polyethylene glycol/electrolyte Solution. 4000 milliLiter(s) Oral once  senna 2 Tablet(s) Oral at bedtime  sodium bicarbonate 650 milliGRAM(s) Oral every 12 hours  sodium chloride 0.9% Bolus 1000 milliLiter(s) IV Bolus once    PRN MEDICATIONS  acetaminophen   Tablet .. 650 milliGRAM(s) Oral every 6 hours PRN  ALBUTerol    90 MICROgram(s) HFA Inhaler 1 Puff(s) Inhalation every 8 hours PRN    VITALS:  T(F): 98.5  HR: 87  BP: 149/66  RR: 18  SpO2: 97%    PHYSICAL EXAM  GEN: NAD, Resting comfortably in bed  PULM: Clear to auscultation bilaterally, No wheezes  CVS: Regular rate and rhythm, S1-S2, no murmurs  ABD: Soft, non-tender, non-distended, no guarding  EXT: No edema  NEURO: A&Ox3, no focal deficits    LABS                        RADIOLOGY

## 2021-01-08 NOTE — DISCHARGE NOTE PROVIDER - NSDCCPCAREPLAN_GEN_ALL_CORE_FT
PRINCIPAL DISCHARGE DIAGNOSIS  Diagnosis: GI bleed  Assessment and Plan of Treatment: Gastrointestinal (GI) bleeding may occur in any part of your digestive tract. This includes your esophagus, stomach, intestines, rectum, or anus. Bleeding may be mild to severe. Your bleeding may begin suddenly, or start slowly and last for a longer period of time. Bleeding that lasts for a longer period of time is called chronic GI bleeding.  Seek care immediately if:  Your symptoms return.  Contact your healthcare provider if:  You have nausea or are vomiting.  You have heartburn.  You have questions or concerns about your condition or care.  Activity:  Rest as directed. Ask when you can return to your usual activities, such as work. Slowly do more each day.  Nutrition:  Ask if you need to be on a special diet. A special diet can help treat GI conditions and prevent problems such as GI bleeding. Eat small meals more often while your digestive system heals. Avoid or limit caffeine and spicy foods. Also avoid foods that cause heartburn, nausea, or diarrhea.  Prevent GI bleeding:  Manage GI conditions as directed. Examples of GI conditions include gastroesophageal reflux, peptic ulcer disease, and ulcerative colitis. Take all medicines for these conditions as directed.  Limit or do not take NSAIDs. Ask your healthcare provider if it is safe for you to take NSAIDs. NSAIDs can increase your risk for ulcers and GI bleeding.  Do not drink alcohol. Alcohol can cause ulcers and esophageal varices. Esophageal varices are swollen blood vessels in your esophagus. Over time the blood vessels become weak and may bleed.  Do not smoke. Nicotine and other chemicals in cigarettes and cigars can increase your risk for ulcers. Ask your healthcare provider for information if you currently smoke and need help to quit. E-cigarettes or smokeless tobacco still contain nicotine. Talk to your healthcare provider before you use these products.  While you were in the hospital, you underwent a colonscopy which showed polyps. GI is recommending further follow up for outpatient colonoscopy.       PRINCIPAL DISCHARGE DIAGNOSIS  Diagnosis: GI bleed  Assessment and Plan of Treatment: Gastrointestinal (GI) bleeding may occur in any part of your digestive tract. This includes your esophagus, stomach, intestines, rectum, or anus. Bleeding may be mild to severe. Your bleeding may begin suddenly, or start slowly and last for a longer period of time. Bleeding that lasts for a longer period of time is called chronic GI bleeding.  Seek care immediately if:  Your symptoms return.  Contact your healthcare provider if:  You have nausea or are vomiting.  You have heartburn.  You have questions or concerns about your condition or care.  Activity:  Rest as directed. Ask when you can return to your usual activities, such as work. Slowly do more each day.  Nutrition:  Ask if you need to be on a special diet. A special diet can help treat GI conditions and prevent problems such as GI bleeding. Eat small meals more often while your digestive system heals. Avoid or limit caffeine and spicy foods. Also avoid foods that cause heartburn, nausea, or diarrhea.  Prevent GI bleeding:  Manage GI conditions as directed. Examples of GI conditions include gastroesophageal reflux, peptic ulcer disease, and ulcerative colitis. Take all medicines for these conditions as directed.  Limit or do not take NSAIDs. Ask your healthcare provider if it is safe for you to take NSAIDs. NSAIDs can increase your risk for ulcers and GI bleeding.  Do not drink alcohol. Alcohol can cause ulcers and esophageal varices. Esophageal varices are swollen blood vessels in your esophagus. Over time the blood vessels become weak and may bleed.  Do not smoke. Nicotine and other chemicals in cigarettes and cigars can increase your risk for ulcers. Ask your healthcare provider for information if you currently smoke and need help to quit. E-cigarettes or smokeless tobacco still contain nicotine. Talk to your healthcare provider before you use these products.  While you were in the hospital, you underwent a colonscopy which showed polyps. GI is recommending further follow up for outpatient capsule endoscopy.

## 2021-01-08 NOTE — PROGRESS NOTE ADULT - ASSESSMENT
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 s/p colonoscopy     1. Lower GI Bleed   - Hb 7.7 on admission; currently 8- 9 s/p 3 units PRBCs.  - EGD: duodenum nodule, f/u pathology.  - s/p colonoscopy showing multiple polyps s/p polypectomy - GI recommendations:  advance diet as tolerated   no further inpatient GI workup  f/u with GI as outpatient for capsule endoscopy.    2. Superficial thrombophlebitis RUE - resolved  - Right cephalic vein thrombosis on duplex.    3. DM - c/w lantus and monitor FS      4. 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  - Nephro following--no indication for RRT.  - will restart lasix     5. Alzheimer's dementia  - Continue home donepezil  - Home melatonin  - reorient as needed    6. COPD - stable  - Continue home albuterol, montelukast    7. DVT prophylaxis - SCD    code status: full  Diet: advance diet as tolerated  Disposition: SNF, PT recommendations

## 2021-01-09 LAB
ALBUMIN SERPL ELPH-MCNC: 3 G/DL — LOW (ref 3.5–5.2)
ALP SERPL-CCNC: 51 U/L — SIGNIFICANT CHANGE UP (ref 30–115)
ALT FLD-CCNC: <5 U/L — SIGNIFICANT CHANGE UP (ref 0–41)
ANION GAP SERPL CALC-SCNC: 8 MMOL/L — SIGNIFICANT CHANGE UP (ref 7–14)
AST SERPL-CCNC: 8 U/L — SIGNIFICANT CHANGE UP (ref 0–41)
BASOPHILS # BLD AUTO: 0.06 K/UL — SIGNIFICANT CHANGE UP (ref 0–0.2)
BASOPHILS NFR BLD AUTO: 0.9 % — SIGNIFICANT CHANGE UP (ref 0–1)
BILIRUB SERPL-MCNC: <0.2 MG/DL — SIGNIFICANT CHANGE UP (ref 0.2–1.2)
BUN SERPL-MCNC: 24 MG/DL — HIGH (ref 10–20)
CALCIUM SERPL-MCNC: 8.4 MG/DL — LOW (ref 8.5–10.1)
CHLORIDE SERPL-SCNC: 105 MMOL/L — SIGNIFICANT CHANGE UP (ref 98–110)
CO2 SERPL-SCNC: 27 MMOL/L — SIGNIFICANT CHANGE UP (ref 17–32)
CREAT SERPL-MCNC: 3.4 MG/DL — HIGH (ref 0.7–1.5)
EOSINOPHIL # BLD AUTO: 0.74 K/UL — HIGH (ref 0–0.7)
EOSINOPHIL NFR BLD AUTO: 10.6 % — HIGH (ref 0–8)
GLUCOSE BLDC GLUCOMTR-MCNC: 112 MG/DL — HIGH (ref 70–99)
GLUCOSE BLDC GLUCOMTR-MCNC: 120 MG/DL — HIGH (ref 70–99)
GLUCOSE SERPL-MCNC: 85 MG/DL — SIGNIFICANT CHANGE UP (ref 70–99)
HCT VFR BLD CALC: 25.2 % — LOW (ref 42–52)
HGB BLD-MCNC: 7.8 G/DL — LOW (ref 14–18)
IMM GRANULOCYTES NFR BLD AUTO: 0.7 % — HIGH (ref 0.1–0.3)
LYMPHOCYTES # BLD AUTO: 2.34 K/UL — SIGNIFICANT CHANGE UP (ref 1.2–3.4)
LYMPHOCYTES # BLD AUTO: 33.6 % — SIGNIFICANT CHANGE UP (ref 20.5–51.1)
MCHC RBC-ENTMCNC: 27.6 PG — SIGNIFICANT CHANGE UP (ref 27–31)
MCHC RBC-ENTMCNC: 31 G/DL — LOW (ref 32–37)
MCV RBC AUTO: 89 FL — SIGNIFICANT CHANGE UP (ref 80–94)
MONOCYTES # BLD AUTO: 0.85 K/UL — HIGH (ref 0.1–0.6)
MONOCYTES NFR BLD AUTO: 12.2 % — HIGH (ref 1.7–9.3)
NEUTROPHILS # BLD AUTO: 2.93 K/UL — SIGNIFICANT CHANGE UP (ref 1.4–6.5)
NEUTROPHILS NFR BLD AUTO: 42 % — LOW (ref 42.2–75.2)
NRBC # BLD: 0 /100 WBCS — SIGNIFICANT CHANGE UP (ref 0–0)
PLATELET # BLD AUTO: 284 K/UL — SIGNIFICANT CHANGE UP (ref 130–400)
POTASSIUM SERPL-MCNC: 3.6 MMOL/L — SIGNIFICANT CHANGE UP (ref 3.5–5)
POTASSIUM SERPL-SCNC: 3.6 MMOL/L — SIGNIFICANT CHANGE UP (ref 3.5–5)
PROT SERPL-MCNC: 6.1 G/DL — SIGNIFICANT CHANGE UP (ref 6–8)
RBC # BLD: 2.83 M/UL — LOW (ref 4.7–6.1)
RBC # FLD: 13.6 % — SIGNIFICANT CHANGE UP (ref 11.5–14.5)
SODIUM SERPL-SCNC: 140 MMOL/L — SIGNIFICANT CHANGE UP (ref 135–146)
WBC # BLD: 6.97 K/UL — SIGNIFICANT CHANGE UP (ref 4.8–10.8)
WBC # FLD AUTO: 6.97 K/UL — SIGNIFICANT CHANGE UP (ref 4.8–10.8)

## 2021-01-09 PROCEDURE — 99232 SBSQ HOSP IP/OBS MODERATE 35: CPT

## 2021-01-09 RX ORDER — SODIUM CHLORIDE 9 MG/ML
1000 INJECTION INTRAMUSCULAR; INTRAVENOUS; SUBCUTANEOUS
Refills: 0 | Status: DISCONTINUED | OUTPATIENT
Start: 2021-01-09 | End: 2021-01-10

## 2021-01-09 RX ADMIN — Medication 50 MILLIGRAM(S): at 05:30

## 2021-01-09 RX ADMIN — NYSTATIN CREAM 1 APPLICATION(S): 100000 CREAM TOPICAL at 18:45

## 2021-01-09 RX ADMIN — Medication 650 MILLIGRAM(S): at 18:43

## 2021-01-09 RX ADMIN — POLYETHYLENE GLYCOL 3350 17 GRAM(S): 17 POWDER, FOR SOLUTION ORAL at 05:31

## 2021-01-09 RX ADMIN — MONTELUKAST 10 MILLIGRAM(S): 4 TABLET, CHEWABLE ORAL at 11:22

## 2021-01-09 RX ADMIN — SENNA PLUS 2 TABLET(S): 8.6 TABLET ORAL at 22:19

## 2021-01-09 RX ADMIN — INSULIN GLARGINE 8 UNIT(S): 100 INJECTION, SOLUTION SUBCUTANEOUS at 22:19

## 2021-01-09 RX ADMIN — Medication 650 MILLIGRAM(S): at 05:30

## 2021-01-09 RX ADMIN — NYSTATIN CREAM 1 APPLICATION(S): 100000 CREAM TOPICAL at 05:32

## 2021-01-09 RX ADMIN — BUDESONIDE AND FORMOTEROL FUMARATE DIHYDRATE 2 PUFF(S): 160; 4.5 AEROSOL RESPIRATORY (INHALATION) at 20:42

## 2021-01-09 RX ADMIN — Medication 500 MILLIGRAM(S): at 11:21

## 2021-01-09 RX ADMIN — PANTOPRAZOLE SODIUM 40 MILLIGRAM(S): 20 TABLET, DELAYED RELEASE ORAL at 05:31

## 2021-01-09 RX ADMIN — BUDESONIDE AND FORMOTEROL FUMARATE DIHYDRATE 2 PUFF(S): 160; 4.5 AEROSOL RESPIRATORY (INHALATION) at 07:53

## 2021-01-09 RX ADMIN — DONEPEZIL HYDROCHLORIDE 5 MILLIGRAM(S): 10 TABLET, FILM COATED ORAL at 22:19

## 2021-01-09 RX ADMIN — Medication 3 MILLIGRAM(S): at 22:19

## 2021-01-09 RX ADMIN — POLYETHYLENE GLYCOL 3350 17 GRAM(S): 17 POWDER, FOR SOLUTION ORAL at 18:48

## 2021-01-09 RX ADMIN — CHLORHEXIDINE GLUCONATE 1 APPLICATION(S): 213 SOLUTION TOPICAL at 05:31

## 2021-01-09 RX ADMIN — Medication 5 MILLIGRAM(S): at 05:30

## 2021-01-09 RX ADMIN — SODIUM CHLORIDE 75 MILLILITER(S): 9 INJECTION INTRAMUSCULAR; INTRAVENOUS; SUBCUTANEOUS at 13:32

## 2021-01-09 RX ADMIN — Medication 4000 MILLILITER(S): at 18:44

## 2021-01-09 NOTE — PROVIDER CONTACT NOTE (OTHER) - SITUATION
Pt had 2 bloody BMs
Pt refusing bowel prep, sergio.  Pt dtr Carito called and given to pt to speak regarding bowel prep  Pt refused to drink despite dtr speaking to pt
Pt FS 49 in am, repeat 56  Attempted to call Md Amos to order dextrose IVP   unable to reach md
patient blood pressure elevated at 175/76 pulse 80.
pt refuses to finish Golytely has only drunk about 1/4 of amount prescribed since 1900
Pt ordered for 1 unit PRBCs
patient FS 69 - due for 2200 dose of Lantus
patient due to go for colonoscopy tomorrow. patient ordered for Golytely, started around 9pm. Patient tolerated about half of gallon

## 2021-01-09 NOTE — PROGRESS NOTE ADULT - ATTENDING COMMENTS
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 s/p colonoscopy     #Lower GI Bleed   Hb 7.7 on admission; currently 8- 9 s/p 3 units PRBCs.  EGD: duodenum nodule, f/u pathology.  s/p colonoscopy showing multiple polyps s/p polypectomy - GI recommendations:  advance diet as tolerated   no further inpatient GI workup  f/u with GI as outpatient for capsule endoscopy and outpatient CT A/P     #Orthostasis   SBP drop from 174 laying to 98 standing   remains dizzy and reluctant to participate with PT  c/w NS      #Superficial thrombophlebitis RUE - resolved  Right cephalic vein thrombosis on duplex.    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  Nephro following--no indication for RRT.  Hold lasix severely orthostatic      #Alzheimer's dementia  c/w home donepezil  Home melatonin  reorient as needed    #COPD - stable  c/w home albuterol, montelukast    DVT prophylaxis - SCD    I saw and evaluated the patient on the above date of service.   I agree with the above history, physical, and plan which I have reviewed and edited where appropriate.     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 s/p colonoscopy     #Lower GI Bleed   Hb 7.7 on admission; currently 8- 9 s/p 3 units PRBCs.  EGD: duodenum nodule, f/u pathology.  s/p colonoscopy showing multiple polyps s/p polypectomy - GI recommendations:  advance diet as tolerated   no further inpatient GI workup  f/u with GI as outpatient for capsule endoscopy and outpatient CT A/P     #Orthostasis   SBP drop from 174 laying to 98 standing   remains dizzy and reluctant to participate with PT  c/w NS      #Superficial thrombophlebitis RUE - resolved  Right cephalic vein thrombosis on duplex.    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  Nephro following--no indication for RRT.  Hold lasix severely orthostatic  - restart on discharge    #Alzheimer's dementia  c/w home donepezil  Home melatonin  reorient as needed    #COPD - stable  c/w home albuterol, montelukast    DVT prophylaxis - SCD    I saw and evaluated the patient on the above date of service.   I agree with the above history, physical, and plan which I have reviewed and edited where appropriate.     Progress Note Handoff:  pending PT eval - once orthostasis improves  Full code

## 2021-01-09 NOTE — PROGRESS NOTE ADULT - SUBJECTIVE AND OBJECTIVE BOX
SUBJECTIVE:    Patient is a 80y old Male who presents with a chief complaint of blood in stool (08 Jan 2021 15:45)    Currently admitted to medicine with the primary diagnosis of GI bleed    Today is hospital day 17d. This morning he is resting comfortably in bed and reports no new issues or overnight events.     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     ALLERGIES:  No Known Allergies    MEDICATIONS:  STANDING MEDICATIONS  ascorbic acid 500 milliGRAM(s) Oral daily  budesonide 160 MICROgram(s)/formoterol 4.5 MICROgram(s) Inhaler 2 Puff(s) Inhalation two times a day  busPIRone 5 milliGRAM(s) Oral <User Schedule>  chlorhexidine 4% Liquid 1 Application(s) Topical <User Schedule>  donepezil 5 milliGRAM(s) Oral at bedtime  insulin glargine Injectable (LANTUS) 8 Unit(s) SubCutaneous at bedtime  melatonin 3 milliGRAM(s) Oral at bedtime  montelukast 10 milliGRAM(s) Oral daily  nystatin Cream 1 Application(s) Topical two times a day  pantoprazole    Tablet 40 milliGRAM(s) Oral before breakfast  polyethylene glycol 3350 17 Gram(s) Oral two times a day  polyethylene glycol/electrolyte Solution. 4000 milliLiter(s) Oral once  senna 2 Tablet(s) Oral at bedtime  sodium bicarbonate 650 milliGRAM(s) Oral every 12 hours  sodium chloride 0.9%. 1000 milliLiter(s) IV Continuous <Continuous>    PRN MEDICATIONS  acetaminophen   Tablet .. 650 milliGRAM(s) Oral every 6 hours PRN  ALBUTerol    90 MICROgram(s) HFA Inhaler 1 Puff(s) Inhalation every 8 hours PRN    VITALS:   T(F): 96.8  HR: 72  BP: 154/71  RR: 18  SpO2: --    I&Os:  01-08-21 @ 07:01  -  01-09-21 @ 07:00  --------------------------------------------------------  IN: 230 mL / OUT: 0 mL / NET: 230 mL    01-09-21 @ 07:01  -  01-09-21 @ 11:52  --------------------------------------------------------  IN: 0 mL / OUT: 200 mL / NET: -200 mL        PHYSICAL EXAM:  GEN: No acute distress  LUNGS: Clear to auscultation bilaterally   HEART: S1/S2  ABD: Soft, NT/ND. BS +  EXT: no cyanosis/edema  NEURO: AAOX3    LABS:                        7.8    6.97  )-----------( 284      ( 09 Jan 2021 06:40 )             25.2     01-09    140  |  105  |  24<H>  ----------------------------<  85  3.6   |  27  |  3.4<H>    Ca    8.4<L>      09 Jan 2021 06:40    TPro  6.1  /  Alb  3.0<L>  /  TBili  <0.2  /  DBili  x   /  AST  8   /  ALT  <5  /  AlkPhos  51  01-09      RADIOLOGY:

## 2021-01-09 NOTE — PROVIDER CONTACT NOTE (OTHER) - DATE AND TIME:
03-Jan-2021 10:50
09-Jan-2021 21:32
24-Dec-2020 12:34
05-Jan-2021 01:32
23-Dec-2020 13:40
30-Dec-2020 21:00
30-Dec-2020 01:10
03-Jan-2021 09:42

## 2021-01-09 NOTE — PROGRESS NOTE ADULT - ASSESSMENT
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 s/p colonoscopy     Patient was prepared for discharge 1/8 but orthostatic when working with PT. Reassess orthostatics, COVID test negative > return to [place].    1. Lower GI Bleed   - Hb 7.7 on admission; currently 8- 9 s/p 3 units PRBCs.  - EGD: duodenum nodule, f/u pathology.  - s/p colonoscopy showing multiple polyps s/p polypectomy - GI recommendations: tolerating regular diet, f/u with GI as outpatient for capsule endoscopy.  - Hb dropped for 9.0 (on 1/6) to 7.8 today  - repeat cbc for 4 PM ordered  - type and screen for 4 PM ordered.    # Orthostatic hypotension  - orthostatic vital positive for hypotension  - hydralazine stopped today for orthostatic hypotension  - IVF with NS at 75 cc/hr started  - monitor BP    2. Superficial thrombophlebitis RUE - resolved  - Right cephalic vein thrombosis on duplex.    3. DM - c/w lantus and monitor FS      4. 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  - Nephro following--no indication for RRT.  - will restart lasix on d/c    5. Alzheimer's dementia  - Continue home donepezil  - Home melatonin  - reorient as needed    6. COPD - stable  - Continue home albuterol, montelukast    7. DVT prophylaxis - SCD    code status: full  Diet: advance diet as tolerated  Disposition: orthostatic hypotension, c/w IVF, reassess in AM

## 2021-01-09 NOTE — PROGRESS NOTE ADULT - SUBJECTIVE AND OBJECTIVE BOX
[facility] FOLLOW UP NOTE  --------------------------------------------------------------------------------  Chief Complaint/24 hour events/subjective:    pt seen , no c/o, lethargic     PAST HISTORY  --------------------------------------------------------------------------------  No significant changes to PMH, PSH, FHx, SHx, unless otherwise noted    ALLERGIES & MEDICATIONS  --------------------------------------------------------------------------------  Allergies    No Known Allergies    Intolerances      Standing Inpatient Medications  ascorbic acid 500 milliGRAM(s) Oral daily  budesonide 160 MICROgram(s)/formoterol 4.5 MICROgram(s) Inhaler 2 Puff(s) Inhalation two times a day  busPIRone 5 milliGRAM(s) Oral <User Schedule>  chlorhexidine 4% Liquid 1 Application(s) Topical <User Schedule>  donepezil 5 milliGRAM(s) Oral at bedtime  insulin glargine Injectable (LANTUS) 8 Unit(s) SubCutaneous at bedtime  melatonin 3 milliGRAM(s) Oral at bedtime  montelukast 10 milliGRAM(s) Oral daily  nystatin Cream 1 Application(s) Topical two times a day  pantoprazole    Tablet 40 milliGRAM(s) Oral before breakfast  polyethylene glycol 3350 17 Gram(s) Oral two times a day  polyethylene glycol/electrolyte Solution. 4000 milliLiter(s) Oral once  senna 2 Tablet(s) Oral at bedtime  sodium bicarbonate 650 milliGRAM(s) Oral every 12 hours  sodium chloride 0.9%. 1000 milliLiter(s) IV Continuous <Continuous>    PRN Inpatient Medications  acetaminophen   Tablet .. 650 milliGRAM(s) Oral every 6 hours PRN  ALBUTerol    90 MICROgram(s) HFA Inhaler 1 Puff(s) Inhalation every 8 hours PRN      REVIEW OF SYSTEMS  --------------------------------------------------------------------------------    All other systems were reviewed and are negative, except as noted.    VITALS/PHYSICAL EXAM  --------------------------------------------------------------------------------  T(C): 36 (01-09-21 @ 05:02), Max: 36.4 (01-08-21 @ 20:50)  HR: 72 (01-09-21 @ 05:02) (72 - 94)  BP: 154/71 (01-09-21 @ 05:02) (142/65 - 154/71)  RR: 18 (01-09-21 @ 05:02) (18 - 18)  SpO2: --  Wt(kg): --  Height (cm): 172.7 (01-07-21 @ 13:51)  Weight (kg): 75.2 (01-07-21 @ 13:51)  BMI (kg/m2): 25.2 (01-07-21 @ 13:51)  BSA (m2): 1.89 (01-07-21 @ 13:51)      01-08-21 @ 07:01  -  01-09-21 @ 07:00  --------------------------------------------------------  IN: 230 mL / OUT: 0 mL / NET: 230 mL    01-09-21 @ 07:01  -  01-09-21 @ 13:13  --------------------------------------------------------  IN: 0 mL / OUT: 200 mL / NET: -200 mL      Physical Exam:    	Gen: no distress   	Pulm: CTA B/L  	CV: S1S2; no rub  	Abd: +BS, soft, nontender/nondistended  	LE:  no  edema      LABS/STUDIES  --------------------------------------------------------------------------------              7.8    6.97  >-----------<  284      [01-09-21 @ 06:40]              25.2     140  |  105  |  24  ----------------------------<  85      [01-09-21 @ 06:40]  3.6   |  27  |  3.4        Ca     8.4     [01-09-21 @ 06:40]    TPro  6.1  /  Alb  3.0  /  TBili  <0.2  /  DBili  x   /  AST  8   /  ALT  <5  /  AlkPhos  51  [01-09-21 @ 06:40]          Creatinine Trend:  SCr 3.4 [01-09 @ 06:40]  SCr 3.2 [01-06 @ 06:47]  SCr 3.3 [01-05 @ 06:46]  SCr 3.7 [01-04 @ 06:40]  SCr 3.7 [01-02 @ 07:22]        Iron 34, TIBC 178, %sat 19      [01-04-21 @ 06:40]  Ferritin 332      [01-04-21 @ 06:40]  PTH -- (Ca 8.9)      [12-24-20 @ 04:30]   54

## 2021-01-09 NOTE — PROVIDER CONTACT NOTE (OTHER) - REASON
Pt FS
elevated blood pressure
pt refusing bowel prep
pt refuses bowel prep
FS 69
PRBCs ordered
Siobhanly
Bloody BMs

## 2021-01-09 NOTE — PROGRESS NOTE ADULT - ASSESSMENT
CKD stage 4-5  anemia  s/p GI bleed  HTN  DM    Plan:    stable Cr, good UO  f/u BMP and cbc.  outpatient capsule endoscopy  bowel prep  cont po na bicarb  cont hydralazine  resume lasix on discharge

## 2021-01-10 ENCOUNTER — TRANSCRIPTION ENCOUNTER (OUTPATIENT)
Age: 81
End: 2021-01-10

## 2021-01-10 VITALS
SYSTOLIC BLOOD PRESSURE: 173 MMHG | TEMPERATURE: 98 F | HEART RATE: 77 BPM | DIASTOLIC BLOOD PRESSURE: 78 MMHG | RESPIRATION RATE: 18 BRPM

## 2021-01-10 LAB
ALBUMIN SERPL ELPH-MCNC: 3.3 G/DL — LOW (ref 3.5–5.2)
ALP SERPL-CCNC: 50 U/L — SIGNIFICANT CHANGE UP (ref 30–115)
ALT FLD-CCNC: <5 U/L — SIGNIFICANT CHANGE UP (ref 0–41)
ANION GAP SERPL CALC-SCNC: 9 MMOL/L — SIGNIFICANT CHANGE UP (ref 7–14)
AST SERPL-CCNC: 11 U/L — SIGNIFICANT CHANGE UP (ref 0–41)
BASOPHILS # BLD AUTO: 0.05 K/UL — SIGNIFICANT CHANGE UP (ref 0–0.2)
BASOPHILS # BLD AUTO: 0.07 K/UL — SIGNIFICANT CHANGE UP (ref 0–0.2)
BASOPHILS NFR BLD AUTO: 0.6 % — SIGNIFICANT CHANGE UP (ref 0–1)
BASOPHILS NFR BLD AUTO: 0.9 % — SIGNIFICANT CHANGE UP (ref 0–1)
BILIRUB SERPL-MCNC: 0.2 MG/DL — SIGNIFICANT CHANGE UP (ref 0.2–1.2)
BLD GP AB SCN SERPL QL: SIGNIFICANT CHANGE UP
BUN SERPL-MCNC: 21 MG/DL — HIGH (ref 10–20)
CALCIUM SERPL-MCNC: 8.6 MG/DL — SIGNIFICANT CHANGE UP (ref 8.5–10.1)
CHLORIDE SERPL-SCNC: 105 MMOL/L — SIGNIFICANT CHANGE UP (ref 98–110)
CO2 SERPL-SCNC: 26 MMOL/L — SIGNIFICANT CHANGE UP (ref 17–32)
CREAT SERPL-MCNC: 3 MG/DL — HIGH (ref 0.7–1.5)
EOSINOPHIL # BLD AUTO: 0.79 K/UL — HIGH (ref 0–0.7)
EOSINOPHIL # BLD AUTO: 0.93 K/UL — HIGH (ref 0–0.7)
EOSINOPHIL NFR BLD AUTO: 11 % — HIGH (ref 0–8)
EOSINOPHIL NFR BLD AUTO: 9.6 % — HIGH (ref 0–8)
GLUCOSE BLDC GLUCOMTR-MCNC: 215 MG/DL — HIGH (ref 70–99)
GLUCOSE SERPL-MCNC: 113 MG/DL — HIGH (ref 70–99)
HCT VFR BLD CALC: 24.3 % — LOW (ref 42–52)
HCT VFR BLD CALC: 27.7 % — LOW (ref 42–52)
HGB BLD-MCNC: 7.7 G/DL — LOW (ref 14–18)
HGB BLD-MCNC: 8.4 G/DL — LOW (ref 14–18)
IMM GRANULOCYTES NFR BLD AUTO: 0.2 % — SIGNIFICANT CHANGE UP (ref 0.1–0.3)
IMM GRANULOCYTES NFR BLD AUTO: 0.4 % — HIGH (ref 0.1–0.3)
LYMPHOCYTES # BLD AUTO: 2.29 K/UL — SIGNIFICANT CHANGE UP (ref 1.2–3.4)
LYMPHOCYTES # BLD AUTO: 2.49 K/UL — SIGNIFICANT CHANGE UP (ref 1.2–3.4)
LYMPHOCYTES # BLD AUTO: 27 % — SIGNIFICANT CHANGE UP (ref 20.5–51.1)
LYMPHOCYTES # BLD AUTO: 30.3 % — SIGNIFICANT CHANGE UP (ref 20.5–51.1)
MCHC RBC-ENTMCNC: 27.7 PG — SIGNIFICANT CHANGE UP (ref 27–31)
MCHC RBC-ENTMCNC: 28.5 PG — SIGNIFICANT CHANGE UP (ref 27–31)
MCHC RBC-ENTMCNC: 30.3 G/DL — LOW (ref 32–37)
MCHC RBC-ENTMCNC: 31.7 G/DL — LOW (ref 32–37)
MCV RBC AUTO: 90 FL — SIGNIFICANT CHANGE UP (ref 80–94)
MCV RBC AUTO: 91.4 FL — SIGNIFICANT CHANGE UP (ref 80–94)
MONOCYTES # BLD AUTO: 0.82 K/UL — HIGH (ref 0.1–0.6)
MONOCYTES # BLD AUTO: 0.85 K/UL — HIGH (ref 0.1–0.6)
MONOCYTES NFR BLD AUTO: 10.3 % — HIGH (ref 1.7–9.3)
MONOCYTES NFR BLD AUTO: 9.7 % — HIGH (ref 1.7–9.3)
NEUTROPHILS # BLD AUTO: 4 K/UL — SIGNIFICANT CHANGE UP (ref 1.4–6.5)
NEUTROPHILS # BLD AUTO: 4.35 K/UL — SIGNIFICANT CHANGE UP (ref 1.4–6.5)
NEUTROPHILS NFR BLD AUTO: 48.7 % — SIGNIFICANT CHANGE UP (ref 42.2–75.2)
NEUTROPHILS NFR BLD AUTO: 51.3 % — SIGNIFICANT CHANGE UP (ref 42.2–75.2)
NRBC # BLD: 0 /100 WBCS — SIGNIFICANT CHANGE UP (ref 0–0)
NRBC # BLD: 0 /100 WBCS — SIGNIFICANT CHANGE UP (ref 0–0)
PLATELET # BLD AUTO: 283 K/UL — SIGNIFICANT CHANGE UP (ref 130–400)
PLATELET # BLD AUTO: 315 K/UL — SIGNIFICANT CHANGE UP (ref 130–400)
POTASSIUM SERPL-MCNC: 3.8 MMOL/L — SIGNIFICANT CHANGE UP (ref 3.5–5)
POTASSIUM SERPL-SCNC: 3.8 MMOL/L — SIGNIFICANT CHANGE UP (ref 3.5–5)
PROT SERPL-MCNC: 5.9 G/DL — LOW (ref 6–8)
RBC # BLD: 2.7 M/UL — LOW (ref 4.7–6.1)
RBC # BLD: 3.03 M/UL — LOW (ref 4.7–6.1)
RBC # FLD: 13.6 % — SIGNIFICANT CHANGE UP (ref 11.5–14.5)
RBC # FLD: 13.6 % — SIGNIFICANT CHANGE UP (ref 11.5–14.5)
SODIUM SERPL-SCNC: 140 MMOL/L — SIGNIFICANT CHANGE UP (ref 135–146)
WBC # BLD: 8.22 K/UL — SIGNIFICANT CHANGE UP (ref 4.8–10.8)
WBC # BLD: 8.47 K/UL — SIGNIFICANT CHANGE UP (ref 4.8–10.8)
WBC # FLD AUTO: 8.22 K/UL — SIGNIFICANT CHANGE UP (ref 4.8–10.8)
WBC # FLD AUTO: 8.47 K/UL — SIGNIFICANT CHANGE UP (ref 4.8–10.8)

## 2021-01-10 PROCEDURE — 99239 HOSP IP/OBS DSCHRG MGMT >30: CPT

## 2021-01-10 RX ADMIN — CHLORHEXIDINE GLUCONATE 1 APPLICATION(S): 213 SOLUTION TOPICAL at 04:46

## 2021-01-10 RX ADMIN — POLYETHYLENE GLYCOL 3350 17 GRAM(S): 17 POWDER, FOR SOLUTION ORAL at 04:45

## 2021-01-10 RX ADMIN — BUDESONIDE AND FORMOTEROL FUMARATE DIHYDRATE 2 PUFF(S): 160; 4.5 AEROSOL RESPIRATORY (INHALATION) at 10:44

## 2021-01-10 RX ADMIN — MONTELUKAST 10 MILLIGRAM(S): 4 TABLET, CHEWABLE ORAL at 10:44

## 2021-01-10 RX ADMIN — PANTOPRAZOLE SODIUM 40 MILLIGRAM(S): 20 TABLET, DELAYED RELEASE ORAL at 08:36

## 2021-01-10 RX ADMIN — Medication 5 MILLIGRAM(S): at 04:45

## 2021-01-10 RX ADMIN — Medication 650 MILLIGRAM(S): at 04:43

## 2021-01-10 RX ADMIN — NYSTATIN CREAM 1 APPLICATION(S): 100000 CREAM TOPICAL at 04:44

## 2021-01-10 RX ADMIN — Medication 500 MILLIGRAM(S): at 13:05

## 2021-01-10 NOTE — PROGRESS NOTE ADULT - ATTENDING COMMENTS
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 s/p colonoscopy     #Lower GI Bleed   Hb 7.7 on admission; currently 8- 9 s/p 3 units PRBCs.  EGD: duodenum nodule, f/u pathology.  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   I agree with the above history, physical, and plan which I have reviewed and edited where appropriate.     Progress Note Handoff:  pending PT eval - pt refusing to work with PT, on my interaction patient also refusing to get out of bed  Full code. 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 s/p colonoscopy     #Lower GI Bleed   Hb 7.7 on admission; currently 8- 9 s/p 3 units PRBCs.  EGD: duodenum nodule, f/u pathology.  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 More than 30 minutes was personally spent on discharge planning on the day of discharge in coordination of care, counseling the patient, and preparation of discharge and transfer paperwork.      Progress Note Handoff:  pending PT eval - pt refusing to work with PT, on my interaction patient also refusing to get out of bed  Full code.

## 2021-01-10 NOTE — PROGRESS NOTE ADULT - SUBJECTIVE AND OBJECTIVE BOX
HPI:  80 years old male from [facility] with PMHx of HF with unknown EF, HTN, anxiety, Alzheimer's dementia, COPD not on home O2, T2DM on insulin, hx right nephrectomy and ESRD on retacrit, anemia of chronic disease, brought in by EMS with a complaint of burgundy stool found in diaper for the past 2 days. Patient is a poor historian so history was taken from chart review and NH nurse Irlanda. Patient has no prior episode. Patient has been on Aspirin and was stopped due to melena. He also has lost 20 lbs in one month and has poor appetite. Patient did not complains of any abdominal pain, nausea/vomiting, diarrhea or constipation. The stool was described as large amount of dark tarry stool. A stool guaiac test was positive. Nurse Irlanda denies any fever/chills, cough, chest pain, shortness of breath, or any urinary symptoms.       T(C): 36.6 (23 Dec 2020 06:36), Max: 36.6 (23 Dec 2020 06:36)  T(F): 97.8 (23 Dec 2020 06:36), Max: 97.8 (23 Dec 2020 06:36)  HR: 81 (23 Dec 2020 06:36) (81 - 94)  BP: 138/63 (23 Dec 2020 06:36) (130/60 - 144/68)  RR: 20 (23 Dec 2020 06:36) (16 - 20)  SpO2: 98% (23 Dec 2020 06:36) (98% - 100%)   (23 Dec 2020 08:01)    Currently admitted to medicine with the primary diagnosis of GI bleed       Today is hospital day 18d.     INTERVAL HPI / OVERNIGHT EVENTS:  Patient was examined and seen at bedside. This morning he is resting comfortably in bed and reports no new issues or overnight events.     ROS: Otherwise unremarkable     PAST MEDICAL & SURGICAL HISTORY  CHF (congestive heart failure)    Lymphedema of both lower extremities    COPD (chronic obstructive pulmonary disease)    Diabetes mellitus    H/O right nephrectomy  20 yrs ago      ALLERGIES  No Known Allergies    MEDICATIONS  STANDING MEDICATIONS  ascorbic acid 500 milliGRAM(s) Oral daily  budesonide 160 MICROgram(s)/formoterol 4.5 MICROgram(s) Inhaler 2 Puff(s) Inhalation two times a day  busPIRone 5 milliGRAM(s) Oral <User Schedule>  chlorhexidine 4% Liquid 1 Application(s) Topical <User Schedule>  donepezil 5 milliGRAM(s) Oral at bedtime  insulin glargine Injectable (LANTUS) 8 Unit(s) SubCutaneous at bedtime  melatonin 3 milliGRAM(s) Oral at bedtime  montelukast 10 milliGRAM(s) Oral daily  nystatin Cream 1 Application(s) Topical two times a day  pantoprazole    Tablet 40 milliGRAM(s) Oral before breakfast  polyethylene glycol 3350 17 Gram(s) Oral two times a day  senna 2 Tablet(s) Oral at bedtime  sodium bicarbonate 650 milliGRAM(s) Oral every 12 hours  sodium chloride 0.9%. 1000 milliLiter(s) IV Continuous <Continuous>    PRN MEDICATIONS  acetaminophen   Tablet .. 650 milliGRAM(s) Oral every 6 hours PRN  ALBUTerol    90 MICROgram(s) HFA Inhaler 1 Puff(s) Inhalation every 8 hours PRN    VITALS:  T(F): 99.5  HR: 80  BP: 175/76  RR: 18  SpO2: --    PHYSICAL EXAM  GEN: NAD, Resting comfortably in bed  PULM: Clear to auscultation bilaterally, No wheezes  CVS: Regular rate and rhythm, S1-S2, no murmurs  ABD: Soft, non-tender, non-distended, no guarding  EXT: No edema  NEURO: A&Ox3, no focal deficits    LABS                        7.8    6.97  )-----------( 284      ( 09 Jan 2021 06:40 )             25.2     01-09    140  |  105  |  24<H>  ----------------------------<  85  3.6   |  27  |  3.4<H>    Ca    8.4<L>      09 Jan 2021 06:40    TPro  6.1  /  Alb  3.0<L>  /  TBili  <0.2  /  DBili  x   /  AST  8   /  ALT  <5  /  AlkPhos  51  01-09                  RADIOLOGY     HPI:  80 years old male from [facility] with PMHx of HF with unknown EF, HTN, anxiety, Alzheimer's dementia, COPD not on home O2, T2DM on insulin, hx right nephrectomy and ESRD on retacrit, anemia of chronic disease, brought in by EMS with a complaint of burgundy stool found in diaper for the past 2 days. Patient is a poor historian so history was taken from chart review and NH nurse Irlanda. Patient has no prior episode. Patient has been on Aspirin and was stopped due to melena. He also has lost 20 lbs in one month and has poor appetite. Patient did not complains of any abdominal pain, nausea/vomiting, diarrhea or constipation. The stool was described as large amount of dark tarry stool. A stool guaiac test was positive. Nurse Irlanda denies any fever/chills, cough, chest pain, shortness of breath, or any urinary symptoms.       T(C): 36.6 (23 Dec 2020 06:36), Max: 36.6 (23 Dec 2020 06:36)  T(F): 97.8 (23 Dec 2020 06:36), Max: 97.8 (23 Dec 2020 06:36)  HR: 81 (23 Dec 2020 06:36) (81 - 94)  BP: 138/63 (23 Dec 2020 06:36) (130/60 - 144/68)  RR: 20 (23 Dec 2020 06:36) (16 - 20)  SpO2: 98% (23 Dec 2020 06:36) (98% - 100%)   (23 Dec 2020 08:01)    Currently admitted to medicine with the primary diagnosis of GI bleed       Today is hospital day 18d.     INTERVAL HPI / OVERNIGHT EVENTS:  Patient was examined and seen at bedside. This morning he is resting comfortably in bed and reports no new issues or overnight events. Found eating breakfast and comfortable. Overnight, patient had elevated BP readings. Fluids were stopped.     ROS: Otherwise unremarkable     PAST MEDICAL & SURGICAL HISTORY  CHF (congestive heart failure)    Lymphedema of both lower extremities    COPD (chronic obstructive pulmonary disease)    Diabetes mellitus    H/O right nephrectomy  20 yrs ago      ALLERGIES  No Known Allergies    MEDICATIONS  STANDING MEDICATIONS  ascorbic acid 500 milliGRAM(s) Oral daily  budesonide 160 MICROgram(s)/formoterol 4.5 MICROgram(s) Inhaler 2 Puff(s) Inhalation two times a day  busPIRone 5 milliGRAM(s) Oral <User Schedule>  chlorhexidine 4% Liquid 1 Application(s) Topical <User Schedule>  donepezil 5 milliGRAM(s) Oral at bedtime  insulin glargine Injectable (LANTUS) 8 Unit(s) SubCutaneous at bedtime  melatonin 3 milliGRAM(s) Oral at bedtime  montelukast 10 milliGRAM(s) Oral daily  nystatin Cream 1 Application(s) Topical two times a day  pantoprazole    Tablet 40 milliGRAM(s) Oral before breakfast  polyethylene glycol 3350 17 Gram(s) Oral two times a day  senna 2 Tablet(s) Oral at bedtime  sodium bicarbonate 650 milliGRAM(s) Oral every 12 hours  sodium chloride 0.9%. 1000 milliLiter(s) IV Continuous <Continuous>    PRN MEDICATIONS  acetaminophen   Tablet .. 650 milliGRAM(s) Oral every 6 hours PRN  ALBUTerol    90 MICROgram(s) HFA Inhaler 1 Puff(s) Inhalation every 8 hours PRN    VITALS:  T(F): 99.5  HR: 80  BP: 175/76  RR: 18  SpO2: --    PHYSICAL EXAM  GEN: NAD, Resting comfortably in bed  PULM: Clear to auscultation bilaterally, No wheezes  CVS: Regular rate and rhythm, S1-S2, no murmurs  ABD: Soft, non-tender, non-distended, no guarding  EXT: No edema, warm  NEURO: A&Ox3, no focal deficits    LABS                        7.8    6.97  )-----------( 284      ( 09 Jan 2021 06:40 )             25.2     01-09    140  |  105  |  24<H>  ----------------------------<  85  3.6   |  27  |  3.4<H>    Ca    8.4<L>      09 Jan 2021 06:40    TPro  6.1  /  Alb  3.0<L>  /  TBili  <0.2  /  DBili  x   /  AST  8   /  ALT  <5  /  AlkPhos  51  01-09                  RADIOLOGY

## 2021-01-10 NOTE — PROGRESS NOTE ADULT - PROVIDER SPECIALTY LIST ADULT
Gastroenterology
Hospitalist
Hospitalist
Internal Medicine
Nephrology
Nephrology
Hospitalist
Internal Medicine
Internal Medicine
Nephrology
Gastroenterology
Gastroenterology
Hospitalist
Internal Medicine
Nephrology
Hospitalist
Internal Medicine
Nephrology
Hospitalist
Nephrology

## 2021-01-10 NOTE — PROGRESS NOTE ADULT - SUBJECTIVE AND OBJECTIVE BOX
[facility] FOLLOW UP NOTE  --------------------------------------------------------------------------------  Chief Complaint/24 hour events/subjective:    pt seen and examined , no new c/o    PAST HISTORY  --------------------------------------------------------------------------------  No significant changes to PMH, PSH, FHx, SHx, unless otherwise noted    ALLERGIES & MEDICATIONS  --------------------------------------------------------------------------------  Allergies    No Known Allergies    Intolerances      Standing Inpatient Medications  ascorbic acid 500 milliGRAM(s) Oral daily  budesonide 160 MICROgram(s)/formoterol 4.5 MICROgram(s) Inhaler 2 Puff(s) Inhalation two times a day  busPIRone 5 milliGRAM(s) Oral <User Schedule>  chlorhexidine 4% Liquid 1 Application(s) Topical <User Schedule>  donepezil 5 milliGRAM(s) Oral at bedtime  insulin glargine Injectable (LANTUS) 8 Unit(s) SubCutaneous at bedtime  melatonin 3 milliGRAM(s) Oral at bedtime  montelukast 10 milliGRAM(s) Oral daily  nystatin Cream 1 Application(s) Topical two times a day  pantoprazole    Tablet 40 milliGRAM(s) Oral before breakfast  polyethylene glycol 3350 17 Gram(s) Oral two times a day  senna 2 Tablet(s) Oral at bedtime  sodium bicarbonate 650 milliGRAM(s) Oral every 12 hours    PRN Inpatient Medications  acetaminophen   Tablet .. 650 milliGRAM(s) Oral every 6 hours PRN  ALBUTerol    90 MICROgram(s) HFA Inhaler 1 Puff(s) Inhalation every 8 hours PRN      REVIEW OF SYSTEMS  --------------------------------------------------------------------------------    All other systems were reviewed and are negative, except as noted.    VITALS/PHYSICAL EXAM  --------------------------------------------------------------------------------  T(C): 35.6 (01-10-21 @ 05:06), Max: 37.5 (01-09-21 @ 21:02)  HR: 81 (01-10-21 @ 05:06) (80 - 87)  BP: 172/77 (01-10-21 @ 05:06) (167/79 - 181/91)  RR: 18 (01-10-21 @ 05:06) (18 - 18)  SpO2: --  Wt(kg): --        01-09-21 @ 07:01  -  01-10-21 @ 07:00  --------------------------------------------------------  IN: 0 mL / OUT: 1250 mL / NET: -1250 mL      Physical Exam:    	Gen: no distress   	Pulm: CTA B/L  	CV: S1S2; no rub  	Abd: +BS, soft, nontender/nondistended  	LE:  no  edema      LABS/STUDIES  --------------------------------------------------------------------------------              8.4    8.22  >-----------<  315      [01-10-21 @ 04:30]              27.7     140  |  105  |  21  ----------------------------<  113      [01-10-21 @ 04:30]  3.8   |  26  |  3.0        Ca     8.6     [01-10-21 @ 04:30]    TPro  5.9  /  Alb  3.3  /  TBili  0.2  /  DBili  x   /  AST  11  /  ALT  <5  /  AlkPhos  50  [01-10-21 @ 04:30]          Creatinine Trend:  SCr 3.0 [01-10 @ 04:30]  SCr 3.4 [01-09 @ 06:40]  SCr 3.2 [01-06 @ 06:47]  SCr 3.3 [01-05 @ 06:46]  SCr 3.7 [01-04 @ 06:40]        Iron 34, TIBC 178, %sat 19      [01-04-21 @ 06:40]  Ferritin 332      [01-04-21 @ 06:40]  PTH -- (Ca 8.9)      [12-24-20 @ 04:30]   54       [facility] for Tobacco Control website --- [email or web address] website --- [email or web address]

## 2021-01-10 NOTE — PROGRESS NOTE ADULT - REASON FOR ADMISSION
blood in stool

## 2021-01-10 NOTE — PROGRESS NOTE ADULT - ASSESSMENT
CKD stage 4-5  anemia  s/p GI bleed  HTN  DM    Plan:    Cr continues t inprove, good UO  f/u BMP and cbc.  outpatient capsule endoscopy  bowel prep  cont po na bicarb  cont hydralazine  resume lasix on discharge

## 2021-01-10 NOTE — PROGRESS NOTE ADULT - ASSESSMENT
79 y/o man with PMH of CHF (unknown EF) - chronic, Alzheimer's, COPD, DM and CKD 5 not on HD who was sent from [facility] for rectal bleeding. S/p colonoscopy. Patient was prepared for discharge 1/8 but orthostatic when working with PT. Will provide hydration and reassess orthostatics..    1. Lower GI Bleed   - Hb 7.7 on admission; currently 8- 9 s/p 3 units PRBCs.  - EGD: duodenum nodule, f/u pathology.  - s/p colonoscopy showing multiple polyps s/p polypectomy - GI recommendations: tolerating regular diet, f/u with GI as outpatient for capsule endoscopy.  - Hb dropped for 9.0 (on 1/6) to 7.8 yesterda (1/9)  - repeat cbc for 4 PM ordered  - type and screen for 4 PM ordered.    # Orthostatic hypotension  - orthostatic vital positive for hypotension  - hydralazine stopped today for orthostatic hypotension  - IVF with NS at 75 cc/hr started  - monitor BP    2. Superficial thrombophlebitis RUE - resolved  - Right cephalic vein thrombosis on duplex.    3. DM - c/w lantus and monitor FS      4. 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  - Nephro following--no indication for RRT.  - will restart lasix on d/c    5. Alzheimer's dementia  - Continue home donepezil  - Home melatonin  - reorient as needed    6. COPD - stable  - Continue home albuterol, montelukast    7. DVT prophylaxis - SCD    code status: full  Diet: advance diet as tolerated  Disposition: orthostatic hypotension, c/w IVF, reassess in AM, return to Street. 79 y/o man with PMH of CHF (unknown EF) - chronic, Alzheimer's, COPD, DM and CKD 5 not on HD who was sent from [facility] for rectal bleeding. S/p colonoscopy. Patient was prepared for discharge 1/8 but orthostatic when working with PT. Will provide hydration and reassess orthostatics. Patient pending improvement of orthostatics prior to d/c to [facility].    1. Lower GI Bleed   - Hb 7.7 on admission; currently 8- 9 s/p 3 units PRBCs.  - EGD: duodenum nodule, f/u pathology.  - s/p colonoscopy showing multiple polyps s/p polypectomy - GI recommendations: tolerating regular diet, f/u with GI as outpatient for capsule endoscopy.  - Hb dropped for 9.0 (on 1/6) to8.4 (1/10)  - repeat cbc for 4 PM ordered  - type and screen for 4 PM ordered.    # Orthostatic hypotension  - orthostatic vital positive for hypotension  - hydralazine stopped today for orthostatic hypotension  - IVF with NS at 75 cc/hr started > d/c after consistently elevated BP readings    2. Superficial thrombophlebitis RUE - resolved  - Right cephalic vein thrombosis on duplex.    3. DM - c/w lantus and monitor FS      4. 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  - Nephro following--no indication for RRT.  - will restart lasix on d/c    5. Alzheimer's dementia  - Continue home donepezil  - Home melatonin  - reorient as needed    6. COPD - stable  - Continue home albuterol, montelukast    7. DVT prophylaxis - SCD    code status: full  Diet: advance diet as tolerated  Disposition: orthostatic hypotension, c/w IVF, reassess in AM, return to [facility].

## 2021-01-11 LAB — SURGICAL PATHOLOGY STUDY: SIGNIFICANT CHANGE UP

## 2021-02-24 NOTE — PHYSICAL THERAPY INITIAL EVALUATION ADULT - SITTING BALANCE: STATIC
Cancer Center Progress Note    Patient Name: Ray Her   YOB: 1945   Medical Record Number: [id number]   Attending Physician: Sri Cordero M.D.        Chief Complaint:  Lung cancer    History of Present Illness:  Cancer history:  75-ye Procedure Laterality Date   • BRONCHOSCOPY N/A 8/20/2018    Performed by Ashley Torres MD at [address] [place] [address] ENDOSCOPY   • CAPSULE ENDOSCOPY N/A 7/24/2018    Performed by Nell Nelson MD at [address] [place] [address] ENDOSCOPY   • COLONOSCOPY  2010   • COLONOSCOPY     • COLONO organizations: Not on file        Relationship status: Not on file      Intimate partner violence        Fear of current or ex partner: Not on file        Emotionally abused: Not on file        Physically abused: Not on file        Forced sexual activity: Patient is alert and oriented x 3, not in acute distress. Psych:  Mood and affect appropriate  HEENT: EOMs intact. PERRL. Oropharynx is clear. Neck: No JVD. No palpable lymphadenopathy. Neck is supple. Lymphatics:  There is no palpable peripheral lympha anemia-improved following the completion of chemotherapy. Rolly Vázquez he has unresectable stage IIIb disease with no progression following initial chemotherapy we  added immunotherapy with durvalumab. –anemia with severe iron deficiency.   Responded to IV iron good balance

## 2021-07-12 NOTE — CONSULT NOTE ADULT - ASSESSMENT
IMPRESSION:    Acute Renal Failure on CKD IV SP ESCOBAR, now in HD  Volume Overload  Hyperkalemia  UTI  Probable RLL Pneumonia  HO Alzheimer's Dementia    PLAN:    CNS: avoid sedation    HEENT: Oral care    PULMONARY:  HOB @ 45 degrees.  Aspiration precautions     CARDIOVASCULAR: wean down levophed, keep MAP 65     GI: GI prophylaxis.  Feeding.  Bowel regimen     RENAL:  Follow up lytes.  Correct as needed. HD as per Nephrology     INFECTIOUS DISEASE: Follow up cultures, check procalcitonin, continue antiboitics, MRSA swab     HEMATOLOGICAL:  DVT prophylaxis.    ENDOCRINE:  Follow up FS.  Insulin protocol if needed.    MUSCULOSKELETAL: bed rest    SDU monitoring          IMPRESSION:    Acute Renal Failure on CKD IV SP UDALL  Sepsis present on admission  severe metabolic acidosis  Volume Overload  Hyperkalemia  UTI  Probable RLL Pneumonia  HO Alzheimer's Dementia    PLAN:    CNS: avoid sedation    HEENT: Oral care    PULMONARY:  HOB @ 45 degrees.  Aspiration precautions     CARDIOVASCULAR: wean down levophed, keep MAP 65 , IVF per JENNIFER miller    GI: GI prophylaxis.  Feeding.  Bowel regimen     RENAL:  Follow up lytes.  Correct as needed. HD as per Nephrology , repeat CMP    INFECTIOUS DISEASE: Follow up cultures, check procalcitonin, Vanco/ shaka    HEMATOLOGICAL:  DVT prophylaxis. LE doppler    ENDOCRINE:  Follow up FS.  Insulin protocol if needed.    MUSCULOSKELETAL: bed rest    SDU/ MICU monitoring

## 2021-07-12 NOTE — ED PROVIDER NOTE - OBJECTIVE STATEMENT
Pt is Pt is an 81M with a pmhx of HTN, CHF, DM2, COPD (on 3L home o2), CKD and alzheimers disease presenting with fatigue and vomiting. Per pt's daughter at bedside, pt lives in assisted living facility and is generally alert/oriented but does not ambulate. Pt has had increasing weight loss for last month, then had worsened decreased appetite several days ago and last night into today had a few episodes of NBNB emesis. Hx limited due to pt's general condition, but pt does deny pain anywhere though he is coughing intermittently.

## 2021-07-12 NOTE — ED PROVIDER NOTE - PHYSICAL EXAMINATION
CONSTITUTIONAL: ill-appearing, somnolent, opens eyes to verbal stimuli but does not willingly speak except to request things, (e.g. "give me water." or "I am cold")  SKIN: Warm dry  HEAD: NCAT  EYES: NL inspection  ENT: MMM  NECK: Supple; non tender.  CARD: RRR  RESP: crackles R lung base  ABD: + TTP diffusely, mild in severity, no rebound or guarding, abdomen soft nondistended  EXT: no pedal edema  NEURO: Grossly unremarkable

## 2021-07-12 NOTE — H&P ADULT - HISTORY OF PRESENT ILLNESS
81 [facility] of CHF (unknown EF), HTN, anxiety, Alzheimer's dementia, COPD on home O2, T2DM on insulin, hx right nephrectomy/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 Patient is unable to provide history so history was taken from daughter over the phone. She states that last night, patient had multiple episodes of NBNB vomiting, with decreased PO intake and more somnolent. Today, [facility] took his BP and it was 85/40 so was brought to ED. No complaints of dysuria, diarrhea, or cough. No fever.    ED Course:  Patient found to have RLL PNA and UTI. Patient hypotensive, requiring pressors. Labs significant for hyperkalemia and metabolic acidosis. [place] placed, patient went for urgent HD.

## 2021-07-12 NOTE — CONSULT NOTE ADULT - SUBJECTIVE AND OBJECTIVE BOX
Patient is a 81y old  Male who presents with a chief complaint of SOB    HPI: 82 yo m PMHx of HF with unknown EF, HTN, anxiety, Alzheimer's dementia, COPD not on home O2, T2DM on insulin, hx right nephrectomy and ESRD on retacrit, anemia of chronic disease, pt presents for eval of fatigue, vomiting. daughter states pt lives at assisted living. pt bedbound but can normally help w/ some adls. pt has had weight loss for >1 month, decreased appetite for several days and started vomiting today which prompted eval.  pt limited historian but no known cp, sob, syncope. during interview, pt coughing several times      PAST MEDICAL & SURGICAL HISTORY:  Diabetes mellitus    COPD (chronic obstructive pulmonary disease)    Lymphedema of both lower extremities    CHF (congestive heart failure)    H/O right nephrectomy  20 yrs ago        SOCIAL HX:   Smoking                         ETOH                            Other    FAMILY HISTORY:  No pertinent family history in first degree relatives    :  No known cardiovacular family hisotry     Review Of Systems:     All ROS are negative except per HPI       Allergies    No Known Allergies    Intolerances          PHYSICAL EXAM    ICU Vital Signs Last 24 Hrs  T(C): 36.5 (2021 09:21), Max: 36.5 (2021 09:21)  T(F): 97.7 (2021 09:21), Max: 97.7 (2021 09:21)  HR: 87 (2021 12:54) (85 - 96)  BP: 105/48 (2021 13:40) (72/34 - 116/56)  BP(mean): 73 (2021 13:40) (50 - 74)  ABP: --  ABP(mean): --  RR: 16 (2021 12:54) (16 - 17)  SpO2: 99% (2021 12:54) (96% - 99%)      CONSTITUTIONAL:  Well nourished.   NAD    ENT:   Airway patent,   Mouth with normal mucosa.   No thrush      CARDIAC:   Normal rate,   Regular rhythm.    No edema      Vascular:   normal systolic impulse  no bruits    RESPIRATORY:   Bilateral crackles  Not tachypneic,  No use of accessory muscles    GASTROINTESTINAL:  Abdomen soft,   Non-tender,   No guarding,   + BS      NEUROLOGICAL:   Alert and oriented x1  No motor deficits.    SKIN:   Skin normal color for race,   No evidence of rash.      HEME LYMPH: .  No cervical  lymphadenopathy.  No inguinal lymphadenopathy              LABS:                          8.8    15.07 )-----------( 187      ( 2021 10:44 )             28.1                                               -12    135  |  102  |  198<HH>  ----------------------------<  144<H>  6.7<HH>   |  6<LL>  |  11.3<HH>    Ca    8.2<L>      2021 10:44  Mg     2.4         TPro  7.3  /  Alb  4.1  /  TBili  0.3  /  DBili  x   /  AST  12  /  ALT  7   /  AlkPhos  69                                               Urinalysis Basic - ( 2021 13:07 )    Color: Yellow / Appearance: Slightly Turbid / S.014 / pH: x  Gluc: x / Ketone: Negative  / Bili: Negative / Urobili: <2 mg/dL   Blood: x / Protein: 300 mg/dL / Nitrite: Negative   Leuk Esterase: Large / RBC: 14 /HPF / WBC 73 /HPF   Sq Epi: x / Non Sq Epi: 1 /HPF / Bacteria: Few        CARDIAC MARKERS ( 2021 10:44 )  x     / 0.57 ng/mL / x     / x     / x                                                LIVER FUNCTIONS - ( 2021 10:44 )  Alb: 4.1 g/dL / Pro: 7.3 g/dL / ALK PHOS: 69 U/L / ALT: 7 U/L / AST: 12 U/L / GGT: x                                                                                                                                       X-Rays reviewed                                                                                     ECHO    CXR interpreted by me     MEDICATIONS  (STANDING):  ALBUTerol    90 MICROgram(s) HFA Inhaler 2 Puff(s) Inhalation every 6 hours  norepinephrine Infusion 0.05 MICROgram(s)/kG/Min (6.17 mL/Hr) IV Continuous <Continuous>    MEDICATIONS  (PRN):         Patient is a 81y old  Male who presents with a chief complaint of SOB    HPI: 80 yo m PMHx of HF with unknown EF, HTN, anxiety, Alzheimer's dementia, COPD not on home O2, T2DM on insulin, hx right nephrectomy and CRF on retacrit, anemia of chronic disease, pt presents for eval of fatigue, vomiting. daughter states pt lives at assisted living. pt bedbound but can normally help w/ some adls. pt has had weight loss for >1 month, decreased appetite for several days and started vomiting today which prompted eval.  pt limited historian but no known cp, sob, syncope. during interview, pt coughing several times, CT AP done, was started  on levophed called to evaluate      PAST MEDICAL & SURGICAL HISTORY:  Diabetes mellitus    COPD (chronic obstructive pulmonary disease)    Lymphedema of both lower extremities    CHF (congestive heart failure)    H/O right nephrectomy  20 yrs ago        SOCIAL HX:   Smoking   -      Review Of Systems:     All ROS are negative except per HPI       Allergies    No Known Allergies    Intolerances          PHYSICAL EXAM    ICU Vital Signs Last 24 Hrs  T(C): 36.5 (2021 09:21), Max: 36.5 (2021 09:21)  T(F): 97.7 (2021 09:21), Max: 97.7 (2021 09:21)  HR: 87 (2021 12:54) (85 - 96)  BP: 105/48 (2021 13:40) (72/34 - 116/56)  BP(mean): 73 (2021 13:40) (50 - 74)  RR: 16 (2021 12:54) (16 - 17)  SpO2: 99% (2021 12:54) (96% - 99%)      CONSTITUTIONAL:  ill looking    ENT:   Airway patent,   Mouth with normal mucosa.   No thrush      CARDIAC:   SHEN 3/6      RESPIRATORY:   Bilateral crackles  Not tachypneic,  No use of accessory muscles    GASTROINTESTINAL:  Abdomen soft,   Non-tender,   No guarding,   + BS      NEUROLOGICAL:   Alert and oriented x1  No motor deficits.                LABS:                          8.8    15.07 )-----------( 187      ( 2021 10:44 )             28.1                                               07-12    135  |  102  |  198<HH>  ----------------------------<  144<H>  6.7<HH>   |  6<LL>  |  11.3<HH>    Ca    8.2<L>      2021 10:44  Mg     2.4     07-12    TPro  7.3  /  Alb  4.1  /  TBili  0.3  /  DBili  x   /  AST  12  /  ALT  7   /  AlkPhos  69  07-12                                             Urinalysis Basic - ( 2021 13:07 )    Color: Yellow / Appearance: Slightly Turbid / S.014 / pH: x  Gluc: x / Ketone: Negative  / Bili: Negative / Urobili: <2 mg/dL   Blood: x / Protein: 300 mg/dL / Nitrite: Negative   Leuk Esterase: Large / RBC: 14 /HPF / WBC 73 /HPF   Sq Epi: x / Non Sq Epi: 1 /HPF / Bacteria: Few        CARDIAC MARKERS ( 2021 10:44 )  x     / 0.57 ng/mL / x     / x     / x                                                LIVER FUNCTIONS - ( 2021 10:44 )  Alb: 4.1 g/dL / Pro: 7.3 g/dL / ALK PHOS: 69 U/L / ALT: 7 U/L / AST: 12 U/L / GGT: x                                                                                               CXR / CT AP reviewed    MEDICATIONS  (STANDING):  ALBUTerol    90 MICROgram(s) HFA Inhaler 2 Puff(s) Inhalation every 6 hours  norepinephrine Infusion 0.05 MICROgram(s)/kG/Min (6.17 mL/Hr) IV Continuous <Continuous>    MEDICATIONS  (PRN):

## 2021-07-12 NOTE — H&P ADULT - ASSESSMENT
81 Y M from [facility] with pmh of CHF (unknown EF), HTN, anxiety, Alzheimer's dementia, COPD on home O2, T2DM on insulin, hx right nephrectomy/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 Found to be in acute on chronic renal failure, with pneumonia and UTI.    #RLL Pneumonia  #UTI  -f/u urine and blood cultures  -renally-dosed antibiotics (vancomycin, cefepime, azithromycin)  -f/u vancomycin trough at 8pm  -ID consult  -send procalcitonin  -send urine strep and legionella  -MRSA swab  -wean levophed    #CKDV now on HD  #Metabolic acidosis  #Hyperkalemia  -urgent HD now  -f/u nephro recs  -repeat labs at 8pm  -correct lytes as needed     #HF?EF  -volume overload on admission  -strict i/o, fluid restriction, daily weight  -holding lasix for now    #HTN  -holding hydralazine, patient hypotensive on admission    #DM  -lantus 8qhs, lispro 5 qac  -avoid hypoglycemia    #Alzheimer's dementia  -continue remeron, aricept, sertraline    #Iron deficiency anemia  -at baseline  -continue ferrous sulfate  -ROMELIA per nephro    #COPD on home O2  -continue inhalers  -not in exacerbation    #DVT PPx- Heparin 5000u sq q12h  #GI PPx- Protonix 40mg po qam   #Diet- DASH/TLC/CC/Renal  #CHG  #Activity- Bedrest  #Dispo- Acute; SDU  #Code- FULL     81 Y M from [facility] with pmh of CHF (unknown EF), HTN, anxiety, Alzheimer's dementia, COPD on home O2, T2DM on insulin, hx right nephrectomy/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 Found to be in acute on chronic renal failure, with pneumonia and UTI.    #RLL Pneumonia  #UTI  -f/u urine and blood cultures  -renally-dosed antibiotics (vancomycin, cefepime, azithromycin)  -f/u vancomycin trough at 8pm  -ID consult  -send procalcitonin  -send urine strep and legionella  -MRSA swab  -wean levophed    #CKDV now on HD  #Metabolic acidosis  #Hyperkalemia  -urgent HD now  -f/u nephro recs  -repeat labs at 8pm  -correct lytes as needed     #HF?EF  -volume overload on admission  -strict i/o, fluid restriction, daily weight  -holding lasix for now    #Troponemia  -likely due to ARF  -no chest pain  -repeat EKG  -trend    #HTN  -holding hydralazine, patient hypotensive on admission    #DM  -lantus 8qhs, lispro 5 qac  -avoid hypoglycemia    #Alzheimer's dementia  -continue remeron, aricept, sertraline    #Iron deficiency anemia  -at baseline  -continue ferrous sulfate  -ROMELIA per nephro    #COPD on home O2  -continue inhalers  -not in exacerbation    #DVT PPx- Heparin 5000u sq q12h  #GI PPx- Protonix 40mg po qam   #Diet- DASH/TLC/CC/Renal  #CHG  #Activity- Bedrest  #Dispo- Acute; SDU  #Code- FULL

## 2021-07-12 NOTE — ED PROVIDER NOTE - CLINICAL SUMMARY MEDICAL DECISION MAKING FREE TEXT BOX
Patient upgraded to the Critical Care ED for septic shock with acute hyperkalemia and renal failure. Patient given fluid resuscitation, IV antibiotics, and started on vasopressors. K+ 6.7, , Creatinine 11.3. WBC count 15. Patient medically treated for hyperkalemia. Patient treated for sepsis with 30 cc/kg fluid bolus and broad spectrum antibiotics. Renal paged STAT and case seen at bedside by Dr. Saba. Emergent dialysis catheter placed by ED team and renal expedited dialysis. Hemodynamic status improved with fluids, antibiotics, and vasopressors. Case discussed with ICU and will admit to Stepdown. Patient going straight to hemodialysis for  session from the Critical Care ED.

## 2021-07-12 NOTE — CONSULT NOTE ADULT - ASSESSMENT
Acute renal failure, likely ATN in setting of sepsis  CKD V 3.3-4  Sepsis, leukocytosis, hypotension, R lung opacity, ?UTI  Hyperkalemia  Anion gap metabolic acidosis/acidemia  Anemia    Plan:    Given acidemia and hyperkalemia, patient will need renal replacement therapy  Case discussed with daughter at the bedside, she gave consent for dialysis  HD today: 2 hours, opti 160 dialyzer, 1K bath, 0L UF  Followup CT scan  Followup cultures  Renally dose antibiotics

## 2021-07-12 NOTE — H&P ADULT - ATTENDING COMMENTS
HPI: Agree with resident history taken below   80 y/o gentleman from [facility] with pmh of CHF (unknown EF), HTN, anxiety, Alzheimer's dementia, COPD on home O2, T2DM on insulin, hx right nephrectomy/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 Majority of history was taken from daughter at bedside. She states that last night, patient had multiple episodes of NBNB vomiting, with decreased PO intake and more somnolent. Today, he was more dyspneic, Shalonda took his BP and it was 85/40 so was brought to ED.   he reports his breathing has improved, no chest pain, n/v/d, abdo pain, palpitations, dysuria, or cough. He is very uncomfortable in the gurney and wants a room.   He was found to RLL pneumonia, any pyelonephritis, and interstitial lung opacities on CT a/p. Additionally he was noted to be in renal failure and underwent emergent HD for hyperkalemia  Trops elevated at 0.59, in septic shock and started on low dose pressors. Pulm-CCM following and admitted to Step down unit.    REVIEW OF SYSTEMS:  CONSTITUTIONAL:  No weakness, fevers, chills, night sweats, weight loss  EYES/ENT: No visual changes. No vertigo or dysphagia  NECK: No neck pain or stiffness  RESPIRATORY: No cough, wheezing, hemoptysis. + shortness of breath  CARDIOVASCULAR: No chest pain or palpitations. No lower extremity edema  GASTROINTESTINAL: No abdominal pain. + nausea, vomiting, no diarrhea, or hematemesis  GENITOURINARY: No dysuria or hematuria   NEUROLOGICAL: No focal numbness or weakness  SKIN: No rashes or itching  HEMATOLOGIC: No easy bruising or prolonged bleeding.      PHYSICAL EXAM:  GENERAL: moderately distressed, thin, Non-toxic, elderly and frail stated age   HEAD:  Atraumatic, Normocephalic  EYES: EOMI, Sclera White   NECK: Supple, No JVD  CHEST/LUNG: Clear to auscultation bilaterally; No wheezing, rhonchi, or crackles  HEART: Regular rate and rhythm; s1, s2, No murmurs, rubs, or gallops  ABDOMEN: Soft, Nontender, Nondistended; Bowel sounds present, No rebound or guarding noted   EXTREMITIES:  No lower extremity edema or calf tenderness to palpation.  No clubbing or cyanosis  PSYCH: AAOx (name, , and location correct, date wrong, at baseline as per daughter at bedside), pleasant, cooperative, anxious  NEUROLOGY: non-focal  SKIN: No rashes or lesions      ASSESSMENT AND PLAN:        My note supersedes the residents in the event of a discrepancy. HPI: Agree with resident history taken below   80 y/o gentleman from [facility] with pmh of CHF (unknown EF), HTN, anxiety, Alzheimer's dementia, COPD on home O2, T2DM on insulin, hx right nephrectomy/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 Majority of history was taken from daughter at bedside. She states that last night, patient had multiple episodes of NBNB vomiting, with decreased PO intake and more somnolent. Today, he was more dyspneic, Shalonda took his BP and it was 85/40 so was brought to ED.   he reports his breathing has improved, no chest pain, n/v/d, abdo pain, palpitations, dysuria, or cough. He is very uncomfortable in the gurney and wants a room.   He was found to RLL pneumonia, any pyelonephritis, and interstitial lung opacities on CT a/p. Additionally he was noted to be in renal failure and underwent emergent HD for hyperkalemia  Trops elevated at 0.59, in septic shock and started on low dose pressors. Pulm-CCM following and admitted to Step down unit.    REVIEW OF SYSTEMS:  CONSTITUTIONAL:  No weakness, fevers, chills, night sweats, weight loss  EYES/ENT: No visual changes. No vertigo or dysphagia  NECK: No neck pain or stiffness  RESPIRATORY: No cough, wheezing, hemoptysis. + shortness of breath  CARDIOVASCULAR: No chest pain or palpitations. No lower extremity edema  GASTROINTESTINAL: No abdominal pain. + nausea, vomiting, no diarrhea, or hematemesis  GENITOURINARY: No dysuria or hematuria   NEUROLOGICAL: No focal numbness or weakness  SKIN: No rashes or itching  HEMATOLOGIC: No easy bruising or prolonged bleeding.      PHYSICAL EXAM:  GENERAL: moderately distressed, thin, Non-toxic, elderly and frail stated age   HEAD:  Atraumatic, Normocephalic  EYES: EOMI, Sclera White   NECK: Supple, No JVD  CHEST/LUNG: Clear to auscultation bilaterally; No wheezing, rhonchi, or crackles  HEART: Regular rate and rhythm; s1, s2, No murmurs, rubs, or gallops  ABDOMEN: Soft, Nontender, Nondistended; Bowel sounds present, No rebound or guarding noted   EXTREMITIES:  No lower extremity edema or calf tenderness to palpation.  No clubbing or cyanosis  PSYCH: AAOx2 (name, , and location correct, date wrong, at baseline as per daughter at bedside), pleasant, cooperative, anxious  NEUROLOGY: non-focal  SKIN: No rashes or lesions      ASSESSMENT AND PLAN:  Septic Shock: Pyelonephritis and Aspiration PNA (Vs gram negative PNA)  -Pulm-Crit following: Step down unit monitoring  -Cont with Vanco and  shaka renally dosed   -Cont with peripheral levophed   -follow up blood, urine, sputum cultures, procal, MRSA nares, urine legionella and strep     ASHLEE On CDK V: s/p HD   Metabolic acidosis      My note supersedes the residents in the event of a discrepancy. HPI: Agree with resident history taken below   82 y/o gentleman from [facility] with pmh of CHF (unknown EF), HTN, anxiety, Alzheimer's dementia, COPD on home O2, T2DM on insulin, hx right nephrectomy/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 Majority of history was taken from daughter at bedside. She states that last night, patient had multiple episodes of NBNB vomiting, with decreased PO intake and more somnolent. Today, he was more dyspneic, Shalonda took his BP and it was 85/40 so was brought to ED.   he reports his breathing has improved, no chest pain, n/v/d, abdo pain, palpitations, dysuria, or cough. He is very uncomfortable in the gurney and wants a room.   He was found to RLL pneumonia, any pyelonephritis, and interstitial lung opacities on CT a/p. Additionally he was noted to be in renal failure and underwent emergent HD for hyperkalemia  Trops elevated at 0.59, in septic shock and started on low dose pressors. Pulm-CCM following and admitted to Step down unit.    REVIEW OF SYSTEMS:  CONSTITUTIONAL:  No weakness, fevers, chills, night sweats, weight loss  EYES/ENT: No visual changes. No vertigo or dysphagia  NECK: No neck pain or stiffness  RESPIRATORY: No cough, wheezing, hemoptysis. + shortness of breath  CARDIOVASCULAR: No chest pain or palpitations. No lower extremity edema  GASTROINTESTINAL: No abdominal pain. + nausea, vomiting, no diarrhea, or hematemesis  GENITOURINARY: No dysuria or hematuria   NEUROLOGICAL: No focal numbness or weakness  SKIN: No rashes or itching  HEMATOLOGIC: No easy bruising or prolonged bleeding.      PHYSICAL EXAM:  GENERAL: moderately distressed, thin, Non-toxic, elderly and frail stated age   HEAD:  Atraumatic, Normocephalic  EYES: EOMI, Sclera White   NECK: Supple, No JVD  CHEST/LUNG: Clear to auscultation bilaterally; No wheezing, rhonchi, or crackles  HEART: Regular rate and rhythm; s1, s2, No murmurs, rubs, or gallops  ABDOMEN: Soft, Nontender, Nondistended; Bowel sounds present, No rebound or guarding noted   EXTREMITIES:  No lower extremity edema or calf tenderness to palpation.  No clubbing or cyanosis  PSYCH: AAOx2 (name, , and location correct, date wrong, at baseline as per daughter at bedside), pleasant, cooperative, anxious  NEUROLOGY: non-focal  SKIN: No rashes or lesions      ASSESSMENT AND PLAN:  Septic Shock: Pyelonephritis and Aspiration PNA (Vs gram negative PNA)  -Pulm-Crit following: Step down unit monitoring  -Cont with Vanco and  shaka renally dosed   -Cont with peripheral levophed   -follow up blood, urine, sputum cultures, procal, MRSA nares, urine legionella and strep     Suspected Type II NSTEMI: multiple metabolic stressor, never had active chest pain   -Cardio following  -Cont with tele monitoring  -Trend trops, Check 2D Echo, AM EKG  -On ASA 81mg, Atorvastatin     ASHLEE On CDK V: s/p HD   Metabolic acidosis  Hyperkalemia: resolved  -Nephro follow: s/p HD   -Bicarb 80cc/hr (150meq), repeat VBG improved 7.22/38  -Check Urine Na, Cl, urea, urine Pr:Cr, phos     Metabolic encephalopathy (pyelo and pnuemonia)  Alzheimer's Dementia   Anxiety  -Minimize sleep disturbances. Encourage family visitation when able: familiar voices/faces/objects. Exposure to daylight during wake time hours. Limit sedating medications. Frequent reorientation.   -Cont with donepizil, mirtazepine, and sertraline     Normocytic anemia: likely secondary to renal dx.   -Hb at baseline    Diabetes: Basal bolus insulin, keep fingerstick glucose <180.     DVT ppx: Heparin  GI ppx: Not indicated  GOC: DNR / DNI , ok for trial of BiPAP, no tube feeds, IVF and Abx are ok. MOSLT in chart.     My note supersedes the residents in the event of a discrepancy.

## 2021-07-12 NOTE — CONSULT NOTE ADULT - SUBJECTIVE AND OBJECTIVE BOX
[facility] INITIAL CONSULT NOTE  --------------------------------------------------------------------------------  HPI:  81 years old male from [facility] with PMHx of CHF, HTN, anxiety, Alzheimer's dementia, COPD on home O2, T2DM on insulin, hx right nephrectomy/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 Patient is unable to provide history so history was taken from daughter at the bedside.  Patient has been more confused over last 48 hours. Decreased oral intake. Sleeping most of day. No complaints of dysuria, diarrhea, or cough. No fever.    PAST HISTORY  --------------------------------------------------------------------------------  PAST MEDICAL & SURGICAL HISTORY:  Diabetes mellitus  COPD (chronic obstructive pulmonary disease)  Lymphedema of both lower extremities  CHF (congestive heart failure)  H/O right nephrectomy 20 yrs ago  CKD V    FAMILY HISTORY:  No pertinent family history in first degree relatives    SOCIAL HISTORY:  No current ETOH, tobacco, or illicit drug use    ALLERGIES & MEDICATIONS  --------------------------------------------------------------------------------  Allergies    No Known Allergies    Standing Inpatient Medications  ALBUTerol    90 MICROgram(s) HFA Inhaler 2 Puff(s) Inhalation every 6 hours  norepinephrine Infusion 0.05 MICROgram(s)/kG/Min IV Continuous <Continuous>    REVIEW OF SYSTEMS  --------------------------------------------------------------------------------  Unable to obtain    VITALS/PHYSICAL EXAM  --------------------------------------------------------------------------------  T(C): 36.5 (07-12-21 @ 09:21), Max: 36.5 (07-12-21 @ 09:21)  HR: 87 (07-12-21 @ 12:54) (85 - 96)  BP: 105/48 (07-12-21 @ 13:40) (72/34 - 116/56)  RR: 16 (07-12-21 @ 12:54) (16 - 17)  SpO2: 99% (07-12-21 @ 12:54) (96% - 99%)  Height (cm): 172.7 (07-12-21 @ 09:21)  Weight (kg): 65.8 (07-12-21 @ 09:21)  BMI (kg/m2): 22.1 (07-12-21 @ 09:21)  BSA (m2): 1.78 (07-12-21 @ 09:21)    Physical Exam:  	Gen: NAD  	Pulm: CTA B/L  	CV: RRR, S1S2  	Back: No CVA tenderness; no sacral edema  	Abd: +BS, soft, nontender/nondistended  	: No suprapubic tenderness, Laura  	LE: Warm, no edema  	Neuro: Somnolent    LABS/STUDIES  --------------------------------------------------------------------------------              8.8    15.07 >-----------<  187      [07-12-21 @ 10:44]              28.1     135  |  102  |  198  ----------------------------<  144      [07-12-21 @ 10:44]  6.7   |  6   |  11.3        Ca     8.2     [07-12-21 @ 10:44]      Mg     2.4     [07-12-21 @ 10:44]    TPro  7.3  /  Alb  4.1  /  TBili  0.3  /  DBili  x   /  AST  12  /  ALT  7   /  AlkPhos  69  [07-12-21 @ 10:44]    Troponin 0.57      [07-12-21 @ 10:44]    Creatinine Trend:  SCr 11.3 [07-12 @ 10:44]    Urinalysis - [07-12-21 @ 13:07]      Color Yellow / Appearance Slightly Turbid / SG 1.014 / pH 6.0      Gluc Negative / Ketone Negative  / Bili Negative / Urobili <2 mg/dL       Blood Small / Protein 300 mg/dL / Leuk Est Large / Nitrite Negative      RBC 14 / WBC 73 / Hyaline 3 / Gran  / Sq Epi  / Non Sq Epi 1 / Bacteria Few    Iron 34, TIBC 178, %sat 19      [01-04-21 @ 06:40]  Ferritin 332      [01-04-21 @ 06:40]  PTH -- (Ca 8.9)      [12-24-20 @ 04:30]   54

## 2021-07-12 NOTE — H&P ADULT - NSHPSOCIALHISTORY_GEN_ALL_CORE
lives at [facility] currently  former smoker, 40 pack years, quit 24 years ago  no alcohol or drugs

## 2021-07-12 NOTE — PROCEDURAL SAFETY CHECKLIST WITH OR WITHOUT SEDATION - NSPOSTCOMMENTFT_GEN_ALL_CORE
right femoral dialysis catheter placement successful. pt tolerated procedure well, sterility maintained throughout.

## 2021-07-12 NOTE — ED ADULT NURSE NOTE - NSIMPLEMENTINTERV_GEN_ALL_ED
Implemented All Fall with Harm Risk Interventions:  [place] to call system. Call bell, personal items and telephone within reach. Instruct patient to call for assistance. Room bathroom lighting operational. Non-slip footwear when patient is off stretcher. Physically safe environment: no spills, clutter or unnecessary equipment. Stretcher in lowest position, wheels locked, appropriate side rails in place. Provide visual cue, wrist band, yellow gown, etc. Monitor gait and stability. Monitor for mental status changes and reorient to person, place, and time. Review medications for side effects contributing to fall risk. Reinforce activity limits and safety measures with patient and family. Provide visual clues: red socks.

## 2021-07-12 NOTE — H&P ADULT - NSHPPHYSICALEXAM_GEN_ALL_CORE
PHYSICAL EXAM:  CONSTITUTIONAL: ill-appearing, somnolent, opens eyes to verbal stimuli but does not willingly speak except to request things, (e.g. "give me water." or "I am cold")  SKIN: Warm dry  HEAD: NCAT  EYES: NL inspection  ENT: MMM  NECK: Supple; non tender.  CARD: RRR  RESP: crackles R lung base  ABD: + TTP diffusely, mild in severity, no rebound or guarding, abdomen soft nondistended  EXT: no pedal edema  NEURO: Grossly unremarkable

## 2021-07-12 NOTE — PROGRESS NOTE ADULT - SUBJECTIVE AND OBJECTIVE BOX
Patient was examined during HD treatment.    Hemodialysis Prescription:  	Access: fem temp HD cath  	Dialyzer: Opti 160  	Blood Flow (mL/Min): 200  	Dialysate Flow (mL/Min): 500  	Target UF (Liters): -  	Treatment Time: 2 hours  	Potassium: 1K  	Calcium: 2.5

## 2021-07-12 NOTE — ED ADULT NURSE NOTE - OBJECTIVE STATEMENT
Pt sent in from nursing home for eval of lethargy, vomiting, shortness of breath, and hypotension. Pt poor historian, pt has hx oa alzheimers.

## 2021-07-12 NOTE — ED ADULT TRIAGE NOTE - CHIEF COMPLAINT QUOTE
Pt BIBEMS from NH for respiratory distress & hypotension; on arrival to scene, pt was placed from 3L to 4L nasal cannula and oxygen came up to 100%. Pt was also normotensive. Pt is calm & comfortable in triage. Pt has COPD, is on 3L baseline.

## 2021-07-12 NOTE — ED PROVIDER NOTE - ATTENDING CONTRIBUTION TO CARE
80 yo m PMHx of HF with unknown EF, HTN, anxiety, Alzheimer's dementia, COPD not on home O2, T2DM on insulin, hx right nephrectomy and ESRD on retacrit, anemia of chronic disease 80 yo m PMHx of HF with unknown EF, HTN, anxiety, Alzheimer's dementia, COPD not on home O2, T2DM on insulin, hx right nephrectomy and ESRD on retacrit, anemia of chronic disease  pt presents for eval of fatigue, vomiting. daughter states pt lives at assisted living. pt bedbound but can normally help w/ some adls. pt has had weight loss for >1 month, decreased appetite for several days and started vomiting today which prompted eval.  pt limited historian but no known cp, sob, syncope. during interview, pt coughing several times    vss  gen- NAD, aaox0  card-rrr  lungs-RLL crackles, otherwise no wheezing or stridor   abd-mild diffuse tenderness, nondistended, no guarding or rebound  neuro- moving all extremities, sensation grossly intact throughout, pt answering some basic questions but will primarily respond when something bothers him (put the blanket on, lower the bed)    fatigue, vomiting, diffuse abd discomfort  will start w/ belly labs, ekg/trop, cxr, ctap  abx pending imaging  cardiac monitor  likely admit

## 2021-07-12 NOTE — ED PROVIDER NOTE - CARE PLAN
Principal Discharge DX:	Pneumonia   Principal Discharge DX:	Septic shock  Secondary Diagnosis:	Pneumonia of right lower lobe due to infectious organism  Secondary Diagnosis:	Acute hyperkalemia  Secondary Diagnosis:	Renal failure

## 2021-07-12 NOTE — ED PROVIDER NOTE - NS ED ROS FT
Constitutional:  See HPI  Eyes:  No visual changes  ENMT: No neck pain or stiffness  Cardiac:  No chest pain  Respiratory:  + cough or respiratory distress.   GI:  No nausea, vomiting, diarrhea or abdominal pain.  :  No dysuria, frequency or burning.  MS:  No back pain.  Neuro:  No headache   Skin:  No skin rash  Except as documented in the HPI,  all other systems are negative

## 2021-07-12 NOTE — ED PROVIDER NOTE - PROGRESS NOTE DETAILS
d/w rad, does not appear to be free air on cxr, agrees w/ plan for further imaging. VBG w/ elevated K, treatment started. abx ordered for RLL opacity. pt on cardiac monitor. unchanged abd exam CO- pt hypotensive in main. given degree of critical illness, will move to critical care area for close monitoring, likely central line. pt endorsed to Dr. Titus- pending kub, ctap, serial ekg, serial exam, labs, dispo TD: Discussed pt with ICU fellow who states pt can be admitted to stepdown unit, approved by Dr. Cadena. Pt endorsed to MAR. Emergent dialysis catheter placed. Patient going for CT scan of abd/pelvis. Renal aware and arranging emergent dialysis. Repeat sepsis perfusion exam performed and patient has dry skin with strong pulses. He is awake and talking. VS reviewed. Patient has rhonchi to right lower lung with clear lungs b/l otherwise. Sepsis suspected now. d/w rad, does not appear to be free air on cxr, agrees w/ plan for further imaging. VBG w/ elevated K, treatment started. abx ordered for RLL opacity. pt on cardiac monitor. unchanged abd exam

## 2021-07-12 NOTE — H&P ADULT - NSHPLABSRESULTS_GEN_ALL_CORE
8.8    15.07 )-----------( 187      ( 2021 10:44 )             28.1         135  |  102  |  198<HH>  ----------------------------<  144<H>  6.7<HH>   |  6<LL>  |  11.3<HH>    Ca    8.2<L>      2021 10:44  Mg     2.4         TPro  7.3  /  Alb  4.1  /  TBili  0.3  /  DBili  x   /  AST  12  /  ALT  7   /  AlkPhos  69      Lipase, Serum: 143 U/L (21 @ 11:37)    Blood Gas Venous - Lactate: 1.3 mmoL/L (21 @ 11:28)    Serum Pro-Brain Natriuretic Peptide: >69085 pg/mL (21 @ 10:44)    Troponin T, Serum: 0.57: Critical value: ng/mL (21 @ 10:44)    Urinalysis Basic - ( 2021 13:07 )    Color: Yellow / Appearance: Slightly Turbid / S.014 / pH: x  Gluc: x / Ketone: Negative  / Bili: Negative / Urobili: <2 mg/dL   Blood: x / Protein: 300 mg/dL / Nitrite: Negative   Leuk Esterase: Large / RBC: 14 /HPF / WBC 73 /HPF   Sq Epi: x / Non Sq Epi: 1 /HPF / Bacteria: Few    < from: 12 Lead ECG (21 @ 11:03) >      Diagnosis Line Normal sinus rhythm  Possible Left atrial enlargement  Left axis deviation  Non-specific intra-ventricular conduction delay  ST & T wave abnormality, consider anterolateral ischemia  Abnormal ECG    < end of copied text >    < from: CT Abdomen and Pelvis No Cont (21 @ 15:27) >    IMPRESSION:  1.  Circumferential urinary bladder wall thickening with surrounding inflammation, suspicious for cystitis. Left perinephric fat stranding also noted which may represent superimposed ascending UTI/pyelonephritis. Correlate with urinalysis and other laboratory findings.    2.  Right basilar airspace opacities with volume loss. Findings may represent atelectasis and/or pneumonia.    3.  Trace bilateral pleural effusions and bilateral interlobular septal thickening suggestive of interstitial edema.    < end of copied text >

## 2021-07-13 NOTE — CONSULT NOTE ADULT - ATTENDING COMMENTS
NSTEMI / Type 2 MI in Septic shock and ASHLEE on CKD requiring HD  Alzheimer's dementia  DNR/DNI    CXR:  RLL opacity  CT A/P:  Coronary calcifications noted in LAD and LCx  EKG:  SR with IVCD and lateral ischemia    Troponin  0.57  -->  2.1    Medical management of CAD  No indication for acute cardiac intervention at this time  Can consider adding Plavix 75 mg daily if no other procedures planned  Continue Aspirin and Atorvastatin
Events noted, sepsis present on admission, acute on chronic renal failure sp u dall HD, iv ABX, repeat LABS, Pancx sdu
Counseled patient about diagnostic testing and treatment plan. All questions answered.

## 2021-07-13 NOTE — PHARMACOTHERAPY INTERVENTION NOTE - COMMENTS
Patient was on vancomycin and meropenem for broad coverage for suspected pyelonephritis. Team wanted to narrow coverage and switch to unasyn. Recommended to start unasyn 3g q24h due to ESRD on HD
Patient's phosphorus level was 10.8 and is ESRD on HD. Recommended a phosphate binder, such as calcium acetate 667 mg TID.
Patient's MRSA swabs came back positive. Recommended to start vancomycin 1000 mg post-HD.
67

## 2021-07-13 NOTE — GOALS OF CARE CONVERSATION - ADVANCED CARE PLANNING - CONVERSATION DETAILS
Discussed end of life care should Chace's condition deteriorate.   His daughter and HCP Shin was at bedside and made the majority of decisions in accordance to her previous conversations with her father.   He does not want to be kept alive via machines. He is DNR / DNI but agreeable to a trail of BiPAP, No tube feeds, IVF and Abx are ok to use.   They understand the risks and benefits regarding CRP and intubation.

## 2021-07-13 NOTE — CONSULT NOTE ADULT - ASSESSMENT
ASSESSMENT  81y yo  Male with a PMH of....... (fill in)    IMPRESSION  #   # Lines and tubes:   - Laura catheter + (inserted 07/12/21)  - Rt Femoral [place] + (inserted 07/12/21)      RECOMMENDATIONS  -     *** This is a pending note. All final recommendations to follow pending discussion with ID Attending *** ASSESSMENT  81 Y M from [facility] with pmh of CHF (unknown EF), HTN, anxiety, Alzheimer's dementia, COPD on home O2, T2DM on insulin, hx right nephrectomy/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    IMPRESSION  # RLL Pneumonia:   - Hypotension/shortness of breath  - X-ray chest (07/12): RLL opacity    # UTI:   - CT Abd/pelvis (07/12): Circumferential urinary bladder wall thickening with surrounding inflammation, suspicious for cystitis. Left perinephric fat stranding also noted which may represent superimposed ascending UTI/pyelonephritis.     # Lines and tubes:   - Laura catheter + (inserted 07/12/21)  - Rt Femoral [place] + (inserted 07/12/21)      RECOMMENDATIONS  - Follow up Blood culture/Urine culture  - on Unasyn    *** This is a pending note. All final recommendations to follow pending discussion with ID Attending *** ASSESSMENT  81 Y M from [facility] with pmh of CHF (unknown EF), HTN, anxiety, Alzheimer's dementia, COPD on home O2, T2DM on insulin, hx right nephrectomy/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    IMPRESSION  # RLL Pneumonia:   - episode of vomiting on day prior to admission  - increased O2 requirements  - Hypotension/shortness of breath  - X-ray chest (07/12): RLL opacity    # UTI:   - CT Abd/pelvis (07/12): Circumferential urinary bladder wall thickening with surrounding inflammation, suspicious for cystitis. Left perinephric fat stranding also noted which may represent superimposed ascending UTI/pyelonephritis.     # Lines and tubes:   - Laura catheter + (inserted 07/12/21)  - Rt Femoral [place] + (inserted 07/12/21)      RECOMMENDATIONS  - Follow up Blood culture/Urine culture  - on Unasyn    *** This is a pending note. All final recommendations to follow pending discussion with ID Attending *** ASSESSMENT  81 Y M from [facility] with pmh of CHF (unknown EF), HTN, anxiety, Alzheimer's dementia, COPD on home O2, T2DM on insulin, hx right nephrectomy/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    IMPRESSION  # RLL Pneumonia:   - episode of vomiting on day prior to admission  - increased O2 requirements  - Hypotension/shortness of breath  - X-ray chest (07/12): RLL opacity    # UTI:   - CT Abd/pelvis (07/12): Circumferential urinary bladder wall thickening with surrounding inflammation, suspicious for cystitis. Left perinephric fat stranding also noted which may represent superimposed ascending UTI/pyelonephritis.     # Lines and tubes:   - Laura catheter + (inserted 07/12/21)  - Rt Femoral [place] + (inserted 07/12/21)      RECOMMENDATIONS  - Follow up Blood culture/Urine culture  - on Unasyn    *** This is a pending note. All final recommendations to follow pending discussion with ID Attending *** ASSESSMENT  81 Y M from [facility] with pmh of CHF (unknown EF), HTN, anxiety, Alzheimer's dementia, COPD on home O2, T2DM on insulin, hx right nephrectomy/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    IMPRESSION  # RLL Pneumonia:   - episode of vomiting on day prior to admission  - increased O2 requirements  - Hypotension/shortness of breath  - X-ray chest (07/12): RLL opacity    # Imaging suggestive of UTI:   - CT Abd/pelvis (07/12): Circumferential urinary bladder wall thickening with surrounding inflammation, suspicious for cystitis. Left perinephric fat stranding also noted which may represent superimposed ascending UTI/pyelonephritis.     # Lines and tubes:   - Laura catheter + (inserted 07/12/21)  - Rt Femoral [place] + (inserted 07/12/21)    RECOMMENDATIONS  - Follow up Blood culture/Urine culture  - on Unasyn    *** This is a pending note. All final recommendations to follow pending discussion with ID Attending *** ASSESSMENT  81 Y M from [facility] with pmh of CHF (unknown EF), HTN, anxiety, Alzheimer's dementia, COPD on home O2, T2DM on insulin, hx right nephrectomy/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    IMPRESSION  # RLL Pneumonia   # Septic shock  - episode of vomiting on day prior to admission  - increased O2 requirements  - Hypotension/Leukocytosis/shortness of breath  - X-ray chest (07/12): RLL opacity    # ASHLEE on CKD; s/p Rt nephrectomy  # Imaging suggestive of UTI/left pyelonephritis:   - CT Abd/pelvis (07/12): Circumferential urinary bladder wall thickening with surrounding inflammation, suspicious for cystitis. Left perinephric fat stranding also noted which may represent superimposed ascending UTI/pyelonephritis.     # Lines and tubes:   - Laura catheter + (inserted 07/12/21)  - Rt Femoral [place] + (inserted 07/12/21)    RECOMMENDATIONS  - Follow up Blood culture/Urine culture  - Meropenem 750mg IV stat, then 500mg OD  - Zyvox 600mg IV q12h ASSESSMENT  81 Y M from [facility] with pmh of CHF (unknown EF), HTN, anxiety, Alzheimer's dementia, COPD on home O2, T2DM on insulin, hx right nephrectomy/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    IMPRESSION  # RLL Pneumonia   # Septic shock  - episode of vomiting on day prior to admission  - increased O2 requirements  - Hypotension/Leukocytosis/shortness of breath  - X-ray chest (07/12): RLL opacity  - Nasal MRSA +    # ASHLEE on CKD; s/p Rt nephrectomy  # Imaging suggestive of UTI/left pyelonephritis:   - CT Abd/pelvis (07/12): Circumferential urinary bladder wall thickening with surrounding inflammation, suspicious for cystitis. Left perinephric fat stranding also noted which may represent superimposed ascending UTI/pyelonephritis.     # Lines and tubes:   - Laura catheter + (inserted 07/12/21)  - Rt Femoral [place] + (inserted 07/12/21)    RECOMMENDATIONS  - Follow up Blood culture/Urine culture  - Meropenem 750mg IV stat, then 500mg OD  - Zyvox 600mg IV q12h ASSESSMENT  81 Y M from [facility] with pmh of CHF (unknown EF), HTN, anxiety, Alzheimer's dementia, COPD on home O2, T2DM on insulin, hx right nephrectomy/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    IMPRESSION  # RLL Pneumonia   # Septic shock  - episode of vomiting on day prior to admission  - increased O2 requirements  - Hypotension/Leukocytosis/shortness of breath  - X-ray chest (07/12): RLL opacity  - Nasal MRSA +    # ASHLEE on CKD; s/p Rt nephrectomy  # Imaging suggestive of UTI/left pyelonephritis:   - CT Abd/pelvis (07/12): Circumferential urinary bladder wall thickening with surrounding inflammation, suspicious for cystitis. Left perinephric fat stranding also noted which may represent superimposed ascending UTI/pyelonephritis.     # Lines and tubes:   - Laura catheter + (inserted 07/12/21)  - Rt Femoral [place] + (inserted 07/12/21)    RECOMMENDATIONS  - Follow up Blood culture/Urine culture  - Meropenem 750mg IV stat, then 500mg iv q24h  - Zyvox 600mg IV q12h

## 2021-07-13 NOTE — CONSULT NOTE ADULT - ASSESSMENT
81 Y M from [facility] with pmh of HTN, anxiety, Alzheimer's dementia, COPD on home O2, T2DM on insulin, hx right nephrectomy/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    Impression   -NSTEMI II, likely demand ischemia in the setting of septic shock and worsening kidney function  -Septic shock   -CKD V now on HD  -IDDM  -HTN  -COPD  - Alzheimer's dementia    Recommendations   -currently denies chest pain or SOB  -repeat troponin in AM  -2d ECHO   -start on aspirin 81 mg daily

## 2021-07-13 NOTE — PROGRESS NOTE ADULT - SUBJECTIVE AND OBJECTIVE BOX
DRAKE BELTRANJ 81y Male  MRN#: [id number]   CODE STATUS: DNR    Hospital Day: 1d    Pt is currently admitted with the primary diagnosis of Septic Shock 2/2 Pneumonia, Acute renal failure     SUBJECTIVE  Hospital Course:   81 Y M from [facility] with pmh of CHF (unknown EF), HTN, anxiety, Alzheimer's dementia, COPD on home O2, T2DM on insulin, hx right nephrectomy/CKD V non-dialysis dependent baseline creatinine 3.3-4, anemia of chronic disease on ROMELIA therapy, brought in by EMS with complaint of SOB, vomiting and hypotension.     ED Course:  Patient found to have RLL PNA and UTI. Patient hypotensive, requiring pressors. Labs significant for hyperkalemia and metabolic acidosis. [place] placed, patient went for urgent HD.    Overnight events: Requiring Levophed 0.5. Bicarb gtt @80cc/hr. S/P [place]. S/P emergent RRT. S/P vanc, azythromycin, cefepime in ER.     Subjective complaints: He is Awake and alert but does not answer questions. He speaks [place] and English per daughter. Unable to assess ROS as patient does not want to speak.   Spoke to daughter Patrick Cosme at bedside: [phone]. Updated her on care plan for her father.     Present Today:   - Laura:  No [  ], Yes [  X ] : Indication:     - Type of IV Access:       .. CVC/Piccline:  No [  ], Yes [   ] : Indication:       .. Midline: No [  ], Yes [   ] : Indication:                                             ----------------------------------------------------------  OBJECTIVE  PAST MEDICAL & SURGICAL HISTORY  Diabetes mellitus    COPD (chronic obstructive pulmonary disease)    Lymphedema of both lower extremities    CHF (congestive heart failure)    H/O right nephrectomy  20 yrs ago                                              -----------------------------------------------------------  ALLERGIES:  No Known Allergies                                            ------------------------------------------------------------    HOME MEDICATIONS  Home Medications:  albuterol 90 mcg/inh inhalation aerosol: 2 puff(s) inhaled every 4 hours (2021 17:02)  Aricept 5 mg oral tablet: 1 tab(s) orally once a day (at bedtime) (2021 17:02)  Breo Ellipta 200 mcg-25 mcg/inh inhalation powder: 1 puff(s) inhaled once a day (2021 17:02)  ferrous sulfate 325 mg (65 mg elemental iron) oral tablet: 1 tab(s) orally once a day (2021 17:)  furosemide 20 mg oral tablet: 1 tab(s) orally once a day (2021 17:02)  hydrALAZINE 50 mg oral tablet: 1 tab(s) orally every 12 hours (2021 17:02)  Lantus 100 units/mL subcutaneous solution: 8 unit(s) subcutaneous once a day (at bedtime) (2021 17:02)  Melatonin 3 mg oral tablet: 1 tab(s) orally once a day (at bedtime) (2021 17:02)  nitroglycerin 0.3 mg sublingual tablet: 1 tab(s) sublingual every 5 minutes no more than 3 doses (2021 17:02)  NovoLOG FlexPen 100 units/mL injectable solution: 5 unit(s) injectable 3 times a day (before meals) (2021 17:02)  Remeron 15 mg oral tablet: 1 tab(s) orally once a day (at bedtime) (2021 17:02)  Retacrit 4000 units/mL preservative-free injectable solution: 4000 unit(s) injectable once a week. hold if Hgb &gt; 10 (:)  Senna 8.6 mg oral tablet: 2 tab(s) orally once a day (at bedtime), As Needed (2021 17:02)  sertraline 25 mg oral tablet: 1 tab(s) orally once a day (2021 17:02)  Vitamin C 500 mg oral tablet: 1 tab(s) orally once a day (2021 17:02)                           MEDICATIONS:  STANDING MEDICATIONS  ALBUTerol    90 MICROgram(s) HFA Inhaler 2 Puff(s) Inhalation every 6 hours  ascorbic acid 500 milliGRAM(s) Oral daily  aspirin  chewable 81 milliGRAM(s) Oral daily  atorvastatin 40 milliGRAM(s) Oral at bedtime  budesonide 160 MICROgram(s)/formoterol 4.5 MICROgram(s) Inhaler 2 Puff(s) Inhalation two times a day  calcium acetate 667 milliGRAM(s) Oral three times a day with meals  chlorhexidine 4% Liquid 1 Application(s) Topical <User Schedule>  dextrose 40% Gel 15 Gram(s) Oral once  dextrose 5%. 1000 milliLiter(s) IV Continuous <Continuous>  dextrose 5%. 1000 milliLiter(s) IV Continuous <Continuous>  dextrose 50% Injectable 25 Gram(s) IV Push once  dextrose 50% Injectable 12.5 Gram(s) IV Push once  dextrose 50% Injectable 25 Gram(s) IV Push once  donepezil 5 milliGRAM(s) Oral at bedtime  ferrous    sulfate 325 milliGRAM(s) Oral daily  glucagon  Injectable 1 milliGRAM(s) IntraMuscular once  heparin   Injectable 5000 Unit(s) SubCutaneous every 12 hours  insulin glargine Injectable (LANTUS) 8 Unit(s) SubCutaneous at bedtime  insulin lispro (ADMELOG) corrective regimen sliding scale   SubCutaneous three times a day before meals  insulin lispro Injectable (ADMELOG) 5 Unit(s) SubCutaneous three times a day before meals  lactulose Syrup 10 Gram(s) Oral daily  linezolid  IVPB 600 milliGRAM(s) IV Intermittent every 12 hours  meropenem  IVPB      mirtazapine 15 milliGRAM(s) Oral at bedtime  norepinephrine Infusion 0.05 MICROgram(s)/kG/Min IV Continuous <Continuous>  pantoprazole    Tablet 40 milliGRAM(s) Oral before breakfast  polyethylene glycol 3350 17 Gram(s) Oral daily  sertraline 25 milliGRAM(s) Oral daily    PRN MEDICATIONS  senna 8.6 milliGRAM(s) Oral Tablet - Peds 2 Tablet(s) Oral at bedtime PRN                                            ------------------------------------------------------------  VITAL SIGNS: Last 24 Hours  T(C): 36.6 (2021 12:43), Max: 36.6 (2021 12:43)  T(F): 97.8 (2021 12:43), Max: 97.8 (2021 12:43)  HR: 96 (2021 15:30) (91 - 987)  BP: 108/56 (2021 15:30) (108/56 - 151/81)  BP(mean): 76 (2021 12:43) (76 - 79)  RR: 20 (2021 15:30) (17 - 20)  SpO2: 100% (2021 15:30) (97% - 100%)      21 @ 07:  -  21 @ 07:00  --------------------------------------------------------  IN: 480.6 mL / OUT: 300 mL / NET: 180.6 mL    21 @ 07:  -  21 @ 20:30  --------------------------------------------------------  IN: 0 mL / OUT: 0 mL / NET: 0 mL                                             --------------------------------------------------------------  LABS:                        7.9    14.98 )-----------( 170      ( 2021 10:19 )             24.6     07-13    133<L>  |  96<L>  |  129<HH>  ----------------------------<  197<H>  4.5   |  18  |  8.2<HH>    Ca    7.6<L>      2021 10:19  Phos  10.8       Mg     1.9         TPro  6.4  /  Alb  3.6  /  TBili  0.3  /  DBili  x   /  AST  34  /  ALT  10  /  AlkPhos  64  13    PT/INR - ( 2021 01:13 )   PT: 13.60 sec;   INR: 1.18 ratio         PTT - ( :13 )  PTT:28.5 sec  Urinalysis Basic - ( 2021 13:07 )    Color: Yellow / Appearance: Slightly Turbid / S.014 / pH: x  Gluc: x / Ketone: Negative  / Bili: Negative / Urobili: <2 mg/dL   Blood: x / Protein: 300 mg/dL / Nitrite: Negative   Leuk Esterase: Large / RBC: 14 /HPF / WBC 73 /HPF   Sq Epi: x / Non Sq Epi: 1 /HPF / Bacteria: Few        Troponin T, Serum: 2.19 ng/mL *HH* (21 @ 01:13)  Troponin T, Serum: 2.11 ng/mL *HH* (21 @ 00:53)  Troponin T, Serum: 1.69 ng/mL *HH* (21 @ 21:02)        Culture - Urine (collected 2021 13:06)  Source: .Urine Clean Catch (Midstream)  Final Report (2021 17:36):    No growth    Culture - Blood (collected 2021 11:38)  Source: .Blood Blood-Peripheral  Preliminary Report (2021 19:01):    No growth to date.    Culture - Blood (collected 2021 11:38)  Source: .Blood Blood-Peripheral  Preliminary Report (2021 19:01):    No growth to date.        CARDIAC MARKERS ( 2021 01:13 )  x     / 2.19 ng/mL / x     / x     / x      CARDIAC MARKERS ( 2021 00:53 )  x     / 2.11 ng/mL / x     / x     / x      CARDIAC MARKERS ( 2021 21:02 )  x     / 1.69 ng/mL / x     / x     / x      CARDIAC MARKERS ( 2021 10:44 )  x     / 0.57 ng/mL / x     / x     / x                                                  -------------------------------------------------------------  RADIOLOGY:    EXAM:  XR CHEST PORTABLE URGENT 1V            PROCEDURE DATE:  2021            INTERPRETATION:  Clinical History / Reason for exam: Chest pain    Comparison : Chest radiograph 2020.    Technique/Positionin AP views of the chest.    Findings:    Support devices: None.    Cardiac/mediastinum/hilum: Unchanged.    Lung parenchyma/Pleura: Right lower lobe opacity. No pneumothorax.    Skeleton/soft tissues: No acute abnormality.    Impression:    Right lower lobe opacity.      CT Abdomen and Pelvis 21  IMPRESSION:  1.  Circumferential urinary bladder wall thickening with surrounding inflammation, suspicious for cystitis. Left perinephric fat stranding also noted which may represent superimposed ascending UTI/pyelonephritis. Correlate with urinalysis and other laboratory findings.    2.  Right basilar airspace opacities with volume loss. Findings may represent atelectasis and/or pneumonia.    3.  Trace bilateral pleural effusions and bilateral interlobular septal thickening suggestive of interstitial edema.                                              --------------------------------------------------------------    Physical Exam: PHYSICAL EXAM:  CONSTITUTIONAL: ill-appearing, somnolent, opens eyes to verbal stimuli but does not willingly speak except to request things, (e.g. "give me water." or "I am cold")  SKIN: Warm dry  HEAD: NCAT  EYES: NL inspection  ENT: MMM  NECK: Supple; non tender.  CARD: RRR  RESP: crackles R lung base  ABD: + TTP diffusely, mild in severity, no rebound or guarding, abdomen soft nondistended  EXT: no pedal edema  NEURO: Grossly unremarkable                                             --------------------------------------------------------------    ASSESSMENT & PLAN    Past medical history and hospital course   81 Y M from [facility] with pmh of CHF (unknown EF), HTN, anxiety, Alzheimer's dementia, COPD on home O2, T2DM on insulin, hx right nephrectomy/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 Found to be in acute on chronic renal failure, with pneumonia and UTI.    # Septic Shock 2/2 RLL Pneumonia  #2/2 UTI  - Presented Hypotensive requiring Levophed.  - Chest Xray : RLL pneumonia   - UA: Turbid, large Leukocyte esterase   - Leukocytosis worsening 12.82 > 14.98  - Urine culture : NGTD  - Blood Culture NGTD  - nasal MRSA +  -S/p renally-dosed antibiotics in ER: (vancomycin, cefepime, azithromycin)  -ID consult: Started on Zyvox 600 q12, meropenum 750mg today, 500 QD starting tomorrow   - D/C'd Vanc, cefepime, azithromycin   -F/U procalcitonin  -F/U urine strep and legionella  -wean levophed  - f/u chest xray   - f/u pan culture     #CKDV now on HD, s/p Rt nephrectomy   #Metabolic acidosis  #Hyperkalemia  -S/P [place]   -S/p Dialysis sessions , , tolerating well   - s/p Bicarb gtt, Dc'd    - Nephro recs: DC bicarb drip, f/u cultures, f/u lytes   -repeat labs at 8pm  -correct lytes as needed   - F/U VBG/ ABG     #New ? HF?EF  -volume overload on admission  - bedside GDE EF 40%,   - No baseline Echo  - F/U TTE  -strict i/o, fluid restriction, daily weight  -holding lasix for now    #Troponemia  - 0.57>1.69>2.11>2.19  -no chest pain  -Cardiology recs: - Likely NSTEMI TYPE II 2/2 demand ischemia from ARF/ Septic Shock  -F/U 2d ECHO   -started on aspirin 81 mg daily    #HTN  -holding hydralazine, patient hypotensive on admission  - Currently on levophed 0.5  - Ween levophed, goal MAP>60     #DM  -lantus 8qhs, lispro 5 qac  -avoid hypoglycemia    #Alzheimer's dementia  -continue remeron, aricept, sertraline    #Iron deficiency anemia  -at baseline  -continue ferrous sulfate  -ROMELIA per nephro    #COPD on home O2  -continue inhalers  -not in exacerbation    #DVT PPx- Heparin 5000u sq q12h  #GI PPx- Protonix 40mg po qam   #Diet- DASH/TLC/CC/Renal  #CHG  #Activity- Bedrest  #Dispo- Acute; SDU  #Code- FULL

## 2021-07-13 NOTE — PROGRESS NOTE ADULT - SUBJECTIVE AND OBJECTIVE BOX
Patient is a 81y old  Male who presents with a chief complaint of vomiting, hypotension (2021 01:48)        Over Night Events:    no events. On Levo 0.05. bicarb drip 80cc/hr. afebrile    ROS:     All ROS are negative except HPI         PHYSICAL EXAM    ICU Vital Signs Last 24 Hrs  T(C): 36.2 (2021 08:21), Max: 36.5 (2021 09:21)  T(F): 97.1 (2021 08:21), Max: 97.7 (2021 09:21)  HR: 94 (2021 08:21) (85 - 987)  BP: 112/55 (2021 08:21) (72/34 - 151/81)  BP(mean): 79 (2021 08:21) (50 - 79)  ABP: --  ABP(mean): --  RR: 18 (2021 08:21) (16 - 24)  SpO2: 97% (2021 00:30) (96% - 100%)      CONSTITUTIONAL:  Ill appearing. NAD    ENT:   Airway patent,   Mouth with normal mucosa.   No thrush    EYES:   Pupils equal,   Round and reactive to light.    CARDIAC:   Normal rate,   Regular rhythm.    No edema    Vascular:  Normal systolic impulse  No Carotid bruits    RESPIRATORY:   No wheezing  Bilateral BS  Normal chest expansion  Not tachypneic,  No use of accessory muscles    GASTROINTESTINAL:  Abdomen soft,   Non-tender,   No guarding,   + BS    MUSCULOSKELETAL:   Range of motion is not limited,  No clubbing, cyanosis    NEUROLOGICAL:   Alert and oriented   No motor  deficits.    SKIN:   Skin normal color for race,   Warm and dry and intact.   No evidence of rash.    PSYCHIATRIC:   Normal mood and affect.   No apparent risk to self or others.    HEMATOLOGICAL:  No cervical  lymphadenopathy.  no inguinal lymphadenopathy      21 @ 07:01  -  - @ 07:00  --------------------------------------------------------  IN:    IV PiggyBack: 50 mL    Norepinephrine: 6 mL    Norepinephrine: 24.7 mL    Sodium Bicarbonate: 400 mL  Total IN: 480.6 mL    OUT:    Other (mL): 0 mL    Voided (mL): 300 mL  Total OUT: 300 mL    Total NET: 180.6 mL          LABS:                            8.3    12.82 )-----------( 183      ( 2021 01:13 )             25.0                                               07-13    138  |  98  |  123<HH>  ----------------------------<  165<H>  4.5   |  15<L>  |  7.8<HH>    Ca    8.3<L>      2021 01:13  Phos  10.0     07-13  Mg     2.0     07-13    TPro  6.9  /  Alb  3.8  /  TBili  0.3  /  DBili  x   /  AST  42<H>  /  ALT  10  /  AlkPhos  69  07-13      PT/INR - ( 2021 01:13 )   PT: 13.60 sec;   INR: 1.18 ratio         PTT - ( 2021 01:13 )  PTT:28.5 sec                                       Urinalysis Basic - ( 2021 13:07 )    Color: Yellow / Appearance: Slightly Turbid / S.014 / pH: x  Gluc: x / Ketone: Negative  / Bili: Negative / Urobili: <2 mg/dL   Blood: x / Protein: 300 mg/dL / Nitrite: Negative   Leuk Esterase: Large / RBC: 14 /HPF / WBC 73 /HPF   Sq Epi: x / Non Sq Epi: 1 /HPF / Bacteria: Few        CARDIAC MARKERS ( 2021 01:13 )  x     / 2.19 ng/mL / x     / x     / x      CARDIAC MARKERS ( 2021 00:53 )  x     / 2.11 ng/mL / x     / x     / x      CARDIAC MARKERS ( 2021 21:02 )  x     / 1.69 ng/mL / x     / x     / x      CARDIAC MARKERS ( 2021 10:44 )  x     / 0.57 ng/mL / x     / x     / x                                                LIVER FUNCTIONS - ( 2021 01:13 )  Alb: 3.8 g/dL / Pro: 6.9 g/dL / ALK PHOS: 69 U/L / ALT: 10 U/L / AST: 42 U/L / GGT: x                                                                                                                                       MEDICATIONS  (STANDING):  ALBUTerol    90 MICROgram(s) HFA Inhaler 2 Puff(s) Inhalation every 6 hours  ascorbic acid 500 milliGRAM(s) Oral daily  aspirin  chewable 81 milliGRAM(s) Oral daily  atorvastatin 40 milliGRAM(s) Oral at bedtime  budesonide 160 MICROgram(s)/formoterol 4.5 MICROgram(s) Inhaler 2 Puff(s) Inhalation two times a day  chlorhexidine 4% Liquid 1 Application(s) Topical <User Schedule>  dextrose 40% Gel 15 Gram(s) Oral once  dextrose 5%. 1000 milliLiter(s) (50 mL/Hr) IV Continuous <Continuous>  dextrose 5%. 1000 milliLiter(s) (100 mL/Hr) IV Continuous <Continuous>  dextrose 50% Injectable 25 Gram(s) IV Push once  dextrose 50% Injectable 12.5 Gram(s) IV Push once  dextrose 50% Injectable 25 Gram(s) IV Push once  donepezil 5 milliGRAM(s) Oral at bedtime  ferrous    sulfate 325 milliGRAM(s) Oral daily  glucagon  Injectable 1 milliGRAM(s) IntraMuscular once  heparin   Injectable 5000 Unit(s) SubCutaneous every 12 hours  insulin glargine Injectable (LANTUS) 8 Unit(s) SubCutaneous at bedtime  insulin lispro (ADMELOG) corrective regimen sliding scale   SubCutaneous three times a day before meals  insulin lispro Injectable (ADMELOG) 5 Unit(s) SubCutaneous three times a day before meals  meropenem  IVPB 500 milliGRAM(s) IV Intermittent every 24 hours  mirtazapine 15 milliGRAM(s) Oral at bedtime  norepinephrine Infusion 0.05 MICROgram(s)/kG/Min (6.17 mL/Hr) IV Continuous <Continuous>  pantoprazole    Tablet 40 milliGRAM(s) Oral before breakfast  sertraline 25 milliGRAM(s) Oral daily  sodium bicarbonate  Infusion 0.182 mEq/kG/Hr (80 mL/Hr) IV Continuous <Continuous>  vancomycin  IVPB 500 milliGRAM(s) IV Intermittent <User Schedule>    MEDICATIONS  (PRN):  senna 8.6 milliGRAM(s) Oral Tablet - Peds 2 Tablet(s) Oral at bedtime PRN Constipation      New X-rays reviewed:                                                                                  ECHO    CXR interpreted by me:  Right sided infiltrates

## 2021-07-13 NOTE — CONSULT NOTE ADULT - SUBJECTIVE AND OBJECTIVE BOX
VIC, ISMAIL  81y, Male  Allergy: No Known Allergies      CHIEF COMPLAINT: vomiting, hypotension (2021 09:11)      HPI:  81 Y M from [facility] with pmh of CHF (unknown EF), HTN, anxiety, Alzheimer's dementia, COPD on home O2, T2DM on insulin, hx right nephrectomy/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 Patient is unable to provide history so history was taken from daughter over the phone. She stated that last night, patient had multiple episodes of NBNB vomiting, with decreased PO intake and more somnolent. Today, Shalonda took his BP and it was 85/40 so was brought to ED. No complaints of dysuria, diarrhea, or cough. No fever.    ED Course:  Patient found to have RLL PNA and UTI. Patient hypotensive, requiring pressors. Labs significant for hyperkalemia and metabolic acidosis. [place] placed, patient went for urgent HD.  Patient was admitted to Vent unit for further management  ID consulted for evaluation of pneumonia and UTI.       Infectious Diseases History:  Old Micro Data/Cultures:     FAMILY HISTORY:  No pertinent family history in first degree relatives      PAST MEDICAL & SURGICAL HISTORY:  Diabetes mellitus    COPD (chronic obstructive pulmonary disease)    Lymphedema of both lower extremities    CHF (congestive heart failure)    H/O right nephrectomy  20 yrs ago        SOCIAL HISTORY  Social History:  lives at [facility] currently  former smoker, 40 pack years, quit 24 years ago  no alcohol or drugs (2021 17:03)      Recent Travel:  Other Exposures:     ROS  General: rigors +, chills +, nightsweats -  HEENT: Denies headache, rhinorrhea, sore throat, eye pain  CV: Denies CP, palpitations  PULM: Denies wheezing, hemoptysis  GI: Denies hematemesis, hematochezia, melena  : Denies discharge, hematuria  MSK: Denies arthralgias, myalgias  SKIN: Denies rash, lesions  NEURO: Denies paresthesias, weakness  PSYCH: Denies depression, anxiety    VITALS:  T(F): 97.1, Max: 97.4 (21 @ 23:37)  HR: 94  BP: 112/55  RR: 18    Vital Signs Last 24 Hrs  T(F): 97.1 (2021 08:21), Max: 97.4 (2021 23:37)  HR: 94 (2021 08:21) (85 - 987)  BP: 112/55 (2021 08:21) (72/34 - 151/81)  BP(mean): 79 (2021 08:21) (50 - 79)  RR: 18 (2021 08:21) (16 - 24)  SpO2: 97% (2021 00:30) (97% - 100%)    PHYSICAL EXAM:  Gen: NAD, resting in bed, on NC at 2lpm  HEENT: Normocephalic, atraumatic  Neck: supple, no lymphadenopathy  CV: Regular rate & regular rhythm  Lungs: bilateral equal breath sounds, Rt basal crackles +  Abdomen: Soft, BS present  Ext: Warm, well perfused  Neuro: non focal, awake, alert, follows commands; minimally verbal  Skin: Hyperkeratotic lesions on bilateral feet; scabbed excoriations scattered over bilateral upper and lower extremities  Lines: no phlebitis; Rt femoral [place] +    TESTS & MEASUREMENTS:                        8.3    12.82 )-----------( 183      ( 2021 01:13 )             25.0     0713    138  |  98  |  123<HH>  ----------------------------<  165<H>  4.5   |  15<L>  |  7.8<HH>    Ca    8.3<L>      2021 01:13  Phos  10.0       Mg     2.0         TPro  6.9  /  Alb  3.8  /  TBili  0.3  /  DBili  x   /  AST  42<H>  /  ALT  10  /  AlkPhos  69      eGFR if Non African American: 6 mL/min/1.73M2 (21 @ 01:13)  eGFR if : 7 mL/min/1.73M2 (21 @ 01:13)  eGFR if Non African American: 6 mL/min/1.73M2 (21 @ 00:53)  eGFR if : 6 mL/min/1.73M2 (21 @ 00:53)  eGFR if Non African American: 6 mL/min/1.73M2 (21 @ 21:02)  eGFR if : 7 mL/min/1.73M2 (21 @ 21:02)    LIVER FUNCTIONS - ( 2021 01:13 )  Alb: 3.8 g/dL / Pro: 6.9 g/dL / ALK PHOS: 69 U/L / ALT: 10 U/L / AST: 42 U/L / GGT: x           Urinalysis Basic - ( 2021 13:07 )    Color: Yellow / Appearance: Slightly Turbid / S.014 / pH: x  Gluc: x / Ketone: Negative  / Bili: Negative / Urobili: <2 mg/dL   Blood: x / Protein: 300 mg/dL / Nitrite: Negative   Leuk Esterase: Large / RBC: 14 /HPF / WBC 73 /HPF   Sq Epi: x / Non Sq Epi: 1 /HPF / Bacteria: Few          Blood Gas Venous - Lactate: 0.8 mmoL/L (21 @ 01:56)  Blood Gas Venous - Lactate: 1.3 mmoL/L (21 @ 11:28)      INFECTIOUS DISEASES TESTING      RADIOLOGY & ADDITIONAL TESTS:  I have personally reviewed the last available Chest xray  CXR  Xray Chest 1 View- PORTABLE-Urgent:   EXAM:  XR CHEST PORTABLE URGENT 1V            PROCEDURE DATE:  2021            INTERPRETATION:  Clinical History / Reason for exam: Chest pain    Comparison : Chest radiograph 2020.    Technique/Positionin AP views of the chest.    Findings:    Support devices: None.    Cardiac/mediastinum/hilum: Unchanged.    Lung parenchyma/Pleura: Right lower lobe opacity. No pneumothorax.    Skeleton/soft tissues: No acute abnormality.    Impression:    Right lower lobe opacity.        --- End of Report ---              ELLIOT LANDAU MD; Attending Radiologist  This document has been electronically signed. 2021 12:01PM (21 @ 11:44)      CT  CT Abdomen and Pelvis No Cont:   EXAM:  CT ABDOMEN AND PELVIS            PROCEDURE DATE:  2021            INTERPRETATION:  CLINICAL STATEMENT: Fatigue, vomiting.    TECHNIQUE: Contiguous axial CT images were obtained from the lower chest to the pubic symphysis without intravenous contrast. Oral contrast was not administered.  Reformatted images in the coronal and sagittal planes were acquired.    COMPARISON CT: None.    FINDINGS:  Limited evaluation of solid organs and vascular structures secondary to lack of intravenouscontrast. Additionally, evaluation of the upper abdomen is significantly limited secondary to motion artifact.    LOWER CHEST: Trace bilateral pleural effusions and bibasilar interlobular septal thickening. Right lower lobe airspace opacities with volume loss. Atherosclerotic vascular calcifications of the coronary arteries noted. Aortic valve and mitral valve calcifications.    HEPATOBILIARY: Evidence of granulomatous disease within the right hepatic lobe.    SPLEEN: Slightly atrophic spleen..    PANCREAS: Unremarkable.    ADRENAL GLANDS: Thickened left adrenal gland. Nonvisualization of the right adrenal gland, likely surgically absent.    KIDNEYS: Post right nephrectomy. Nonspecific left perinephric fat stranding noted. No hydronephrosis. Left renal cysts.    ABDOMINOPELVIC NODES: Unremarkable.    PELVIC ORGANS: Circumferential urinary bladder wall thickening and adjacent fat stranding; wall thickening appears out of proportion to degree of underdistention. Urinary bladder decompressedwith a Laura catheter in place. Enlarged prostate gland.    PERITONEUM/MESENTERY/BOWEL: Large rectal stool burden. Mild presacral edema noted. Diverticulosis without evidence of acute diverticulitis of the sigmoid colon. Appendix is normal. No bowel obstruction or pneumoperitoneum. Small hiatal hernia.    BONES/SOFT TISSUES: Severe osteopenia. Degenerative changes of the spine and bilateral hips noted.    OTHER: Atherosclerotic disease abdominal aorta and its branches. Right femoral CVC noted.    IMPRESSION:  1.  Circumferential urinary bladder wall thickening with surrounding inflammation, suspicious for cystitis. Left perinephric fat stranding also noted which may represent superimposed ascending UTI/pyelonephritis. Correlate with urinalysis and other laboratory findings.    2.  Right basilar airspace opacities with volume loss. Findings may represent atelectasis and/or pneumonia.    3.  Trace bilateral pleural effusions and bilateral interlobular septal thickening suggestive of interstitial edema.    --- End of Report ---            MARYURI COLLINS DO; Resident Radiologist  This document has been electronically signed.  JAX STREETER MD; Attending Radiologist  This document has been electronically signed. 2021  4:44PM (21 @ 15:27)      CARDIOLOGY TESTING  12 Lead ECG:   Ventricular Rate 103 BPM    Atrial Rate 103 BPM    P-R Interval 168 ms    QRS Duration 114 ms    Q-T Interval 350 ms    QTC Calculation(Bazett) 458 ms    P Axis 60 degrees    R Axis -62 degrees    T Axis 106 degrees    Diagnosis Line Sinus tachycardia  Left axis deviation  Incomplete right bundle branch block  Left ventricular hypertrophy with repolarization abnormality ( R in aVL ,   [place] product )  Abnormal ECG    Confirmed by EDOUARD CERVANTES MD ([id number]) on 2021 7:05:05 AM (21 @ 01:25)  12 Lead ECG:   Ventricular Rate 104 BPM    Atrial Rate 104 BPM    P-R Interval 208 ms    QRS Duration 118 ms    Q-T Interval 338 ms    QTC Calculation(Bazett) 444 ms    P Axis 83 degrees    R Axis -62 degrees    T Axis 106 degrees    Diagnosis Line Sinus tachycardia  Possible Left atrial enlargement  Left anterior fascicular block  Left ventricular hypertrophy with QRS widening and repolarization abnormality   ( R in aVL , [place] product )  Septal infarct , age undetermined  Abnormal ECG    Confirmed by EDOUARD CERVANTES MD ([id number]) on 2021 7:05:00 AM (21 @ 01:25)      All available historical records have been reviewed    MEDICATIONS  ALBUTerol    90 MICROgram(s) HFA Inhaler 2  ampicillin/sulbactam  IVPB 3  ascorbic acid 500  aspirin  chewable 81  atorvastatin 40  budesonide 160 MICROgram(s)/formoterol 4.5 MICROgram(s) Inhaler 2  chlorhexidine 4% Liquid 1  dextrose 40% Gel 15  dextrose 5%. 1000  dextrose 5%. 1000  dextrose 50% Injectable 25  dextrose 50% Injectable 12.5  dextrose 50% Injectable 25  donepezil 5  ferrous    sulfate 325  glucagon  Injectable 1  heparin   Injectable 5000  insulin glargine Injectable (LANTUS) 8  insulin lispro (ADMELOG) corrective regimen sliding scale   insulin lispro Injectable (ADMELOG) 5  lactulose Syrup 10  mirtazapine 15  norepinephrine Infusion 0.05  pantoprazole    Tablet 40  polyethylene glycol 3350 17  sertraline 25  sodium bicarbonate  Infusion 0.182      ANTIBIOTICS:  ampicillin/sulbactam  IVPB 3 Gram(s) IV Intermittent every 24 hours      All available historical data has been reviewed   VIC, ISMAIL  81y, Male  Allergy: No Known Allergies      CHIEF COMPLAINT: vomiting, hypotension (2021 09:11)      HPI:  81 Y M from [facility] with pmh of CHF (unknown EF), HTN, anxiety, Alzheimer's dementia, COPD on home O2, T2DM on insulin, hx right nephrectomy/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 Patient is unable to provide history so history was taken from daughter over the phone. She stated that last night, patient had multiple episodes of NBNB vomiting, with decreased PO intake and more somnolent. Today, Shalonda took his BP and it was 85/40 so was brought to ED. No complaints of dysuria, diarrhea, or cough. No fever.    ED Course:  Patient found to have RLL PNA and UTI. Patient hypotensive, requiring pressors. Labs significant for hyperkalemia and metabolic acidosis. [place] placed, patient went for urgent HD.  Patient was admitted to Vent unit for further management  ID consulted for evaluation of pneumonia and UTI.       Infectious Diseases History:  Old Micro Data/Cultures:     FAMILY HISTORY:  No pertinent family history in first degree relatives      PAST MEDICAL & SURGICAL HISTORY:  Diabetes mellitus    COPD (chronic obstructive pulmonary disease)    Lymphedema of both lower extremities    CHF (congestive heart failure)    H/O right nephrectomy  20 yrs ago        SOCIAL HISTORY  Social History:  lives at [facility] currently  former smoker, 40 pack years, quit 24 years ago  no alcohol or drugs (2021 17:03)      Recent Travel:  Other Exposures:     ROS  General: rigors +, chills +, nightsweats -  HEENT: Denies headache, rhinorrhea, sore throat, eye pain  CV: Denies CP, palpitations  PULM: Denies wheezing, hemoptysis  GI: Denies hematemesis, hematochezia, melena  : Denies discharge, hematuria  MSK: Denies arthralgias, myalgias  SKIN: Denies rash, lesions  NEURO: Denies paresthesias, weakness  PSYCH: Denies depression, anxiety    VITALS:  T(F): 97.1, Max: 97.4 (21 @ 23:37)  HR: 94  BP: 112/55  RR: 18    Vital Signs Last 24 Hrs  T(F): 97.1 (2021 08:21), Max: 97.4 (2021 23:37)  HR: 94 (2021 08:21) (85 - 987)  BP: 112/55 (2021 08:21) (72/34 - 151/81)  BP(mean): 79 (2021 08:21) (50 - 79)  RR: 18 (2021 08:21) (16 - 24)  SpO2: 97% (2021 00:30) (97% - 100%)    PHYSICAL EXAM:  Gen: NAD, resting in bed, on NC at 2lpm  HEENT: Normocephalic, atraumatic  Neck: supple, no lymphadenopathy  CV: Regular rate & regular rhythm  Lungs: bilateral equal breath sounds, Rt basal crackles +  Abdomen: Soft, BS present  Ext: Warm, well perfused  Neuro: non focal, awake, alert, follows commands; minimally verbal  Skin: Hyperkeratotic lesions on bilateral feet; scabbed excoriations scattered over bilateral upper and lower extremities  Lines: no phlebitis; Rt femoral [place] +    TESTS & MEASUREMENTS:                        8.3    12.82 )-----------( 183      ( 2021 01:13 )             25.0     0713    138  |  98  |  123<HH>  ----------------------------<  165<H>  4.5   |  15<L>  |  7.8<HH>    Ca    8.3<L>      2021 01:13  Phos  10.0       Mg     2.0         TPro  6.9  /  Alb  3.8  /  TBili  0.3  /  DBili  x   /  AST  42<H>  /  ALT  10  /  AlkPhos  69      eGFR if Non African American: 6 mL/min/1.73M2 (21 @ 01:13)  eGFR if : 7 mL/min/1.73M2 (21 @ 01:13)  eGFR if Non African American: 6 mL/min/1.73M2 (21 @ 00:53)  eGFR if : 6 mL/min/1.73M2 (21 @ 00:53)  eGFR if Non African American: 6 mL/min/1.73M2 (21 @ 21:02)  eGFR if : 7 mL/min/1.73M2 (21 @ 21:02)    LIVER FUNCTIONS - ( 2021 01:13 )  Alb: 3.8 g/dL / Pro: 6.9 g/dL / ALK PHOS: 69 U/L / ALT: 10 U/L / AST: 42 U/L / GGT: x           Urinalysis Basic - ( 2021 13:07 )    Color: Yellow / Appearance: Slightly Turbid / S.014 / pH: x  Gluc: x / Ketone: Negative  / Bili: Negative / Urobili: <2 mg/dL   Blood: x / Protein: 300 mg/dL / Nitrite: Negative   Leuk Esterase: Large / RBC: 14 /HPF / WBC 73 /HPF   Sq Epi: x / Non Sq Epi: 1 /HPF / Bacteria: Few          Blood Gas Venous - Lactate: 0.8 mmoL/L (21 @ 01:56)  Blood Gas Venous - Lactate: 1.3 mmoL/L (21 @ 11:28)      INFECTIOUS DISEASES TESTING      RADIOLOGY & ADDITIONAL TESTS:  I have personally reviewed the last available Chest xray  CXR  Xray Chest 1 View- PORTABLE-Urgent:   EXAM:  XR CHEST PORTABLE URGENT 1V            PROCEDURE DATE:  2021            INTERPRETATION:  Clinical History / Reason for exam: Chest pain    Comparison : Chest radiograph 2020.    Technique/Positionin AP views of the chest.    Findings:    Support devices: None.    Cardiac/mediastinum/hilum: Unchanged.    Lung parenchyma/Pleura: Right lower lobe opacity. No pneumothorax.    Skeleton/soft tissues: No acute abnormality.    Impression:    Right lower lobe opacity.        --- End of Report ---              ELLIOT LANDAU MD; Attending Radiologist  This document has been electronically signed. 2021 12:01PM (21 @ 11:44)      CT  CT Abdomen and Pelvis No Cont:   EXAM:  CT ABDOMEN AND PELVIS            PROCEDURE DATE:  2021            INTERPRETATION:  CLINICAL STATEMENT: Fatigue, vomiting.    TECHNIQUE: Contiguous axial CT images were obtained from the lower chest to the pubic symphysis without intravenous contrast. Oral contrast was not administered.  Reformatted images in the coronal and sagittal planes were acquired.    COMPARISON CT: None.    FINDINGS:  Limited evaluation of solid organs and vascular structures secondary to lack of intravenouscontrast. Additionally, evaluation of the upper abdomen is significantly limited secondary to motion artifact.    LOWER CHEST: Trace bilateral pleural effusions and bibasilar interlobular septal thickening. Right lower lobe airspace opacities with volume loss. Atherosclerotic vascular calcifications of the coronary arteries noted. Aortic valve and mitral valve calcifications.    HEPATOBILIARY: Evidence of granulomatous disease within the right hepatic lobe.    SPLEEN: Slightly atrophic spleen..    PANCREAS: Unremarkable.    ADRENAL GLANDS: Thickened left adrenal gland. Nonvisualization of the right adrenal gland, likely surgically absent.    KIDNEYS: Post right nephrectomy. Nonspecific left perinephric fat stranding noted. No hydronephrosis. Left renal cysts.    ABDOMINOPELVIC NODES: Unremarkable.    PELVIC ORGANS: Circumferential urinary bladder wall thickening and adjacent fat stranding; wall thickening appears out of proportion to degree of underdistention. Urinary bladder decompressedwith a Laura catheter in place. Enlarged prostate gland.    PERITONEUM/MESENTERY/BOWEL: Large rectal stool burden. Mild presacral edema noted. Diverticulosis without evidence of acute diverticulitis of the sigmoid colon. Appendix is normal. No bowel obstruction or pneumoperitoneum. Small hiatal hernia.    BONES/SOFT TISSUES: Severe osteopenia. Degenerative changes of the spine and bilateral hips noted.    OTHER: Atherosclerotic disease abdominal aorta and its branches. Right femoral CVC noted.    IMPRESSION:  1.  Circumferential urinary bladder wall thickening with surrounding inflammation, suspicious for cystitis. Left perinephric fat stranding also noted which may represent superimposed ascending UTI/pyelonephritis. Correlate with urinalysis and other laboratory findings.    2.  Right basilar airspace opacities with volume loss. Findings may represent atelectasis and/or pneumonia.    3.  Trace bilateral pleural effusions and bilateral interlobular septal thickening suggestive of interstitial edema.    --- End of Report ---    MARYURI COLLINS DO; Resident Radiologist  This document has been electronically signed.  JAX STREETER MD; Attending Radiologist  This document has been electronically signed. 2021  4:44PM (21 @ 15:27)      CARDIOLOGY TESTING  12 Lead ECG:   Ventricular Rate 103 BPM    Atrial Rate 103 BPM    P-R Interval 168 ms    QRS Duration 114 ms    Q-T Interval 350 ms    QTC Calculation(Bazett) 458 ms    P Axis 60 degrees    R Axis -62 degrees    T Axis 106 degrees    Diagnosis Line Sinus tachycardia  Left axis deviation  Incomplete right bundle branch block  Left ventricular hypertrophy with repolarization abnormality ( R in aVL ,   [place] product )  Abnormal ECG    Confirmed by EDOUARD CERVANTES MD ([id number]) on 2021 7:05:05 AM (21 @ 01:25)  12 Lead ECG:   Ventricular Rate 104 BPM    Atrial Rate 104 BPM    P-R Interval 208 ms    QRS Duration 118 ms    Q-T Interval 338 ms    QTC Calculation(Bazett) 444 ms    P Axis 83 degrees    R Axis -62 degrees    T Axis 106 degrees    Diagnosis Line Sinus tachycardia  Possible Left atrial enlargement  Left anterior fascicular block  Left ventricular hypertrophy with QRS widening and repolarization abnormality   ( R in aVL , [place] product )  Septal infarct , age undetermined  Abnormal ECG    Confirmed by EDOUARD CERVANTES MD ([id number]) on 2021 7:05:00 AM (21 @ 01:25)      All available historical records have been reviewed    MEDICATIONS  ALBUTerol    90 MICROgram(s) HFA Inhaler 2  ampicillin/sulbactam  IVPB 3  ascorbic acid 500  aspirin  chewable 81  atorvastatin 40  budesonide 160 MICROgram(s)/formoterol 4.5 MICROgram(s) Inhaler 2  chlorhexidine 4% Liquid 1  dextrose 40% Gel 15  dextrose 5%. 1000  dextrose 5%. 1000  dextrose 50% Injectable 25  dextrose 50% Injectable 12.5  dextrose 50% Injectable 25  donepezil 5  ferrous    sulfate 325  glucagon  Injectable 1  heparin   Injectable 5000  insulin glargine Injectable (LANTUS) 8  insulin lispro (ADMELOG) corrective regimen sliding scale   insulin lispro Injectable (ADMELOG) 5  lactulose Syrup 10  mirtazapine 15  norepinephrine Infusion 0.05  pantoprazole    Tablet 40  polyethylene glycol 3350 17  sertraline 25  sodium bicarbonate  Infusion 0.182    ANTIBIOTICS:  ampicillin/sulbactam  IVPB 3 Gram(s) IV Intermittent every 24 hours    All available historical data has been reviewed   VIC, ISMAIL  81y, Male  Allergy: No Known Allergies      CHIEF COMPLAINT: vomiting, hypotension (2021 09:11)      HPI:  81 Y M from [facility] with pmh of CHF (unknown EF), HTN, anxiety, Alzheimer's dementia, COPD on home O2, T2DM on insulin, hx right nephrectomy/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 Patient is unable to provide history so history was taken from daughter over the phone. Per daughter patient has been on [facility] for more than a year now. Patient is on 2-3LPM O2 at baseline. She stated that the day prior to admission, patient had multiple episodes of NBNB vomiting, with decreased PO intake and more somnolent. Today, Shalonda took his BP and it was 85/40 so was brought to ED. No h/o chest pain, diarrhea, abdominal pain, headache, fever.     ED Course:  Patient found to have RLL PNA and UTI. Patient hypotensive, requiring pressors. Labs significant for hyperkalemia and metabolic acidosis. [place] placed, patient went for urgent HD.  Patient was admitted to Vent unit for further management  ID consulted for evaluation of pneumonia and UTI.       Infectious Diseases History:  Old Micro Data/Cultures:     FAMILY HISTORY:  No pertinent family history in first degree relatives      PAST MEDICAL & SURGICAL HISTORY:  Diabetes mellitus    COPD (chronic obstructive pulmonary disease)    Lymphedema of both lower extremities    CHF (congestive heart failure)    H/O right nephrectomy  20 yrs ago        SOCIAL HISTORY  Social History:  lives at [facility] currently  former smoker, 40 pack years, quit 24 years ago  no alcohol or drugs (2021 17:03)      Recent Travel:  Other Exposures:     ROS  General: rigors +, chills +, nightsweats -  HEENT: Denies headache, rhinorrhea, sore throat, eye pain  CV: Denies CP, palpitations  PULM: Denies wheezing, hemoptysis  GI: Denies hematemesis, hematochezia, melena  : Denies discharge, hematuria  MSK: Denies arthralgias, myalgias  SKIN: Denies rash, lesions  NEURO: Denies paresthesias, weakness  PSYCH: Denies depression, anxiety    VITALS:  T(F): 97.1, Max: 97.4 (21 @ 23:37)  HR: 94  BP: 112/55  RR: 18    Vital Signs Last 24 Hrs  T(F): 97.1 (2021 08:21), Max: 97.4 (2021 23:37)  HR: 94 (2021 08:21) (85 - 987)  BP: 112/55 (2021 08:21) (72/34 - 151/81)  BP(mean): 79 (2021 08:21) (50 - 79)  RR: 18 (2021 08:21) (16 - 24)  SpO2: 97% (2021 00:30) (97% - 100%)    PHYSICAL EXAM:  Gen: NAD, resting in bed, on NC at 2lpm  HEENT: Normocephalic, atraumatic  Neck: supple, no lymphadenopathy  CV: Regular rate & regular rhythm  Lungs: bilateral equal breath sounds, Rt basal crackles +  Abdomen: Soft, BS present  Ext: Warm, well perfused  Neuro: non focal, awake, alert, follows commands; minimally verbal  Skin: Hyperkeratotic lesions on bilateral feet; scabbed excoriations scattered over bilateral upper and lower extremities  Lines: no phlebitis; Rt femoral [place] +    TESTS & MEASUREMENTS:                        8.3    12.82 )-----------( 183      ( 2021 01:13 )             25.0     07    138  |  98  |  123<HH>  ----------------------------<  165<H>  4.5   |  15<L>  |  7.8<HH>    Ca    8.3<L>      2021 01:13  Phos  10.0       Mg     2.0         TPro  6.9  /  Alb  3.8  /  TBili  0.3  /  DBili  x   /  AST  42<H>  /  ALT  10  /  AlkPhos  69  13    eGFR if Non African American: 6 mL/min/1.73M2 (21 @ 01:13)  eGFR if : 7 mL/min/1.73M2 (21 @ 01:13)  eGFR if Non African American: 6 mL/min/1.73M2 (21 @ 00:53)  eGFR if : 6 mL/min/1.73M2 (21 @ 00:53)  eGFR if Non African American: 6 mL/min/1.73M2 (21 @ 21:02)  eGFR if : 7 mL/min/1.73M2 (21 @ 21:02)    LIVER FUNCTIONS - ( 2021 01:13 )  Alb: 3.8 g/dL / Pro: 6.9 g/dL / ALK PHOS: 69 U/L / ALT: 10 U/L / AST: 42 U/L / GGT: x           Urinalysis Basic - ( 2021 13:07 )    Color: Yellow / Appearance: Slightly Turbid / S.014 / pH: x  Gluc: x / Ketone: Negative  / Bili: Negative / Urobili: <2 mg/dL   Blood: x / Protein: 300 mg/dL / Nitrite: Negative   Leuk Esterase: Large / RBC: 14 /HPF / WBC 73 /HPF   Sq Epi: x / Non Sq Epi: 1 /HPF / Bacteria: Few          Blood Gas Venous - Lactate: 0.8 mmoL/L (21 @ 01:56)  Blood Gas Venous - Lactate: 1.3 mmoL/L (21 @ 11:28)      INFECTIOUS DISEASES TESTING      RADIOLOGY & ADDITIONAL TESTS:  I have personally reviewed the last available Chest xray  CXR  Xray Chest 1 View- PORTABLE-Urgent:   EXAM:  XR CHEST PORTABLE URGENT 1V            PROCEDURE DATE:  2021            INTERPRETATION:  Clinical History / Reason for exam: Chest pain    Comparison : Chest radiograph 2020.    Technique/Positionin AP views of the chest.    Findings:    Support devices: None.    Cardiac/mediastinum/hilum: Unchanged.    Lung parenchyma/Pleura: Right lower lobe opacity. No pneumothorax.    Skeleton/soft tissues: No acute abnormality.    Impression:    Right lower lobe opacity.        --- End of Report ---              ELLIOT LANDAU MD; Attending Radiologist  This document has been electronically signed. 2021 12:01PM (21 @ 11:44)      CT  CT Abdomen and Pelvis No Cont:   EXAM:  CT ABDOMEN AND PELVIS            PROCEDURE DATE:  2021            INTERPRETATION:  CLINICAL STATEMENT: Fatigue, vomiting.    TECHNIQUE: Contiguous axial CT images were obtained from the lower chest to the pubic symphysis without intravenous contrast. Oral contrast was not administered.  Reformatted images in the coronal and sagittal planes were acquired.    COMPARISON CT: None.    FINDINGS:  Limited evaluation of solid organs and vascular structures secondary to lack of intravenouscontrast. Additionally, evaluation of the upper abdomen is significantly limited secondary to motion artifact.    LOWER CHEST: Trace bilateral pleural effusions and bibasilar interlobular septal thickening. Right lower lobe airspace opacities with volume loss. Atherosclerotic vascular calcifications of the coronary arteries noted. Aortic valve and mitral valve calcifications.    HEPATOBILIARY: Evidence of granulomatous disease within the right hepatic lobe.    SPLEEN: Slightly atrophic spleen..    PANCREAS: Unremarkable.    ADRENAL GLANDS: Thickened left adrenal gland. Nonvisualization of the right adrenal gland, likely surgically absent.    KIDNEYS: Post right nephrectomy. Nonspecific left perinephric fat stranding noted. No hydronephrosis. Left renal cysts.    ABDOMINOPELVIC NODES: Unremarkable.    PELVIC ORGANS: Circumferential urinary bladder wall thickening and adjacent fat stranding; wall thickening appears out of proportion to degree of underdistention. Urinary bladder decompressedwith a Laura catheter in place. Enlarged prostate gland.    PERITONEUM/MESENTERY/BOWEL: Large rectal stool burden. Mild presacral edema noted. Diverticulosis without evidence of acute diverticulitis of the sigmoid colon. Appendix is normal. No bowel obstruction or pneumoperitoneum. Small hiatal hernia.    BONES/SOFT TISSUES: Severe osteopenia. Degenerative changes of the spine and bilateral hips noted.    OTHER: Atherosclerotic disease abdominal aorta and its branches. Right femoral CVC noted.    IMPRESSION:  1.  Circumferential urinary bladder wall thickening with surrounding inflammation, suspicious for cystitis. Left perinephric fat stranding also noted which may represent superimposed ascending UTI/pyelonephritis. Correlate with urinalysis and other laboratory findings.    2.  Right basilar airspace opacities with volume loss. Findings may represent atelectasis and/or pneumonia.    3.  Trace bilateral pleural effusions and bilateral interlobular septal thickening suggestive of interstitial edema.    --- End of Report ---    MARYURI COLLINS DO; Resident Radiologist  This document has been electronically signed.  JAX STREETER MD; Attending Radiologist  This document has been electronically signed. 2021  4:44PM (21 @ 15:27)      CARDIOLOGY TESTING  12 Lead ECG:   Ventricular Rate 103 BPM    Atrial Rate 103 BPM    P-R Interval 168 ms    QRS Duration 114 ms    Q-T Interval 350 ms    QTC Calculation(Bazett) 458 ms    P Axis 60 degrees    R Axis -62 degrees    T Axis 106 degrees    Diagnosis Line Sinus tachycardia  Left axis deviation  Incomplete right bundle branch block  Left ventricular hypertrophy with repolarization abnormality ( R in aVL ,   Byron product )  Abnormal ECG    Confirmed by EDOUARD CERVANTES MD ([id number]) on 2021 7:05:05 AM (21 @ 01:25)  12 Lead ECG:   Ventricular Rate 104 BPM    Atrial Rate 104 BPM    P-R Interval 208 ms    QRS Duration 118 ms    Q-T Interval 338 ms    QTC Calculation(Bazett) 444 ms    P Axis 83 degrees    R Axis -62 degrees    T Axis 106 degrees    Diagnosis Line Sinus tachycardia  Possible Left atrial enlargement  Left anterior fascicular block  Left ventricular hypertrophy with QRS widening and repolarization abnormality   ( R in aVL , Tres product )  Septal infarct , age undetermined  Abnormal ECG    Confirmed by EDOUARD CERVANTES MD ([id number]) on 2021 7:05:00 AM (21 @ 01:25)      All available historical records have been reviewed    MEDICATIONS  ALBUTerol    90 MICROgram(s) HFA Inhaler 2  ampicillin/sulbactam  IVPB 3  ascorbic acid 500  aspirin  chewable 81  atorvastatin 40  budesonide 160 MICROgram(s)/formoterol 4.5 MICROgram(s) Inhaler 2  chlorhexidine 4% Liquid 1  dextrose 40% Gel 15  dextrose 5%. 1000  dextrose 5%. 1000  dextrose 50% Injectable 25  dextrose 50% Injectable 12.5  dextrose 50% Injectable 25  donepezil 5  ferrous    sulfate 325  glucagon  Injectable 1  heparin   Injectable 5000  insulin glargine Injectable (LANTUS) 8  insulin lispro (ADMELOG) corrective regimen sliding scale   insulin lispro Injectable (ADMELOG) 5  lactulose Syrup 10  mirtazapine 15  norepinephrine Infusion 0.05  pantoprazole    Tablet 40  polyethylene glycol 3350 17  sertraline 25  sodium bicarbonate  Infusion 0.182    ANTIBIOTICS:  ampicillin/sulbactam  IVPB 3 Gram(s) IV Intermittent every 24 hours    All available historical data has been reviewed

## 2021-07-13 NOTE — PROGRESS NOTE ADULT - SUBJECTIVE AND OBJECTIVE BOX
[facility] FOLLOW UP NOTE  --------------------------------------------------------------------------------  24 hour events/subjective: Patient examined. Appears comfortable.    PAST HISTORY  --------------------------------------------------------------------------------  No significant changes to PMH, PSH, FHx, SHx, unless otherwise noted    ALLERGIES & MEDICATIONS  --------------------------------------------------------------------------------  Allergies    No Known Allergies    Standing Inpatient Medications  ALBUTerol    90 MICROgram(s) HFA Inhaler 2 Puff(s) Inhalation every 6 hours  ascorbic acid 500 milliGRAM(s) Oral daily  aspirin  chewable 81 milliGRAM(s) Oral daily  atorvastatin 40 milliGRAM(s) Oral at bedtime  budesonide 160 MICROgram(s)/formoterol 4.5 MICROgram(s) Inhaler 2 Puff(s) Inhalation two times a day  calcium acetate 667 milliGRAM(s) Oral three times a day with meals  chlorhexidine 4% Liquid 1 Application(s) Topical <User Schedule>  dextrose 40% Gel 15 Gram(s) Oral once  dextrose 5%. 1000 milliLiter(s) IV Continuous <Continuous>  dextrose 5%. 1000 milliLiter(s) IV Continuous <Continuous>  dextrose 50% Injectable 25 Gram(s) IV Push once  dextrose 50% Injectable 12.5 Gram(s) IV Push once  dextrose 50% Injectable 25 Gram(s) IV Push once  donepezil 5 milliGRAM(s) Oral at bedtime  ferrous    sulfate 325 milliGRAM(s) Oral daily  glucagon  Injectable 1 milliGRAM(s) IntraMuscular once  heparin   Injectable 5000 Unit(s) SubCutaneous every 12 hours  insulin glargine Injectable (LANTUS) 8 Unit(s) SubCutaneous at bedtime  insulin lispro (ADMELOG) corrective regimen sliding scale   SubCutaneous three times a day before meals  insulin lispro Injectable (ADMELOG) 5 Unit(s) SubCutaneous three times a day before meals  lactulose Syrup 10 Gram(s) Oral daily  linezolid  IVPB 600 milliGRAM(s) IV Intermittent every 12 hours  meropenem  IVPB 750 milliGRAM(s) IV Intermittent once  meropenem  IVPB      mirtazapine 15 milliGRAM(s) Oral at bedtime  norepinephrine Infusion 0.05 MICROgram(s)/kG/Min IV Continuous <Continuous>  pantoprazole    Tablet 40 milliGRAM(s) Oral before breakfast  polyethylene glycol 3350 17 Gram(s) Oral daily  sertraline 25 milliGRAM(s) Oral daily  sodium bicarbonate  Infusion 0.182 mEq/kG/Hr IV Continuous <Continuous>    PRN Inpatient Medications  senna 8.6 milliGRAM(s) Oral Tablet - Peds 2 Tablet(s) Oral at bedtime PRN      VITALS/PHYSICAL EXAM  --------------------------------------------------------------------------------  T(C): 36.6 (07-13-21 @ 12:43), Max: 36.6 (07-13-21 @ 12:43)  HR: 96 (07-13-21 @ 15:30) (91 - 987)  BP: 108/56 (07-13-21 @ 15:30) (108/56 - 151/81)  RR: 20 (07-13-21 @ 15:30) (17 - 20)  SpO2: 100% (07-13-21 @ 15:30) (97% - 100%)  Height (cm): 172.7 (07-12-21 @ 09:21)  Weight (kg): 65.8 (07-12-21 @ 09:21)  BMI (kg/m2): 22.1 (07-12-21 @ 09:21)  BSA (m2): 1.78 (07-12-21 @ 09:21)    07-12-21 @ 07:01  -  07-13-21 @ 07:00  --------------------------------------------------------  IN: 480.6 mL / OUT: 300 mL / NET: 180.6 mL    07-13-21 @ 07:01  -  07-13-21 @ 16:09  --------------------------------------------------------  IN: 0 mL / OUT: 0 mL / NET: 0 mL    Physical Exam:  	Gen: NAD  	Pulm: CTA B/L  	CV: RRR, S1S2  	Abd: +BS, soft, nontender/nondistended  	: No suprapubic tenderness  	LE: Warm,  no edema  	Vascular access: fem temp HD cath    LABS/STUDIES  --------------------------------------------------------------------------------              7.9    14.98 >-----------<  170      [07-13-21 @ 10:19]              24.6     133  |  96  |  129  ----------------------------<  197      [07-13-21 @ 10:19]  4.5   |  18  |  8.2        Ca     7.6     [07-13-21 @ 10:19]      Mg     1.9     [07-13-21 @ 10:19]      Phos  10.8     [07-13-21 @ 10:19]    TPro  6.4  /  Alb  3.6  /  TBili  0.3  /  DBili  x   /  AST  34  /  ALT  10  /  AlkPhos  64  [07-13-21 @ 10:19]    PT/INR: PT 13.60, INR 1.18       [07-13-21 @ 01:13]  PTT: 28.5       [07-13-21 @ 01:13]    Troponin 2.19      [07-13-21 @ 01:13]    Creatinine Trend:  SCr 8.2 [07-13 @ 10:19]  SCr 7.8 [07-13 @ 01:13]  SCr 8.0 [07-13 @ 00:53]  SCr 7.6 [07-12 @ 21:02]  SCr 11.3 [07-12 @ 10:44]    Urinalysis - [07-12-21 @ 13:07]      Color Yellow / Appearance Slightly Turbid / SG 1.014 / pH 6.0      Gluc Negative / Ketone Negative  / Bili Negative / Urobili <2 mg/dL       Blood Small / Protein 300 mg/dL / Leuk Est Large / Nitrite Negative      RBC 14 / WBC 73 / Hyaline 3 / Gran  / Sq Epi  / Non Sq Epi 1 / Bacteria Few      Iron 34, TIBC 178, %sat 19      [01-04-21 @ 06:40]  Ferritin 332      [01-04-21 @ 06:40]  PTH -- (Ca 8.9)      [12-24-20 @ 04:30]   54    HBsAb Nonreact      [07-12-21 @ 18:05]  HBsAg Nonreact      [07-12-21 @ 18:05]  HCV 0.15, Nonreact      [07-12-21 @ 18:05]

## 2021-07-13 NOTE — CONSULT NOTE ADULT - SUBJECTIVE AND OBJECTIVE BOX
HPI:  81 Y M from [facility] with pmh of HTN, anxiety, Alzheimer's dementia, COPD on home O2, T2DM on insulin, hx right nephrectomy/CKD V non-dialysis dependent baseline creatinine 3.3-4, anemia of chronic disease on ROMEILA therapy, brought in by EMS with complaint of SOB and hypotension. Patient is unable to provide history so history was taken from daughter at bedside. She states that last night, patient had multiple episodes of NBNB vomiting, with decreased PO intake and more somnolent. Today, Shalonda took his BP and it was 85/40 so was brought to ED. No complaints of dysuria, diarrhea, or cough. No fever.  ED Course:  Patient found to have RLL PNA and UTI. Patient hypotensive, requiring pressors. Labs significant for hyperkalemia and metabolic acidosis. [place] placed, patient went for urgent HD. (12 Jul 2021 17:03)  Cardiology consulted for elevated troponin. Patient currently denies chest pain and SOB.       PAST MEDICAL & SURGICAL HISTORY  Diabetes mellitus    COPD (chronic obstructive pulmonary disease)    Lymphedema of both lower extremities    CHF (congestive heart failure)    H/O right nephrectomy  20 yrs ago        FAMILY HISTORY:  FAMILY HISTORY:  No pertinent family history in first degree relatives        SOCIAL HISTORY:  stopped smoking   []Alcohol  []Drug    ALLERGIES:  No Known Allergies      MEDICATIONS:  MEDICATIONS  (STANDING):  ALBUTerol    90 MICROgram(s) HFA Inhaler 2 Puff(s) Inhalation every 6 hours  ascorbic acid 500 milliGRAM(s) Oral daily  aspirin  chewable 81 milliGRAM(s) Oral daily  azithromycin  IVPB 500 milliGRAM(s) IV Intermittent every 24 hours  budesonide 160 MICROgram(s)/formoterol 4.5 MICROgram(s) Inhaler 2 Puff(s) Inhalation two times a day  cefepime   IVPB 500 milliGRAM(s) IV Intermittent every 24 hours  chlorhexidine 4% Liquid 1 Application(s) Topical <User Schedule>  dextrose 40% Gel 15 Gram(s) Oral once  dextrose 5%. 1000 milliLiter(s) (50 mL/Hr) IV Continuous <Continuous>  dextrose 5%. 1000 milliLiter(s) (100 mL/Hr) IV Continuous <Continuous>  dextrose 50% Injectable 25 Gram(s) IV Push once  dextrose 50% Injectable 12.5 Gram(s) IV Push once  dextrose 50% Injectable 25 Gram(s) IV Push once  donepezil 5 milliGRAM(s) Oral at bedtime  ferrous    sulfate 325 milliGRAM(s) Oral daily  glucagon  Injectable 1 milliGRAM(s) IntraMuscular once  heparin   Injectable 5000 Unit(s) SubCutaneous every 12 hours  insulin glargine Injectable (LANTUS) 8 Unit(s) SubCutaneous at bedtime  insulin lispro (ADMELOG) corrective regimen sliding scale   SubCutaneous three times a day before meals  insulin lispro Injectable (ADMELOG) 5 Unit(s) SubCutaneous three times a day before meals  mirtazapine 15 milliGRAM(s) Oral at bedtime  norepinephrine Infusion 0.05 MICROgram(s)/kG/Min (6.17 mL/Hr) IV Continuous <Continuous>  pantoprazole    Tablet 40 milliGRAM(s) Oral before breakfast  sertraline 25 milliGRAM(s) Oral daily    MEDICATIONS  (PRN):  senna 8.6 milliGRAM(s) Oral Tablet - Peds 2 Tablet(s) Oral at bedtime PRN Constipation      HOME MEDICATIONS:  Home Medications:  albuterol 90 mcg/inh inhalation aerosol: 2 puff(s) inhaled every 4 hours (12 Jul 2021 17:02)  Aricept 5 mg oral tablet: 1 tab(s) orally once a day (at bedtime) (12 Jul 2021 17:02)  Breo Ellipta 200 mcg-25 mcg/inh inhalation powder: 1 puff(s) inhaled once a day (12 Jul 2021 17:02)  ferrous sulfate 325 mg (65 mg elemental iron) oral tablet: 1 tab(s) orally once a day (12 Jul 2021 17:02)  furosemide 20 mg oral tablet: 1 tab(s) orally once a day (12 Jul 2021 17:02)  hydrALAZINE 50 mg oral tablet: 1 tab(s) orally every 12 hours (12 Jul 2021 17:02)  Lantus 100 units/mL subcutaneous solution: 8 unit(s) subcutaneous once a day (at bedtime) (12 Jul 2021 17:02)  Melatonin 3 mg oral tablet: 1 tab(s) orally once a day (at bedtime) (12 Jul 2021 17:02)  nitroglycerin 0.3 mg sublingual tablet: 1 tab(s) sublingual every 5 minutes no more than 3 doses (12 Jul 2021 17:02)  NovoLOG FlexPen 100 units/mL injectable solution: 5 unit(s) injectable 3 times a day (before meals) (12 Jul 2021 17:02)  Remeron 15 mg oral tablet: 1 tab(s) orally once a day (at bedtime) (12 Jul 2021 17:02)  Retacrit 4000 units/mL preservative-free injectable solution: 4000 unit(s) injectable once a week. hold if Hgb &gt; 10 (12 Jul 2021 17:02)  Senna 8.6 mg oral tablet: 2 tab(s) orally once a day (at bedtime), As Needed (12 Jul 2021 17:02)  sertraline 25 mg oral tablet: 1 tab(s) orally once a day (12 Jul 2021 17:02)  Vitamin C 500 mg oral tablet: 1 tab(s) orally once a day (12 Jul 2021 17:02)      VITALS:   T(F): 97.4 (07-12 @ 23:37), Max: 97.7 (07-12 @ 09:21)  HR: 91 (07-13 @ 00:30) (85 - 101)  BP: 136/64 (07-13 @ 00:30) (72/34 - 151/81)  BP(mean): 73 (07-12 @ 13:40) (50 - 74)  RR: 17 (07-13 @ 00:30) (16 - 24)  SpO2: 97% (07-13 @ 00:30) (96% - 100%)    I&O's Summary    12 Jul 2021 07:01  -  13 Jul 2021 01:48  --------------------------------------------------------  IN: 0 mL / OUT: 0 mL / NET: 0 mL        REVIEW OF SYSTEMS:  unable to assess due to the current medical condition     PHYSICAL EXAM:  NEURO: patient is awake , alert and oriented   GEN: in distress   NECK: no thyroid enlargement, no JVD  LUNGS: Clear to auscultation bilaterally   CARDIOVASCULAR: S1/S2 present, RRR , no murmurs or rubs, no carotid bruits,  + PP bilaterally  ABD: Soft, non-tender, non-distended, +BS  EXT: No RUBY  SKIN: Intact    LABS:                        8.3    12.82 )-----------( 183      ( 13 Jul 2021 01:13 )             25.0     07-12    136  |  97<L>  |  121<HH>  ----------------------------<  163<H>  4.4   |  11<L>  |  7.6<HH>    Ca    8.0<L>      12 Jul 2021 21:02  Phos  9.8     07-12  Mg     2.0     07-12    TPro  6.9  /  Alb  3.8  /  TBili  0.3  /  DBili  x   /  AST  39  /  ALT  10  /  AlkPhos  71  07-12    PT/INR - ( 13 Jul 2021 01:13 )   PT: 13.60 sec;   INR: 1.18 ratio           Troponin T, Serum: 1.69 ng/mL *HH* (07-12-21 @ 21:02)  Troponin T, Serum: 0.57 ng/mL *HH* (07-12-21 @ 10:44)    CARDIAC MARKERS ( 12 Jul 2021 21:02 )  x     / 1.69 ng/mL / x     / x     / x      CARDIAC MARKERS ( 12 Jul 2021 10:44 )  x     / 0.57 ng/mL / x     / x     / x            Troponin trend:    Serum Pro-Brain Natriuretic Peptide: >36888 pg/mL (07-12-21 @ 10:44)          RADIOLOGY:  -CXR:  < from: Xray Chest 1 View- PORTABLE-Urgent (07.12.21 @ 11:44) >  Impression:    Right lower lobe opacity.    < end of copied text >      ECG:  < from: 12 Lead ECG (07.12.21 @ 11:03) >  Diagnosis Line Normal sinus rhythm  Possible Left atrial enlargement  Left axis deviation  Non-specific intra-ventricular conduction delay  ST & T wave abnormality, consider anterolateral ischemia  Abnormal ECG    < end of copied text >   HPI:  81 Y M [facility] with pmh of HTN, anxiety, Alzheimer's dementia, COPD on home O2, T2DM on insulin, hx right nephrectomy/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 Patient is unable to provide history so history was taken from daughter at bedside. She states that last night, patient had multiple episodes of NBNB vomiting, with decreased PO intake and more somnolent. Today, Shalonda took his BP and it was 85/40 so was brought to ED. No complaints of dysuria, diarrhea, or cough. No fever.  ED Course:  Patient found to have RLL PNA and UTI. Patient hypotensive, requiring pressors. Labs significant for hyperkalemia and metabolic acidosis. [place] placed, patient went for urgent HD. (12 Jul 2021 17:03)  Cardiology consulted for elevated troponin. Patient currently denies chest pain and SOB.       PAST MEDICAL & SURGICAL HISTORY  Diabetes mellitus    COPD (chronic obstructive pulmonary disease)    Lymphedema of both lower extremities    CHF (congestive heart failure)    H/O right nephrectomy  20 yrs ago      No pertinent family history of premature CAD in first degree relatives    SOCIAL HISTORY:  stopped smoking   []Alcohol  []Drug    ALLERGIES:  No Known Allergies      MEDICATIONS:  MEDICATIONS  (STANDING):  ALBUTerol    90 MICROgram(s) HFA Inhaler 2 Puff(s) Inhalation every 6 hours  ascorbic acid 500 milliGRAM(s) Oral daily  aspirin  chewable 81 milliGRAM(s) Oral daily  azithromycin  IVPB 500 milliGRAM(s) IV Intermittent every 24 hours  budesonide 160 MICROgram(s)/formoterol 4.5 MICROgram(s) Inhaler 2 Puff(s) Inhalation two times a day  cefepime   IVPB 500 milliGRAM(s) IV Intermittent every 24 hours  chlorhexidine 4% Liquid 1 Application(s) Topical <User Schedule>  dextrose 40% Gel 15 Gram(s) Oral once  dextrose 5%. 1000 milliLiter(s) (50 mL/Hr) IV Continuous <Continuous>  dextrose 5%. 1000 milliLiter(s) (100 mL/Hr) IV Continuous <Continuous>  dextrose 50% Injectable 25 Gram(s) IV Push once  dextrose 50% Injectable 12.5 Gram(s) IV Push once  dextrose 50% Injectable 25 Gram(s) IV Push once  donepezil 5 milliGRAM(s) Oral at bedtime  ferrous    sulfate 325 milliGRAM(s) Oral daily  glucagon  Injectable 1 milliGRAM(s) IntraMuscular once  heparin   Injectable 5000 Unit(s) SubCutaneous every 12 hours  insulin glargine Injectable (LANTUS) 8 Unit(s) SubCutaneous at bedtime  insulin lispro (ADMELOG) corrective regimen sliding scale   SubCutaneous three times a day before meals  insulin lispro Injectable (ADMELOG) 5 Unit(s) SubCutaneous three times a day before meals  mirtazapine 15 milliGRAM(s) Oral at bedtime  norepinephrine Infusion 0.05 MICROgram(s)/kG/Min (6.17 mL/Hr) IV Continuous <Continuous>  pantoprazole    Tablet 40 milliGRAM(s) Oral before breakfast  sertraline 25 milliGRAM(s) Oral daily    MEDICATIONS  (PRN):  senna 8.6 milliGRAM(s) Oral Tablet - Peds 2 Tablet(s) Oral at bedtime PRN Constipation      Home Medications:  albuterol 90 mcg/inh inhalation aerosol: 2 puff(s) inhaled every 4 hours (12 Jul 2021 17:02)  Aricept 5 mg oral tablet: 1 tab(s) orally once a day (at bedtime) (12 Jul 2021 17:02)  Breo Ellipta 200 mcg-25 mcg/inh inhalation powder: 1 puff(s) inhaled once a day (12 Jul 2021 17:02)  ferrous sulfate 325 mg (65 mg elemental iron) oral tablet: 1 tab(s) orally once a day (12 Jul 2021 17:02)  furosemide 20 mg oral tablet: 1 tab(s) orally once a day (12 Jul 2021 17:02)  hydrALAZINE 50 mg oral tablet: 1 tab(s) orally every 12 hours (12 Jul 2021 17:02)  Lantus 100 units/mL subcutaneous solution: 8 unit(s) subcutaneous once a day (at bedtime) (12 Jul 2021 17:02)  Melatonin 3 mg oral tablet: 1 tab(s) orally once a day (at bedtime) (12 Jul 2021 17:02)  nitroglycerin 0.3 mg sublingual tablet: 1 tab(s) sublingual every 5 minutes no more than 3 doses (12 Jul 2021 17:02)  NovoLOG FlexPen 100 units/mL injectable solution: 5 unit(s) injectable 3 times a day (before meals) (12 Jul 2021 17:02)  Remeron 15 mg oral tablet: 1 tab(s) orally once a day (at bedtime) (12 Jul 2021 17:02)  Retacrit 4000 units/mL preservative-free injectable solution: 4000 unit(s) injectable once a week. hold if Hgb &gt; 10 (12 Jul 2021 17:02)  Senna 8.6 mg oral tablet: 2 tab(s) orally once a day (at bedtime), As Needed (12 Jul 2021 17:02)  sertraline 25 mg oral tablet: 1 tab(s) orally once a day (12 Jul 2021 17:02)  Vitamin C 500 mg oral tablet: 1 tab(s) orally once a day (12 Jul 2021 17:02)      VITALS:   T(F): 97.4 (07-12 @ 23:37), Max: 97.7 (07-12 @ 09:21)  HR: 91 (07-13 @ 00:30) (85 - 101)  BP: 136/64 (07-13 @ 00:30) (72/34 - 151/81)  BP(mean): 73 (07-12 @ 13:40) (50 - 74)  RR: 17 (07-13 @ 00:30) (16 - 24)  SpO2: 97% (07-13 @ 00:30) (96% - 100%)    I&O's Summary    12 Jul 2021 07:01  -  13 Jul 2021 01:48  --------------------------------------------------------  IN: 0 mL / OUT: 0 mL / NET: 0 mL        REVIEW OF SYSTEMS:  Unable to assess due to the current medical condition     PHYSICAL EXAM:  NEURO: patient is awake , alert and oriented   GEN: in distress   NECK: no thyroid enlargement, no JVD  LUNGS: Clear to auscultation bilaterally   CARDIOVASCULAR: S1/S2 present, RRR , no murmurs or rubs, no carotid bruits,  + PP bilaterally  ABD: Soft, non-tender, non-distended, +BS  EXT: No RUBY  SKIN: Intact      LABS:                        8.3    12.82 )-----------( 183      ( 13 Jul 2021 01:13 )             25.0     07-12    136  |  97<L>  |  121<HH>  ----------------------------<  163<H>  4.4   |  11<L>  |  7.6<HH>    Ca    8.0<L>      12 Jul 2021 21:02  Phos  9.8     07-12  Mg     2.0     07-12    TPro  6.9  /  Alb  3.8  /  TBili  0.3  /  DBili  x   /  AST  39  /  ALT  10  /  AlkPhos  71  07-12    PT/INR - ( 13 Jul 2021 01:13 )   PT: 13.60 sec;   INR: 1.18 ratio           Troponin T, Serum: 1.69 ng/mL *HH* (07-12-21 @ 21:02)  Troponin T, Serum: 0.57 ng/mL ** (07-12-21 @ 10:44)    Serum Pro-Brain Natriuretic Peptide: >31336 pg/mL (07-12-21 @ 10:44)          RADIOLOGY:    < from: Xray Chest 1 View- PORTABLE-Urgent (07.12.21 @ 11:44) >  Impression:    Right lower lobe opacity.    < end of copied text >        < from: 12 Lead ECG (07.12.21 @ 11:03) >  Diagnosis Line Normal sinus rhythm  Possible Left atrial enlargement  Left axis deviation  Non-specific intra-ventricular conduction delay  ST & T wave abnormality, consider anterolateral ischemia  Abnormal ECG

## 2021-07-14 NOTE — DIETITIAN INITIAL EVALUATION ADULT. - ADD RECOMMEND
1. Add renal replacement diet modifier once HD initiated. 2. Cont w/ current diet order. RD to f/u in 3 days to monitor PO intake as staff unable to provide concrete info.

## 2021-07-14 NOTE — DIETITIAN INITIAL EVALUATION ADULT. - PHYSCIAL ASSESSMENT
Alert/disoriented to situation; no edema noted; GI: fecal incontinence, LBM -- staff unsure; skin: stage 2 PU to sacrum; wts: 65.8kg (7/12) vs. 70kg (7/13) vs. RD bedscale wt 72kg -- will use RD bedcale for calculations for now

## 2021-07-14 NOTE — CHART NOTE - NSCHARTNOTEFT_GEN_A_CORE
MICU Transfer Note    Transfer from: MICU  Transfer to:  (  ) Medicine    (  ) Telemetry    (  ) RCU    (  ) Palliative    (  ) Stroke Unit    (  ) _______________  [facility]          ASSESSMENT & PLAN:         For Follow-Up:          Vital Signs Last 24 Hrs  T(C): 36.2 (14 Jul 2021 15:19), Max: 36.8 (14 Jul 2021 07:30)  T(F): 97.2 (14 Jul 2021 15:19), Max: 98.2 (14 Jul 2021 07:30)  HR: 91 (14 Jul 2021 15:19) (80 - 96)  BP: 109/59 (14 Jul 2021 15:19) (90/55 - 110/54)  BP(mean): 80 (14 Jul 2021 15:19) (65 - 90)  RR: 18 (14 Jul 2021 15:19) (18 - 20)  SpO2: 99% (14 Jul 2021 14:31) (98% - 100%)  I&O's Summary    13 Jul 2021 07:01  -  14 Jul 2021 07:00  --------------------------------------------------------  IN: 368.2 mL / OUT: 0 mL / NET: 368.2 mL    14 Jul 2021 07:01  -  14 Jul 2021 15:56  --------------------------------------------------------  IN: 0 mL / OUT: 30 mL / NET: -30 mL          MEDICATIONS  (STANDING):  ALBUTerol    90 MICROgram(s) HFA Inhaler 2 Puff(s) Inhalation every 6 hours  ascorbic acid 500 milliGRAM(s) Oral daily  aspirin  chewable 81 milliGRAM(s) Oral daily  atorvastatin 40 milliGRAM(s) Oral at bedtime  budesonide 160 MICROgram(s)/formoterol 4.5 MICROgram(s) Inhaler 2 Puff(s) Inhalation two times a day  calcium acetate 667 milliGRAM(s) Oral three times a day with meals  chlorhexidine 4% Liquid 1 Application(s) Topical <User Schedule>  dextrose 40% Gel 15 Gram(s) Oral once  dextrose 5%. 1000 milliLiter(s) (50 mL/Hr) IV Continuous <Continuous>  dextrose 5%. 1000 milliLiter(s) (100 mL/Hr) IV Continuous <Continuous>  dextrose 50% Injectable 25 Gram(s) IV Push once  dextrose 50% Injectable 12.5 Gram(s) IV Push once  dextrose 50% Injectable 25 Gram(s) IV Push once  donepezil 5 milliGRAM(s) Oral at bedtime  ferrous    sulfate 325 milliGRAM(s) Oral daily  glucagon  Injectable 1 milliGRAM(s) IntraMuscular once  heparin   Injectable 5000 Unit(s) SubCutaneous every 12 hours  insulin glargine Injectable (LANTUS) 8 Unit(s) SubCutaneous at bedtime  insulin lispro (ADMELOG) corrective regimen sliding scale   SubCutaneous three times a day before meals  insulin lispro Injectable (ADMELOG) 5 Unit(s) SubCutaneous three times a day before meals  lactulose Syrup 10 Gram(s) Oral daily  linezolid  IVPB 600 milliGRAM(s) IV Intermittent every 12 hours  meropenem  IVPB 500 milliGRAM(s) IV Intermittent every 24 hours  meropenem  IVPB      mirtazapine 15 milliGRAM(s) Oral at bedtime  mupirocin 2% Nasal 1 Application(s) Nasal two times a day  pantoprazole    Tablet 40 milliGRAM(s) Oral before breakfast  polyethylene glycol 3350 17 Gram(s) Oral daily  sertraline 25 milliGRAM(s) Oral daily  sodium bicarbonate 650 milliGRAM(s) Oral three times a day    MEDICATIONS  (PRN):  senna 8.6 milliGRAM(s) Oral Tablet - Peds 2 Tablet(s) Oral at bedtime PRN Constipation        LABS                                            7.4                   Neurophils% (auto):   x      (07-14 @ 06:01):    10.41)-----------(168          Lymphocytes% (auto):  x                                             22.8                   Eosinphils% (auto):   x        Manual%: Neutrophils x    ; Lymphocytes x    ; Eosinophils x    ; Bands%: x    ; Blasts x                                    x      |  x      |  x                   Calcium: x     / iCa: x      (07-14 @ 08:50)    ----------------------------<  x         Magnesium: x                                x       |  x      |  x                Phosphorous: 7.2      TPro  6.0    /  Alb  3.4    /  TBili  0.3    /  DBili  x      /  AST  28     /  ALT  10     /  AlkPhos  60     14 Jul 2021 02:24 MICU Transfer Note    Transfer from: MICU  Transfer to:  ( x ) Medicine    (  ) Telemetry    (  ) RCU    (  ) Palliative    (  ) Stroke Unit    (  ) _______________  Accepting physican:      MICU COURSE:  81 Y M from [facility] with pmh of CHF (unknown EF), HTN, anxiety, Alzheimer's dementia, COPD on home O2, T2DM on insulin, hx right nephrectomy/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 Patient is unable to provide history so history was taken from daughter over the phone. She states that the night before his ED arrival, patient had multiple episodes of NBNB vomiting, with decreased PO intake and more somnolent. BP in the nursing home was 85/40 so was brought to ED. At the time of admission patient had no complaints of dysuria, diarrhea, or cough. No fever.  In the ED, patient found to have RLL PNA and UTI. Patient was hypotensive, requiring pressors. Labs were significant for hyperkalemia and metabolic acidosis. [place] was placed, patient went for urgent HD.    ASSESSMENT & PLAN:   81 Y M from [facility] with pmh of CHF (unknown EF), HTN, anxiety, Alzheimer's dementia, COPD on home O2, T2DM on insulin, hx right nephrectomy/ 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 Found to be in acute on chronic renal failure, with pneumonia and UTI.    # Septic Shock 2/2 RLL Pneumonia, Suspected Aspiration Pneumonia   #2/2 UTI  - Presented Hypotensive requiring Levophed, now no longer on levophed 7/14  - Chest Xray 7/12: RLL pneumonia 2/2 likely aspiration pneumonia   - UA: Turbid, large Leukocyte esterase   - Leukocytosis worsening 12.82 > 14.98  - Urine culture 7/12: NGTD  - Blood Culture NGTD  - nasal MRSA +  -S/p renally-dosed antibiotics in ER: (vancomycin, cefepime, azithromycin)  -ID consult: Started on Zyvox 600 q12, meropenum 750mg today, 500 QD starting tomorrow (Day 2 now)   -Mupirocin 2% nasal for MRSA + nares Day 1  - D/C'd Vanc, cefepime, azithromycin   -Procalcitonin: 0.37  -D-Dimer: 732 likely from septic shock, ASHLEE, renal failure. Low suspicion for PE at this time   -F/U urine strep and legionella  - f/u chest xray   - f/u pan culture     #CKDV now on HD, s/p Rt nephrectomy   #Metabolic acidosis  #Hyperkalemia  #Oliguria   -S/P udall 7/12  -S/p Dialysis sessions 7/12, 7/13, tolerating well, next session 7/14  - s/p Bicarb gtt, Dc'd 7/13   - Nephro recs: DC bicarb drip, f/u cultures, f/u lytes   -correct lytes as needed   - F/U VBG/ ABG   - Oliguric, 30cc of urine past 24hrs   - Due for dialysis on 7/15/2021    #New ? HF?EF  -volume overload on admission  - bedside goal directed echocardiogram EF 40%,   - No baseline Echocardiogram  - F/U TTE > still pending   -strict i/o, fluid restriction, daily weight  -holding lasix for now    #Troponemia  - 0.57>1.69>2.11>2.19  -no chest pain  -Cardiology recs: - Likely NSTEMI TYPE II 2/2 demand ischemia from ARF/ Septic Shock  -F/U 2d ECHO   -started on aspirin 81 mg daily    #HTN  -holding hydralazine, patient hypotensive on admission  - Currently on levophed 0.5  - Ween levophed, goal MAP>60     #DM  -lantus 8qhs, lispro 5 qac  -avoid hypoglycemia    #Alzheimer's dementia  -continue remeron, aricept, sertraline    #Iron deficiency anemia  -at baseline  -continue ferrous sulfate  -ROMELIA per nephro    #COPD on home O2  -continue inhalers  -not in exacerbation    #DVT PPx- Heparin 5000u sq q12h  #GI PPx- Protonix 40mg po qam   #Diet- DASH/TLC/CC/Renal  #CHG  #Activity- Bedrest  #Dispo- Acute; SDU  #Code- FULL        For Follow-Up:  -F/U urine strep and legionella  - f/u chest xray   - f/u pan culture   - Due for dialysis on 7/15/2021  - F/U 2d ECHO   - Restart levophed if MAP <60, goal MAP>60   - Heparin and ASA was stopped due to suspicion for GI bleed. JAMAAL + for dark green stool, most likely not melena.         Vital Signs Last 24 Hrs  T(C): 36.2 (14 Jul 2021 15:19), Max: 36.8 (14 Jul 2021 07:30)  T(F): 97.2 (14 Jul 2021 15:19), Max: 98.2 (14 Jul 2021 07:30)  HR: 91 (14 Jul 2021 15:19) (80 - 96)  BP: 109/59 (14 Jul 2021 15:19) (90/55 - 110/54)  BP(mean): 80 (14 Jul 2021 15:19) (65 - 90)  RR: 18 (14 Jul 2021 15:19) (18 - 20)  SpO2: 99% (14 Jul 2021 14:31) (98% - 100%)  I&O's Summary    13 Jul 2021 07:01  -  14 Jul 2021 07:00  --------------------------------------------------------  IN: 368.2 mL / OUT: 0 mL / NET: 368.2 mL    14 Jul 2021 07:01  -  14 Jul 2021 15:56  --------------------------------------------------------  IN: 0 mL / OUT: 30 mL / NET: -30 mL          MEDICATIONS  (STANDING):  ALBUTerol    90 MICROgram(s) HFA Inhaler 2 Puff(s) Inhalation every 6 hours  ascorbic acid 500 milliGRAM(s) Oral daily  aspirin  chewable 81 milliGRAM(s) Oral daily  atorvastatin 40 milliGRAM(s) Oral at bedtime  budesonide 160 MICROgram(s)/formoterol 4.5 MICROgram(s) Inhaler 2 Puff(s) Inhalation two times a day  calcium acetate 667 milliGRAM(s) Oral three times a day with meals  chlorhexidine 4% Liquid 1 Application(s) Topical <User Schedule>  dextrose 40% Gel 15 Gram(s) Oral once  dextrose 5%. 1000 milliLiter(s) (50 mL/Hr) IV Continuous <Continuous>  dextrose 5%. 1000 milliLiter(s) (100 mL/Hr) IV Continuous <Continuous>  dextrose 50% Injectable 25 Gram(s) IV Push once  dextrose 50% Injectable 12.5 Gram(s) IV Push once  dextrose 50% Injectable 25 Gram(s) IV Push once  donepezil 5 milliGRAM(s) Oral at bedtime  ferrous    sulfate 325 milliGRAM(s) Oral daily  glucagon  Injectable 1 milliGRAM(s) IntraMuscular once  heparin   Injectable 5000 Unit(s) SubCutaneous every 12 hours  insulin glargine Injectable (LANTUS) 8 Unit(s) SubCutaneous at bedtime  insulin lispro (ADMELOG) corrective regimen sliding scale   SubCutaneous three times a day before meals  insulin lispro Injectable (ADMELOG) 5 Unit(s) SubCutaneous three times a day before meals  lactulose Syrup 10 Gram(s) Oral daily  linezolid  IVPB 600 milliGRAM(s) IV Intermittent every 12 hours  meropenem  IVPB 500 milliGRAM(s) IV Intermittent every 24 hours  meropenem  IVPB      mirtazapine 15 milliGRAM(s) Oral at bedtime  mupirocin 2% Nasal 1 Application(s) Nasal two times a day  pantoprazole    Tablet 40 milliGRAM(s) Oral before breakfast  polyethylene glycol 3350 17 Gram(s) Oral daily  sertraline 25 milliGRAM(s) Oral daily  sodium bicarbonate 650 milliGRAM(s) Oral three times a day    MEDICATIONS  (PRN):  senna 8.6 milliGRAM(s) Oral Tablet - Peds 2 Tablet(s) Oral at bedtime PRN Constipation        LABS                                            7.4                   Neurophils% (auto):   x      (07-14 @ 06:01):    10.41)-----------(168          Lymphocytes% (auto):  x                                             22.8                   Eosinphils% (auto):   x        Manual%: Neutrophils x    ; Lymphocytes x    ; Eosinophils x    ; Bands%: x    ; Blasts x                                    x      |  x      |  x                   Calcium: x     / iCa: x      (07-14 @ 08:50)    ----------------------------<  x         Magnesium: x                                x       |  x      |  x                Phosphorous: 7.2      TPro  6.0    /  Alb  3.4    /  TBili  0.3    /  DBili  x      /  AST  28     /  ALT  10     /  AlkPhos  60     14 Jul 2021 02:24 MICU Transfer Note    Transfer from: MICU  Transfer to:  ( x ) Medicine    (  ) Telemetry    (  ) RCU    (  ) Palliative    (  ) Stroke Unit    (  ) _______________  Accepting physican:      MICU COURSE:  81 Y M from [facility] with pmh of CHF (unknown EF), HTN, anxiety, Alzheimer's dementia, COPD on home O2, T2DM on insulin, hx right nephrectomy/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 Patient is unable to provide history so history was taken from daughter over the phone. She states that the night before his ED arrival, patient had multiple episodes of NBNB vomiting, with decreased PO intake and more somnolent. BP in the nursing home was 85/40 so was brought to ED. At the time of admission patient had no complaints of dysuria, diarrhea, or cough. No fever.  In the ED, patient found to have RLL PNA and UTI. Patient was hypotensive, requiring pressors. Labs were significant for hyperkalemia and metabolic acidosis. [place] was placed, patient went for urgent HD.    ASSESSMENT & PLAN:   81 Y M from [facility] with pmh of CHF (unknown EF), HTN, anxiety, Alzheimer's dementia, COPD on home O2, T2DM on insulin, hx right nephrectomy/ 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 Found to be in acute on chronic renal failure, with Septic Shock 2/2 RLL Pneumonia, Suspected Aspiration Pneumonia and. UTI  Patient was Hypotensive requiring Levophed, now no longer on levophed 7/14  Chest Xray 7/12: RLL pneumonia 2/2 likely aspiration pneumonia, UA: Turbid, large Leukocyte esterase, Leukocytosis worsening 12.82 > 14.98. Urine culture 7/12: NGTD and Blood Culture NGTD, nasal MRSA +. Patient is S/p renally-dosed antibiotics in ER: (vancomycin, cefepime, azithromycin). Patient was started on Zyvox 600 q12, meropenum 750mg today, 500 QD starting tomorrow (Day 2 now) as per ID and Mupirocin 2% nasal for MRSA + nares Day 1. Vanc, cefepime, azithromycin was discontinued.       -Procalcitonin: 0.37  -D-Dimer: 732 likely from septic shock, ASHLEE, renal failure. Low suspicion for PE at this time   -F/U urine strep and legionella  - f/u chest xray   - f/u pan culture     #CKDV now on HD, s/p Rt nephrectomy   #Metabolic acidosis  #Hyperkalemia  #Oliguria   -S/P udall 7/12  -S/p Dialysis sessions 7/12, 7/13, tolerating well, next session 7/14  - s/p Bicarb gtt, Dc'd 7/13   - Nephro recs: DC bicarb drip, f/u cultures, f/u lytes   -correct lytes as needed   - F/U VBG/ ABG   - Oliguric, 30cc of urine past 24hrs   - Due for dialysis on 7/15/2021    #New ? HF?EF  -volume overload on admission  - bedside goal directed echocardiogram EF 40%,   - No baseline Echocardiogram  - F/U TTE > still pending   -strict i/o, fluid restriction, daily weight  -holding lasix for now    #Troponemia  - 0.57>1.69>2.11>2.19  -no chest pain  -Cardiology recs: - Likely NSTEMI TYPE II 2/2 demand ischemia from ARF/ Septic Shock  -F/U 2d ECHO   -started on aspirin 81 mg daily    #HTN  -holding hydralazine, patient hypotensive on admission  - Currently on levophed 0.5  - Ween levophed, goal MAP>60     #DM  -lantus 8qhs, lispro 5 qac  -avoid hypoglycemia    #Alzheimer's dementia  -continue remeron, aricept, sertraline    #Iron deficiency anemia  -at baseline  -continue ferrous sulfate  -ROMELIA per nephro    #COPD on home O2  -continue inhalers  -not in exacerbation    #DVT PPx- Heparin 5000u sq q12h  #GI PPx- Protonix 40mg po qam   #Diet- DASH/TLC/CC/Renal  #CHG  #Activity- Bedrest  #Dispo- Acute; SDU  #Code- FULL        For Follow-Up:  -F/U urine strep and legionella  - f/u chest xray   - f/u pan culture   - Due for dialysis on 7/15/2021  - F/U 2d ECHO   - Restart levophed if MAP <60, goal MAP>60   - Heparin and ASA was stopped due to suspicion for GI bleed. JAMAAL + for dark green stool, most likely not melena.         Vital Signs Last 24 Hrs  T(C): 36.2 (14 Jul 2021 15:19), Max: 36.8 (14 Jul 2021 07:30)  T(F): 97.2 (14 Jul 2021 15:19), Max: 98.2 (14 Jul 2021 07:30)  HR: 91 (14 Jul 2021 15:19) (80 - 96)  BP: 109/59 (14 Jul 2021 15:19) (90/55 - 110/54)  BP(mean): 80 (14 Jul 2021 15:19) (65 - 90)  RR: 18 (14 Jul 2021 15:19) (18 - 20)  SpO2: 99% (14 Jul 2021 14:31) (98% - 100%)  I&O's Summary    13 Jul 2021 07:01  -  14 Jul 2021 07:00  --------------------------------------------------------  IN: 368.2 mL / OUT: 0 mL / NET: 368.2 mL    14 Jul 2021 07:01  -  14 Jul 2021 15:56  --------------------------------------------------------  IN: 0 mL / OUT: 30 mL / NET: -30 mL          MEDICATIONS  (STANDING):  ALBUTerol    90 MICROgram(s) HFA Inhaler 2 Puff(s) Inhalation every 6 hours  ascorbic acid 500 milliGRAM(s) Oral daily  aspirin  chewable 81 milliGRAM(s) Oral daily  atorvastatin 40 milliGRAM(s) Oral at bedtime  budesonide 160 MICROgram(s)/formoterol 4.5 MICROgram(s) Inhaler 2 Puff(s) Inhalation two times a day  calcium acetate 667 milliGRAM(s) Oral three times a day with meals  chlorhexidine 4% Liquid 1 Application(s) Topical <User Schedule>  dextrose 40% Gel 15 Gram(s) Oral once  dextrose 5%. 1000 milliLiter(s) (50 mL/Hr) IV Continuous <Continuous>  dextrose 5%. 1000 milliLiter(s) (100 mL/Hr) IV Continuous <Continuous>  dextrose 50% Injectable 25 Gram(s) IV Push once  dextrose 50% Injectable 12.5 Gram(s) IV Push once  dextrose 50% Injectable 25 Gram(s) IV Push once  donepezil 5 milliGRAM(s) Oral at bedtime  ferrous    sulfate 325 milliGRAM(s) Oral daily  glucagon  Injectable 1 milliGRAM(s) IntraMuscular once  heparin   Injectable 5000 Unit(s) SubCutaneous every 12 hours  insulin glargine Injectable (LANTUS) 8 Unit(s) SubCutaneous at bedtime  insulin lispro (ADMELOG) corrective regimen sliding scale   SubCutaneous three times a day before meals  insulin lispro Injectable (ADMELOG) 5 Unit(s) SubCutaneous three times a day before meals  lactulose Syrup 10 Gram(s) Oral daily  linezolid  IVPB 600 milliGRAM(s) IV Intermittent every 12 hours  meropenem  IVPB 500 milliGRAM(s) IV Intermittent every 24 hours  meropenem  IVPB      mirtazapine 15 milliGRAM(s) Oral at bedtime  mupirocin 2% Nasal 1 Application(s) Nasal two times a day  pantoprazole    Tablet 40 milliGRAM(s) Oral before breakfast  polyethylene glycol 3350 17 Gram(s) Oral daily  sertraline 25 milliGRAM(s) Oral daily  sodium bicarbonate 650 milliGRAM(s) Oral three times a day    MEDICATIONS  (PRN):  senna 8.6 milliGRAM(s) Oral Tablet - Peds 2 Tablet(s) Oral at bedtime PRN Constipation        LABS                                            7.4                   Neurophils% (auto):   x      (07-14 @ 06:01):    10.41)-----------(168          Lymphocytes% (auto):  x                                             22.8                   Eosinphils% (auto):   x        Manual%: Neutrophils x    ; Lymphocytes x    ; Eosinophils x    ; Bands%: x    ; Blasts x                                    x      |  x      |  x                   Calcium: x     / iCa: x      (07-14 @ 08:50)    ----------------------------<  x         Magnesium: x                                x       |  x      |  x                Phosphorous: 7.2      TPro  6.0    /  Alb  3.4    /  TBili  0.3    /  DBili  x      /  AST  28     /  ALT  10     /  AlkPhos  60     14 Jul 2021 02:24 MICU Transfer Note    Transfer from: MICU  Transfer to:  ( x ) Medicine    (  ) Telemetry    (  ) RCU    (  ) Palliative    (  ) Stroke Unit    (  ) _______________  Accepting physican:      MICU COURSE:  81 Y M from [facility] with pmh of CHF (unknown EF), HTN, anxiety, Alzheimer's dementia, COPD on home O2, T2DM on insulin, hx right nephrectomy/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 Patient is unable to provide history so history was taken from daughter over the phone. She states that the night before his ED arrival, patient had multiple episodes of NBNB vomiting, with decreased PO intake and more somnolent. BP in the nursing home was 85/40 so was brought to ED. At the time of admission patient had no complaints of dysuria, diarrhea, or cough. No fever.  In the ED, patient found to have RLL PNA and UTI. Patient was hypotensive, requiring pressors. Labs were significant for hyperkalemia and metabolic acidosis. [place] was placed, patient went for urgent HD.    ASSESSMENT & PLAN:   81 Y M from [facility] with pmh of CHF (unknown EF), HTN, anxiety, Alzheimer's dementia, COPD on home O2, T2DM on insulin, hx right nephrectomy/ 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 Found to be in acute on chronic renal failure, with Septic Shock 2/2 RLL Pneumonia, Suspected Aspiration Pneumonia and UTI.   Chest Xray 7/12: RLL pneumonia 2/2 likely aspiration pneumonia, UA: Turbid, large Leukocyte esterase, Leukocytosis worsening 12.82 > 14.98. Urine culture 7/12: NGTD and Blood Culture NGTD, nasal MRSA +. Patient is S/p renally-dosed antibiotics in ER: (vancomycin, cefepime, azithromycin). Patient was started on Zyvox 600 q12, meropenum 750mg today, 500 QD starting tomorrow (Day 2 now) as per ID and Mupirocin 2% nasal for MRSA + nares Day 1. Vanc, cefepime, azithromycin was discontinued. Patient is CKDV now on HD, s/p Rt nephrectomy. Patient is on dialysis. Due for dialysis on 7/15/2021. Follow strict i/o, fluid restriction, daily weight and holding lasix for now. New Hx HF, volume overload on admission, bedside goal directed echocardiogram EF 40%. Patient also presented with troponemia, likely NSTEMI TYPE II 2/2 demand ischemia from ARF/ Septic Shock. F/U 2d ECHO. Started on aspirin 81 mg daily. Currently holding hydralazine, patient hypotensive on admission. Patient was Hypotensive requiring Levophed, now no longer on levophed 7/14. Patien is receiving lantus 8qhs, lispro 5 qac for DM. Patient has Alzheimer's dementia and on remeron, aricept, sertraline. Patient has Iron deficiency anemia at baseline, continue ferrous sulfate and ROMELIA per nephro. Patient has COPD on home O2 and continue inhalers      #DVT PPx- Heparin 5000u sq q12h  #GI PPx- Protonix 40mg po qam   #Diet- DASH/TLC/CC/Renal  #CHG  #Activity- Bedrest  #Dispo- Acute; SDU  #Code- FULL    For Follow-Up:  -F/U urine strep and legionella  - f/u chest xray   - f/u pan culture   - Due for dialysis on 7/15/2021  - F/U 2d ECHO   - Restart levophed if MAP <60, goal MAP>60   - f/u lytes : correct lytes as needed   - F/U VBG/ ABG   - F/U TTE > still pending   - Heparin and ASA was stopped due to suspicion for GI bleed. JAMAAL + for dark green stool, most likely not melena. Hb is stable.         Vital Signs Last 24 Hrs  T(C): 36.2 (14 Jul 2021 15:19), Max: 36.8 (14 Jul 2021 07:30)  T(F): 97.2 (14 Jul 2021 15:19), Max: 98.2 (14 Jul 2021 07:30)  HR: 91 (14 Jul 2021 15:19) (80 - 96)  BP: 109/59 (14 Jul 2021 15:19) (90/55 - 110/54)  BP(mean): 80 (14 Jul 2021 15:19) (65 - 90)  RR: 18 (14 Jul 2021 15:19) (18 - 20)  SpO2: 99% (14 Jul 2021 14:31) (98% - 100%)  I&O's Summary    13 Jul 2021 07:01  -  14 Jul 2021 07:00  --------------------------------------------------------  IN: 368.2 mL / OUT: 0 mL / NET: 368.2 mL    14 Jul 2021 07:01  -  14 Jul 2021 15:56  --------------------------------------------------------  IN: 0 mL / OUT: 30 mL / NET: -30 mL          MEDICATIONS  (STANDING):  ALBUTerol    90 MICROgram(s) HFA Inhaler 2 Puff(s) Inhalation every 6 hours  ascorbic acid 500 milliGRAM(s) Oral daily  aspirin  chewable 81 milliGRAM(s) Oral daily  atorvastatin 40 milliGRAM(s) Oral at bedtime  budesonide 160 MICROgram(s)/formoterol 4.5 MICROgram(s) Inhaler 2 Puff(s) Inhalation two times a day  calcium acetate 667 milliGRAM(s) Oral three times a day with meals  chlorhexidine 4% Liquid 1 Application(s) Topical <User Schedule>  dextrose 40% Gel 15 Gram(s) Oral once  dextrose 5%. 1000 milliLiter(s) (50 mL/Hr) IV Continuous <Continuous>  dextrose 5%. 1000 milliLiter(s) (100 mL/Hr) IV Continuous <Continuous>  dextrose 50% Injectable 25 Gram(s) IV Push once  dextrose 50% Injectable 12.5 Gram(s) IV Push once  dextrose 50% Injectable 25 Gram(s) IV Push once  donepezil 5 milliGRAM(s) Oral at bedtime  ferrous    sulfate 325 milliGRAM(s) Oral daily  glucagon  Injectable 1 milliGRAM(s) IntraMuscular once  heparin   Injectable 5000 Unit(s) SubCutaneous every 12 hours  insulin glargine Injectable (LANTUS) 8 Unit(s) SubCutaneous at bedtime  insulin lispro (ADMELOG) corrective regimen sliding scale   SubCutaneous three times a day before meals  insulin lispro Injectable (ADMELOG) 5 Unit(s) SubCutaneous three times a day before meals  lactulose Syrup 10 Gram(s) Oral daily  linezolid  IVPB 600 milliGRAM(s) IV Intermittent every 12 hours  meropenem  IVPB 500 milliGRAM(s) IV Intermittent every 24 hours  meropenem  IVPB      mirtazapine 15 milliGRAM(s) Oral at bedtime  mupirocin 2% Nasal 1 Application(s) Nasal two times a day  pantoprazole    Tablet 40 milliGRAM(s) Oral before breakfast  polyethylene glycol 3350 17 Gram(s) Oral daily  sertraline 25 milliGRAM(s) Oral daily  sodium bicarbonate 650 milliGRAM(s) Oral three times a day    MEDICATIONS  (PRN):  senna 8.6 milliGRAM(s) Oral Tablet - Peds 2 Tablet(s) Oral at bedtime PRN Constipation        LABS                                            7.4                   Neurophils% (auto):   x      (07-14 @ 06:01):    10.41)-----------(168          Lymphocytes% (auto):  x                                             22.8                   Eosinphils% (auto):   x        Manual%: Neutrophils x    ; Lymphocytes x    ; Eosinophils x    ; Bands%: x    ; Blasts x                                    x      |  x      |  x                   Calcium: x     / iCa: x      (07-14 @ 08:50)    ----------------------------<  x         Magnesium: x                                x       |  x      |  x                Phosphorous: 7.2      TPro  6.0    /  Alb  3.4    /  TBili  0.3    /  DBili  x      /  AST  28     /  ALT  10     /  AlkPhos  60     14 Jul 2021 02:24

## 2021-07-14 NOTE — DIETITIAN INITIAL EVALUATION ADULT. - OTHER CALCULATIONS
using RD bedscale wt 72kg; energy: 2175-2520kcal (30-35kcal -- d/t plan of HD initiation 7/15 per Nephro + PU, can aim toward lower end d/t age); protein: 86-101g/kg (1.2-1.4g/kg -- same reason as above); Fluid: per LIP

## 2021-07-14 NOTE — DIETITIAN INITIAL EVALUATION ADULT. - PERTINENT LABORATORY DATA
(7/14) H/H: 7.4/22.8, BUN: 92, Cr: 6.6, Glucose: 110, eGFR: 7  (7/13): a1c: 5.0  CAPILLARY BLOOD GLUCOSE  POCT Blood Glucose.: 152 mg/dL (14 Jul 2021 12:32)  POCT Blood Glucose.: 65 mg/dL (14 Jul 2021 11:40)  POCT Blood Glucose.: 117 mg/dL (14 Jul 2021 08:17)  POCT Blood Glucose.: 175 mg/dL (13 Jul 2021 20:53)  POCT Blood Glucose.: 140 mg/dL (13 Jul 2021 16:18)

## 2021-07-14 NOTE — PROGRESS NOTE ADULT - SUBJECTIVE AND OBJECTIVE BOX
[facility] FOLLOW UP NOTE  --------------------------------------------------------------------------------  24 hour events/subjective: Patient examined. Appears comfortable.    PAST HISTORY  --------------------------------------------------------------------------------  No significant changes to PMH, PSH, FHx, SHx, unless otherwise noted    ALLERGIES & MEDICATIONS  --------------------------------------------------------------------------------  Allergies    No Known Allergies    Standing Inpatient Medications  ALBUTerol    90 MICROgram(s) HFA Inhaler 2 Puff(s) Inhalation every 6 hours  ascorbic acid 500 milliGRAM(s) Oral daily  aspirin  chewable 81 milliGRAM(s) Oral daily  atorvastatin 40 milliGRAM(s) Oral at bedtime  budesonide 160 MICROgram(s)/formoterol 4.5 MICROgram(s) Inhaler 2 Puff(s) Inhalation two times a day  calcium acetate 667 milliGRAM(s) Oral three times a day with meals  chlorhexidine 4% Liquid 1 Application(s) Topical <User Schedule>  dextrose 40% Gel 15 Gram(s) Oral once  dextrose 5%. 1000 milliLiter(s) IV Continuous <Continuous>  dextrose 5%. 1000 milliLiter(s) IV Continuous <Continuous>  dextrose 50% Injectable 25 Gram(s) IV Push once  dextrose 50% Injectable 12.5 Gram(s) IV Push once  dextrose 50% Injectable 25 Gram(s) IV Push once  donepezil 5 milliGRAM(s) Oral at bedtime  ferrous    sulfate 325 milliGRAM(s) Oral daily  glucagon  Injectable 1 milliGRAM(s) IntraMuscular once  heparin   Injectable 5000 Unit(s) SubCutaneous every 12 hours  insulin glargine Injectable (LANTUS) 8 Unit(s) SubCutaneous at bedtime  insulin lispro (ADMELOG) corrective regimen sliding scale   SubCutaneous three times a day before meals  insulin lispro Injectable (ADMELOG) 5 Unit(s) SubCutaneous three times a day before meals  lactulose Syrup 10 Gram(s) Oral daily  linezolid  IVPB 600 milliGRAM(s) IV Intermittent every 12 hours  meropenem  IVPB 500 milliGRAM(s) IV Intermittent every 24 hours  meropenem  IVPB      mirtazapine 15 milliGRAM(s) Oral at bedtime  mupirocin 2% Nasal 1 Application(s) Nasal two times a day  norepinephrine Infusion 0.05 MICROgram(s)/kG/Min IV Continuous <Continuous>  pantoprazole    Tablet 40 milliGRAM(s) Oral before breakfast  polyethylene glycol 3350 17 Gram(s) Oral daily  sertraline 25 milliGRAM(s) Oral daily  sodium bicarbonate 650 milliGRAM(s) Oral three times a day    PRN Inpatient Medications  senna 8.6 milliGRAM(s) Oral Tablet - Peds 2 Tablet(s) Oral at bedtime PRN    VITALS/PHYSICAL EXAM  --------------------------------------------------------------------------------  T(C): 36.8 (07-14-21 @ 07:30), Max: 36.8 (07-14-21 @ 07:30)  HR: 87 (07-14-21 @ 09:45) (80 - 107)  BP: 102/50 (07-14-21 @ 09:45) (92/50 - 114/42)  RR: 20 (07-14-21 @ 07:30) (18 - 20)  SpO2: 100% (07-14-21 @ 07:30) (98% - 100%)    07-13-21 @ 07:01  -  07-14-21 @ 07:00  --------------------------------------------------------  IN: 368.2 mL / OUT: 0 mL / NET: 368.2 mL    Physical Exam:  	Gen: NAD  	Pulm: CTA B/L  	CV: RRR, S1S2  	Abd: +BS, soft, nontender/nondistended  	: No suprapubic tenderness  	LE: Warm, no edema  	Vascular access: Fem temp HD cath    LABS/STUDIES  --------------------------------------------------------------------------------              7.4    10.41 >-----------<  168      [07-14-21 @ 06:01]              22.8     137  |  98  |  92  ----------------------------<  110      [07-14-21 @ 06:01]  3.9   |  22  |  6.6        Ca     7.6     [07-14-21 @ 06:01]      Mg     1.9     [07-14-21 @ 06:01]      Phos  7.2     [07-14-21 @ 08:50]    TPro  6.0  /  Alb  3.4  /  TBili  0.3  /  DBili  x   /  AST  28  /  ALT  10  /  AlkPhos  60  [07-14-21 @ 02:24]    PT/INR: PT 13.60, INR 1.18       [07-13-21 @ 01:13]  PTT: 28.5       [07-13-21 @ 01:13]    Troponin 2.19      [07-13-21 @ 01:13]    Creatinine Trend:  SCr 6.6 [07-14 @ 06:01]  SCr 6.3 [07-14 @ 02:24]  SCr 8.2 [07-13 @ 10:19]  SCr 7.8 [07-13 @ 01:13]  SCr 8.0 [07-13 @ 00:53]    Urinalysis - [07-12-21 @ 13:07]      Color Yellow / Appearance Slightly Turbid / SG 1.014 / pH 6.0      Gluc Negative / Ketone Negative  / Bili Negative / Urobili <2 mg/dL       Blood Small / Protein 300 mg/dL / Leuk Est Large / Nitrite Negative      RBC 14 / WBC 73 / Hyaline 3 / Gran  / Sq Epi  / Non Sq Epi 1 / Bacteria Few    Iron 34, TIBC 178, %sat 19      [01-04-21 @ 06:40]  Ferritin 332      [01-04-21 @ 06:40]  PTH -- (Ca 8.9)      [12-24-20 @ 04:30]   54    HBsAb Nonreact      [07-12-21 @ 18:05]  HBsAg Nonreact      [07-12-21 @ 18:05]  HCV 0.15, Nonreact      [07-12-21 @ 18:05]

## 2021-07-14 NOTE — PHYSICAL THERAPY INITIAL EVALUATION ADULT - SPECIFY REASON(S)
Attempted to see pt for PT, however pt adamently refused, kept saying thank you, I'm OK, and refused to participate when requested.  PT to follow.

## 2021-07-14 NOTE — PROGRESS NOTE ADULT - SUBJECTIVE AND OBJECTIVE BOX
DRAKE GJONBALAJ 81y Male  MRN#: [id number]   CODE STATUS:________    Hospital Day: 2d    Pt is currently admitted with the primary diagnosis of Septic Shock 2/2 Pneumonia, Acute renal failure     SUBJECTIVE  Hospital Course    Overnight events     Subjective complaints     Present Today:   - Laura:  No [  ], Yes [   ] : Indication:     - Type of IV Access:       .. CVC/Piccline:  No [  ], Yes [   ] : Indication:       .. Midline: No [  ], Yes [   ] : Indication:                                             ----------------------------------------------------------  OBJECTIVE  PAST MEDICAL & SURGICAL HISTORY  Diabetes mellitus    COPD (chronic obstructive pulmonary disease)    Lymphedema of both lower extremities    CHF (congestive heart failure)    H/O right nephrectomy  20 yrs ago                                              -----------------------------------------------------------  ALLERGIES:  No Known Allergies                                            ------------------------------------------------------------    HOME MEDICATIONS  Home Medications:  albuterol 90 mcg/inh inhalation aerosol: 2 puff(s) inhaled every 4 hours (12 Jul 2021 17:02)  Aricept 5 mg oral tablet: 1 tab(s) orally once a day (at bedtime) (12 Jul 2021 17:02)  Breo Ellipta 200 mcg-25 mcg/inh inhalation powder: 1 puff(s) inhaled once a day (12 Jul 2021 17:02)  ferrous sulfate 325 mg (65 mg elemental iron) oral tablet: 1 tab(s) orally once a day (12 Jul 2021 17:02)  furosemide 20 mg oral tablet: 1 tab(s) orally once a day (12 Jul 2021 17:02)  hydrALAZINE 50 mg oral tablet: 1 tab(s) orally every 12 hours (12 Jul 2021 17:02)  Lantus 100 units/mL subcutaneous solution: 8 unit(s) subcutaneous once a day (at bedtime) (12 Jul 2021 17:02)  Melatonin 3 mg oral tablet: 1 tab(s) orally once a day (at bedtime) (12 Jul 2021 17:02)  nitroglycerin 0.3 mg sublingual tablet: 1 tab(s) sublingual every 5 minutes no more than 3 doses (12 Jul 2021 17:02)  NovoLOG FlexPen 100 units/mL injectable solution: 5 unit(s) injectable 3 times a day (before meals) (12 Jul 2021 17:02)  Remeron 15 mg oral tablet: 1 tab(s) orally once a day (at bedtime) (12 Jul 2021 17:02)  Retacrit 4000 units/mL preservative-free injectable solution: 4000 unit(s) injectable once a week. hold if Hgb &gt; 10 (12 Jul 2021 17:02)  Senna 8.6 mg oral tablet: 2 tab(s) orally once a day (at bedtime), As Needed (12 Jul 2021 17:02)  sertraline 25 mg oral tablet: 1 tab(s) orally once a day (12 Jul 2021 17:02)  Vitamin C 500 mg oral tablet: 1 tab(s) orally once a day (12 Jul 2021 17:02)                           MEDICATIONS:  STANDING MEDICATIONS  ALBUTerol    90 MICROgram(s) HFA Inhaler 2 Puff(s) Inhalation every 6 hours  ascorbic acid 500 milliGRAM(s) Oral daily  aspirin  chewable 81 milliGRAM(s) Oral daily  atorvastatin 40 milliGRAM(s) Oral at bedtime  budesonide 160 MICROgram(s)/formoterol 4.5 MICROgram(s) Inhaler 2 Puff(s) Inhalation two times a day  calcium acetate 667 milliGRAM(s) Oral three times a day with meals  chlorhexidine 4% Liquid 1 Application(s) Topical <User Schedule>  dextrose 40% Gel 15 Gram(s) Oral once  dextrose 5%. 1000 milliLiter(s) IV Continuous <Continuous>  dextrose 5%. 1000 milliLiter(s) IV Continuous <Continuous>  dextrose 50% Injectable 25 Gram(s) IV Push once  dextrose 50% Injectable 12.5 Gram(s) IV Push once  dextrose 50% Injectable 25 Gram(s) IV Push once  donepezil 5 milliGRAM(s) Oral at bedtime  ferrous    sulfate 325 milliGRAM(s) Oral daily  glucagon  Injectable 1 milliGRAM(s) IntraMuscular once  heparin   Injectable 5000 Unit(s) SubCutaneous every 12 hours  insulin glargine Injectable (LANTUS) 8 Unit(s) SubCutaneous at bedtime  insulin lispro (ADMELOG) corrective regimen sliding scale   SubCutaneous three times a day before meals  insulin lispro Injectable (ADMELOG) 5 Unit(s) SubCutaneous three times a day before meals  lactulose Syrup 10 Gram(s) Oral daily  linezolid  IVPB 600 milliGRAM(s) IV Intermittent every 12 hours  meropenem  IVPB 500 milliGRAM(s) IV Intermittent every 24 hours  meropenem  IVPB      mirtazapine 15 milliGRAM(s) Oral at bedtime  mupirocin 2% Nasal 1 Application(s) Nasal two times a day  pantoprazole    Tablet 40 milliGRAM(s) Oral before breakfast  polyethylene glycol 3350 17 Gram(s) Oral daily  sertraline 25 milliGRAM(s) Oral daily  sodium bicarbonate 650 milliGRAM(s) Oral three times a day    PRN MEDICATIONS  senna 8.6 milliGRAM(s) Oral Tablet - Peds 2 Tablet(s) Oral at bedtime PRN                                            ------------------------------------------------------------  VITAL SIGNS: Last 24 Hours  T(C): 36.8 (14 Jul 2021 07:30), Max: 36.8 (14 Jul 2021 07:30)  T(F): 98.2 (14 Jul 2021 07:30), Max: 98.2 (14 Jul 2021 07:30)  HR: 91 (14 Jul 2021 14:31) (80 - 96)  BP: 101/53 (14 Jul 2021 14:31) (90/55 - 110/54)  BP(mean): 74 (14 Jul 2021 14:31) (65 - 90)  RR: 20 (14 Jul 2021 14:31) (20 - 20)  SpO2: 99% (14 Jul 2021 14:31) (98% - 100%)      07-13-21 @ 07:01  -  07-14-21 @ 07:00  --------------------------------------------------------  IN: 368.2 mL / OUT: 0 mL / NET: 368.2 mL    07-14-21 @ 07:01  -  07-14-21 @ 14:58  --------------------------------------------------------  IN: 0 mL / OUT: 30 mL / NET: -30 mL                                             --------------------------------------------------------------  LABS:                        7.4    10.41 )-----------( 168      ( 14 Jul 2021 06:01 )             22.8     07-14    137  |  98  |  92<HH>  ----------------------------<  110<H>  3.9   |  22  |  6.6<HH>    Ca    7.6<L>      14 Jul 2021 06:01  Phos  7.2     07-14  Mg     1.9     07-14    TPro  6.0  /  Alb  3.4<L>  /  TBili  0.3  /  DBili  x   /  AST  28  /  ALT  10  /  AlkPhos  60  07-14    PT/INR - ( 13 Jul 2021 01:13 )   PT: 13.60 sec;   INR: 1.18 ratio         PTT - ( 13 Jul 2021 01:13 )  PTT:28.5 sec            Culture - Urine (collected 12 Jul 2021 13:06)  Source: .Urine Clean Catch (Midstream)  Final Report (13 Jul 2021 17:36):    No growth    Culture - Blood (collected 12 Jul 2021 11:38)  Source: .Blood Blood-Peripheral  Preliminary Report (13 Jul 2021 19:01):    No growth to date.    Culture - Blood (collected 12 Jul 2021 11:38)  Source: .Blood Blood-Peripheral  Preliminary Report (13 Jul 2021 19:01):    No growth to date.        CARDIAC MARKERS ( 13 Jul 2021 01:13 )  x     / 2.19 ng/mL / x     / x     / x      CARDIAC MARKERS ( 13 Jul 2021 00:53 )  x     / 2.11 ng/mL / x     / x     / x      CARDIAC MARKERS ( 12 Jul 2021 21:02 )  x     / 1.69 ng/mL / x     / x     / x                                                  -------------------------------------------------------------  RADIOLOGY:  EXAM:  XR CHEST PORTABLE ROUTINE 1V            PROCEDURE DATE:  07/14/2021            INTERPRETATION:  STUDY INDICATION: encephalopathy. Pleural effusion.    Comparison: 7/12/2021.    Technique/Positioning: Frontal view of the chest.    Findings:    Support devices: On telemetry leads.    Cardiac/mediastinum/hilum: Stable cardiomediastinal silhouette.    Lung parenchyma/Pleura: Bibasal pleural-parenchymal opacities, increased on the left. No pneumothorax.    Skeleton/soft tissues: Unchanged.    Impression:    Bibasal pleural-parenchymal opacities, increased on the left. No pneumothorax.    --- End of Report ---                                            --------------------------------------------------------------    PHYSICAL EXAM:  General:   HEENT: NCAT  LUNGS: CTAB, Good air entry bilat  HEART: RRR, +S1,S2, RRR  ABDOMEN: SNTTP, ND x 4 q's  EXT: Warm, well perfused x 4  NEURO: AxOx3, No FND's noted  SKIN: No new breakdown or rashes noted                                           --------------------------------------------------------------    ASSESSMENT & PLAN    81 Y M from [facility] with pmh of CHF (unknown EF), HTN, anxiety, Alzheimer's dementia, COPD on home O2, T2DM on insulin, hx right nephrectomy/ 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 Found to be in acute on chronic renal failure, with pneumonia and UTI.    # Septic Shock 2/2 RLL Pneumonia, Suspected Aspiration Pneumonia   #2/2 UTI  - Presented Hypotensive requiring Levophed, now no longer on levophed 7/14  - Chest Xray 7/12: RLL pneumonia 2/2 likely aspiration pneumonia   - UA: Turbid, large Leukocyte esterase   - Leukocytosis worsening 12.82 > 14.98  - Urine culture 7/12: NGTD  - Blood Culture NGTD  - nasal MRSA +  -S/p renally-dosed antibiotics in ER: (vancomycin, cefepime, azithromycin)  -ID consult: Started on Zyvox 600 q12, meropenum 750mg today, 500 QD starting tomorrow (Day 2 now)   -Mupirocin 2% nasal for MRSA + nares Day 1  - D/C'd Vanc, cefepime, azithromycin   -Procalcitonin: 0.37  -F/U urine strep and legionella  - f/u chest xray   - f/u pan culture     #CKDV now on HD, s/p Rt nephrectomy   #Metabolic acidosis  #Hyperkalemia  #Oliguria   -S/P udall 7/12  -S/p Dialysis sessions 7/12, 7/13, tolerating well, next session 7/14  - s/p Bicarb gtt, Dc'd 7/13   - Nephro recs: DC bicarb drip, f/u cultures, f/u lytes   -correct lytes as needed   - F/U VBG/ ABG   - Oliguric, 30cc of urine past 24hrs   - Due for dialysis tomorrow     #New ? HF?EF  -volume overload on admission  - bedside goal directed echocardiogram EF 40%,   - No baseline Echocardiogram  - F/U TTE > still pending   -strict i/o, fluid restriction, daily weight  -holding lasix for now    #Troponemia  - 0.57>1.69>2.11>2.19  -no chest pain  -Cardiology recs: - Likely NSTEMI TYPE II 2/2 demand ischemia from ARF/ Septic Shock  -F/U 2d ECHO   -started on aspirin 81 mg daily    #HTN  -holding hydralazine, patient hypotensive on admission  - Currently on levophed 0.5  - Ween levophed, goal MAP>60     #DM  -lantus 8qhs, lispro 5 qac  -avoid hypoglycemia    #Alzheimer's dementia  -continue remeron, aricept, sertraline    #Iron deficiency anemia  -at baseline  -continue ferrous sulfate  -ROMELIA per nephro    #COPD on home O2  -continue inhalers  -not in exacerbation    #DVT PPx- Heparin 5000u sq q12h  #GI PPx- Protonix 40mg po qam   #Diet- DASH/TLC/CC/Renal  #CHG  #Activity- Bedrest  #Dispo- Acute; SDU  #Code- FULL                     DRAKE MCCLELLANDAJ 81y Male  MRN#: [id number]   CODE STATUS: DNR/DNI     Hospital Day: 2d    Pt is currently admitted with the primary diagnosis of Septic Shock 2/2 Pneumonia, Acute renal failure     SUBJECTIVE    Overnight events: No over night events    Subjective complaints: He does not answer ROS questions although he is conversational with his daughter. He refuses to eat but will eat when his daughter feeds him. During the morning, his sugar dropped to 65 and he required an amp of D50. His BP is 101/53 with a MAP of 70s off the levophed today. For dialysis tomorrow per nephrology.       Present Today:   - Laura:  No [  ], Yes [   ] : Indication:     - Type of IV Access:       .. CVC/Piccline:  No [  ], Yes [   ] : Indication:       .. Midline: No [  ], Yes [   ] : Indication:                                             ----------------------------------------------------------  OBJECTIVE  PAST MEDICAL & SURGICAL HISTORY  Diabetes mellitus    COPD (chronic obstructive pulmonary disease)    Lymphedema of both lower extremities    CHF (congestive heart failure)    H/O right nephrectomy  20 yrs ago                                              -----------------------------------------------------------  ALLERGIES:  No Known Allergies                                            ------------------------------------------------------------    HOME MEDICATIONS  Home Medications:  albuterol 90 mcg/inh inhalation aerosol: 2 puff(s) inhaled every 4 hours (12 Jul 2021 17:02)  Aricept 5 mg oral tablet: 1 tab(s) orally once a day (at bedtime) (12 Jul 2021 17:02)  Breo Ellipta 200 mcg-25 mcg/inh inhalation powder: 1 puff(s) inhaled once a day (12 Jul 2021 17:02)  ferrous sulfate 325 mg (65 mg elemental iron) oral tablet: 1 tab(s) orally once a day (12 Jul 2021 17:02)  furosemide 20 mg oral tablet: 1 tab(s) orally once a day (12 Jul 2021 17:02)  hydrALAZINE 50 mg oral tablet: 1 tab(s) orally every 12 hours (12 Jul 2021 17:02)  Lantus 100 units/mL subcutaneous solution: 8 unit(s) subcutaneous once a day (at bedtime) (12 Jul 2021 17:02)  Melatonin 3 mg oral tablet: 1 tab(s) orally once a day (at bedtime) (12 Jul 2021 17:02)  nitroglycerin 0.3 mg sublingual tablet: 1 tab(s) sublingual every 5 minutes no more than 3 doses (12 Jul 2021 17:02)  NovoLOG FlexPen 100 units/mL injectable solution: 5 unit(s) injectable 3 times a day (before meals) (12 Jul 2021 17:02)  Remeron 15 mg oral tablet: 1 tab(s) orally once a day (at bedtime) (12 Jul 2021 17:02)  Retacrit 4000 units/mL preservative-free injectable solution: 4000 unit(s) injectable once a week. hold if Hgb &gt; 10 (12 Jul 2021 17:02)  Senna 8.6 mg oral tablet: 2 tab(s) orally once a day (at bedtime), As Needed (12 Jul 2021 17:02)  sertraline 25 mg oral tablet: 1 tab(s) orally once a day (12 Jul 2021 17:02)  Vitamin C 500 mg oral tablet: 1 tab(s) orally once a day (12 Jul 2021 17:02)                           MEDICATIONS:  STANDING MEDICATIONS  ALBUTerol    90 MICROgram(s) HFA Inhaler 2 Puff(s) Inhalation every 6 hours  ascorbic acid 500 milliGRAM(s) Oral daily  aspirin  chewable 81 milliGRAM(s) Oral daily  atorvastatin 40 milliGRAM(s) Oral at bedtime  budesonide 160 MICROgram(s)/formoterol 4.5 MICROgram(s) Inhaler 2 Puff(s) Inhalation two times a day  calcium acetate 667 milliGRAM(s) Oral three times a day with meals  chlorhexidine 4% Liquid 1 Application(s) Topical <User Schedule>  dextrose 40% Gel 15 Gram(s) Oral once  dextrose 5%. 1000 milliLiter(s) IV Continuous <Continuous>  dextrose 5%. 1000 milliLiter(s) IV Continuous <Continuous>  dextrose 50% Injectable 25 Gram(s) IV Push once  dextrose 50% Injectable 12.5 Gram(s) IV Push once  dextrose 50% Injectable 25 Gram(s) IV Push once  donepezil 5 milliGRAM(s) Oral at bedtime  ferrous    sulfate 325 milliGRAM(s) Oral daily  glucagon  Injectable 1 milliGRAM(s) IntraMuscular once  heparin   Injectable 5000 Unit(s) SubCutaneous every 12 hours  insulin glargine Injectable (LANTUS) 8 Unit(s) SubCutaneous at bedtime  insulin lispro (ADMELOG) corrective regimen sliding scale   SubCutaneous three times a day before meals  insulin lispro Injectable (ADMELOG) 5 Unit(s) SubCutaneous three times a day before meals  lactulose Syrup 10 Gram(s) Oral daily  linezolid  IVPB 600 milliGRAM(s) IV Intermittent every 12 hours  meropenem  IVPB 500 milliGRAM(s) IV Intermittent every 24 hours  meropenem  IVPB      mirtazapine 15 milliGRAM(s) Oral at bedtime  mupirocin 2% Nasal 1 Application(s) Nasal two times a day  pantoprazole    Tablet 40 milliGRAM(s) Oral before breakfast  polyethylene glycol 3350 17 Gram(s) Oral daily  sertraline 25 milliGRAM(s) Oral daily  sodium bicarbonate 650 milliGRAM(s) Oral three times a day    PRN MEDICATIONS  senna 8.6 milliGRAM(s) Oral Tablet - Peds 2 Tablet(s) Oral at bedtime PRN                                            ------------------------------------------------------------  VITAL SIGNS: Last 24 Hours  T(C): 36.8 (14 Jul 2021 07:30), Max: 36.8 (14 Jul 2021 07:30)  T(F): 98.2 (14 Jul 2021 07:30), Max: 98.2 (14 Jul 2021 07:30)  HR: 91 (14 Jul 2021 14:31) (80 - 96)  BP: 101/53 (14 Jul 2021 14:31) (90/55 - 110/54)  BP(mean): 74 (14 Jul 2021 14:31) (65 - 90)  RR: 20 (14 Jul 2021 14:31) (20 - 20)  SpO2: 99% (14 Jul 2021 14:31) (98% - 100%)      07-13-21 @ 07:01  -  07-14-21 @ 07:00  --------------------------------------------------------  IN: 368.2 mL / OUT: 0 mL / NET: 368.2 mL    07-14-21 @ 07:01  -  07-14-21 @ 14:58  --------------------------------------------------------  IN: 0 mL / OUT: 30 mL / NET: -30 mL                                             --------------------------------------------------------------  LABS:                        7.4    10.41 )-----------( 168      ( 14 Jul 2021 06:01 )             22.8     07-14    137  |  98  |  92<HH>  ----------------------------<  110<H>  3.9   |  22  |  6.6<HH>    Ca    7.6<L>      14 Jul 2021 06:01  Phos  7.2     07-14  Mg     1.9     07-14    TPro  6.0  /  Alb  3.4<L>  /  TBili  0.3  /  DBili  x   /  AST  28  /  ALT  10  /  AlkPhos  60  07-14    PT/INR - ( 13 Jul 2021 01:13 )   PT: 13.60 sec;   INR: 1.18 ratio         PTT - ( 13 Jul 2021 01:13 )  PTT:28.5 sec            Culture - Urine (collected 12 Jul 2021 13:06)  Source: .Urine Clean Catch (Midstream)  Final Report (13 Jul 2021 17:36):    No growth    Culture - Blood (collected 12 Jul 2021 11:38)  Source: .Blood Blood-Peripheral  Preliminary Report (13 Jul 2021 19:01):    No growth to date.    Culture - Blood (collected 12 Jul 2021 11:38)  Source: .Blood Blood-Peripheral  Preliminary Report (13 Jul 2021 19:01):    No growth to date.        CARDIAC MARKERS ( 13 Jul 2021 01:13 )  x     / 2.19 ng/mL / x     / x     / x      CARDIAC MARKERS ( 13 Jul 2021 00:53 )  x     / 2.11 ng/mL / x     / x     / x      CARDIAC MARKERS ( 12 Jul 2021 21:02 )  x     / 1.69 ng/mL / x     / x     / x                                                  -------------------------------------------------------------  RADIOLOGY:  EXAM:  XR CHEST PORTABLE ROUTINE 1V            PROCEDURE DATE:  07/14/2021            INTERPRETATION:  STUDY INDICATION: encephalopathy. Pleural effusion.    Comparison: 7/12/2021.    Technique/Positioning: Frontal view of the chest.    Findings:    Support devices: On telemetry leads.    Cardiac/mediastinum/hilum: Stable cardiomediastinal silhouette.    Lung parenchyma/Pleura: Bibasal pleural-parenchymal opacities, increased on the left. No pneumothorax.    Skeleton/soft tissues: Unchanged.    Impression:    Bibasal pleural-parenchymal opacities, increased on the left. No pneumothorax.    --- End of Report ---                                            --------------------------------------------------------------    PHYSICAL EXAM:  General:   HEENT: NCAT  LUNGS: CTAB, Good air entry bilat  HEART: RRR, +S1,S2, RRR  ABDOMEN: SNTTP, ND x 4 q's  EXT: Warm, well perfused x 4  NEURO: AxOx3, No FND's noted  SKIN: No new breakdown or rashes noted                                           --------------------------------------------------------------    ASSESSMENT & PLAN    81 Y M from [facility] with pmh of CHF (unknown EF), HTN, anxiety, Alzheimer's dementia, COPD on home O2, T2DM on insulin, hx right nephrectomy/ 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 Found to be in acute on chronic renal failure, with pneumonia and UTI.    # Septic Shock 2/2 RLL Pneumonia, Suspected Aspiration Pneumonia   #2/2 UTI  - Presented Hypotensive requiring Levophed, now no longer on levophed 7/14  - Chest Xray 7/12: RLL pneumonia 2/2 likely aspiration pneumonia   - UA: Turbid, large Leukocyte esterase   - Leukocytosis worsening 12.82 > 14.98  - Urine culture 7/12: NGTD  - Blood Culture NGTD  - nasal MRSA +  -S/p renally-dosed antibiotics in ER: (vancomycin, cefepime, azithromycin)  -ID consult: Started on Zyvox 600 q12, meropenum 750mg today, 500 QD starting tomorrow (Day 2 now)   -Mupirocin 2% nasal for MRSA + nares Day 1  - D/C'd Vanc, cefepime, azithromycin   -Procalcitonin: 0.37  -F/U urine strep and legionella  - f/u chest xray   - f/u pan culture     #CKDV now on HD, s/p Rt nephrectomy   #Metabolic acidosis  #Hyperkalemia  #Oliguria   -S/P udall 7/12  -S/p Dialysis sessions 7/12, 7/13, tolerating well, next session 7/14  - s/p Bicarb gtt, Dc'd 7/13   - Nephro recs: DC bicarb drip, f/u cultures, f/u lytes   -correct lytes as needed   - F/U VBG/ ABG   - Oliguric, 30cc of urine past 24hrs   - Due for dialysis tomorrow     #New ? HF?EF  -volume overload on admission  - bedside goal directed echocardiogram EF 40%,   - No baseline Echocardiogram  - F/U TTE > still pending   -strict i/o, fluid restriction, daily weight  -holding lasix for now    #Troponemia  - 0.57>1.69>2.11>2.19  -no chest pain  -Cardiology recs: - Likely NSTEMI TYPE II 2/2 demand ischemia from ARF/ Septic Shock  -F/U 2d ECHO   -started on aspirin 81 mg daily    #HTN  -holding hydralazine, patient hypotensive on admission  - Currently on levophed 0.5  - Ween levophed, goal MAP>60     #DM  -lantus 8qhs, lispro 5 qac  -avoid hypoglycemia    #Alzheimer's dementia  -continue remeron, aricept, sertraline    #Iron deficiency anemia  -at baseline  -continue ferrous sulfate  -ROMELIA per nephro    #COPD on home O2  -continue inhalers  -not in exacerbation    #DVT PPx- Heparin 5000u sq q12h  #GI PPx- Protonix 40mg po qam   #Diet- DASH/TLC/CC/Renal  #CHG  #Activity- Bedrest  #Dispo- Acute; SDU  #Code- FULL                     DRAKE FORTUNEBALAJ 81y Male  MRN#: [id number]   CODE STATUS: DNR/DNI     Hospital Day: 2d    Pt is currently admitted with the primary diagnosis of Septic Shock 2/2 Pneumonia, Acute renal failure     SUBJECTIVE    Overnight events: No over night events    Subjective complaints: He does not answer ROS questions although he is conversational with his daughter. He refuses to eat but will eat when his daughter feeds him. During the morning, his sugar dropped to 65 and he required an amp of D50. His BP is 101/53 with a MAP of 70s off the levophed today. For dialysis tomorrow per nephrology. Had a Bowel movement overnight.     Present Today:   - Laura:  No [  ], Yes [   ] : Indication:     - Type of IV Access:       .. CVC/Piccline:  No [  ], Yes [   ] : Indication:       .. Midline: No [  ], Yes [   ] : Indication:                                             ----------------------------------------------------------  OBJECTIVE  PAST MEDICAL & SURGICAL HISTORY  Diabetes mellitus    COPD (chronic obstructive pulmonary disease)    Lymphedema of both lower extremities    CHF (congestive heart failure)    H/O right nephrectomy  20 yrs ago                                              -----------------------------------------------------------  ALLERGIES:  No Known Allergies                                            ------------------------------------------------------------    HOME MEDICATIONS  Home Medications:  albuterol 90 mcg/inh inhalation aerosol: 2 puff(s) inhaled every 4 hours (12 Jul 2021 17:02)  Aricept 5 mg oral tablet: 1 tab(s) orally once a day (at bedtime) (12 Jul 2021 17:02)  Breo Ellipta 200 mcg-25 mcg/inh inhalation powder: 1 puff(s) inhaled once a day (12 Jul 2021 17:02)  ferrous sulfate 325 mg (65 mg elemental iron) oral tablet: 1 tab(s) orally once a day (12 Jul 2021 17:02)  furosemide 20 mg oral tablet: 1 tab(s) orally once a day (12 Jul 2021 17:02)  hydrALAZINE 50 mg oral tablet: 1 tab(s) orally every 12 hours (12 Jul 2021 17:02)  Lantus 100 units/mL subcutaneous solution: 8 unit(s) subcutaneous once a day (at bedtime) (12 Jul 2021 17:02)  Melatonin 3 mg oral tablet: 1 tab(s) orally once a day (at bedtime) (12 Jul 2021 17:02)  nitroglycerin 0.3 mg sublingual tablet: 1 tab(s) sublingual every 5 minutes no more than 3 doses (12 Jul 2021 17:02)  NovoLOG FlexPen 100 units/mL injectable solution: 5 unit(s) injectable 3 times a day (before meals) (12 Jul 2021 17:02)  Remeron 15 mg oral tablet: 1 tab(s) orally once a day (at bedtime) (12 Jul 2021 17:02)  Retacrit 4000 units/mL preservative-free injectable solution: 4000 unit(s) injectable once a week. hold if Hgb &gt; 10 (12 Jul 2021 17:02)  Senna 8.6 mg oral tablet: 2 tab(s) orally once a day (at bedtime), As Needed (12 Jul 2021 17:02)  sertraline 25 mg oral tablet: 1 tab(s) orally once a day (12 Jul 2021 17:02)  Vitamin C 500 mg oral tablet: 1 tab(s) orally once a day (12 Jul 2021 17:02)                           MEDICATIONS:  STANDING MEDICATIONS  ALBUTerol    90 MICROgram(s) HFA Inhaler 2 Puff(s) Inhalation every 6 hours  ascorbic acid 500 milliGRAM(s) Oral daily  aspirin  chewable 81 milliGRAM(s) Oral daily  atorvastatin 40 milliGRAM(s) Oral at bedtime  budesonide 160 MICROgram(s)/formoterol 4.5 MICROgram(s) Inhaler 2 Puff(s) Inhalation two times a day  calcium acetate 667 milliGRAM(s) Oral three times a day with meals  chlorhexidine 4% Liquid 1 Application(s) Topical <User Schedule>  dextrose 40% Gel 15 Gram(s) Oral once  dextrose 5%. 1000 milliLiter(s) IV Continuous <Continuous>  dextrose 5%. 1000 milliLiter(s) IV Continuous <Continuous>  dextrose 50% Injectable 25 Gram(s) IV Push once  dextrose 50% Injectable 12.5 Gram(s) IV Push once  dextrose 50% Injectable 25 Gram(s) IV Push once  donepezil 5 milliGRAM(s) Oral at bedtime  ferrous    sulfate 325 milliGRAM(s) Oral daily  glucagon  Injectable 1 milliGRAM(s) IntraMuscular once  heparin   Injectable 5000 Unit(s) SubCutaneous every 12 hours  insulin glargine Injectable (LANTUS) 8 Unit(s) SubCutaneous at bedtime  insulin lispro (ADMELOG) corrective regimen sliding scale   SubCutaneous three times a day before meals  insulin lispro Injectable (ADMELOG) 5 Unit(s) SubCutaneous three times a day before meals  lactulose Syrup 10 Gram(s) Oral daily  linezolid  IVPB 600 milliGRAM(s) IV Intermittent every 12 hours  meropenem  IVPB 500 milliGRAM(s) IV Intermittent every 24 hours  meropenem  IVPB      mirtazapine 15 milliGRAM(s) Oral at bedtime  mupirocin 2% Nasal 1 Application(s) Nasal two times a day  pantoprazole    Tablet 40 milliGRAM(s) Oral before breakfast  polyethylene glycol 3350 17 Gram(s) Oral daily  sertraline 25 milliGRAM(s) Oral daily  sodium bicarbonate 650 milliGRAM(s) Oral three times a day    PRN MEDICATIONS  senna 8.6 milliGRAM(s) Oral Tablet - Peds 2 Tablet(s) Oral at bedtime PRN                                            ------------------------------------------------------------  VITAL SIGNS: Last 24 Hours  T(C): 36.8 (14 Jul 2021 07:30), Max: 36.8 (14 Jul 2021 07:30)  T(F): 98.2 (14 Jul 2021 07:30), Max: 98.2 (14 Jul 2021 07:30)  HR: 91 (14 Jul 2021 14:31) (80 - 96)  BP: 101/53 (14 Jul 2021 14:31) (90/55 - 110/54)  BP(mean): 74 (14 Jul 2021 14:31) (65 - 90)  RR: 20 (14 Jul 2021 14:31) (20 - 20)  SpO2: 99% (14 Jul 2021 14:31) (98% - 100%)      07-13-21 @ 07:01  -  07-14-21 @ 07:00  --------------------------------------------------------  IN: 368.2 mL / OUT: 0 mL / NET: 368.2 mL    07-14-21 @ 07:01  -  07-14-21 @ 14:58  --------------------------------------------------------  IN: 0 mL / OUT: 30 mL / NET: -30 mL                                             --------------------------------------------------------------  LABS:                        7.4    10.41 )-----------( 168      ( 14 Jul 2021 06:01 )             22.8     07-14    137  |  98  |  92<HH>  ----------------------------<  110<H>  3.9   |  22  |  6.6<HH>    Ca    7.6<L>      14 Jul 2021 06:01  Phos  7.2     07-14  Mg     1.9     07-14    TPro  6.0  /  Alb  3.4<L>  /  TBili  0.3  /  DBili  x   /  AST  28  /  ALT  10  /  AlkPhos  60  07-14    PT/INR - ( 13 Jul 2021 01:13 )   PT: 13.60 sec;   INR: 1.18 ratio         PTT - ( 13 Jul 2021 01:13 )  PTT:28.5 sec            Culture - Urine (collected 12 Jul 2021 13:06)  Source: .Urine Clean Catch (Midstream)  Final Report (13 Jul 2021 17:36):    No growth    Culture - Blood (collected 12 Jul 2021 11:38)  Source: .Blood Blood-Peripheral  Preliminary Report (13 Jul 2021 19:01):    No growth to date.    Culture - Blood (collected 12 Jul 2021 11:38)  Source: .Blood Blood-Peripheral  Preliminary Report (13 Jul 2021 19:01):    No growth to date.        CARDIAC MARKERS ( 13 Jul 2021 01:13 )  x     / 2.19 ng/mL / x     / x     / x      CARDIAC MARKERS ( 13 Jul 2021 00:53 )  x     / 2.11 ng/mL / x     / x     / x      CARDIAC MARKERS ( 12 Jul 2021 21:02 )  x     / 1.69 ng/mL / x     / x     / x                                                  -------------------------------------------------------------  RADIOLOGY:  EXAM:  XR CHEST PORTABLE ROUTINE 1V            PROCEDURE DATE:  07/14/2021            INTERPRETATION:  STUDY INDICATION: encephalopathy. Pleural effusion.    Comparison: 7/12/2021.    Technique/Positioning: Frontal view of the chest.    Findings:    Support devices: On telemetry leads.    Cardiac/mediastinum/hilum: Stable cardiomediastinal silhouette.    Lung parenchyma/Pleura: Bibasal pleural-parenchymal opacities, increased on the left. No pneumothorax.    Skeleton/soft tissues: Unchanged.    Impression:    Bibasal pleural-parenchymal opacities, increased on the left. No pneumothorax.    --- End of Report ---                                            --------------------------------------------------------------    PHYSICAL EXAM:  General:   HEENT: NCAT  LUNGS: CTAB, Good air entry bilat  HEART: RRR, +S1,S2, RRR  ABDOMEN: SNTTP, ND x 4 q's  EXT: Warm, well perfused x 4  NEURO: AxOx3, No FND's noted  SKIN: No new breakdown or rashes noted                                           --------------------------------------------------------------    ASSESSMENT & PLAN    81 Y M from [facility] with pmh of CHF (unknown EF), HTN, anxiety, Alzheimer's dementia, COPD on home O2, T2DM on insulin, hx right nephrectomy/ 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 Found to be in acute on chronic renal failure, with pneumonia and UTI.    # Septic Shock 2/2 RLL Pneumonia, Suspected Aspiration Pneumonia   #2/2 UTI  - Presented Hypotensive requiring Levophed, now no longer on levophed 7/14  - Chest Xray 7/12: RLL pneumonia 2/2 likely aspiration pneumonia   - UA: Turbid, large Leukocyte esterase   - Leukocytosis worsening 12.82 > 14.98  - Urine culture 7/12: NGTD  - Blood Culture NGTD  - nasal MRSA +  -S/p renally-dosed antibiotics in ER: (vancomycin, cefepime, azithromycin)  -ID consult: Started on Zyvox 600 q12, meropenum 750mg today, 500 QD starting tomorrow (Day 2 now)   -Mupirocin 2% nasal for MRSA + nares Day 1  - D/C'd Vanc, cefepime, azithromycin   -Procalcitonin: 0.37  -D-Dimer: 732 likely from septic shock, ASHLEE, renal failure. Low suspicion for PE at this time   -F/U urine strep and legionella  - f/u chest xray   - f/u pan culture     #CKDV now on HD, s/p Rt nephrectomy   #Metabolic acidosis  #Hyperkalemia  #Oliguria   -S/P udall 7/12  -S/p Dialysis sessions 7/12, 7/13, tolerating well, next session 7/14  - s/p Bicarb gtt, Dc'd 7/13   - Nephro recs: DC bicarb drip, f/u cultures, f/u lytes   -correct lytes as needed   - F/U VBG/ ABG   - Oliguric, 30cc of urine past 24hrs   - Due for dialysis tomorrow     #New ? HF?EF  -volume overload on admission  - bedside goal directed echocardiogram EF 40%,   - No baseline Echocardiogram  - F/U TTE > still pending   -strict i/o, fluid restriction, daily weight  -holding lasix for now    #Troponemia  - 0.57>1.69>2.11>2.19  -no chest pain  -Cardiology recs: - Likely NSTEMI TYPE II 2/2 demand ischemia from ARF/ Septic Shock  -F/U 2d ECHO   -started on aspirin 81 mg daily    #HTN  -holding hydralazine, patient hypotensive on admission  - Currently on levophed 0.5  - Ween levophed, goal MAP>60     #DM  -lantus 8qhs, lispro 5 qac  -avoid hypoglycemia    #Alzheimer's dementia  -continue remeron, aricept, sertraline    #Iron deficiency anemia  -at baseline  -continue ferrous sulfate  -ROMELIA per nephro    #COPD on home O2  -continue inhalers  -not in exacerbation    #DVT PPx- Heparin 5000u sq q12h  #GI PPx- Protonix 40mg po qam   #Diet- DASH/TLC/CC/Renal  #CHG  #Activity- Bedrest  #Dispo- Acute; SDU  #Code- FULL

## 2021-07-14 NOTE — CHART NOTE - NSCHARTNOTEFT_GEN_A_CORE
Was called to evaluate melena. On JAMAAL patient has dark green stool, ?most likely not melena.   Will follow up on CBC to evaluate any blood loss. Was called to evaluate melena. On JAMAAL patient has dark green stool, ?most likely not melena.   Will follow up on CBC to evaluate any blood loss.    F/U CBC Was called to evaluate melena. On JAMAAL patient has dark green stool, ?most likely not melena.   Will follow up on CBC to evaluate any blood loss.    Hold all ACs for now.    F/U CBC Was called to evaluate melena. On JAMAAL patient has dark green stool, ?most likely not melena.   Will follow up on CBC to evaluate any blood loss.    Heparin and ASA was stopped due to suspicion for GI bleed. JAMAAL + for dark green stool, most likely not melena. Hb is stable for now.    F/U CBC Was called to evaluate melena. On JAMAAL patient has dark green stool, ?most likely not melena.   Will follow up on CBC to evaluate any blood loss.    Heparin and ASA was stopped     F/U CBC

## 2021-07-14 NOTE — PROGRESS NOTE ADULT - SUBJECTIVE AND OBJECTIVE BOX
Patient is a 81y old  Male who presents with a chief complaint of vomiting, hypotension (2021 20:29)        Over Night Events:  On levo 0.05. No events overnight.  On 3 liters O2.      ROS:     All ROS are negative except HPI         PHYSICAL EXAM    ICU Vital Signs Last 24 Hrs  T(C): 36.8 (2021 07:30), Max: 36.8 (2021 07:30)  T(F): 98.2 (2021 07:30), Max: 98.2 (2021 07:30)  HR: 87 (2021 07:30) (81 - 107)  BP: 106/58 (2021 07:30) (92/50 - 114/42)  BP(mean): 79 (2021 07:30) (65 - 90)  ABP: --  ABP(mean): --  RR: 20 (2021 07:30) (18 - 20)  SpO2: 100% (2021 07:30) (98% - 100%)      CONSTITUTIONAL:  Well nourished.  NAD    ENT:   Airway patent,   Mouth with normal mucosa.   No thrush    EYES:   Pupils equal,   Round and reactive to light.    CARDIAC:   Normal rate,   Regular rhythm.    No edema      Vascular:  Normal systolic impulse  No Carotid bruits    RESPIRATORY:   No wheezing  Bilateral BS  Normal chest expansion  Not tachypneic,  No use of accessory muscles    GASTROINTESTINAL:  Abdomen soft,   Non-tender,   No guarding,   + BS    MUSCULOSKELETAL:   Range of motion is not limited,  No clubbing, cyanosis    NEUROLOGICAL:   Alert and oriented  No motor  deficits.    SKIN:   Skin normal color for race,   Warm and dry and intact.   No evidence of rash.    PSYCHIATRIC:   Normal mood and affect.   No apparent risk to self or others.    HEMATOLOGICAL:  No cervical  lymphadenopathy.  no inguinal lymphadenopathy      21 @ 07:01  -  21 @ 07:00  --------------------------------------------------------  IN:    IV PiggyBack: 300 mL    Norepinephrine: 68.2 mL  Total IN: 368.2 mL    OUT:    Other (mL): 0 mL  Total OUT: 0 mL    Total NET: 368.2 mL          LABS:                            7.4    10.41 )-----------( 168      ( 2021 06:01 )             22.8                                               07-14    137  |  98  |  92<HH>  ----------------------------<  110<H>  3.9   |  22  |  6.6<HH>    Ca    7.6<L>      2021 06:01  Phos  10.8     07-13  Mg     1.9     07-14    TPro  6.0  /  Alb  3.4<L>  /  TBili  0.3  /  DBili  x   /  AST  28  /  ALT  10  /  AlkPhos  60  07-14      PT/INR - ( 2021 01:13 )   PT: 13.60 sec;   INR: 1.18 ratio         PTT - ( 2021 01:13 )  PTT:28.5 sec                                       Urinalysis Basic - ( 2021 13:07 )    Color: Yellow / Appearance: Slightly Turbid / S.014 / pH: x  Gluc: x / Ketone: Negative  / Bili: Negative / Urobili: <2 mg/dL   Blood: x / Protein: 300 mg/dL / Nitrite: Negative   Leuk Esterase: Large / RBC: 14 /HPF / WBC 73 /HPF   Sq Epi: x / Non Sq Epi: 1 /HPF / Bacteria: Few        CARDIAC MARKERS ( 2021 01:13 )  x     / 2.19 ng/mL / x     / x     / x      CARDIAC MARKERS ( 2021 00:53 )  x     / 2.11 ng/mL / x     / x     / x      CARDIAC MARKERS ( 2021 21:02 )  x     / 1.69 ng/mL / x     / x     / x      CARDIAC MARKERS ( 2021 10:44 )  x     / 0.57 ng/mL / x     / x     / x                                                LIVER FUNCTIONS - ( 2021 02:24 )  Alb: 3.4 g/dL / Pro: 6.0 g/dL / ALK PHOS: 60 U/L / ALT: 10 U/L / AST: 28 U/L / GGT: x                                                  Culture - Urine (collected 2021 13:06)  Source: .Urine Clean Catch (Midstream)  Final Report (2021 17:36):    No growth    Culture - Blood (collected 2021 11:38)  Source: .Blood Blood-Peripheral  Preliminary Report (2021 19:01):    No growth to date.    Culture - Blood (collected 2021 11:38)  Source: .Blood Blood-Peripheral  Preliminary Report (2021 19:01):    No growth to date.                                                                                           MEDICATIONS  (STANDING):  ALBUTerol    90 MICROgram(s) HFA Inhaler 2 Puff(s) Inhalation every 6 hours  ascorbic acid 500 milliGRAM(s) Oral daily  aspirin  chewable 81 milliGRAM(s) Oral daily  atorvastatin 40 milliGRAM(s) Oral at bedtime  budesonide 160 MICROgram(s)/formoterol 4.5 MICROgram(s) Inhaler 2 Puff(s) Inhalation two times a day  calcium acetate 667 milliGRAM(s) Oral three times a day with meals  chlorhexidine 4% Liquid 1 Application(s) Topical <User Schedule>  dextrose 40% Gel 15 Gram(s) Oral once  dextrose 5%. 1000 milliLiter(s) (50 mL/Hr) IV Continuous <Continuous>  dextrose 5%. 1000 milliLiter(s) (100 mL/Hr) IV Continuous <Continuous>  dextrose 50% Injectable 25 Gram(s) IV Push once  dextrose 50% Injectable 12.5 Gram(s) IV Push once  dextrose 50% Injectable 25 Gram(s) IV Push once  donepezil 5 milliGRAM(s) Oral at bedtime  ferrous    sulfate 325 milliGRAM(s) Oral daily  glucagon  Injectable 1 milliGRAM(s) IntraMuscular once  heparin   Injectable 5000 Unit(s) SubCutaneous every 12 hours  insulin glargine Injectable (LANTUS) 8 Unit(s) SubCutaneous at bedtime  insulin lispro (ADMELOG) corrective regimen sliding scale   SubCutaneous three times a day before meals  insulin lispro Injectable (ADMELOG) 5 Unit(s) SubCutaneous three times a day before meals  lactulose Syrup 10 Gram(s) Oral daily  linezolid  IVPB 600 milliGRAM(s) IV Intermittent every 12 hours  meropenem  IVPB 500 milliGRAM(s) IV Intermittent every 24 hours  meropenem  IVPB      mirtazapine 15 milliGRAM(s) Oral at bedtime  norepinephrine Infusion 0.05 MICROgram(s)/kG/Min (6.17 mL/Hr) IV Continuous <Continuous>  pantoprazole    Tablet 40 milliGRAM(s) Oral before breakfast  polyethylene glycol 3350 17 Gram(s) Oral daily  sertraline 25 milliGRAM(s) Oral daily    MEDICATIONS  (PRN):  senna 8.6 milliGRAM(s) Oral Tablet - Peds 2 Tablet(s) Oral at bedtime PRN Constipation      New X-rays reviewed:                                                                                  ECHO    CXR interpreted by me:  Carlos parker

## 2021-07-14 NOTE — DIETITIAN INITIAL EVALUATION ADULT. - ORAL INTAKE PTA/DIET HISTORY
attempted to call emergency contact numbers in EMR -- unable to reach at this time. Pt himself alert/disoriented + [place] speaking -- will attempt to obtain nut hx @ f/u.

## 2021-07-14 NOTE — DIETITIAN INITIAL EVALUATION ADULT. - NAME AND PHONE
Nutrition Intervention:meals and snacks; Nutrition Monitoring:diet order,energy intake,body composition,NFPF, renal/electrolyte profile

## 2021-07-14 NOTE — DIETITIAN INITIAL EVALUATION ADULT. - PERTINENT MEDS FT
MEDICATIONS  (STANDING):  ALBUTerol    90 MICROgram(s) HFA Inhaler 2 Puff(s) Inhalation every 6 hours  ascorbic acid 500 milliGRAM(s) Oral daily  aspirin  chewable 81 milliGRAM(s) Oral daily  atorvastatin 40 milliGRAM(s) Oral at bedtime  budesonide 160 MICROgram(s)/formoterol 4.5 MICROgram(s) Inhaler 2 Puff(s) Inhalation two times a day  calcium acetate 667 milliGRAM(s) Oral three times a day with meals  dextrose 40% Gel 15 Gram(s) Oral once  dextrose 5%. 1000 milliLiter(s) (50 mL/Hr) IV Continuous <Continuous>  dextrose 5%. 1000 milliLiter(s) (100 mL/Hr) IV Continuous <Continuous>  dextrose 50% Injectable 25 Gram(s) IV Push once  donepezil 5 milliGRAM(s) Oral at bedtime  ferrous    sulfate 325 milliGRAM(s) Oral daily  glucagon  Injectable 1 milliGRAM(s) IntraMuscular once  heparin   Injectable 5000 Unit(s) SubCutaneous every 12 hours  insulin glargine Injectable (LANTUS) 8 Unit(s) SubCutaneous at bedtime  insulin lispro Injectable (ADMELOG) 5 Unit(s) SubCutaneous three times a day before meals  lactulose Syrup 10 Gram(s) Oral daily  linezolid  IVPB 600 milliGRAM(s) IV Intermittent every 12 hours  meropenem  IVPB 500 milliGRAM(s) IV Intermittent every 24 hours  meropenem  IVPB      mirtazapine 15 milliGRAM(s) Oral at bedtime  norepinephrine Infusion 0.05 MICROgram(s)/kG/Min (6.17 mL/Hr) IV Continuous <Continuous>  pantoprazole    Tablet 40 milliGRAM(s) Oral before breakfast  polyethylene glycol 3350 17 Gram(s) Oral daily  sertraline 25 milliGRAM(s) Oral daily  sodium bicarbonate 650 milliGRAM(s) Oral three times a day    MEDICATIONS  (PRN):  senna 8.6 milliGRAM(s) Oral Tablet - Peds 2 Tablet(s) Oral at bedtime PRN Constipation

## 2021-07-14 NOTE — DIETITIAN INITIAL EVALUATION ADULT. - OTHER INFO
pertinent medical information:   81 Y M from [facility] with pmh of CHF (unknown EF), HTN, anxiety, Alzheimer's dementia, COPD on home O2, T2DM on insulin, hx right nephrectomy/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 Found to be in acute on chronic renal failure, with pneumonia and UTI.  --Sepsis present on admission   Septic, Shock improving   --UTI/Pyelonephritis  #Acute renal failure, likely ATN in setting of sepsis, CKD V 3.3-4, No evidence of renal recovery--HD tomorrow  #New ? HF?EF -volume overload on admission  #Iron deficiency anemia-at baseline    pertinent subjective information: spoke w/ PCA/RN -- report that pt ate today and he is also able to feeds himself. Not exactly sure of the amount of food he ate on the tray, but believe that he did well. No chewing/swallowing difficulty reported by staff.

## 2021-07-15 NOTE — PROGRESS NOTE ADULT - SUBJECTIVE AND OBJECTIVE BOX
DRAKE GJONBALAJ 81y Male  MRN#: [id number]   CODE STATUS: FULL    Hospital Day: 3d    Pt is currently admitted with the primary diagnosis of PNA / SEPSIS    SUBJECTIVE  Hospital Course    Overnight events: Hb 6.9 and transfused 1U pRBC      Subjective complaints: Pt was seen and evaluated at bedside. Pt was very guarded, confused, and irritated. Pt asked intern to leave him alone. Pt also seems a little hard of hearing.     Present Today:   - Laura:  No [  ], Yes [  x ] : Indication: urinary retention    - Type of IV Access:       .. CVC/Piccline:  No [ x ], Yes [   ] : Indication:       .. Midline: No [ x ], Yes [   ] : Indication:                                             ----------------------------------------------------------  OBJECTIVE  PAST MEDICAL & SURGICAL HISTORY  Diabetes mellitus    COPD (chronic obstructive pulmonary disease)    Lymphedema of both lower extremities    CHF (congestive heart failure)    H/O right nephrectomy  20 yrs ago                                              -----------------------------------------------------------  ALLERGIES:  No Known Allergies                                            ------------------------------------------------------------    HOME MEDICATIONS  Home Medications:  albuterol 90 mcg/inh inhalation aerosol: 2 puff(s) inhaled every 4 hours (12 Jul 2021 17:02)  Aricept 5 mg oral tablet: 1 tab(s) orally once a day (at bedtime) (12 Jul 2021 17:02)  Breo Ellipta 200 mcg-25 mcg/inh inhalation powder: 1 puff(s) inhaled once a day (12 Jul 2021 17:02)  ferrous sulfate 325 mg (65 mg elemental iron) oral tablet: 1 tab(s) orally once a day (12 Jul 2021 17:02)  furosemide 20 mg oral tablet: 1 tab(s) orally once a day (12 Jul 2021 17:02)  hydrALAZINE 50 mg oral tablet: 1 tab(s) orally every 12 hours (12 Jul 2021 17:02)  Lantus 100 units/mL subcutaneous solution: 8 unit(s) subcutaneous once a day (at bedtime) (12 Jul 2021 17:02)  Melatonin 3 mg oral tablet: 1 tab(s) orally once a day (at bedtime) (12 Jul 2021 17:02)  nitroglycerin 0.3 mg sublingual tablet: 1 tab(s) sublingual every 5 minutes no more than 3 doses (12 Jul 2021 17:02)  NovoLOG FlexPen 100 units/mL injectable solution: 5 unit(s) injectable 3 times a day (before meals) (12 Jul 2021 17:02)  Remeron 15 mg oral tablet: 1 tab(s) orally once a day (at bedtime) (12 Jul 2021 17:02)  Retacrit 4000 units/mL preservative-free injectable solution: 4000 unit(s) injectable once a week. hold if Hgb &gt; 10 (12 Jul 2021 17:02)  Senna 8.6 mg oral tablet: 2 tab(s) orally once a day (at bedtime), As Needed (12 Jul 2021 17:02)  sertraline 25 mg oral tablet: 1 tab(s) orally once a day (12 Jul 2021 17:02)  Vitamin C 500 mg oral tablet: 1 tab(s) orally once a day (12 Jul 2021 17:02)                           MEDICATIONS:  STANDING MEDICATIONS  ALBUTerol    90 MICROgram(s) HFA Inhaler 2 Puff(s) Inhalation every 6 hours  ascorbic acid 500 milliGRAM(s) Oral daily  aspirin  chewable 81 milliGRAM(s) Oral daily  atorvastatin 40 milliGRAM(s) Oral at bedtime  budesonide 160 MICROgram(s)/formoterol 4.5 MICROgram(s) Inhaler 2 Puff(s) Inhalation two times a day  calcium acetate 667 milliGRAM(s) Oral three times a day with meals  chlorhexidine 4% Liquid 1 Application(s) Topical <User Schedule>  dextrose 40% Gel 15 Gram(s) Oral once  dextrose 5%. 1000 milliLiter(s) IV Continuous <Continuous>  dextrose 5%. 1000 milliLiter(s) IV Continuous <Continuous>  dextrose 50% Injectable 25 Gram(s) IV Push once  dextrose 50% Injectable 12.5 Gram(s) IV Push once  dextrose 50% Injectable 25 Gram(s) IV Push once  donepezil 5 milliGRAM(s) Oral at bedtime  epoetin graham-epbx (RETACRIT) Injectable 22926 Unit(s) IV Push <User Schedule>  glucagon  Injectable 1 milliGRAM(s) IntraMuscular once  insulin glargine Injectable (LANTUS) 8 Unit(s) SubCutaneous at bedtime  insulin lispro (ADMELOG) corrective regimen sliding scale   SubCutaneous three times a day before meals  insulin lispro Injectable (ADMELOG) 5 Unit(s) SubCutaneous three times a day before meals  iron sucrose IVPB 100 milliGRAM(s) IV Intermittent <User Schedule>  lactulose Syrup 10 Gram(s) Oral daily  levoFLOXacin IVPB 250 milliGRAM(s) IV Intermittent every 48 hours  mirtazapine 15 milliGRAM(s) Oral at bedtime  mupirocin 2% Nasal 1 Application(s) Nasal two times a day  pantoprazole  Injectable 40 milliGRAM(s) IV Push every 12 hours  polyethylene glycol 3350 17 Gram(s) Oral daily  sertraline 25 milliGRAM(s) Oral daily  tamsulosin 0.4 milliGRAM(s) Oral at bedtime    PRN MEDICATIONS  senna 8.6 milliGRAM(s) Oral Tablet - Peds 2 Tablet(s) Oral at bedtime PRN                                            ------------------------------------------------------------  VITAL SIGNS: Last 24 Hours  T(C): 37.2 (15 Jul 2021 20:14), Max: 37.2 (15 Jul 2021 20:14)  T(F): 98.9 (15 Jul 2021 20:14), Max: 98.9 (15 Jul 2021 20:14)  HR: 79 (15 Jul 2021 20:14) (79 - 94)  BP: 113/56 (15 Jul 2021 20:14) (103/56 - 116/84)  BP(mean): 100 (14 Jul 2021 23:28) (100 - 100)  RR: 18 (15 Jul 2021 19:57) (18 - 18)  SpO2: 97% (15 Jul 2021 19:57) (96% - 100%)      07-14-21 @ 07:01  -  07-15-21 @ 07:00  --------------------------------------------------------  IN: 120 mL / OUT: 80 mL / NET: 40 mL    07-15-21 @ 07:01  -  07-15-21 @ 22:50  --------------------------------------------------------  IN: 0 mL / OUT: 0 mL / NET: 0 mL                                             --------------------------------------------------------------  LABS:                        7.6    10.48 )-----------( 154      ( 15 Jul 2021 10:12 )             23.5     07-15    139  |  98  |  104<HH>  ----------------------------<  86  4.0   |  21  |  8.0<HH>    Ca    7.5<L>      15 Jul 2021 04:30  Phos  8.2     07-15  Mg     1.9     07-15    TPro  5.9<L>  /  Alb  3.1<L>  /  TBili  0.3  /  DBili  x   /  AST  17  /  ALT  9   /  AlkPhos  59  07-15      #Nephro consult  Plan:    HD today: 3 hours, opti 160 dialyzer, 2K bath, 0L UF  No evidence of renal recovery  Surgery consult to place tunneled HD catheter  DC PO bicarb  DC FeSO4  Add EPO and Venofer with HD  PhoLo with meals  Renal diet                                                        -------------------------------------------------------------  RADIOLOGY:  Xray Chest 1 View- PORTABLE-Urgent (07.12.21 @ 11:44) >  Impression:    Right lower lobe opacity.         Xray Chest 1 View- PORTABLE-Routine (07.15.21 @ 06:19) >  Impression:    Increased bibasilar opacities, right greater than left.                                              --------------------------------------------------------------    PHYSICAL EXAM:  General: pt refused to be interview and examined   HEENT: pt refused to be interview and examined   LUNGS: pt refused to be interview and examined   HEART: pt refused to be interview and examined   ABDOMEN: pt refused to be interview and examined   EXT: pt refused to be interview and examined   NEURO: pt refused to be interview and examined   SKIN: pt refused to be interview and examined                                            --------------------------------------------------------------    ASSESSMENT & PLAN    81 Y M from [facility] with pmh of CHF (unknown EF), HTN, anxiety, Alzheimer's dementia, COPD on home O2, T2DM on insulin, hx right nephrectomy/ 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 Found to be in acute on chronic renal failure, with pneumonia and UTI.    #Septic Shock 2/2 RLL Pneumonia, Suspected Aspiration Pneumonia and UTI  - Chest Xray 7/12: RLL pneumonia 2/2 likely aspiration pneumonia   - UA: Turbid, large Leukocyte esterase   - Urine culture grew Legionella  - d/c Meropenem and Linezolid  - start levofloxacin 250 Q48H alternating w/ Azithromycin 500 Q48H  - c/w 2L NC     #CKDV now on HD, s/p Rt nephrectomy   #Metabolic acidosis  #Hyperkalemia  -S/P udall 7/12  -S/p Dialysis sessions 7/12, 7/13, and today   -will need long term HD  - vascular surg consult   -f/u TTE   - f/u outpatient Dialysis at [facility] on [facility] post d/c    #Acute on chronic anemia  - reported to have dark stools, on ferrous sulfate, hgb dropped slightly   - Hb 6.6 overnight  - s/p one unit of PRBC   - IV PPI   - Clears for now, trend CBC, will advance once stable     #Acute urinary retention - TOV in am, FLOMAX     #Troponemia  -no chest pain  -Cardiology recs: - Likely NSTEMI TYPE II 2/2 demand ischemia from ARF/ Septic Shock  -F/U 2d ECHO   -c/w aspirin 81 mg daily    #HTN  - monitor     #DM  -insulin PRN     #Alzheimer's dementia  -continue remeron, aricept, sertraline    #Iron deficiency anemia  -continue ferrous sulfate  -ROMELIA per nephro    #COPD on home O2  -continue inhalers    #DVT PPx -SCDs   #PPI PPx:   # Diet: NPO > Thin liquids > AAT  # Activity: bed bound  #Dispo: SNF                                                                                                              ----------------------------------------------------  # DVT prophylaxis     # GI prophylaxis     # Diet     # Activity Score (AM-PAC)    # Code status     # Disposition                                                                              --------------------------------------------------------    # Handoff

## 2021-07-15 NOTE — CHART NOTE - NSCHARTNOTEFT_GEN_A_CORE
Pt had dropping Hb from 7.4 to 6.9 over 3 hours. JAMAAL was negative for blood and unknown reason for drop in Hb. Attempted to contact family to get consent for blood transfusion but no one answered several calls so transfused 1 PRBC as emergency transfusion. Cancelled Hep.

## 2021-07-15 NOTE — PROGRESS NOTE ADULT - SUBJECTIVE AND OBJECTIVE BOX
[facility] NEPHROLOGY FOLLOW UP NOTE  --------------------------------------------------------------------------------  24 hour events/subjective: Patient examined. Appears comfortable.    PAST HISTORY  --------------------------------------------------------------------------------  No significant changes to PMH, PSH, FHx, SHx, unless otherwise noted    ALLERGIES & MEDICATIONS  --------------------------------------------------------------------------------  Allergies    No Known Allergies    Standing Inpatient Medications  ALBUTerol    90 MICROgram(s) HFA Inhaler 2 Puff(s) Inhalation every 6 hours  ascorbic acid 500 milliGRAM(s) Oral daily  atorvastatin 40 milliGRAM(s) Oral at bedtime  budesonide 160 MICROgram(s)/formoterol 4.5 MICROgram(s) Inhaler 2 Puff(s) Inhalation two times a day  calcium acetate 667 milliGRAM(s) Oral three times a day with meals  chlorhexidine 4% Liquid 1 Application(s) Topical <User Schedule>  dextrose 40% Gel 15 Gram(s) Oral once  dextrose 5%. 1000 milliLiter(s) IV Continuous <Continuous>  dextrose 5%. 1000 milliLiter(s) IV Continuous <Continuous>  dextrose 50% Injectable 25 Gram(s) IV Push once  dextrose 50% Injectable 12.5 Gram(s) IV Push once  dextrose 50% Injectable 25 Gram(s) IV Push once  donepezil 5 milliGRAM(s) Oral at bedtime  ferrous    sulfate 325 milliGRAM(s) Oral daily  glucagon  Injectable 1 milliGRAM(s) IntraMuscular once  insulin glargine Injectable (LANTUS) 8 Unit(s) SubCutaneous at bedtime  insulin lispro (ADMELOG) corrective regimen sliding scale   SubCutaneous three times a day before meals  insulin lispro Injectable (ADMELOG) 5 Unit(s) SubCutaneous three times a day before meals  lactulose Syrup 10 Gram(s) Oral daily  mirtazapine 15 milliGRAM(s) Oral at bedtime  mupirocin 2% Nasal 1 Application(s) Nasal two times a day  pantoprazole  Injectable 40 milliGRAM(s) IV Push every 12 hours  polyethylene glycol 3350 17 Gram(s) Oral daily  sertraline 25 milliGRAM(s) Oral daily  sodium bicarbonate 650 milliGRAM(s) Oral three times a day  tamsulosin 0.4 milliGRAM(s) Oral at bedtime    PRN Inpatient Medications  senna 8.6 milliGRAM(s) Oral Tablet - Peds 2 Tablet(s) Oral at bedtime PRN    VITALS/PHYSICAL EXAM  --------------------------------------------------------------------------------  T(C): 36.5 (07-15-21 @ 04:36), Max: 36.7 (07-15-21 @ 01:50)  HR: 94 (07-15-21 @ 04:36) (80 - 94)  BP: 114/56 (07-15-21 @ 04:36) (90/55 - 116/84)  RR: 18 (07-14-21 @ 23:28) (18 - 20)  SpO2: 96% (07-15-21 @ 04:36) (96% - 100%)    07-14-21 @ 07:01  -  07-15-21 @ 07:00  --------------------------------------------------------  IN: 120 mL / OUT: 80 mL / NET: 40 mL    Physical Exam:  	Gen: NAD  	Pulm: CTA B/L  	CV: RRR, S1S2  	Abd: +BS, soft, nontender/nondistended  	: No suprapubic tenderness  	LE: Warm, no edema  	Vascular access: fem temp HD cath     LABS/STUDIES  --------------------------------------------------------------------------------              7.6    10.48 >-----------<  154      [07-15-21 @ 10:12]              23.5     139  |  98  |  104  ----------------------------<  86      [07-15-21 @ 04:30]  4.0   |  21  |  8.0        Ca     7.5     [07-15-21 @ 04:30]      Mg     1.9     [07-15-21 @ 04:30]      Phos  8.2     [07-15-21 @ 04:30]    TPro  5.9  /  Alb  3.1  /  TBili  0.3  /  DBili  x   /  AST  17  /  ALT  9   /  AlkPhos  59  [07-15-21 @ 04:30]    Creatinine Trend:  SCr 8.0 [07-15 @ 04:30]  SCr 6.6 [07-14 @ 06:01]  SCr 6.3 [07-14 @ 02:24]  SCr 8.2 [07-13 @ 10:19]  SCr 7.8 [07-13 @ 01:13]    Urinalysis - [07-12-21 @ 13:07]      Color Yellow / Appearance Slightly Turbid / SG 1.014 / pH 6.0      Gluc Negative / Ketone Negative  / Bili Negative / Urobili <2 mg/dL       Blood Small / Protein 300 mg/dL / Leuk Est Large / Nitrite Negative      RBC 14 / WBC 73 / Hyaline 3 / Gran  / Sq Epi  / Non Sq Epi 1 / Bacteria Few    Iron 34, TIBC 178, %sat 19      [01-04-21 @ 06:40]  Ferritin 332      [01-04-21 @ 06:40]  PTH -- (Ca 8.9)      [12-24-20 @ 04:30]   54    HBsAb Nonreact      [07-12-21 @ 18:05]  HBsAg Nonreact      [07-12-21 @ 18:05]  HCV 0.15, Nonreact      [07-12-21 @ 18:05]

## 2021-07-15 NOTE — PROGRESS NOTE ADULT - SUBJECTIVE AND OBJECTIVE BOX
DRAKE GJONBALAJ 81y Male  MRN#: [id number]   CODE STATUS: FULL    Hospital Day: 3d    Pt is currently admitted with the primary diagnosis of Pneum    SUBJECTIVE  Hospital Course    Overnight events     Subjective complaints     Present Today:   - Laura:  No [  ], Yes [   ] : Indication:     - Type of IV Access:       .. CVC/Piccline:  No [  ], Yes [   ] : Indication:       .. Midline: No [  ], Yes [   ] : Indication:                                             ----------------------------------------------------------  OBJECTIVE  PAST MEDICAL & SURGICAL HISTORY  Diabetes mellitus    COPD (chronic obstructive pulmonary disease)    Lymphedema of both lower extremities    CHF (congestive heart failure)    H/O right nephrectomy  20 yrs ago                                              -----------------------------------------------------------  ALLERGIES:  No Known Allergies                                            ------------------------------------------------------------    HOME MEDICATIONS  Home Medications:  albuterol 90 mcg/inh inhalation aerosol: 2 puff(s) inhaled every 4 hours (12 Jul 2021 17:02)  Aricept 5 mg oral tablet: 1 tab(s) orally once a day (at bedtime) (12 Jul 2021 17:02)  Breo Ellipta 200 mcg-25 mcg/inh inhalation powder: 1 puff(s) inhaled once a day (12 Jul 2021 17:02)  ferrous sulfate 325 mg (65 mg elemental iron) oral tablet: 1 tab(s) orally once a day (12 Jul 2021 17:02)  furosemide 20 mg oral tablet: 1 tab(s) orally once a day (12 Jul 2021 17:02)  hydrALAZINE 50 mg oral tablet: 1 tab(s) orally every 12 hours (12 Jul 2021 17:02)  Lantus 100 units/mL subcutaneous solution: 8 unit(s) subcutaneous once a day (at bedtime) (12 Jul 2021 17:02)  Melatonin 3 mg oral tablet: 1 tab(s) orally once a day (at bedtime) (12 Jul 2021 17:02)  nitroglycerin 0.3 mg sublingual tablet: 1 tab(s) sublingual every 5 minutes no more than 3 doses (12 Jul 2021 17:02)  NovoLOG FlexPen 100 units/mL injectable solution: 5 unit(s) injectable 3 times a day (before meals) (12 Jul 2021 17:02)  Remeron 15 mg oral tablet: 1 tab(s) orally once a day (at bedtime) (12 Jul 2021 17:02)  Retacrit 4000 units/mL preservative-free injectable solution: 4000 unit(s) injectable once a week. hold if Hgb &gt; 10 (12 Jul 2021 17:02)  Senna 8.6 mg oral tablet: 2 tab(s) orally once a day (at bedtime), As Needed (12 Jul 2021 17:02)  sertraline 25 mg oral tablet: 1 tab(s) orally once a day (12 Jul 2021 17:02)  Vitamin C 500 mg oral tablet: 1 tab(s) orally once a day (12 Jul 2021 17:02)                           MEDICATIONS:  STANDING MEDICATIONS  ALBUTerol    90 MICROgram(s) HFA Inhaler 2 Puff(s) Inhalation every 6 hours  ascorbic acid 500 milliGRAM(s) Oral daily  atorvastatin 40 milliGRAM(s) Oral at bedtime  budesonide 160 MICROgram(s)/formoterol 4.5 MICROgram(s) Inhaler 2 Puff(s) Inhalation two times a day  calcium acetate 667 milliGRAM(s) Oral three times a day with meals  chlorhexidine 4% Liquid 1 Application(s) Topical <User Schedule>  dextrose 40% Gel 15 Gram(s) Oral once  dextrose 5%. 1000 milliLiter(s) IV Continuous <Continuous>  dextrose 5%. 1000 milliLiter(s) IV Continuous <Continuous>  dextrose 50% Injectable 25 Gram(s) IV Push once  dextrose 50% Injectable 12.5 Gram(s) IV Push once  dextrose 50% Injectable 25 Gram(s) IV Push once  donepezil 5 milliGRAM(s) Oral at bedtime  epoetin graham-epbx (RETACRIT) Injectable 25619 Unit(s) IV Push <User Schedule>  glucagon  Injectable 1 milliGRAM(s) IntraMuscular once  insulin glargine Injectable (LANTUS) 8 Unit(s) SubCutaneous at bedtime  insulin lispro (ADMELOG) corrective regimen sliding scale   SubCutaneous three times a day before meals  insulin lispro Injectable (ADMELOG) 5 Unit(s) SubCutaneous three times a day before meals  iron sucrose IVPB 100 milliGRAM(s) IV Intermittent <User Schedule>  lactulose Syrup 10 Gram(s) Oral daily  levoFLOXacin IVPB 250 milliGRAM(s) IV Intermittent every 48 hours  mirtazapine 15 milliGRAM(s) Oral at bedtime  mupirocin 2% Nasal 1 Application(s) Nasal two times a day  pantoprazole  Injectable 40 milliGRAM(s) IV Push every 12 hours  polyethylene glycol 3350 17 Gram(s) Oral daily  sertraline 25 milliGRAM(s) Oral daily  tamsulosin 0.4 milliGRAM(s) Oral at bedtime    PRN MEDICATIONS  senna 8.6 milliGRAM(s) Oral Tablet - Peds 2 Tablet(s) Oral at bedtime PRN                                            ------------------------------------------------------------  VITAL SIGNS: Last 24 Hours  T(C): 36.5 (15 Jul 2021 04:36), Max: 36.7 (15 Jul 2021 01:50)  T(F): 97.7 (15 Jul 2021 04:36), Max: 98 (15 Jul 2021 01:50)  HR: 90 (15 Jul 2021 12:30) (84 - 94)  BP: 103/56 (15 Jul 2021 12:30) (92/56 - 116/84)  BP(mean): 100 (14 Jul 2021 23:28) (68 - 100)  RR: 18 (14 Jul 2021 23:28) (18 - 18)  SpO2: 96% (15 Jul 2021 04:36) (96% - 100%)      07-14-21 @ 07:01  -  07-15-21 @ 07:00  --------------------------------------------------------  IN: 120 mL / OUT: 80 mL / NET: 40 mL                                             --------------------------------------------------------------  LABS:                        7.6    10.48 )-----------( 154      ( 15 Jul 2021 10:12 )             23.5     07-15    139  |  98  |  104<HH>  ----------------------------<  86  4.0   |  21  |  8.0<HH>    Ca    7.5<L>      15 Jul 2021 04:30  Phos  8.2     07-15  Mg     1.9     07-15    TPro  5.9<L>  /  Alb  3.1<L>  /  TBili  0.3  /  DBili  x   /  AST  17  /  ALT  9   /  AlkPhos  59  07-15                                                              -------------------------------------------------------------  RADIOLOGY:                                            --------------------------------------------------------------    PHYSICAL EXAM:  General:   HEENT:  LUNGS:  HEART:  ABDOMEN:  EXT:  NEURO:  SKIN:                                           --------------------------------------------------------------    ASSESSMENT & PLAN    Past medical history and hospital course                                                                                                           ----------------------------------------------------  # DVT prophylaxis     # GI prophylaxis     # Diet     # Activity Score (AM-PAC)    # Code status     # Disposition                                                                              --------------------------------------------------------    # Handoff

## 2021-07-15 NOTE — PROGRESS NOTE ADULT - SUBJECTIVE AND OBJECTIVE BOX
Pt seen and examined at bedside. No complaints.     VITAL SIGNS (Last 24 hrs):  T(C): 36.5 (07-15-21 @ 04:36), Max: 36.7 (07-15-21 @ 01:50)  HR: 92 (07-15-21 @ 15:30) (84 - 94)  BP: 104/56 (07-15-21 @ 15:30) (92/56 - 116/84)  RR: 18 (07-14-21 @ 23:28) (18 - 18)  SpO2: 96% (07-15-21 @ 04:36) (96% - 100%)  Wt(kg): --  Daily     Daily     I&O's Summary    14 Jul 2021 07:01  -  15 Jul 2021 07:00  --------------------------------------------------------  IN: 120 mL / OUT: 80 mL / NET: 40 mL    15 Jul 2021 07:01  -  15 Jul 2021 19:30  --------------------------------------------------------  IN: 0 mL / OUT: 0 mL / NET: 0 mL        PHYSICAL EXAM:  GENERAL: NAD   HEAD:  Atraumatic, Normocephalic  EYES: EOMI, PERRLA, conjunctiva and sclera clear  NECK: Supple, No JVD  CHEST/LUNG: Clear to auscultation bilaterally; No wheeze  HEART: Regular rate and rhythm; No murmurs, rubs, or gallops  ABDOMEN: Soft, Nontender, Nondistended; Bowel sounds present  EXTREMITIES:  2+ Peripheral Pulses, No clubbing, cyanosis, or edema  NEUROLOGY: non-focal  SKIN: No rashes or lesions    Labs Reviewed  Spoke to patient in regards to abnormal labs.    CBC Full  -  ( 15 Jul 2021 10:12 )  WBC Count : 10.48 K/uL  Hemoglobin : 7.6 g/dL  Hematocrit : 23.5 %  Platelet Count - Automated : 154 K/uL  Mean Cell Volume : 85.8 fL  Mean Cell Hemoglobin : 27.7 pg  Mean Cell Hemoglobin Concentration : 32.3 g/dL  Auto Neutrophil # : 6.32 K/uL  Auto Lymphocyte # : 1.15 K/uL  Auto Monocyte # : 1.47 K/uL  Auto Eosinophil # : 1.41 K/uL  Auto Basophil # : 0.05 K/uL  Auto Neutrophil % : 60.2 %  Auto Lymphocyte % : 11.0 %  Auto Monocyte % : 14.0 %  Auto Eosinophil % : 13.5 %  Auto Basophil % : 0.5 %    BMP:    07-15 @ 04:30    Blood Urea Nitrogen - 104  Calcium - 7.5  Carbond Dioxide - 21  Chloride - 98  Creatinine - 8.0  Glucose - 86  Potassium - 4.0  Sodium - 139      Hemoglobin A1c -   PT/INR - ( 13 Jul 2021 01:13 )   PT: 13.60 sec;   INR: 1.18 ratio         PTT - ( 13 Jul 2021 01:13 )  PTT:28.5 sec  Urine Culture:  07-12 @ 13:06 Urine culture: --    Culture Results:   No growth  Method Type: --  Organism: --  Organism Identification: --  Specimen Source: .Urine Clean Catch (Midstream)  07-12 @ 11:38 Urine culture: --    Culture Results:   No growth to date.  Method Type: --  Organism: --  Organism Identification: --  Specimen Source: .Blood Blood-Peripheral           MEDICATIONS  (STANDING):  ALBUTerol    90 MICROgram(s) HFA Inhaler 2 Puff(s) Inhalation every 6 hours  ascorbic acid 500 milliGRAM(s) Oral daily  aspirin  chewable 81 milliGRAM(s) Oral daily  atorvastatin 40 milliGRAM(s) Oral at bedtime  budesonide 160 MICROgram(s)/formoterol 4.5 MICROgram(s) Inhaler 2 Puff(s) Inhalation two times a day  calcium acetate 667 milliGRAM(s) Oral three times a day with meals  chlorhexidine 4% Liquid 1 Application(s) Topical <User Schedule>  dextrose 40% Gel 15 Gram(s) Oral once  dextrose 5%. 1000 milliLiter(s) (50 mL/Hr) IV Continuous <Continuous>  dextrose 5%. 1000 milliLiter(s) (100 mL/Hr) IV Continuous <Continuous>  dextrose 50% Injectable 25 Gram(s) IV Push once  dextrose 50% Injectable 12.5 Gram(s) IV Push once  dextrose 50% Injectable 25 Gram(s) IV Push once  donepezil 5 milliGRAM(s) Oral at bedtime  epoetin graham-epbx (RETACRIT) Injectable 70039 Unit(s) IV Push <User Schedule>  glucagon  Injectable 1 milliGRAM(s) IntraMuscular once  heparin   Injectable 5000 Unit(s) SubCutaneous every 12 hours  insulin glargine Injectable (LANTUS) 8 Unit(s) SubCutaneous at bedtime  insulin lispro (ADMELOG) corrective regimen sliding scale   SubCutaneous three times a day before meals  insulin lispro Injectable (ADMELOG) 5 Unit(s) SubCutaneous three times a day before meals  iron sucrose IVPB 100 milliGRAM(s) IV Intermittent <User Schedule>  lactulose Syrup 10 Gram(s) Oral daily  levoFLOXacin IVPB 250 milliGRAM(s) IV Intermittent every 48 hours  mirtazapine 15 milliGRAM(s) Oral at bedtime  mupirocin 2% Nasal 1 Application(s) Nasal two times a day  pantoprazole  Injectable 40 milliGRAM(s) IV Push every 12 hours  polyethylene glycol 3350 17 Gram(s) Oral daily  sertraline 25 milliGRAM(s) Oral daily  tamsulosin 0.4 milliGRAM(s) Oral at bedtime    MEDICATIONS  (PRN):  senna 8.6 milliGRAM(s) Oral Tablet - Peds 2 Tablet(s) Oral at bedtime PRN Constipation

## 2021-07-15 NOTE — PROGRESS NOTE ADULT - SUBJECTIVE AND OBJECTIVE BOX
VIC, DRAKE  81y, Male    All available historical data reviewed    OVERNIGHT EVENTS:  no fevers    ROS:  General: Denies rigors, nightsweats  HEENT: Denies headache, rhinorrhea, sore throat, eye pain  CV: Denies CP, palpitations  PULM: Denies wheezing, hemoptysis  GI: Denies hematemesis, hematochezia, melena  : Denies discharge, hematuria  MSK: Denies arthralgias, myalgias  SKIN: Denies rash, lesions  NEURO: Denies paresthesias, weakness  PSYCH: Denies depression, anxiety    VITALS:  T(F): 97.7, Max: 98 (07-15-21 @ 01:50)  HR: 94  BP: 114/56  RR: 18Vital Signs Last 24 Hrs  T(C): 36.5 (15 Jul 2021 04:36), Max: 36.7 (15 Jul 2021 01:50)  T(F): 97.7 (15 Jul 2021 04:36), Max: 98 (15 Jul 2021 01:50)  HR: 94 (15 Jul 2021 04:36) (80 - 94)  BP: 114/56 (15 Jul 2021 04:36) (90/55 - 116/84)  BP(mean): 100 (14 Jul 2021 23:28) (68 - 100)  RR: 18 (14 Jul 2021 23:28) (18 - 20)  SpO2: 96% (15 Jul 2021 04:36) (96% - 100%)    TESTS & MEASUREMENTS:                        6.9    11.71 )-----------( 160      ( 15 Jul 2021 00:15 )             22.0     07-15    139  |  98  |  104<HH>  ----------------------------<  86  4.0   |  21  |  8.0<HH>    Ca    7.5<L>      15 Jul 2021 04:30  Phos  8.2     07-15  Mg     1.9     07-15    TPro  5.9<L>  /  Alb  3.1<L>  /  TBili  0.3  /  DBili  x   /  AST  17  /  ALT  9   /  AlkPhos  59  07-15    LIVER FUNCTIONS - ( 15 Jul 2021 04:30 )  Alb: 3.1 g/dL / Pro: 5.9 g/dL / ALK PHOS: 59 U/L / ALT: 9 U/L / AST: 17 U/L / GGT: x             Culture - Urine (collected 07-12-21 @ 13:06)  Source: .Urine Clean Catch (Midstream)  Final Report (07-13-21 @ 17:36):    No growth    Culture - Blood (collected 07-12-21 @ 11:38)  Source: .Blood Blood-Peripheral  Preliminary Report (07-13-21 @ 19:01):    No growth to date.    Culture - Blood (collected 07-12-21 @ 11:38)  Source: .Blood Blood-Peripheral  Preliminary Report (07-13-21 @ 19:01):    No growth to date.            RADIOLOGY & ADDITIONAL TESTS:  Personal review of radiological diagnostics performed  Echo and EKG results noted when applicable.     MEDICATIONS:  ALBUTerol    90 MICROgram(s) HFA Inhaler 2 Puff(s) Inhalation every 6 hours  ascorbic acid 500 milliGRAM(s) Oral daily  atorvastatin 40 milliGRAM(s) Oral at bedtime  budesonide 160 MICROgram(s)/formoterol 4.5 MICROgram(s) Inhaler 2 Puff(s) Inhalation two times a day  calcium acetate 667 milliGRAM(s) Oral three times a day with meals  chlorhexidine 4% Liquid 1 Application(s) Topical <User Schedule>  dextrose 40% Gel 15 Gram(s) Oral once  dextrose 5%. 1000 milliLiter(s) IV Continuous <Continuous>  dextrose 5%. 1000 milliLiter(s) IV Continuous <Continuous>  dextrose 50% Injectable 25 Gram(s) IV Push once  dextrose 50% Injectable 12.5 Gram(s) IV Push once  dextrose 50% Injectable 25 Gram(s) IV Push once  donepezil 5 milliGRAM(s) Oral at bedtime  ferrous    sulfate 325 milliGRAM(s) Oral daily  glucagon  Injectable 1 milliGRAM(s) IntraMuscular once  insulin glargine Injectable (LANTUS) 8 Unit(s) SubCutaneous at bedtime  insulin lispro (ADMELOG) corrective regimen sliding scale   SubCutaneous three times a day before meals  insulin lispro Injectable (ADMELOG) 5 Unit(s) SubCutaneous three times a day before meals  lactulose Syrup 10 Gram(s) Oral daily  linezolid  IVPB 600 milliGRAM(s) IV Intermittent every 12 hours  meropenem  IVPB 500 milliGRAM(s) IV Intermittent every 24 hours  meropenem  IVPB      mirtazapine 15 milliGRAM(s) Oral at bedtime  mupirocin 2% Nasal 1 Application(s) Nasal two times a day  pantoprazole  Injectable 40 milliGRAM(s) IV Push every 12 hours  polyethylene glycol 3350 17 Gram(s) Oral daily  senna 8.6 milliGRAM(s) Oral Tablet - Peds 2 Tablet(s) Oral at bedtime PRN  sertraline 25 milliGRAM(s) Oral daily  sodium bicarbonate 650 milliGRAM(s) Oral three times a day  tamsulosin 0.4 milliGRAM(s) Oral at bedtime      ANTIBIOTICS:  linezolid  IVPB 600 milliGRAM(s) IV Intermittent every 12 hours  meropenem  IVPB 500 milliGRAM(s) IV Intermittent every 24 hours  meropenem  IVPB

## 2021-07-15 NOTE — PHYSICAL THERAPY INITIAL EVALUATION ADULT - SPECIFY REASON(S)
Pt currently refusing to participate in PT, stating he doesn't want to. Pt educated on the benefits of PT and encouraged to get oob but continues to refuse. YSABEL Moreira made aware.

## 2021-07-16 NOTE — PHYSICAL THERAPY INITIAL EVALUATION ADULT - PERTINENT HX OF CURRENT PROBLEM, REHAB EVAL
Pt adm from [facility] for vomiting and hypotension. Pt required MAX encouragement to participate, pt education completed, then pt manually assisted out of bed.

## 2021-07-16 NOTE — PHYSICAL THERAPY INITIAL EVALUATION ADULT - GENERAL OBSERVATIONS, REHAB EVAL
Pt seen [id number] for a total of 35 minutes. Pt encountered supine in bed, +maldonado no apparent distress, agreeable to PT. Pt left sitting in bedside chair +chair alarm, no apparent distress, call bell/tv/phone in reach, tray table in front, all needs met. +RN Deanne aware. While PT was writing note, pt screaming he was dizzy and requested bed. PT asssited pt back to bed, BP=99/51, HR=92. RN staff aware.

## 2021-07-16 NOTE — PHYSICAL THERAPY INITIAL EVALUATION ADULT - IMPAIRMENTS FOUND, PT EVAL
aerobic capacity/endurance/ergonomics and body mechanics/gait, locomotion, and balance/gross motor/integumentary integrity/joint integrity and mobility/muscle strength/poor safety awareness

## 2021-07-16 NOTE — PROGRESS NOTE ADULT - SUBJECTIVE AND OBJECTIVE BOX
VIC, DRAKE  81y, Male    All available historical data reviewed    OVERNIGHT EVENTS:  no fevers  feels well and has no complaints     ROS:  General: Denies rigors, nightsweats  HEENT: Denies headache, rhinorrhea, sore throat, eye pain  CV: Denies CP, palpitations  PULM: Denies wheezing, hemoptysis  GI: Denies hematemesis, hematochezia, melena  : Denies discharge, hematuria  MSK: Denies arthralgias, myalgias  SKIN: Denies rash, lesions  NEURO: Denies paresthesias, weakness  PSYCH: Denies depression, anxiety    VITALS:  T(F): 98.4, Max: 98.9 (07-15-21 @ 20:14)  HR: 85  BP: 113/57  RR: 18Vital Signs Last 24 Hrs  T(C): 36.9 (16 Jul 2021 12:35), Max: 37.2 (15 Jul 2021 20:14)  T(F): 98.4 (16 Jul 2021 12:35), Max: 98.9 (15 Jul 2021 20:14)  HR: 85 (16 Jul 2021 12:35) (79 - 92)  BP: 113/57 (16 Jul 2021 12:35) (102/53 - 113/57)  BP(mean): --  RR: 18 (16 Jul 2021 12:35) (18 - 18)  SpO2: 97% (15 Jul 2021 19:57) (97% - 97%)    TESTS & MEASUREMENTS:                        7.9    10.50 )-----------( 160      ( 16 Jul 2021 06:21 )             24.6     07-16    139  |  98  |  71<HH>  ----------------------------<  43<LL>  3.7   |  25  |  6.0<HH>    Ca    7.9<L>      16 Jul 2021 06:21  Phos  8.2     07-15  Mg     1.9     07-15    TPro  5.7<L>  /  Alb  3.3<L>  /  TBili  0.3  /  DBili  x   /  AST  14  /  ALT  7   /  AlkPhos  54  07-16    LIVER FUNCTIONS - ( 16 Jul 2021 06:21 )  Alb: 3.3 g/dL / Pro: 5.7 g/dL / ALK PHOS: 54 U/L / ALT: 7 U/L / AST: 14 U/L / GGT: x             Culture - Urine (collected 07-12-21 @ 13:06)  Source: .Urine Clean Catch (Midstream)  Final Report (07-13-21 @ 17:36):    No growth    Culture - Blood (collected 07-12-21 @ 11:38)  Source: .Blood Blood-Peripheral  Preliminary Report (07-13-21 @ 19:01):    No growth to date.    Culture - Blood (collected 07-12-21 @ 11:38)  Source: .Blood Blood-Peripheral  Preliminary Report (07-13-21 @ 19:01):    No growth to date.            RADIOLOGY & ADDITIONAL TESTS:  Personal review of radiological diagnostics performed  Echo and EKG results noted when applicable.     MEDICATIONS:  ALBUTerol    90 MICROgram(s) HFA Inhaler 2 Puff(s) Inhalation every 6 hours  ascorbic acid 500 milliGRAM(s) Oral daily  aspirin  chewable 81 milliGRAM(s) Oral daily  atorvastatin 40 milliGRAM(s) Oral at bedtime  azithromycin  IVPB 500 milliGRAM(s) IV Intermittent <User Schedule>  budesonide 160 MICROgram(s)/formoterol 4.5 MICROgram(s) Inhaler 2 Puff(s) Inhalation two times a day  calcium acetate 667 milliGRAM(s) Oral three times a day with meals  chlorhexidine 4% Liquid 1 Application(s) Topical <User Schedule>  dextrose 40% Gel 15 Gram(s) Oral once  dextrose 5%. 1000 milliLiter(s) IV Continuous <Continuous>  dextrose 5%. 1000 milliLiter(s) IV Continuous <Continuous>  dextrose 50% Injectable 25 Gram(s) IV Push once  dextrose 50% Injectable 12.5 Gram(s) IV Push once  dextrose 50% Injectable 25 Gram(s) IV Push once  donepezil 5 milliGRAM(s) Oral at bedtime  epoetin graham-epbx (RETACRIT) Injectable 60763 Unit(s) IV Push <User Schedule>  glucagon  Injectable 1 milliGRAM(s) IntraMuscular once  insulin lispro (ADMELOG) corrective regimen sliding scale   SubCutaneous three times a day before meals  iron sucrose IVPB 100 milliGRAM(s) IV Intermittent <User Schedule>  lactulose Syrup 10 Gram(s) Oral daily  levoFLOXacin IVPB 250 milliGRAM(s) IV Intermittent every 48 hours  mirtazapine 15 milliGRAM(s) Oral at bedtime  mupirocin 2% Nasal 1 Application(s) Nasal two times a day  pantoprazole  Injectable 40 milliGRAM(s) IV Push every 12 hours  polyethylene glycol 3350 17 Gram(s) Oral daily  senna 8.6 milliGRAM(s) Oral Tablet - Peds 2 Tablet(s) Oral at bedtime PRN  sertraline 25 milliGRAM(s) Oral daily  tamsulosin 0.4 milliGRAM(s) Oral at bedtime      ANTIBIOTICS:  azithromycin  IVPB 500 milliGRAM(s) IV Intermittent <User Schedule>  levoFLOXacin IVPB 250 milliGRAM(s) IV Intermittent every 48 hours

## 2021-07-16 NOTE — PHYSICAL THERAPY INITIAL EVALUATION ADULT - ORIENTATION, REHAB EVAL
Erythromycin Pregnancy And Lactation Text: This medication is Pregnancy Category B and is considered safe during pregnancy. It is also excreted in breast milk.
Isotretinoin Counseling: Patient should get monthly blood tests, not donate blood, not drive at night if vision affected, not share medication, and not undergo elective surgery for 6 months after tx completed. Side effects reviewed, pt to contact office should one occur.
Topical Sulfur Applications Pregnancy And Lactation Text: This medication is Pregnancy Category C and has an unknown safety profile during pregnancy. It is unknown if this topical medication is excreted in breast milk.
Azithromycin Pregnancy And Lactation Text: This medication is considered safe during pregnancy and is also secreted in breast milk.
Detail Level: Zone
Bactrim Counseling:  I discussed with the patient the risks of sulfa antibiotics including but not limited to GI upset, allergic reaction, drug rash, diarrhea, dizziness, photosensitivity, and yeast infections. Rarely, more serious reactions can occur including but not limited to aplastic anemia, agranulocytosis, methemoglobinemia, blood dyscrasias, liver or kidney failure, lung infiltrates or desquamative/blistering drug rashes.
Dapsone Pregnancy And Lactation Text: This medication is Pregnancy Category C and is not considered safe during pregnancy or breast feeding.
Topical Clindamycin Counseling: Patient counseled that this medication may cause skin irritation or allergic reactions. In the event of skin irritation, the patient was advised to reduce the amount of the drug applied or use it less frequently. The patient verbalized understanding of the proper use and possible adverse effects of clindamycin. All of the patient's questions and concerns were addressed.
Spironolactone Counseling: Patient advised regarding risks of diarrhea, abdominal pain, hyperkalemia, birth defects (for female patients), liver toxicity and renal toxicity. The patient may need blood work to monitor liver and kidney function and potassium levels while on therapy. The patient verbalized understanding of the proper use and possible adverse effects of spironolactone. All of the patient's questions and concerns were addressed.
Topical Clindamycin Pregnancy And Lactation Text: This medication is Pregnancy Category B and is considered safe during pregnancy. It is unknown if it is excreted in breast milk.
Birth Control Pills Counseling: Birth Control Pill Counseling: I discussed with the patient the potential side effects of OCPs including but not limited to increased risk of stroke, heart attack, thrombophlebitis, deep venous thrombosis, hepatic adenomas, breast changes, GI upset, headaches, and depression. The patient verbalized understanding of the proper use and possible adverse effects of OCPs. All of the patient's questions and concerns were addressed.
Use Enhanced Medication Counseling?: No
High Dose Vitamin A Counseling: Side effects reviewed, pt to contact office should one occur.
Erythromycin Counseling:  I discussed with the patient the risks of erythromycin including but not limited to GI upset, allergic reaction, drug rash, diarrhea, increase in liver enzymes, and yeast infections.
Birth Control Pills Pregnancy And Lactation Text: This medication should be avoided if pregnant and for the first 30 days post-partum.
Tetracycline Counseling: Patient counseled regarding possible photosensitivity and increased risk for sunburn. Patient instructed to avoid sunlight, if possible. When exposed to sunlight, patients should wear protective clothing, sunglasses, and sunscreen. The patient was instructed to call the office immediately if the following severe adverse effects occur:  hearing changes, easy bruising/bleeding, severe headache, or vision changes. The patient verbalized understanding of the proper use and possible adverse effects of tetracycline. All of the patient's questions and concerns were addressed. Patient understands to avoid pregnancy while on therapy due to potential birth defects.
Topical Retinoid Pregnancy And Lactation Text: This medication is Pregnancy Category C. It is unknown if this medication is excreted in breast milk.
Azithromycin Counseling:  I discussed with the patient the risks of azithromycin including but not limited to GI upset, allergic reaction, drug rash, diarrhea, and yeast infections.
High Dose Vitamin A Pregnancy And Lactation Text: High dose vitamin A therapy is contraindicated during pregnancy and breast feeding.
Tazorac Pregnancy And Lactation Text: This medication is not safe during pregnancy. It is unknown if this medication is excreted in breast milk.
Dapsone Counseling: I discussed with the patient the risks of dapsone including but not limited to hemolytic anemia, agranulocytosis, rashes, methemoglobinemia, kidney failure, peripheral neuropathy, headaches, GI upset, and liver toxicity. Patients who start dapsone require monitoring including baseline LFTs and weekly CBCs for the first month, then every month thereafter. The patient verbalized understanding of the proper use and possible adverse effects of dapsone. All of the patient's questions and concerns were addressed.
Bactrim Pregnancy And Lactation Text: This medication is Pregnancy Category D and is known to cause fetal risk. It is also excreted in breast milk.
Doxycycline Counseling:  Patient counseled regarding possible photosensitivity and increased risk for sunburn. Patient instructed to avoid sunlight, if possible. When exposed to sunlight, patients should wear protective clothing, sunglasses, and sunscreen. The patient was instructed to call the office immediately if the following severe adverse effects occur:  hearing changes, easy bruising/bleeding, severe headache, or vision changes. The patient verbalized understanding of the proper use and possible adverse effects of doxycycline. All of the patient's questions and concerns were addressed.
Minocycline Pregnancy And Lactation Text: This medication is Pregnancy Category D and not consider safe during pregnancy. It is also excreted in breast milk.
Tazorac Counseling:  Patient advised that medication is irritating and drying. Patient may need to apply sparingly and wash off after an hour before eventually leaving it on overnight. The patient verbalized understanding of the proper use and possible adverse effects of tazorac. All of the patient's questions and concerns were addressed.
Minocycline Counseling: Patient advised regarding possible photosensitivity and discoloration of the teeth, skin, lips, tongue and gums. Patient instructed to avoid sunlight, if possible. When exposed to sunlight, patients should wear protective clothing, sunglasses, and sunscreen. The patient was instructed to call the office immediately if the following severe adverse effects occur:  hearing changes, easy bruising/bleeding, severe headache, or vision changes. The patient verbalized understanding of the proper use and possible adverse effects of minocycline. All of the patient's questions and concerns were addressed.
Topical Sulfur Applications Counseling: Topical Sulfur Counseling: Patient counseled that this medication may cause skin irritation or allergic reactions. In the event of skin irritation, the patient was advised to reduce the amount of the drug applied or use it less frequently. The patient verbalized understanding of the proper use and possible adverse effects of topical sulfur application. All of the patient's questions and concerns were addressed.
Spironolactone Pregnancy And Lactation Text: This medication can cause feminization of the male fetus and should be avoided during pregnancy. The active metabolite is also found in breast milk.
person
Isotretinoin Pregnancy And Lactation Text: This medication is Pregnancy Category X and is considered extremely dangerous during pregnancy. It is unknown if it is excreted in breast milk.
Benzoyl Peroxide Pregnancy And Lactation Text: This medication is Pregnancy Category C. It is unknown if benzoyl peroxide is excreted in breast milk.
Doxycycline Pregnancy And Lactation Text: This medication is Pregnancy Category D and not consider safe during pregnancy. It is also excreted in breast milk but is considered safe for shorter treatment courses.
Topical Retinoid counseling:  Patient advised to apply a pea-sized amount only at bedtime and wait 30 minutes after washing their face before applying. If too drying, patient may add a non-comedogenic moisturizer. The patient verbalized understanding of the proper use and possible adverse effects of retinoids. All of the patient's questions and concerns were addressed.
Benzoyl Peroxide Counseling: Patient counseled that medicine may cause skin irritation and bleach clothing. In the event of skin irritation, the patient was advised to reduce the amount of the drug applied or use it less frequently. The patient verbalized understanding of the proper use and possible adverse effects of benzoyl peroxide. All of the patient's questions and concerns were addressed.

## 2021-07-16 NOTE — CONSULT NOTE ADULT - SUBJECTIVE AND OBJECTIVE BOX
81 Y M from [facility] with pmh of CHF (unknown EF), HTN, anxiety, Alzheimer's dementia, COPD on home O2, T2DM on insulin, hx right nephrectomy/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 Patient is unable to provide history so history was taken from daughter over the phone. Called by nephro for tesio placement.       Past Medical History/ Surgical History:  PNEUMONIA      Diabetes mellitus    COPD (chronic obstructive pulmonary disease)    Lymphedema of both lower extremities    CHF (congestive heart failure)    Pneumonia    Septic shock    H/O right nephrectomy    DIFF BREATHING    90+    Pneumonia of right lower lobe due to infectious organism    Acute hyperkalemia    Renal failure      Allergies:No Known Allergies    Medications:albuterol 90 mcg/inh inhalation aerosol: 2 puff(s) inhaled every 4 hours  Aricept 5 mg oral tablet: 1 tab(s) orally once a day (at bedtime)  Breo Ellipta 200 mcg-25 mcg/inh inhalation powder: 1 puff(s) inhaled once a day  ferrous sulfate 325 mg (65 mg elemental iron) oral tablet: 1 tab(s) orally once a day  furosemide 20 mg oral tablet: 1 tab(s) orally once a day  hydrALAZINE 50 mg oral tablet: 1 tab(s) orally every 12 hours  Lantus 100 units/mL subcutaneous solution: 8 unit(s) subcutaneous once a day (at bedtime)  Melatonin 3 mg oral tablet: 1 tab(s) orally once a day (at bedtime)  nitroglycerin 0.3 mg sublingual tablet: 1 tab(s) sublingual every 5 minutes no more than 3 doses  NovoLOG FlexPen 100 units/mL injectable solution: 5 unit(s) injectable 3 times a day (before meals)  Remeron 15 mg oral tablet: 1 tab(s) orally once a day (at bedtime)  Retacrit 4000 units/mL preservative-free injectable solution: 4000 unit(s) injectable once a week. hold if Hgb &gt; 10  Senna 8.6 mg oral tablet: 2 tab(s) orally once a day (at bedtime), As Needed  sertraline 25 mg oral tablet: 1 tab(s) orally once a day  Vitamin C 500 mg oral tablet: 1 tab(s) orally once a day  ALBUTerol    90 MICROgram(s) HFA Inhaler 2 Puff(s) Inhalation every 6 hours  ascorbic acid 500 milliGRAM(s) Oral daily  aspirin  chewable 81 milliGRAM(s) Oral daily  atorvastatin 40 milliGRAM(s) Oral at bedtime  azithromycin  IVPB 500 milliGRAM(s) IV Intermittent <User Schedule>  budesonide 160 MICROgram(s)/formoterol 4.5 MICROgram(s) Inhaler 2 Puff(s) Inhalation two times a day  calcium acetate 667 milliGRAM(s) Oral three times a day with meals  chlorhexidine 4% Liquid 1 Application(s) Topical <User Schedule>  dextrose 40% Gel 15 Gram(s) Oral once  dextrose 5%. 1000 milliLiter(s) IV Continuous <Continuous>  dextrose 5%. 1000 milliLiter(s) IV Continuous <Continuous>  dextrose 50% Injectable 25 Gram(s) IV Push once  dextrose 50% Injectable 12.5 Gram(s) IV Push once  dextrose 50% Injectable 25 Gram(s) IV Push once  donepezil 5 milliGRAM(s) Oral at bedtime  epoetin graham-epbx (RETACRIT) Injectable 09720 Unit(s) IV Push <User Schedule>  glucagon  Injectable 1 milliGRAM(s) IntraMuscular once  insulin lispro (ADMELOG) corrective regimen sliding scale   SubCutaneous three times a day before meals  iron sucrose IVPB 100 milliGRAM(s) IV Intermittent <User Schedule>  lactulose Syrup 10 Gram(s) Oral daily  levoFLOXacin IVPB 250 milliGRAM(s) IV Intermittent every 48 hours  mirtazapine 15 milliGRAM(s) Oral at bedtime  mupirocin 2% Nasal 1 Application(s) Nasal two times a day  pantoprazole  Injectable 40 milliGRAM(s) IV Push every 12 hours  polyethylene glycol 3350 17 Gram(s) Oral daily  senna 8.6 milliGRAM(s) Oral Tablet - Peds 2 Tablet(s) Oral at bedtime PRN  sertraline 25 milliGRAM(s) Oral daily  tamsulosin 0.4 milliGRAM(s) Oral at bedtime      Physical Exam:  Vitals:T(C): 36.9 (07-16-21 @ 12:35), Max: 37.2 (07-15-21 @ 20:14)  HR: 85 (07-16-21 @ 12:35) (79 - 86)  BP: 113/57 (07-16-21 @ 12:35) (102/53 - 113/57)  RR: 18 (07-16-21 @ 12:35) (18 - 18)  SpO2: 97% (07-15-21 @ 19:57) (97% - 97%)  General: Alert and oriented times 3 , Not in acute distress   Heart: Regular rate and rhythm , no rubs murmurs or gallops  Lungs: Clear to auscultation , no wheezes , rales rhonci or adventicious breath sounds  Abdomen: Soft , positive bowel sounds, non tender, non distended, no peritoneal signs  Extemities: Groin : rigth fem udall ,warm, capillary refill, no swelling , no edema, good motor and sensation positive pulses                7.9    10.50 )-----------( 160      ( 07-16 @ 06:21 )             24.6                8.2    10.00 )-----------( 142      ( 07-15 @ 20:00 )             25.4                7.6    10.48 )-----------( 154      ( 07-15 @ 10:12 )             23.5                6.9    11.71 )-----------( 160      ( 07-15 @ 00:15 )             22.0                7.4    12.11 )-----------( 154      ( 07-14 @ 21:28 )             23.0                7.1    11.37 )-----------( 159      ( 07-14 @ 20:50 )             21.6                    139   |  98    |  71                 Ca: 7.9    BMP:   ----------------------------< 43     Mg: x     (07-16-21 @ 06:21)             3.7    |  25    | 6.0                Ph: x        LFT:     TPro: 5.7 / Alb: 3.3 / TBili: 0.3 / DBili: x / AST: 14 / ALT: 7 / AlkPhos: 54   (07-16-21 @ 06:21)

## 2021-07-16 NOTE — PROGRESS NOTE ADULT - SUBJECTIVE AND OBJECTIVE BOX
[facility] FOLLOW UP NOTE  --------------------------------------------------------------------------------  24 hour events/subjective: Patient examined. Appears comfortable.    PAST HISTORY  --------------------------------------------------------------------------------  No significant changes to PMH, PSH, FHx, SHx, unless otherwise noted    ALLERGIES & MEDICATIONS  --------------------------------------------------------------------------------  Allergies    No Known Allergies    Standing Inpatient Medications  ALBUTerol    90 MICROgram(s) HFA Inhaler 2 Puff(s) Inhalation every 6 hours  ascorbic acid 500 milliGRAM(s) Oral daily  aspirin  chewable 81 milliGRAM(s) Oral daily  atorvastatin 40 milliGRAM(s) Oral at bedtime  azithromycin  IVPB 500 milliGRAM(s) IV Intermittent <User Schedule>  budesonide 160 MICROgram(s)/formoterol 4.5 MICROgram(s) Inhaler 2 Puff(s) Inhalation two times a day  calcium acetate 667 milliGRAM(s) Oral three times a day with meals  chlorhexidine 4% Liquid 1 Application(s) Topical <User Schedule>  dextrose 40% Gel 15 Gram(s) Oral once  dextrose 5%. 1000 milliLiter(s) IV Continuous <Continuous>  dextrose 5%. 1000 milliLiter(s) IV Continuous <Continuous>  dextrose 50% Injectable 25 Gram(s) IV Push once  dextrose 50% Injectable 12.5 Gram(s) IV Push once  dextrose 50% Injectable 25 Gram(s) IV Push once  donepezil 5 milliGRAM(s) Oral at bedtime  epoetin graham-epbx (RETACRIT) Injectable 71938 Unit(s) IV Push <User Schedule>  glucagon  Injectable 1 milliGRAM(s) IntraMuscular once  insulin lispro (ADMELOG) corrective regimen sliding scale   SubCutaneous three times a day before meals  iron sucrose IVPB 100 milliGRAM(s) IV Intermittent <User Schedule>  lactulose Syrup 10 Gram(s) Oral daily  levoFLOXacin IVPB 250 milliGRAM(s) IV Intermittent every 48 hours  mirtazapine 15 milliGRAM(s) Oral at bedtime  mupirocin 2% Nasal 1 Application(s) Nasal two times a day  pantoprazole  Injectable 40 milliGRAM(s) IV Push every 12 hours  polyethylene glycol 3350 17 Gram(s) Oral daily  sertraline 25 milliGRAM(s) Oral daily  tamsulosin 0.4 milliGRAM(s) Oral at bedtime    PRN Inpatient Medications  senna 8.6 milliGRAM(s) Oral Tablet - Peds 2 Tablet(s) Oral at bedtime PRN      VITALS/PHYSICAL EXAM  --------------------------------------------------------------------------------  T(C): 36.9 (07-16-21 @ 12:35), Max: 37.2 (07-15-21 @ 20:14)  HR: 85 (07-16-21 @ 12:35) (79 - 86)  BP: 113/57 (07-16-21 @ 12:35) (102/53 - 113/57)  RR: 18 (07-16-21 @ 12:35) (18 - 18)  SpO2: 97% (07-15-21 @ 19:57) (97% - 97%)  Weight (kg): 68.6 (07-16-21 @ 04:58)    07-15-21 @ 07:01  -  07-16-21 @ 07:00  --------------------------------------------------------  IN: 0 mL / OUT: 300 mL / NET: -300 mL    07-16-21 @ 07:01  -  07-16-21 @ 16:23  --------------------------------------------------------  IN: 0 mL / OUT: 50 mL / NET: -50 mL      Physical Exam:  	Gen: NAD  	Pulm: CTA B/L  	CV: RRR, S1S2  	Abd: +BS, soft, nontender/nondistended  	: No suprapubic tenderness  	LE: Warm, no edema  	Vascular access: Fem temp catheter    LABS/STUDIES  --------------------------------------------------------------------------------              7.9    10.50 >-----------<  160      [07-16-21 @ 06:21]              24.6     139  |  98  |  71  ----------------------------<  43      [07-16-21 @ 06:21]  3.7   |  25  |  6.0        Ca     7.9     [07-16-21 @ 06:21]      Mg     1.9     [07-15-21 @ 04:30]      Phos  8.2     [07-15-21 @ 04:30]    TPro  5.7  /  Alb  3.3  /  TBili  0.3  /  DBili  x   /  AST  14  /  ALT  7   /  AlkPhos  54  [07-16-21 @ 06:21]    Creatinine Trend:  SCr 6.0 [07-16 @ 06:21]  SCr 8.0 [07-15 @ 04:30]  SCr 6.6 [07-14 @ 06:01]  SCr 6.3 [07-14 @ 02:24]  SCr 8.2 [07-13 @ 10:19]    Urinalysis - [07-12-21 @ 13:07]      Color Yellow / Appearance Slightly Turbid / SG 1.014 / pH 6.0      Gluc Negative / Ketone Negative  / Bili Negative / Urobili <2 mg/dL       Blood Small / Protein 300 mg/dL / Leuk Est Large / Nitrite Negative      RBC 14 / WBC 73 / Hyaline 3 / Gran  / Sq Epi  / Non Sq Epi 1 / Bacteria Few      Iron 34, TIBC 178, %sat 19      [01-04-21 @ 06:40]  Ferritin 332      [01-04-21 @ 06:40]  PTH -- (Ca 8.9)      [12-24-20 @ 04:30]   54    HBsAb Nonreact      [07-12-21 @ 18:05]  HBsAg Nonreact      [07-12-21 @ 18:05]  HCV 0.15, Nonreact      [07-12-21 @ 18:05]

## 2021-07-16 NOTE — PROGRESS NOTE ADULT - SUBJECTIVE AND OBJECTIVE BOX
HPI  Patient is a 81y old Male who presents with a chief complaint of vomiting, hypotension (16 Jul 2021 16:22)    Currently admitted to medicine with the primary diagnosis of Septic shock       Today is hospital day 4d.     INTERVAL HPI / OVERNIGHT EVENTS:  Patient was examined and seen at bedside. This morning he is resting comfortably in bed   denies any pain, SOB  on 2 L NC oxygen      PAST MEDICAL & SURGICAL HISTORY  Diabetes mellitus    COPD (chronic obstructive pulmonary disease)    Lymphedema of both lower extremities    CHF (congestive heart failure)    H/O right nephrectomy  20 yrs ago      ALLERGIES  No Known Allergies    MEDICATIONS  STANDING MEDICATIONS  ALBUTerol    90 MICROgram(s) HFA Inhaler 2 Puff(s) Inhalation every 6 hours  ascorbic acid 500 milliGRAM(s) Oral daily  aspirin  chewable 81 milliGRAM(s) Oral daily  atorvastatin 40 milliGRAM(s) Oral at bedtime  azithromycin  IVPB 500 milliGRAM(s) IV Intermittent <User Schedule>  budesonide 160 MICROgram(s)/formoterol 4.5 MICROgram(s) Inhaler 2 Puff(s) Inhalation two times a day  calcium acetate 667 milliGRAM(s) Oral three times a day with meals  chlorhexidine 4% Liquid 1 Application(s) Topical <User Schedule>  dextrose 40% Gel 15 Gram(s) Oral once  dextrose 5%. 1000 milliLiter(s) IV Continuous <Continuous>  dextrose 5%. 1000 milliLiter(s) IV Continuous <Continuous>  dextrose 50% Injectable 25 Gram(s) IV Push once  dextrose 50% Injectable 12.5 Gram(s) IV Push once  dextrose 50% Injectable 25 Gram(s) IV Push once  donepezil 5 milliGRAM(s) Oral at bedtime  epoetin graham-epbx (RETACRIT) Injectable 95910 Unit(s) IV Push <User Schedule>  glucagon  Injectable 1 milliGRAM(s) IntraMuscular once  insulin lispro (ADMELOG) corrective regimen sliding scale   SubCutaneous three times a day before meals  iron sucrose IVPB 100 milliGRAM(s) IV Intermittent <User Schedule>  lactulose Syrup 10 Gram(s) Oral daily  levoFLOXacin IVPB 250 milliGRAM(s) IV Intermittent every 48 hours  mirtazapine 15 milliGRAM(s) Oral at bedtime  mupirocin 2% Nasal 1 Application(s) Nasal two times a day  pantoprazole  Injectable 40 milliGRAM(s) IV Push every 12 hours  polyethylene glycol 3350 17 Gram(s) Oral daily  sertraline 25 milliGRAM(s) Oral daily  tamsulosin 0.4 milliGRAM(s) Oral at bedtime    PRN MEDICATIONS  senna 8.6 milliGRAM(s) Oral Tablet - Peds 2 Tablet(s) Oral at bedtime PRN    VITALS:  T(F): 98.4  HR: 85  BP: 113/57  RR: 18  SpO2: 97%    PHYSICAL EXAM  GEN: NAD, Resting comfortably in bed, on Oxygen  PULM: Clear to auscultation bilaterally, No wheezing  CVS: Regular rate and rhythm, S1-S2, systolic murmur present  ABD: Soft, non-tender, non-distended, no guarding; maldonado catheter present  EXT: No edema  NEURO: A&Ox2, not to year;  no focal deficits    LABS                        7.9    10.50 )-----------( 160      ( 16 Jul 2021 06:21 )             24.6     07-16    139  |  98  |  71<HH>  ----------------------------<  43<LL>  3.7   |  25  |  6.0<HH>    Ca    7.9<L>      16 Jul 2021 06:21  Phos  8.2     07-15  Mg     1.9     07-15    TPro  5.7<L>  /  Alb  3.3<L>  /  TBili  0.3  /  DBili  x   /  AST  14  /  ALT  7   /  AlkPhos  54  07-16                  RADIOLOGY

## 2021-07-16 NOTE — PROGRESS NOTE ADULT - SUBJECTIVE AND OBJECTIVE BOX
DRAKE GJONBALAJ 81y Male  MRN#: [id number]   CODE STATUS: FULL    Hospital Day: 5d    Pt is currently admitted with the primary diagnosis of Sepsis 2/2 PNA/UTI    SUBJECTIVE  Hospital Course    Overnight events: Uneventful     Subjective complaints: Pt was seen and evaluated at bedside. Pt is less confuse today however hard of hearing. Pt is AOx3. No new complain. Pt urine output at 50cc     Present Today:   - Laura:  No [  ], Yes [   ] : Indication:     - Type of IV Access:       .. CVC/Piccline:  No [  ], Yes [   ] : Indication:       .. Midline: No [  ], Yes [   ] : Indication:                                             ----------------------------------------------------------  OBJECTIVE  PAST MEDICAL & SURGICAL HISTORY  Diabetes mellitus    COPD (chronic obstructive pulmonary disease)    Lymphedema of both lower extremities    CHF (congestive heart failure)    H/O right nephrectomy  20 yrs ago                                              -----------------------------------------------------------  ALLERGIES:  No Known Allergies                                            ------------------------------------------------------------    HOME MEDICATIONS  Home Medications:  albuterol 90 mcg/inh inhalation aerosol: 2 puff(s) inhaled every 4 hours (12 Jul 2021 17:02)  Aricept 5 mg oral tablet: 1 tab(s) orally once a day (at bedtime) (12 Jul 2021 17:02)  Breo Ellipta 200 mcg-25 mcg/inh inhalation powder: 1 puff(s) inhaled once a day (12 Jul 2021 17:02)  ferrous sulfate 325 mg (65 mg elemental iron) oral tablet: 1 tab(s) orally once a day (12 Jul 2021 17:02)  furosemide 20 mg oral tablet: 1 tab(s) orally once a day (12 Jul 2021 17:02)  hydrALAZINE 50 mg oral tablet: 1 tab(s) orally every 12 hours (12 Jul 2021 17:02)  Lantus 100 units/mL subcutaneous solution: 8 unit(s) subcutaneous once a day (at bedtime) (12 Jul 2021 17:02)  Melatonin 3 mg oral tablet: 1 tab(s) orally once a day (at bedtime) (12 Jul 2021 17:02)  nitroglycerin 0.3 mg sublingual tablet: 1 tab(s) sublingual every 5 minutes no more than 3 doses (12 Jul 2021 17:02)  NovoLOG FlexPen 100 units/mL injectable solution: 5 unit(s) injectable 3 times a day (before meals) (12 Jul 2021 17:02)  Remeron 15 mg oral tablet: 1 tab(s) orally once a day (at bedtime) (12 Jul 2021 17:02)  Retacrit 4000 units/mL preservative-free injectable solution: 4000 unit(s) injectable once a week. hold if Hgb &gt; 10 (12 Jul 2021 17:02)  Senna 8.6 mg oral tablet: 2 tab(s) orally once a day (at bedtime), As Needed (12 Jul 2021 17:02)  sertraline 25 mg oral tablet: 1 tab(s) orally once a day (12 Jul 2021 17:02)  Vitamin C 500 mg oral tablet: 1 tab(s) orally once a day (12 Jul 2021 17:02)                           MEDICATIONS:  STANDING MEDICATIONS  ALBUTerol    90 MICROgram(s) HFA Inhaler 2 Puff(s) Inhalation every 6 hours  ascorbic acid 500 milliGRAM(s) Oral daily  aspirin  chewable 81 milliGRAM(s) Oral daily  atorvastatin 40 milliGRAM(s) Oral at bedtime  budesonide 160 MICROgram(s)/formoterol 4.5 MICROgram(s) Inhaler 2 Puff(s) Inhalation two times a day  calcium acetate 667 milliGRAM(s) Oral three times a day with meals  chlorhexidine 4% Liquid 1 Application(s) Topical <User Schedule>  dextrose 40% Gel 15 Gram(s) Oral once  dextrose 5%. 1000 milliLiter(s) IV Continuous <Continuous>  dextrose 5%. 1000 milliLiter(s) IV Continuous <Continuous>  dextrose 50% Injectable 25 Gram(s) IV Push once  dextrose 50% Injectable 12.5 Gram(s) IV Push once  dextrose 50% Injectable 25 Gram(s) IV Push once  donepezil 5 milliGRAM(s) Oral at bedtime  epoetin graham-epbx (RETACRIT) Injectable 91755 Unit(s) IV Push <User Schedule>  glucagon  Injectable 1 milliGRAM(s) IntraMuscular once  insulin lispro (ADMELOG) corrective regimen sliding scale   SubCutaneous three times a day before meals  iron sucrose IVPB 100 milliGRAM(s) IV Intermittent <User Schedule>  lactulose Syrup 10 Gram(s) Oral daily  levoFLOXacin  Tablet 250 milliGRAM(s) Oral every 24 hours  mirtazapine 15 milliGRAM(s) Oral at bedtime  mupirocin 2% Nasal 1 Application(s) Nasal two times a day  pantoprazole    Tablet 40 milliGRAM(s) Oral two times a day  polyethylene glycol 3350 17 Gram(s) Oral daily  sertraline 25 milliGRAM(s) Oral daily  tamsulosin 0.4 milliGRAM(s) Oral at bedtime    PRN MEDICATIONS  senna 8.6 milliGRAM(s) Oral Tablet - Peds 2 Tablet(s) Oral at bedtime PRN                                            ------------------------------------------------------------  VITAL SIGNS: Last 24 Hours  T(C): 36.4 (17 Jul 2021 04:57), Max: 37.6 (16 Jul 2021 20:38)  T(F): 97.6 (17 Jul 2021 04:57), Max: 99.6 (16 Jul 2021 20:38)  HR: 86 (17 Jul 2021 11:17) (84 - 98)  BP: 104/71 (17 Jul 2021 11:17) (90/55 - 120/61)  BP(mean): --  RR: 16 (17 Jul 2021 11:17) (16 - 18)  SpO2: 97% (16 Jul 2021 19:53) (97% - 97%)      07-16-21 @ 07:01  -  07-17-21 @ 07:00  --------------------------------------------------------  IN: 0 mL / OUT: 50 mL / NET: -50 mL    07-17-21 @ 07:01  -  07-17-21 @ 12:57  --------------------------------------------------------  IN: 0 mL / OUT: 1000 mL / NET: -1000 mL                                             --------------------------------------------------------------  LABS:                        7.9    12.82 )-----------( 163      ( 17 Jul 2021 06:42 )             24.8     07-17    136  |  96<L>  |  83<HH>  ----------------------------<  126<H>  4.3   |  23  |  7.0<HH>    Ca    8.0<L>      17 Jul 2021 06:42    TPro  5.7<L>  /  Alb  3.2<L>  /  TBili  0.3  /  DBili  x   /  AST  13  /  ALT  7   /  AlkPhos  58  07-17                                                              -------------------------------------------------------------  RADIOLOGY:                                            --------------------------------------------------------------    PHYSICAL EXAM:  General:   HEENT:  LUNGS:  HEART:  ABDOMEN:  EXT:  NEURO:  SKIN:                                           --------------------------------------------------------------    ASSESSMENT & PLAN    Past medical history and hospital course                                                                                                           ----------------------------------------------------  # DVT prophylaxis     # GI prophylaxis     # Diet     # Activity Score (AM-PAC)    # Code status     # Disposition                                                                              --------------------------------------------------------    # Handoff

## 2021-07-16 NOTE — PHYSICAL THERAPY INITIAL EVALUATION ADULT - ADDITIONAL COMMENTS
Pt reports he lives with family but does not answer any other questions regarding social history. Pt reports he doesn't walk.

## 2021-07-17 NOTE — PROGRESS NOTE ADULT - SUBJECTIVE AND OBJECTIVE BOX
DRAKE GJONBALAJ 81y Male  MRN#: [id number]   CODE STATUS: FULL    Hospital Day: 5d    Pt is currently admitted with the primary diagnosis of sepsis 2/2 PNA/UTI    SUBJECTIVE  Hospital Course    Overnight events: Uneventful     Subjective complaints: Pt was seen and evaluated at bedside. Pt is less confused than yesterday however, remain AOx2. Pt has no new complain. Pt Laura collected 50cc urine.      Present Today:   - Laura:  No [  ], Yes [  x ] : Indication: Urinary Retention    - Type of IV Access:       .. CVC/Piccline:  No [ x ], Yes [   ] : Indication:       .. Midline: No [x ], Yes [   ] : Indication:                                             ----------------------------------------------------------  OBJECTIVE  PAST MEDICAL & SURGICAL HISTORY  Diabetes mellitus    COPD (chronic obstructive pulmonary disease)    Lymphedema of both lower extremities    CHF (congestive heart failure)    H/O right nephrectomy  20 yrs ago                                              -----------------------------------------------------------  ALLERGIES:  No Known Allergies                                            ------------------------------------------------------------    HOME MEDICATIONS  Home Medications:  albuterol 90 mcg/inh inhalation aerosol: 2 puff(s) inhaled every 4 hours (12 Jul 2021 17:02)  Aricept 5 mg oral tablet: 1 tab(s) orally once a day (at bedtime) (12 Jul 2021 17:02)  Breo Ellipta 200 mcg-25 mcg/inh inhalation powder: 1 puff(s) inhaled once a day (12 Jul 2021 17:02)  ferrous sulfate 325 mg (65 mg elemental iron) oral tablet: 1 tab(s) orally once a day (12 Jul 2021 17:02)  furosemide 20 mg oral tablet: 1 tab(s) orally once a day (12 Jul 2021 17:02)  hydrALAZINE 50 mg oral tablet: 1 tab(s) orally every 12 hours (12 Jul 2021 17:02)  Lantus 100 units/mL subcutaneous solution: 8 unit(s) subcutaneous once a day (at bedtime) (12 Jul 2021 17:02)  Melatonin 3 mg oral tablet: 1 tab(s) orally once a day (at bedtime) (12 Jul 2021 17:02)  nitroglycerin 0.3 mg sublingual tablet: 1 tab(s) sublingual every 5 minutes no more than 3 doses (12 Jul 2021 17:02)  NovoLOG FlexPen 100 units/mL injectable solution: 5 unit(s) injectable 3 times a day (before meals) (12 Jul 2021 17:02)  Remeron 15 mg oral tablet: 1 tab(s) orally once a day (at bedtime) (12 Jul 2021 17:02)  Retacrit 4000 units/mL preservative-free injectable solution: 4000 unit(s) injectable once a week. hold if Hgb &gt; 10 (12 Jul 2021 17:02)  Senna 8.6 mg oral tablet: 2 tab(s) orally once a day (at bedtime), As Needed (12 Jul 2021 17:02)  sertraline 25 mg oral tablet: 1 tab(s) orally once a day (12 Jul 2021 17:02)  Vitamin C 500 mg oral tablet: 1 tab(s) orally once a day (12 Jul 2021 17:02)                           MEDICATIONS:  STANDING MEDICATIONS  ALBUTerol    90 MICROgram(s) HFA Inhaler 2 Puff(s) Inhalation every 6 hours  ascorbic acid 500 milliGRAM(s) Oral daily  aspirin  chewable 81 milliGRAM(s) Oral daily  atorvastatin 40 milliGRAM(s) Oral at bedtime  budesonide 160 MICROgram(s)/formoterol 4.5 MICROgram(s) Inhaler 2 Puff(s) Inhalation two times a day  calcium acetate 667 milliGRAM(s) Oral three times a day with meals  chlorhexidine 4% Liquid 1 Application(s) Topical <User Schedule>  dextrose 40% Gel 15 Gram(s) Oral once  dextrose 5%. 1000 milliLiter(s) IV Continuous <Continuous>  dextrose 5%. 1000 milliLiter(s) IV Continuous <Continuous>  dextrose 50% Injectable 25 Gram(s) IV Push once  dextrose 50% Injectable 12.5 Gram(s) IV Push once  dextrose 50% Injectable 25 Gram(s) IV Push once  donepezil 5 milliGRAM(s) Oral at bedtime  epoetin graham-epbx (RETACRIT) Injectable 01079 Unit(s) IV Push <User Schedule>  glucagon  Injectable 1 milliGRAM(s) IntraMuscular once  insulin lispro (ADMELOG) corrective regimen sliding scale   SubCutaneous three times a day before meals  iron sucrose IVPB 100 milliGRAM(s) IV Intermittent <User Schedule>  lactulose Syrup 10 Gram(s) Oral daily  levoFLOXacin  Tablet 250 milliGRAM(s) Oral every 24 hours  mirtazapine 15 milliGRAM(s) Oral at bedtime  mupirocin 2% Nasal 1 Application(s) Nasal two times a day  pantoprazole    Tablet 40 milliGRAM(s) Oral two times a day  polyethylene glycol 3350 17 Gram(s) Oral daily  sertraline 25 milliGRAM(s) Oral daily  tamsulosin 0.4 milliGRAM(s) Oral at bedtime    PRN MEDICATIONS  senna 8.6 milliGRAM(s) Oral Tablet - Peds 2 Tablet(s) Oral at bedtime PRN                                            ------------------------------------------------------------  VITAL SIGNS: Last 24 Hours  T(C): 36.4 (17 Jul 2021 04:57), Max: 37.6 (16 Jul 2021 20:38)  T(F): 97.6 (17 Jul 2021 04:57), Max: 99.6 (16 Jul 2021 20:38)  HR: 86 (17 Jul 2021 11:17) (84 - 98)  BP: 104/71 (17 Jul 2021 11:17) (90/55 - 120/61)  BP(mean): --  RR: 16 (17 Jul 2021 11:17) (16 - 18)  SpO2: 97% (16 Jul 2021 19:53) (97% - 97%)      07-16-21 @ 07:01  -  07-17-21 @ 07:00  --------------------------------------------------------  IN: 0 mL / OUT: 50 mL / NET: -50 mL    07-17-21 @ 07:01  -  07-17-21 @ 13:09  --------------------------------------------------------  IN: 0 mL / OUT: 1000 mL / NET: -1000 mL                                             --------------------------------------------------------------  LABS:                        7.9    12.82 )-----------( 163      ( 17 Jul 2021 06:42 )             24.8     07-17    136  |  96<L>  |  83<HH>  ----------------------------<  126<H>  4.3   |  23  |  7.0<HH>    Ca    8.0<L>      17 Jul 2021 06:42    TPro  5.7<L>  /  Alb  3.2<L>  /  TBili  0.3  /  DBili  x   /  AST  13  /  ALT  7   /  AlkPhos  58  07-17      #Vascular consult  Discussed with Fellow Lillie  Called patients daughter in order to get consent for possible tesio placement -left message  If family is agreeable will possibly take to OR monday for tesio placement                                                        -------------------------------------------------------------  RADIOLOGY:                                            --------------------------------------------------------------    PHYSICAL EXAM:  General: elderly gentleman laying comfy in bed  HEENT: NCAT, Hard of hearing  LUNGS: CTAB  HEART: flow murmur AV  ABDOMEN: soft nontender nondistended  EXT: no joint tenderness or swelling  NEURO: AOx3  SKIN: thin warm dry. no edema                                           --------------------------------------------------------------    ASSESSMENT & PLAN    81 Y M from [facility] with pmh of CHF (unknown EF), HTN, anxiety, Alzheimer's dementia, COPD on home O2, T2DM on insulin, hx right nephrectomy/ 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 Found to be in acute on chronic renal failure, with pneumonia and UTI.    #Septic Shock 2/2 RLL Pneumonia, Suspected Aspiration Pneumonia and UTI  - Chest Xray 7/12: RLL pneumonia 2/2 likely aspiration pneumonia   - UA: Turbid, large Leukocyte esterase   - Urine culture grew Legionella  - d/c Meropenem and Linezolid  - d/c levofloxacin 250 Q48H alternating w/ Azithromycin 500 Q48H  - start levofloxacin 250mg Q12H  - pending sputum culture collection   - c/w 2L NC     #CKDV now on HD, s/p Rt nephrectomy   #Metabolic acidosis  #Hyperkalemia  -S/P udall 7/12  -S/p Dialysis today  -will need long term HD  - vascular surg - pending family approval for Tesio   - f/u TTE pending  - f/u outpatient Dialysis at [facility] post d/c [phone]    #Acute on chronic anemia  - reported to have dark stools, on ferrous sulfate, hgb dropped slightly   - Hb 7.9 stable  - s/p one unit of PRBC   - IV PPI       #Acute urinary retention   - D/c Laura today  - trial of void  - c/w  FLOMAX     #Troponemia  -no chest pain  -Cardiology recs: - Likely NSTEMI TYPE II 2/2 demand ischemia from ARF/ Septic Shock  -F/U 2d ECHO pending  -c/w aspirin 81 mg daily    #HTN  - monitor     #DM  - insulin PRN     #Alzheimer's dementia  -continue remeron, aricept, sertraline    #Iron deficiency anemia  -d/c ferrous sulfate  - d/c bicab  - c/w EPO and Venofer with HD      #COPD on home O2  -continue inhalers    #DVT PPx -SCDs   #PPI PPx:   # Diet: NPO > Thin liquids > AAT  # Activity: bed bound  #Dispo: SNF                                                                                                           ----------------------------------------------------                                                                             --------------------------------------------------------    # Handoff

## 2021-07-17 NOTE — PROGRESS NOTE ADULT - SUBJECTIVE AND OBJECTIVE BOX
[place] NEPHROLOGY FOLLOW UP NOTE  --------------------------------------------------------------------------------  pt seen on HD    PAST HISTORY  --------------------------------------------------------------------------------  No significant changes to PMH, PSH, FHx, SHx, unless otherwise noted    ALLERGIES & MEDICATIONS  --------------------------------------------------------------------------------  Allergies    No Known Allergies    Physical Exam:  	Gen: NAD  	Pulm: CTA B/L  	CV: RRR, S1S2  	Abd: +BS, soft, nontender/nondistended  	: No suprapubic tenderness  	LE: Warm, no edema  	Vascular access: [facility] Medications:   MEDICATIONS  (STANDING):  ALBUTerol    90 MICROgram(s) HFA Inhaler 2 Puff(s) Inhalation every 6 hours  ascorbic acid 500 milliGRAM(s) Oral daily  aspirin  chewable 81 milliGRAM(s) Oral daily  atorvastatin 40 milliGRAM(s) Oral at bedtime  budesonide 160 MICROgram(s)/formoterol 4.5 MICROgram(s) Inhaler 2 Puff(s) Inhalation two times a day  calcium acetate 667 milliGRAM(s) Oral three times a day with meals  chlorhexidine 4% Liquid 1 Application(s) Topical <User Schedule>  dextrose 40% Gel 15 Gram(s) Oral once  dextrose 5%. 1000 milliLiter(s) (50 mL/Hr) IV Continuous <Continuous>  dextrose 5%. 1000 milliLiter(s) (100 mL/Hr) IV Continuous <Continuous>  dextrose 50% Injectable 25 Gram(s) IV Push once  dextrose 50% Injectable 12.5 Gram(s) IV Push once  dextrose 50% Injectable 25 Gram(s) IV Push once  donepezil 5 milliGRAM(s) Oral at bedtime  epoetin graham-epbx (RETACRIT) Injectable 01678 Unit(s) IV Push <User Schedule>  glucagon  Injectable 1 milliGRAM(s) IntraMuscular once  insulin lispro (ADMELOG) corrective regimen sliding scale   SubCutaneous three times a day before meals  iron sucrose IVPB 100 milliGRAM(s) IV Intermittent <User Schedule>  lactulose Syrup 10 Gram(s) Oral daily  levoFLOXacin  Tablet 250 milliGRAM(s) Oral every 24 hours  mirtazapine 15 milliGRAM(s) Oral at bedtime  mupirocin 2% Nasal 1 Application(s) Nasal two times a day  pantoprazole    Tablet 40 milliGRAM(s) Oral two times a day  polyethylene glycol 3350 17 Gram(s) Oral daily  sertraline 25 milliGRAM(s) Oral daily  tamsulosin 0.4 milliGRAM(s) Oral at bedtime        VITALS:  T(F): 97.6 (21 @ 04:57), Max: 99.6 (21 @ 20:38)  HR: 98 (21 @ 08:15)  BP: 120/61 (21 @ 08:15)  RR: 18 (21 @ 08:15)  SpO2: 97% (21 @ 19:53)  Wt(kg): --    07-15 @ :  -   @ 07:00  --------------------------------------------------------  IN: 0 mL / OUT: 300 mL / NET: -300 mL     @ 07:  -   @ 07:00  --------------------------------------------------------  IN: 0 mL / OUT: 50 mL / NET: -50 mL          LABS:      136  |  96<L>  |  83<HH>  ----------------------------<  126<H>  4.3   |  23  |  7.0<HH>    Ca    8.0<L>      2021 06:42    TPro  5.7<L>  /  Alb  3.2<L>  /  TBili  0.3  /  DBili      /  AST  13  /  ALT  7   /  AlkPhos  58                            7.9    12.82 )-----------( 163      ( 2021 06:42 )             24.8       Urine Studies:  Urinalysis Basic - ( 2021 13:07 )    Color: Yellow / Appearance: Slightly Turbid / S.014 / pH:   Gluc:  / Ketone: Negative  / Bili: Negative / Urobili: <2 mg/dL   Blood:  / Protein: 300 mg/dL / Nitrite: Negative   Leuk Esterase: Large / RBC: 14 /HPF / WBC 73 /HPF   Sq Epi:  / Non Sq Epi: 1 /HPF / Bacteria: Few          RADIOLOGY & ADDITIONAL STUDIES:

## 2021-07-17 NOTE — PROGRESS NOTE ADULT - SUBJECTIVE AND OBJECTIVE BOX
HPI  Patient is a 81y old Male who presents with a chief complaint of vomiting, hypotension (17 Jul 2021 13:08)    Currently admitted to medicine with the primary diagnosis of Septic shock       Today is hospital day 5d.     INTERVAL HPI / OVERNIGHT EVENTS:  Patient was examined and seen at bedside.   patient was seen after dialysis  states he is feeling fine and denies any complaints        PAST MEDICAL & SURGICAL HISTORY  Diabetes mellitus    COPD (chronic obstructive pulmonary disease)    Lymphedema of both lower extremities    CHF (congestive heart failure)    H/O right nephrectomy  20 yrs ago      ALLERGIES  No Known Allergies    MEDICATIONS  STANDING MEDICATIONS  ALBUTerol    90 MICROgram(s) HFA Inhaler 2 Puff(s) Inhalation every 6 hours  ascorbic acid 500 milliGRAM(s) Oral daily  aspirin  chewable 81 milliGRAM(s) Oral daily  atorvastatin 40 milliGRAM(s) Oral at bedtime  budesonide 160 MICROgram(s)/formoterol 4.5 MICROgram(s) Inhaler 2 Puff(s) Inhalation two times a day  calcium acetate 667 milliGRAM(s) Oral three times a day with meals  chlorhexidine 4% Liquid 1 Application(s) Topical <User Schedule>  dextrose 40% Gel 15 Gram(s) Oral once  dextrose 5%. 1000 milliLiter(s) IV Continuous <Continuous>  dextrose 5%. 1000 milliLiter(s) IV Continuous <Continuous>  dextrose 50% Injectable 25 Gram(s) IV Push once  dextrose 50% Injectable 12.5 Gram(s) IV Push once  dextrose 50% Injectable 25 Gram(s) IV Push once  donepezil 5 milliGRAM(s) Oral at bedtime  epoetin graham-epbx (RETACRIT) Injectable 50802 Unit(s) IV Push <User Schedule>  glucagon  Injectable 1 milliGRAM(s) IntraMuscular once  insulin lispro (ADMELOG) corrective regimen sliding scale   SubCutaneous three times a day before meals  iron sucrose IVPB 100 milliGRAM(s) IV Intermittent <User Schedule>  lactulose Syrup 10 Gram(s) Oral daily  levoFLOXacin  Tablet 250 milliGRAM(s) Oral every 24 hours  mirtazapine 15 milliGRAM(s) Oral at bedtime  mupirocin 2% Nasal 1 Application(s) Nasal two times a day  pantoprazole    Tablet 40 milliGRAM(s) Oral two times a day  polyethylene glycol 3350 17 Gram(s) Oral daily  sertraline 25 milliGRAM(s) Oral daily  tamsulosin 0.4 milliGRAM(s) Oral at bedtime    PRN MEDICATIONS  senna 8.6 milliGRAM(s) Oral Tablet - Peds 2 Tablet(s) Oral at bedtime PRN    VITALS:  T(F): 96.5  HR: 82  BP: 115/75  RR: 17  SpO2: 97%    PHYSICAL EXAM  GEN: NAD, Resting comfortably in bed, on Oxygen  PULM: Clear to auscultation bilaterally, No wheezing  CVS: Regular rate and rhythm, S1-S2, systolic murmur present  ABD: Soft, non-tender, non-distended, no guarding; maldonado catheter present  EXT: No edema  NEURO: A&Ox2      LABS                        7.9    12.82 )-----------( 163      ( 17 Jul 2021 06:42 )             24.8     07-17    136  |  96<L>  |  83<HH>  ----------------------------<  126<H>  4.3   |  23  |  7.0<HH>    Ca    8.0<L>      17 Jul 2021 06:42    TPro  5.7<L>  /  Alb  3.2<L>  /  TBili  0.3  /  DBili  x   /  AST  13  /  ALT  7   /  AlkPhos  58  07-17                  RADIOLOGY

## 2021-07-18 NOTE — PROGRESS NOTE ADULT - SUBJECTIVE AND OBJECTIVE BOX
[place] NEPHROLOGY FOLLOW UP NOTE  --------------------------------------------------------------------------------  HD yesterday  no overnight events  bp's fair  no desats  no fever    PAST HISTORY  --------------------------------------------------------------------------------  No significant changes to PMH, PSH, FHx, SHx, unless otherwise noted    ALLERGIES & MEDICATIONS  --------------------------------------------------------------------------------  Allergies    No Known Allergies      advance care planning and advance care directives reviewed     Physical Exam:  	Gen: NAD  	Pulm: CTA B/L  	CV: RRR, S1S2  	Abd: +BS, soft, nontender/nondistended  	: No suprapubic tenderness  	LE: Warm, no edema  	Vascular access: [facility] Medications:   MEDICATIONS  (STANDING):  ALBUTerol    90 MICROgram(s) HFA Inhaler 2 Puff(s) Inhalation every 6 hours  ascorbic acid 500 milliGRAM(s) Oral daily  aspirin  chewable 81 milliGRAM(s) Oral daily  atorvastatin 40 milliGRAM(s) Oral at bedtime  budesonide 160 MICROgram(s)/formoterol 4.5 MICROgram(s) Inhaler 2 Puff(s) Inhalation two times a day  calcium acetate 667 milliGRAM(s) Oral three times a day with meals  chlorhexidine 4% Liquid 1 Application(s) Topical <User Schedule>  dextrose 40% Gel 15 Gram(s) Oral once  dextrose 5%. 1000 milliLiter(s) (50 mL/Hr) IV Continuous <Continuous>  dextrose 5%. 1000 milliLiter(s) (100 mL/Hr) IV Continuous <Continuous>  dextrose 50% Injectable 25 Gram(s) IV Push once  dextrose 50% Injectable 12.5 Gram(s) IV Push once  dextrose 50% Injectable 25 Gram(s) IV Push once  donepezil 5 milliGRAM(s) Oral at bedtime  epoetin graham-epbx (RETACRIT) Injectable 79421 Unit(s) IV Push <User Schedule>  glucagon  Injectable 1 milliGRAM(s) IntraMuscular once  insulin lispro (ADMELOG) corrective regimen sliding scale   SubCutaneous three times a day before meals  iron sucrose IVPB 100 milliGRAM(s) IV Intermittent <User Schedule>  lactulose Syrup 10 Gram(s) Oral daily  levoFLOXacin  Tablet 250 milliGRAM(s) Oral every 24 hours  mirtazapine 15 milliGRAM(s) Oral at bedtime  mupirocin 2% Nasal 1 Application(s) Nasal two times a day  pantoprazole    Tablet 40 milliGRAM(s) Oral two times a day  polyethylene glycol 3350 17 Gram(s) Oral daily  sertraline 25 milliGRAM(s) Oral daily  tamsulosin 0.4 milliGRAM(s) Oral at bedtime        VITALS:  T(F): 98.5 (21 @ 05:13), Max: 98.5 (21 @ 05:13)  HR: 95 (21 @ 05:13)  BP: 128/57 (21 @ 05:13)  RR: 20 (21 @ 05:13)  SpO2: 93% (21 @ 05:13)  Wt(kg): --     @ 07:  -   @ 07:00  --------------------------------------------------------  IN: 0 mL / OUT: 50 mL / NET: -50 mL     @ 07:01  -   @ 07:00  --------------------------------------------------------  IN: 0 mL / OUT: 1000 mL / NET: -1000 mL          LABS:      136  |  97<L>  |  60<H>  ----------------------------<  106<H>  4.3   |  23  |  6.2<HH>    Ca    8.0<L>      2021 06:14  Mg     1.9         TPro  5.5<L>  /  Alb  3.1<L>  /  TBili  0.2  /  DBili      /  AST  13  /  ALT  7   /  AlkPhos  53                            8.1    12.51 )-----------( 142      ( 2021 06:14 )             25.8       Urine Studies:  Urinalysis Basic - ( 2021 13:07 )    Color: Yellow / Appearance: Slightly Turbid / S.014 / pH:   Gluc:  / Ketone: Negative  / Bili: Negative / Urobili: <2 mg/dL   Blood:  / Protein: 300 mg/dL / Nitrite: Negative   Leuk Esterase: Large / RBC: 14 /HPF / WBC 73 /HPF   Sq Epi:  / Non Sq Epi: 1 /HPF / Bacteria: Few          RADIOLOGY & ADDITIONAL STUDIES:

## 2021-07-18 NOTE — PROGRESS NOTE ADULT - SUBJECTIVE AND OBJECTIVE BOX
HPI  Patient is a 81y old Male who presents with a chief complaint of vomiting, hypotension (18 Jul 2021 13:22)    Currently admitted to medicine with the primary diagnosis of Septic shock       Today is hospital day 6d.     INTERVAL HPI / OVERNIGHT EVENTS:  Patient was examined and seen at bedside.   states not feeling good  states he has pain in the epigastric area and in the buttocks area  denies any chest pain or SOB        PAST MEDICAL & SURGICAL HISTORY  Diabetes mellitus    COPD (chronic obstructive pulmonary disease)    Lymphedema of both lower extremities    CHF (congestive heart failure)    H/O right nephrectomy  20 yrs ago      ALLERGIES  No Known Allergies    MEDICATIONS  STANDING MEDICATIONS  ALBUTerol    90 MICROgram(s) HFA Inhaler 2 Puff(s) Inhalation every 6 hours  ascorbic acid 500 milliGRAM(s) Oral daily  aspirin  chewable 81 milliGRAM(s) Oral daily  atorvastatin 40 milliGRAM(s) Oral at bedtime  budesonide 160 MICROgram(s)/formoterol 4.5 MICROgram(s) Inhaler 2 Puff(s) Inhalation two times a day  calcium acetate 667 milliGRAM(s) Oral three times a day with meals  chlorhexidine 4% Liquid 1 Application(s) Topical <User Schedule>  dextrose 40% Gel 15 Gram(s) Oral once  dextrose 5%. 1000 milliLiter(s) IV Continuous <Continuous>  dextrose 5%. 1000 milliLiter(s) IV Continuous <Continuous>  dextrose 50% Injectable 25 Gram(s) IV Push once  dextrose 50% Injectable 12.5 Gram(s) IV Push once  dextrose 50% Injectable 25 Gram(s) IV Push once  donepezil 5 milliGRAM(s) Oral at bedtime  epoetin graham-epbx (RETACRIT) Injectable 09562 Unit(s) IV Push <User Schedule>  glucagon  Injectable 1 milliGRAM(s) IntraMuscular once  insulin lispro (ADMELOG) corrective regimen sliding scale   SubCutaneous three times a day before meals  iron sucrose IVPB 100 milliGRAM(s) IV Intermittent <User Schedule>  lactulose Syrup 10 Gram(s) Oral daily  levoFLOXacin  Tablet 250 milliGRAM(s) Oral every 24 hours  mirtazapine 15 milliGRAM(s) Oral at bedtime  mupirocin 2% Nasal 1 Application(s) Nasal two times a day  pantoprazole    Tablet 40 milliGRAM(s) Oral two times a day  polyethylene glycol 3350 17 Gram(s) Oral daily  sertraline 25 milliGRAM(s) Oral daily  sucralfate suspension 1 Gram(s) Oral four times a day  tamsulosin 0.4 milliGRAM(s) Oral at bedtime  zinc oxide 20% Ointment 1 Application(s) Topical daily    PRN MEDICATIONS  senna 8.6 milliGRAM(s) Oral Tablet - Peds 2 Tablet(s) Oral at bedtime PRN    VITALS:  T(F): 98.7  HR: 87  BP: 119/56  RR: 20  SpO2: 93%    PHYSICAL EXAM  GEN: NAD, Resting comfortably in bed  PULM: Clear to auscultation bilaterally, No wheezing  CVS: Regular rate and rhythm, S1-S2, no murmurs  ABD: Soft, non-tender, non-distended, no guarding  EXT: No edema  NEURO: A&Ox3, no focal deficits    LABS                        8.1    12.51 )-----------( 142      ( 18 Jul 2021 06:14 )             25.8     07-18    136  |  97<L>  |  60<H>  ----------------------------<  106<H>  4.3   |  23  |  6.2<HH>    Ca    8.0<L>      18 Jul 2021 06:14  Mg     1.9     07-18    TPro  5.5<L>  /  Alb  3.1<L>  /  TBili  0.2  /  DBili  x   /  AST  13  /  ALT  7   /  AlkPhos  53  07-18                  RADIOLOGY

## 2021-07-18 NOTE — PRE-ANESTHESIA EVALUATION ADULT - NSANTHPMHFT_GEN_ALL_CORE
PMH: COPD on home O2; CHF (TTE pending); HTN; anxiety, alzheimer's, dementia, right nephrectomy 20 years ago, recent NSTEMI 2/2 demand ischemia due to septic shock/ pneumonia, UTI.

## 2021-07-18 NOTE — PROGRESS NOTE ADULT - TIME BILLING
medical complexity
Counseled patient about diagnostic testing and treatment plan. All questions answered.
Counseled patient about diagnostic testing and treatment plan. All questions answered.

## 2021-07-19 NOTE — BRIEF OPERATIVE NOTE - NSICDXBRIEFPROCEDURE_GEN_ALL_CORE_FT
PROCEDURES:  Insertion, central venous catheter, tunneled, with two access sites without port 19-Jul-2021 17:55:09 TDC placement Bandar Blackwood

## 2021-07-19 NOTE — PROGRESS NOTE ADULT - SUBJECTIVE AND OBJECTIVE BOX
DRAKE FORTUNEBALAJ 81y Male  MRN#: [id number]   CODE STATUS: DNR    Hospital Day: 7d    Pt is currently admitted with the primary diagnosis of PNA/UTI    SUBJECTIVE  Hospital Course    Overnight events: Uneventful     Subjective complaints: pt seen and evaluated at bedside. Pt was found sleeping and had to be woken. Pt expressed feeling fine. He denies SOB and pain.     Present Today:   - Laura:  No [ x ], Yes [   ] : Indication:     - Type of IV Access:       .. CVC/Piccline:  No [ x ], Yes [   ] : Indication:       .. Midline: No [ x ], Yes [   ] : Indication:                                             ----------------------------------------------------------  OBJECTIVE  PAST MEDICAL & SURGICAL HISTORY  Diabetes mellitus    COPD (chronic obstructive pulmonary disease)    Lymphedema of both lower extremities    CHF (congestive heart failure)    H/O right nephrectomy  20 yrs ago                                              -----------------------------------------------------------  ALLERGIES:  No Known Allergies                                            ------------------------------------------------------------    HOME MEDICATIONS  Home Medications:  albuterol 90 mcg/inh inhalation aerosol: 2 puff(s) inhaled every 4 hours (12 Jul 2021 17:02)  Aricept 5 mg oral tablet: 1 tab(s) orally once a day (at bedtime) (12 Jul 2021 17:02)  Breo Ellipta 200 mcg-25 mcg/inh inhalation powder: 1 puff(s) inhaled once a day (12 Jul 2021 17:02)  ferrous sulfate 325 mg (65 mg elemental iron) oral tablet: 1 tab(s) orally once a day (12 Jul 2021 17:02)  furosemide 20 mg oral tablet: 1 tab(s) orally once a day (12 Jul 2021 17:02)  hydrALAZINE 50 mg oral tablet: 1 tab(s) orally every 12 hours (12 Jul 2021 17:02)  Lantus 100 units/mL subcutaneous solution: 8 unit(s) subcutaneous once a day (at bedtime) (12 Jul 2021 17:02)  Melatonin 3 mg oral tablet: 1 tab(s) orally once a day (at bedtime) (12 Jul 2021 17:02)  nitroglycerin 0.3 mg sublingual tablet: 1 tab(s) sublingual every 5 minutes no more than 3 doses (12 Jul 2021 17:02)  NovoLOG FlexPen 100 units/mL injectable solution: 5 unit(s) injectable 3 times a day (before meals) (12 Jul 2021 17:02)  Remeron 15 mg oral tablet: 1 tab(s) orally once a day (at bedtime) (12 Jul 2021 17:02)  Retacrit 4000 units/mL preservative-free injectable solution: 4000 unit(s) injectable once a week. hold if Hgb &gt; 10 (12 Jul 2021 17:02)  Senna 8.6 mg oral tablet: 2 tab(s) orally once a day (at bedtime), As Needed (12 Jul 2021 17:02)  sertraline 25 mg oral tablet: 1 tab(s) orally once a day (12 Jul 2021 17:02)  Vitamin C 500 mg oral tablet: 1 tab(s) orally once a day (12 Jul 2021 17:02)                           MEDICATIONS:  STANDING MEDICATIONS  ALBUTerol    90 MICROgram(s) HFA Inhaler 2 Puff(s) Inhalation every 6 hours  ascorbic acid 500 milliGRAM(s) Oral daily  aspirin  chewable 81 milliGRAM(s) Oral daily  atorvastatin 40 milliGRAM(s) Oral at bedtime  budesonide 160 MICROgram(s)/formoterol 4.5 MICROgram(s) Inhaler 2 Puff(s) Inhalation two times a day  calcium acetate 667 milliGRAM(s) Oral three times a day with meals  chlorhexidine 4% Liquid 1 Application(s) Topical <User Schedule>  dextrose 40% Gel 15 Gram(s) Oral once  dextrose 5%. 1000 milliLiter(s) IV Continuous <Continuous>  dextrose 5%. 1000 milliLiter(s) IV Continuous <Continuous>  dextrose 50% Injectable 12.5 Gram(s) IV Push once  dextrose 50% Injectable 25 Gram(s) IV Push once  donepezil 5 milliGRAM(s) Oral at bedtime  epoetin graham-epbx (RETACRIT) Injectable 11491 Unit(s) IV Push <User Schedule>  glucagon  Injectable 1 milliGRAM(s) IntraMuscular once  insulin lispro (ADMELOG) corrective regimen sliding scale   SubCutaneous three times a day before meals  iron sucrose IVPB 100 milliGRAM(s) IV Intermittent <User Schedule>  lactulose Syrup 10 Gram(s) Oral daily  levoFLOXacin  Tablet 250 milliGRAM(s) Oral every 24 hours  mirtazapine 15 milliGRAM(s) Oral at bedtime  pantoprazole    Tablet 40 milliGRAM(s) Oral two times a day  polyethylene glycol 3350 17 Gram(s) Oral daily  sertraline 25 milliGRAM(s) Oral daily  sodium chloride 0.9%. 1000 milliLiter(s) IV Continuous <Continuous>  sucralfate suspension 1 Gram(s) Oral four times a day  tamsulosin 0.4 milliGRAM(s) Oral at bedtime  zinc oxide 20% Ointment 1 Application(s) Topical daily    PRN MEDICATIONS  senna 8.6 milliGRAM(s) Oral Tablet - Peds 2 Tablet(s) Oral at bedtime PRN                                            ------------------------------------------------------------  VITAL SIGNS: Last 24 Hours  T(C): 36.6 (19 Jul 2021 19:00), Max: 36.9 (19 Jul 2021 13:03)  T(F): 97.9 (19 Jul 2021 19:00), Max: 98.4 (19 Jul 2021 13:03)  HR: 77 (19 Jul 2021 20:32) (65 - 89)  BP: 145/66 (19 Jul 2021 20:32) (111/53 - 171/71)  BP(mean): --  RR: 19 (19 Jul 2021 19:00) (17 - 25)  SpO2: 98% (19 Jul 2021 20:32) (94% - 100%)      07-18-21 @ 07:01  -  07-19-21 @ 07:00  --------------------------------------------------------  IN: 200 mL / OUT: 0 mL / NET: 200 mL                                             --------------------------------------------------------------  LABS:                        8.3    13.78 )-----------( 164      ( 19 Jul 2021 05:24 )             26.5     07-19    136  |  95<L>  |  70<HH>  ----------------------------<  94  4.5   |  24  |  7.6<HH>    Ca    8.6      19 Jul 2021 05:24  Mg     2.0     07-19    TPro  5.9<L>  /  Alb  3.3<L>  /  TBili  0.2  /  DBili  x   /  AST  14  /  ALT  7   /  AlkPhos  56  07-19    PT/INR - ( 19 Jul 2021 05:24 )   PT: 13.60 sec;   INR: 1.18 ratio         PTT - ( 19 Jul 2021 05:24 )  PTT:27.5 sec      Troponin T, Serum: 4.47 ng/mL *HH* (07-19-21 @ 05:24)          CARDIAC MARKERS ( 19 Jul 2021 05:24 )  x     / 4.47 ng/mL / x     / x     / x                                                  -------------------------------------------------------------  RADIOLOGY:    # TTE Echo Complete w/o Contrast w/ Doppler (07.18.21 @ 14:47) >  Summary:   1. Technically difficult study.   2. Moderately decreased global left ventricular systolic function.   3. Entire apex, mid and apical anterior septum, mid and apical inferior septum, and mid and apical inferior wall are abnormal as described above.   4. LV Ejection Fraction by Bishop's Method with a biplane EF of 40 %.   5. Moderately increased LV wall thickness.   6. Normal left ventricular internal cavity size.   7. Moderately enlarged left atrium.   8. Normal right atrial size.   9. There is no evidence of pericardial effusion.  10. Mild to moderate mitral annular calcification.  11. Mild to moderate mitral valve regurgitation.  12. Thickening and calcification of the anterior and posterior mitral valve leaflets.  13. Moderate tricuspid regurgitation.  14. Pulmonary hypertension is present.  15. Peak transaortic gradient equals 35.8 mmHg, mean transaortic gradient equals 19.1 mmHg, the calculated aortic valve area equals 0.58 cm² by the continuity equation consistent with severe aortic stenosis.                                                --------------------------------------------------------------    PHYSICAL EXAM:  General: Elderly man laying in bed appear older than stated age  HEENT: NCAT, PERRLa, EOMI  LUNGS: CTAB  HEART: Flow murmur AV region  ABDOMEN: soft nontender, Nondistended  EXT: no joint swelling nor tenderness. no edema  NEURO: AOx2 not to time, very poor hearing  SKIN: warm and dry                                           --------------------------------------------------------------    ASSESSMENT & PLAN    81 Y M from [facility] with pmh of CHF (unknown EF), HTN, anxiety, Alzheimer's dementia, COPD on home O2, T2DM on insulin, hx right nephrectomy/ 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 Found to be in acute on chronic renal failure, with pneumonia and UTI.    #Septic Shock 2/2 RLL Pneumonia, Suspected Aspiration Pneumonia and UTI  - Chest Xray 7/12: RLL pneumonia 2/2 likely aspiration pneumonia   - UA: Turbid, large Leukocyte esterase   - Urine culture grew Legionella  - d/c Meropenem and Linezolid  - d/c levofloxacin 250 Q48H alternating w/ Azithromycin 500 Q48H  - c/w levofloxacin 250mg Q24H  - pending sputum culture collection   - pending resp sputum induction  - c/w 2L NC     #CKDV now on HD, s/p Rt nephrectomy   #Metabolic acidosis  #Hyperkalemia  -S/P udall 7/12  -S/p Dialysis today  - will need long term HD  - pending vascular surg  for Tesio today  - f/u TTE pending EF of 40 %; Moderately increased LV wall thickness; severe aortic stenosis.  - f/u outpatient Dialysis at [facility] on [facility] post d/c [phone]    #Acute on chronic anemia  - reported to have dark stools, on ferrous sulfate, hgb dropped slightly   - Hb 7.9 stable  - s/p one unit of PRBC   - IV PPI       #Acute urinary retention   - D/c Laura today  - trial of void  - c/w  FLOMAX   - bladder scan    #Troponemia  -no chest pain  -Cardiology recs: - Likely NSTEMI TYPE II 2/2 demand ischemia from ARF/ Septic Shock  -F/U 2d ECHO pending  -c/w aspirin 81 mg daily    #HTN  - monitor     #DM  - insulin PRN     #Alzheimer's dementia  -continue remeron, aricept, sertraline    #Iron deficiency anemia  -d/c ferrous sulfate  - d/c bicab  - c/w EPO and Venofer with HD      #COPD on home O2  -continue inhalers    #DVT PPx -SCDs   #PPI PPx:   # Diet: NPO > Thin liquids > AAT  # Activity: bed bound  #Dispo: SNF                                                                                                           ----------------------------------------------------                                                                               --------------------------------------------------------    # Handoff

## 2021-07-19 NOTE — PRE-ANESTHESIA EVALUATION ADULT - NSANTHOSAYNRD_GEN_A_CORE
No. ROSA screening performed.  STOP BANG Legend: 0-2 = LOW Risk; 3-4 = INTERMEDIATE Risk; 5-8 = HIGH Risk
No. ROSA screening performed.  STOP BANG Legend: 0-2 = LOW Risk; 3-4 = INTERMEDIATE Risk; 5-8 = HIGH Risk

## 2021-07-19 NOTE — PROGRESS NOTE ADULT - SUBJECTIVE AND OBJECTIVE BOX
VASCULAR SURGERY PROGRESS NOTE    CC:   Hospital Day #8  Post-Op Day #    Procedure:    Events of past 24 hours:  Pt states that he had mild pain in the epigastric area. Pt was requiring 2L NC throughout the night       ROS otherwise negative except per subjective and HPI      PAST MEDICAL & SURGICAL HISTORY:  Diabetes mellitus  COPD (chronic obstructive pulmonary disease)  Lymphedema of both lower extremities  CHF (congestive heart failure)  H/O right nephrectomy 20 yrs ago      Vital Signs Last 24 Hrs  T(C): 35.9 (18 Jul 2021 20:50), Max: 37.1 (18 Jul 2021 14:04)  T(F): 96.6 (18 Jul 2021 20:50), Max: 98.7 (18 Jul 2021 14:04)  HR: 89 (19 Jul 2021 00:13) (82 - 99)  BP: 109/69 (18 Jul 2021 20:50) (109/69 - 128/57)  BP(mean): --  RR: 17 (19 Jul 2021 00:13) (17 - 20)  SpO2: 94% (19 Jul 2021 00:13) (91% - 94%)    Pain (0-10):            Pain Control Adequate: [] YES [] N    Diet: NPO    I&O's Detail    17 Jul 2021 07:01  -  18 Jul 2021 07:00  --------------------------------------------------------  IN:  Total IN: 0 mL    OUT:    Other (mL): 1000 mL  Total OUT: 1000 mL    Total NET: -1000 mL      18 Jul 2021 07:01  -  19 Jul 2021 01:18  --------------------------------------------------------  IN:    Oral Fluid: 200 mL  Total IN: 200 mL    OUT:  Total OUT: 0 mL    Total NET: 200 mL    PHYSICAL EXAM    Appearance: NAD	  HEENT: NC/AT, EOMI	  Neck: Supple  Cardiovascular: RRR  Respiratory: symmetrical rise and fall; no 2L NC  Gastrointestinal:  Soft, Non-tender, nontender   Extremities: no edema  Neurologic: Non-focal  Psychiatry: A & O x 3    MEDICATIONS:   MEDICATIONS  (STANDING):  ALBUTerol    90 MICROgram(s) HFA Inhaler 2 Puff(s) Inhalation every 6 hours  ascorbic acid 500 milliGRAM(s) Oral daily  aspirin  chewable 81 milliGRAM(s) Oral daily  atorvastatin 40 milliGRAM(s) Oral at bedtime  budesonide 160 MICROgram(s)/formoterol 4.5 MICROgram(s) Inhaler 2 Puff(s) Inhalation two times a day  calcium acetate 667 milliGRAM(s) Oral three times a day with meals  chlorhexidine 4% Liquid 1 Application(s) Topical <User Schedule>  dextrose 40% Gel 15 Gram(s) Oral once  dextrose 5%. 1000 milliLiter(s) (50 mL/Hr) IV Continuous <Continuous>  dextrose 5%. 1000 milliLiter(s) (100 mL/Hr) IV Continuous <Continuous>  dextrose 50% Injectable 25 Gram(s) IV Push once  dextrose 50% Injectable 12.5 Gram(s) IV Push once  dextrose 50% Injectable 25 Gram(s) IV Push once  donepezil 5 milliGRAM(s) Oral at bedtime  epoetin graham-epbx (RETACRIT) Injectable 84940 Unit(s) IV Push <User Schedule>  glucagon  Injectable 1 milliGRAM(s) IntraMuscular once  insulin lispro (ADMELOG) corrective regimen sliding scale   SubCutaneous three times a day before meals  iron sucrose IVPB 100 milliGRAM(s) IV Intermittent <User Schedule>  lactulose Syrup 10 Gram(s) Oral daily  levoFLOXacin  Tablet 250 milliGRAM(s) Oral every 24 hours  mirtazapine 15 milliGRAM(s) Oral at bedtime  pantoprazole    Tablet 40 milliGRAM(s) Oral two times a day  polyethylene glycol 3350 17 Gram(s) Oral daily  sertraline 25 milliGRAM(s) Oral daily  sodium chloride 0.9%. 1000 milliLiter(s) (75 mL/Hr) IV Continuous <Continuous>  sucralfate suspension 1 Gram(s) Oral four times a day  tamsulosin 0.4 milliGRAM(s) Oral at bedtime  zinc oxide 20% Ointment 1 Application(s) Topical daily    MEDICATIONS  (PRN):  senna 8.6 milliGRAM(s) Oral Tablet - Peds 2 Tablet(s) Oral at bedtime PRN Constipation    LAB/STUDIES:                        8.1    12.51 )-----------( 142      ( 18 Jul 2021 06:14 )             25.8     07-18    136  |  97<L>  |  60<H>  ----------------------------<  106<H>  4.3   |  23  |  6.2<HH>    Ca    8.0<L>      18 Jul 2021 06:14  Mg     1.9     07-18    TPro  5.5<L>  /  Alb  3.1<L>  /  TBili  0.2  /  DBili  x   /  AST  13  /  ALT  7   /  AlkPhos  53  07-18      LIVER FUNCTIONS - ( 18 Jul 2021 06:14 )  Alb: 3.1 g/dL / Pro: 5.5 g/dL / ALK PHOS: 53 U/L / ALT: 7 U/L / AST: 13 U/L / GGT: x             IMAGING: no new imaging    VASCULAR SURGERY PROGRESS NOTE    CC:   Hospital Day #8  Post-Op Day #    Procedure:    Events of past 24 hours:  Pt states that he had mild pain in the epigastric area. Pt was requiring 2L NC throughout the night       ROS otherwise negative except per subjective and HPI      PAST MEDICAL & SURGICAL HISTORY:  Diabetes mellitus  COPD (chronic obstructive pulmonary disease)  Lymphedema of both lower extremities  CHF (congestive heart failure)  H/O right nephrectomy 20 yrs ago      Vital Signs Last 24 Hrs  T(C): 35.9 (18 Jul 2021 20:50), Max: 37.1 (18 Jul 2021 14:04)  T(F): 96.6 (18 Jul 2021 20:50), Max: 98.7 (18 Jul 2021 14:04)  HR: 89 (19 Jul 2021 00:13) (82 - 99)  BP: 109/69 (18 Jul 2021 20:50) (109/69 - 128/57)  BP(mean): --  RR: 17 (19 Jul 2021 00:13) (17 - 20)  SpO2: 94% (19 Jul 2021 00:13) (91% - 94%)    Pain (0-10):            Pain Control Adequate: [] YES [] N    Diet: NPO    I&O's Detail    17 Jul 2021 07:01  -  18 Jul 2021 07:00  --------------------------------------------------------  IN:  Total IN: 0 mL    OUT:    Other (mL): 1000 mL  Total OUT: 1000 mL    Total NET: -1000 mL      18 Jul 2021 07:01  -  19 Jul 2021 01:18  --------------------------------------------------------  IN:    Oral Fluid: 200 mL  Total IN: 200 mL    OUT:  Total OUT: 0 mL    Total NET: 200 mL    PHYSICAL EXAM    Appearance: NAD	  HEENT: NC/AT, EOMI	  Neck: Supple  Cardiovascular: RRR  Respiratory: positive air movement, symmetrical rise and fall of chest; no 2L NC  Gastrointestinal:  Soft, Non-tender, nontender   Extremities: no edema  Neurologic: Non-focal  Psychiatry: A & O x 3    MEDICATIONS:   MEDICATIONS  (STANDING):  ALBUTerol    90 MICROgram(s) HFA Inhaler 2 Puff(s) Inhalation every 6 hours  ascorbic acid 500 milliGRAM(s) Oral daily  aspirin  chewable 81 milliGRAM(s) Oral daily  atorvastatin 40 milliGRAM(s) Oral at bedtime  budesonide 160 MICROgram(s)/formoterol 4.5 MICROgram(s) Inhaler 2 Puff(s) Inhalation two times a day  calcium acetate 667 milliGRAM(s) Oral three times a day with meals  chlorhexidine 4% Liquid 1 Application(s) Topical <User Schedule>  dextrose 40% Gel 15 Gram(s) Oral once  dextrose 5%. 1000 milliLiter(s) (50 mL/Hr) IV Continuous <Continuous>  dextrose 5%. 1000 milliLiter(s) (100 mL/Hr) IV Continuous <Continuous>  dextrose 50% Injectable 25 Gram(s) IV Push once  dextrose 50% Injectable 12.5 Gram(s) IV Push once  dextrose 50% Injectable 25 Gram(s) IV Push once  donepezil 5 milliGRAM(s) Oral at bedtime  epoetin graham-epbx (RETACRIT) Injectable 35788 Unit(s) IV Push <User Schedule>  glucagon  Injectable 1 milliGRAM(s) IntraMuscular once  insulin lispro (ADMELOG) corrective regimen sliding scale   SubCutaneous three times a day before meals  iron sucrose IVPB 100 milliGRAM(s) IV Intermittent <User Schedule>  lactulose Syrup 10 Gram(s) Oral daily  levoFLOXacin  Tablet 250 milliGRAM(s) Oral every 24 hours  mirtazapine 15 milliGRAM(s) Oral at bedtime  pantoprazole    Tablet 40 milliGRAM(s) Oral two times a day  polyethylene glycol 3350 17 Gram(s) Oral daily  sertraline 25 milliGRAM(s) Oral daily  sodium chloride 0.9%. 1000 milliLiter(s) (75 mL/Hr) IV Continuous <Continuous>  sucralfate suspension 1 Gram(s) Oral four times a day  tamsulosin 0.4 milliGRAM(s) Oral at bedtime  zinc oxide 20% Ointment 1 Application(s) Topical daily    MEDICATIONS  (PRN):  senna 8.6 milliGRAM(s) Oral Tablet - Peds 2 Tablet(s) Oral at bedtime PRN Constipation    LAB/STUDIES:                        8.1    12.51 )-----------( 142      ( 18 Jul 2021 06:14 )             25.8     07-18    136  |  97<L>  |  60<H>  ----------------------------<  106<H>  4.3   |  23  |  6.2<HH>    Ca    8.0<L>      18 Jul 2021 06:14  Mg     1.9     07-18    TPro  5.5<L>  /  Alb  3.1<L>  /  TBili  0.2  /  DBili  x   /  AST  13  /  ALT  7   /  AlkPhos  53  07-18      LIVER FUNCTIONS - ( 18 Jul 2021 06:14 )  Alb: 3.1 g/dL / Pro: 5.5 g/dL / ALK PHOS: 53 U/L / ALT: 7 U/L / AST: 13 U/L / GGT: x             IMAGING: no new imaging

## 2021-07-19 NOTE — CHART NOTE - NSCHARTNOTEFT_GEN_A_CORE
PACU ANESTHESIA ADMISSION NOTE      Procedure: Insertion, central venous catheter, tunneled, with two access sites without port  TDC placement      Post op diagnosis:  ESRD on dialysis        ____  Intubated  TV:______       Rate: ______      FiO2: ______    _x___  Patent Airway    _x___  Full return of protective reflexes    _x___  Full recovery from anesthesia / back to baseline status    Vitals:  T(F): 97.3   HR: 90  BP: 144/63  RR: 23  SpO2: 100%    Mental Status:  _x___ Awake   __x___ Alert   _____ Drowsy   _____ Sedated    Nausea/Vomiting:  _x___  NO       ______Yes,   See Post - Op Orders         Pain Scale (0-10):  __0___    Treatment: _x___ None    ____ See Post - Op/PCA Orders    Post - Operative Fluids:   ____ Oral   _x___ See Post - Op Orders    Plan: Discharge:   ____Home       _x____Floor     _____Critical Care    _____  Other:_________________    Comments:  No anesthesia issues or complications noted.  Discharge when criteria met.

## 2021-07-19 NOTE — PROGRESS NOTE ADULT - SUBJECTIVE AND OBJECTIVE BOX
[facility] FOLLOW UP NOTE  --------------------------------------------------------------------------------  24 hour events/subjective: Patient examined. Appears comfortable.    PAST HISTORY  --------------------------------------------------------------------------------  No significant changes to PMH, PSH, FHx, SHx, unless otherwise noted    ALLERGIES & MEDICATIONS  --------------------------------------------------------------------------------  Allergies    No Known Allergies    Standing Inpatient Medications  ALBUTerol    90 MICROgram(s) HFA Inhaler 2 Puff(s) Inhalation every 6 hours  ascorbic acid 500 milliGRAM(s) Oral daily  aspirin  chewable 81 milliGRAM(s) Oral daily  atorvastatin 40 milliGRAM(s) Oral at bedtime  budesonide 160 MICROgram(s)/formoterol 4.5 MICROgram(s) Inhaler 2 Puff(s) Inhalation two times a day  calcium acetate 667 milliGRAM(s) Oral three times a day with meals  chlorhexidine 4% Liquid 1 Application(s) Topical <User Schedule>  dextrose 40% Gel 15 Gram(s) Oral once  dextrose 5%. 1000 milliLiter(s) IV Continuous <Continuous>  dextrose 5%. 1000 milliLiter(s) IV Continuous <Continuous>  dextrose 50% Injectable 25 Gram(s) IV Push once  dextrose 50% Injectable 12.5 Gram(s) IV Push once  dextrose 50% Injectable 25 Gram(s) IV Push once  donepezil 5 milliGRAM(s) Oral at bedtime  epoetin graham-epbx (RETACRIT) Injectable 15463 Unit(s) IV Push <User Schedule>  glucagon  Injectable 1 milliGRAM(s) IntraMuscular once  insulin lispro (ADMELOG) corrective regimen sliding scale   SubCutaneous three times a day before meals  iron sucrose IVPB 100 milliGRAM(s) IV Intermittent <User Schedule>  lactulose Syrup 10 Gram(s) Oral daily  levoFLOXacin  Tablet 250 milliGRAM(s) Oral every 24 hours  mirtazapine 15 milliGRAM(s) Oral at bedtime  pantoprazole    Tablet 40 milliGRAM(s) Oral two times a day  polyethylene glycol 3350 17 Gram(s) Oral daily  sertraline 25 milliGRAM(s) Oral daily  sodium chloride 0.9%. 1000 milliLiter(s) IV Continuous <Continuous>  sucralfate suspension 1 Gram(s) Oral four times a day  tamsulosin 0.4 milliGRAM(s) Oral at bedtime  zinc oxide 20% Ointment 1 Application(s) Topical daily    PRN Inpatient Medications  senna 8.6 milliGRAM(s) Oral Tablet - Peds 2 Tablet(s) Oral at bedtime PRN    VITALS/PHYSICAL EXAM  --------------------------------------------------------------------------------  T(C): 36.6 (07-19-21 @ 05:50), Max: 37.1 (07-18-21 @ 14:04)  HR: 86 (07-19-21 @ 05:50) (82 - 99)  BP: 128/60 (07-19-21 @ 05:50) (109/69 - 128/60)  RR: 18 (07-19-21 @ 05:50) (17 - 20)  SpO2: 94% (07-19-21 @ 00:13) (91% - 94%)  Height (cm): 172.7 (07-18-21 @ 13:46)  Weight (kg): 68.6 (07-18-21 @ 13:46)  BMI (kg/m2): 23 (07-18-21 @ 13:46)  BSA (m2): 1.81 (07-18-21 @ 13:46)    07-18-21 @ 07:01  -  07-19-21 @ 07:00  --------------------------------------------------------  IN: 200 mL / OUT: 0 mL / NET: 200 mL    Physical Exam:  	Gen: NAD  	Pulm: CTA B/L  	CV: RRR, S1S2  	Abd: +BS, soft, nontender/nondistended  	: No suprapubic tenderness  	LE: Warm, no edema  	Vascular access: Fem temp HD cath    LABS/STUDIES  --------------------------------------------------------------------------------              8.3    13.78 >-----------<  164      [07-19-21 @ 05:24]              26.5     136  |  95  |  70  ----------------------------<  94      [07-19-21 @ 05:24]  4.5   |  24  |  7.6        Ca     8.6     [07-19-21 @ 05:24]      Mg     2.0     [07-19-21 @ 05:24]    TPro  5.9  /  Alb  3.3  /  TBili  0.2  /  DBili  x   /  AST  14  /  ALT  7   /  AlkPhos  56  [07-19-21 @ 05:24]    PT/INR: PT 13.60, INR 1.18       [07-19-21 @ 05:24]  PTT: 27.5       [07-19-21 @ 05:24]    Troponin 4.47      [07-19-21 @ 05:24]    Creatinine Trend:  SCr 7.6 [07-19 @ 05:24]  SCr 6.2 [07-18 @ 06:14]  SCr 7.0 [07-17 @ 06:42]  SCr 6.0 [07-16 @ 06:21]  SCr 8.0 [07-15 @ 04:30]    Urinalysis - [07-12-21 @ 13:07]      Color Yellow / Appearance Slightly Turbid / SG 1.014 / pH 6.0      Gluc Negative / Ketone Negative  / Bili Negative / Urobili <2 mg/dL       Blood Small / Protein 300 mg/dL / Leuk Est Large / Nitrite Negative      RBC 14 / WBC 73 / Hyaline 3 / Gran  / Sq Epi  / Non Sq Epi 1 / Bacteria Few      Iron 34, TIBC 178, %sat 19      [01-04-21 @ 06:40]  Ferritin 332      [01-04-21 @ 06:40]  PTH -- (Ca 8.9)      [12-24-20 @ 04:30]   54    HBsAb Nonreact      [07-12-21 @ 18:05]  HBsAg Nonreact      [07-15-21 @ 20:00]  HCV 0.15, Nonreact      [07-15-21 @ 20:00]

## 2021-07-19 NOTE — PROGRESS NOTE ADULT - SUBJECTIVE AND OBJECTIVE BOX
HPI  Patient is a 81y old Male who presents with a chief complaint of vomiting, hypotension (19 Jul 2021 12:29)    Currently admitted to medicine with the primary diagnosis of Septic shock       Today is hospital day 7d.     INTERVAL HPI / OVERNIGHT EVENTS:  Patient was examined and seen at bedside.   when i saw patient in the morning he was feeling hungry and wanted to eat  denies any other complaints        PAST MEDICAL & SURGICAL HISTORY  Diabetes mellitus    COPD (chronic obstructive pulmonary disease)    Lymphedema of both lower extremities    CHF (congestive heart failure)    H/O right nephrectomy  20 yrs ago      ALLERGIES  No Known Allergies    MEDICATIONS  STANDING MEDICATIONS  ALBUTerol    90 MICROgram(s) HFA Inhaler 2 Puff(s) Inhalation every 6 hours  ascorbic acid 500 milliGRAM(s) Oral daily  aspirin  chewable 81 milliGRAM(s) Oral daily  atorvastatin 40 milliGRAM(s) Oral at bedtime  budesonide 160 MICROgram(s)/formoterol 4.5 MICROgram(s) Inhaler 2 Puff(s) Inhalation two times a day  calcium acetate 667 milliGRAM(s) Oral three times a day with meals  chlorhexidine 4% Liquid 1 Application(s) Topical <User Schedule>  dextrose 40% Gel 15 Gram(s) Oral once  dextrose 5%. 1000 milliLiter(s) IV Continuous <Continuous>  dextrose 5%. 1000 milliLiter(s) IV Continuous <Continuous>  dextrose 50% Injectable 12.5 Gram(s) IV Push once  dextrose 50% Injectable 25 Gram(s) IV Push once  donepezil 5 milliGRAM(s) Oral at bedtime  epoetin graham-epbx (RETACRIT) Injectable 86779 Unit(s) IV Push <User Schedule>  glucagon  Injectable 1 milliGRAM(s) IntraMuscular once  insulin lispro (ADMELOG) corrective regimen sliding scale   SubCutaneous three times a day before meals  iron sucrose IVPB 100 milliGRAM(s) IV Intermittent <User Schedule>  lactulose Syrup 10 Gram(s) Oral daily  levoFLOXacin  Tablet 250 milliGRAM(s) Oral every 24 hours  mirtazapine 15 milliGRAM(s) Oral at bedtime  pantoprazole    Tablet 40 milliGRAM(s) Oral two times a day  polyethylene glycol 3350 17 Gram(s) Oral daily  sertraline 25 milliGRAM(s) Oral daily  sodium chloride 0.9%. 1000 milliLiter(s) IV Continuous <Continuous>  sucralfate suspension 1 Gram(s) Oral four times a day  tamsulosin 0.4 milliGRAM(s) Oral at bedtime  zinc oxide 20% Ointment 1 Application(s) Topical daily    PRN MEDICATIONS  senna 8.6 milliGRAM(s) Oral Tablet - Peds 2 Tablet(s) Oral at bedtime PRN    VITALS:  T(F): 98  HR: 82  BP: 142/66  RR: 18  SpO2: 97%    PHYSICAL EXAM  GEN: NAD, Resting comfortably in bed  PULM: Clear to auscultation bilaterally, No wheezing  CVS: Regular rate and rhythm, S1-S2, systolic murmur present  ABD: Soft, non-tender, non-distended, no guarding  EXT: No edema  NEURO: A&Ox2, no focal deficits    LABS                        8.3    13.78 )-----------( 164      ( 19 Jul 2021 05:24 )             26.5     07-19    136  |  95<L>  |  70<HH>  ----------------------------<  94  4.5   |  24  |  7.6<HH>    Ca    8.6      19 Jul 2021 05:24  Mg     2.0     07-19    TPro  5.9<L>  /  Alb  3.3<L>  /  TBili  0.2  /  DBili  x   /  AST  14  /  ALT  7   /  AlkPhos  56  07-19    PT/INR - ( 19 Jul 2021 05:24 )   PT: 13.60 sec;   INR: 1.18 ratio         PTT - ( 19 Jul 2021 05:24 )  PTT:27.5 sec      Troponin T, Serum: 4.47 ng/mL *HH* (07-19-21 @ 05:24)      CARDIAC MARKERS ( 19 Jul 2021 05:24 )  x     / 4.47 ng/mL / x     / x     / x          RADIOLOGY

## 2021-07-20 NOTE — PROGRESS NOTE ADULT - SUBJECTIVE AND OBJECTIVE BOX
Hospital Day: 9  Post Operative Day:1  Procedure:s/p tesio placement   Patient is a 81y old  Male who presents with a chief complaint of vomiting, hypotension (2021 23:57)    PAST MEDICAL & SURGICAL HISTORY:  Diabetes mellitus    COPD (chronic obstructive pulmonary disease)    Lymphedema of both lower extremities    CHF (congestive heart failure)    H/O right nephrectomy  20 yrs ago        Events of the Last 24h:  Vital Signs Last 24 Hrs  T(C): 36.6 (2021 19:00), Max: 36.9 (2021 13:03)  T(F): 97.9 (2021 19:00), Max: 98.4 (2021 13:03)  HR: 77 (2021 20:32) (65 - 86)  BP: 145/66 (2021 20:32) (111/53 - 171/71)  BP(mean): --  RR: 19 (2021 19:00) (18 - 25)  SpO2: 98% (2021 20:32) (97% - 100%)        Diet, DASH/TLC:   Sodium & Cholesterol Restricted  Consistent Carbohydrate No Snacks  1200mL Fluid Restriction ([id number])  For patients receiving Renal Replacement - No Protein Restr, No Conc K, No Conc Phos, Low Sodium (21 @ 18:05)      I&O's Summary    2021 07:01  -  2021 07:00  --------------------------------------------------------  IN: 200 mL / OUT: 0 mL / NET: 200 mL     I&O's Detail    2021 07:01  -  2021 07:00  --------------------------------------------------------  IN:    Oral Fluid: 200 mL  Total IN: 200 mL    OUT:  Total OUT: 0 mL    Total NET: 200 mL          MEDICATIONS  (STANDING):  ALBUTerol    90 MICROgram(s) HFA Inhaler 2 Puff(s) Inhalation every 6 hours  ascorbic acid 500 milliGRAM(s) Oral daily  aspirin  chewable 81 milliGRAM(s) Oral daily  atorvastatin 40 milliGRAM(s) Oral at bedtime  budesonide 160 MICROgram(s)/formoterol 4.5 MICROgram(s) Inhaler 2 Puff(s) Inhalation two times a day  calcium acetate 667 milliGRAM(s) Oral three times a day with meals  chlorhexidine 4% Liquid 1 Application(s) Topical <User Schedule>  dextrose 40% Gel 15 Gram(s) Oral once  dextrose 5%. 1000 milliLiter(s) (50 mL/Hr) IV Continuous <Continuous>  dextrose 5%. 1000 milliLiter(s) (100 mL/Hr) IV Continuous <Continuous>  dextrose 50% Injectable 12.5 Gram(s) IV Push once  dextrose 50% Injectable 25 Gram(s) IV Push once  donepezil 5 milliGRAM(s) Oral at bedtime  epoetin graham-epbx (RETACRIT) Injectable 97747 Unit(s) IV Push <User Schedule>  glucagon  Injectable 1 milliGRAM(s) IntraMuscular once  insulin lispro (ADMELOG) corrective regimen sliding scale   SubCutaneous three times a day before meals  iron sucrose IVPB 100 milliGRAM(s) IV Intermittent <User Schedule>  lactulose Syrup 10 Gram(s) Oral daily  levoFLOXacin  Tablet 250 milliGRAM(s) Oral every 24 hours  mirtazapine 15 milliGRAM(s) Oral at bedtime  pantoprazole    Tablet 40 milliGRAM(s) Oral two times a day  polyethylene glycol 3350 17 Gram(s) Oral daily  sertraline 25 milliGRAM(s) Oral daily  sodium chloride 0.9%. 1000 milliLiter(s) (75 mL/Hr) IV Continuous <Continuous>  sucralfate suspension 1 Gram(s) Oral four times a day  tamsulosin 0.4 milliGRAM(s) Oral at bedtime  zinc oxide 20% Ointment 1 Application(s) Topical daily    MEDICATIONS  (PRN):  senna 8.6 milliGRAM(s) Oral Tablet - Peds 2 Tablet(s) Oral at bedtime PRN Constipation      PHYSICAL EXAM:    GENERAL: NAD    HEENT: NCAT    NECK: right tesio in place     CHEST/LUNGS: CTAB    HEART: RRR,  No murmurs, rubs, or gallops    ABDOMEN: SNTND +BS    EXTREMITIES:  FROM, No clubbing, cyanosis, or edema, palpable pulse    NEURO: No focal neurological deficits    SKIN: No rashes or lesions    INCISION/WOUNDS:                          8.3    13.78 )-----------( 164      ( 2021 05:24 )             26.5        CBC Full  -  ( 2021 05:24 )  WBC Count : 13.78 K/uL  RBC Count : 2.99 M/uL  Hemoglobin : 8.3 g/dL  Hematocrit : 26.5 %  Platelet Count - Automated : 164 K/uL  Mean Cell Volume : 88.6 fL  Mean Cell Hemoglobin : 27.8 pg  Mean Cell Hemoglobin Concentration : 31.3 g/dL  Auto Neutrophil # : 7.82 K/uL  Auto Lymphocyte # : 1.72 K/uL  Auto Monocyte # : 1.36 K/uL  Auto Eosinophil # : 2.71 K/uL  Auto Basophil # : 0.07 K/uL  Auto Neutrophil % : 56.7 %  Auto Lymphocyte % : 12.5 %  Auto Monocyte % : 9.9 %  Auto Eosinophil % : 19.7 %  Auto Basophil % : 0.5 %               136   |  95    |  70                 Ca: 8.6    BMP:   ----------------------------< 94     M.0   (21 @ 05:24)             4.5    |  24    | 7.6                Ph: x        LFT:     TPro: 5.9 / Alb: 3.3 / TBili: 0.2 / DBili: x / AST: 14 / ALT: 7 / AlkPhos: 56   (21 @ 05:24)    LIVER FUNCTIONS - ( 2021 05:24 )  Alb: 3.3 g/dL / Pro: 5.9 g/dL / ALK PHOS: 56 U/L / ALT: 7 U/L / AST: 14 U/L / GGT: x           PT/INR - ( 2021 05:24 )   PT: 13.60 sec;   INR: 1.18 ratio         PTT - ( 2021 05:24 )  PTT:27.5 sec  CARDIAC MARKERS ( 2021 05:24 )  x     / 4.47 ng/mL / x     / x     / x

## 2021-07-20 NOTE — ADVANCED PRACTICE NURSE CONSULT - ASSESSMENT
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  ED Course: Patient found to have RLL PNA and UTI. Patient hypotensive, requiring pressors. Labs significant for hyperkalemia and metabolic acidosis. [place] placed, patient went for urgent HD.  Currently admitted to medicine with the primary diagnosis of Septic shock-Today is hospital day 8d. Currently on 3B, being managed for Septic Shock 2/2 RLL Pneumonia, Suspected Aspiration Pneumonia and UTI; CKDV now on HD, s/p Rt nephrectomy; Metabolic acidosis; Hyperkalemia; Acute on chronic anemia; Acute urinary retention;  Troponemia; HTN; DM; Alzheimer's dementia; Iron deficiency anemia; COPD.    Received patient on 3B, awake, alert, following commands & responding appropriately, sitting up in bed eating lunch tray, daughter at bedside, both made aware of purpose of WOCN visit, agreeable to consult. With assistance from PCA, turned patient to left side for skin assessment.     Sacral & coccyx skin intact. Moisture associated skin damage (MASD) to b/l buttock-intergluteal cleft area -skin macerated, multiple scattered areas (~1cm x 0.3cm x 0.2cm) of partial thickness skin openings in linear pattern scattered to buttock area.     Patient mostly bedbound, able to turn/position in bed w/ assistance, anuric-on HD-reports some urinary incontinence, episodes of fecal incontinence. Ordered for sodium & cholesterol restricted diet, probably inadequate intake as per reported Celso score.

## 2021-07-20 NOTE — DISCHARGE NOTE NURSING/CASE MANAGEMENT/SOCIAL WORK - PATIENT PORTAL LINK FT
You can access the FollowMyHealth Patient Portal offered by [facility] by registering at the following website: [email or web address]. By joining [facility]’s FollowMyHealth portal, you will also be able to view your health information using other applications (apps) compatible with our system.

## 2021-07-20 NOTE — PROGRESS NOTE ADULT - ASSESSMENT
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 Found to be in acute on chronic renal failure, with pneumonia and UTI.    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  patient with h/o  Rt nephrectomy   #Metabolic acidosis- improved  #Hyperkalemia- improved  -S/P udall 7/12  nephro following  Vascular surgery consult to place tunneled HD catheter- possibly on Monday after obtaining conset from family  EPO and Venofer with HD  PhosLo with meals  Renal diet  need OP dialysis center arrangements; hep panel ordered; care management team following for arranging the center; as per nephro note - recommended at [facility] [phone]    #Acute on chronic anemia  hg stable now  - reported to have dark stools, on ferrous sulfate   - s/p one unit of PRBC   - IV PPI BID now-> change to PO protonix  - diet advanced    #Acute urinary retention -    patient was started on FLOMAX - contiune  TOV ; check post void residue    #Troponemia  # h/o CHF- currenlty compensatedunsure type  -no chest pain  -Cardiology recs: - Likely NSTEMI TYPE II 2/2 demand ischemia from ARF/ Septic Shock  -F/U 2d ECHO   -c/w aspirin 81 mg daily    #HTN- monitor     #DM-insulin PRN     #Alzheimer's dementia  -continue remeron, aricept, sertraline    #Iron deficiency anemia  -continue iron sucrose  -ROMELIA per nephro    #COPD on home O2-continue inhalers      #DVT PPx -SCDs ; chemical px on hold considerin episode of anemia ; monitor    #Progress Note Handoff  Pending (specify):  tunneled dialysis catheter placement; dialysis arrangements as OP;  Family discussion: plan of care dicussed with patient. patient does not comprehend the medical plans  Plans of care discussed with daughter Mrs Nguyen and updated plan of care. As per daughter she gave consent for caht placement to [facility] surgery team  Disposition: SNF_     
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 Found to be in acute on chronic renal failure, with pneumonia and UTI.    # epigastric abdominal pain- possible gastritis  monitor hg, monitor symptoms  continue protonic  started on carafare    # pressure ulcer - sacral area and gluteal area  wound care consult  barrier cream application and foam dressing  frequent positioning  discussed with nursing staff and PCA    #Septic Shock 2/2 RLL  secondary to Legionellosis with multilobar PNA  - Chest Xray 7/12: RLL pneumonia 2/2 likely aspiration pneumonia   - UA: Turbid, large Leukocyte esterase   - Urine culture 7/12: NGTD  - Blood Culture NGTD  - ID following- on levofloxacin - recommended 14 more days of treatement till 7/30   azithromycin stopped on 7/17  respiratory therapy for sputum induction for sputum culture- recommended by health department    # 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 Per family they have given consent. Will keep patient NPO after midnight.  EPO and Venofer with HD  PhosLo with meals  Renal diet  need OP dialysis center arrangements; hep panel ordered; care management team following for arranging the center; as per nephro note - recommended at [facility] [phone]    #Acute on chronic anemia  hg stable now  - reported to have dark stools, on ferrous sulfate   - s/p one unit of PRBC   - IV PPI BID now-> change to PO protonix  - diet advanced    #Acute urinary retention -  patient anuric now  patient was started on FLOMAX - contiune  POV - 40 ml    #Troponemia  # h/o CHF- currenlty compensatedunsure type  -no chest pain  -Cardiology recs: - Likely NSTEMI TYPE II 2/2 demand ischemia from ARF/ Septic Shock  -F/U 2d ECHO - pending  -c/w aspirin 81 mg daily    #HTN- monitor     #DM-insulin PRN     #Alzheimer's dementia  -continue remeron, aricept, sertraline    #Iron deficiency anemia  -continue iron sucrose  -ROMELIA per nephro    #COPD on home O2-continue inhalers      #DVT PPx -SCDs ; chemical px on hold considerin episode of anemia ; monitor    #Progress Note Handoff  Pending (specify):  tunneled dialysis catheter placement on Monday; dialysis arrangements as OP;  Family discussion: plan of care dicussed with patient.=  Plans of care discussed with daughter Mrs Nguyen yesterday.  Disposition: SNF_     
ASSESSMENT:  Patient a 81y year old Male presents for tesio placement for long-term dialysis access     PLAN:  - OR add on today for tesio catheter   - PreOp done; consent taken   - IVF while NPO  - Q4h postop checks      VASCULAR TEAM SPECTRA: [id number]
Acute renal failure, likely ATN in setting of sepsis  CKD V 3.3-4  Sepsis, leukocytosis, hypotension, R lung opacity, ?UTI  Hyperkalemia  Anion gap metabolic acidosis/acidemia  Anemia    Plan:    HD today: 3 hours, opti 160 dialyzer, 2K bath, 0L UF  No evidence of renal recovery  Surgery consult to place tunneled HD catheter  DC PO bicarb  DC FeSO4  Add EPO and Venofer with HD  PhoLo with meals  Renal diet
Acute renal failure, likely ATN in setting of sepsis  CKD V 3.3-4  Sepsis, leukocytosis, hypotension, R lung opacity, ?UTI  Hyperkalemia  Anion gap metabolic acidosis/acidemia  Anemia    Plan:    No evidence of renal recovery  Can DC PO bicarb  HD tomorrow: 3 hours, opti 160 dialyzer, 2K bath, 0L UF  Followup cultures  Renally dose antibiotics
Acute renal failure due to ATN  CKD stage 3-4  Sepsis  hypotension  legionellosis multilobar PNA  UTI  Anemia  DM    Plan:    HD today: 3 hours, opti 160 dialyzer, 3K bath, 1L UF  No evidence of renal recovery, cont to monitor  Vascular surgery to place tunneled HD catheter Monday  EPO and Venofer with HD  Pholo with meals  Renal diet  abx per ID  Outpatient HD at [facility] [phone]      addendum:  pt seen again on HD.  BP's low.  Adjust UF as nancy
Acute renal failure, likely ATN in setting of sepsis  CKD V 3.3-4  Sepsis, leukocytosis, hypotension, R lung opacity, ?UTI  Hyperkalemia  Anion gap metabolic acidosis/acidemia  Anemia    Plan:    DC bicarb drip  HD today: 2.5 hours, opti 160 dialyzer, 2K bath, 0L UF  Followup cultures  Renally dose antibiotics  
Acute renal failure, likely ATN in setting of sepsis  CKD V 3.3-4  Sepsis, leukocytosis, hypotension, legionellosis PNA  Hyperkalemia  Anion gap metabolic acidosis/acidemia  Anemia    Plan:    HD tomorrow: 3 hours, opti 160 dialyzer, 3K bath, 1L UF  No evidence of renal recovery  Vascular surgery consult to place tunneled HD catheter  EPO and Venofer with HD  PhoLo with meals  Renal diet  Outpatient HD at [facility] [phone]
CKD/ESRD on HD  Sepsis, leukocytosis, hypotension, legionellosis PNA  Hyperkalemia  Anion gap metabolic acidosis/acidemia  Anemia    Plan:    HD today: 3 hours, opti 160 dialyzer, 3K bath, 2L UF    s/p  tunneled HD catheter     EPO and Venofer with HD    PhoLo with meals    NH placement    Outpatient HD at [facility] [phone]    d/w pt and HD
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 Found to be in acute on chronic renal failure, with pneumonia and UTI.        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 [phone]    # epigastric abdominal pain- possible gastritis- improved  continue protonix  started on carafare    # pressure ulcer - sacral area and gluteal area  wound care consult  barrier cream application and foam dressing  frequent positioning  discussed with nursing staff and PCA  #Acute on chronic anemia  hg stable now  - reported to have dark stools, on ferrous sulfate   - s/p one unit of PRBC   - IV PPI BID now-> change to PO protonix  - diet advanced    #Acute urinary retention -  patient anuric now  patient was started on FLOMAX - contiune  POV - 40 ml    #Troponemia  # h/o CHF- currenlty compensatedunsure type  -no chest pain  -Cardiology recs: - Likely NSTEMI TYPE II 2/2 demand ischemia from ARF/ Septic Shock  -F/U 2d ECHO - pending  -c/w aspirin 81 mg daily    #HTN- monitor     #DM-insulin PRN     #Alzheimer's dementia  -continue remeron, aricept, sertraline    #Iron deficiency anemia  -continue iron sucrose  -ROMELIA per nephro    #COPD on home O2-continue inhalers      #DVT PPx -SCDs ; chemical px on hold considering episode of anemia ; monitor    #Progress Note Handoff  Pending (specify):  tunneled dialysis catheter placement on Monday; dialysis arrangements as OP;  Family discussion: plan of care dicussed with patient  Disposition: SNF  
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 Found to be in acute on chronic renal failure, with pneumonia and UTI.    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  patient with h/o  Rt nephrectomy   #Metabolic acidosis- improved  #Hyperkalemia- improved  -S/P udall 7/12  nephro following; plan for dialsysis topmorrow  Vascular surgery consult to place tunneled HD catheter  EPO and Venofer with HD  PhosLo with meals  Renal diet  need OP dialysis center arrangements; hep panel ordered; care management team following for arranging the center; oper nephro note - recommended at [facility] [phone]      #Acute on chronic anemia  - reported to have dark stools, on ferrous sulfate, hgb dropped slightly   - s/p one unit of PRBC   - IV PPI BID now; if hg stable will transition to PO tomorrow  - diet advanced    #Acute urinary retention -    patient was started on FLOMAX - contiune  continue joel  PT worked with patient; patient was unable to stand up or walk;     #Troponemia  # h/o CHF- currenlty compensatedunsure type  -no chest pain  -Cardiology recs: - Likely NSTEMI TYPE II 2/2 demand ischemia from ARF/ Septic Shock  -F/U 2d ECHO   -c/w aspirin 81 mg daily    #HTN  - monitor     #DM  -insulin PRN     #Alzheimer's dementia  -continue remeron, aricept, sertraline    #Iron deficiency anemia  -continue iron sucrose  -ROMELIA per nephro    #COPD on home O2  -continue inhalers      #DVT PPx -SCDs      
Acute renal failure, likely ATN in setting of sepsis  CKD V 3.3-4  Sepsis, leukocytosis, hypotension, legionellosis PNA  Hyperkalemia  Anion gap metabolic acidosis/acidemia  Anemia    Plan:    HD tomorrow: 3 hours, opti 160 dialyzer, 3K bath, 2L UF  No evidence of renal recovery  Vascular surgery consult to place tunneled HD catheter today  EPO and Venofer with HD  PhoLo with meals  Renal diet  Outpatient HD at [facility] [phone]
Awake
IMPRESSION:    Sepsis present on admission   Septic Shock  ASHLEE on CKD 4  UTI/Pyelonephritis  RLL Pneumonia  Possible aspiration  HO Alzheimer's Dementia    PLAN:    CNS: avoid sedation    HEENT: Oral care.      PULMONARY:  HOB @ 45 degrees.  Aspiration precautions.  Wean O2.  VBG     CARDIOVASCULAR:  Wean  Levophed,  Keep MAP > 60.       GI: GI prophylaxis.  Feeding per speech.  Bowel regimen.  Enemas     RENAL:  Follow up lytes.  Correct as needed.   Repeat CMP Q12.      INFECTIOUS DISEASE: Follow up cultures.  Unasyn for now     HEMATOLOGICAL:  DVT prophylaxis.  Dimer.      ENDOCRINE:  Follow up FS.  Insulin protocol if needed.    MUSCULOSKELETAL: bed rest    SDU     GOC  
81 Y M from [facility] with pmh of CHF (unknown EF), HTN, anxiety, Alzheimer's dementia, COPD on home O2, T2DM on insulin, hx right nephrectomy/ 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 Found to be in acute on chronic renal failure, with pneumonia and UTI.    #Septic Shock 2/2 RLL Pneumonia, Suspected Aspiration Pneumonia and UTI  - Chest Xray 7/12: RLL pneumonia 2/2 likely aspiration pneumonia   - UA: Turbid, large Leukocyte esterase   - Urine culture 7/12: NGTD  - Blood Culture NGTD  - start levofloxacin for legionella PNA      #CKDV now on HD, s/p Rt nephrectomy   #Metabolic acidosis  #Hyperkalemia  -S/P udall 7/12  -S/p Dialysis sessions 7/12, 7/13, and today   -will need long term HD   -f/u TTE     #Acute on chronic anemia  - reported to have dark stools, on ferrous sulfate, hgb dropped slightly   - s/p one unit of PRBC   - IV PPI   - Clears for now, trend CBC, will advance once stable     #Acute urinary retention - TOV in am, FLOMAX     #Troponemia  -no chest pain  -Cardiology recs: - Likely NSTEMI TYPE II 2/2 demand ischemia from ARF/ Septic Shock  -F/U 2d ECHO   -c/w aspirin 81 mg daily    #HTN  - monitor     #DM  -insulin PRN     #Alzheimer's dementia  -continue remeron, aricept, sertraline    #Iron deficiency anemia  -continue ferrous sulfate  -ROMELIA per nephro    #COPD on home O2  -continue inhalers    #DVT PPx -SCDs      
Acute renal failure due to ATN  CKD stage 3-4  Sepsis  hypotension  legionellosis multilobar PNA  UTI  Anemia  DM    Plan:    next HD Tuesday  monitor for any signs of renal recovery  Vascular surgery to place tunneled HD catheter Monday  EPO and Venofer with HD  Pholo with meals  Renal diet  abx per ID  dnr / dni  Outpatient HD at [facility] [phone]  
Can use tesio for dialysis  Call vascular team as needed   Will continue to follow 
IMPRESSION:    Sepsis present on admission   Septic Shock improving   ASHLEE on CKD 4  UTI/Pyelonephritis  RLL Pneumonia  Possible aspiration  HO Alzheimer's Dementia    PLAN:    CNS: avoid sedation    HEENT: Oral care.      PULMONARY:  HOB @ 45 degrees.  Aspiration precautions.  Wean O2.  VBG noted      CARDIOVASCULAR:  Wean  Levophed,  Keep MAP > 60.  Midodrine     GI: GI prophylaxis.  Feeding per speech.  Bowel regimen.  Enemas     RENAL:  Follow up lytes.  Correct as needed.       INFECTIOUS DISEASE: Follow up cultures.  ABX per ID     HEMATOLOGICAL:  DVT prophylaxis.  Dimer.      ENDOCRINE:  Follow up FS.     MUSCULOSKELETAL: PT OT     SDU Possible downgrade PM     GOC  
  81 Y M from [facility] with pmh of CHF (unknown EF), HTN, anxiety, Alzheimer's dementia, COPD on home O2, T2DM on insulin, hx right nephrectomy/ 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 Found to be in acute on chronic renal failure, with pneumonia and UTI.    #Septic Shock 2/2 RLL Pneumonia, Suspected Aspiration Pneumonia and UTI  - Chest Xray 7/12: RLL pneumonia 2/2 likely aspiration pneumonia   - UA: Turbid, large Leukocyte esterase   - Urine culture grew Legionella  - d/c Meropenem and Linezolid  - d/c levofloxacin 250 Q48H alternating w/ Azithromycin 500 Q48H  - c/w levofloxacin 250mg Q24H  - pending sputum culture collection   - pending resp sputum induction  - c/w 2L NC     #CKDV now on HD, s/p Rt nephrectomy   #Metabolic acidosis  #Hyperkalemia  -S/P udall 7/12  -S/p Dialysis today  - will need long term HD  - f/u TTE pending EF of 40 %; Moderately increased LV wall thickness; severe aortic stenosis.  - f/u outpatient Dialysis at [facility] on [facility] post d/c [phone]    #Acute on chronic anemia  - reported to have dark stools, on ferrous sulfate, hgb dropped slightly   - Hb 7.9 stable  - s/p one unit of PRBC   - IV PPI       #Acute urinary retention   - D/c Laura today  - trial of void  - c/w  FLOMAX   - bladder scan    #Troponemia  -no chest pain  -Cardiology recs: - Likely NSTEMI TYPE II 2/2 demand ischemia from ARF/ Septic Shock  -F/U 2d ECHO pending  -c/w aspirin 81 mg daily    #HTN  - monitor     #DM  - insulin PRN     #Alzheimer's dementia  -continue remeron, aricept, sertraline    #Iron deficiency anemia  -d/c ferrous sulfate  - d/c bicab  - c/w EPO and Venofer with HD      #COPD on home O2  -continue inhalers    #DVT PPx -SCDs   #PPI PPx:   # Diet: NPO > Thin liquids > AAT  # Activity: bed bound  #Dispo: SNF   
ASSESSMENT  81 Y M from [facility] with pmh of CHF (unknown EF), HTN, anxiety, Alzheimer's dementia, COPD on home O2, T2DM on insulin, hx right nephrectomy/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    IMPRESSION  # Legionellosis with multilobar PNA  -BCx NG  # ASHLEE on CKD; s/p Rt nephrectomy  # Imaging suggestive of UTI/left pyelonephritis:   - CT Abd/pelvis (07/12): Circumferential urinary bladder wall thickening with surrounding inflammation, suspicious for cystitis. Left perinephric fat stranding also noted which may represent superimposed ascending UTI/pyelonephritis.   -UCx NG    RECOMMENDATIONS  - Levoquin 250 mg iv q24h. Could change to po on discharge  -Duration 14 more days till 7/30  -recall prn please 
ASSESSMENT  81 Y M from [facility] with pmh of CHF (unknown EF), HTN, anxiety, Alzheimer's dementia, COPD on home O2, T2DM on insulin, hx right nephrectomy/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    IMPRESSION  # Legionellosis with multilobar PNA  -BCx NG  # ASHLEE on CKD; s/p Rt nephrectomy  # Imaging suggestive of UTI/left pyelonephritis:   - CT Abd/pelvis (07/12): Circumferential urinary bladder wall thickening with surrounding inflammation, suspicious for cystitis. Left perinephric fat stranding also noted which may represent superimposed ascending UTI/pyelonephritis.   -UCx NG    RECOMMENDATIONS  - Levoquin 250 mg iv q24h. Could change to po on discharge  -Duration 14 more days

## 2021-07-20 NOTE — DISCHARGE NOTE PROVIDER - NSDCCPCAREPLAN_GEN_ALL_CORE_FT
PRINCIPAL DISCHARGE DIAGNOSIS  Diagnosis: Septic shock  Assessment and Plan of Treatment: you presented for severe infection f the lungs causing low blood pressure. You were diagnosed with legionella pneumonia. You were prescribed antibiotics, please take them  prescribed.      SECONDARY DISCHARGE DIAGNOSES  Diagnosis: Renal failure  Assessment and Plan of Treatment: YDue to the severe infection you had and the complications and the abse line of chronic kidney disease you have you were started on dialysis, you should have a low potassium low phosphorus diet and be cautious with your volume status.  Outpatient HD at [facility] [phone]

## 2021-07-20 NOTE — PROGRESS NOTE ADULT - SUBJECTIVE AND OBJECTIVE BOX
[facility] FOLLOW UP NOTE  --------------------------------------------------------------------------------  Chief Complaint/24 hour events/subjective:    pt seen and examined    PAST HISTORY  --------------------------------------------------------------------------------  No significant changes to PMH, PSH, FHx, SHx, unless otherwise noted    ALLERGIES & MEDICATIONS  --------------------------------------------------------------------------------  Allergies    No Known Allergies    Intolerances      Standing Inpatient Medications  ALBUTerol    90 MICROgram(s) HFA Inhaler 2 Puff(s) Inhalation every 6 hours  ascorbic acid 500 milliGRAM(s) Oral daily  aspirin  chewable 81 milliGRAM(s) Oral daily  atorvastatin 40 milliGRAM(s) Oral at bedtime  budesonide 160 MICROgram(s)/formoterol 4.5 MICROgram(s) Inhaler 2 Puff(s) Inhalation two times a day  calcium acetate 667 milliGRAM(s) Oral three times a day with meals  chlorhexidine 4% Liquid 1 Application(s) Topical <User Schedule>  dextrose 40% Gel 15 Gram(s) Oral once  dextrose 5%. 1000 milliLiter(s) IV Continuous <Continuous>  dextrose 5%. 1000 milliLiter(s) IV Continuous <Continuous>  dextrose 50% Injectable 12.5 Gram(s) IV Push once  dextrose 50% Injectable 25 Gram(s) IV Push once  donepezil 5 milliGRAM(s) Oral at bedtime  epoetin graham-epbx (RETACRIT) Injectable 10478 Unit(s) IV Push <User Schedule>  glucagon  Injectable 1 milliGRAM(s) IntraMuscular once  heparin   Injectable 5000 Unit(s) SubCutaneous every 12 hours  insulin lispro (ADMELOG) corrective regimen sliding scale   SubCutaneous three times a day before meals  iron sucrose IVPB 100 milliGRAM(s) IV Intermittent <User Schedule>  lactulose Syrup 10 Gram(s) Oral daily  levoFLOXacin  Tablet 250 milliGRAM(s) Oral every 24 hours  mirtazapine 15 milliGRAM(s) Oral at bedtime  pantoprazole    Tablet 40 milliGRAM(s) Oral two times a day  polyethylene glycol 3350 17 Gram(s) Oral daily  sertraline 25 milliGRAM(s) Oral daily  sodium chloride 0.9%. 1000 milliLiter(s) IV Continuous <Continuous>  sucralfate suspension 1 Gram(s) Oral four times a day  tamsulosin 0.4 milliGRAM(s) Oral at bedtime  zinc oxide 20% Ointment 1 Application(s) Topical daily    PRN Inpatient Medications  senna 8.6 milliGRAM(s) Oral Tablet - Peds 2 Tablet(s) Oral at bedtime PRN      REVIEW OF SYSTEMS  --------------------------------------------------------------------------------    All other systems were reviewed and are negative, except as noted.    VITALS/PHYSICAL EXAM  --------------------------------------------------------------------------------  T(C): 36.9 (07-20-21 @ 05:57), Max: 36.9 (07-19-21 @ 13:03)  HR: 81 (07-20-21 @ 10:05) (65 - 87)  BP: 115/55 (07-20-21 @ 10:05) (110/56 - 171/71)  RR: 18 (07-20-21 @ 10:05) (18 - 25)  SpO2: 98% (07-20-21 @ 10:05) (97% - 100%)  Wt(kg): --  Height (cm): 172.7 (07-19-21 @ 16:57)  Weight (kg): 68.6 (07-19-21 @ 16:57)  BMI (kg/m2): 23 (07-19-21 @ 16:57)  BSA (m2): 1.81 (07-19-21 @ 16:57)      Physical Exam:    	Gen: no distress   	Pulm: CTA B/L  	CV: S1S2; no rub  	Abd: +BS, soft, nontender/nondistended  	LE:  edema      LABS/STUDIES  --------------------------------------------------------------------------------              7.4    13.84 >-----------<  167      [07-20-21 @ 07:37]              23.8     137  |  97  |  83  ----------------------------<  94      [07-20-21 @ 07:37]  5.2   |  24  |  8.6        Ca     8.0     [07-20-21 @ 07:37]      Mg     2.3     [07-20-21 @ 07:37]    TPro  5.5  /  Alb  3.1  /  TBili  0.2  /  DBili  x   /  AST  14  /  ALT  6   /  AlkPhos  58  [07-20-21 @ 07:37]    PT/INR: PT 13.60, INR 1.18       [07-19-21 @ 05:24]  PTT: 27.5       [07-19-21 @ 05:24]    Troponin 4.47      [07-19-21 @ 05:24]    Creatinine Trend:  SCr 8.6 [07-20 @ 07:37]  SCr 7.6 [07-19 @ 05:24]  SCr 6.2 [07-18 @ 06:14]  SCr 7.0 [07-17 @ 06:42]  SCr 6.0 [07-16 @ 06:21]    Urinalysis - [07-12-21 @ 13:07]      Color Yellow / Appearance Slightly Turbid / SG 1.014 / pH 6.0      Gluc Negative / Ketone Negative  / Bili Negative / Urobili <2 mg/dL       Blood Small / Protein 300 mg/dL / Leuk Est Large / Nitrite Negative      RBC 14 / WBC 73 / Hyaline 3 / Gran  / Sq Epi  / Non Sq Epi 1 / Bacteria Few      Iron 34, TIBC 178, %sat 19      [01-04-21 @ 06:40]  Ferritin 332      [01-04-21 @ 06:40]  PTH -- (Ca 8.9)      [12-24-20 @ 04:30]   54    HBsAb Nonreact      [07-12-21 @ 18:05]  HBsAg Nonreact      [07-15-21 @ 20:00]  HCV 0.15, Nonreact      [07-15-21 @ 20:00]

## 2021-07-20 NOTE — OCCUPATIONAL THERAPY INITIAL EVALUATION ADULT - PATIENT PROFILE REVIEW, REHAB EVAL
[id number] Pt chart thoroughly reviewed prior to OT evaluation./yes
Evaluation Attempted: 11:30am; pt chart thoroughly reviewed prior to OT evaluation/yes
yes
Pt's chart reviewed./yes
yes

## 2021-07-20 NOTE — DISCHARGE NOTE PROVIDER - HOSPITAL COURSE
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 Found to be in acute on chronic renal failure, with pneumonia and UTI.        #Septic Shock 2/2 RLL  secondary to Legionellosis with multilobar PNA  - Chest Xray 7/12: RLL pneumonia 2/2 likely aspiration pneumonia   - UA: Turbid, large Leukocyte esterase   - Urine culture 7/12: NGTD  - Blood Culture NGTD  - ID following- on levofloxacin - recommended 14 more days of treatement till 7/30   azithromycin stopped on 7/17  respiratory therapy for sputum induction for sputum culture- recommended by health department    he is on hd today   ori vargas   need OP dialysis center arrangements; hep panel ordered; care management team following for arranging the center; as per nephro note - recommended at [facility] [phone]      Disposition: SNF   · Assessment    81 Y M from [facility] with pmh of CHF (unknown EF), HTN, anxiety, Alzheimer's dementia, COPD on home O2, T2DM on insulin, hx right nephrectomy/ 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 Found to be in acute on chronic renal failure, with pneumonia and UTI.    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 Nephrology team was following.   Allison had  udall cath on 7/12- will be dsicontinued today. Vascular surgery was following and patient had tunneled catheter placement on7/19    Patient has dialysis today through tunneled catheter which patient was able to tolerate.  EPO and Venofer with HD- nephro logy to arrange at HD center.  PhosLo with meals  SW made arrangements for OP dialysis set up.    # epigastric abdominal pain- possible gastritis- improved  # pressure ulcer - sacral area and gluteal area- continue wound care recommendations  #Acute on chronic anemia- hg stable ; Iron deficiency anemia; s/p one unit of PRBC ; continue protonix  #Acute urinary retention -  patient anuric now; continue flomax.  #Troponemia- - Likely NSTEMI TYPE II 2/2 demand ischemia from ARF/ Septic Shock  # h/o HFrEF- compensated; EF 40 %  #HTN- stable  #DM-well controlled blood sugar  #Alzheimer's dementia-continue remeron, aricept, sertraline  #COPD on home O2-continue inhalers      Disposition: [facility]        Echo 7/18/21  Summary:   1. Technically difficult study.   2. Moderately decreased global left ventricular systolic function.   3. Entire apex, mid and apical anterior septum, mid and apical inferior septum, and mid and apical inferior wall are abnormal as described above.   4. LV Ejection Fraction by Bishop's Method with a biplane EF of 40 %.   5. Moderately increased LV wall thickness.   6. Normal left ventricular internal cavity size.   7. Moderately enlarged left atrium.   8. Normal right atrial size.   9. There is no evidence of pericardial effusion.  10. Mild to moderate mitral annular calcification.  11. Mild to moderate mitral valve regurgitation.  12. Thickening and calcification of the anterior and posterior mitral valve leaflets.  13. Moderate tricuspid regurgitation.  14. Pulmonary hypertension is present.  15. Peak transaortic gradient equals 35.8 mmHg, mean transaortic gradient equals 19.1 mmHg, the calculated aortic valve area equals 0.58 cm² by the continuity equation consistent with severe aortic stenosis.      PHYSICAL EXAM    GEN: NAD, Resting comfortably in bed  PULM: Clear to auscultation bilaterally, No wheezing  CVS: Regular rate and rhythm, S1-S2, no murmurs  ABD: Soft, non-tender, non-distended, no guarding  EXT: No edema, no joint swelling  NEURO: A&Ox2, no focal deficits · Assessment    81 Y M from [facility] with pmh of CHF (unknown EF), HTN, anxiety, Alzheimer's dementia, COPD on home O2, T2DM on insulin, hx right nephrectomy/ 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 Found to be in acute on chronic renal failure, with pneumonia and UTI.    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 Nephrology team was following.   Allison had  udall cath on 7/12- will be dsicontinued today. Vascular surgery was following and patient had tunneled catheter placement on7/19    Patient has dialysis today through tunneled catheter which patient was able to tolerate.  EPO and Venofer with HD- nephro logy to arrange at HD center.  PhosLo with meals  SW made arrangements for OP dialysis set up.    # epigastric abdominal pain- possible gastritis- improved  # pressure ulcer - sacral area and gluteal area- continue wound care recommendations  #Acute on chronic anemia- hg stable ; Iron deficiency anemia; s/p one unit of PRBC ; continue protonix  #Acute urinary retention -  patient anuric now; continue flomax.  #Troponemia- - Likely NSTEMI TYPE II 2/2 demand ischemia from ARF/ Septic Shock  # h/o HFrEF- compensated; EF 40 %  #HTN- stable  #DM-well controlled blood sugar  #Alzheimer's dementia-continue remeron, aricept, sertraline  #COPD on home O2-continue inhalers      Disposition: SNF  Plan of care discussed with patient and family today       Echo 7/18/21  Summary:   1. Technically difficult study.   2. Moderately decreased global left ventricular systolic function.   3. Entire apex, mid and apical anterior septum, mid and apical inferior septum, and mid and apical inferior wall are abnormal as described above.   4. LV Ejection Fraction by Bishop's Method with a biplane EF of 40 %.   5. Moderately increased LV wall thickness.   6. Normal left ventricular internal cavity size.   7. Moderately enlarged left atrium.   8. Normal right atrial size.   9. There is no evidence of pericardial effusion.  10. Mild to moderate mitral annular calcification.  11. Mild to moderate mitral valve regurgitation.  12. Thickening and calcification of the anterior and posterior mitral valve leaflets.  13. Moderate tricuspid regurgitation.  14. Pulmonary hypertension is present.  15. Peak transaortic gradient equals 35.8 mmHg, mean transaortic gradient equals 19.1 mmHg, the calculated aortic valve area equals 0.58 cm² by the continuity equation consistent with severe aortic stenosis.      PHYSICAL EXAM    GEN: NAD, Resting comfortably in bed  PULM: Clear to auscultation bilaterally, No wheezing  CVS: Regular rate and rhythm, S1-S2, no murmurs  ABD: Soft, non-tender, non-distended, no guarding  EXT: No edema, no joint swelling  NEURO: A&Ox2, no focal deficits

## 2021-07-20 NOTE — DISCHARGE NOTE PROVIDER - PROVIDER TOKENS
PROVIDER:[TOKEN:[[id number]],FOLLOWUP:[2 weeks]] PROVIDER:[TOKEN:[[id number]],FOLLOWUP:[2 weeks]],PROVIDER:[TOKEN:[[id number]],FOLLOWUP:[2 weeks]],FREE:[LAST:[Primary care physician],PHONE:[(   )    -],FAX:[(   )    -],FOLLOWUP:[1-3 days]]

## 2021-07-20 NOTE — DISCHARGE NOTE PROVIDER - NSDCMRMEDTOKEN_GEN_ALL_CORE_FT
albuterol 90 mcg/inh inhalation aerosol: 2 puff(s) inhaled every 4 hours  Aricept 5 mg oral tablet: 1 tab(s) orally once a day (at bedtime)  aspirin 81 mg oral tablet, chewable: 1 tab(s) orally once a day  atorvastatin 40 mg oral tablet: 1 tab(s) orally once a day (at bedtime)  Breo Ellipta 200 mcg-25 mcg/inh inhalation powder: 1 puff(s) inhaled once a day  calcium acetate 667 mg oral tablet:  orally   ferrous sulfate 325 mg (65 mg elemental iron) oral tablet: 1 tab(s) orally once a day  lactulose 10 g/15 mL oral syrup: 15 milliliter(s) orally once a day  levoFLOXacin 250 mg oral tablet: 1 tab(s) orally every 24 hours till july/30/21  pantoprazole 40 mg oral delayed release tablet: 1 tab(s) orally 2 times a day  polyethylene glycol 3350 oral powder for reconstitution: 17 gram(s) orally once a day  Remeron 15 mg oral tablet: 1 tab(s) orally once a day (at bedtime)  Retacrit 4000 units/mL preservative-free injectable solution: 4000 unit(s) injectable once a week. hold if Hgb &gt; 10  Senna 8.6 mg oral tablet: 2 tab(s) orally once a day (at bedtime), As Needed  sertraline 25 mg oral tablet: 1 tab(s) orally once a day  sucralfate 1 g/10 mL oral suspension: 10 milliliter(s) orally 4 times a day  tamsulosin 0.4 mg oral capsule: 1 cap(s) orally once a day (at bedtime)  Vitamin C 500 mg oral tablet: 1 tab(s) orally once a day  zinc oxide 20% topical ointment: 1 application topically once a day   albuterol 90 mcg/inh inhalation aerosol: 2 puff(s) inhaled every 4 hours  Aricept 5 mg oral tablet: 1 tab(s) orally once a day (at bedtime)  aspirin 81 mg oral tablet, chewable: 1 tab(s) orally once a day  atorvastatin 40 mg oral tablet: 1 tab(s) orally once a day (at bedtime)  Breo Ellipta 200 mcg-25 mcg/inh inhalation powder: 1 puff(s) inhaled once a day  calcium acetate 667 mg oral tablet: orally 3 times a day (with meals)  ferrous sulfate 325 mg (65 mg elemental iron) oral tablet: 1 tab(s) orally once a day  lactulose 10 g/15 mL oral syrup: 15 milliliter(s) orally once a day, As Needed for constipation  levoFLOXacin 250 mg oral tablet: 1 tab(s) orally every 24 hours till july/30/21  pantoprazole 40 mg oral delayed release tablet: 1 tab(s) orally 2 times a day  polyethylene glycol 3350 oral powder for reconstitution: 17 gram(s) orally once a day  Remeron 15 mg oral tablet: 1 tab(s) orally once a day (at bedtime)  Retacrit 4000 units/mL preservative-free injectable solution: 4000 unit(s) injectable once a week. hold if Hgb &gt; 10  Senna 8.6 mg oral tablet: 2 tab(s) orally once a day (at bedtime), As Needed  sertraline 25 mg oral tablet: 1 tab(s) orally once a day  sucralfate 1 g/10 mL oral suspension: 10 milliliter(s) orally 4 times a day  tamsulosin 0.4 mg oral capsule: 1 cap(s) orally once a day (at bedtime)  Vitamin C 500 mg oral tablet: 1 tab(s) orally once a day  zinc oxide 20% topical ointment: 1 application topically once a day

## 2021-07-20 NOTE — PROGRESS NOTE ADULT - PROVIDER SPECIALTY LIST ADULT
Nephrology
Infectious Disease
Internal Medicine
Nephrology
Internal Medicine
Nephrology
Nephrology
Vascular Surgery
Critical Care
Internal Medicine
Nephrology
Vascular Surgery
Critical Care
Internal Medicine
Internal Medicine
Nephrology
Nephrology
Infectious Disease

## 2021-07-20 NOTE — PROGRESS NOTE ADULT - SUBJECTIVE AND OBJECTIVE BOX
SUBJECTIVE:    Patient is a 81y old Male who presents with a chief complaint of vomiting, hypotension (20 Jul 2021 00:16)    Currently admitted to medicine with the primary diagnosis of Septic shock       Today is hospital day 8d. This morning he is resting comfortably in bed and reports no new issues or overnight events.     PAST MEDICAL & SURGICAL HISTORY  Diabetes mellitus    COPD (chronic obstructive pulmonary disease)    Lymphedema of both lower extremities    CHF (congestive heart failure)    H/O right nephrectomy  20 yrs ago      SOCIAL HISTORY:  Negative for smoking/alcohol/drug use.     ALLERGIES:  No Known Allergies    MEDICATIONS:  STANDING MEDICATIONS  ALBUTerol    90 MICROgram(s) HFA Inhaler 2 Puff(s) Inhalation every 6 hours  ascorbic acid 500 milliGRAM(s) Oral daily  aspirin  chewable 81 milliGRAM(s) Oral daily  atorvastatin 40 milliGRAM(s) Oral at bedtime  budesonide 160 MICROgram(s)/formoterol 4.5 MICROgram(s) Inhaler 2 Puff(s) Inhalation two times a day  calcium acetate 667 milliGRAM(s) Oral three times a day with meals  chlorhexidine 4% Liquid 1 Application(s) Topical <User Schedule>  dextrose 40% Gel 15 Gram(s) Oral once  dextrose 5%. 1000 milliLiter(s) IV Continuous <Continuous>  dextrose 5%. 1000 milliLiter(s) IV Continuous <Continuous>  dextrose 50% Injectable 12.5 Gram(s) IV Push once  dextrose 50% Injectable 25 Gram(s) IV Push once  donepezil 5 milliGRAM(s) Oral at bedtime  epoetin graham-epbx (RETACRIT) Injectable 43677 Unit(s) IV Push <User Schedule>  glucagon  Injectable 1 milliGRAM(s) IntraMuscular once  heparin   Injectable 5000 Unit(s) SubCutaneous every 12 hours  insulin lispro (ADMELOG) corrective regimen sliding scale   SubCutaneous three times a day before meals  iron sucrose IVPB 100 milliGRAM(s) IV Intermittent <User Schedule>  lactulose Syrup 10 Gram(s) Oral daily  levoFLOXacin  Tablet 250 milliGRAM(s) Oral every 24 hours  mirtazapine 15 milliGRAM(s) Oral at bedtime  pantoprazole    Tablet 40 milliGRAM(s) Oral two times a day  polyethylene glycol 3350 17 Gram(s) Oral daily  sertraline 25 milliGRAM(s) Oral daily  sodium chloride 0.9%. 1000 milliLiter(s) IV Continuous <Continuous>  sucralfate suspension 1 Gram(s) Oral four times a day  tamsulosin 0.4 milliGRAM(s) Oral at bedtime  zinc oxide 20% Ointment 1 Application(s) Topical daily    PRN MEDICATIONS  senna 8.6 milliGRAM(s) Oral Tablet - Peds 2 Tablet(s) Oral at bedtime PRN    VITALS:   T(F): 98.5  HR: 87  BP: 110/56  RR: 18  SpO2: 98%    LABS:                        7.4    13.84 )-----------( 167      ( 20 Jul 2021 07:37 )             23.8     07-20    137  |  97<L>  |  83<HH>  ----------------------------<  94  5.2<H>   |  24  |  8.6<HH>    Ca    8.0<L>      20 Jul 2021 07:37  Mg     2.3     07-20    TPro  5.5<L>  /  Alb  3.1<L>  /  TBili  0.2  /  DBili  x   /  AST  14  /  ALT  6   /  AlkPhos  58  07-20    PT/INR - ( 19 Jul 2021 05:24 )   PT: 13.60 sec;   INR: 1.18 ratio         PTT - ( 19 Jul 2021 05:24 )  PTT:27.5 sec          CARDIAC MARKERS ( 19 Jul 2021 05:24 )  x     / 4.47 ng/mL / x     / x     / x          RADIOLOGY:    PHYSICAL EXAM:  GEN: No acute distress  LUNGS: Clear to auscultation bilaterally   HEART: S1/S2 present. RRR.   ABD: Soft, non-tender, non-distended. Bowel sounds present  EXT: NC/NC/NE/2+PP/LEDBETTER/Skin Intact.   NEURO: AAOX3    Intravenous access:   NG tube:   Laura Catheter:

## 2021-07-20 NOTE — PROGRESS NOTE ADULT - REASON FOR ADMISSION
vomiting, hypotension

## 2021-07-20 NOTE — PROGRESS NOTE ADULT - NSICDXPILOT_GEN_ALL_CORE
[place]

## 2021-07-20 NOTE — DISCHARGE NOTE PROVIDER - CARE PROVIDER_API CALL
Sara Mccurdy  INTERNAL MEDICINE  [address] [place] [address]  Phone: [phone]  Fax: [phone]  Follow Up Time: 2 weeks   Sara Mccurdy  INTERNAL MEDICINE  [address] [place] [address]  Phone: [phone]  Fax: [phone]  Follow Up Time: 2 weeks    Sonu Jarrett  Gastroenterology  [address] [place] [id number]  Phone: [phone]  Fax: [phone]  Follow Up Time: 2 weeks    Primary care physician,   Phone: (   )    -  Fax: (   )    -  Follow Up Time: 1-3 days

## 2021-07-20 NOTE — ADVANCED PRACTICE NURSE CONSULT - RECOMMEDATIONS
1. MASD b/l buttock- intergluteal cleft- Cleanse skin w/ perineal cleanser gently pat dry. Continue applying Coloplast Cachorro Protect moisture barrier cream daily and prn after each incontinent episode.    -Assess skin/wound qshift, report changes to primary provider.     Additional recs/PI prevention:  -Continue turning/positioning patient from side-to-side q2h while in bed, q1h when/if OOB chair, or in accordance w/ pt's plan of care. Utilize pillows to assist w/ turning/positioning. When/if OOB chair, utilize pillows or chair cushion to offload pressure.   -Continue to offload heels from bed surface with soft pillow under calfs or by applying offloading boots to BLEs.   -Continue utilizing one underpad underneath patient to contain incontinence episodes; change pad when saturated/soiled.   -Continue nutrition consult & tight glucose control for optimal wound healing & nutritional status.     Plan of Care: Primary RN Alayna made aware of above recs. Spoke w/  covering/primary MD Christopher (at [phone]) in regards to above. Signing off on patient, no further needs/recs from [facility] service at this time. Staff RN to perform routine skin/wound assessment and manage wound care. Questions or concerns or if wound worsens and reconsult needed, please contact [facility], [phone].

## 2021-07-20 NOTE — DISCHARGE NOTE PROVIDER - NSDCFUADDINST_GEN_ALL_CORE_FT
Continue dialysis  Follow up with nephrologist  monitor CBC; follow up with GI physician.    Continue dialysis  Follow up with nephrologist  monitor CBC; follow up with GI physician.   continue wound care     MASD b/l buttock- intergluteal cleft- Cleanse skin w/ perineal cleanser gently pat dry. Continue applying Coloplast Cachorro Protect moisture barrier cream daily and prn after each incontinent episode.      -Continue turning/positioning patient from side-to-side q2h while in bed, q1h when/if OOB chair, or in accordance w/ pt's plan of care. Utilize pillows to assist w/ turning/positioning. When/if OOB chair, utilize pillows or chair cushion to offload pressure.   -Continue to offload heels from bed surface with soft pillow under calfs or by applying offloading boots to BLEs.   -prevent soiling of the wound  .

## 2021-07-20 NOTE — DISCHARGE NOTE PROVIDER - CARE PROVIDERS DIRECT ADDRESSES
,DirectAddress_Unknown ,DirectAddress_Unknown,[email or web address],DirectAddress_Unknown

## 2021-07-20 NOTE — OCCUPATIONAL THERAPY INITIAL EVALUATION ADULT - SPECIFY REASON(S)
Attempted to see pt this PM for OT initial assessment. Pt presently off unit for hemodialysis. OT to f/u when appropriate
Pt declined to move body or get OOB 2* "not feeling good". Greek  used to increase motivation to no effect.
Attempted to see pt for OT IE, however pt currently NPO 2* scheduled Tessio placement today. Pt perseverative on eating. OT to follow up
Pt with decreased hemoglobin over night (6.9). Pt s/p 1 unit of blood. OT to follow up pending labs.
Attempted to see pt for OT evaluation, however pt refusing despite encouragement and max attempt, will follow up
Orders currently state bedrest and ambulate as tolerated orders. Contacted [phone]. MD made aware awaiting bedrest orders to be discontinued.

## 2021-07-25 NOTE — ED PROVIDER NOTE - PHYSICAL EXAMINATION
VITAL SIGNS: I have reviewed nursing notes and confirm.  CONSTITUTIONAL: well-appearing, non-toxic, NAD  SKIN: Warm dry, normal skin turgor  HEAD: NCAT  EYES: EOMI, PERRLA, no scleral icterus  ENT: Moist mucous membranes, normal pharynx   NECK: Supple; non tender. Full ROM.   CARD: RRR, no murmurs, rubs or gallops  RESP: clear to ausculation b/l.  No rales, rhonchi, or wheezing.  ABD: soft, + BS, non-tender, non-distended, no rebound or guarding. No CVA tenderness  EXT: Full ROM, no bony tenderness, no pedal edema, no calf tenderness  NEURO: Non-focal, LEDBETTER  PSYCH: Cooperative, appropriate.

## 2021-07-25 NOTE — ED ADULT NURSE NOTE - OBJECTIVE STATEMENT
Pt from [facility] for C/O  SOB , lower abdomen pain related to urinary retention .Pt T-99.4 rectal ,  excoriation stage 2 on the sacrum noted , A/O times 2 , SpO2 98% on 2LNCO2.

## 2021-07-25 NOTE — ED ADULT NURSE NOTE - CHIEF COMPLAINT QUOTE
MARLENE from [facility] c/o hypoxia. Pt was sating in 80s and placed on 5L NRB. As per EMS sat was 90% so NBR increased to 15L now sating 100%. Pt on HD R CW tesio. breath sounds clear.

## 2021-07-25 NOTE — ED PROVIDER NOTE - CLINICAL SUMMARY MEDICAL DECISION MAKING FREE TEXT BOX
81 Y M from [facility] with pmh of CHF (unknown EF), HTN, anxiety, Alzheimer's dementia, COPD on home O2, T2DM on insulin, hx right nephrectomy/ CKD non-dialysis dependent baseline creatinine 3.3-4, anemia of chronic disease presenting to ED for episode of desaturation on [facility]. Patient currently denies any complaints, no shortness of breath or chest pain, no nausea, vomiting, fever, BIBEMS - at his baseline home o2.     Counseling, X-ray similar to previous, labs unremarkable compared to patients baseline, EKG at baseline

## 2021-07-25 NOTE — ED PROVIDER NOTE - PATIENT PORTAL LINK FT
You can access the FollowMyHealth Patient Portal offered by [facility] by registering at the following website: [email or web address]. By joining [employer]’s FollowMyHealth portal, you will also be able to view your health information using other applications (apps) compatible with our system.

## 2021-07-25 NOTE — ED PROVIDER NOTE - OBJECTIVE STATEMENT
81 Y M from [facility] with pmh of CHF (unknown EF), HTN, anxiety, Alzheimer's dementia, COPD on home O2, T2DM on insulin, hx right nephrectomy/ CKD non-dialysis dependent baseline creatinine 3.3-4, anemia of chronic disease presenting to ED for episode of desaturation on [facility]. Patient currently denies any complaints, no shortness of breath or chest pain, no nausea, vomiting, fever, BIBEMS - at his baseline home o2.

## 2021-07-25 NOTE — ED ADULT TRIAGE NOTE - CHIEF COMPLAINT QUOTE
MARLENE from [facility] c/o hypoxia. Pt was sating in 80s and placed on 5L NRB. As per EMS sat was 90% so NBR increased to 15L now sating 100%. Pt on HD R CW tesio. breath sounds clear.

## 2021-07-25 NOTE — ED PROVIDER NOTE - PROGRESS NOTE DETAILS
Spoke with nursing supervisor Yolanda at [facility], discussed return precautions with nursing staff and patient. Patient was refusing to have bed up at nursing home earlier today and he normally desaturates when laying flat for long periods of time - noted to have transient desat episode that has since resolved.

## 2021-07-26 NOTE — ED PROVIDER NOTE - OBJECTIVE STATEMENT
History obtained via combination of patient and family member, given patient's demented status.     The patient is an History obtained via combination of patient and family member, given patient's demented status.     The patient is an 81 year old male with PMHx of dementia, CKD (on dialysis M/W/F), and CHF (last EF of 40%) presenting with a chief complaint of SOB. The patient was recently admitted for sepsis and PNA (Legionellosis) and discharged on Abx (will be completed 7/30). A few hours PTA the patient was complaining of chest pain with SOB, although at present he is denying any chest pain. He is currently experiencing nasal congestion. No recent fevers, chills, abdominal pain, N/V, or any other complaints at this time. He is asking for food.

## 2021-07-26 NOTE — CONSULT NOTE ADULT - ASSESSMENT
Acute renal failure in setting of sepsis and CKD V 3.3-4, now likely ESRD  Legionellosis PNA on Levaquin  Pulmonary edema  Anemia    Plan:    HD today: 3 hours, opti 160 dialyzer, 3K bath, 3L UF  EPO and Venofer with HD  PhoLo with meals  Renal diet  DC Carafate

## 2021-07-26 NOTE — H&P ADULT - HISTORY OF PRESENT ILLNESS
82yo M from [facility] w/ pmhx of CHF (EF 40% in July 2021), HTN, anxiety, Alzheimer's dementia, COPD on home 4L O2, T2DM on insulin, hx right nephrectomy and ESRD recently started on dialysis s/p tunnelled catheter, anemia of chronic disease on ROMELIA therapy, brought in by EMS with complaint of SOB. Hx taken from daughter Carmelina. SOB associated with mild chest pain and abdominal pain at rest. O2 at NH was 100% and O2 requirements were at baseline. Recently admitted in 7/12/21 for septic shock secondary to PNA (+ legionella urine ag) and discharged on levofloxacin 250mg po daily (will be completed 7/30). Patient also had metablic acidosis and hyperkalemia and received udall and urgent HD. Patient currently denies any fevers or cough. Patient's daughter reports however that BP drops during dialysis and patient is not on midodrine. Patient was discharged from the ED yesterday for the same reason and CXR showed R pleural effusion.     ED course:   vitals: /63, HR 88, T 98.2F, 100% 4L NC  labs: WBC 12.03, trop 1.18  imaging: CXR wet read: R pleural effusion slightly better compared to 7/25

## 2021-07-26 NOTE — H&P ADULT - ASSESSMENT
80yo M from [facility] w/ pmhx of CHF (EF 40% in July 2021), HTN, anxiety, Alzheimer's dementia, COPD on home 4L O2, T2DM on insulin, hx right nephrectomy and ESRD recently started on dialysis s/p tunnelled catheter, anemia of chronic disease on ROMELIA therapy, brought in by EMS with complaint of SOB. Admitted for possible fluid overload due to dialysis vs. unresolved pneumonia.     #SOB secondary to R pleural effusion   #ESRD on HD M/W/F s/p tunnelled catheter  #Hx of RLL PNA   #troponemia likely secondary to ESRD   - DDx: fluid overload due to ESRD; less likely RLL PNA (unresolved), CHF exacerbation, ACS, COPD      - mild leukocytosis, no fevers, no cough, no pitting edema  - CXR wet read: RLL effusion slightly better compared to CXR 7/25   - WBC 12  - trop 1.18, much better compared to baseline (2-4)      - repeat one more set   - EKG shows chronic changes from prior   - will send procalcitonin  - repeat CXR in am   - nephrology consult     - monitor BP during HD, may need midodrine  - if SOB/symptoms do not get better after dialysis, can consider CHF higher on differential   - c/w levofloxacin 250mg po daily (end 7/30)   - c/w calcum acetate; retacrit as per nephro   - supportive measures: tylenol, mucinex     #HFrEF  #severe AS   - TTE 7/18/21: EF 40%, severe AS   - strict i/o, daily weight     #DM  - FS AC/HS, start insulin if FS peristently > 180    #Alzheimer's dementia  -continue remeron, aricept, sertraline    #Iron deficiency anemia  -at baseline  -continue ferrous sulfate  -ROMELIA per nephro    #COPD on home O2 4L   -continue albuterol and symbicort (in place of home breo ellipta)     #epigastric pain likely gastritis  - c/w sucralfate and protonix 40mg po bid     DVT ppx: hep subq  GI ppx: protonix  Diet: CC/renal   Code Status: DNR/DNI   Dispo: from [facility], pending nephro consult, labs and trop 11am    80yo M from [facility] w/ pmhx of CHF (EF 40% in July 2021), HTN, anxiety, Alzheimer's dementia, COPD on home 4L O2, T2DM on insulin, hx right nephrectomy and ESRD recently started on dialysis s/p tunnelled catheter, anemia of chronic disease on ROMELIA therapy, brought in by EMS with complaint of SOB. Admitted for possible fluid overload due to dialysis vs. unresolved pneumonia.     #SOB secondary to R pleural effusion   #ESRD on HD M/W/F s/p tunnelled catheter  #Hx of RLL PNA   #troponemia likely secondary to ESRD   - DDx: fluid overload due to ESRD; less likely RLL PNA (unresolved), CHF exacerbation, ACS, COPD      - mild leukocytosis, no fevers, no cough, no pitting edema  - CXR wet read: RLL effusion slightly better compared to CXR 7/25   - WBC 12  - trop 1.18, much better compared to baseline (2-4)      - repeat one more set   - EKG shows chronic changes from prior   - will send procalcitonin  - repeat CXR in am   - nephrology consult     - monitor BP during HD, may need midodrine  - if SOB/symptoms do not get better after dialysis, can consider CHF higher on differential   - c/w levofloxacin 250mg po daily (end 7/30)   - c/w calcum acetate; retacrit as per nephro   - supportive measures: tylenol, mucinex     #HFrEF  #severe AS   - TTE 7/18/21: EF 40%, severe AS   - strict i/o, daily weight   - c/w asa 81mg daily     #DM  - FS AC/HS, start insulin if FS peristently > 180    #Alzheimer's dementia  -continue remeron, aricept, sertraline    #Iron deficiency anemia  -at baseline  -continue ferrous sulfate  -ROMELIA per nephro    #COPD on home O2 4L   -continue albuterol and symbicort (in place of home breo ellipta)     #epigastric pain likely gastritis  - c/w sucralfate and protonix 40mg po bid     DVT ppx: hep subq  GI ppx: protonix  Diet: CC/renal   Code Status: DNR/DNI   Dispo: from [facility], pending nephro consult, labs and trop 11am    82yo M from [facility] w/ pmhx of CHF (EF 40% in July 2021), HTN, anxiety, Alzheimer's dementia, COPD on home 4L O2, T2DM on insulin, hx right nephrectomy and ESRD recently started on dialysis s/p tunnelled catheter, anemia of chronic disease on ROMELIA therapy, brought in by EMS with complaint of SOB. Admitted for possible fluid overload due to dialysis vs. unresolved pneumonia.     #Acute hypoxemic respiratory failure secondary to acute on chronic HFrEF w/ RLL pleural effusion   #ESRD on HD M/W/F s/p tunnelled catheter  #Hx of RLL PNA   #Troponemia secondary to ESRD   - DDx: fluid overload due to CHF/ESRD; less likely RLL PNA (unresolved), ACS, COPD      - mild leukocytosis, no fevers, no cough, no pitting edema  - CXR wet read: RLL effusion slightly better compared to CXR 7/25   - WBC 12  - trop 1.18, much better compared to baseline (2-4)      - repeat one more set   - EKG shows chronic changes from prior   - TTE 7/18/21: EF 40%, severe AS   - repeat CXR in am, send procal/BNP  - c/w levofloxacin 250mg po daily (end 7/30)   - c/w calcum acetate  - cardiology last admission said medical management: start lopressor 25mg bid and plavix 75mg daily; c/w asa 81mg daily      - start ACEi if BP tolerates   - will give one dose lasix 80mg IV    - nephrology consult  - if SOB/pleural effusion do not get better after dialysis/lasix, consider pulm eval for thoracentesis   - aspiration precautions, speech and swallow consult   - strict i/o, daily weight, fluid restriction     #DM  - FS AC/HS, start insulin if FS peristently > 180    #Alzheimer's dementia  -continue remeron, aricept, sertraline    #Iron deficiency anemia  -at baseline  -continue ferrous sulfate  -ROMELIA per nephro    #COPD on home O2 4L   -continue albuterol and symbicort (in place of home breo ellipta)     #epigastric pain likely gastritis  - c/w sucralfate and protonix 40mg po bid     DVT ppx: hep subq  GI ppx: protonix  Diet: CC/renal   Code Status: DNR/DNI   [facility]po: [facility], pending nephro consult, labs and trop 11am, CXR in am, start medical management for HF    82yo M from [facility] w/ pmhx of CHF (EF 40% in July 2021), HTN, anxiety, Alzheimer's dementia, COPD on home 4L O2, T2DM on insulin, hx right nephrectomy and ESRD recently started on dialysis s/p tunnelled catheter, anemia of chronic disease on ROMELIA therapy, brought in by EMS with complaint of SOB. Admitted for possible fluid overload due to dialysis vs. unresolved pneumonia.     #Acute hypoxemic respiratory failure secondary to acute on chronic HFrEF w/ RLL pleural effusion   #ESRD on HD M/W/F s/p tunnelled catheter  #Hx of RLL PNA   #Troponemia secondary to ESRD   - DDx: fluid overload due to CHF/ESRD; less likely RLL PNA (unresolved), ACS, COPD      - mild leukocytosis, no fevers, no cough, no pitting edema  - CXR wet read: RLL effusion slightly better compared to CXR 7/25   - WBC 12  - trop 1.18, much better compared to baseline (2-4)      - repeat one more set   - EKG shows chronic changes from prior   - TTE 7/18/21: EF 40%, severe AS   - repeat CXR in am, send procal/BNP  - c/w levofloxacin 250mg po daily (end 7/30)   - c/w calcum acetate  - cardiology last admission said medical management: start lopressor 25mg bid and plavix 75mg daily; c/w asa 81mg daily      - start ACEi if BP tolerates   - will give one dose lasix 80mg IV    - nephrology consult  - if SOB/pleural effusion do not get better after dialysis/lasix, consider pulm eval for thoracentesis   - aspiration precautions, speech and swallow consult   - strict i/o, daily weight, fluid restriction     #DM  - FS AC/HS, start insulin if FS peristently > 180    #Alzheimer's dementia  -continue remeron, aricept, sertraline    #Iron deficiency anemia  -at baseline  -continue ferrous sulfate  -ROMELIA per nephro    #COPD on home O2 4L   -continue albuterol and symbicort (in place of home breo ellipta)     #epigastric pain likely gastritis  - c/w sucralfate and protonix 40mg po bid     DVT ppx: hep subq  GI ppx: protonix  Diet: CC/renal   Code Status: DNR/DNI   Dispo: from [facility], pending nephro consult, labs and trop 11am, CXR in am, start medical management for HF     Emergency contact:   Daughter - Carmelina  [phone]

## 2021-07-26 NOTE — ED ADULT NURSE NOTE - CHIEF COMPLAINT QUOTE
81 Y M from [facility] with pmh of CHF , HTN, anxiety, Alzheimer's dementia, COPD on home O2, T2DM on insulin, hx right nephrectomy/ CKD non-dialysis dependent baseline creatinine 3.3-4, anemia of chronic disease presenting to ED with SOB and ABD pain. patient also noted with productive cough. patient has covid vaccination

## 2021-07-26 NOTE — ED ADULT TRIAGE NOTE - CHIEF COMPLAINT QUOTE
81 Y M from [facility] with pmh of CHF , HTN, anxiety, Alzheimer's dementia, COPD on home O2, T2DM on insulin, hx right nephrectomy/ CKD non-dialysis dependent baseline creatinine 3.3-4, anemia of chronic disease presenting to ED with SOB and ABD pain. patient also noted with productive cough. patient has covid vaccination

## 2021-07-26 NOTE — ED PROVIDER NOTE - PRIOR EKG STATUS
T wave and ST segment abnormalities c/w prior EKGs; frequent PVCs possibly new/the EKG is unchanged from prior EKG

## 2021-07-26 NOTE — ED PROVIDER NOTE - ATTENDING CONTRIBUTION TO CARE
Patient is BIB EMS for evaluation of chest pain/sob, patient with h/o dementia and not able to provide much details on his health/illness.   Vitals reviewed.  Lungs: B/L decreased air entry, no wheezing/crackles.   abd: +BS, NT, ND, soft,   A/P: Chest pain/sob,   labs, EKG, CXR,   reevaluation.

## 2021-07-26 NOTE — ED PROVIDER NOTE - PHYSICAL EXAMINATION
VITAL SIGNS: I have reviewed nursing notes and confirm.  CONSTITUTIONAL: well-appearing, non-toxic, elderly male resting comfortably in bed in NAD. Difficult of hearing.   SKIN: Warm dry, normal skin turgor  HEAD: NCAT  EYES: EOMI, no scleral icterus, no conjunctival injection  ENT: Moist mucous membranes, normal pharynx with no erythema or exudates  NECK: Supple; non tender.   CARD: RRR, no murmurs, rubs or gallops  RESP: On 4L Nasal Cannula. Clear to ausculation b/l.  No rales, rhonchi, or wheezing. Tachypneic to about 20 breaths per minute.   ABD: soft, non-tender, non-distended, no rebound or guarding. No CVA tenderness  EXT: Full ROM, no bony tenderness, no pedal edema, no calf tenderness, no swelling of the BLE.  NEURO: normal motor. normal sensory. CN II-XII intact.   PSYCH: Cooperative, appropriate. VITAL SIGNS: I have reviewed nursing notes and confirm.  CONSTITUTIONAL: well-appearing, non-toxic, elderly male resting comfortably in bed in NAD. Difficult of hearing.   SKIN: Warm dry, normal skin turgor  HEAD: NCAT  EYES: EOMI, no scleral icterus, no conjunctival injection  ENT: Moist mucous membranes, normal pharynx with no erythema or exudates  NECK: Supple; non tender.   CARD: RRR, no rubs or gallops, there is a 2/6 systolic murmur.   RESP: On 4L Nasal Cannula. Clear to ausculation b/l.  No rales, rhonchi, or wheezing. Tachypneic to about 20 breaths per minute.   ABD: soft, non-tender, non-distended, no rebound or guarding. No CVA tenderness  EXT: Full ROM, no bony tenderness, no pedal edema, no calf tenderness, no swelling of the BLE.  NEURO: normal motor. normal sensory. CN II-XII intact.   PSYCH: Cooperative, appropriate.

## 2021-07-26 NOTE — H&P ADULT - NSHPLABSRESULTS_GEN_ALL_CORE
LABS:  cret                        8.4    12.03 )-----------( 231      ( 26 Jul 2021 02:50 )             27.8     07-26    142  |  102  |  46<H>  ----------------------------<  108<H>  4.0   |  26  |  6.4<HH>    Ca    8.4<L>      26 Jul 2021 02:50    TPro  6.0  /  Alb  3.5  /  TBili  0.3  /  DBili  x   /  AST  14  /  ALT  5   /  AlkPhos  60  07-26        imaging:  CXR wet read: R pleural effusion slightly better compared to 7/25

## 2021-07-26 NOTE — PROGRESS NOTE ADULT - SUBJECTIVE AND OBJECTIVE BOX
Patient was examined during HD treatment.    Hemodialysis Prescription:  	Access: IJ TDC  	Dialyzer: Opti 160  	Blood Flow (mL/Min): 400  	Dialysate Flow (mL/Min): 500  	Target UF (Liters): 3  	Treatment Time: 3 hours  	Potassium: 2K  	Calcium: 2.5

## 2021-07-26 NOTE — ED PROVIDER NOTE - CLINICAL SUMMARY MEDICAL DECISION MAKING FREE TEXT BOX
no verbalization of thoughts of harm
patient remained hemodynamically stable, results of the labs, imaging findings reviewed and discussed with patient, discussed with admitting physician and MAR, patient is admitted to Medicine for further evaluation and care.

## 2021-07-26 NOTE — H&P ADULT - ATTENDING COMMENTS
81 yr old male with from [facility] with hx of HFrEF (EF 40%), ESRD on HD, HTN, Anxiety, Alzheimer's dementia, COPD on home 4L O2, T2DM on insulin, hx right nephrectomy admitted for sob for past 1 day.     # Acute Hypoxic Resp Failure secondary to Acute on Chronic HFrEF  # CAD   - CXR congested  - pt going for HD today   - Lasix 40mg IV q24 if bp allows   - c/w ASA  - pt was assess by cardio last admission - no candidate for Card Cath; advised to start Plavix  - start metoprolol   - daily weights      # ESRD on HD  - HD today   - c/w phoslo   - check P    # PNA from prior admission  -  Legionella Ag positive  - complete Levaquin course     # HTN  - c/w home meds    # Renal Diet    # Ambulate as tolreated    # DVT ppx  - start heparin     # DNR / DNI

## 2021-07-26 NOTE — CONSULT NOTE ADULT - SUBJECTIVE AND OBJECTIVE BOX
[place] [facility] INITIAL CONSULT NOTE  --------------------------------------------------------------------------------  HPI:  82yo M from [facility] w/ pmhx of CHF (EF 40% in July 2021), HTN, anxiety, Alzheimer's dementia, COPD on home 4L O2, T2DM on insulin, hx right nephrectomy and acute renal failure recently started on dialysis MWF via tunnelled catheter at [facility] under the care of Dr Mccurdy, anemia of chronic disease on ROMELIA therapy, brought in by EMS with complaint of SOB. Hx taken from daughter. SOB associated with mild chest pain and abdominal pain at rest. O2 at NH was 100% and O2 requirements were at baseline. Recently admitted in 7/12/21 for septic shock secondary to PNA (+ legionella urine ag) and discharged on levofloxacin 250mg po daily (will be completed 7/30). Patient also had metablic acidosis and hyperkalemia and received udall and urgent HD. Patient currently denies any fevers or cough. Patient's daughter reports however that BP drops during dialysis and patient is not on midodrine. Patient was discharged from the ED yesterday for the same reason and CXR showed R pleural effusion.     PAST HISTORY  --------------------------------------------------------------------------------  PAST MEDICAL & SURGICAL HISTORY:  Diabetes mellitus  COPD (chronic obstructive pulmonary disease)  Lymphedema of both lower extremities  CHF (congestive heart failure)  H/O right nephrectomy 20 yrs ago    FAMILY HISTORY:  No pertinent family history in first degree relatives    SOCIAL HISTORY:  No current ETOH, tobacco, or illicit drug use    ALLERGIES & MEDICATIONS  --------------------------------------------------------------------------------  Allergies    No Known Allergies    Standing Inpatient Medications  ascorbic acid 500 milliGRAM(s) Oral daily  atorvastatin 40 milliGRAM(s) Oral at bedtime  budesonide 160 MICROgram(s)/formoterol 4.5 MICROgram(s) Inhaler 2 Puff(s) Inhalation two times a day  calcium acetate 667 milliGRAM(s) Oral three times a day with meals  chlorhexidine 4% Liquid 1 Application(s) Topical <User Schedule>  clopidogrel Tablet 75 milliGRAM(s) Oral daily  donepezil 5 milliGRAM(s) Oral at bedtime  ferrous    sulfate 325 milliGRAM(s) Oral daily  heparin   Injectable 5000 Unit(s) SubCutaneous every 8 hours  levoFLOXacin  Tablet 250 milliGRAM(s) Oral every 24 hours  metoprolol tartrate 25 milliGRAM(s) Oral two times a day  mirtazapine 15 milliGRAM(s) Oral at bedtime  pantoprazole    Tablet 40 milliGRAM(s) Oral two times a day  polyethylene glycol 3350 17 Gram(s) Oral daily  senna 2 Tablet(s) Oral at bedtime  sertraline 25 milliGRAM(s) Oral daily  sucralfate suspension 1 Gram(s) Oral four times a day  tamsulosin 0.4 milliGRAM(s) Oral at bedtime  zinc oxide 20% Ointment 1 Application(s) Topical daily    PRN Inpatient Medications  acetaminophen   Tablet .. 650 milliGRAM(s) Oral every 6 hours PRN  ALBUTerol    90 MICROgram(s) HFA Inhaler 2 Puff(s) Inhalation every 4 hours PRN    REVIEW OF SYSTEMS  --------------------------------------------------------------------------------  Unable to obtain    VITALS/PHYSICAL EXAM  --------------------------------------------------------------------------------  T(C): 36.7 (07-26-21 @ 06:15), Max: 36.8 (07-26-21 @ 01:31)  HR: 75 (07-26-21 @ 06:15) (75 - 96)  BP: 122/59 (07-26-21 @ 06:15) (122/59 - 156/65)  RR: 16 (07-26-21 @ 06:15) (16 - 24)  SpO2: 97% (07-26-21 @ 09:14) (94% - 100%)  Height (cm): 172.7 (07-26-21 @ 01:31)    Physical Exam:  	Gen: NAD  	Pulm: B/L rales  	CV: RRR, S1S2  	Back: No CVA tenderness; no sacral edema  	Abd: +BS, soft, nontender/nondistended  	: No suprapubic tenderness  	LE: Warm, trace edema  	Neuro: AAO x2  	Vascular access: IJ [facility]    LABS/STUDIES  --------------------------------------------------------------------------------              7.9    10.19 >-----------<  237      [07-26-21 @ 11:20]              26.2     141  |  101  |  48  ----------------------------<  132      [07-26-21 @ 11:20]  3.8   |  27  |  6.8        Ca     8.3     [07-26-21 @ 11:20]      Mg     2.1     [07-26-21 @ 11:20]      Phos  5.0     [07-26-21 @ 11:20]    TPro  5.9  /  Alb  3.4  /  TBili  0.2  /  DBili  x   /  AST  12  /  ALT  5   /  AlkPhos  66  [07-26-21 @ 11:20]    Troponin 1.18      [07-26-21 @ 03:10]    Creatinine Trend:  SCr 6.8 [07-26 @ 11:20]  SCr 6.4 [07-26 @ 02:50]  SCr 6.0 [07-25 @ 16:05]  SCr 8.6 [07-20 @ 07:37]  SCr 7.6 [07-19 @ 05:24]    Urinalysis - [07-12-21 @ 13:07]      Color Yellow / Appearance Slightly Turbid / SG 1.014 / pH 6.0      Gluc Negative / Ketone Negative  / Bili Negative / Urobili <2 mg/dL       Blood Small / Protein 300 mg/dL / Leuk Est Large / Nitrite Negative      RBC 14 / WBC 73 / Hyaline 3 / Gran  / Sq Epi  / Non Sq Epi 1 / Bacteria Few    Iron 34, TIBC 178, %sat 19      [01-04-21 @ 06:40]  Ferritin 332      [01-04-21 @ 06:40]  PTH -- (Ca 8.9)      [12-24-20 @ 04:30]   54    HBsAb Nonreact      [07-12-21 @ 18:05]  HBsAg Nonreact      [07-15-21 @ 20:00]  HCV 0.15, Nonreact      [07-15-21 @ 20:00]

## 2021-07-26 NOTE — ED ADULT NURSE NOTE - OBJECTIVE STATEMENT
pt. is an 81yr male BIBA from [facility] for complaints of increased SOB and congestion, as per daughter pt had been d/c'd recently from hospital and was witnessed to have more respiratory distress today, pt. also has a wet cough. pt. has a hx of copd on home 02.

## 2021-07-26 NOTE — SWALLOW BEDSIDE ASSESSMENT ADULT - SWALLOW EVAL: DIAGNOSIS
+toleration for regular solids, soft solids, puree, and thin liquids w/o overt s/s aspiration/penetration

## 2021-07-26 NOTE — H&P ADULT - NSHPPHYSICALEXAM_GEN_ALL_CORE
PHYSICAL EXAM:  GENERAL: NAD, lying in bed comfortably  HEAD:  Atraumatic, Normocephalic  EYES: EOMI, PERRLA, conjunctiva and sclera clear  ENT: Moist mucous membranes  NECK: Supple, No JVD  CHEST/LUNG: R chest wall tunnelled catheter, Clear to auscultation bilaterally; No rales, rhonchi, wheezing, or rubs. Unlabored respirations  HEART: Regular rate and rhythm; No murmurs, rubs, or gallops  ABDOMEN: Bowel sounds present; Soft, Nontender, Nondistended. No hepatomegally  EXTREMITIES:  2+ Peripheral Pulses, brisk capillary refill. No clubbing, cyanosis, or edema  NERVOUS SYSTEM:  Alert & Oriented X3, speech clear. No deficits   MSK: FROM all 4 extremities, full and equal strength  SKIN: No rashes or lesions

## 2021-07-26 NOTE — ED PROVIDER NOTE - NS ED ROS FT
Constitutional:  No fever, chills, lethargy, or abnormal weight loss  Eyes:  No eye pain or visual changes  ENMT: No nasal discharge, no toothache, no sore throat. No neck pain or stiffness  Cardiac:  +chest pain. No palpitations  Respiratory:  +Per HPI  GI:  No nausea, vomiting, diarrhea or abdominal pain.  :  No dysuria, frequency or burning.  MS:  No back or joint pain.  Neuro:  No headache. No numbness, weakness, or tingling.   Skin:  No skin rash  Except as documented in the HPI,  all other systems are negative

## 2021-07-26 NOTE — SWALLOW BEDSIDE ASSESSMENT ADULT - SLP PERTINENT HISTORY OF CURRENT PROBLEM
pt is an 82 y/o M from [facility] w/ PMHx: CHF, HTN, anxiety, Alzheimer's dementia, COPD on home 4L O2, T2DM, R nephrectomy and ESRD recently started on dialysis who was BIBEMS for SOB. pt is being treated for AHRF 2' acute on chronic HFrEF w/ RLL pleural effusion. CXR-> Unchanged bilateral pleural effusions and bilateral opacities, right greater than left.

## 2021-07-26 NOTE — H&P ADULT - NSHPREVIEWOFSYSTEMS_GEN_ALL_CORE
CONSTITUTIONAL: No weakness, fevers or chills  EYES/ENT: No visual changes;  No vertigo or throat pain   NECK: No pain or stiffness  RESPIRATORY: No cough, wheezing, hemoptysis; No shortness of breath  CARDIOVASCULAR: + mild chest pain   GASTROINTESTINAL: + mild epigastric pain; No nausea, vomiting, or hematemesis; No diarrhea or constipation. No melena or hematochezia.  GENITOURINARY: No dysuria, frequency or hematuria  NEUROLOGICAL: No numbness or weakness  SKIN: No itching, rashes

## 2021-07-26 NOTE — PATIENT PROFILE ADULT - VISION (WITH CORRECTIVE LENSES IF THE PATIENT USUALLY WEARS THEM):
Normal vision: sees adequately in most situations; can see medication labels, newsprint
patient and patient's 2 daughers.
Dr. Wolfe

## 2021-07-27 NOTE — PROGRESS NOTE ADULT - SUBJECTIVE AND OBJECTIVE BOX
[place] [facility]PHROLOGY FOLLOW UP NOTE  --------------------------------------------------------------------------------  24 hour events/subjective: Patient examined. Appears comfortable.    PAST HISTORY  --------------------------------------------------------------------------------  No significant changes to PMH, PSH, FHx, SHx, unless otherwise noted    ALLERGIES & MEDICATIONS  --------------------------------------------------------------------------------  Allergies    No Known Allergies    Standing Inpatient Medications  albuterol/ipratropium for Nebulization 3 milliLiter(s) Nebulizer every 6 hours  ascorbic acid 500 milliGRAM(s) Oral daily  aspirin  chewable 81 milliGRAM(s) Oral daily  atorvastatin 40 milliGRAM(s) Oral at bedtime  budesonide 160 MICROgram(s)/formoterol 4.5 MICROgram(s) Inhaler 2 Puff(s) Inhalation two times a day  calcium acetate 667 milliGRAM(s) Oral three times a day with meals  chlorhexidine 4% Liquid 1 Application(s) Topical <User Schedule>  clopidogrel Tablet 75 milliGRAM(s) Oral daily  donepezil 5 milliGRAM(s) Oral at bedtime  epoetin graham-epbx (RETACRIT) Injectable 36593 Unit(s) IV Push <User Schedule>  ferrous    sulfate 325 milliGRAM(s) Oral daily  furosemide   Injectable 40 milliGRAM(s) IV Push daily  heparin   Injectable 5000 Unit(s) SubCutaneous every 8 hours  iron sucrose IVPB 100 milliGRAM(s) IV Intermittent <User Schedule>  levoFLOXacin  Tablet 250 milliGRAM(s) Oral every 24 hours  metoprolol tartrate 25 milliGRAM(s) Oral two times a day  mirtazapine 15 milliGRAM(s) Oral at bedtime  pantoprazole    Tablet 40 milliGRAM(s) Oral two times a day  polyethylene glycol 3350 17 Gram(s) Oral daily  predniSONE   Tablet 40 milliGRAM(s) Oral daily  senna 2 Tablet(s) Oral at bedtime  sertraline 25 milliGRAM(s) Oral daily  tamsulosin 0.4 milliGRAM(s) Oral at bedtime  zinc oxide 20% Ointment 1 Application(s) Topical daily    PRN Inpatient Medications  acetaminophen   Tablet .. 650 milliGRAM(s) Oral every 6 hours PRN  ALBUTerol    90 MICROgram(s) HFA Inhaler 2 Puff(s) Inhalation every 4 hours PRN    VITALS/PHYSICAL EXAM  --------------------------------------------------------------------------------  T(C): 36.2 (07-27-21 @ 04:12), Max: 36.2 (07-27-21 @ 04:12)  HR: 89 (07-27-21 @ 04:12) (62 - 89)  BP: 142/65 (07-27-21 @ 04:12) (109/58 - 142/65)  RR: 18 (07-27-21 @ 04:12) (18 - 18)  SpO2: 95% (07-27-21 @ 04:12) (95% - 100%)  Height (cm): 172.7 (07-27-21 @ 04:12)  Weight (kg): 69.1 (07-27-21 @ 04:12)  BMI (kg/m2): 23.2 (07-27-21 @ 04:12)  BSA (m2): 1.82 (07-27-21 @ 04:12)    07-26-21 @ 07:01  -  07-27-21 @ 07:00  --------------------------------------------------------  IN: 0 mL / OUT: 3000 mL / NET: -3000 mL    07-27-21 @ 07:01  -  07-27-21 @ 10:54  --------------------------------------------------------  IN: 240 mL / OUT: 0 mL / NET: 240 mL    Physical Exam:  	Gen: NAD  	Pulm: CTA B/L  	CV: RRR, S1S2  	Abd: +BS, soft, nontender/nondistended  	: No suprapubic tenderness  	LE: Warm, no edema  	Vascular access: TDC    LABS/STUDIES  --------------------------------------------------------------------------------              8.4    4.93  >-----------<  247      [07-27-21 @ 06:37]              28.2     140  |  100  |  30  ----------------------------<  199      [07-27-21 @ 06:37]  3.8   |  29  |  4.8        Ca     8.3     [07-27-21 @ 06:37]      Mg     2.0     [07-27-21 @ 06:37]      Phos  3.3     [07-27-21 @ 06:37]    TPro  6.3  /  Alb  3.5  /  TBili  0.3  /  DBili  x   /  AST  11  /  ALT  5   /  AlkPhos  64  [07-27-21 @ 06:37]    Troponin 0.95      [07-26-21 @ 20:38]    Creatinine Trend:  SCr 4.8 [07-27 @ 06:37]  SCr 6.8 [07-26 @ 11:20]  SCr 6.4 [07-26 @ 02:50]  SCr 6.0 [07-25 @ 16:05]  SCr 8.6 [07-20 @ 07:37]    Urinalysis - [07-12-21 @ 13:07]      Color Yellow / Appearance Slightly Turbid / SG 1.014 / pH 6.0      Gluc Negative / Ketone Negative  / Bili Negative / Urobili <2 mg/dL       Blood Small / Protein 300 mg/dL / Leuk Est Large / Nitrite Negative      RBC 14 / WBC 73 / Hyaline 3 / Gran  / Sq Epi  / Non Sq Epi 1 / Bacteria Few    Iron 34, TIBC 178, %sat 19      [01-04-21 @ 06:40]  Ferritin 332      [01-04-21 @ 06:40]  PTH -- (Ca 8.9)      [12-24-20 @ 04:30]   54    HBsAb Nonreact      [07-12-21 @ 18:05]  HBsAg Nonreact      [07-15-21 @ 20:00]  HCV 0.15, Nonreact      [07-15-21 @ 20:00]

## 2021-07-27 NOTE — PROGRESS NOTE ADULT - ASSESSMENT
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

## 2021-07-27 NOTE — PROGRESS NOTE ADULT - SUBJECTIVE AND OBJECTIVE BOX
Patient Information:  DRAKE HO / 81y / Male / MRN#:[id number]    Hospital Day: 1d    Interval History:      Past Medical History:  Diabetes mellitus    COPD (chronic obstructive pulmonary disease)    Lymphedema of both lower extremities    CHF (congestive heart failure)      Past Surgical History:  H/O right nephrectomy      Allergies:  No Known Allergies    Medications:  PRN:  acetaminophen   Tablet .. 650 milliGRAM(s) Oral every 6 hours PRN Temp greater or equal to 38C (100.4F), Mild Pain (1 - 3)  ALBUTerol    90 MICROgram(s) HFA Inhaler 2 Puff(s) Inhalation every 4 hours PRN Shortness of Breath and/or Wheezing    Standing:  albuterol/ipratropium for Nebulization 3 milliLiter(s) Nebulizer every 6 hours  ascorbic acid 500 milliGRAM(s) Oral daily  aspirin  chewable 81 milliGRAM(s) Oral daily  atorvastatin 40 milliGRAM(s) Oral at bedtime  budesonide 160 MICROgram(s)/formoterol 4.5 MICROgram(s) Inhaler 2 Puff(s) Inhalation two times a day  calcium acetate 667 milliGRAM(s) Oral three times a day with meals  chlorhexidine 4% Liquid 1 Application(s) Topical <User Schedule>  clopidogrel Tablet 75 milliGRAM(s) Oral daily  donepezil 5 milliGRAM(s) Oral at bedtime  epoetin graham-epbx (RETACRIT) Injectable 06347 Unit(s) IV Push <User Schedule>  ferrous    sulfate 325 milliGRAM(s) Oral daily  furosemide   Injectable 40 milliGRAM(s) IV Push daily  heparin   Injectable 5000 Unit(s) SubCutaneous every 8 hours  iron sucrose IVPB 100 milliGRAM(s) IV Intermittent <User Schedule>  levoFLOXacin  Tablet 250 milliGRAM(s) Oral every 24 hours  metoprolol tartrate 25 milliGRAM(s) Oral two times a day  mirtazapine 15 milliGRAM(s) Oral at bedtime  pantoprazole    Tablet 40 milliGRAM(s) Oral two times a day  polyethylene glycol 3350 17 Gram(s) Oral daily  senna 2 Tablet(s) Oral at bedtime  sertraline 25 milliGRAM(s) Oral daily  tamsulosin 0.4 milliGRAM(s) Oral at bedtime  zinc oxide 20% Ointment 1 Application(s) Topical daily    Home:  albuterol 90 mcg/inh inhalation aerosol: 2 puff(s) inhaled every 4 hours  Aricept 5 mg oral tablet: 1 tab(s) orally once a day (at bedtime)  aspirin 81 mg oral tablet, chewable: 1 tab(s) orally once a day  atorvastatin 40 mg oral tablet: 1 tab(s) orally once a day (at bedtime)  Breo Ellipta 200 mcg-25 mcg/inh inhalation powder: 1 puff(s) inhaled once a day  calcium acetate 667 mg oral tablet: orally 3 times a day (with meals)  ferrous sulfate 325 mg (65 mg elemental iron) oral tablet: 1 tab(s) orally once a day  lactulose 10 g/15 mL oral syrup: 15 milliliter(s) orally once a day, As Needed for constipation  levoFLOXacin 250 mg oral tablet: 1 tab(s) orally every 24 hours till july/30/21  pantoprazole 40 mg oral delayed release tablet: 1 tab(s) orally 2 times a day  polyethylene glycol 3350 oral powder for reconstitution: 17 gram(s) orally once a day  Remeron 15 mg oral tablet: 1 tab(s) orally once a day (at bedtime)  Retacrit 4000 units/mL preservative-free injectable solution: 4000 unit(s) injectable once a week. hold if Hgb &gt; 10  Senna 8.6 mg oral tablet: 2 tab(s) orally once a day (at bedtime), As Needed  sertraline 25 mg oral tablet: 1 tab(s) orally once a day  sucralfate 1 g/10 mL oral suspension: 10 milliliter(s) orally 4 times a day  tamsulosin 0.4 mg oral capsule: 1 cap(s) orally once a day (at bedtime)  Vitamin C 500 mg oral tablet: 1 tab(s) orally once a day  zinc oxide 20% topical ointment: 1 application topically once a day    Vitals:  T(C): 36.2, Max: 36.2 (07-27-21 @ 04:12)  T(F): 97.2, Max: 97.2 (07-27-21 @ 04:12)  HR: 89 (62 - 89)  BP: 142/65 (109/58 - 142/65)  RR: 18 (18 - 18)  SpO2: 95% (95% - 100%)    Physical Exam:  General: NAD  HEENT: NC AT  Heart: RRR, systolic ejection murmur  Lungs: decreased breath sounds over bases, mild expiratory wheezing  Abdomen: soft, nontender  Extremities: no edema or cyanosis  Neuro: NFD    Labs:  CBC (07-27 @ 06:37)                        Hgb: 8.4    WBC: 4.93  )-----------------( Plts: 247                              Hct: 28.2     Chem (07-27 @ 06:37)  Na: 140  |     Cl: 100     |  BUN: 30  -----------------------------------------< Gluc: 199    K: 3.8   |    HCO3: 29  |  Cr: 4.8    Ca 8.3 (07-27 @ 06:37)  Phos 3.3 (07-27 @ 06:37)  Mg 2.0 (07-27 @ 06:37)    LFTs (07-27 @ 06:37)  TPro 6.3  /  Alb 3.5  TBili 0.3  /  DBili     AST 11  /  ALT 5   /  AlkPhos 64    Cardiac Markers (07-26 @ 20:38)  Troponin I X  Troponin T 0.95  CK X  CKMB X  CKMB Units X  Myoglobin X  Lactate X  ESR X    Cardiac Markers (07-26 @ 03:10)  Troponin I X  Troponin T 1.18  CK X  CKMB X  CKMB Units X  Myoglobin X  Lactate X  ESR X            Microbiology:    Radiology:

## 2021-07-27 NOTE — PROGRESS NOTE ADULT - ATTENDING COMMENTS
Mr Douglas is an 80yo Man from [facility] w/ medical history of CHF (EF 40% in July 2021), HTN, anxiety, Alzheimer's dementia, COPD on home 4L O2, T2DM on insulin, hx right nephrectomy and ESRD recently started on dialysis s/p tunnelled catheter, anemia of chronic disease on ROMELIA therapy, brought in by EMS with complaint of SOB. Admitted for possible fluid overload due to dialysis vs. unresolved pneumonia.     #Acute hypoxemic respiratory failure secondary to COPD exacerbation +/- CHF exacerbation   #Recent legionella PNA  CXR with effusion; improved  Wheezing on exam; improved with duonebs  c/w IV Lasix 40 mg daily  c/w prednisone, duonebs  c/w levofloxacin 250mg po daily (end 7/30)     #troponinemia   trop 1.18 > 0.95  EKG shows chronic changes from prior   Reconsulted cardio - trops went as high as 5 from 2 last admission  TTE 7/18/21: EF 40%, severe AS   cardiology last admission said medical management: start lopressor 25mg bid and plavix 75mg daily; c/w asa 81mg daily   start ACEi if BP tolerates     #ESRD on HD M/W/F s/p tunnelled catheter  f/u nephrology    #DM  FS AC/HS, start insulin if FS peristently > 180    #Alzheimer's dementia  c/w remeron, aricept, sertraline    #Iron deficiency anemia  at baseline  continue ferrous sulfate  ROMELIA per nephro    #COPD on home O2 4L   c/w albuterol and symbicort (in place of home breo ellipta)     #epigastric pain likely gastritis  c/w sucralfate and protonix 40mg po bid     DVT ppx: hep subq  GI ppx: protonix    Code Status: DNR/DNI     Emergency contact:   Daughter Yanick Cosme  [phone]    Attempted to reach the patient's daughter at the above number today at 4:20 pm but was unable to reach her    Progress Note Handoff:  Pending: Cardiology, clinical improvement, anticipate 48-72hr  Dispo: acute

## 2021-07-27 NOTE — PROGRESS NOTE ADULT - ASSESSMENT
80yo M from [facility] w/ pmhx of CHF (EF 40% in July 2021), HTN, anxiety, Alzheimer's dementia, COPD on home 4L O2, T2DM on insulin, hx right nephrectomy and ESRD recently started on dialysis s/p tunnelled catheter, anemia of chronic disease on ROMELIA therapy, brought in by EMS with complaint of SOB. Admitted for possible fluid overload due to dialysis vs. unresolved pneumonia.     #Acute hypoxemic respiratory failure secondary to COPD exacerbation +/- CHF exacerbation   #Recent legionella PNA  - CXR with effusion; improved  - Wheezing on exam; improved with duonebs  - Continue with IV Lasix 40 mg daily  - Continue prednisone, duonebs  - c/w levofloxacin 250mg po daily (end 7/30)     #troponinemia   - trop 1.18 > 0.95  - EKG shows chronic changes from prior   - Reconsulted cardio  - TTE 7/18/21: EF 40%, severe AS   - cardiology last admission said medical management: start lopressor 25mg bid and plavix 75mg daily; c/w asa 81mg daily      - start ACEi if BP tolerates     #ESRD on HD M/W/F s/p tunnelled catheter  - f/u nephrology    #DM  - FS AC/HS, start insulin if FS peristently > 180    #Alzheimer's dementia  -continue remeron, aricept, sertraline    #Iron deficiency anemia  -at baseline  -continue ferrous sulfate  -ROMELIA per nephro    #COPD on home O2 4L   -continue albuterol and symbicort (in place of home breo ellipta)     #epigastric pain likely gastritis  - c/w sucralfate and protonix 40mg po bid     DVT ppx: hep subq  GI ppx: protonix  Diet: CC/renal   Code Status: DNR/DNI   Dispo: acute    Emergency contact:   Daughter - Carmelina  [phone] 82yo M from [facility] w/ pmhx of CHF (EF 40% in July 2021), HTN, anxiety, Alzheimer's dementia, COPD on home 4L O2, T2DM on insulin, hx right nephrectomy and ESRD recently started on dialysis s/p tunnelled catheter, anemia of chronic disease on ROMELIA therapy, brought in by EMS with complaint of SOB. Admitted for possible fluid overload due to dialysis vs. unresolved pneumonia.     #Acute hypoxemic respiratory failure secondary to COPD exacerbation +/- CHF exacerbation   #Recent legionella PNA  - CXR with effusion; improved  - Wheezing on exam; improved with duonebs  - Continue with IV Lasix 40 mg daily  - Continue prednisone, duonebs  - c/w levofloxacin 250mg po daily (end 7/30)     #troponinemia   - trop 1.18 > 0.95  - EKG shows chronic changes from prior   - Reconsulted cardio  - TTE 7/18/21: EF 40%, severe AS   - cardiology last admission said medical management: start lopressor 25mg bid and plavix 75mg daily; c/w asa 81mg daily      - start ACEi if BP tolerates     #ESRD on HD M/W/F s/p tunnelled catheter  - f/u nephrology    #DM  - FS AC/HS, start insulin if FS peristently > 180    #Alzheimer's dementia  -continue remeron, aricept, sertraline    #Iron deficiency anemia  -at baseline  -continue ferrous sulfate  -ROMELIA per nephro    #COPD on home O2 4L   -continue albuterol and symbicort (in place of home breo ellipta)     #epigastric pain likely gastritis  - c/w sucralfate and protonix 40mg po bid     DVT ppx: hep subq  GI ppx: protonix  Diet: CC/renal   Code Status: DNR/DNI   Dispo: acute    Emergency contact:   Daughter - Carmelina  [phone]    Attempted to reach the patient's daughter at the above number today at 4:20 pm but was unable to reach her

## 2021-07-28 NOTE — DIETITIAN INITIAL EVALUATION ADULT. - NAME AND PHONE
Nutrition Intervention: meals and snacks,medical food supplements, vitamin and mineral supplements; Nutrition Monitoring:diet order,energy intake,body composition,NFPF, renal/glucose profile

## 2021-07-28 NOTE — DIETITIAN INITIAL EVALUATION ADULT. - PERTINENT MEDS FT
MEDICATIONS  (STANDING):  albuterol/ipratropium for Nebulization 3 milliLiter(s) Nebulizer every 6 hours  ascorbic acid 500 milliGRAM(s) Oral daily  aspirin  chewable 81 milliGRAM(s) Oral daily  atorvastatin 40 milliGRAM(s) Oral at bedtime  budesonide 160 MICROgram(s)/formoterol 4.5 MICROgram(s) Inhaler 2 Puff(s) Inhalation two times a day  calcium acetate 667 milliGRAM(s) Oral three times a day with meals  chlorhexidine 4% Liquid 1 Application(s) Topical <User Schedule>  clopidogrel Tablet 75 milliGRAM(s) Oral daily  donepezil 5 milliGRAM(s) Oral at bedtime  epoetin graham-epbx (RETACRIT) Injectable 82458 Unit(s) IV Push <User Schedule>  ferrous    sulfate 325 milliGRAM(s) Oral daily  furosemide   Injectable 40 milliGRAM(s) IV Push daily  heparin   Injectable 5000 Unit(s) SubCutaneous every 8 hours  iron sucrose IVPB 100 milliGRAM(s) IV Intermittent <User Schedule>  levoFLOXacin  Tablet 250 milliGRAM(s) Oral every 24 hours  metoprolol tartrate 25 milliGRAM(s) Oral two times a day  mirtazapine 15 milliGRAM(s) Oral at bedtime  mupirocin 2% Ointment 1 Application(s) Topical two times a day  pantoprazole    Tablet 40 milliGRAM(s) Oral two times a day  polyethylene glycol 3350 17 Gram(s) Oral daily  predniSONE   Tablet 40 milliGRAM(s) Oral daily  senna 2 Tablet(s) Oral at bedtime  sertraline 25 milliGRAM(s) Oral daily  tamsulosin 0.4 milliGRAM(s) Oral at bedtime  zinc oxide 20% Ointment 1 Application(s) Topical daily

## 2021-07-28 NOTE — PROGRESS NOTE ADULT - ASSESSMENT
80yo M from [facility] w/ pmhx of CHF (EF 40% in July 2021), HTN, anxiety, Alzheimer's dementia, COPD on home 4L O2, T2DM on insulin, hx right nephrectomy and ESRD recently started on dialysis s/p tunnelled catheter, anemia of chronic disease on ROMELIA therapy, brought in by EMS with complaint of SOB. Admitted for possible fluid overload due to dialysis vs. unresolved pneumonia.     #Acute hypoxemic respiratory failure secondary to COPD exacerbation +/- CHF exacerbation   #Recent legionella PNA  - CXR with effusion; improved  - on Admission: Wheezing on exam; improved with duonebs  - today on physical exam: coarse b/l rhonchi  - Continue with IV Lasix 40 mg daily  - TTE 7/18/21: EF 40%, severe AS   - Continue prednisone, duonebs  - c/w levofloxacin 250mg po daily (end 7/30)   - F/u Cardio consult   - f/u CXR in AM    #troponinemia   - trop 1.18 > 0.95  - EKG shows chronic changes from prior   - F/u Cardio consult  - TTE 7/18/21: EF 40%, severe AS   - cardiology last admission said medical management: start lopressor 25mg bid and plavix 75mg daily; c/w asa 81mg daily     #ESRD on HD M/W/F s/p tunnelled catheter  - f/u nephrology  - c/w HD    #DM  - FS AC/HS, start insulin if FS peristently > 180    #Alzheimer's dementia  -continue remeron, aricept, sertraline    #Iron deficiency anemia  -at baseline  -continue ferrous sulfate  -ROMELIA per nephro    #COPD on home O2 4L   -continue albuterol and symbicort (in place of home breo ellipta)     #epigastric pain likely gastritis  - c/w sucralfate and protonix 40mg po bid     DVT ppx: hep subq  GI ppx: protonix  Diet: CC/renal   Code Status: DNR/DNI   Dispo: acute

## 2021-07-28 NOTE — DIETITIAN INITIAL EVALUATION ADULT. - PERTINENT LABORATORY DATA
(7/28) H/H: 7.8/26.6, BUN: 52, Cr: 6.1, Glucose: 170, eGFR: 8  HgbA1C: 5.0    CAPILLARY BLOOD GLUCOSE  POCT Blood Glucose.: 174 mg/dL (28 Jul 2021 07:41)  POCT Blood Glucose.: 242 mg/dL (27 Jul 2021 21:40)  POCT Blood Glucose.: 167 mg/dL (27 Jul 2021 16:33)  POCT Blood Glucose.: 158 mg/dL (27 Jul 2021 11:56)

## 2021-07-28 NOTE — DIETITIAN INITIAL EVALUATION ADULT. - ORAL INTAKE PTA/DIET HISTORY
unable to obtain hx from pt as confused. [phone] -- unable to reach. will attempt at f/u

## 2021-07-28 NOTE — PROGRESS NOTE ADULT - ASSESSMENT
80 yo M from [facility] w/ pmhx of CHF (EF 40% in July 2021), HTN, anxiety, Alzheimer's dementia, COPD on home 4L O2, T2DM on insulin, hx right nephrectomy and ESRD recently started on dialysis s/p tunnelled catheter, anemia of chronic disease on ROMELIA therapy, brought in by EMS with complaint of SOB. Admitted for possible fluid overload due to dialysis vs. unresolved pneumonia.     #Acute hypoxemic respiratory failure secondary to COPD exacerbation +/- CHF exacerbation   #Recent legionella PNA  #Severe AS  - CXR with effusion; improved  - duopnebs   - Continue with IV Lasix 40 mg daily  - Continue prednisone, duonebs  - c/w levofloxacin 250mg po daily (end 7/30)   - Cardio consult for AS and CHF, mortality rate high given combination without intervention. keep on cardiac telemonitoring high risk for arrythmia  - Avoid Calcium Channel Blocker       #troponinemia   - trop 1.18 > 0.95  - EKG shows chronic changes from prior   - Reconsulted cardio  - TTE 7/18/21: EF 40%, severe AS   - cardiology last admission said medical management: start lopressor 25mg bid and plavix 75mg daily; c/w asa 81mg daily     #ESRD on HD M/W/F s/p tunnelled catheter  - f/u nephrology    #DM  - FS AC/HS, start insulin if FS peristently > 180    #Alzheimer's dementia  -continue remeron, aricept, sertraline    #Iron deficiency anemia  -at baseline  -continue ferrous sulfate  -ROMELIA per nephro    #COPD on home O2 4L   -continue albuterol and symbicort (in place of home breo ellipta)     #epigastric pain likely gastritis  - c/w sucralfate and protonix 40mg po bid     #Progress Note Handoff  Pending (specify):  Cardio consult, renal   Family discussion:  spoke to Daughter Filomena and updated on condition. Discussed high mortality for pt with severe AS and CHF. She would like cardiology to weigh in if he may be a TAVR candidate    Disposition: cardiology consult, cardiac telemonitoring

## 2021-07-28 NOTE — PROGRESS NOTE ADULT - ASSESSMENT
Acute renal failure in setting of sepsis and CKD V, now likely ESRD  Legionellosis PNA on Levaquin  Pulmonary edema  Anemia    Plan:    HD today: 3 hours, opti 160 dialyzer, 3K bath, 2L UF  EPO and Venofer with HD  PhoLo with meals  Renal diet

## 2021-07-28 NOTE — PROGRESS NOTE ADULT - SUBJECTIVE AND OBJECTIVE BOX
[place] [facility] FOLLOW UP NOTE  --------------------------------------------------------------------------------  24 hour events/subjective: Patient examined. Appears comfortable.    PAST HISTORY  --------------------------------------------------------------------------------  No significant changes to PMH, PSH, FHx, SHx, unless otherwise noted    ALLERGIES & MEDICATIONS  --------------------------------------------------------------------------------  Allergies    No Known Allergies    Standing Inpatient Medications  albuterol/ipratropium for Nebulization 3 milliLiter(s) Nebulizer every 6 hours  ascorbic acid 500 milliGRAM(s) Oral daily  aspirin  chewable 81 milliGRAM(s) Oral daily  atorvastatin 40 milliGRAM(s) Oral at bedtime  budesonide 160 MICROgram(s)/formoterol 4.5 MICROgram(s) Inhaler 2 Puff(s) Inhalation two times a day  calcium acetate 667 milliGRAM(s) Oral three times a day with meals  chlorhexidine 4% Liquid 1 Application(s) Topical <User Schedule>  clopidogrel Tablet 75 milliGRAM(s) Oral daily  donepezil 5 milliGRAM(s) Oral at bedtime  epoetin graham-epbx (RETACRIT) Injectable 70978 Unit(s) IV Push <User Schedule>  ferrous    sulfate 325 milliGRAM(s) Oral daily  furosemide   Injectable 40 milliGRAM(s) IV Push daily  heparin   Injectable 5000 Unit(s) SubCutaneous every 8 hours  iron sucrose IVPB 100 milliGRAM(s) IV Intermittent <User Schedule>  levoFLOXacin  Tablet 250 milliGRAM(s) Oral every 24 hours  metoprolol tartrate 25 milliGRAM(s) Oral two times a day  mirtazapine 15 milliGRAM(s) Oral at bedtime  mupirocin 2% Ointment 1 Application(s) Topical two times a day  pantoprazole    Tablet 40 milliGRAM(s) Oral two times a day  polyethylene glycol 3350 17 Gram(s) Oral daily  predniSONE   Tablet 40 milliGRAM(s) Oral daily  senna 2 Tablet(s) Oral at bedtime  sertraline 25 milliGRAM(s) Oral daily  tamsulosin 0.4 milliGRAM(s) Oral at bedtime  zinc oxide 20% Ointment 1 Application(s) Topical daily    PRN Inpatient Medications  acetaminophen   Tablet .. 650 milliGRAM(s) Oral every 6 hours PRN  ALBUTerol    90 MICROgram(s) HFA Inhaler 2 Puff(s) Inhalation every 4 hours PRN    VITALS/PHYSICAL EXAM  --------------------------------------------------------------------------------  T(C): 35.9 (07-28-21 @ 05:15), Max: 36.6 (07-27-21 @ 20:00)  HR: 71 (07-28-21 @ 05:15) (63 - 71)  BP: 112/53 (07-28-21 @ 05:15) (112/53 - 120/57)  RR: 19 (07-28-21 @ 05:15) (17 - 19)  SpO2: 97% (07-27-21 @ 22:28) (97% - 100%)  Height (cm): 172.7 (07-27-21 @ 04:12)  Weight (kg): 69.1 (07-27-21 @ 04:12)  BMI (kg/m2): 23.2 (07-27-21 @ 04:12)  BSA (m2): 1.82 (07-27-21 @ 04:12)    07-27-21 @ 07:01  -  07-28-21 @ 07:00  --------------------------------------------------------  IN: 240 mL / OUT: 0 mL / NET: 240 mL    Physical Exam:  	Gen: NAD  	Pulm: CTA B/L  	CV: RRR, S1S2  	Abd: +BS, soft, nontender/nondistended  	: No suprapubic tenderness  	LE: Warm,  no edema  	Vascular access: TDC    LABS/STUDIES  --------------------------------------------------------------------------------              7.8    8.78  >-----------<  239      [07-28-21 @ 05:43]              26.6     138  |  99  |  52  ----------------------------<  170      [07-28-21 @ 05:43]  4.3   |  28  |  6.1        Ca     8.2     [07-28-21 @ 05:43]      Mg     2.0     [07-28-21 @ 05:43]      Phos  4.9     [07-28-21 @ 05:43]    TPro  5.6  /  Alb  3.3  /  TBili  0.2  /  DBili  x   /  AST  10  /  ALT  5   /  AlkPhos  63  [07-28-21 @ 05:43]    Troponin 0.95      [07-26-21 @ 20:38]    Creatinine Trend:  SCr 6.1 [07-28 @ 05:43]  SCr 4.8 [07-27 @ 06:37]  SCr 6.8 [07-26 @ 11:20]  SCr 6.4 [07-26 @ 02:50]  SCr 6.0 [07-25 @ 16:05]    Urinalysis - [07-12-21 @ 13:07]      Color Yellow / Appearance Slightly Turbid / SG 1.014 / pH 6.0      Gluc Negative / Ketone Negative  / Bili Negative / Urobili <2 mg/dL       Blood Small / Protein 300 mg/dL / Leuk Est Large / Nitrite Negative      RBC 14 / WBC 73 / Hyaline 3 / Gran  / Sq Epi  / Non Sq Epi 1 / Bacteria Few    Iron 34, TIBC 178, %sat 19      [01-04-21 @ 06:40]  Ferritin 332      [01-04-21 @ 06:40]  PTH -- (Ca 8.9)      [12-24-20 @ 04:30]   54    HBsAb Nonreact      [07-12-21 @ 18:05]  HBsAg Nonreact      [07-15-21 @ 20:00]  HCV 0.15, Nonreact      [07-15-21 @ 20:00]

## 2021-07-28 NOTE — PROGRESS NOTE ADULT - SUBJECTIVE AND OBJECTIVE BOX
SUBJECTIVE:    Patient is a 81y old Male who presents with a chief complaint of SOB (28 Jul 2021 18:13)    Currently admitted to medicine with the primary diagnosis of Shortness of breath.     Today is hospital day 2d. This morning he is resting in bed and reports feeling SOB. No overnight events.     PAST MEDICAL & SURGICAL HISTORY  Diabetes mellitus    COPD (chronic obstructive pulmonary disease)    Lymphedema of both lower extremities    CHF (congestive heart failure)    H/O right nephrectomy  20 yrs ago    ALLERGIES:  No Known Allergies    MEDICATIONS:  STANDING MEDICATIONS  albuterol/ipratropium for Nebulization 3 milliLiter(s) Nebulizer every 6 hours  ascorbic acid 500 milliGRAM(s) Oral daily  aspirin  chewable 81 milliGRAM(s) Oral daily  atorvastatin 40 milliGRAM(s) Oral at bedtime  budesonide 160 MICROgram(s)/formoterol 4.5 MICROgram(s) Inhaler 2 Puff(s) Inhalation two times a day  calcium acetate 667 milliGRAM(s) Oral three times a day with meals  chlorhexidine 4% Liquid 1 Application(s) Topical <User Schedule>  clopidogrel Tablet 75 milliGRAM(s) Oral daily  donepezil 5 milliGRAM(s) Oral at bedtime  epoetin graham-epbx (RETACRIT) Injectable 73903 Unit(s) IV Push <User Schedule>  ferrous    sulfate 325 milliGRAM(s) Oral daily  furosemide   Injectable 40 milliGRAM(s) IV Push daily  heparin   Injectable 5000 Unit(s) SubCutaneous every 8 hours  iron sucrose IVPB 100 milliGRAM(s) IV Intermittent <User Schedule>  levoFLOXacin  Tablet 250 milliGRAM(s) Oral every 24 hours  metoprolol tartrate 25 milliGRAM(s) Oral two times a day  mirtazapine 15 milliGRAM(s) Oral at bedtime  mupirocin 2% Ointment 1 Application(s) Topical two times a day  pantoprazole    Tablet 40 milliGRAM(s) Oral two times a day  polyethylene glycol 3350 17 Gram(s) Oral daily  predniSONE   Tablet 40 milliGRAM(s) Oral daily  senna 2 Tablet(s) Oral at bedtime  sertraline 25 milliGRAM(s) Oral daily  tamsulosin 0.4 milliGRAM(s) Oral at bedtime  zinc oxide 20% Ointment 1 Application(s) Topical daily    PRN MEDICATIONS  acetaminophen   Tablet .. 650 milliGRAM(s) Oral every 6 hours PRN  ALBUTerol    90 MICROgram(s) HFA Inhaler 2 Puff(s) Inhalation every 4 hours PRN    VITALS:   T(F): 96.7  HR: 65  BP: 124/60  RR: 19  SpO2: 97%    LABS:                        7.8    8.78  )-----------( 239      ( 28 Jul 2021 05:43 )             26.6     07-28    138  |  99  |  52<H>  ----------------------------<  170<H>  4.3   |  28  |  6.1<HH>    Ca    8.2<L>      28 Jul 2021 05:43  Phos  4.9     07-28  Mg     2.0     07-28    TPro  5.6<L>  /  Alb  3.3<L>  /  TBili  0.2  /  DBili  x   /  AST  10  /  ALT  5   /  AlkPhos  63  07-28    CARDIAC MARKERS ( 26 Jul 2021 20:38 )  x     / 0.95 ng/mL / x     / x     / x          RADIOLOGY:  < from: TTE Echo Complete w/o Contrast w/ Doppler (07.18.21 @ 14:47) >    Summary:   1. Technically difficult study.   2. Moderately decreased global left ventricular systolic function.   3. Entire apex, mid and apical anterior septum, mid and apical inferior septum, and mid and apical inferior wall are abnormal as described above.   4. LV Ejection Fraction by Bishop's Method with a biplane EF of 40 %.   5. Moderately increased LV wall thickness.   6. Normal left ventricular internal cavity size.   7. Moderately enlarged left atrium.   8. Normal right atrial size.   9. There is no evidence of pericardial effusion.  10. Mild to moderate mitral annular calcification.  11. Mild to moderate mitral valve regurgitation.  12. Thickening and calcification of the anterior and posterior mitral valve leaflets.  13. Moderate tricuspid regurgitation.  14. Pulmonary hypertension is present.  15. Peak transaortic gradient equals 35.8 mmHg, mean transaortic gradient equals 19.1 mmHg, the calculated aortic valve area equals 0.58 cm² by the continuity equation consistent with severe aortic stenosis.    < end of copied text >    < from: Xray Chest 1 View- PORTABLE-Routine (Xray Chest 1 View- PORTABLE-Routine in AM.) (07.27.21 @ 04:43) >  Impression:    Slightly decreased right greater than left bilateral pleural effusions and opacities.    < end of copied text >    PHYSICAL EXAM:  GEN: No acute distress  LUNGS: coarse b/l rhonchi  HEART: Regular rate and rhythm, +s1 s2  ABD: Soft, non-tender, non-distended.  EXT: no LE edema, 2+PP/LEDBETTER/Skin Intact.   NEURO: AAOX3, no focal deficits

## 2021-07-28 NOTE — PHYSICAL THERAPY INITIAL EVALUATION ADULT - PATIENT PROFILE REVIEW, REHAB EVAL
Consult placed at 11:41AM. PT addressed consult at 1:20PM. Pt is gone for HD for the PM. PT to attempt again tomorrow when available./yes

## 2021-07-28 NOTE — PROGRESS NOTE ADULT - SUBJECTIVE AND OBJECTIVE BOX
Pt seen and examined at bedside. No CP or SOB. Feeling better    PAST MEDICAL & SURGICAL HISTORY:  Diabetes mellitus    COPD (chronic obstructive pulmonary disease)    Lymphedema of both lower extremities    CHF (congestive heart failure)    H/O right nephrectomy  20 yrs ago        VITAL SIGNS (Last 24 hrs):  T(C): 35.9 (21 @ 05:15), Max: 36.6 (21 @ 20:00)  HR: 65 (21 @ 17:09) (63 - 92)  BP: 124/60 (21 @ 17:09) (112/53 - 154/64)  RR: 19 (21 @ 05:15) (18 - 19)  SpO2: 97% (21 @ 22:28) (97% - 97%)  Wt(kg): --  Daily Height in cm: 172.7 (2021 11:29)    Daily Weight in k.9 (2021 05:15)    I&O's Summary    2021 07:  -  2021 07:00  --------------------------------------------------------  IN: 240 mL / OUT: 0 mL / NET: 240 mL    2021 07:01  -  2021 18:13  --------------------------------------------------------  IN: 120 mL / OUT: 3000 mL / NET: -2880 mL        PHYSICAL EXAM:  GENERAL: NAD, well-developed  HEAD:  Atraumatic, Normocephalic  EYES: EOMI, PERRLA, conjunctiva and sclera clear  NECK: Supple, No JVD  CHEST/LUNG: rales BL   HEART: Regular rate and rhythm; No murmurs, rubs, or gallops  ABDOMEN: Soft, Nontender, Nondistended; Bowel sounds present  EXTREMITIES:  2+ Peripheral Pulses, No clubbing, cyanosis, or edema  PSYCH: AAOx3  NEUROLOGY: non-focal  SKIN: No rashes or lesions    Labs Reviewed  Spoke to patient in regards to abnormal labs.    CBC Full  -  ( 2021 05:43 )  WBC Count : 8.78 K/uL  Hemoglobin : 7.8 g/dL  Hematocrit : 26.6 %  Platelet Count - Automated : 239 K/uL  Mean Cell Volume : 97.4 fL  Mean Cell Hemoglobin : 28.6 pg  Mean Cell Hemoglobin Concentration : 29.3 g/dL  Auto Neutrophil # : 6.90 K/uL  Auto Lymphocyte # : 1.09 K/uL  Auto Monocyte # : 0.70 K/uL  Auto Eosinophil # : 0.00 K/uL  Auto Basophil # : 0.01 K/uL  Auto Neutrophil % : 78.6 %  Auto Lymphocyte % : 12.4 %  Auto Monocyte % : 8.0 %  Auto Eosinophil % : 0.0 %  Auto Basophil % : 0.1 %    BMP:     @ 05:43    Blood Urea Nitrogen - 52  Calcium - 8.2  Carbond Dioxide - 28  Chloride - 99  Creatinine - 6.1  Glucose - 170  Potassium - 4.3  Sodium - 138      Hemoglobin A1c -     Urine Culture:        COVID Labs    Procalcitonin, Serum: 0.21 ng/mL (21 @ 11:20)    D-Dimer:        Imaging reviewed      MEDICATIONS  (STANDING):  albuterol/ipratropium for Nebulization 3 milliLiter(s) Nebulizer every 6 hours  ascorbic acid 500 milliGRAM(s) Oral daily  aspirin  chewable 81 milliGRAM(s) Oral daily  atorvastatin 40 milliGRAM(s) Oral at bedtime  budesonide 160 MICROgram(s)/formoterol 4.5 MICROgram(s) Inhaler 2 Puff(s) Inhalation two times a day  calcium acetate 667 milliGRAM(s) Oral three times a day with meals  chlorhexidine 4% Liquid 1 Application(s) Topical <User Schedule>  clopidogrel Tablet 75 milliGRAM(s) Oral daily  donepezil 5 milliGRAM(s) Oral at bedtime  epoetin graham-epbx (RETACRIT) Injectable 10881 Unit(s) IV Push <User Schedule>  ferrous    sulfate 325 milliGRAM(s) Oral daily  furosemide   Injectable 40 milliGRAM(s) IV Push daily  heparin   Injectable 5000 Unit(s) SubCutaneous every 8 hours  iron sucrose IVPB 100 milliGRAM(s) IV Intermittent <User Schedule>  levoFLOXacin  Tablet 250 milliGRAM(s) Oral every 24 hours  metoprolol tartrate 25 milliGRAM(s) Oral two times a day  mirtazapine 15 milliGRAM(s) Oral at bedtime  mupirocin 2% Ointment 1 Application(s) Topical two times a day  pantoprazole    Tablet 40 milliGRAM(s) Oral two times a day  polyethylene glycol 3350 17 Gram(s) Oral daily  predniSONE   Tablet 40 milliGRAM(s) Oral daily  senna 2 Tablet(s) Oral at bedtime  sertraline 25 milliGRAM(s) Oral daily  tamsulosin 0.4 milliGRAM(s) Oral at bedtime  zinc oxide 20% Ointment 1 Application(s) Topical daily    MEDICATIONS  (PRN):  acetaminophen   Tablet .. 650 milliGRAM(s) Oral every 6 hours PRN Temp greater or equal to 38C (100.4F), Mild Pain (1 - 3)  ALBUTerol    90 MICROgram(s) HFA Inhaler 2 Puff(s) Inhalation every 4 hours PRN Shortness of Breath and/or Wheezing    < from: TTE Echo Complete w/o Contrast w/ Doppler (21 @ 14:47) >  Summary:   1. Technically difficult study.   2. Moderately decreased global left ventricular systolic function.   3. Entire apex, mid and apical anterior septum, mid and apical inferior septum, and mid and apical inferior wall are abnormal as described above.   4. LV Ejection Fraction by Bishop's Method with a biplane EF of 40 %.   5. Moderately increased LV wall thickness.   6. Normal left ventricular internal cavity size.   7. Moderately enlarged left atrium.   8. Normal right atrial size.   9. There is no evidence of pericardial effusion.  10. Mild to moderate mitral annular calcification.  11. Mild to moderate mitral valve regurgitation.  12. Thickening and calcification of the anterior and posterior mitral valve leaflets.  13. Moderate tricuspid regurgitation.  14. Pulmonary hypertension is present.  15. Peak transaortic gradient equals 35.8 mmHg, mean transaortic gradient equals 19.1 mmHg, the calculated aortic valve area equals 0.58 cm² by the continuity equation consistent with severe aortic stenosis.    < end of copied text >

## 2021-07-28 NOTE — DIETITIAN INITIAL EVALUATION ADULT. - OTHER INFO
pertinent medical information:   --82yo M from [facility] w/ pmhx of CHF (EF 40% in July 2021), HTN, anxiety, Alzheimer's dementia, COPD on home 4L O2, T2DM on insulin, hx right nephrectomy and ESRD recently started on dialysis s/p tunnelled catheter, anemia of chronic disease on ROMELIA therapy, brought in by EMS with complaint of SOB. Admitted for possible fluid overload due to dialysis vs. unresolved pneumonia.   #Acute hypoxemic respiratory failure secondary to COPD exacerbation +/- CHF exacerbation   #Recent legionella PNA  #ESRD on HD M/W/F s/p tunnelled catheter  #Iron deficiency anemia-at baseline  #epigastric pain likely gastritis    pertinent subjective information: RN reports po/appetite ~75%. tolerating current diet texture well.

## 2021-07-28 NOTE — DIETITIAN INITIAL EVALUATION ADULT. - PHYSCIAL ASSESSMENT
WDL/disoriented to time, situation; no edema noted; GI: no discomfort reported, LBM 7/28 per EMR; skin: B/L buttocks stage 2 PU; wts in EMR in relatively stable

## 2021-07-28 NOTE — DIETITIAN INITIAL EVALUATION ADULT. - OTHER CALCULATIONS
using ABW 69.1kg; Energy: 1639-2049kcal (MSJ 1.2-1.5 AF -- d/t PU + HD + Age considered); Protein: 83-97g/kg (1.2-1.4g/kg -- same reason as above); Fluid: 1mL/kcal or LIP

## 2021-07-29 NOTE — PROGRESS NOTE ADULT - ASSESSMENT
80 yo M from [facility] w/ pmhx of CHF (EF 40% in July 2021), HTN, anxiety, Alzheimer's dementia, COPD on home 4L O2, T2DM on insulin, hx right nephrectomy and ESRD recently started on dialysis s/p tunnelled catheter, anemia of chronic disease on ROMELIA therapy, brought in by EMS with complaint of SOB. Admitted for possible fluid overload due to dialysis vs. unresolved pneumonia.     #Acute hypoxemic respiratory failure secondary to COPD exacerbation +/- CHF exacerbation   #Recent legionella PNA  #Severe AS  - CXR with effusion; improved  - duopnebs   - Continue with IV Lasix 40 mg daily  - Continue prednisone, duonebs  - c/w levofloxacin 250mg po daily (end 7/30)   - Cardio consult for AS and CHF, mortality rate high given combination without intervention. keep on cardiac telemonitoring high risk for arrythmia  - Avoid Calcium Channel Blocker   - Team DW family today and they will discuss on if TAVR is wanted by them and the family. Will follow up with Cardio after decision pt remains DNR/DNI      #troponinemia   - trop 1.18 > 0.95  - EKG shows chronic changes from prior   - Reconsulted cardio  - TTE 7/18/21: EF 40%, severe AS   - cardiology last admission said medical management: start lopressor 25mg bid and plavix 75mg daily; c/w asa 81mg daily     #ESRD on HD M/W/F s/p tunnelled catheter  - f/u nephrology    #DM  - FS AC/HS, start insulin if FS peristently > 180    #Alzheimer's dementia  -continue remeron, aricept, sertraline    #Iron deficiency anemia  -at baseline  -continue ferrous sulfate  -ROMELIA per nephro    #COPD on home O2 4L   -continue albuterol and symbicort (in place of home breo ellipta)     #epigastric pain likely gastritis  - c/w sucralfate and protonix 40mg po bid     #Progress Note Handoff  Pending (specify):  Cardio consult, renal   Family discussion:  house staff dw family   Disposition: cardiology consult, cardiac telemonitoring   82 yo M from [facility] w/ pmhx of CHF (EF 40% in July 2021), HTN, anxiety, Alzheimer's dementia, COPD on home 4L O2, T2DM on insulin, hx right nephrectomy and ESRD recently started on dialysis s/p tunnelled catheter, anemia of chronic disease on ROMELIA therapy, brought in by EMS with complaint of SOB. Admitted for possible fluid overload due to dialysis vs. unresolved pneumonia.     #Acute hypoxemic respiratory failure secondary to COPD exacerbation +/- CHF exacerbation   #Recent legionella PNA  #Severe AS  - CXR with effusion; improved  - duopnebs   - switch lasix intravenous to PO lasix   - Continue prednisone, duonebs  - c/w levofloxacin 250mg po daily (end 7/30)   - Cardio consult for AS and CHF, mortality rate high given combination without intervention. keep on cardiac telemonitoring high risk for arrythmia  - Avoid Calcium Channel Blocker   - Team DW family today and they will discuss on if TAVR is wanted by them and the family. Will follow up with Cardio after decision pt remains DNR/DNI    #leukocytosis- pt on prednisone- no fever, will monitor       #troponinemia   - trop 1.18 > 0.95  - EKG shows chronic changes from prior   - Reconsulted cardio  - TTE 7/18/21: EF 40%, severe AS   - cardiology last admission said medical management: start lopressor 25mg bid and plavix 75mg daily; c/w asa 81mg daily     #ESRD on HD M/W/F s/p tunnelled catheter  - f/u nephrology    #DM  - FS AC/HS, start insulin if FS peristently > 180    #Alzheimer's dementia  -continue remeron, aricept, sertraline    #Iron deficiency anemia  -at baseline  -continue ferrous sulfate  -ROMELIA per nephro    #COPD on home O2 4L   -continue albuterol and symbicort (in place of home breo ellipta)     #epigastric pain likely gastritis  - c/w sucralfate and protonix 40mg po bid     #Progress Note Handoff  Pending (specify):  Cardio consult, renal   Family discussion:  house staff dw family   Disposition: cardiology consult, cardiac telemonitoring

## 2021-07-29 NOTE — PROGRESS NOTE ADULT - ASSESSMENT
80yo M from [facility] w/ pmhx of CHF (EF 40% in July 2021), HTN, anxiety, Alzheimer's dementia, COPD on home 4L O2, T2DM on insulin, hx right nephrectomy and ESRD recently started on dialysis s/p tunnelled catheter, anemia of chronic disease on ROMELIA therapy, brought in by EMS with complaint of SOB. Admitted for possible fluid overload due to dialysis vs. unresolved pneumonia.     #Acute hypoxemic respiratory failure secondary to COPD exacerbation +/- CHF exacerbation   #Recent legionella PNA  - CXR with effusion;   - on Admission: Wheezing on exam; improved with duonebs  - today on physical exam: mild b/l rhonchi - improves since yesterday  - d/c IV Lasix 40 mg daily  - start lasix 80 mg QD  - TTE 7/18/21: EF 40%, severe AS   - Continue prednisone, duonebs  - c/w levofloxacin 250mg po daily (end 7/30)   -  Cardio consultappreciated  - Family currently refuses TAVR,   - f/u OP with Dr. Martinez for TAVR if within of [facility]  - continue to monitor WBC( pt is on prednisone)    #troponinemia   - trop 1.18 > 0.95  - EKG shows chronic changes from prior   -  Cardio consult apprefciated  - TTE 7/18/21: EF 40%, severe AS   - c/w lopressor 25mg bid and plavix 75mg daily; c/w asa 81mg daily   - Cardio consult appreciated  - Family currently refuses TAVR,   - f/u OP with Dr. Martinez for TAVR if within of [facility]    #ESRD on HD M/W/F s/p tunnelled catheter  - f/u nephrology  - c/w HD    #DM  - FS AC/HS, start insulin if FS peristently > 180    #Alzheimer's dementia  -continue remeron, aricept, sertraline    #Iron deficiency anemia  -at baseline  -continue ferrous sulfate  -ROMELIA per nephro    #COPD on home O2 4L   -continue albuterol and symbicort (in place of home breo ellipta)     #epigastric pain likely gastritis  - c/w sucralfate and protonix 40mg po bid     DVT ppx: hep subq  GI ppx: protonix  Diet: CC/renal   Code Status: DNR/DNI   Dispo: acute   80yo M from [facility] w/ pmhx of CHF (EF 40% in July 2021), HTN, anxiety, Alzheimer's dementia, COPD on home 4L O2, T2DM on insulin, hx right nephrectomy and ESRD recently started on dialysis s/p tunnelled catheter, anemia of chronic disease on ROMELIA therapy, brought in by EMS with complaint of SOB. Admitted for possible fluid overload due to dialysis vs. unresolved pneumonia.     #Acute hypoxemic respiratory failure secondary to COPD exacerbation +/- CHF exacerbation   #Recent legionella PNA  - CXR with effusion;   - on Admission: Wheezing on exam; improved with duonebs  - today on physical exam: mild b/l rhonchi - improves since yesterday  - d/c IV Lasix 40 mg daily  - start lasix 80 mg QD  - TTE 7/18/21: EF 40%, severe AS   - Continue prednisone, duonebs  - c/w levofloxacin 250mg po daily (end 7/30)   -  Cardio consultappreciated  - Family in discuss about TAVR  - continue to monitor WBC( pt is on prednisone)    #troponinemia   - trop 1.18 > 0.95  - EKG shows chronic changes from prior   -  Cardio consult apprefciated  - TTE 7/18/21: EF 40%, severe AS   - c/w lopressor 25mg bid and plavix 75mg daily; c/w asa 81mg daily   - Cardio consult appreciated  - Family currently refuses TAVR,   - f/u OP with Dr. Martinez for TAVR if within of [facility]    #ESRD on HD M/W/F s/p tunnelled catheter  - f/u nephrology  - c/w HD    #DM  - FS AC/HS, start insulin if FS peristently > 180    #Alzheimer's dementia  -continue remeron, aricept, sertraline    #Iron deficiency anemia  -at baseline  -continue ferrous sulfate  -ROMELIA per nephro    #COPD on home O2 4L   -continue albuterol and symbicort (in place of home breo ellipta)     #epigastric pain likely gastritis  - c/w sucralfate and protonix 40mg po bid     DVT ppx: hep subq  GI ppx: protonix  Diet: CC/renal   Code Status: DNR/DNI   Dispo: acute

## 2021-07-29 NOTE — PROGRESS NOTE ADULT - ASSESSMENT
Acute renal failure in setting of sepsis and CKD V, now likely ESRD  Legionellosis PNA on Levaquin  Pulmonary edema  Anemia    Plan:    HD tomorrow: 3 hours, opti 160 dialyzer, 3K bath, 2L UF  EPO and Venofer with HD  PhoLo with meals  Renal diet  Change Lasix to PO

## 2021-07-29 NOTE — ADVANCED PRACTICE NURSE CONSULT - RECOMMEDATIONS
1. IAD b/l buttock- intergluteal cleft- Cleanse skin w/ perineal cleanser gently pat dry. Continue applying Coloplast Cachorro Protect moisture barrier cream daily and prn after each incontinent episode.    -Assess skin/wound qshift, report changes to primary provider.     Additional recs/PI prevention:  -Continue turning/positioning patient from side-to-side q2h while in bed, q1h when/if OOB chair, or in accordance w/ pt's plan of care. Continue utilizing pillows to assist w/ turning/positioning. When/if OOB chair, utilize pillows or chair cushion to offload pressure.   -Continue to offload heels from bed surface with soft pillow under calfs or by applying offloading boots to BLEs.   -Continue utilizing one underpad underneath patient to contain incontinence episodes; change pad when saturated/soiled.   -Continue nutrition consult & tight glucose control for optimal wound healing & nutritional status.     Plan of Care: Primary RN Xenia made aware of above recs. Spoke w/  covering/primary MD Baldev (at 1447) in regards to above. Signing off on patient, no further needs/recs from [facility] service at this time. Staff RN to perform routine skin/wound assessment and manage wound care. Questions or concerns or if wound worsens and reconsult needed, please contact [facility], [phone].

## 2021-07-29 NOTE — CONSULT NOTE ADULT - SUBJECTIVE AND OBJECTIVE BOX
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 Hx taken from daughter Carmelina. SOB associated with mild chest pain and abdominal pain at rest. O2 at NH was 100% and O2 requirements were at baseline. Recently admitted in 7/12/21 for septic shock secondary to PNA (+ legionella urine ag) and discharged on levofloxacin 250mg po daily (will be completed 7/30). Patient also had metablic acidosis and hyperkalemia and received udall and urgent HD. Patient currently denies any fevers or cough. Patient's daughter reports however that BP drops during dialysis and patient is not on midodrine.     (26 Jul 2021 09:48)  Interval history, patient was admitted for acute hypoxic hypercapneic respiratory failure due to COPD exacerbation.       PAST MEDICAL & SURGICAL HISTORY  Diabetes mellitus    COPD (chronic obstructive pulmonary disease)    Lymphedema of both lower extremities    CHF (congestive heart failure)    H/O right nephrectomy  20 yrs ago        FAMILY HISTORY:  FAMILY HISTORY:  No pertinent family history in first degree relatives        SOCIAL HISTORY:  []smoker  []Alcohol  []Drug    ALLERGIES:  No Known Allergies      MEDICATIONS:  MEDICATIONS  (STANDING):  albuterol/ipratropium for Nebulization 3 milliLiter(s) Nebulizer every 6 hours  ascorbic acid 500 milliGRAM(s) Oral daily  aspirin  chewable 81 milliGRAM(s) Oral daily  atorvastatin 40 milliGRAM(s) Oral at bedtime  budesonide 160 MICROgram(s)/formoterol 4.5 MICROgram(s) Inhaler 2 Puff(s) Inhalation two times a day  calcium acetate 667 milliGRAM(s) Oral three times a day with meals  chlorhexidine 4% Liquid 1 Application(s) Topical <User Schedule>  clopidogrel Tablet 75 milliGRAM(s) Oral daily  donepezil 5 milliGRAM(s) Oral at bedtime  epoetin graham-epbx (RETACRIT) Injectable 02827 Unit(s) IV Push <User Schedule>  ferrous    sulfate 325 milliGRAM(s) Oral daily  furosemide   Injectable 40 milliGRAM(s) IV Push daily  heparin   Injectable 5000 Unit(s) SubCutaneous every 8 hours  iron sucrose IVPB 100 milliGRAM(s) IV Intermittent <User Schedule>  levoFLOXacin  Tablet 250 milliGRAM(s) Oral every 24 hours  metoprolol tartrate 25 milliGRAM(s) Oral two times a day  mirtazapine 15 milliGRAM(s) Oral at bedtime  multivitamin 1 Tablet(s) Oral daily  mupirocin 2% Ointment 1 Application(s) Topical two times a day  pantoprazole    Tablet 40 milliGRAM(s) Oral two times a day  polyethylene glycol 3350 17 Gram(s) Oral daily  predniSONE   Tablet 40 milliGRAM(s) Oral daily  senna 2 Tablet(s) Oral at bedtime  sertraline 25 milliGRAM(s) Oral daily  tamsulosin 0.4 milliGRAM(s) Oral at bedtime  zinc oxide 20% Ointment 1 Application(s) Topical daily    MEDICATIONS  (PRN):  acetaminophen   Tablet .. 650 milliGRAM(s) Oral every 6 hours PRN Temp greater or equal to 38C (100.4F), Mild Pain (1 - 3)  ALBUTerol    90 MICROgram(s) HFA Inhaler 2 Puff(s) Inhalation every 4 hours PRN Shortness of Breath and/or Wheezing      HOME MEDICATIONS:  Home Medications:  albuterol 90 mcg/inh inhalation aerosol: 2 puff(s) inhaled every 4 hours (26 Jul 2021 10:25)  Aricept 5 mg oral tablet: 1 tab(s) orally once a day (at bedtime) (26 Jul 2021 10:25)  aspirin 81 mg oral tablet, chewable: 1 tab(s) orally once a day (26 Jul 2021 10:25)  atorvastatin 40 mg oral tablet: 1 tab(s) orally once a day (at bedtime) (26 Jul 2021 10:25)  Breo Ellipta 200 mcg-25 mcg/inh inhalation powder: 1 puff(s) inhaled once a day (26 Jul 2021 10:25)  calcium acetate 667 mg oral tablet: orally 3 times a day (with meals) (26 Jul 2021 10:25)  ferrous sulfate 325 mg (65 mg elemental iron) oral tablet: 1 tab(s) orally once a day (26 Jul 2021 10:25)  lactulose 10 g/15 mL oral syrup: 15 milliliter(s) orally once a day, As Needed for constipation (26 Jul 2021 10:25)  levoFLOXacin 250 mg oral tablet: 1 tab(s) orally every 24 hours till july/30/21 (26 Jul 2021 10:25)  pantoprazole 40 mg oral delayed release tablet: 1 tab(s) orally 2 times a day (26 Jul 2021 10:25)  polyethylene glycol 3350 oral powder for reconstitution: 17 gram(s) orally once a day (26 Jul 2021 10:25)  Remeron 15 mg oral tablet: 1 tab(s) orally once a day (at bedtime) (26 Jul 2021 10:25)  Retacrit 4000 units/mL preservative-free injectable solution: 4000 unit(s) injectable once a week. hold if Hgb &gt; 10 (26 Jul 2021 10:25)  Senna 8.6 mg oral tablet: 2 tab(s) orally once a day (at bedtime), As Needed (26 Jul 2021 10:25)  sertraline 25 mg oral tablet: 1 tab(s) orally once a day (26 Jul 2021 10:25)  sucralfate 1 g/10 mL oral suspension: 10 milliliter(s) orally 4 times a day (26 Jul 2021 10:25)  tamsulosin 0.4 mg oral capsule: 1 cap(s) orally once a day (at bedtime) (26 Jul 2021 10:25)  Vitamin C 500 mg oral tablet: 1 tab(s) orally once a day (26 Jul 2021 10:25)  zinc oxide 20% topical ointment: 1 application topically once a day (26 Jul 2021 10:25)      VITALS:   T(F): 97.1 (07-29 @ 05:13), Max: 99.6 (07-28 @ 20:59)  HR: 75 (07-29 @ 05:13) (62 - 92)  BP: 114/57 (07-29 @ 05:13) (102/50 - 154/64)  BP(mean): --  RR: 19 (07-28 @ 05:15) (16 - 19)  SpO2: 99% (07-28 @ 20:00) (94% - 100%)    I&O's Summary    28 Jul 2021 07:01  -  29 Jul 2021 07:00  --------------------------------------------------------  IN: 120 mL / OUT: 3000 mL / NET: -2880 mL        REVIEW OF SYSTEMS:  CONSTITUTIONAL: No weakness, fevers or chills  EYES: No visual changes  ENT: No vertigo or throat pain   NECK: No pain or stiffness  RESPIRATORY: No cough, wheezing, hemoptysis; No shortness of breath  CARDIOVASCULAR: No chest pain or palpitations  GASTROINTESTINAL: No abdominal or epigastric pain. No nausea, vomiting, or hematemesis; No diarrhea or constipation. No melena or hematochezia.  GENITOURINARY: No dysuria, frequency or hematuria  NEUROLOGICAL: No numbness or weakness  SKIN: No itching, no rashes  MSK: No pain    PHYSICAL EXAM:  NEURO: patient is awake , alert and oriented  GEN: Not in acute distress  NECK: no thyroid enlargement, no JVD  LUNGS: Clear to auscultation bilaterally   CARDIOVASCULAR: S1/S2 present, RRR , no murmurs or rubs, no carotid bruits,  + PP bilaterally  ABD: Soft, non-tender, non-distended, +BS  EXT: No RUBY  SKIN: Intact    LABS:                        7.8    8.78  )-----------( 239      ( 28 Jul 2021 05:43 )             26.6     07-28    138  |  99  |  52<H>  ----------------------------<  170<H>  4.3   |  28  |  6.1<HH>    Ca    8.2<L>      28 Jul 2021 05:43  Phos  4.9     07-28  Mg     2.0     07-28    TPro  5.6<L>  /  Alb  3.3<L>  /  TBili  0.2  /  DBili  x   /  AST  10  /  ALT  5   /  AlkPhos  63  07-28            RADIOLOGY:  -CXR:  < from: Xray Chest 1 View- PORTABLE-Routine (Xray Chest 1 View- PORTABLE-Routine in AM.) (07.27.21 @ 04:43) >  Impression:    Slightly decreased right greater than left bilateral pleural effusions and opacities.      < end of copied text >      -TTE:  < from: TTE Echo Complete w/o Contrast w/ Doppler (07.18.21 @ 14:47) >    Summary:   1. Technically difficult study.   2. Moderately decreased global left ventricular systolic function.   3. Entire apex, mid and apical anterior septum, mid and apical inferior septum, and mid and apical inferior wall are abnormal as described above.   4. LV Ejection Fraction by Bishop's Method with a biplane EF of 40 %.   5. Moderately increased LV wall thickness.   6. Normal left ventricular internal cavity size.   7. Moderately enlarged left atrium.   8. Normal right atrial size.   9. There is no evidence of pericardial effusion.  10. Mild to moderate mitral annular calcification.  11. Mild to moderate mitral valve regurgitation.  12. Thickening and calcification of the anterior and posterior mitral valve leaflets.  13. Moderate tricuspid regurgitation.  14. Pulmonary hypertension is present.  15. Peak transaortic gradient equals 35.8 mmHg, mean transaortic gradient equals 19.1 mmHg, the calculated aortic valve area equals 0.58 cm² by the continuity equation consistent with severe aortic stenosis.    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 Hx taken from daughter Carmelina. SOB associated with mild chest pain and abdominal pain at rest. O2 at NH was 100% and O2 requirements were at baseline. Recently admitted in 7/12/21 for septic shock secondary to PNA (+ legionella urine ag) and discharged on levofloxacin 250mg po daily (will be completed 7/30). Patient also had metablic acidosis and hyperkalemia and received udall and urgent HD. Patient currently denies any fevers or cough. Patient's daughter reports however that BP drops during dialysis and patient is not on midodrine.     (26 Jul 2021 09:48)  Interval history, patient was admitted for acute hypoxic respiratory failure due to COPD exacerbation. Cardiology consulted for severe AS on previous ECHO.   patient currently comfortable with no complaints       PAST MEDICAL & SURGICAL HISTORY  Diabetes mellitus    COPD (chronic obstructive pulmonary disease)    Lymphedema of both lower extremities    CHF (congestive heart failure)    H/O right nephrectomy  20 yrs ago        FAMILY HISTORY:  FAMILY HISTORY:  No pertinent family history in first degree relatives        SOCIAL HISTORY:  []smoker  []Alcohol  []Drug    ALLERGIES:  No Known Allergies      MEDICATIONS:  MEDICATIONS  (STANDING):  albuterol/ipratropium for Nebulization 3 milliLiter(s) Nebulizer every 6 hours  ascorbic acid 500 milliGRAM(s) Oral daily  aspirin  chewable 81 milliGRAM(s) Oral daily  atorvastatin 40 milliGRAM(s) Oral at bedtime  budesonide 160 MICROgram(s)/formoterol 4.5 MICROgram(s) Inhaler 2 Puff(s) Inhalation two times a day  calcium acetate 667 milliGRAM(s) Oral three times a day with meals  chlorhexidine 4% Liquid 1 Application(s) Topical <User Schedule>  clopidogrel Tablet 75 milliGRAM(s) Oral daily  donepezil 5 milliGRAM(s) Oral at bedtime  epoetin graham-epbx (RETACRIT) Injectable 38986 Unit(s) IV Push <User Schedule>  ferrous    sulfate 325 milliGRAM(s) Oral daily  furosemide   Injectable 40 milliGRAM(s) IV Push daily  heparin   Injectable 5000 Unit(s) SubCutaneous every 8 hours  iron sucrose IVPB 100 milliGRAM(s) IV Intermittent <User Schedule>  levoFLOXacin  Tablet 250 milliGRAM(s) Oral every 24 hours  metoprolol tartrate 25 milliGRAM(s) Oral two times a day  mirtazapine 15 milliGRAM(s) Oral at bedtime  multivitamin 1 Tablet(s) Oral daily  mupirocin 2% Ointment 1 Application(s) Topical two times a day  pantoprazole    Tablet 40 milliGRAM(s) Oral two times a day  polyethylene glycol 3350 17 Gram(s) Oral daily  predniSONE   Tablet 40 milliGRAM(s) Oral daily  senna 2 Tablet(s) Oral at bedtime  sertraline 25 milliGRAM(s) Oral daily  tamsulosin 0.4 milliGRAM(s) Oral at bedtime  zinc oxide 20% Ointment 1 Application(s) Topical daily    MEDICATIONS  (PRN):  acetaminophen   Tablet .. 650 milliGRAM(s) Oral every 6 hours PRN Temp greater or equal to 38C (100.4F), Mild Pain (1 - 3)  ALBUTerol    90 MICROgram(s) HFA Inhaler 2 Puff(s) Inhalation every 4 hours PRN Shortness of Breath and/or Wheezing      HOME MEDICATIONS:  Home Medications:  albuterol 90 mcg/inh inhalation aerosol: 2 puff(s) inhaled every 4 hours (26 Jul 2021 10:25)  Aricept 5 mg oral tablet: 1 tab(s) orally once a day (at bedtime) (26 Jul 2021 10:25)  aspirin 81 mg oral tablet, chewable: 1 tab(s) orally once a day (26 Jul 2021 10:25)  atorvastatin 40 mg oral tablet: 1 tab(s) orally once a day (at bedtime) (26 Jul 2021 10:25)  Breo Ellipta 200 mcg-25 mcg/inh inhalation powder: 1 puff(s) inhaled once a day (26 Jul 2021 10:25)  calcium acetate 667 mg oral tablet: orally 3 times a day (with meals) (26 Jul 2021 10:25)  ferrous sulfate 325 mg (65 mg elemental iron) oral tablet: 1 tab(s) orally once a day (26 Jul 2021 10:25)  lactulose 10 g/15 mL oral syrup: 15 milliliter(s) orally once a day, As Needed for constipation (26 Jul 2021 10:25)  levoFLOXacin 250 mg oral tablet: 1 tab(s) orally every 24 hours till july/30/21 (26 Jul 2021 10:25)  pantoprazole 40 mg oral delayed release tablet: 1 tab(s) orally 2 times a day (26 Jul 2021 10:25)  polyethylene glycol 3350 oral powder for reconstitution: 17 gram(s) orally once a day (26 Jul 2021 10:25)  Remeron 15 mg oral tablet: 1 tab(s) orally once a day (at bedtime) (26 Jul 2021 10:25)  Retacrit 4000 units/mL preservative-free injectable solution: 4000 unit(s) injectable once a week. hold if Hgb &gt; 10 (26 Jul 2021 10:25)  Senna 8.6 mg oral tablet: 2 tab(s) orally once a day (at bedtime), As Needed (26 Jul 2021 10:25)  sertraline 25 mg oral tablet: 1 tab(s) orally once a day (26 Jul 2021 10:25)  sucralfate 1 g/10 mL oral suspension: 10 milliliter(s) orally 4 times a day (26 Jul 2021 10:25)  tamsulosin 0.4 mg oral capsule: 1 cap(s) orally once a day (at bedtime) (26 Jul 2021 10:25)  Vitamin C 500 mg oral tablet: 1 tab(s) orally once a day (26 Jul 2021 10:25)  zinc oxide 20% topical ointment: 1 application topically once a day (26 Jul 2021 10:25)      VITALS:   T(F): 97.1 (07-29 @ 05:13), Max: 99.6 (07-28 @ 20:59)  HR: 75 (07-29 @ 05:13) (62 - 92)  BP: 114/57 (07-29 @ 05:13) (102/50 - 154/64)  BP(mean): --  RR: 19 (07-28 @ 05:15) (16 - 19)  SpO2: 99% (07-28 @ 20:00) (94% - 100%)    I&O's Summary    28 Jul 2021 07:01  -  29 Jul 2021 07:00  --------------------------------------------------------  IN: 120 mL / OUT: 3000 mL / NET: -2880 mL        REVIEW OF SYSTEMS:  CONSTITUTIONAL: No weakness, fevers or chills  EYES: No visual changes  ENT: No vertigo or throat pain   NECK: No pain or stiffness  RESPIRATORY: No cough, wheezing, hemoptysis; No shortness of breath  CARDIOVASCULAR: No chest pain or palpitations  GASTROINTESTINAL: No abdominal or epigastric pain. No nausea, vomiting, or hematemesis; No diarrhea or constipation. No melena or hematochezia.  GENITOURINARY: No dysuria, frequency or hematuria  NEUROLOGICAL: No numbness or weakness  SKIN: No itching, no rashes  MSK: No pain    PHYSICAL EXAM:  NEURO: patient is awake , alert and oriented  GEN: ill appearing   NECK: no thyroid enlargement, no JVD  LUNGS: Clear to auscultation bilaterally   CARDIOVASCULAR: S1/S2 present, RRR , ejection systolic murmur  ABD: Soft, non-tender, non-distended, +BS  EXT: No RUBY  SKIN: Intact    LABS:                        7.8    8.78  )-----------( 239      ( 28 Jul 2021 05:43 )             26.6     07-28    138  |  99  |  52<H>  ----------------------------<  170<H>  4.3   |  28  |  6.1<HH>    Ca    8.2<L>      28 Jul 2021 05:43  Phos  4.9     07-28  Mg     2.0     07-28    TPro  5.6<L>  /  Alb  3.3<L>  /  TBili  0.2  /  DBili  x   /  AST  10  /  ALT  5   /  AlkPhos  63  07-28            RADIOLOGY:  -CXR:  < from: Xray Chest 1 View- PORTABLE-Routine (Xray Chest 1 View- PORTABLE-Routine in AM.) (07.27.21 @ 04:43) >  Impression:    Slightly decreased right greater than left bilateral pleural effusions and opacities.      < end of copied text >      -TTE:  < from: TTE Echo Complete w/o Contrast w/ Doppler (07.18.21 @ 14:47) >    Summary:   1. Technically difficult study.   2. Moderately decreased global left ventricular systolic function.   3. Entire apex, mid and apical anterior septum, mid and apical inferior septum, and mid and apical inferior wall are abnormal as described above.   4. LV Ejection Fraction by Bishop's Method with a biplane EF of 40 %.   5. Moderately increased LV wall thickness.   6. Normal left ventricular internal cavity size.   7. Moderately enlarged left atrium.   8. Normal right atrial size.   9. There is no evidence of pericardial effusion.  10. Mild to moderate mitral annular calcification.  11. Mild to moderate mitral valve regurgitation.  12. Thickening and calcification of the anterior and posterior mitral valve leaflets.  13. Moderate tricuspid regurgitation.  14. Pulmonary hypertension is present.  15. Peak transaortic gradient equals 35.8 mmHg, mean transaortic gradient equals 19.1 mmHg, the calculated aortic valve area equals 0.58 cm² by the continuity equation consistent with severe aortic stenosis.    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 Hx taken from daughter Carmelina. SOB associated with mild chest pain and abdominal pain at rest. O2 at NH was 100% and O2 requirements were at baseline. Recently admitted in 7/12/21 for septic shock secondary to PNA (+ legionella urine ag) and discharged on levofloxacin 250mg po daily (will be completed 7/30). Patient also had metablic acidosis and hyperkalemia and received udall and urgent HD. Patient currently denies any fevers or cough. Patient's daughter reports however that BP drops during dialysis and patient is not on midodrine.     (26 Jul 2021 09:48)    Interval history, patient was admitted for acute hypoxic respiratory failure due to COPD exacerbation. Cardiology consulted for severe AS on previous ECHO.   patient currently comfortable with no complaints.       PAST MEDICAL & SURGICAL HISTORY  Diabetes mellitus    COPD (chronic obstructive pulmonary disease)    Lymphedema of both lower extremities    CHF (congestive heart failure)    H/O right nephrectomy  20 yrs ago        FAMILY HISTORY:  FAMILY HISTORY:  No pertinent family history in first degree relatives        SOCIAL HISTORY:  []smoker  []Alcohol  []Drug    ALLERGIES:  No Known Allergies      MEDICATIONS:  MEDICATIONS  (STANDING):  albuterol/ipratropium for Nebulization 3 milliLiter(s) Nebulizer every 6 hours  ascorbic acid 500 milliGRAM(s) Oral daily  aspirin  chewable 81 milliGRAM(s) Oral daily  atorvastatin 40 milliGRAM(s) Oral at bedtime  budesonide 160 MICROgram(s)/formoterol 4.5 MICROgram(s) Inhaler 2 Puff(s) Inhalation two times a day  calcium acetate 667 milliGRAM(s) Oral three times a day with meals  chlorhexidine 4% Liquid 1 Application(s) Topical <User Schedule>  clopidogrel Tablet 75 milliGRAM(s) Oral daily  donepezil 5 milliGRAM(s) Oral at bedtime  epoetin graham-epbx (RETACRIT) Injectable 40845 Unit(s) IV Push <User Schedule>  ferrous    sulfate 325 milliGRAM(s) Oral daily  furosemide   Injectable 40 milliGRAM(s) IV Push daily  heparin   Injectable 5000 Unit(s) SubCutaneous every 8 hours  iron sucrose IVPB 100 milliGRAM(s) IV Intermittent <User Schedule>  levoFLOXacin  Tablet 250 milliGRAM(s) Oral every 24 hours  metoprolol tartrate 25 milliGRAM(s) Oral two times a day  mirtazapine 15 milliGRAM(s) Oral at bedtime  multivitamin 1 Tablet(s) Oral daily  mupirocin 2% Ointment 1 Application(s) Topical two times a day  pantoprazole    Tablet 40 milliGRAM(s) Oral two times a day  polyethylene glycol 3350 17 Gram(s) Oral daily  predniSONE   Tablet 40 milliGRAM(s) Oral daily  senna 2 Tablet(s) Oral at bedtime  sertraline 25 milliGRAM(s) Oral daily  tamsulosin 0.4 milliGRAM(s) Oral at bedtime  zinc oxide 20% Ointment 1 Application(s) Topical daily    MEDICATIONS  (PRN):  acetaminophen   Tablet .. 650 milliGRAM(s) Oral every 6 hours PRN Temp greater or equal to 38C (100.4F), Mild Pain (1 - 3)  ALBUTerol    90 MICROgram(s) HFA Inhaler 2 Puff(s) Inhalation every 4 hours PRN Shortness of Breath and/or Wheezing      HOME MEDICATIONS:  Home Medications:  albuterol 90 mcg/inh inhalation aerosol: 2 puff(s) inhaled every 4 hours (26 Jul 2021 10:25)  Aricept 5 mg oral tablet: 1 tab(s) orally once a day (at bedtime) (26 Jul 2021 10:25)  aspirin 81 mg oral tablet, chewable: 1 tab(s) orally once a day (26 Jul 2021 10:25)  atorvastatin 40 mg oral tablet: 1 tab(s) orally once a day (at bedtime) (26 Jul 2021 10:25)  Breo Ellipta 200 mcg-25 mcg/inh inhalation powder: 1 puff(s) inhaled once a day (26 Jul 2021 10:25)  calcium acetate 667 mg oral tablet: orally 3 times a day (with meals) (26 Jul 2021 10:25)  ferrous sulfate 325 mg (65 mg elemental iron) oral tablet: 1 tab(s) orally once a day (26 Jul 2021 10:25)  lactulose 10 g/15 mL oral syrup: 15 milliliter(s) orally once a day, As Needed for constipation (26 Jul 2021 10:25)  levoFLOXacin 250 mg oral tablet: 1 tab(s) orally every 24 hours till july/30/21 (26 Jul 2021 10:25)  pantoprazole 40 mg oral delayed release tablet: 1 tab(s) orally 2 times a day (26 Jul 2021 10:25)  polyethylene glycol 3350 oral powder for reconstitution: 17 gram(s) orally once a day (26 Jul 2021 10:25)  Remeron 15 mg oral tablet: 1 tab(s) orally once a day (at bedtime) (26 Jul 2021 10:25)  Retacrit 4000 units/mL preservative-free injectable solution: 4000 unit(s) injectable once a week. hold if Hgb &gt; 10 (26 Jul 2021 10:25)  Senna 8.6 mg oral tablet: 2 tab(s) orally once a day (at bedtime), As Needed (26 Jul 2021 10:25)  sertraline 25 mg oral tablet: 1 tab(s) orally once a day (26 Jul 2021 10:25)  sucralfate 1 g/10 mL oral suspension: 10 milliliter(s) orally 4 times a day (26 Jul 2021 10:25)  tamsulosin 0.4 mg oral capsule: 1 cap(s) orally once a day (at bedtime) (26 Jul 2021 10:25)  Vitamin C 500 mg oral tablet: 1 tab(s) orally once a day (26 Jul 2021 10:25)  zinc oxide 20% topical ointment: 1 application topically once a day (26 Jul 2021 10:25)      VITALS:   T(F): 97.1 (07-29 @ 05:13), Max: 99.6 (07-28 @ 20:59)  HR: 75 (07-29 @ 05:13) (62 - 92)  BP: 114/57 (07-29 @ 05:13) (102/50 - 154/64)  BP(mean): --  RR: 19 (07-28 @ 05:15) (16 - 19)  SpO2: 99% (07-28 @ 20:00) (94% - 100%)    I&O's Summary    28 Jul 2021 07:01  -  29 Jul 2021 07:00  --------------------------------------------------------  IN: 120 mL / OUT: 3000 mL / NET: -2880 mL        REVIEW OF SYSTEMS:  CONSTITUTIONAL: No weakness, fevers or chills  EYES: No visual changes  ENT: No vertigo or throat pain   NECK: No pain or stiffness  RESPIRATORY: No cough, wheezing, hemoptysis; No shortness of breath  CARDIOVASCULAR: No chest pain or palpitations  GASTROINTESTINAL: No abdominal or epigastric pain. No nausea, vomiting, or hematemesis; No diarrhea or constipation. No melena or hematochezia.  GENITOURINARY: No dysuria, frequency or hematuria  NEUROLOGICAL: No numbness or weakness  SKIN: No itching, no rashes  MSK: No pain    PHYSICAL EXAM:  NEURO: patient is awake , alert and oriented  GEN: ill appearing   NECK: no thyroid enlargement, no JVD  LUNGS: Clear to auscultation bilaterally   CARDIOVASCULAR: S1/S2 present, RRR , 3/6 ejection systolic murmur  ABD: Soft, non-tender, non-distended, +BS  EXT: No RUBY  SKIN: Intact    LABS:                        7.8    8.78  )-----------( 239      ( 28 Jul 2021 05:43 )             26.6     07-28    138  |  99  |  52<H>  ----------------------------<  170<H>  4.3   |  28  |  6.1<HH>    Ca    8.2<L>      28 Jul 2021 05:43  Phos  4.9     07-28  Mg     2.0     07-28    TPro  5.6<L>  /  Alb  3.3<L>  /  TBili  0.2  /  DBili  x   /  AST  10  /  ALT  5   /  AlkPhos  63  07-28            RADIOLOGY:  -CXR:  < from: Xray Chest 1 View- PORTABLE-Routine (Xray Chest 1 View- PORTABLE-Routine in AM.) (07.27.21 @ 04:43) >  Impression:    Slightly decreased right greater than left bilateral pleural effusions and opacities.      < end of copied text >      -TTE:  < from: TTE Echo Complete w/o Contrast w/ Doppler (07.18.21 @ 14:47) >    Summary:   1. Technically difficult study.   2. Moderately decreased global left ventricular systolic function.   3. Entire apex, mid and apical anterior septum, mid and apical inferior septum, and mid and apical inferior wall are abnormal as described above.   4. LV Ejection Fraction by Bishop's Method with a biplane EF of 40 %.   5. Moderately increased LV wall thickness.   6. Normal left ventricular internal cavity size.   7. Moderately enlarged left atrium.   8. Normal right atrial size.   9. There is no evidence of pericardial effusion.  10. Mild to moderate mitral annular calcification.  11. Mild to moderate mitral valve regurgitation.  12. Thickening and calcification of the anterior and posterior mitral valve leaflets.  13. Moderate tricuspid regurgitation.  14. Pulmonary hypertension is present.  15. Peak transaortic gradient equals 35.8 mmHg, mean transaortic gradient equals 19.1 mmHg, the calculated aortic valve area equals 0.58 cm² by the continuity equation consistent with severe aortic stenosis.    < end of copied text >        ECG:  < from: 12 Lead ECG (07.26.21 @ 04:38) >    Diagnosis Line Sinus rhythm with frequent and consecutive Premature ventricular complexes  Left axis deviation  Left anterior fascicular block  Incomplete right bundle branch block  Septal infarct , age undetermined  ST & Marked T wave abnormality, consider anterolateral ischemia  Abnormal ECG    < end of copied text >

## 2021-07-29 NOTE — ADVANCED PRACTICE NURSE CONSULT - ASSESSMENT
80yo M from [facility] w/ pmhx of CHF (EF 40% in July 2021), HTN, anxiety, Alzheimer's dementia, COPD on home 4L O2, T2DM on insulin, hx right nephrectomy and ESRD recently started on dialysis s/p tunnelled catheter, anemia of chronic disease on ROMELIA therapy, brought in by EMS (7/26) with complaint of SOB. Hx taken from daughter Carmelina. SOB associated with mild chest pain and abdominal pain at rest. O2 at NH was 100% and O2 requirements were at baseline. Recently admitted in 7/12/21 for septic shock secondary to PNA (+ legionella urine ag) and discharged on levofloxacin 250mg po daily (will be completed 7/30). Patient also had metablic acidosis and hyperkalemia and received udall and urgent HD.  Patient was discharged from the ED yesterday for the same reason and CXR showed R pleural effusion..  Currently admitted to medicine with the primary diagnosis of Shortness of breath; Today is hospital day 2d, on F9, being managed for Acute hypoxemic respiratory failure secondary to COPD exacerbation +/- CHF exacerbation; Recent legionella PNA; troponinemia; ESRD; DM. Alzheimer's dementia; iron deficiency anemia; COPD; epigastric pain likely gastritis.    Received patient on F9, laying supine in bed,  turned to left side (pilllow under right side), pt awake, alert, following commands & responding appropriately, made aware of purpose of WOCN visit, agreeable to consult. With assistance from PCA, turned patient to left side for skin assessment.     Sacral & coccyx skin intact. Incontinence associated dermatitis to b/l buttock-intergluteal cleft area -skin denuded & slightly macerated with scattered areas of dry peeling skin, partial thickness skin skin loss/opening x 2  (~0.5cm x 0.3cm x 0.2cm) to b/l buttock, open wound beds w/light pink tissue.      Dry maroon colored scabbed tissue ~3cm x 0.2cm ) to left heel.      Patient mostly bedbound, able to turn/position in bed w/ assistance, anuric-on HD-reports some urinary incontinence, incontinence of semi-formed stool during WOCN visit. Ordered for diabetic & renal diet, probably adequate intake as per reported Celso score.

## 2021-07-29 NOTE — PROGRESS NOTE ADULT - SUBJECTIVE AND OBJECTIVE BOX
Pt seen and examined at bedside. No CP or SOB.      PAST MEDICAL & SURGICAL HISTORY:  Diabetes mellitus    COPD (chronic obstructive pulmonary disease)    Lymphedema of both lower extremities    CHF (congestive heart failure)    H/O right nephrectomy  20 yrs ago        VITAL SIGNS (Last 24 hrs):  T(C): 36.6 (07-29-21 @ 13:39), Max: 37.6 (07-28-21 @ 20:59)  HR: 66 (07-29-21 @ 13:39) (65 - 75)  BP: 116/58 (07-29-21 @ 13:39) (102/50 - 124/60)  RR: 17 (07-29-21 @ 13:39) (17 - 17)  SpO2: 99% (07-28-21 @ 20:00) (99% - 99%)  Wt(kg): --  Daily     Daily     I&O's Summary    28 Jul 2021 07:01  -  29 Jul 2021 07:00  --------------------------------------------------------  IN: 120 mL / OUT: 3000 mL / NET: -2880 mL    29 Jul 2021 07:01  -  29 Jul 2021 16:07  --------------------------------------------------------  IN: 300 mL / OUT: 0 mL / NET: 300 mL        PHYSICAL EXAM:  GENERAL: NAD, well-developed  HEAD:  Atraumatic, Normocephalic  EYES: EOMI, PERRLA, conjunctiva and sclera clear  NECK: Supple, No JVD  CHEST/LUNG: Clear to auscultation bilaterally; No wheeze  HEART: Regular rate and rhythm; +systolic murmur, rubs, or gallops  ABDOMEN: Soft, Nontender, Nondistended; Bowel sounds present  EXTREMITIES:  2+ Peripheral Pulses, No clubbing, cyanosis, or edema  PSYCH: AAOx3  NEUROLOGY: non-focal  SKIN: No rashes or lesions    Labs Reviewed  Spoke to patient in regards to abnormal labs.    CBC Full  -  ( 29 Jul 2021 08:01 )  WBC Count : 14.68 K/uL  Hemoglobin : 9.0 g/dL  Hematocrit : 30.6 %  Platelet Count - Automated : 250 K/uL  Mean Cell Volume : 95.3 fL  Mean Cell Hemoglobin : 28.0 pg  Mean Cell Hemoglobin Concentration : 29.4 g/dL  Auto Neutrophil # : 12.27 K/uL  Auto Lymphocyte # : 1.25 K/uL  Auto Monocyte # : 0.92 K/uL  Auto Eosinophil # : 0.06 K/uL  Auto Basophil # : 0.04 K/uL  Auto Neutrophil % : 83.5 %  Auto Lymphocyte % : 8.5 %  Auto Monocyte % : 6.3 %  Auto Eosinophil % : 0.4 %  Auto Basophil % : 0.3 %    BMP:    07-29 @ 08:01    Blood Urea Nitrogen - 46  Calcium - 8.2  Carbond Dioxide - 27  Chloride - 98  Creatinine - 5.3  Glucose - 145  Potassium - 3.8  Sodium - 138      Hemoglobin A1c -     Urine Culture:        COVID Labs    Procalcitonin, Serum: 0.21 ng/mL (07-26-21 @ 11:20)    D-Dimer:        Imaging reviewed      MEDICATIONS  (STANDING):  albuterol/ipratropium for Nebulization 3 milliLiter(s) Nebulizer every 6 hours  ascorbic acid 500 milliGRAM(s) Oral daily  aspirin  chewable 81 milliGRAM(s) Oral daily  atorvastatin 40 milliGRAM(s) Oral at bedtime  budesonide 160 MICROgram(s)/formoterol 4.5 MICROgram(s) Inhaler 2 Puff(s) Inhalation two times a day  calcium acetate 667 milliGRAM(s) Oral three times a day with meals  chlorhexidine 4% Liquid 1 Application(s) Topical <User Schedule>  clopidogrel Tablet 75 milliGRAM(s) Oral daily  donepezil 5 milliGRAM(s) Oral at bedtime  epoetin graham-epbx (RETACRIT) Injectable 14830 Unit(s) IV Push <User Schedule>  furosemide    Tablet 80 milliGRAM(s) Oral daily  heparin   Injectable 5000 Unit(s) SubCutaneous every 8 hours  iron sucrose IVPB 100 milliGRAM(s) IV Intermittent <User Schedule>  levoFLOXacin  Tablet 250 milliGRAM(s) Oral every 24 hours  metoprolol tartrate 25 milliGRAM(s) Oral two times a day  mirtazapine 15 milliGRAM(s) Oral at bedtime  multivitamin 1 Tablet(s) Oral daily  mupirocin 2% Ointment 1 Application(s) Topical two times a day  pantoprazole    Tablet 40 milliGRAM(s) Oral two times a day  polyethylene glycol 3350 17 Gram(s) Oral daily  predniSONE   Tablet 40 milliGRAM(s) Oral daily  senna 2 Tablet(s) Oral at bedtime  sertraline 25 milliGRAM(s) Oral daily  tamsulosin 0.4 milliGRAM(s) Oral at bedtime  zinc oxide 20% Ointment 1 Application(s) Topical daily    MEDICATIONS  (PRN):  acetaminophen   Tablet .. 650 milliGRAM(s) Oral every 6 hours PRN Temp greater or equal to 38C (100.4F), Mild Pain (1 - 3)  ALBUTerol    90 MICROgram(s) HFA Inhaler 2 Puff(s) Inhalation every 4 hours PRN Shortness of Breath and/or Wheezing

## 2021-07-29 NOTE — CONSULT NOTE ADULT - ASSESSMENT
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 Martinez as outpatient.

## 2021-07-29 NOTE — PROGRESS NOTE ADULT - SUBJECTIVE AND OBJECTIVE BOX
SUBJECTIVE:    Patient is a 81y old Male who presents with a chief complaint of SOB (29 Jul 2021 12:38)    Currently admitted to medicine with the primary diagnosis of Shortness of breath    Today is hospital day 3d. This morning he is resting comfortably in bed and reports no new issues or overnight events.     PAST MEDICAL & SURGICAL HISTORY  Diabetes mellitus    COPD (chronic obstructive pulmonary disease)    Lymphedema of both lower extremities    CHF (congestive heart failure)    H/O right nephrectomy  20 yrs ago    ALLERGIES:  No Known Allergies    MEDICATIONS:  STANDING MEDICATIONS  albuterol/ipratropium for Nebulization 3 milliLiter(s) Nebulizer every 6 hours  ascorbic acid 500 milliGRAM(s) Oral daily  aspirin  chewable 81 milliGRAM(s) Oral daily  atorvastatin 40 milliGRAM(s) Oral at bedtime  budesonide 160 MICROgram(s)/formoterol 4.5 MICROgram(s) Inhaler 2 Puff(s) Inhalation two times a day  calcium acetate 667 milliGRAM(s) Oral three times a day with meals  chlorhexidine 4% Liquid 1 Application(s) Topical <User Schedule>  clopidogrel Tablet 75 milliGRAM(s) Oral daily  donepezil 5 milliGRAM(s) Oral at bedtime  epoetin graham-epbx (RETACRIT) Injectable 77870 Unit(s) IV Push <User Schedule>  furosemide    Tablet 80 milliGRAM(s) Oral daily  heparin   Injectable 5000 Unit(s) SubCutaneous every 8 hours  iron sucrose IVPB 100 milliGRAM(s) IV Intermittent <User Schedule>  levoFLOXacin  Tablet 250 milliGRAM(s) Oral every 24 hours  metoprolol tartrate 25 milliGRAM(s) Oral two times a day  mirtazapine 15 milliGRAM(s) Oral at bedtime  multivitamin 1 Tablet(s) Oral daily  mupirocin 2% Ointment 1 Application(s) Topical two times a day  pantoprazole    Tablet 40 milliGRAM(s) Oral two times a day  polyethylene glycol 3350 17 Gram(s) Oral daily  predniSONE   Tablet 40 milliGRAM(s) Oral daily  senna 2 Tablet(s) Oral at bedtime  sertraline 25 milliGRAM(s) Oral daily  tamsulosin 0.4 milliGRAM(s) Oral at bedtime  zinc oxide 20% Ointment 1 Application(s) Topical daily    PRN MEDICATIONS  acetaminophen   Tablet .. 650 milliGRAM(s) Oral every 6 hours PRN  ALBUTerol    90 MICROgram(s) HFA Inhaler 2 Puff(s) Inhalation every 4 hours PRN    VITALS:   T(F): 97.8  HR: 66  BP: 116/58  RR: 17  SpO2: 99%    LABS:                        9.0    14.68 )-----------( 250      ( 29 Jul 2021 08:01 )             30.6     07-29    138  |  98  |  46<H>  ----------------------------<  145<H>  3.8   |  27  |  5.3<HH>    Ca    8.2<L>      29 Jul 2021 08:01  Phos  2.5     07-29  Mg     1.8     07-29    TPro  6.2  /  Alb  3.6  /  TBili  0.3  /  DBili  x   /  AST  14  /  ALT  8   /  AlkPhos  68  07-29    RADIOLOGY:    PHYSICAL EXAM:  GEN: No acute distress  LUNGS: mild b/l ronchi improved since yesterday  HEART: Regular rate and rhythm, +s1 s2  ABD: Soft, non-tender, non-distended.  EXT: no LE edema, 2+PP/LEDBETTER/Skin Intact.   NEURO: AAOX3, no focal deficits

## 2021-07-30 NOTE — DISCHARGE NOTE PROVIDER - NSDCCPCAREPLAN_GEN_ALL_CORE_FT
PRINCIPAL DISCHARGE DIAGNOSIS  Diagnosis: Shortness of breath  Assessment and Plan of Treatment: You have a history of COPD. In the hospital you were treated for an exacerbation of this condition. Continue using your home inhalers upon discharge. Continue using supplemental oxygen as needed. Follow-up with your primary care doctor and pulmonologist within one week of discharge for further monitoring of this condition.   You also completed course of antibiotics for recent Legionella pneumonia.  In Addition, you were treated with IV diuretics for fluid overload.      SECONDARY DISCHARGE DIAGNOSES  Diagnosis: Aortic stenosis  Assessment and Plan of Treatment: You were found to have aortic stenosis on ECHO. Please follow up with Dr. Martinez for possible TAVR.      Diagnosis: ESRD on dialysis  Assessment and Plan of Treatment: You have kidney failure, which requires dialysis to perform the function of your kidneys. Please continue to undergo dialysis according to your dialysis schedule, and follow up with your nephrologist to maintain your electrolyte balance. If you notice any palpitations, shortness of breath, or weakness, please return to the emergency department.

## 2021-07-30 NOTE — DISCHARGE NOTE PROVIDER - HOSPITAL COURSE
82yo M from [facility] w/ pmhx of CHF (EF 40% in July 2021), HTN, anxiety, Alzheimer's dementia, COPD on home 4L O2, T2DM on insulin, hx right nephrectomy and ESRD recently started on dialysis s/p tunnelled catheter, anemia of chronic disease on ROMELIA therapy, brought in by EMS with complaint of SOB. Admitted for possible fluid overload due to dialysis vs. unresolved pneumonia.   Pt was treated for acute hypoxemic respiratory failure secondary to COPD exacerbation vs CHF exacerbation. Pt has Hx of Recent legionella PNA. CXR with effusion; pt was treated with IV Lasix 40 mg daily. TTE 7/18/21: EF 40%, severe AS. pt was treated with prednisone, duonebs and levofloxacin 250mg po daily (end 7/30). pt was seen by Cardio- recommended TAVR,  pt is to follow up with Dr. Martinez as outpatient.    Pt was evaluated for troponinemia: trop 1.18 > 0.95. EKG shows chronic changes from prior. TTE 7/18/21: EF 40%, severe AS. pt was continued on lopressor 25mg bid and plavix 75mg daily; c/w asa 81mg daily.  Pt was evaluated by Nephrology for ESRD, c/w HD M/W/F.    Pt stable for discharge.

## 2021-07-30 NOTE — PROGRESS NOTE ADULT - ASSESSMENT
80yo M from [facility] w/ pmhx of CHF (EF 40% in July 2021), HTN, anxiety, Alzheimer's dementia, COPD on home 4L O2, T2DM on insulin, hx right nephrectomy and ESRD recently started on dialysis s/p tunnelled catheter, anemia of chronic disease on ROMELIA therapy, brought in by EMS with complaint of SOB. Admitted for possible fluid overload due to dialysis vs. unresolved pneumonia.     #Acute hypoxemic respiratory failure secondary to COPD exacerbation +/- CHF exacerbation   #Recent legionella PNA  - CXR with effusion;   -c/w PO Lasix 20 mg daily  - TTE 7/18/21: EF 40%, severe AS   - Continue prednisone, duonebs  - s/p levofloxacin 250mg po daily (end 7/30)   - Cardio consult appreciated  - f/u TAVR as outpatient    #troponinemia   - trop 1.18 > 0.95  - EKG shows chronic changes from prior   -  Cardio consult appreciated  - TTE 7/18/21: EF 40%, severe AS   - c/w lopressor 25mg bid and plavix 75mg daily; c/w asa 81mg daily   -f/u TAVR as outpatient with Dr. Martinez for TAVR if within of GOC    #ESRD on HD M/W/F s/p tunnelled catheter  - f/u nephrology  - c/w HD    #DM  - FS AC/HS, start insulin if FS peristently > 180    #Alzheimer's dementia  -continue remeron, aricept, sertraline    #Iron deficiency anemia  -at baseline  -continue ferrous sulfate  -ROMELIA per nephro    #COPD on home O2 4L   -continue albuterol and symbicort (in place of home breo ellipta)     #epigastric pain likely gastritis  - c/w sucralfate and protonix 40mg po bid     DVT ppx: hep subq  GI ppx: protonix  Diet: CC/renal   Code Status: DNR/DNI   Dispo: d/c tomorrow

## 2021-07-30 NOTE — DISCHARGE NOTE PROVIDER - CARE PROVIDER_API CALL
Toñito Martinez)  Cardiovascular Disease; Internal Medicine  [facility], [address] [address]  Phone: [phone]  Fax: [phone]  Follow Up Time: 1 week    NUBIA WILDER  [id number]  [address] [place] [address]  Phone: [phone]  Fax: [phone]  Follow Up Time: 2 weeks    Bowen Saba  INTERNAL MEDICINE  [address] [place] [address]  Phone: [phone]  Fax: [phone]  Follow Up Time: Routine

## 2021-07-30 NOTE — DISCHARGE NOTE PROVIDER - NSDCMRMEDTOKEN_GEN_ALL_CORE_FT
albuterol 90 mcg/inh inhalation aerosol: 2 puff(s) inhaled every 4 hours  Aricept 5 mg oral tablet: 1 tab(s) orally once a day (at bedtime)  aspirin 81 mg oral tablet, chewable: 1 tab(s) orally once a day  atorvastatin 40 mg oral tablet: 1 tab(s) orally once a day (at bedtime)  Breo Ellipta 200 mcg-25 mcg/inh inhalation powder: 1 puff(s) inhaled once a day  calcium acetate 667 mg oral tablet: orally 3 times a day (with meals)  ferrous sulfate 325 mg (65 mg elemental iron) oral tablet: 1 tab(s) orally once a day  lactulose 10 g/15 mL oral syrup: 15 milliliter(s) orally once a day, As Needed for constipation  levoFLOXacin 250 mg oral tablet: 1 tab(s) orally every 24 hours till july/30/21  pantoprazole 40 mg oral delayed release tablet: 1 tab(s) orally 2 times a day  polyethylene glycol 3350 oral powder for reconstitution: 17 gram(s) orally once a day  Remeron 15 mg oral tablet: 1 tab(s) orally once a day (at bedtime)  Retacrit 4000 units/mL preservative-free injectable solution: 4000 unit(s) injectable once a week. hold if Hgb &gt; 10  Senna 8.6 mg oral tablet: 2 tab(s) orally once a day (at bedtime), As Needed  sertraline 25 mg oral tablet: 1 tab(s) orally once a day  sucralfate 1 g/10 mL oral suspension: 10 milliliter(s) orally 4 times a day  tamsulosin 0.4 mg oral capsule: 1 cap(s) orally once a day (at bedtime)  Vitamin C 500 mg oral tablet: 1 tab(s) orally once a day  zinc oxide 20% topical ointment: 1 application topically once a day   albuterol 90 mcg/inh inhalation aerosol: 2 puff(s) inhaled every 4 hours  Aricept 5 mg oral tablet: 1 tab(s) orally once a day (at bedtime)  aspirin 81 mg oral tablet, chewable: 1 tab(s) orally once a day  atorvastatin 40 mg oral tablet: 1 tab(s) orally once a day (at bedtime)  Breo Ellipta 200 mcg-25 mcg/inh inhalation powder: 1 puff(s) inhaled once a day  calcium acetate 667 mg oral tablet: orally 3 times a day (with meals)  clopidogrel 75 mg oral tablet: 1 tab(s) orally once a day  ferrous sulfate 325 mg (65 mg elemental iron) oral tablet: 1 tab(s) orally once a day  furosemide 20 mg oral tablet: 1 tab(s) orally once a day  lactulose 10 g/15 mL oral syrup: 15 milliliter(s) orally once a day, As Needed for constipation  metoprolol tartrate 25 mg oral tablet: 1 tab(s) orally 2 times a day  pantoprazole 40 mg oral delayed release tablet: 1 tab(s) orally 2 times a day  polyethylene glycol 3350 oral powder for reconstitution: 17 gram(s) orally once a day  Remeron 15 mg oral tablet: 1 tab(s) orally once a day (at bedtime)  Retacrit 4000 units/mL preservative-free injectable solution: 4000 unit(s) injectable once a week. hold if Hgb &gt; 10  Senna 8.6 mg oral tablet: 2 tab(s) orally once a day (at bedtime), As Needed  sertraline 25 mg oral tablet: 1 tab(s) orally once a day  sucralfate 1 g/10 mL oral suspension: 10 milliliter(s) orally 4 times a day  tamsulosin 0.4 mg oral capsule: 1 cap(s) orally once a day (at bedtime)  Vitamin C 500 mg oral tablet: 1 tab(s) orally once a day  zinc oxide 20% topical ointment: 1 application topically once a day

## 2021-07-30 NOTE — PROGRESS NOTE ADULT - NSICDXPILOT_GEN_ALL_CORE
[place]

## 2021-07-30 NOTE — PROGRESS NOTE ADULT - PROVIDER SPECIALTY LIST ADULT
Hospitalist
Internal Medicine
Nephrology
Internal Medicine
Hospitalist
Internal Medicine
Nephrology
Internal Medicine
Hospitalist

## 2021-07-30 NOTE — PROGRESS NOTE ADULT - SUBJECTIVE AND OBJECTIVE BOX
[place] NEPHROLOGY FOLLOW UP NOTE  --------------------------------------------------------------------------------  24 hour events/subjective: Patient examined during HD. Appears comfortable.    PAST HISTORY  --------------------------------------------------------------------------------  No significant changes to PMH, PSH, FHx, SHx, unless otherwise noted    ALLERGIES & MEDICATIONS  --------------------------------------------------------------------------------  Allergies    No Known Allergies    Standing Inpatient Medications  albuterol/ipratropium for Nebulization 3 milliLiter(s) Nebulizer every 6 hours  ascorbic acid 500 milliGRAM(s) Oral daily  aspirin  chewable 81 milliGRAM(s) Oral daily  atorvastatin 40 milliGRAM(s) Oral at bedtime  budesonide 160 MICROgram(s)/formoterol 4.5 MICROgram(s) Inhaler 2 Puff(s) Inhalation two times a day  calcium acetate 667 milliGRAM(s) Oral three times a day with meals  chlorhexidine 4% Liquid 1 Application(s) Topical <User Schedule>  clopidogrel Tablet 75 milliGRAM(s) Oral daily  donepezil 5 milliGRAM(s) Oral at bedtime  epoetin graham-epbx (RETACRIT) Injectable 03731 Unit(s) IV Push <User Schedule>  furosemide    Tablet 20 milliGRAM(s) Oral daily  heparin   Injectable 5000 Unit(s) SubCutaneous every 8 hours  iron sucrose IVPB 100 milliGRAM(s) IV Intermittent <User Schedule>  metoprolol tartrate 25 milliGRAM(s) Oral two times a day  mirtazapine 15 milliGRAM(s) Oral at bedtime  multivitamin 1 Tablet(s) Oral daily  mupirocin 2% Ointment 1 Application(s) Topical two times a day  pantoprazole    Tablet 40 milliGRAM(s) Oral two times a day  polyethylene glycol 3350 17 Gram(s) Oral daily  predniSONE   Tablet 40 milliGRAM(s) Oral daily  senna 2 Tablet(s) Oral at bedtime  sertraline 25 milliGRAM(s) Oral daily  tamsulosin 0.4 milliGRAM(s) Oral at bedtime  zinc oxide 20% Ointment 1 Application(s) Topical daily    PRN Inpatient Medications  acetaminophen   Tablet .. 650 milliGRAM(s) Oral every 6 hours PRN  ALBUTerol    90 MICROgram(s) HFA Inhaler 2 Puff(s) Inhalation every 4 hours PRN      VITALS/PHYSICAL EXAM  --------------------------------------------------------------------------------  T(C): 36.3 (07-30-21 @ 05:34), Max: 36.6 (07-29-21 @ 13:39)  HR: 63 (07-30-21 @ 12:10) (63 - 77)  BP: 110/60 (07-30-21 @ 12:10) (107/53 - 139/62)  RR: 20 (07-30-21 @ 12:10) (17 - 20 07-29-21 @ 07:01  -  07-30-21 @ 07:00  --------------------------------------------------------  IN: 300 mL / OUT: 0 mL / NET: 300 mL    07-30-21 @ 07:01  -  07-30-21 @ 12:50  --------------------------------------------------------  IN: 120 mL / OUT: 0 mL / NET: 120 mL    Physical Exam:  	Gen: NAD  	Pulm: CTA B/L  	CV: RRR, S1S2  	Abd: +BS, soft, nontender/nondistended  	: No suprapubic tenderness  	LE: Warm,  no edema  	Vascular access: TDC    LABS/STUDIES  --------------------------------------------------------------------------------              8.3    11.18 >-----------<  258      [07-30-21 @ 09:00]              27.4     132  |  94  |  73  ----------------------------<  169      [07-30-21 @ 09:00]  4.5   |  26  |  6.6        Ca     8.2     [07-30-21 @ 09:00]      Mg     1.8     [07-30-21 @ 09:00]      Phos  4.1     [07-30-21 @ 09:00]    TPro  5.9  /  Alb  3.4  /  TBili  0.3  /  DBili  x   /  AST  10  /  ALT  6   /  AlkPhos  61  [07-30-21 @ 09:00]    Creatinine Trend:  SCr 6.6 [07-30 @ 09:00]  SCr 5.3 [07-29 @ 08:01]  SCr 6.1 [07-28 @ 05:43]  SCr 4.8 [07-27 @ 06:37]  SCr 6.8 [07-26 @ 11:20]    Urinalysis - [07-12-21 @ 13:07]      Color Yellow / Appearance Slightly Turbid / SG 1.014 / pH 6.0      Gluc Negative / Ketone Negative  / Bili Negative / Urobili <2 mg/dL       Blood Small / Protein 300 mg/dL / Leuk Est Large / Nitrite Negative      RBC 14 / WBC 73 / Hyaline 3 / Gran  / Sq Epi  / Non Sq Epi 1 / Bacteria Few      Iron 34, TIBC 178, %sat 19      [01-04-21 @ 06:40]  Ferritin 332      [01-04-21 @ 06:40]  PTH -- (Ca 8.9)      [12-24-20 @ 04:30]   54    HBsAb Nonreact      [07-12-21 @ 18:05]  HBsAg Nonreact      [07-15-21 @ 20:00]  HCV 0.15, Nonreact      [07-15-21 @ 20:00]

## 2021-07-30 NOTE — PROGRESS NOTE ADULT - SUBJECTIVE AND OBJECTIVE BOX
SUBJECTIVE:    Patient is a 81y old Male who presents with a chief complaint of SOB (30 Jul 2021 15:58)    Currently admitted to medicine with the primary diagnosis of Shortness of breath.  Today is hospital day 4d. This morning he is resting comfortably in bed and reports no new issues or overnight events.     PAST MEDICAL & SURGICAL HISTORY  Diabetes mellitus    COPD (chronic obstructive pulmonary disease)    Lymphedema of both lower extremities    CHF (congestive heart failure)    H/O right nephrectomy  20 yrs ago  ALLERGIES:  No Known Allergies    MEDICATIONS:  STANDING MEDICATIONS  albuterol/ipratropium for Nebulization 3 milliLiter(s) Nebulizer every 6 hours  ascorbic acid 500 milliGRAM(s) Oral daily  aspirin  chewable 81 milliGRAM(s) Oral daily  atorvastatin 40 milliGRAM(s) Oral at bedtime  budesonide 160 MICROgram(s)/formoterol 4.5 MICROgram(s) Inhaler 2 Puff(s) Inhalation two times a day  calcium acetate 667 milliGRAM(s) Oral three times a day with meals  chlorhexidine 4% Liquid 1 Application(s) Topical <User Schedule>  clopidogrel Tablet 75 milliGRAM(s) Oral daily  donepezil 5 milliGRAM(s) Oral at bedtime  epoetin graham-epbx (RETACRIT) Injectable 41141 Unit(s) IV Push <User Schedule>  furosemide    Tablet 20 milliGRAM(s) Oral daily  heparin   Injectable 5000 Unit(s) SubCutaneous every 8 hours  iron sucrose IVPB 100 milliGRAM(s) IV Intermittent <User Schedule>  metoprolol tartrate 25 milliGRAM(s) Oral two times a day  mirtazapine 15 milliGRAM(s) Oral at bedtime  multivitamin 1 Tablet(s) Oral daily  mupirocin 2% Ointment 1 Application(s) Topical two times a day  pantoprazole    Tablet 40 milliGRAM(s) Oral two times a day  polyethylene glycol 3350 17 Gram(s) Oral daily  predniSONE   Tablet 40 milliGRAM(s) Oral daily  senna 2 Tablet(s) Oral at bedtime  sertraline 25 milliGRAM(s) Oral daily  tamsulosin 0.4 milliGRAM(s) Oral at bedtime  zinc oxide 20% Ointment 1 Application(s) Topical daily    PRN MEDICATIONS  acetaminophen   Tablet .. 650 milliGRAM(s) Oral every 6 hours PRN  ALBUTerol    90 MICROgram(s) HFA Inhaler 2 Puff(s) Inhalation every 4 hours PRN    VITALS:   T(F): 97.4  HR: 63  BP: 118/57  RR: 18  SpO2: --    LABS:                        8.3    11.18 )-----------( 258      ( 30 Jul 2021 09:00 )             27.4     07-30    132<L>  |  94<L>  |  73<HH>  ----------------------------<  169<H>  4.5   |  26  |  6.6<HH>    Ca    8.2<L>      30 Jul 2021 09:00  Phos  4.1     07-30  Mg     1.8     07-30    TPro  5.9<L>  /  Alb  3.4<L>  /  TBili  0.3  /  DBili  x   /  AST  10  /  ALT  6   /  AlkPhos  61  07-30      RADIOLOGY:    PHYSICAL EXAM:  GEN: No acute distress  LUNGS: mild b/l ronchi improved since yesterday  HEART: Regular rate and rhythm, +s1 s2  ABD: Soft, non-tender, non-distended.  EXT: no LE edema, 2+PP/LEDBETTER/Skin Intact.   NEURO: AAOX3, no focal deficits     SUBJECTIVE:    Patient is a 81y old Male who presents with a chief complaint of SOB (30 Jul 2021 15:58)    Currently admitted to medicine with the primary diagnosis of Shortness of breath.  Today is hospital day 4d. This morning he is resting comfortably in bed and reports no new issues or overnight events.     PAST MEDICAL & SURGICAL HISTORY  Diabetes mellitus    COPD (chronic obstructive pulmonary disease)    Lymphedema of both lower extremities    CHF (congestive heart failure)    H/O right nephrectomy  20 yrs ago  ALLERGIES:  No Known Allergies    MEDICATIONS:  STANDING MEDICATIONS  albuterol/ipratropium for Nebulization 3 milliLiter(s) Nebulizer every 6 hours  ascorbic acid 500 milliGRAM(s) Oral daily  aspirin  chewable 81 milliGRAM(s) Oral daily  atorvastatin 40 milliGRAM(s) Oral at bedtime  budesonide 160 MICROgram(s)/formoterol 4.5 MICROgram(s) Inhaler 2 Puff(s) Inhalation two times a day  calcium acetate 667 milliGRAM(s) Oral three times a day with meals  chlorhexidine 4% Liquid 1 Application(s) Topical <User Schedule>  clopidogrel Tablet 75 milliGRAM(s) Oral daily  donepezil 5 milliGRAM(s) Oral at bedtime  epoetin graham-epbx (RETACRIT) Injectable 23170 Unit(s) IV Push <User Schedule>  furosemide    Tablet 20 milliGRAM(s) Oral daily  heparin   Injectable 5000 Unit(s) SubCutaneous every 8 hours  iron sucrose IVPB 100 milliGRAM(s) IV Intermittent <User Schedule>  metoprolol tartrate 25 milliGRAM(s) Oral two times a day  mirtazapine 15 milliGRAM(s) Oral at bedtime  multivitamin 1 Tablet(s) Oral daily  mupirocin 2% Ointment 1 Application(s) Topical two times a day  pantoprazole    Tablet 40 milliGRAM(s) Oral two times a day  polyethylene glycol 3350 17 Gram(s) Oral daily  predniSONE   Tablet 40 milliGRAM(s) Oral daily  senna 2 Tablet(s) Oral at bedtime  sertraline 25 milliGRAM(s) Oral daily  tamsulosin 0.4 milliGRAM(s) Oral at bedtime  zinc oxide 20% Ointment 1 Application(s) Topical daily    PRN MEDICATIONS  acetaminophen   Tablet .. 650 milliGRAM(s) Oral every 6 hours PRN  ALBUTerol    90 MICROgram(s) HFA Inhaler 2 Puff(s) Inhalation every 4 hours PRN    VITALS:   T(F): 97.4  HR: 63  BP: 118/57  RR: 18  SpO2: --    LABS:                        8.3    11.18 )-----------( 258      ( 30 Jul 2021 09:00 )             27.4     07-30    132<L>  |  94<L>  |  73<HH>  ----------------------------<  169<H>  4.5   |  26  |  6.6<HH>    Ca    8.2<L>      30 Jul 2021 09:00  Phos  4.1     07-30  Mg     1.8     07-30    TPro  5.9<L>  /  Alb  3.4<L>  /  TBili  0.3  /  DBili  x   /  AST  10  /  ALT  6   /  AlkPhos  61  07-30      RADIOLOGY:    PHYSICAL EXAM:  GEN: No acute distress  LUNGS: clear to auscultation b/l  HEART: Regular rate and rhythm, +s1 s2  ABD: Soft, non-tender, non-distended.  EXT: no LE edema, 2+PP/LEDBETTER/Skin Intact.   NEURO: AAOX3, no focal deficits

## 2021-07-30 NOTE — DISCHARGE NOTE PROVIDER - PROVIDER TOKENS
PROVIDER:[TOKEN:[[id number]],FOLLOWUP:[1 week]],PROVIDER:[TOKEN:[[id number]],FOLLOWUP:[2 weeks]],PROVIDER:[TOKEN:[[id number]],FOLLOWUP:[Routine]]

## 2021-07-30 NOTE — PROGRESS NOTE ADULT - SUBJECTIVE AND OBJECTIVE BOX
Pt seen and examined at bedside. No CP or SOB.          PAST MEDICAL & SURGICAL HISTORY:  Diabetes mellitus    COPD (chronic obstructive pulmonary disease)    Lymphedema of both lower extremities    CHF (congestive heart failure)    H/O right nephrectomy  20 yrs ago        VITAL SIGNS (Last 24 hrs):  T(C): 36.3 (21 @ 05:34), Max: 36.5 (21 @ 20:18)  HR: 63 (21 @ 12:10) (63 - 77)  BP: 118/57 (21 @ 15:10) (107/53 - 139/62)  RR: 18 (21 @ 15:10) (18 - 20)  SpO2: --  Wt(kg): --  Daily     Daily Weight in k.7 (2021 10:00)    I&O's Summary    2021 07:  -  2021 07:00  --------------------------------------------------------  IN: 300 mL / OUT: 0 mL / NET: 300 mL    2021 07:01  -  2021 15:58  --------------------------------------------------------  IN: 120 mL / OUT: 2000 mL / NET: -1880 mL        PHYSICAL EXAM:  GENERAL: NAD, well-developed  HEAD:  Atraumatic, Normocephalic  EYES: EOMI, PERRLA, conjunctiva and sclera clear  NECK: Supple, No JVD  CHEST/LUNG: Clear to auscultation bilaterally; No wheeze  HEART: Regular rate and rhythm; No murmurs, rubs, or gallops  ABDOMEN: Soft, Nontender, Nondistended; Bowel sounds present  EXTREMITIES:  2+ Peripheral Pulses, No clubbing, cyanosis, or edema  PSYCH: AAOx3  NEUROLOGY: non-focal  SKIN: No rashes or lesions    Labs Reviewed  Spoke to patient in regards to abnormal labs.    CBC Full  -  ( 2021 09:00 )  WBC Count : 11.18 K/uL  Hemoglobin : 8.3 g/dL  Hematocrit : 27.4 %  Platelet Count - Automated : 258 K/uL  Mean Cell Volume : 93.5 fL  Mean Cell Hemoglobin : 28.3 pg  Mean Cell Hemoglobin Concentration : 30.3 g/dL  Auto Neutrophil # : 9.55 K/uL  Auto Lymphocyte # : 0.89 K/uL  Auto Monocyte # : 0.59 K/uL  Auto Eosinophil # : 0.05 K/uL  Auto Basophil # : 0.01 K/uL  Auto Neutrophil % : 85.4 %  Auto Lymphocyte % : 8.0 %  Auto Monocyte % : 5.3 %  Auto Eosinophil % : 0.4 %  Auto Basophil % : 0.1 %    BMP:     @ 09:00    Blood Urea Nitrogen - 73  Calcium - 8.2  Carbond Dioxide - 26  Chloride - 94  Creatinine - 6.6  Glucose - 169  Potassium - 4.5  Sodium - 132      Hemoglobin A1c -     Urine Culture:        COVID Labs    Procalcitonin, Serum: 0.21 ng/mL (21 @ 11:20)    D-Dimer:        Imaging reviewed      MEDICATIONS  (STANDING):  albuterol/ipratropium for Nebulization 3 milliLiter(s) Nebulizer every 6 hours  ascorbic acid 500 milliGRAM(s) Oral daily  aspirin  chewable 81 milliGRAM(s) Oral daily  atorvastatin 40 milliGRAM(s) Oral at bedtime  budesonide 160 MICROgram(s)/formoterol 4.5 MICROgram(s) Inhaler 2 Puff(s) Inhalation two times a day  calcium acetate 667 milliGRAM(s) Oral three times a day with meals  chlorhexidine 4% Liquid 1 Application(s) Topical <User Schedule>  clopidogrel Tablet 75 milliGRAM(s) Oral daily  donepezil 5 milliGRAM(s) Oral at bedtime  epoetin graham-epbx (RETACRIT) Injectable 02943 Unit(s) IV Push <User Schedule>  furosemide    Tablet 20 milliGRAM(s) Oral daily  heparin   Injectable 5000 Unit(s) SubCutaneous every 8 hours  iron sucrose IVPB 100 milliGRAM(s) IV Intermittent <User Schedule>  metoprolol tartrate 25 milliGRAM(s) Oral two times a day  mirtazapine 15 milliGRAM(s) Oral at bedtime  multivitamin 1 Tablet(s) Oral daily  mupirocin 2% Ointment 1 Application(s) Topical two times a day  pantoprazole    Tablet 40 milliGRAM(s) Oral two times a day  polyethylene glycol 3350 17 Gram(s) Oral daily  predniSONE   Tablet 40 milliGRAM(s) Oral daily  senna 2 Tablet(s) Oral at bedtime  sertraline 25 milliGRAM(s) Oral daily  tamsulosin 0.4 milliGRAM(s) Oral at bedtime  zinc oxide 20% Ointment 1 Application(s) Topical daily    MEDICATIONS  (PRN):  acetaminophen   Tablet .. 650 milliGRAM(s) Oral every 6 hours PRN Temp greater or equal to 38C (100.4F), Mild Pain (1 - 3)  ALBUTerol    90 MICROgram(s) HFA Inhaler 2 Puff(s) Inhalation every 4 hours PRN Shortness of Breath and/or Wheezing

## 2021-07-30 NOTE — DISCHARGE NOTE PROVIDER - CARE PROVIDERS DIRECT ADDRESSES
[email or web address],[email or web address],DirectAddress_Unknown

## 2021-07-30 NOTE — PROGRESS NOTE ADULT - ASSESSMENT
82 yo M from [facility] w/ pmhx of CHF (EF 40% in July 2021), HTN, anxiety, Alzheimer's dementia, COPD on home 4L O2, T2DM on insulin, hx right nephrectomy and ESRD recently started on dialysis s/p tunnelled catheter, anemia of chronic disease on ROMELIA therapy, brought in by EMS with complaint of SOB. Admitted for possible fluid overload due to dialysis vs. unresolved pneumonia.     #Acute hypoxemic respiratory failure secondary to COPD exacerbation +/- CHF exacerbation   #Recent legionella PNA  #Severe AS  - CXR with effusion; improved  - duopnebs   - continue lasix 20 mg po  - Continue prednisone, duonebs  - c/w levofloxacin 250mg po daily (end 7/30)   - Cardio consult for AS and CHF, mortality rate high given combination without intervention. keep on cardiac telemonitoring high risk for arrythmia  - Avoid Calcium Channel Blocker   - Family refused TAVR, they would like to follow up with Dr. Harish jennings.  DNR/DNI    #leukocytosis- pt on prednisone- no fever, will monitor - improved.       #troponinemia   - trop 1.18 > 0.95  - EKG shows chronic changes from prior   - Reconsulted cardio  - TTE 7/18/21: EF 40%, severe AS   - cardiology last admission said medical management: start lopressor 25mg bid and plavix 75mg daily; c/w asa 81mg daily     #ESRD on HD M/W/F s/p tunnelled catheter  - f/u nephrology    #DM  - FS AC/HS, start insulin if FS peristently > 180    #Alzheimer's dementia  -continue remeron, aricept, sertraline    #Iron deficiency anemia  -at baseline  -continue ferrous sulfate  -ROMELIA per nephro    #COPD on home O2 4L   -continue albuterol and symbicort (in place of home breo ellipta)     #epigastric pain likely gastritis  - c/w sucralfate and protonix 40mg po bid     #Progress Note Handoff  Pending (specify):  Pending bed back at SNF   Family discussion:  house staff dw family   Disposition: 24 hrs.

## 2021-07-31 NOTE — DISCHARGE NOTE NURSING/CASE MANAGEMENT/SOCIAL WORK - PATIENT PORTAL LINK FT
You can access the FollowMyHealth Patient Portal offered by [facility] by registering at the following website: [email or web address]. By joining [facility]’s FollowMyHealth portal, you will also be able to view your health information using other applications (apps) compatible with our system.

## 2021-08-08 NOTE — H&P ADULT - NSHPPHYSICALEXAM_GEN_ALL_CORE
GEN: NAD, Well Appearing, Well nourished  HEENT: NCAT, PERRL, EOMI, No Icterus, No Pallor, No nystagmus, Vision Intact, No JVD/TD  CV/CHEST: R TDC, RRR, +S1/S2, no murmurs  PULM: R lung sounds diminished, L CTA  ABD: SNTTP, ND x 4 Q's  EXT: Warm, Well Perfused x 4. No Edema  SKIN: No Rash  NEURO: Awake, not alert

## 2021-08-08 NOTE — ED PROVIDER NOTE - PHYSICAL EXAMINATION
CONSTITUTIONAL: ill-appearing, tachypneic with increased work of breathing  SKIN: Warm dry  HEAD: NCAT  EYES: NL inspection  ENT: MMM  NECK: Supple; non tender.  CARD: RRR  RESP: +diminished breath sounds R lung fields. + crackles b/l  ABD: S/NT no R/G  EXT: + cachectic limbs  NEURO: moves all 4 extremities

## 2021-08-08 NOTE — CONSULT NOTE ADULT - SUBJECTIVE AND OBJECTIVE BOX
NEPHROLOGY CONSULTATION NOTE    82yo M from [facility] w/ pmhx of CHF (EF 40% in July 2021), HTN, anxiety, Alzheimer's dementia, COPD on home 4L O2, T2DM on insulin, hx right nephrectomy and ESRD recently started on dialysis s/p tunnelled catheter, anemia of chronic disease on ROMELIA therapy, brought in by EMS with complaint of SOB and desat at NH. Recently admitted for septic shock secondary to PNA and  metablic acidosis and hyperkalemia and received udall and urgent HD. Pt seen subsequently in ED for similar complaints as well between last month and now.     PAST MEDICAL & SURGICAL HISTORY:  Diabetes mellitus    COPD (chronic obstructive pulmonary disease)    Lymphedema of both lower extremities    CHF (congestive heart failure)    H/O right nephrectomy  20 yrs ago      Allergies:  No Known Allergies    Home Medications Reviewed    SOCIAL HISTORY:  Denies ETOH,Smoking,   FAMILY HISTORY:  No pertinent family history in first degree relatives          REVIEW OF SYSTEMS:  unobtainable  All other review of systems is negative unless indicated above.    PHYSICAL EXAM:  Constitutional: NAD  HEENT: anicteric sclera, oropharynx clear, MMM + HFO2  Neck: No JVD  Respiratory: decreased BS R  Cardiovascular: S1, S2, RRR  Gastrointestinal: BS+, soft, NT/ND  Extremities: No cyanosis or clubbing. No peripheral edema  Neurological: A/O x 3, no focal deficits  Psychiatric: Normal mood, normal affect  : No CVA tenderness. No maldonado.   Skin: No rashes  V[facility] Access: [facility]    Hospital Medications:   MEDICATIONS  (STANDING):  acetylcysteine 20%  Inhalation 4 milliLiter(s) Inhalation three times a day  albuterol/ipratropium for Nebulization 3 milliLiter(s) Nebulizer every 20 minutes  ascorbic acid 500 milliGRAM(s) Oral daily  aspirin enteric coated 81 milliGRAM(s) Oral daily  atorvastatin 40 milliGRAM(s) Oral at bedtime  calcium acetate 667 milliGRAM(s) Oral three times a day with meals  clopidogrel Tablet 75 milliGRAM(s) Oral daily  donepezil 5 milliGRAM(s) Oral at bedtime  ferrous    sulfate 325 milliGRAM(s) Oral daily  heparin   Injectable 5000 Unit(s) SubCutaneous every 8 hours  metoprolol tartrate 25 milliGRAM(s) Oral two times a day  mirtazapine 15 milliGRAM(s) Oral at bedtime  pantoprazole    Tablet 40 milliGRAM(s) Oral before breakfast  sertraline 25 milliGRAM(s) Oral daily  tamsulosin 0.4 milliGRAM(s) Oral at bedtime        VITALS:  T(F): 98.6 (08-08-21 @ 01:22), Max: 98.6 (08-08-21 @ 01:22)  HR: 79 (08-08-21 @ 11:45)  BP: 94/47 (08-08-21 @ 11:45)  RR: 21 (08-08-21 @ 11:45)  SpO2: 99% (08-08-21 @ 11:45)  Wt(kg): --    Height (cm): 172.7 (08-08 @ 01:22)  Weight (kg): 81.6 (08-08 @ 01:22)  BMI (kg/m2): 27.4 (08-08 @ 01:22)  BSA (m2): 1.95 (08-08 @ 01:22)    LABS:  08-08    137  |  98  |  57<H>  ----------------------------<  190<H>  5.4<H>   |  23  |  5.9<HH>    Ca    8.6      08 Aug 2021 03:24  Mg     1.9     08-08    TPro  6.2  /  Alb  3.5  /  TBili  0.2  /  DBili      /  AST  19  /  ALT  6   /  AlkPhos  105  08-08                          10.3   14.88 )-----------( 296      ( 08 Aug 2021 03:24 )             34.8       Urine Studies:        RADIOLOGY & ADDITIONAL STUDIES:

## 2021-08-08 NOTE — ED PROVIDER NOTE - CLINICAL SUMMARY MEDICAL DECISION MAKING FREE TEXT BOX
Pt given empiric abx cefepime + levofloxacin, 125mg solumedrol  MICU consulted pt approved for SDU, pt placed on BiPAP but did not tolerate ripped mask off.

## 2021-08-08 NOTE — H&P ADULT - NSHPLABSRESULTS_GEN_ALL_CORE
LABS/RADIOLOGY RESULTS:                          10.3   14.88 )-----------( 296      ( 08 Aug 2021 03:24 )             34.8   08-08    137  |  98  |  57<H>  ----------------------------<  190<H>  5.4<H>   |  23  |  5.9<HH>    Ca    8.6      08 Aug 2021 03:24  Mg     1.9     08-08    TPro  6.2  /  Alb  3.5  /  TBili  0.2  /  DBili  x   /  AST  19  /  ALT  6   /  AlkPhos  105  08-08  Blood Cultures    Troponin T, Serum: 0.12 ng/mL (08-08-21 @ 03:24)  Troponin T, Serum: 0.95: Critical value: ng/mL (07.26.21 @ 20:38)  Troponin T, Serum: 1.18 ng/mL (07.26.21 @ 03:10)      Serum Pro-Brain Natriuretic Peptide: >09933 pg/mL (08.08.21 @ 03:24)  Serum Pro-Brain Natriuretic Peptide: >22201 pg/mL (07.26.21 @ 11:20)

## 2021-08-08 NOTE — CONSULT NOTE ADULT - ASSESSMENT
ESRD recently started on HD   - access via TDC  acute respiratory failure due to mucous plug / PNA  COPD on home O2  HFrEF  dementia   HTN  anemia  right nephrectomy    plan:    no indication for urgent HD  next HD Mon or Tue  pulm eval for bronchoscopy  O2 / BIPAP  renal dosed levaquin  SDU / ICU admit  d/w resident in ED

## 2021-08-08 NOTE — ED ADULT TRIAGE NOTE - CHIEF COMPLAINT QUOTE
Pt was sent from [facility] for SOB and difficulty breathing x 2 days pt is on 3 LNC at NH, pt is DNR/DNI

## 2021-08-08 NOTE — ED ADULT NURSE NOTE - OBJECTIVE STATEMENT
pt sleeping, easily aroused by verbal and tactile stimuli  Difficulty hearing  daughter at bedside  presents to ED from NH for difficulty breathing s/p dialysis yesterday (M,W,F), Catheter noted to upper right chest wall  pt placed on cardiac monitor, no s/s of distress  oxygen in place  awaiting MD graham and disposition

## 2021-08-08 NOTE — PROVIDER CONTACT NOTE (OTHER) - SITUATION
patient states "I cant breath". auditory wheezes heard with labored breathing. patient o2 sat 98% on 100% NRB. Patient given albuterol as ordered with no relief.

## 2021-08-08 NOTE — ED ADULT NURSE REASSESSMENT NOTE - NS ED NURSE REASSESS COMMENT FT1
Weaned patient from non-rebreather, unable to tolerate nasal cannula and venti mask, MD Oro aware and witnessed patient desating to 78%-84% ordered to keep patient on non-rebreather; IV infiltrated after dose of Cefepime IV, informed MD Oro for IV US.

## 2021-08-08 NOTE — H&P ADULT - ASSESSMENT
ASSESSMENT  # Acute on Chronic Hypercapneic RF 2/2 COPD/PNA/Mucus Plug  # R White Out likely mucus plug  # HFrEF  # ESRD on HD      PLAN:    NEUROLOGY: c/w home alzheimer's meds.       CARDIOVASCULAR: Keep I=O, BP Control, c/w BB, ASA, Plavix      PULMONARY: NIPPV cont, Mucomyst inline, HOB 45 deg, aspiration precautions, chest PT, Albuterol PRN      GASTROINTESTINAL: PPI QD, NPO for now       RENAL: monitor + replete electrolytes, renal following HD tomorrow      INFECTIOUS DISEASE: Levaquin renally dosed, procal, urine strep/legionella, MRSA nares      ENDOCRINE: BB/SS PRN goal -180mg/dL      HEME: D-dimer, HSQ 5000U TID + SCD, VA Duplex LE      FLUIDS/ELECTROLYTES/NUTRITION:  -IVF: n/a  -Monitor, Replete to K>4 and Mg>2  -Diet: NPO    PROPHYLAXIS  -DVT: HSQ  -GI- PPI qd    DISPO: SDU  DNR/DNI

## 2021-08-08 NOTE — H&P ADULT - HISTORY OF PRESENT ILLNESS
HPI: 80yo M from [facility] w/ pmhx of CHF (EF 40% in July 2021), HTN, anxiety, Alzheimer's dementia, COPD on home 4L O2, T2DM on insulin, hx right nephrectomy and ESRD recently started on dialysis s/p tunnelled catheter, anemia of chronic disease on ROMELIA therapy, brought in by EMS with complaint of SOB and desat at NH. Recently admitted for septic shock secondary to PNA and  metablic acidosis and hyperkalemia and received udall and urgent HD. Pt seen subsequently in ED for similar complaints as well between last month and now.     ED VITALS:  Vital Signs Last 24 Hrs  T(C): 37 (08 Aug 2021 01:22), Max: 37 (08 Aug 2021 01:22)  T(F): 98.6 (08 Aug 2021 01:22), Max: 98.6 (08 Aug 2021 01:22)  HR: 73 (08 Aug 2021 07:35) (73 - 83)  BP: 116/53 (08 Aug 2021 07:09) (116/53 - 145/86)  RR: 24 (08 Aug 2021 07:09) (24 - 24)  SpO2: 97% (08 Aug 2021 07:35) (88% - 100%)    ED IMAGING:   - CXR: R White Out   - POCUS Bedside: IVC not dilated, collapsible w/insp, pt not on any PPV, R Lung: Small pleural effusion, RLL appears atelectatic w/some dynamic bronchograms observed.     ED LABS: notable for WBC 15K, BNP > 70K, Trop 0.12, VBG PCO2 90    ED COURSE:   Pt given empiric abx cefepime + levofloxacin, 125mg solumedrol  MICU consulted pt approved for SDU, pt placed on BiPAP but did not tolerate ripped mask off.

## 2021-08-08 NOTE — ED ADULT NURSE REASSESSMENT NOTE - NS ED NURSE REASSESS COMMENT FT1
Pt hand off report received from Catarina PLATA RN. Pt received on non rebreather 15 L/m. O2 98%. Pt destats when off nonrebeather, doesn't tolerate high flow NC or BIPAP. Pt admitted to bedside cardiac monitor, awaiting further orders, will continue to monitor/assess.

## 2021-08-08 NOTE — ED ADULT NURSE NOTE - NSIMPLEMENTINTERV_GEN_ALL_ED
Implemented All Universal Safety Interventions:  [place] to call system. Call bell, personal items and telephone within reach. Instruct patient to call for assistance. Room bathroom lighting operational. Non-slip footwear when patient is off stretcher. Physically safe environment: no spills, clutter or unnecessary equipment. Stretcher in lowest position, wheels locked, appropriate side rails in place.

## 2021-08-08 NOTE — ED PROVIDER NOTE - ATTENDING CONTRIBUTION TO CARE
80yo M from [facility] w/ pmhx of CHF (EF 40% in July 2021), HTN, anxiety, Alzheimer's dementia, COPD on home 4L O2, T2DM on insulin, hx right nephrectomy and ESRD recently started on dialysis s/p tunnelled catheter, anemia of chronic disease on ROMELIA therapy, brought in by EMS with complaint of SOB and desat at NH. Recently admitted for septic shock secondary to PNA and  metablic acidosis and hyperkalemia and received udall and urgent HD. Pt seen subsequently in ED for similar complaints as well between last month and now. PE:  agree with above.  A/P:  Hypoxia, Decreased breath sounds. R/O PTX vs. Effusion.

## 2021-08-08 NOTE — CHART NOTE - NSCHARTNOTEFT_GEN_A_CORE
CXR revealing of suspected right sided mucous plugging, consent for bronchoscopy with conscious sedation obtained from patient's daughter, Shin, over the phone, gave total of 4mg Versed, bronchoscope advanced with copious thick secretions suctioned, f/u repeat CXR and ABG

## 2021-08-09 NOTE — CONSULT NOTE ADULT - ASSESSMENT
IMPRESSION:  respiratory failure secondondary to Mucous plug s/p bronchoscopy yesterday  PNA  HO DMII on insulin  HO Severe COPD on 4 liters O2  HO HFrEF  Ho HTN  Ho Dementia  HO ESRD on HD    PLAN:    CNS: Avoid sedatives    HEENT: Oral care    PULMONARY:  HOB @ 45 degrees.  Aspiration precautions     CARDIOVASCULAR: Goal Map >65,     GI: GI prophylaxis.  Feeding.  Bowel regimen     RENAL:  Follow up lytes.  Correct as needed. HD per renal    INFECTIOUS DISEASE: Follow up cultures. Unasyn + levaquin. check sputum culture, urine strep/ urine legionella.    HEMATOLOGICAL:  DVT prophylaxis.    ENDOCRINE:  Follow up FS.  Insulin protocol if needed.    MUSCULOSKELETAL: OOBTC    DNR/DNI  poor prognosis    downgrade to medical floor

## 2021-08-09 NOTE — PROGRESS NOTE ADULT - ASSESSMENT
ESRD recently started on HD   - access via TDC  acute respiratory failure due to mucous plug / PNA - s/p bronchoscopy  COPD on home O2  HFrEF  dementia   HTN  anemia  right nephrectomy    plan:    Patient with ARF with a pulmonary edema  Unable to contact daughter for dialysis consent  Will proceed with dialysis with 2 physician consent  HD today: 3 hours, opti 160 dialyzer, 2K bath, 3L UF

## 2021-08-09 NOTE — ED ADULT NURSE REASSESSMENT NOTE - NS ED NURSE REASSESS COMMENT FT1
Pt awaiting bed assignment, pt moved to the back of the ER, pt hand off report endorsed to Ivet Mason RN. Pt updated on continuing plan of care. Pt awaiting bed assignment, pt moved to the back of the ER, pt hand off report endorsed to Ivet Mason RN. Pt remains on BIPAP. Pt updated on continuing plan of care.

## 2021-08-09 NOTE — CONSULT NOTE ADULT - SUBJECTIVE AND OBJECTIVE BOX
Patient is a 81y old  Male who presents with a chief complaint of Desaturation (08 Aug 2021 11:50)      HPI:  HPI: 80yo M from [facility] w/ pmhx of CHF (EF 40% in July 2021), HTN, anxiety, Alzheimer's dementia, COPD on home 4L O2, T2DM on insulin, hx right nephrectomy and ESRD recently started on dialysis s/p tunnelled catheter, anemia of chronic disease on ROMELIA therapy, brought in by EMS with complaint of SOB and desat at NH. Recently admitted for septic shock secondary to PNA and  metablic acidosis and hyperkalemia and received udall and urgent HD. Pt seen subsequently in ED for similar complaints as well between last month and now.     ED VITALS:  Vital Signs Last 24 Hrs  T(C): 37 (08 Aug 2021 01:22), Max: 37 (08 Aug 2021 01:22)  T(F): 98.6 (08 Aug 2021 01:22), Max: 98.6 (08 Aug 2021 01:22)  HR: 73 (08 Aug 2021 07:35) (73 - 83)  BP: 116/53 (08 Aug 2021 07:09) (116/53 - 145/86)  RR: 24 (08 Aug 2021 07:09) (24 - 24)  SpO2: 97% (08 Aug 2021 07:35) (88% - 100%)    ED IMAGING:   - CXR: R White Out   - POCUS Bedside: IVC not dilated, collapsible w/insp, pt not on any PPV, R Lung: Small pleural effusion, RLL appears atelectatic w/some dynamic bronchograms observed.     ED LABS: notable for WBC 15K, BNP > 70K, Trop 0.12, VBG PCO2 90    ED COURSE:   Pt given empiric abx cefepime + levofloxacin, 125mg solumedrol  MICU consulted pt approved for SDU, pt placed on BiPAP but did not tolerate ripped mask off.  (08 Aug 2021 08:45)      PAST MEDICAL & SURGICAL HISTORY:  Diabetes mellitus    COPD (chronic obstructive pulmonary disease)    Lymphedema of both lower extremities    CHF (congestive heart failure)    H/O right nephrectomy  20 yrs ago        SOCIAL HX:   Smoking  former                   FAMILY HISTORY:  No pertinent family history in first degree relatives    :  No known cardiovacular family hisotry     Review Of Systems:     All ROS are negative except per HPI       Allergies    No Known Allergies    Intolerances          PHYSICAL EXAM    ICU Vital Signs Last 24 Hrs  T(C): 36.1 (09 Aug 2021 09:30), Max: 37.2 (08 Aug 2021 19:23)  T(F): 96.9 (09 Aug 2021 09:30), Max: 98.9 (08 Aug 2021 19:23)  HR: 87 (09 Aug 2021 09:30) (62 - 96)  BP: 135/63 (09 Aug 2021 09:30) (82/41 - 146/63)  BP(mean): 68 (09 Aug 2021 00:59) (59 - 92)  ABP: --  ABP(mean): --  RR: 28 (09 Aug 2021 09:30) (14 - 40)  SpO2: 96% (09 Aug 2021 09:30) (85% - 100%)      CONSTITUTIONAL:  chronically ill appearing  NAD    ENT:   Airway patent,   Mouth with normal mucosa.   No thrush  on NC      CARDIAC:   Normal rate,   Regular rhythm.    No edema  right chest wall catheter      Vascular:   normal systolic impulse  no bruits    RESPIRATORY:   No wheezing  Bilateral BS   Not tachypneic,  No use of accessory muscles    GASTROINTESTINAL:  Abdomen soft,   Non-tender,   No guarding,       NEUROLOGICAL:   Alert and oriented x1  follows simple commands    SKIN:   Skin normal color for race,   No evidence of rash.              LABS:                          9.2    9.73  )-----------( 254      ( 09 Aug 2021 05:43 )             31.2                                               08-09    134<L>  |  97<L>  |  65<HH>  ----------------------------<  93  5.0   |  22  |  6.8<HH>    Ca    8.5      09 Aug 2021 05:43  Phos  6.3     08-09  Mg     1.9     08-09    TPro  5.9<L>  /  Alb  3.4<L>  /  TBili  0.3  /  DBili  x   /  AST  12  /  ALT  5   /  AlkPhos  66  08-09                                                 CARDIAC MARKERS ( 09 Aug 2021 05:43 )  x     / 0.16 ng/mL / x     / x     / x      CARDIAC MARKERS ( 08 Aug 2021 03:24 )  x     / 0.12 ng/mL / x     / x     / x                                                LIVER FUNCTIONS - ( 09 Aug 2021 05:43 )  Alb: 3.4 g/dL / Pro: 5.9 g/dL / ALK PHOS: 66 U/L / ALT: 5 U/L / AST: 12 U/L / GGT: x                                                                                                                                   ABG - ( 09 Aug 2021 01:02 )  pH, Arterial: 7.32  pH, Blood: x     /  pCO2: 48    /  pO2: 112   / HCO3: 25    / Base Excess: -1.6  /  SaO2: 99                  X-Rays reviewed                                                                                     ECHO    CXR interpreted by me     MEDICATIONS  (STANDING):  ascorbic acid 500 milliGRAM(s) Oral daily  aspirin enteric coated 81 milliGRAM(s) Oral daily  atorvastatin 40 milliGRAM(s) Oral at bedtime  calcium acetate 667 milliGRAM(s) Oral three times a day with meals  clopidogrel Tablet 75 milliGRAM(s) Oral daily  donepezil 5 milliGRAM(s) Oral at bedtime  ferrous    sulfate 325 milliGRAM(s) Oral daily  heparin   Injectable 5000 Unit(s) SubCutaneous every 8 hours  methylPREDNISolone sodium succinate Injectable 60 milliGRAM(s) IV Push two times a day  metoprolol tartrate 25 milliGRAM(s) Oral two times a day  mirtazapine 15 milliGRAM(s) Oral at bedtime  pantoprazole    Tablet 40 milliGRAM(s) Oral before breakfast  sertraline 25 milliGRAM(s) Oral daily  tamsulosin 0.4 milliGRAM(s) Oral at bedtime    MEDICATIONS  (PRN):  ALBUTerol    90 MICROgram(s) HFA Inhaler 2 Puff(s) Inhalation every 6 hours PRN Shortness of Breath and/or Wheezing

## 2021-08-09 NOTE — ED ADULT NURSE REASSESSMENT NOTE - NS ED NURSE REASSESS COMMENT FT1
MD at 5855 made aware pt is becoming tachypneic. as per MD request, BIPAP at bedside attempted. pt unable to tolerate BIPAP mask. pt will rip BIPAP off before it is even completely strapped on.

## 2021-08-09 NOTE — ED ADULT NURSE REASSESSMENT NOTE - NS ED NURSE REASSESS COMMENT FT1
pt only agreed to take 2 PO medications, see MAR for details. refused to take all of his PO 1200 medications as ordered.

## 2021-08-09 NOTE — PROGRESS NOTE ADULT - ASSESSMENT
80[facility] pm[facility] CHF (EF 40% in July 2021), HTN, anxiety, Alzheimer's dementia, COPD on home 4L O2, T2DM on insulin, hx right nephrectomy and ESRD recently started on dialysis s/p tunnelled catheter, anemia of chronic disease on ROMELIA therapy, brought in by EMS with complaint of SOB and desat at NH    # Acute on Chronic Hypercapneic RF 2/2 COPD/PNA/Mucus Plug  # Severe COPD on 4 liters O2  - s/p brochoscopy  secretions suctioned.   - repeat chest xray improved.   - BIPAP at night cont,  - VA Duplex LE- negative   - continue Mucomyst inline, chest PT,  - start duonebs   - continue Levaquin renally dosed,   - FU procal, urine strep/legionella, MRSA nares  - wean steroids IV to po  - aspiration precautions,  Albuterol PRN    # HFrEF  # ESRD on HD  - nephrology following   - c/w BB, ASA, Plavix  - dialysis today     #DMII   - check finger sticks qhs  - start insulin if needed.     #HTN  #Dementia alzheimers  - c/w home alzheimer's meds.    # GI PPx - Protonix  # DVT PPx - Heparin 5000 mg SubQ  q8  # Activity -   Increase as Tolerated  # Dispo -   Patient to be discharged when medically optimized.  # Code Status-  DNR / DNI

## 2021-08-09 NOTE — ED ADULT NURSE REASSESSMENT NOTE - NS ED NURSE REASSESS COMMENT FT1
as per MD, do not administer metoprolol 25mg PO if HR is below 70. requested to reschedule medication and re-take HR at 1900

## 2021-08-09 NOTE — PROGRESS NOTE ADULT - ATTENDING COMMENTS
#Acute hypoxic and hypercapnic resp failure, due to mucous plug  s/p bronch with much improvement  significant hypercapnia, acidosis now resolving  bipap prn, qhs  nc to keep spo2 >92  cont levaquin  prednisone 40  f/u pulm    #Progress Note Handoff:  Pending (specify):  Consults_________, Tests________, Test Results_______, Other____hypoxia_____  Family discussion: d/w pt at bedside re: treatment plan, primary dx  Disposition: Home___/SNF___/Other________/Unknown at this time__x______ #Acute hypoxic and hypercapnic resp failure, due to mucous plug  s/p bronch with much improvement  significant hypercapnia, acidosis now resolving  bipap prn, qhs  nc to keep spo2 >92  cont levaquin  prednisone 40, plan for slow taper  f/u pulm    #Progress Note Handoff:  Pending (specify):  Consults_________, Tests________, Test Results_______, Other____prednisone taper, PT, improvement in hypoxia____  Family discussion: d/w pt at bedside re: treatment plan, primary dx  Disposition: Home___/SNF___/Other________/Unknown at this time__x______

## 2021-08-09 NOTE — PROGRESS NOTE ADULT - SUBJECTIVE AND OBJECTIVE BOX
SUBJECTIVE:    Patient is a 81y old Male who presents with a chief complaint of Desaturation (09 Aug 2021 13:17)    Currently admitted to medicine with the primary diagnosis of: Mucous plug    Today is hospital day 1d.     Overnight Events:     No acute overnight events.     PAST MEDICAL & SURGICAL HISTORY  Diabetes mellitus    COPD (chronic obstructive pulmonary disease)    Lymphedema of both lower extremities    CHF (congestive heart failure)    H/O right nephrectomy  20 yrs ago      ALLERGIES:  No Known Allergies    MEDICATIONS:  STANDING MEDICATIONS  albuterol/ipratropium for Nebulization 3 milliLiter(s) Nebulizer every 6 hours  ascorbic acid 500 milliGRAM(s) Oral daily  aspirin enteric coated 81 milliGRAM(s) Oral daily  atorvastatin 40 milliGRAM(s) Oral at bedtime  calcium acetate 667 milliGRAM(s) Oral three times a day with meals  clopidogrel Tablet 75 milliGRAM(s) Oral daily  donepezil 5 milliGRAM(s) Oral at bedtime  ferrous    sulfate 325 milliGRAM(s) Oral daily  heparin   Injectable 5000 Unit(s) SubCutaneous every 8 hours  methylPREDNISolone sodium succinate Injectable 60 milliGRAM(s) IV Push two times a day  metoprolol tartrate 25 milliGRAM(s) Oral two times a day  mirtazapine 15 milliGRAM(s) Oral at bedtime  pantoprazole    Tablet 40 milliGRAM(s) Oral before breakfast  sertraline 25 milliGRAM(s) Oral daily  tamsulosin 0.4 milliGRAM(s) Oral at bedtime    PRN MEDICATIONS  ALBUTerol    90 MICROgram(s) HFA Inhaler 2 Puff(s) Inhalation every 6 hours PRN    VITALS:   ICU Vital Signs Last 24 Hrs  T(C): 36.1 (09 Aug 2021 09:30), Max: 37.2 (08 Aug 2021 19:23)  T(F): 96.9 (09 Aug 2021 09:30), Max: 98.9 (08 Aug 2021 19:23)  HR: 88 (09 Aug 2021 12:50) (63 - 88)  BP: 158/67 (09 Aug 2021 12:50) (82/41 - 158/67)  BP(mean): 68 (09 Aug 2021 00:59) (59 - 92)  RR: 18 (09 Aug 2021 12:50) (14 - 28)  SpO2: 98% (09 Aug 2021 12:50) (85% - 100%)      LABS:                        9.2    9.73  )-----------( 254      ( 09 Aug 2021 05:43 )             31.2     08-09    134<L>  |  97<L>  |  65<HH>  ----------------------------<  93  5.0   |  22  |  6.8<HH>    Ca    8.5      09 Aug 2021 05:43  Phos  6.3     08-09  Mg     1.9     08-09    TPro  5.9<L>  /  Alb  3.4<L>  /  TBili  0.3  /  DBili  x   /  AST  12  /  ALT  5   /  AlkPhos  66  08-09        ABG - ( 09 Aug 2021 01:02 )  pH, Arterial: 7.32  pH, Blood: x     /  pCO2: 48    /  pO2: 112   / HCO3: 25    / Base Excess: -1.6  /  SaO2: 99        Troponin T, Serum: 0.16 ng/mL *HH* (08-09-21 @ 05:43)    CARDIAC MARKERS ( 09 Aug 2021 05:43 )  x     / 0.16 ng/mL / x     / x     / x      CARDIAC MARKERS ( 08 Aug 2021 03:24 )  x     / 0.12 ng/mL / x     / x     / x            RADIOLOGY:    < from: Xray Chest 1 View- PORTABLE-Routine (Xray Chest 1 View- PORTABLE-Routine in AM.) (08.09.21 @ 04:13) >  Impression:      Decreased, right lung opacity/effusion. Stable left opacities/effusion. No pneumothorax.    --- End of Report ---    < end of copied text >      PHYSICAL EXAM:    GENERAL: NAD, lying in bed comfortably  CHEST/LUNG: decreased breath sounds ; + wheezing . Unlabored respirations  HEART: Regular rate and rhythm; No murmurs, rubs, or gallops  ABDOMEN: Bowel sounds present; Soft, Nontender, Nondistended.   EXTREMITIES:  no edema  NERVOUS SYSTEM:  Alert & Oriented X3, speech clear. No deficits   MSK: FROM all 4 extremities, full and equal strength  SKIN: sacral bruising

## 2021-08-09 NOTE — PROGRESS NOTE ADULT - SUBJECTIVE AND OBJECTIVE BOX
[facility] FOLLOW UP NOTE  --------------------------------------------------------------------------------  24 hour events/subjective: Patient examined. Appears comfortable.    PAST HISTORY  --------------------------------------------------------------------------------  No significant changes to PMH, PSH, FHx, SHx, unless otherwise noted    ALLERGIES & MEDICATIONS  --------------------------------------------------------------------------------  Allergies    No Known Allergies    Standing Inpatient Medications  albuterol/ipratropium for Nebulization 3 milliLiter(s) Nebulizer every 6 hours  ascorbic acid 500 milliGRAM(s) Oral daily  aspirin enteric coated 81 milliGRAM(s) Oral daily  atorvastatin 40 milliGRAM(s) Oral at bedtime  calcium acetate 667 milliGRAM(s) Oral three times a day with meals  clopidogrel Tablet 75 milliGRAM(s) Oral daily  donepezil 5 milliGRAM(s) Oral at bedtime  ferrous    sulfate 325 milliGRAM(s) Oral daily  heparin   Injectable 5000 Unit(s) SubCutaneous every 8 hours  methylPREDNISolone sodium succinate Injectable 60 milliGRAM(s) IV Push two times a day  metoprolol tartrate 25 milliGRAM(s) Oral two times a day  mirtazapine 15 milliGRAM(s) Oral at bedtime  pantoprazole    Tablet 40 milliGRAM(s) Oral before breakfast  sertraline 25 milliGRAM(s) Oral daily  tamsulosin 0.4 milliGRAM(s) Oral at bedtime    PRN Inpatient Medications  ALBUTerol    90 MICROgram(s) HFA Inhaler 2 Puff(s) Inhalation every 6 hours PRN    VITALS/PHYSICAL EXAM  --------------------------------------------------------------------------------  T(C): 36.1 (08-09-21 @ 09:30), Max: 37.2 (08-08-21 @ 19:23)  HR: 76 (08-09-21 @ 11:45) (62 - 87)  BP: 149/67 (08-09-21 @ 11:45) (82/41 - 149/67)  RR: 22 (08-09-21 @ 11:45) (14 - 28)  SpO2: 97% (08-09-21 @ 11:45) (85% - 100%)  Height (cm): 172.7 (08-08-21 @ 01:22)  Weight (kg): 81.6 (08-08-21 @ 01:22)  BMI (kg/m2): 27.4 (08-08-21 @ 01:22)  BSA (m2): 1.95 (08-08-21 @ 01:22)    Physical Exam:  	Gen: NAD  	Pulm: B/L rales  	CV: RRR, S1S2  	Abd: +BS, soft, nontender/nondistended  	: No suprapubic tenderness  	LE: Warm, edema  	Vascular access: IJ TDC    LABS/STUDIES  --------------------------------------------------------------------------------              9.2    9.73  >-----------<  254      [08-09-21 @ 05:43]              31.2     134  |  97  |  65  ----------------------------<  93      [08-09-21 @ 05:43]  5.0   |  22  |  6.8        Ca     8.5     [08-09-21 @ 05:43]      Mg     1.9     [08-09-21 @ 05:43]      Phos  6.3     [08-09-21 @ 05:43]    TPro  5.9  /  Alb  3.4  /  TBili  0.3  /  DBili  x   /  AST  12  /  ALT  5   /  AlkPhos  66  [08-09-21 @ 05:43]    Troponin 0.16      [08-09-21 @ 05:43]    Creatinine Trend:  SCr 6.8 [08-09 @ 05:43]  SCr 5.9 [08-08 @ 03:24]  SCr 4.3 [07-31 @ 09:17]  SCr 6.6 [07-30 @ 09:00]  SCr 5.3 [07-29 @ 08:01]    Urinalysis - [07-12-21 @ 13:07]      Color Yellow / Appearance Slightly Turbid / SG 1.014 / pH 6.0      Gluc Negative / Ketone Negative  / Bili Negative / Urobili <2 mg/dL       Blood Small / Protein 300 mg/dL / Leuk Est Large / Nitrite Negative      RBC 14 / WBC 73 / Hyaline 3 / Gran  / Sq Epi  / Non Sq Epi 1 / Bacteria Few    Iron 34, TIBC 178, %sat 19      [01-04-21 @ 06:40]  Ferritin 332      [01-04-21 @ 06:40]  PTH -- (Ca 8.9)      [12-24-20 @ 04:30]   54    HBsAb Nonreact      [07-12-21 @ 18:05]  HBsAg Nonreact      [07-15-21 @ 20:00]  HCV 0.15, Nonreact      [07-15-21 @ 20:00]

## 2021-08-10 NOTE — PROGRESS NOTE ADULT - ATTENDING COMMENTS
Overall pt is doing better however poor prognosis long term due to Chronic Respiratory Failure 4L Home Oxygen Dependent.   Pt was admitted for Acute on Chronic Resp Failure due to mucous plug and over mucous secretions - natural progression of copd likely.  Now back on same level of home oxygen.   c/w NC during the day and biPAP at nights.  start d/c planning and get PT input.    Dispo: d/c planning Overall pt is doing better however poor prognosis long term due to Chronic Respiratory Failure 4L Home Oxygen Dependent.   Pt was admitted for Acute on Chronic Hypercapnic Resp Failure due to mucous plug and over mucous secretions - natural progression of copd likely.  Now back on same level of home oxygen.   c/w NC during the day and biPAP at nights.  start d/c planning and get PT input.    h/x ESRD on HD per Nephro     Dispo: d/c planning / Poor Prognosis

## 2021-08-10 NOTE — PROGRESS NOTE ADULT - ASSESSMENT
ESRD recently started on HD   - access via TDC  acute respiratory failure due to mucous plug / PNA - s/p bronchoscopy  COPD on home O2  HFrEF  dementia   HTN  anemia  right nephrectomy    plan:    HD today: 3 hours, opti 160 dialyzer, 2K bath, 3L UF  PhosLo with meals  Renal diet  Renally dose antibiotics   ESRD recently started on HD   - access via TDC  acute respiratory failure due to mucous plug / PNA - s/p bronchoscopy  COPD on home O2  HFrEF  dementia   HTN  anemia  right nephrectomy    plan:    HD tomorrow: 3 hours, opti 160 dialyzer, 2K bath, 3L UF  PhosLo with meals  Renal diet  Renally dose antibiotics

## 2021-08-10 NOTE — PROGRESS NOTE ADULT - SUBJECTIVE AND OBJECTIVE BOX
DRAKE HO 81y Male  MRN#: [id number]   Hospital Day: 2d    HPI:  HPI: 82[facility]/ [facility]x of CHF (EF 40% in July 2021), HTN, anxiety, Alzheimer's dementia, COPD on home 4L O2, T2DM on insulin, hx right nephrectomy and ESRD recently started on dialysis s/p tunnelled catheter, anemia of chronic disease on ROMELIA therapy, brought in by EMS with complaint of SOB and desat at NH. Recently admitted for septic shock secondary to PNA and  metablic acidosis and hyperkalemia and received udall and urgent HD. Pt seen subsequently in ED for similar complaints as well between last month and now.     ED VITALS:  Vital Signs Last 24 Hrs  T(C): 37 (08 Aug 2021 01:22), Max: 37 (08 Aug 2021 01:22)  T(F): 98.6 (08 Aug 2021 01:22), Max: 98.6 (08 Aug 2021 01:22)  HR: 73 (08 Aug 2021 07:35) (73 - 83)  BP: 116/53 (08 Aug 2021 07:09) (116/53 - 145/86)  RR: 24 (08 Aug 2021 07:09) (24 - 24)  SpO2: 97% (08 Aug 2021 07:35) (88% - 100%)    ED IMAGING:   - CXR: R White Out   - POCUS Bedside: IVC not dilated, collapsible w/insp, pt not on any PPV, R Lung: Small pleural effusion, RLL appears atelectatic w/some dynamic bronchograms observed.     ED LABS: notable for WBC 15K, BNP > 70K, Trop 0.12, VBG PCO2 90    ED COURSE:   Pt given empiric abx cefepime + levofloxacin, 125mg solumedrol  MICU consulted pt approved for SDU, pt placed on BiPAP but did not tolerate ripped mask off.  (08 Aug 2021 08:45)      SUBJECTIVE  Patient is a 81y old Male who presents with a chief complaint of Desaturation (09 Aug 2021 15:18)  Currently admitted to medicine with the primary diagnosis of Acute hypercapnic respiratory failure      INTERVAL HPI AND OVERNIGHT EVENTS:  Patient was examined and seen at bedside. He was visibly SOB and in respiratory distress. He was complaining of diffuse abdominal pain this morning.     REVIEW OF SYMPTOMS:  CONSTITUTIONAL: No weakness, fevers or chills; No headaches  EYES: No visual changes, eye pain, or discharge  ENT: No vertigo; No ear pain or change in hearing; No sore throat or difficulty swallowing  NECK: No pain or stiffness  RESPIRATORY: No cough, wheezing, or hemoptysis; No shortness of breath  CARDIOVASCULAR: No chest pain or palpitations  GASTROINTESTINAL: No abdominal or epigastric pain; No nausea, vomiting, or hematemesis; No diarrhea or constipation; No melena or hematochezia  GENITOURINARY: No dysuria, frequency or hematuria  MUSCULOSKELETAL: No joint pain, no muscle pain, no weakness  NEUROLOGICAL: No numbness or weakness  SKIN: No itching or rashes    OBJECTIVE  PAST MEDICAL & SURGICAL HISTORY  Diabetes mellitus    COPD (chronic obstructive pulmonary disease)    Lymphedema of both lower extremities    CHF (congestive heart failure)    H/O right nephrectomy  20 yrs ago      ALLERGIES:  No Known Allergies    MEDICATIONS:  STANDING MEDICATIONS  albuterol/ipratropium for Nebulization 3 milliLiter(s) Nebulizer every 6 hours  ampicillin/sulbactam  IVPB 3 Gram(s) IV Intermittent every 12 hours  ascorbic acid 500 milliGRAM(s) Oral daily  aspirin enteric coated 81 milliGRAM(s) Oral daily  atorvastatin 40 milliGRAM(s) Oral at bedtime  calcium acetate 667 milliGRAM(s) Oral three times a day with meals  clopidogrel Tablet 75 milliGRAM(s) Oral daily  donepezil 5 milliGRAM(s) Oral at bedtime  ferrous    sulfate 325 milliGRAM(s) Oral daily  heparin   Injectable 5000 Unit(s) SubCutaneous every 8 hours  levoFLOXacin IVPB 250 milliGRAM(s) IV Intermittent every 48 hours  metoprolol tartrate 25 milliGRAM(s) Oral two times a day  mirtazapine 15 milliGRAM(s) Oral at bedtime  pantoprazole    Tablet 40 milliGRAM(s) Oral before breakfast  predniSONE   Tablet 40 milliGRAM(s) Oral daily  sertraline 25 milliGRAM(s) Oral daily  tamsulosin 0.4 milliGRAM(s) Oral at bedtime    PRN MEDICATIONS  ALBUTerol    90 MICROgram(s) HFA Inhaler 2 Puff(s) Inhalation every 6 hours PRN      VITAL SIGNS: Last 24 Hours  T(C): 36.9 (10 Aug 2021 05:00), Max: 36.9 (09 Aug 2021 20:46)  T(F): 98.5 (10 Aug 2021 05:00), Max: 98.5 (09 Aug 2021 20:46)  HR: 81 (10 Aug 2021 05:00) (60 - 90)  BP: 133/63 (10 Aug 2021 05:00) (133/63 - 158/67)  BP(mean): --  RR: 18 (10 Aug 2021 05:00) (18 - 28)  SpO2: 97% (09 Aug 2021 19:50) (96% - 100%)    LABS:                        8.7    7.52  )-----------( 251      ( 10 Aug 2021 07:14 )             28.6     08-10    138  |  100  |  43<H>  ----------------------------<  104<H>  4.2   |  27  |  4.9<HH>    Ca    8.3<L>      10 Aug 2021 07:14  Phos  4.6     08-10  Mg     1.9     08-10    TPro  5.6<L>  /  Alb  3.3<L>  /  TBili  0.3  /  DBili  x   /  AST  11  /  ALT  <5  /  AlkPhos  62  08-10        ABG - ( 10 Aug 2021 08:09 )  pH, Arterial: 7.33  pH, Blood: x     /  pCO2: 58    /  pO2: 104   / HCO3: 30    / Base Excess: 3.7   /  SaO2: 98                    CARDIAC MARKERS ( 09 Aug 2021 05:43 )  x     / 0.16 ng/mL / x     / x     / x          RADIOLOGY:      PHYSICAL EXAM:  GENERAL: in Acute distress w/ abdominal breathing  CHEST/LUNG: decreased breath sounds ; labored respirations  HEART: Regular rate and rhythm; No murmurs, rubs, or gallops  ABDOMEN: Bowel sounds present; Soft, Nontender, Nondistended.   EXTREMITIES:  no edema  NERVOUS SYSTEM:  Alert .   MSK: FROM all 4 extremities, full and equal strength  SKIN: sacral bruising      ASSESSMENT & PLAN  82[facility] w/ pm[facility]x of CHF (EF 40% in July 2021), HTN, anxiety, Alzheimer's dementia, COPD on home 4L O2, T2DM on insulin, hx right nephrectomy and ESRD recently started on dialysis s/p tunnelled catheter, anemia of chronic disease on ROMELIA therapy, brought in by EMS with complaint of SOB and desat at NH    # Acute on Chronic Hypercapneic RF 2/2 COPD/PNA/Mucus Plug  # Severe COPD on 4 liters O2  - s/p brochoscopy  secretions suctioned.   - repeat chest xray improved.   - BIPAP at night cont, patient didn't use BIPAP  - [facility] Duplex LE- negative   - continue Mucomyst inline, chest PT,  - start duonebs   - continue Levaquin and Unasyn renally dosed,   - FU procal, urine strep/legionella, MRSA nares  - wean steroids IV to po  - aspiration precautions,  Albuterol PRN    # HFrEF  # ESRD on HD  - nephrology following   - c/w BB, ASA, Plavix  - dialysis today     #DMII   - check finger sticks qhs  - start insulin if needed.     #HTN  #Dementia alzheimers  - c/w home alzheimer's meds.    # GI PPx - Protonix  # DVT PPx - Heparin 5000 mg SubQ  q8  # Activity -   Increase as Tolerated  # Dispo -   Patient to be discharged when medically optimized.  # Code Status-  DNR / DNI   DRAKE HO 81y Male  MRN#: [id number]   Hospital Day: 2d    HPI:  HPI: 80yo M from [facility] w/ pmhx of CHF (EF 40% in July 2021), HTN, anxiety, Alzheimer's dementia, COPD on home 4L O2, T2DM on insulin, hx right nephrectomy and ESRD recently started on dialysis s/p tunnelled catheter, anemia of chronic disease on ROMELIA therapy, brought in by EMS with complaint of SOB and desat at NH. Recently admitted for septic shock secondary to PNA and  metablic acidosis and hyperkalemia and received udall and urgent HD. Pt seen subsequently in ED for similar complaints as well between last month and now.     ED VITALS:  Vital Signs Last 24 Hrs  T(C): 37 (08 Aug 2021 01:22), Max: 37 (08 Aug 2021 01:22)  T(F): 98.6 (08 Aug 2021 01:22), Max: 98.6 (08 Aug 2021 01:22)  HR: 73 (08 Aug 2021 07:35) (73 - 83)  BP: 116/53 (08 Aug 2021 07:09) (116/53 - 145/86)  RR: 24 (08 Aug 2021 07:09) (24 - 24)  SpO2: 97% (08 Aug 2021 07:35) (88% - 100%)    ED IMAGING:   - CXR: R White Out   - POCUS Bedside: IVC not dilated, collapsible w/insp, pt not on any PPV, R Lung: Small pleural effusion, RLL appears atelectatic w/some dynamic bronchograms observed.     ED LABS: notable for WBC 15K, BNP > 70K, Trop 0.12, VBG PCO2 90    ED COURSE:   Pt given empiric abx cefepime + levofloxacin, 125mg solumedrol  MICU consulted pt approved for SDU, pt placed on BiPAP but did not tolerate ripped mask off.  (08 Aug 2021 08:45)      SUBJECTIVE  Patient is a 81y old Male who presents with a chief complaint of Desaturation (09 Aug 2021 15:18)  Currently admitted to medicine with the primary diagnosis of Acute hypercapnic respiratory failure      INTERVAL HPI AND OVERNIGHT EVENTS:  Patient was examined and seen at bedside. He was visibly SOB and in respiratory distress. He was complaining of diffuse abdominal pain this morning.     REVIEW OF SYMPTOMS:  CONSTITUTIONAL: No weakness, fevers or chills; No headaches  EYES: No visual changes, eye pain, or discharge  ENT: No vertigo; No ear pain or change in hearing; No sore throat or difficulty swallowing  NECK: No pain or stiffness  RESPIRATORY: No cough, wheezing, or hemoptysis; No shortness of breath  CARDIOVASCULAR: No chest pain or palpitations  GASTROINTESTINAL: No abdominal or epigastric pain; No nausea, vomiting, or hematemesis; No diarrhea or constipation; No melena or hematochezia  GENITOURINARY: No dysuria, frequency or hematuria  MUSCULOSKELETAL: No joint pain, no muscle pain, no weakness  NEUROLOGICAL: No numbness or weakness  SKIN: No itching or rashes    OBJECTIVE  PAST MEDICAL & SURGICAL HISTORY  Diabetes mellitus    COPD (chronic obstructive pulmonary disease)    Lymphedema of both lower extremities    CHF (congestive heart failure)    H/O right nephrectomy  20 yrs ago      ALLERGIES:  No Known Allergies    MEDICATIONS:  STANDING MEDICATIONS  albuterol/ipratropium for Nebulization 3 milliLiter(s) Nebulizer every 6 hours  ampicillin/sulbactam  IVPB 3 Gram(s) IV Intermittent every 12 hours  ascorbic acid 500 milliGRAM(s) Oral daily  aspirin enteric coated 81 milliGRAM(s) Oral daily  atorvastatin 40 milliGRAM(s) Oral at bedtime  calcium acetate 667 milliGRAM(s) Oral three times a day with meals  clopidogrel Tablet 75 milliGRAM(s) Oral daily  donepezil 5 milliGRAM(s) Oral at bedtime  ferrous    sulfate 325 milliGRAM(s) Oral daily  heparin   Injectable 5000 Unit(s) SubCutaneous every 8 hours  levoFLOXacin IVPB 250 milliGRAM(s) IV Intermittent every 48 hours  metoprolol tartrate 25 milliGRAM(s) Oral two times a day  mirtazapine 15 milliGRAM(s) Oral at bedtime  pantoprazole    Tablet 40 milliGRAM(s) Oral before breakfast  predniSONE   Tablet 40 milliGRAM(s) Oral daily  sertraline 25 milliGRAM(s) Oral daily  tamsulosin 0.4 milliGRAM(s) Oral at bedtime    PRN MEDICATIONS  ALBUTerol    90 MICROgram(s) HFA Inhaler 2 Puff(s) Inhalation every 6 hours PRN      VITAL SIGNS: Last 24 Hours  T(C): 36.9 (10 Aug 2021 05:00), Max: 36.9 (09 Aug 2021 20:46)  T(F): 98.5 (10 Aug 2021 05:00), Max: 98.5 (09 Aug 2021 20:46)  HR: 81 (10 Aug 2021 05:00) (60 - 90)  BP: 133/63 (10 Aug 2021 05:00) (133/63 - 158/67)  BP(mean): --  RR: 18 (10 Aug 2021 05:00) (18 - 28)  SpO2: 97% (09 Aug 2021 19:50) (96% - 100%)    LABS:                        8.7    7.52  )-----------( 251      ( 10 Aug 2021 07:14 )             28.6     08-10    138  |  100  |  43<H>  ----------------------------<  104<H>  4.2   |  27  |  4.9<HH>    Ca    8.3<L>      10 Aug 2021 07:14  Phos  4.6     08-10  Mg     1.9     08-10    TPro  5.6<L>  /  Alb  3.3<L>  /  TBili  0.3  /  DBili  x   /  AST  11  /  ALT  <5  /  AlkPhos  62  08-10        ABG - ( 10 Aug 2021 08:09 )  pH, Arterial: 7.33  pH, Blood: x     /  pCO2: 58    /  pO2: 104   / HCO3: 30    / Base Excess: 3.7   /  SaO2: 98                    CARDIAC MARKERS ( 09 Aug 2021 05:43 )  x     / 0.16 ng/mL / x     / x     / x          RADIOLOGY:      PHYSICAL EXAM:  GENERAL: in Acute distress w/ abdominal breathing  CHEST/LUNG: decreased breath sounds ; labored respirations  HEART: Regular rate and rhythm; No murmurs, rubs, or gallops  ABDOMEN: Bowel sounds present; Soft, Nontender, Nondistended.   EXTREMITIES:  no edema  NERVOUS SYSTEM:  Alert .   MSK: FROM all 4 extremities, full and equal strength  SKIN: sacral bruising      ASSESSMENT & PLAN  80yo M from [facility] w/ pmhx of CHF (EF 40% in July 2021), HTN, anxiety, Alzheimer's dementia, COPD on home 4L O2, T2DM on insulin, hx right nephrectomy and ESRD recently started on dialysis s/p tunnelled catheter, anemia of chronic disease on ROMELIA therapy, brought in by EMS with complaint of SOB and desat at NH    # Acute on Chronic Hypercapneic RF 2/2 COPD/PNA/Mucus Plug  # Severe COPD on 4 liters O2  - C-XRAY on admission 08/07  - New near complete opacification of the right hemithorax, possibly related to mucous plug. Unchanged left sided interstitial opacities/edema and small left pleural effusion.  - s/p bronchoscopy - copious thick secretions suctioned,  - repeat chest x-ray improved.   - VA Duplex LE- negative   - continue Mucomyst inline, chest PT,  - start duonebs   - continue Levaquin and Unasyn renally dosed,   - FU procal, urine strep/legionella, MRSA nares  - wean steroids IV to po  - BIPAP at night cont, patient didn't use BIPAP  - aspiration precautions,  Albuterol PRN    # HFrEF  # ESRD on HD  - nephrology following   - c/w BB, ASA, Plavix  - dialysis tomorrow    #DMII   - check finger sticks qhs  - start insulin if needed.     #HTN  #Dementia alzheimers  - c/w home alzheimer's meds.    # GI PPx - Protonix  # DVT PPx - Heparin 5000 mg SubQ  q8  # Activity -  Increase as Tolerated  # Dispo - Patient to be discharged when medically optimized.  # Code Status-  DNR / DNI

## 2021-08-10 NOTE — PROGRESS NOTE ADULT - SUBJECTIVE AND OBJECTIVE BOX
[place] [facility] FOLLOW UP NOTE  --------------------------------------------------------------------------------  24 hour events/subjective: Patient examined. Appears comfortable.    PAST HISTORY  --------------------------------------------------------------------------------  No significant changes to PMH, PSH, FHx, SHx, unless otherwise noted    ALLERGIES & MEDICATIONS  --------------------------------------------------------------------------------  Allergies    No Known Allergies    Standing Inpatient Medications  albuterol/ipratropium for Nebulization 3 milliLiter(s) Nebulizer every 6 hours  ampicillin/sulbactam  IVPB 3 Gram(s) IV Intermittent every 12 hours  ascorbic acid 500 milliGRAM(s) Oral daily  aspirin enteric coated 81 milliGRAM(s) Oral daily  atorvastatin 40 milliGRAM(s) Oral at bedtime  calcium acetate 667 milliGRAM(s) Oral three times a day with meals  clopidogrel Tablet 75 milliGRAM(s) Oral daily  donepezil 5 milliGRAM(s) Oral at bedtime  ferrous    sulfate 325 milliGRAM(s) Oral daily  heparin   Injectable 5000 Unit(s) SubCutaneous every 8 hours  levoFLOXacin IVPB 250 milliGRAM(s) IV Intermittent every 48 hours  metoprolol tartrate 25 milliGRAM(s) Oral two times a day  mirtazapine 15 milliGRAM(s) Oral at bedtime  pantoprazole    Tablet 40 milliGRAM(s) Oral before breakfast  predniSONE   Tablet 40 milliGRAM(s) Oral daily  sertraline 25 milliGRAM(s) Oral daily  tamsulosin 0.4 milliGRAM(s) Oral at bedtime    PRN Inpatient Medications  ALBUTerol    90 MICROgram(s) HFA Inhaler 2 Puff(s) Inhalation every 6 hours PRN    VITALS/PHYSICAL EXAM  --------------------------------------------------------------------------------  T(C): 36.9 (08-10-21 @ 05:00), Max: 36.9 (08-09-21 @ 20:46)  HR: 81 (08-10-21 @ 05:00) (60 - 90)  BP: 133/63 (08-10-21 @ 05:00) (133/63 - 158/67)  RR: 18 (08-10-21 @ 05:00) (18 - 18)  SpO2: 96% (08-10-21 @ 08:00) (96% - 100%)  Height (cm): 172.7 (08-09-21 @ 19:00)  Weight (kg): 70.8 (08-09-21 @ 19:00)  BMI (kg/m2): 23.7 (08-09-21 @ 19:00)  BSA (m2): 1.84 (08-09-21 @ 19:00)    08-09-21 @ 07:01  -  08-10-21 @ 07:00  --------------------------------------------------------  IN: 0 mL / OUT: 2000 mL / NET: -2000 mL    Physical Exam:  	Gen: NAD  	Pulm: CTA B/L  	CV: RRR, S1S2  	Abd: +BS, soft, nontender/nondistended  	: No suprapubic tenderness  	LE: Warm, no edema  	Vascular access:     LABS/STUDIES  --------------------------------------------------------------------------------              8.7    7.52  >-----------<  251      [08-10-21 @ 07:14]              28.6     138  |  100  |  43  ----------------------------<  104      [08-10-21 @ 07:14]  4.2   |  27  |  4.9        Ca     8.3     [08-10-21 @ 07:14]      Mg     1.9     [08-10-21 @ 07:14]      Phos  4.6     [08-10-21 @ 07:14]    TPro  5.6  /  Alb  3.3  /  TBili  0.3  /  DBili  x   /  AST  11  /  ALT  <5  /  AlkPhos  62  [08-10-21 @ 07:14]    Troponin 0.16      [08-09-21 @ 05:43]    Creatinine Trend:  SCr 4.9 [08-10 @ 07:14]  SCr 6.8 [08-09 @ 05:43]  SCr 5.9 [08-08 @ 03:24]  SCr 4.3 [07-31 @ 09:17]  SCr 6.6 [07-30 @ 09:00]    Urinalysis - [07-12-21 @ 13:07]      Color Yellow / Appearance Slightly Turbid / SG 1.014 / pH 6.0      Gluc Negative / Ketone Negative  / Bili Negative / Urobili <2 mg/dL       Blood Small / Protein 300 mg/dL / Leuk Est Large / Nitrite Negative      RBC 14 / WBC 73 / Hyaline 3 / Gran  / Sq Epi  / Non Sq Epi 1 / Bacteria Few    Iron 34, TIBC 178, %sat 19      [01-04-21 @ 06:40]  Ferritin 332      [01-04-21 @ 06:40]  PTH -- (Ca 8.9)      [12-24-20 @ 04:30]   54    HBsAb Nonreact      [07-12-21 @ 18:05]  HBsAg Nonreact      [07-15-21 @ 20:00]  HCV 0.15, Nonreact      [07-15-21 @ 20:00]

## 2021-08-11 NOTE — DIETITIAN INITIAL EVALUATION ADULT. - PHYSCIAL ASSESSMENT
Alert/oriented x 4  Skin: Right buttock stage 2 1.5 X1X0  GI: LBM 8/9, no N/V/D/C well nourished Alert/oriented x 4  Skin: Right buttock stage 2 1.5 X1X0  GI: LBM 8/9, no N/V/D/C  Good dentition

## 2021-08-11 NOTE — DIETITIAN INITIAL EVALUATION ADULT. - OTHER CALCULATIONS
Estimated needs based on CBW 71.8 [id number] kcal (30-35 kcal/kg),  g protein (1-1.2 g/kg), fluids per LIP. Increased kcal/protein needs r/t ESRD on HD & stage 2 to R buttock. No current fluid restriction ordered per Nephrology note, will monitor.

## 2021-08-11 NOTE — DIETITIAN INITIAL EVALUATION ADULT. - PERTINENT LABORATORY DATA
8/11 H/H 8.9/30.6 L, BUN 64 H, Cr 6.8 H, glu 126 H, Ca 8.3 L  7/13 A1c 5%  CAPILLARY BLOOD GLUCOSE  POCT Blood Glucose.: 120 mg/dL (11 Aug 2021 07:34)  POCT Blood Glucose.: 164 mg/dL (10 Aug 2021 21:58)  POCT Blood Glucose.: 170 mg/dL (10 Aug 2021 16:52)  POCT Blood Glucose.: 163 mg/dL (10 Aug 2021 11:09)

## 2021-08-11 NOTE — PROGRESS NOTE ADULT - SUBJECTIVE AND OBJECTIVE BOX
[place] [facility] FOLLOW UP NOTE  --------------------------------------------------------------------------------  24 hour events/subjective: Patient examined during HD. Appears comfortable.    PAST HISTORY  --------------------------------------------------------------------------------  No significant changes to PMH, PSH, FHx, SHx, unless otherwise noted    ALLERGIES & MEDICATIONS  --------------------------------------------------------------------------------  Allergies    No Known Allergies    Standing Inpatient Medications  albuterol/ipratropium for Nebulization 3 milliLiter(s) Nebulizer every 6 hours  ampicillin/sulbactam  IVPB 3 Gram(s) IV Intermittent every 12 hours  ascorbic acid 500 milliGRAM(s) Oral daily  aspirin enteric coated 81 milliGRAM(s) Oral daily  atorvastatin 40 milliGRAM(s) Oral at bedtime  calcium acetate 667 milliGRAM(s) Oral three times a day with meals  clopidogrel Tablet 75 milliGRAM(s) Oral daily  donepezil 5 milliGRAM(s) Oral at bedtime  ferrous    sulfate 325 milliGRAM(s) Oral daily  heparin   Injectable 5000 Unit(s) SubCutaneous every 8 hours  levoFLOXacin IVPB 250 milliGRAM(s) IV Intermittent every 48 hours  metoprolol tartrate 25 milliGRAM(s) Oral two times a day  mirtazapine 15 milliGRAM(s) Oral at bedtime  pantoprazole    Tablet 40 milliGRAM(s) Oral before breakfast  predniSONE   Tablet 40 milliGRAM(s) Oral daily  sertraline 25 milliGRAM(s) Oral daily  tamsulosin 0.4 milliGRAM(s) Oral at bedtime    PRN Inpatient Medications  ALBUTerol    90 MICROgram(s) HFA Inhaler 2 Puff(s) Inhalation every 6 hours PRN    VITALS/PHYSICAL EXAM  --------------------------------------------------------------------------------  T(C): 36.1 (08-11-21 @ 05:21), Max: 37.3 (08-10-21 @ 12:56)  HR: 71 (08-11-21 @ 09:35) (71 - 95)  BP: 137/63 (08-11-21 @ 09:35) (137/63 - 156/67)  RR: 18 (08-11-21 @ 09:35) (18 - 18)  SpO2: 98% (08-11-21 @ 09:35) (98% - 99%)  Height (cm): 172.7 (08-09-21 @ 19:00)  Weight (kg): 70.8 (08-09-21 @ 19:00)  BMI (kg/m2): 23.7 (08-09-21 @ 19:00)  BSA (m2): 1.84 (08-09-21 @ 19:00)    08-10-21 @ 07:01  -  08-11-21 @ 07:00  --------------------------------------------------------  IN: 200 mL / OUT: 100 mL / NET: 100 mL    Physical Exam:  	Gen: NAD  	Pulm: CTA B/L  	CV: RRR, S1S2  	Abd: +BS, soft, nontender/nondistended  	: No suprapubic tenderness  	LE: Warm, no edema  	Vascular access: TDC    LABS/STUDIES  --------------------------------------------------------------------------------              8.9    8.13  >-----------<  260      [08-11-21 @ 07:59]              30.6     141  |  101  |  64  ----------------------------<  126      [08-11-21 @ 07:59]  4.9   |  26  |  6.8        Ca     8.3     [08-11-21 @ 07:59]      Mg     2.1     [08-11-21 @ 07:59]      Phos  4.6     [08-10-21 @ 07:14]    TPro  5.7  /  Alb  3.4  /  TBili  0.3  /  DBili  x   /  AST  9   /  ALT  <5  /  AlkPhos  64  [08-11-21 @ 07:59]    Creatinine Trend:  SCr 6.8 [08-11 @ 07:59]  SCr 4.9 [08-10 @ 07:14]  SCr 6.8 [08-09 @ 05:43]  SCr 5.9 [08-08 @ 03:24]  SCr 4.3 [07-31 @ 09:17]    Urinalysis - [07-12-21 @ 13:07]      Color Yellow / Appearance Slightly Turbid / SG 1.014 / pH 6.0      Gluc Negative / Ketone Negative  / Bili Negative / Urobili <2 mg/dL       Blood Small / Protein 300 mg/dL / Leuk Est Large / Nitrite Negative      RBC 14 / WBC 73 / Hyaline 3 / Gran  / Sq Epi  / Non Sq Epi 1 / Bacteria Few    Iron 34, TIBC 178, %sat 19      [01-04-21 @ 06:40]  Ferritin 332      [01-04-21 @ 06:40]  PTH -- (Ca 8.9)      [12-24-20 @ 04:30]   54    HBsAb Nonreact      [07-12-21 @ 18:05]  HBsAg Nonreact      [07-15-21 @ 20:00]  HCV 0.15, Nonreact      [07-15-21 @ 20:00]

## 2021-08-11 NOTE — PROGRESS NOTE ADULT - ATTENDING COMMENTS
Patient with partial improvement of respiratory status. Continues to be dependent on Non-invasive positive pressure ventilation (NIPPV) at times; however, overall continues to improve.   Frailty/ s/p legionella infection (multilobar pneumonia) in July     Progress Note Handoff  Pending:  improvement in O2 requirement   Disposition: [facility] as per case management team Patient with partial improvement of respiratory status. Continues to be dependent on Non-invasive positive pressure ventilation (NIPPV) at times; however, overall continues to improve.   Frailty/ s/p legionella infection (multilobar pneumonia) in July     Progress Note Handoff  Pending:  improvement in O2 requirement / observe OFF antibiotics (stopped today). Patient received long course of Levaquin until recently.   Disposition: [facility] as per case management team within 24-48 hours

## 2021-08-11 NOTE — DIETITIAN INITIAL EVALUATION ADULT. - OTHER INFO
# Acute on Chronic Hypercapneic RF 2/2 COPD/PNA/Mucus Plug  # Severe COPD on 4 liters O2  # HFrEF  # ESRD on HD  #DMII  #HTN  #Dementia alzheimers

## 2021-08-11 NOTE — PROGRESS NOTE ADULT - ASSESSMENT
ESRD recently started on HD   - access via TDC  acute respiratory failure due to mucous plug / PNA - s/p bronchoscopy  COPD on home O2  HFrEF  dementia   HTN  anemia  right nephrectomy    plan:    HD today: 3 hours, opti 160 dialyzer, 2K bath, 2L UF  PhosLo with meals  Renal diet  Renally dose antibiotics

## 2021-08-11 NOTE — DIETITIAN INITIAL EVALUATION ADULT. - RD TO REMAIN AVAILABLE
Interventions: medical food supplements, vitamin supplements. Monitoring/evaluation: meal intake, supplement intake, weight, renal/electrolyte profile, skin status./yes

## 2021-08-11 NOTE — PROGRESS NOTE ADULT - SUBJECTIVE AND OBJECTIVE BOX
DRAKE HO 81y Male  MRN#: [id number]   Hospital Day: 3d    HPI:  HPI: 82yo M from [facility] w/ pmhx of CHF (EF 40% in July 2021), HTN, anxiety, Alzheimer's dementia, COPD on home 4L O2, T2DM on insulin, hx right nephrectomy and ESRD recently started on dialysis s/p tunnelled catheter, anemia of chronic disease on ROMELIA therapy, brought in by EMS with complaint of SOB and desat at NH. Recently admitted for septic shock secondary to PNA and  metablic acidosis and hyperkalemia and received udall and urgent HD. Pt seen subsequently in ED for similar complaints as well between last month and now.     ED VITALS:  Vital Signs Last 24 Hrs  T(C): 37 (08 Aug 2021 01:22), Max: 37 (08 Aug 2021 01:22)  T(F): 98.6 (08 Aug 2021 01:22), Max: 98.6 (08 Aug 2021 01:22)  HR: 73 (08 Aug 2021 07:35) (73 - 83)  BP: 116/53 (08 Aug 2021 07:09) (116/53 - 145/86)  RR: 24 (08 Aug 2021 07:09) (24 - 24)  SpO2: 97% (08 Aug 2021 07:35) (88% - 100%)    ED IMAGING:   - CXR: R White Out   - POCUS Bedside: IVC not dilated, collapsible w/insp, pt not on any PPV, R Lung: Small pleural effusion, RLL appears atelectatic w/some dynamic bronchograms observed.     ED LABS: notable for WBC 15K, BNP > 70K, Trop 0.12, VBG PCO2 90    ED COURSE:   Pt given empiric abx cefepime + levofloxacin, 125mg solumedrol  MICU consulted pt approved for SDU, pt placed on BiPAP but did not tolerate ripped mask off.  (08 Aug 2021 08:45)      SUBJECTIVE  Patient is a 81y old Male who presents with a chief complaint of Desaturation (10 Aug 2021 12:18)  Currently admitted to medicine with the primary diagnosis of Acute hypercapnic respiratory failure      INTERVAL HPI AND OVERNIGHT EVENTS:  Patient was examined and seen at bedside. This morning patient is still SOB. He was mouth breathing. He looked better than yesterday. Patient was not complaining of abdominal pain this am. OF note patient did not have bipap overnight. Per respiratory it was not approved by pulm. BIPAP was brought in to be set up this AM. Patient needs BIPAP to help with his work of breathing.     OBJECTIVE  PAST MEDICAL & SURGICAL HISTORY  Diabetes mellitus    COPD (chronic obstructive pulmonary disease)    Lymphedema of both lower extremities    CHF (congestive heart failure)    H/O right nephrectomy  20 yrs ago      ALLERGIES:  No Known Allergies    MEDICATIONS:  STANDING MEDICATIONS  albuterol/ipratropium for Nebulization 3 milliLiter(s) Nebulizer every 6 hours  ampicillin/sulbactam  IVPB 3 Gram(s) IV Intermittent every 12 hours  ascorbic acid 500 milliGRAM(s) Oral daily  aspirin enteric coated 81 milliGRAM(s) Oral daily  atorvastatin 40 milliGRAM(s) Oral at bedtime  calcium acetate 667 milliGRAM(s) Oral three times a day with meals  clopidogrel Tablet 75 milliGRAM(s) Oral daily  donepezil 5 milliGRAM(s) Oral at bedtime  ferrous    sulfate 325 milliGRAM(s) Oral daily  heparin   Injectable 5000 Unit(s) SubCutaneous every 8 hours  levoFLOXacin IVPB 250 milliGRAM(s) IV Intermittent every 48 hours  metoprolol tartrate 25 milliGRAM(s) Oral two times a day  mirtazapine 15 milliGRAM(s) Oral at bedtime  pantoprazole    Tablet 40 milliGRAM(s) Oral before breakfast  predniSONE   Tablet 40 milliGRAM(s) Oral daily  sertraline 25 milliGRAM(s) Oral daily  tamsulosin 0.4 milliGRAM(s) Oral at bedtime    PRN MEDICATIONS  ALBUTerol    90 MICROgram(s) HFA Inhaler 2 Puff(s) Inhalation every 6 hours PRN      VITAL SIGNS: Last 24 Hours  T(C): 36.1 (11 Aug 2021 05:21), Max: 37.3 (10 Aug 2021 12:56)  T(F): 97 (11 Aug 2021 05:21), Max: 99.2 (10 Aug 2021 12:56)  HR: 74 (11 Aug 2021 05:21) (73 - 95)  BP: 143/63 (11 Aug 2021 05:21) (137/63 - 156/67)  BP(mean): --  RR: 18 (11 Aug 2021 05:21) (18 - 18)  SpO2: 99% (10 Aug 2021 20:14) (99% - 99%)    LABS:                        8.9    8.13  )-----------( 260      ( 11 Aug 2021 07:59 )             30.6     08-10    138  |  100  |  43<H>  ----------------------------<  104<H>  4.2   |  27  |  4.9<HH>    Ca    8.3<L>      10 Aug 2021 07:14  Phos  4.6     08-10  Mg     1.9     08-10    TPro  5.6<L>  /  Alb  3.3<L>  /  TBili  0.3  /  DBili  x   /  AST  11  /  ALT  <5  /  AlkPhos  62  08-10        ABG - ( 10 Aug 2021 08:09 )  pH, Arterial: 7.33  pH, Blood: x     /  pCO2: 58    /  pO2: 104   / HCO3: 30    / Base Excess: 3.7   /  SaO2: 98                        RADIOLOGY:      PHYSICAL EXAM:    PHYSICAL EXAM:  GENERAL: in mild distress w/ mouth breathing  CHEST/LUNG: decreased breath sounds   HEART: Regular rate and rhythm; No murmurs, rubs, or gallops  ABDOMEN: Bowel sounds present; Soft, Nontender, Nondistended.   EXTREMITIES:  no edema  NERVOUS SYSTEM:  Alert .   MSK: FROM all 4 extremities, full and equal strength  SKIN: warm and well perfused       ASSESSMENT & PLAN  82yo M from [facility] w/ pmhx of CHF (EF 40% in July 2021), HTN, anxiety, Alzheimer's dementia, COPD on home 4L O2, T2DM on insulin, hx right nephrectomy and ESRD recently started on dialysis s/p tunnelled catheter, anemia of chronic disease on ROMELIA therapy, brought in by EMS with complaint of SOB and desat at NH    # Acute on Chronic Hypercapneic RF 2/2 COPD/PNA/Mucus Plug  # Severe COPD on 4 liters O2  - C-XRAY on admission 08/07  - New near complete opacification of the right hemithorax, possibly related to mucous plug. Unchanged left sided interstitial opacities/edema and small left pleural effusion.  - s/p bronchoscopy - copious thick secretions suctioned,  - repeat chest x-ray improved.   - VA Duplex LE- negative   - continue Mucomyst inline, chest PT,  - start duonebs   - continue Levaquin and Unasyn renally dosed,   - FU procal, urine strep/legionella, MRSA nares  - wean steroids IV to po  - BIPAP at night cont, patient didn't use BIPAP  - aspiration precautions,  Albuterol PRN    # HFrEF  # ESRD on HD  - nephrology following   - c/w BB, ASA, Plavix  - dialysis tomorrow    #DMII   - check finger sticks qhs  - start insulin if needed.     #HTN  #Dementia alzheimers  - c/w home alzheimer's meds.    # GI PPx - Protonix  # DVT PPx - Heparin 5000 mg SubQ  q8  # Activity -  Increase as Tolerated  # Dispo - Patient to be discharged when medically optimized.  # Code Status-  DNR / DNI     DRAKE HO 81y Male  MRN#: [id number]   Hospital Day: 3d    HPI:  HPI: 80yo M from [facility] w/ pmhx of CHF (EF 40% in July 2021), HTN, anxiety, Alzheimer's dementia, COPD on home 4L O2, T2DM on insulin, hx right nephrectomy and ESRD recently started on dialysis s/p tunnelled catheter, anemia of chronic disease on ROMELIA therapy, brought in by EMS with complaint of SOB and desat at NH. Recently admitted for septic shock secondary to PNA and  metablic acidosis and hyperkalemia and received udall and urgent HD. Pt seen subsequently in ED for similar complaints as well between last month and now.     ED VITALS:  Vital Signs Last 24 Hrs  T(C): 37 (08 Aug 2021 01:22), Max: 37 (08 Aug 2021 01:22)  T(F): 98.6 (08 Aug 2021 01:22), Max: 98.6 (08 Aug 2021 01:22)  HR: 73 (08 Aug 2021 07:35) (73 - 83)  BP: 116/53 (08 Aug 2021 07:09) (116/53 - 145/86)  RR: 24 (08 Aug 2021 07:09) (24 - 24)  SpO2: 97% (08 Aug 2021 07:35) (88% - 100%)    ED IMAGING:   - CXR: R White Out   - POCUS Bedside: IVC not dilated, collapsible w/insp, pt not on any PPV, R Lung: Small pleural effusion, RLL appears atelectatic w/some dynamic bronchograms observed.     ED LABS: notable for WBC 15K, BNP > 70K, Trop 0.12, VBG PCO2 90    ED COURSE:   Pt given empiric abx cefepime + levofloxacin, 125mg solumedrol  MICU consulted pt approved for SDU, pt placed on BiPAP but did not tolerate ripped mask off.  (08 Aug 2021 08:45)      SUBJECTIVE  Patient is a 81y old Male who presents with a chief complaint of Desaturation (10 Aug 2021 12:18)  Currently admitted to medicine with the primary diagnosis of Acute hypercapnic respiratory failure      INTERVAL HPI AND OVERNIGHT EVENTS:  Patient was examined and seen at bedside. This morning patient is still SOB. He was mouth breathing. He looked better than yesterday. Patient was not complaining of abdominal pain this am. OF note patient did not have bipap overnight. Per respiratory it was not approved by pulm. BIPAP was brought in to be set up this AM. Patient needs BIPAP to help with his work of breathing.     OBJECTIVE  PAST MEDICAL & SURGICAL HISTORY  Diabetes mellitus    COPD (chronic obstructive pulmonary disease)    Lymphedema of both lower extremities    CHF (congestive heart failure)    H/O right nephrectomy  20 yrs ago      ALLERGIES:  No Known Allergies    MEDICATIONS:  STANDING MEDICATIONS  albuterol/ipratropium for Nebulization 3 milliLiter(s) Nebulizer every 6 hours  ampicillin/sulbactam  IVPB 3 Gram(s) IV Intermittent every 12 hours  ascorbic acid 500 milliGRAM(s) Oral daily  aspirin enteric coated 81 milliGRAM(s) Oral daily  atorvastatin 40 milliGRAM(s) Oral at bedtime  calcium acetate 667 milliGRAM(s) Oral three times a day with meals  clopidogrel Tablet 75 milliGRAM(s) Oral daily  donepezil 5 milliGRAM(s) Oral at bedtime  ferrous    sulfate 325 milliGRAM(s) Oral daily  heparin   Injectable 5000 Unit(s) SubCutaneous every 8 hours  levoFLOXacin IVPB 250 milliGRAM(s) IV Intermittent every 48 hours  metoprolol tartrate 25 milliGRAM(s) Oral two times a day  mirtazapine 15 milliGRAM(s) Oral at bedtime  pantoprazole    Tablet 40 milliGRAM(s) Oral before breakfast  predniSONE   Tablet 40 milliGRAM(s) Oral daily  sertraline 25 milliGRAM(s) Oral daily  tamsulosin 0.4 milliGRAM(s) Oral at bedtime    PRN MEDICATIONS  ALBUTerol    90 MICROgram(s) HFA Inhaler 2 Puff(s) Inhalation every 6 hours PRN      VITAL SIGNS: Last 24 Hours  T(C): 36.1 (11 Aug 2021 05:21), Max: 37.3 (10 Aug 2021 12:56)  T(F): 97 (11 Aug 2021 05:21), Max: 99.2 (10 Aug 2021 12:56)  HR: 74 (11 Aug 2021 05:21) (73 - 95)  BP: 143/63 (11 Aug 2021 05:21) (137/63 - 156/67)  BP(mean): --  RR: 18 (11 Aug 2021 05:21) (18 - 18)  SpO2: 99% (10 Aug 2021 20:14) (99% - 99%)    LABS:                        8.9    8.13  )-----------( 260      ( 11 Aug 2021 07:59 )             30.6     08-10    138  |  100  |  43<H>  ----------------------------<  104<H>  4.2   |  27  |  4.9<HH>    Ca    8.3<L>      10 Aug 2021 07:14  Phos  4.6     08-10  Mg     1.9     08-10    TPro  5.6<L>  /  Alb  3.3<L>  /  TBili  0.3  /  DBili  x   /  AST  11  /  ALT  <5  /  AlkPhos  62  08-10        ABG - ( 10 Aug 2021 08:09 )  pH, Arterial: 7.33  pH, Blood: x     /  pCO2: 58    /  pO2: 104   / HCO3: 30    / Base Excess: 3.7   /  SaO2: 98                        RADIOLOGY:    EXAM:  XR CHEST PORTABLE ROUTINE 1V            PROCEDURE DATE:  08/10/2021            INTERPRETATION:  Clinical History / Reason for exam: Pleural effusion    Comparison : Chest radiograph 8/9/2021.    Technique/Positioning: Frontal chest radiograph.    Findings:    Support devices: Stable right IJ double lumen dialysis catheter with tip in the right atrium.    Cardiac/mediastinum/hilum: No significant change.    Lung parenchyma/Pleura: Stable bilateral opacities/effusions. No pneumothorax.    Skeleton/soft tissues: No significant change.    Impression:    Stable bilateral opacities/effusions.        --- End of Report ---    PHYSICAL EXAM:  GENERAL: in mild distress w/ mouth breathing  CHEST/LUNG: decreased breath sounds   HEART: Regular rate and rhythm; No murmurs, rubs, or gallops  ABDOMEN: Bowel sounds present; Soft, Nontender, Nondistended.   EXTREMITIES:  no edema  NERVOUS SYSTEM:  Alert .   MSK: FROM all 4 extremities, full and equal strength  SKIN: warm and well perfused       ASSESSMENT & PLAN  80yo M from [facility] w/ pmhx of CHF (EF 40% in July 2021), HTN, anxiety, Alzheimer's dementia, COPD on home 4L O2, T2DM on insulin, hx right nephrectomy and ESRD recently started on dialysis s/p tunnelled catheter, anemia of chronic disease on ROMELIA therapy, brought in by EMS with complaint of SOB and desat at NH    # Acute on Chronic Hypercapneic RF 2/2 COPD/PNA/Mucus Plug  # Severe COPD on 4 liters O2  - C-XRAY on admission 08/07  - New near complete opacification of the right hemithorax, possibly related to mucous plug. Unchanged left sided interstitial opacities/edema and small left pleural effusion.  - s/p bronchoscopy - copious thick secretions suctioned,  - repeat chest x-ray improved. VA Duplex LE- negative   - MRSA Nares negative   - continue Mucomyst inline, chest PT, -aspiration precautions  - c/w Duonebs, Prednison 40mg daily, Albuterol PRN   - continue Levaquin and Unasyn renally dosed,   - BIPAP at night, patient didn't use BIPAP  - FU procal, urine strep/legionella,    # HFrEF  # ESRD on HD  - nephrology following   - c/w Plavix  - dialysis today    #DMII   - check finger sticks qhs  - start insulin if needed.     #HTN  - c/w BB, ASA    #Dementia alzheimers  - c/w home alzheimer's meds.    # GI PPx - Protonix  # DVT PPx - Heparin 5000 mg SubQ  q8  # Activity -  Increase as Tolerated  # Dispo - Patient to be discharged when medically optimized.  # Code Status-  DNR / DNI

## 2021-08-11 NOTE — DIETITIAN INITIAL EVALUATION ADULT. - ORAL INTAKE PTA/DIET HISTORY
Unable to obtain nutrition hx, pt unarousable for nutrition interview, called emergency contact w/no answer, will attempt to obtain on follow-up.

## 2021-08-11 NOTE — DIETITIAN INITIAL EVALUATION ADULT. - PERTINENT MEDS FT
MEDICATIONS  (STANDING):  ampicillin/sulbactam  IVPB 3 Gram(s) IV Intermittent every 12 hours  ascorbic acid 500 milliGRAM(s) Oral daily  atorvastatin 40 milliGRAM(s) Oral at bedtime  calcium acetate 667 milliGRAM(s) Oral three times a day with meals  clopidogrel Tablet 75 milliGRAM(s) Oral daily  donepezil 5 milliGRAM(s) Oral at bedtime  ferrous    sulfate 325 milliGRAM(s) Oral daily  heparin   Injectable 5000 Unit(s) SubCutaneous every 8 hours  levoFLOXacin IVPB 250 milliGRAM(s) IV Intermittent every 48 hours  metoprolol tartrate 25 milliGRAM(s) Oral two times a day  mirtazapine 15 milliGRAM(s) Oral at bedtime  pantoprazole    Tablet 40 milliGRAM(s) Oral before breakfast  predniSONE   Tablet 40 milliGRAM(s) Oral daily  sertraline 25 milliGRAM(s) Oral daily  tamsulosin 0.4 milliGRAM(s) Oral at bedtime

## 2021-08-12 NOTE — PROGRESS NOTE ADULT - SUBJECTIVE AND OBJECTIVE BOX
[place] [facility] FOLLOW UP NOTE  --------------------------------------------------------------------------------  24 hour events/subjective: Patient examined. Appears comfortable.    PAST HISTORY  --------------------------------------------------------------------------------  No significant changes to PMH, PSH, FHx, SHx, unless otherwise noted    ALLERGIES & MEDICATIONS  --------------------------------------------------------------------------------  Allergies    No Known Allergies    Standing Inpatient Medications  albuterol/ipratropium for Nebulization 3 milliLiter(s) Nebulizer every 6 hours  ascorbic acid 500 milliGRAM(s) Oral daily  aspirin enteric coated 81 milliGRAM(s) Oral daily  atorvastatin 40 milliGRAM(s) Oral at bedtime  calcium acetate 667 milliGRAM(s) Oral three times a day with meals  clopidogrel Tablet 75 milliGRAM(s) Oral daily  donepezil 5 milliGRAM(s) Oral at bedtime  ferrous    sulfate 325 milliGRAM(s) Oral daily  furosemide    Tablet 80 milliGRAM(s) Oral two times a day  heparin   Injectable 5000 Unit(s) SubCutaneous every 8 hours  metoprolol tartrate 25 milliGRAM(s) Oral two times a day  mirtazapine 15 milliGRAM(s) Oral at bedtime  Nephro-maryjane      Nephro-maryjnae 1 Tablet(s) Oral daily  pantoprazole    Tablet 40 milliGRAM(s) Oral before breakfast  predniSONE   Tablet 40 milliGRAM(s) Oral daily  sertraline 25 milliGRAM(s) Oral daily  tamsulosin 0.4 milliGRAM(s) Oral at bedtime    PRN Inpatient Medications  ALBUTerol    90 MICROgram(s) HFA Inhaler 2 Puff(s) Inhalation every 6 hours PRN    VITALS/PHYSICAL EXAM  --------------------------------------------------------------------------------  T(C): 36.4 (08-12-21 @ 05:40), Max: 36.4 (08-11-21 @ 20:47)  HR: 64 (08-12-21 @ 05:40) (64 - 89)  BP: 127/59 (08-12-21 @ 05:40) (109/55 - 154/67)  RR: 18 (08-12-21 @ 05:40) (18 - 18)  SpO2: 94% (08-11-21 @ 20:47) (94% - 98%)    08-11-21 @ 07:01  -  08-12-21 @ 07:00  --------------------------------------------------------  IN: 0 mL / OUT: 2000 mL / NET: -2000 mL    Physical Exam:  	Gen: NAD  	Pulm: CTA B/L  	CV: RRR, S1S2  	Abd: +BS, soft, nontender/nondistended  	: No suprapubic tenderness  	LE: Warm,  no edema  	Vascular access: IJ [facility]    LABS/STUDIES  --------------------------------------------------------------------------------              9.1    9.13  >-----------<  251      [08-12-21 @ 06:44]              31.5     141  |  102  |  44  ----------------------------<  111      [08-12-21 @ 06:44]  4.1   |  30  |  5.3        Ca     8.3     [08-12-21 @ 06:44]      Mg     1.9     [08-12-21 @ 06:44]    TPro  5.7  /  Alb  3.4  /  TBili  0.3  /  DBili  x   /  AST  9   /  ALT  <5  /  AlkPhos  64  [08-11-21 @ 07:59]    Creatinine Trend:  SCr 5.3 [08-12 @ 06:44]  SCr 6.8 [08-11 @ 07:59]  SCr 4.9 [08-10 @ 07:14]  SCr 6.8 [08-09 @ 05:43]  SCr 5.9 [08-08 @ 03:24]    Iron 34, TIBC 178, %sat 19      [01-04-21 @ 06:40]  Ferritin 332      [01-04-21 @ 06:40]  PTH -- (Ca 8.9)      [12-24-20 @ 04:30]   54    HBsAg Nonreact      [07-15-21 @ 20:00]  HCV 0.15, Nonreact      [07-15-21 @ 20:00]

## 2021-08-12 NOTE — PROGRESS NOTE ADULT - SUBJECTIVE AND OBJECTIVE BOX
DRAKE HO 81y Male  MRN#: [id number]   Hospital Day: 4d    HPI:  HPI: 82yo M from [facility] w/ pmhx of CHF (EF 40% in July 2021), HTN, anxiety, Alzheimer's dementia, COPD on home 4L O2, T2DM on insulin, hx right nephrectomy and ESRD recently started on dialysis s/p tunnelled catheter, anemia of chronic disease on ROMELIA therapy, brought in by EMS with complaint of SOB and desat at NH. Recently admitted for septic shock secondary to PNA and  metablic acidosis and hyperkalemia and received udall and urgent HD. Pt seen subsequently in ED for similar complaints as well between last month and now.     ED VITALS:  Vital Signs Last 24 Hrs  T(C): 37 (08 Aug 2021 01:22), Max: 37 (08 Aug 2021 01:22)  T(F): 98.6 (08 Aug 2021 01:22), Max: 98.6 (08 Aug 2021 01:22)  HR: 73 (08 Aug 2021 07:35) (73 - 83)  BP: 116/53 (08 Aug 2021 07:09) (116/53 - 145/86)  RR: 24 (08 Aug 2021 07:09) (24 - 24)  SpO2: 97% (08 Aug 2021 07:35) (88% - 100%)    ED IMAGING:   - CXR: R White Out   - POCUS Bedside: IVC not dilated, collapsible w/insp, pt not on any PPV, R Lung: Small pleural effusion, RLL appears atelectatic w/some dynamic bronchograms observed.     ED LABS: notable for WBC 15K, BNP > 70K, Trop 0.12, VBG PCO2 90    ED COURSE:   Pt given empiric abx cefepime + levofloxacin, 125mg solumedrol  MICU consulted pt approved for SDU, pt placed on BiPAP but did not tolerate ripped mask off.  (08 Aug 2021 08:45)      SUBJECTIVE  Patient is a 81y old Male who presents with a chief complaint of Desaturation (11 Aug 2021 11:30)  Currently admitted to medicine with the primary diagnosis of Acute hypercapnic respiratory failure      INTERVAL HPI AND OVERNIGHT EVENTS:  Patient was examined and seen at bedside. This morning he is resting comfortably in bed and reports no issues or overnight events.    Of note, Patient declined to use his BIPAP overnight. Chest PT ordered today.       OBJECTIVE  PAST MEDICAL & SURGICAL HISTORY  Diabetes mellitus    COPD (chronic obstructive pulmonary disease)    Lymphedema of both lower extremities    CHF (congestive heart failure)    H/O right nephrectomy  20 yrs ago      ALLERGIES:  No Known Allergies    MEDICATIONS:  STANDING MEDICATIONS  albuterol/ipratropium for Nebulization 3 milliLiter(s) Nebulizer every 6 hours  ascorbic acid 500 milliGRAM(s) Oral daily  aspirin enteric coated 81 milliGRAM(s) Oral daily  atorvastatin 40 milliGRAM(s) Oral at bedtime  calcium acetate 667 milliGRAM(s) Oral three times a day with meals  clopidogrel Tablet 75 milliGRAM(s) Oral daily  donepezil 5 milliGRAM(s) Oral at bedtime  ferrous    sulfate 325 milliGRAM(s) Oral daily  furosemide    Tablet 80 milliGRAM(s) Oral two times a day  heparin   Injectable 5000 Unit(s) SubCutaneous every 8 hours  magnesium sulfate  IVPB 2 Gram(s) IV Intermittent once  metoprolol tartrate 25 milliGRAM(s) Oral two times a day  mirtazapine 15 milliGRAM(s) Oral at bedtime  Nephro-maryjane      Nephro-maryjane 1 Tablet(s) Oral daily  pantoprazole    Tablet 40 milliGRAM(s) Oral before breakfast  predniSONE   Tablet 40 milliGRAM(s) Oral daily  sertraline 25 milliGRAM(s) Oral daily  tamsulosin 0.4 milliGRAM(s) Oral at bedtime    PRN MEDICATIONS  ALBUTerol    90 MICROgram(s) HFA Inhaler 2 Puff(s) Inhalation every 6 hours PRN      VITAL SIGNS: Last 24 Hours  T(C): 36.4 (12 Aug 2021 05:40), Max: 36.4 (11 Aug 2021 20:47)  T(F): 97.5 (12 Aug 2021 05:40), Max: 97.6 (11 Aug 2021 20:47)  HR: 64 (12 Aug 2021 05:40) (64 - 89)  BP: 127/59 (12 Aug 2021 05:40) (109/55 - 154/67)  BP(mean): --  RR: 18 (12 Aug 2021 05:40) (18 - 18)  SpO2: 94% (11 Aug 2021 20:47) (94% - 98%)    LABS:                        9.1    9.13  )-----------( 251      ( 12 Aug 2021 06:44 )             31.5     08-12    141  |  102  |  44<H>  ----------------------------<  111<H>  4.1   |  30  |  5.3<HH>    Ca    8.3<L>      12 Aug 2021 06:44  Mg     1.9     08-12    TPro  5.7<L>  /  Alb  3.4<L>  /  TBili  0.3  /  DBili  x   /  AST  9   /  ALT  <5  /  AlkPhos  64  08-11      RADIOLOGY:    EXAM:  XR CHEST PORTABLE ROUTINE 1V          PROCEDURE DATE:  08/11/2021      INTERPRETATION:  STUDY INDICATION: copd    TECHNIQUE:  Portable frontal view of the chest obtained.    COMPARISON: 8/10/2021.    FINDINGS/  IMPRESSION:    Unchanged right IJ dialysis catheter. Stable cardiomediastinal silhouette.. Unchanged bilateral effusion/opacity. No pneumothorax. Unchanged osseous structures.    --- End of Report ---      EXAM:  XR CHEST PORTABLE ROUTINE 1V            PROCEDURE DATE:  08/10/2021            INTERPRETATION:  Clinical History / Reason for exam: Pleural effusion    Comparison : Chest radiograph 8/9/2021.    Technique/Positioning: Frontal chest radiograph.    Findings:    Support devices: Stable right IJ double lumen dialysis catheter with tip in the right atrium.    Cardiac/mediastinum/hilum: No significant change.    Lung parenchyma/Pleura: Stable bilateral opacities/effusions. No pneumothorax.    Skeleton/soft tissues: No significant change.    Impression:    Stable bilateral opacities/effusions.        --- End of Report ---    PHYSICAL EXAM:  GENERAL: NAD  CHEST/LUNG: decreased breath sounds , right IJ cathether in place   HEART: Regular rate and rhythm; No murmurs, rubs, or gallops  ABDOMEN: Bowel sounds present; Soft, Nontender, Nondistended.   EXTREMITIES:  no edema  NERVOUS SYSTEM:  Alert .   SKIN: warm and well perfused       ASSESSMENT & PLAN  82yo M from [facility] w/ pmhx of CHF (EF 40% in July 2021), HTN, anxiety, Alzheimer's dementia, COPD on home 4L O2, T2DM on insulin, hx right nephrectomy and ESRD recently started on dialysis s/p tunnelled catheter, anemia of chronic disease on ROMELIA therapy, brought in by EMS with complaint of SOB and desat at NH    # Acute on Chronic Hypercapneic RF 2/2 COPD/PNA/Mucus Plug  # Severe COPD on 4 liters O2  - C-XRAY on admission 08/07  - New near complete opacification of the right hemithorax, possibly related to mucous plug. Unchanged left sided interstitial opacities/edema and small left pleural effusion.  - s/p bronchoscopy - copious thick secretions suctioned,  - VA Duplex LE- negative , MRSA Nares negative   - continue Mucomyst inline, chest PT, aspiration precautions  - c/w Duonebs, Prednisone 40mg daily, Albuterol PRN   - Levaquin and Unasyn renally stopped  - Cxray - possible worseing effusion on the right. f/u offical read    - Lasix 80 BID started . okay with nephro  - BIPAP at night, patient didn't use BIPAP  - F/u procal, urine strep/legionella  - f/u pulm recs    # HFrEF  # ESRD on HD  - nephrology following   - dialysis today    #DMII   - check finger sticks qhs  - start insulin if needed.     #HTN  - c/w BB, ASA    #Dementia alzheimers  - c/w home alzheimer's meds.    # GI PPx - Protonix  # DVT PPx - Heparin 5000 mg SubQ  q8  # Activity -  Increase as Tolerated  # Dispo - Patient to be discharged when medically optimized.  # Code Status-  DNR / DNI       DRAKE HO 81y Male  MRN#: [id number]   Hospital Day: 4d    HPI:  HPI: 80yo M from [facility] w/ pmhx of CHF (EF 40% in July 2021), HTN, anxiety, Alzheimer's dementia, COPD on home 4L O2, T2DM on insulin, hx right nephrectomy and ESRD recently started on dialysis s/p tunnelled catheter, anemia of chronic disease on ROMELIA therapy, brought in by EMS with complaint of SOB and desat at NH. Recently admitted for septic shock secondary to PNA and  metablic acidosis and hyperkalemia and received udall and urgent HD. Pt seen subsequently in ED for similar complaints as well between last month and now.     ED VITALS:  Vital Signs Last 24 Hrs  T(C): 37 (08 Aug 2021 01:22), Max: 37 (08 Aug 2021 01:22)  T(F): 98.6 (08 Aug 2021 01:22), Max: 98.6 (08 Aug 2021 01:22)  HR: 73 (08 Aug 2021 07:35) (73 - 83)  BP: 116/53 (08 Aug 2021 07:09) (116/53 - 145/86)  RR: 24 (08 Aug 2021 07:09) (24 - 24)  SpO2: 97% (08 Aug 2021 07:35) (88% - 100%)    ED IMAGING:   - CXR: R White Out   - POCUS Bedside: IVC not dilated, collapsible w/insp, pt not on any PPV, R Lung: Small pleural effusion, RLL appears atelectatic w/some dynamic bronchograms observed.     ED LABS: notable for WBC 15K, BNP > 70K, Trop 0.12, VBG PCO2 90    ED COURSE:   Pt given empiric abx cefepime + levofloxacin, 125mg solumedrol  MICU consulted pt approved for SDU, pt placed on BiPAP but did not tolerate ripped mask off.  (08 Aug 2021 08:45)      SUBJECTIVE  Patient is a 81y old Male who presents with a chief complaint of Desaturation (11 Aug 2021 11:30)  Currently admitted to medicine with the primary diagnosis of Acute hypercapnic respiratory failure      INTERVAL HPI AND OVERNIGHT EVENTS:  Patient was examined and seen at bedside. This morning he is resting comfortably in bed and reports no issues or overnight events.    Of note, Patient declined to use his BIPAP overnight. Chest PT ordered today.       OBJECTIVE  PAST MEDICAL & SURGICAL HISTORY  Diabetes mellitus    COPD (chronic obstructive pulmonary disease)    Lymphedema of both lower extremities    CHF (congestive heart failure)    H/O right nephrectomy  20 yrs ago      ALLERGIES:  No Known Allergies    MEDICATIONS:  STANDING MEDICATIONS  albuterol/ipratropium for Nebulization 3 milliLiter(s) Nebulizer every 6 hours  ascorbic acid 500 milliGRAM(s) Oral daily  aspirin enteric coated 81 milliGRAM(s) Oral daily  atorvastatin 40 milliGRAM(s) Oral at bedtime  calcium acetate 667 milliGRAM(s) Oral three times a day with meals  clopidogrel Tablet 75 milliGRAM(s) Oral daily  donepezil 5 milliGRAM(s) Oral at bedtime  ferrous    sulfate 325 milliGRAM(s) Oral daily  furosemide    Tablet 80 milliGRAM(s) Oral two times a day  heparin   Injectable 5000 Unit(s) SubCutaneous every 8 hours  magnesium sulfate  IVPB 2 Gram(s) IV Intermittent once  metoprolol tartrate 25 milliGRAM(s) Oral two times a day  mirtazapine 15 milliGRAM(s) Oral at bedtime  Nephro-maryjane      Nephro-maryjane 1 Tablet(s) Oral daily  pantoprazole    Tablet 40 milliGRAM(s) Oral before breakfast  predniSONE   Tablet 40 milliGRAM(s) Oral daily  sertraline 25 milliGRAM(s) Oral daily  tamsulosin 0.4 milliGRAM(s) Oral at bedtime    PRN MEDICATIONS  ALBUTerol    90 MICROgram(s) HFA Inhaler 2 Puff(s) Inhalation every 6 hours PRN      VITAL SIGNS: Last 24 Hours  T(C): 36.4 (12 Aug 2021 05:40), Max: 36.4 (11 Aug 2021 20:47)  T(F): 97.5 (12 Aug 2021 05:40), Max: 97.6 (11 Aug 2021 20:47)  HR: 64 (12 Aug 2021 05:40) (64 - 89)  BP: 127/59 (12 Aug 2021 05:40) (109/55 - 154/67)  BP(mean): --  RR: 18 (12 Aug 2021 05:40) (18 - 18)  SpO2: 94% (11 Aug 2021 20:47) (94% - 98%)    LABS:                        9.1    9.13  )-----------( 251      ( 12 Aug 2021 06:44 )             31.5     08-12    141  |  102  |  44<H>  ----------------------------<  111<H>  4.1   |  30  |  5.3<HH>    Ca    8.3<L>      12 Aug 2021 06:44  Mg     1.9     08-12    TPro  5.7<L>  /  Alb  3.4<L>  /  TBili  0.3  /  DBili  x   /  AST  9   /  ALT  <5  /  AlkPhos  64  08-11      RADIOLOGY:    EXAM:  XR CHEST PORTABLE ROUTINE 1V          PROCEDURE DATE:  08/11/2021      INTERPRETATION:  STUDY INDICATION: copd    TECHNIQUE:  Portable frontal view of the chest obtained.    COMPARISON: 8/10/2021.    FINDINGS/  IMPRESSION:    Unchanged right IJ dialysis catheter. Stable cardiomediastinal silhouette.. Unchanged bilateral effusion/opacity. No pneumothorax. Unchanged osseous structures.    --- End of Report ---      EXAM:  XR CHEST PORTABLE ROUTINE 1V            PROCEDURE DATE:  08/10/2021            INTERPRETATION:  Clinical History / Reason for exam: Pleural effusion    Comparison : Chest radiograph 8/9/2021.    Technique/Positioning: Frontal chest radiograph.    Findings:    Support devices: Stable right IJ double lumen dialysis catheter with tip in the right atrium.    Cardiac/mediastinum/hilum: No significant change.    Lung parenchyma/Pleura: Stable bilateral opacities/effusions. No pneumothorax.    Skeleton/soft tissues: No significant change.    Impression:    Stable bilateral opacities/effusions.        --- End of Report ---    PHYSICAL EXAM:  GENERAL: NAD  CHEST/LUNG: decreased breath sounds , right IJ cathether in place   HEART: Regular rate and rhythm; No murmurs, rubs, or gallops  ABDOMEN: Bowel sounds present; Soft, Nontender, Nondistended.   EXTREMITIES:  no edema  NERVOUS SYSTEM:  Alert .   SKIN: warm and well perfused       ASSESSMENT & PLAN  80yo M from [facility] w/ pmhx of CHF (EF 40% in July 2021), HTN, anxiety, Alzheimer's dementia, COPD on home 4L O2, T2DM on insulin, hx right nephrectomy and ESRD recently started on dialysis s/p tunnelled catheter, anemia of chronic disease on ROMELIA therapy, brought in by EMS with complaint of SOB and desat at NH    # Acute on Chronic Hypercapneic RF secondary to Mucus Plug  # Severe COPD on 4 liters O2  - C-XRAY on admission 08/07  - New near complete opacification of the right hemithorax, possibly related to mucous plug. Unchanged left sided interstitial opacities/edema and small left pleural effusion.  - s/p bronchoscopy - copious thick secretions suctioned,  - VA Duplex LE- negative , MRSA Nares negative   - continue Mucomyst inline, chest PT, aspiration precautions  - c/w Duonebs, Prednisone 40mg daily, Albuterol PRN   - Levaquin and Unasyn renally stopped  - Cxray - possible worseing effusion on the right. f/u offical read    - Lasix 80 BID started . okay with nephro  - BIPAP at night, patient didn't use BIPAP  - F/u procal, urine strep/legionella  - f/u pulm recs      # ESRD on HD  - nephrology following   - dialysis today    #DMII   - check finger sticks qhs  - start insulin if needed.     #HTN  - c/w BB, ASA    #Dementia alzheimers  - c/w home alzheimer's meds.    # GI PPx - Protonix  # DVT PPx - Heparin 5000 mg SubQ  q8  # Activity -  Increase as Tolerated  # Dispo - Patient to be discharged when medically optimized.  # Code Status-  DNR / DNI

## 2021-08-12 NOTE — PROGRESS NOTE ADULT - ASSESSMENT
ESRD recently started on HD   - access via TDC  acute respiratory failure due to mucous plug / PNA - s/p bronchoscopy  COPD on home O2  HFrEF  dementia   HTN  anemia  right nephrectomy    plan:    CXR noted  Increase furosemide to BID  Will challenge UF with HD  HD tomorrow: 3 hours, opti 160 dialyzer, 2K bath, 3L UF  PhosLo with meals  Renal diet   ESRD recently started on HD   - access via TDC  acute respiratory failure due to mucous plug / PNA - s/p bronchoscopy  COPD on home O2  HFrEF  dementia   HTN  anemia  right nephrectomy    plan:    CXR noted  Increase furosemide to BID  Will challenge UF with HD  HD tomorrow: 3 hours, opti 160 dialyzer, 2K bath, 3L UF  Add EPO and Venofer with HD  DC oral iron  PhosLo with meals  Renal diet

## 2021-08-12 NOTE — PROGRESS NOTE ADULT - ATTENDING COMMENTS
Patient with partial improvement of respiratory status. less dependent on Non-invasive positive pressure ventilation (NIPPV) didn't want to use it over past 24 hours; however, overall continues to improve.   Frailty/ s/p legionella infection (multilobar pneumonia) in July; no evidence of pneumonia at this time.   Patient is OFF antibiotics remains afebrile  Patient's CXR shows worsening opacities; restarted on PO lasix 80 MG Twice daily (today)     Progress Note Handoff  Pending:  improvement in O2 requirement (better)   Disposition: [facility] as per case management team within 24

## 2021-08-13 NOTE — PROGRESS NOTE ADULT - ATTENDING COMMENTS
80yo M from [facility] w/ pmhx of CHF (EF 40% in July 2021), HTN, anxiety, Alzheimer's dementia, COPD on home 4L O2, T2DM on insulin, hx right nephrectomy and ESRD recently started on dialysis s/p tunnelled catheter, anemia of chronic disease on ROMELIA therapy, brought in by EMS with complaint of SOB and desat at NH    # Acute on Chronic Hypercapneic RF secondary to Mucus Plug  # Severe COPD on 4 liters O2  - C-XRAY on admission 08/07  - New near complete opacification of the right hemithorax, possibly related to mucous plug. Unchanged left sided interstitial opacities/edema and small left pleural effusion.  - s/p bronchoscopy - copious thick secretions suctioned, f/u CXRs improved  - VA Duplex LE- negative , MRSA Nares negative   - continue Mucomyst inline, chest PT, aspiration precautions  - c/w Duonebs, Prednisone 40mg daily, Albuterol PRN , off Abx    NOW, repeat CXR from 8/12 shows new RLL opacity which suddenly developed (was absent on 8/11 CXR)  On exam, Bronchial breath sounds over that area with increased Tactile fremitus.   Hence this is an area of new RLL atelectasis* & NOT Pleural effusion.   Even though patient has CHF, he is clinically euvolemic. No LE edema, Left lung base completely dry.  BNP number does not tell anything about an exacerbation.     Probably patient had a new small mucus plug.   CT chest to confirm this.   Should get better with just Aggressive incentive spirometry & chest pt. Able to elicit a good productive cough on exam.   only needing home dose of 4L O2.     May keep lasix to just po 40 QD & c/w HD per Nephro.     - BIPAP at night, if he keeps it on.     #RWMA On Echo:   Cardio consult.       # ESRD on HD  - nephrology following       #DMII   - check finger sticks   - SSI    #HTN  - c/w BB, ASA    #Dementia alzheimers  - c/w home alzheimer's meds.    # GI PPx - Protonix  # DVT PPx - Heparin 5000 mg SubQ  q8  # Activity -  Increase as Tolerated  # Dispo - Patient to be discharged when medically optimized.  # Code Status-  DNR / DNI  Start PT, Discharge planning

## 2021-08-13 NOTE — PROGRESS NOTE ADULT - SUBJECTIVE AND OBJECTIVE BOX
[facility] FOLLOW UP NOTE  --------------------------------------------------------------------------------  24 hour events/subjective: Patient examined during HD. Appears comfortable.    PAST HISTORY  --------------------------------------------------------------------------------  No significant changes to PMH, PSH, FHx, SHx, unless otherwise noted    ALLERGIES & MEDICATIONS  --------------------------------------------------------------------------------  Allergies    No Known Allergies    Standing Inpatient Medications  albuterol/ipratropium for Nebulization 3 milliLiter(s) Nebulizer every 6 hours  ascorbic acid 500 milliGRAM(s) Oral daily  aspirin enteric coated 81 milliGRAM(s) Oral daily  atorvastatin 40 milliGRAM(s) Oral at bedtime  calcium acetate 667 milliGRAM(s) Oral three times a day with meals  clopidogrel Tablet 75 milliGRAM(s) Oral daily  donepezil 5 milliGRAM(s) Oral at bedtime  epoetin graham-epbx (RETACRIT) Injectable 5000 Unit(s) IV Push <User Schedule>  furosemide    Tablet 80 milliGRAM(s) Oral two times a day  heparin   Injectable 5000 Unit(s) SubCutaneous every 8 hours  iron sucrose IVPB 50 milliGRAM(s) IV Intermittent <User Schedule>  metoprolol tartrate 25 milliGRAM(s) Oral two times a day  mirtazapine 15 milliGRAM(s) Oral at bedtime  Nephro-maryjane      Nephro-maryjane 1 Tablet(s) Oral daily  pantoprazole    Tablet 40 milliGRAM(s) Oral before breakfast  predniSONE   Tablet 40 milliGRAM(s) Oral daily  sertraline 25 milliGRAM(s) Oral daily  tamsulosin 0.4 milliGRAM(s) Oral at bedtime    PRN Inpatient Medications  ALBUTerol    90 MICROgram(s) HFA Inhaler 2 Puff(s) Inhalation every 6 hours PRN    VITALS/PHYSICAL EXAM  --------------------------------------------------------------------------------  T(C): 35.9 (08-13-21 @ 12:45), Max: 37 (08-12-21 @ 20:30)  HR: 64 (08-13-21 @ 15:30) (62 - 108)  BP: 124/50 (08-13-21 @ 15:30) (110/55 - 126/63)  RR: 20 (08-13-21 @ 15:30) (18 - 22)  SpO2: 96% (08-13-21 @ 15:30) (87% - 97%)    08-13-21 @ 07:01  -  08-13-21 @ 16:14  --------------------------------------------------------  IN: 0 mL / OUT: 3000 mL / NET: -3000 mL    Physical Exam:  	Gen: NAD  	Pulm: CTA B/L  	CV: RRR, S1S2  	Abd: +BS, soft, nontender/nondistended  	: No suprapubic tenderness  	LE: Warm, no edema  	Vascular access: TDC    LABS/STUDIES  --------------------------------------------------------------------------------              9.6    13.45 >-----------<  234      [08-13-21 @ 08:25]              33.4     140  |  99  |  74  ----------------------------<  121      [08-13-21 @ 08:25]  4.5   |  26  |  7.4        Ca     8.3     [08-13-21 @ 08:25]      Mg     2.4     [08-13-21 @ 08:25]    Creatinine Trend:  SCr 7.4 [08-13 @ 08:25]  SCr 5.3 [08-12 @ 06:44]  SCr 6.8 [08-11 @ 07:59]  SCr 4.9 [08-10 @ 07:14]  SCr 6.8 [08-09 @ 05:43]    Iron 34, TIBC 178, %sat 19      [01-04-21 @ 06:40]  Ferritin 332      [01-04-21 @ 06:40]  PTH -- (Ca 8.9)      [12-24-20 @ 04:30]   54    HBsAg Nonreact      [07-15-21 @ 20:00]  HCV 0.15, Nonreact      [07-15-21 @ 20:00]

## 2021-08-13 NOTE — PROGRESS NOTE ADULT - SUBJECTIVE AND OBJECTIVE BOX
Patient is a 81y old  Male who presents with a chief complaint of Desaturation (12 Aug 2021 12:17)        SUBJECTIVE: Patient alert to self, states he feels cold, poor historian.      REVIEW OF SYSTEMS:    All Pertinent ROS are negative except per HPI       PHYSICAL EXAM  Vital Signs Last 24 Hrs  T(C): 36.7 (13 Aug 2021 05:02), Max: 37 (12 Aug 2021 20:30)  T(F): 98.1 (13 Aug 2021 05:02), Max: 98.6 (12 Aug 2021 20:30)  HR: 108 (13 Aug 2021 05:02) (62 - 108)  BP: 118/64 (13 Aug 2021 05:02) (110/55 - 126/63)  BP(mean): --  RR: 20 (13 Aug 2021 05:02) (18 - 22)  SpO2: 92% (13 Aug 2021 03:10) (87% - 97%)    CONSTITUTIONAL:  NAD    ENT:   Airway patent,   No thrush    CARDIAC:   Normal rate,   regular rhythm.    no edema      RESPIRATORY:   Right basilar crack;es  No Wheezing  Normal chest expansion  Not tachypneic,  No use of accessory muscles    GASTROINTESTINAL:  Abdomen soft,   non-tender,   no guarding,   + BS    MUSCULOSKELETAL:   range of motion is not limited,  no clubbing, cyanosis    NEUROLOGICAL:   Alert and oriented x1  no motor or deficits.    SKIN:   Skin normal color for race,   warm, dry   No evidence of rash.        LABS:                          9.6    13.45 )-----------( 234      ( 13 Aug 2021 08:25 )             33.4                                               08-13    140  |  99  |  74<HH>  ----------------------------<  121<H>  4.5   |  26  |  7.4<HH>    Ca    8.3<L>      13 Aug 2021 08:25  Mg     2.4     08-13                                                                                                                                                                                    MEDICATIONS  (STANDING):  albuterol/ipratropium for Nebulization 3 milliLiter(s) Nebulizer every 6 hours  ascorbic acid 500 milliGRAM(s) Oral daily  aspirin enteric coated 81 milliGRAM(s) Oral daily  atorvastatin 40 milliGRAM(s) Oral at bedtime  calcium acetate 667 milliGRAM(s) Oral three times a day with meals  clopidogrel Tablet 75 milliGRAM(s) Oral daily  donepezil 5 milliGRAM(s) Oral at bedtime  epoetin graham-epbx (RETACRIT) Injectable 5000 Unit(s) IV Push <User Schedule>  furosemide    Tablet 80 milliGRAM(s) Oral two times a day  heparin   Injectable 5000 Unit(s) SubCutaneous every 8 hours  iron sucrose IVPB 50 milliGRAM(s) IV Intermittent <User Schedule>  metoprolol tartrate 25 milliGRAM(s) Oral two times a day  mirtazapine 15 milliGRAM(s) Oral at bedtime  Nephro-maryjane      Nephro-maryjane 1 Tablet(s) Oral daily  pantoprazole    Tablet 40 milliGRAM(s) Oral before breakfast  predniSONE   Tablet 40 milliGRAM(s) Oral daily  sertraline 25 milliGRAM(s) Oral daily  tamsulosin 0.4 milliGRAM(s) Oral at bedtime    MEDICATIONS  (PRN):  ALBUTerol    90 MICROgram(s) HFA Inhaler 2 Puff(s) Inhalation every 6 hours PRN Shortness of Breath and/or Wheezing      X-Rays reviewed

## 2021-08-13 NOTE — PROGRESS NOTE ADULT - ASSESSMENT
IMPRESSION:    Acute on Chronic Hypercapnic Respiratory Failure secondary to Right Mucous Plug, resolved  Volume Overload secondary to HFrEF exacerbation  Small Right Pleural Effusion on bedside US, bilateral b-lines  COPD on 4L nc home oxygen  Pulmonary Hypertension  HO Severe Aortic Stenosis  HO ESRD on HD  HO Alzheimer's Dementia    PLAN:    Repeat CXR SP HD today  Aggressive IV diuresis   Consider Cardiology evaluation  DVT prophylaxis  Pulmonary toilet  Nebs PRN  DNR/DNI  Poor Prognosis  Palliative evaluation         IMPRESSION:    Acute on Chronic Hypercapnic Respiratory Failure secondary to Right Mucous Plug, resolved  Volume Overload secondary to HFrEF exacerbation  Small Right Pleural Effusion on bedside US, bilateral b-lines  COPD on 4L nc home oxygen  HO Recent Legionella Pneumonia  HO Pulmonary Hypertension  HO Severe Aortic Stenosis  HO ESRD on HD  HO Alzheimer's Dementia    PLAN:    Repeat CXR SP HD today  Aggressive IV diuresis   Send RVP  Cardiology evaluation  DVT prophylaxis  Pulmonary toilet  Nebs PRN  DNR/DNI  Poor Prognosis  Palliative evaluation         IMPRESSION:    Acute on Chronic Hypercapnic Respiratory Failure secondary to Right Mucous Plug, resolved  Volume Overload secondary to HFrEF exacerbation  Small Right Pleural Effusion on bedside US, bilateral b-lines  COPD on 4L nc home oxygen  HO Recent Legionella Pneumonia  HO Pulmonary Hypertension  HO Severe Aortic Stenosis  HO ESRD on HD  HO Alzheimer's Dementia    PLAN:    Repeat CXR SP HD today  Aggressive IV diuresis   Send RVP  Antibiotics per ID  Cardiology evaluation  DVT prophylaxis  Pulmonary toilet  Nebs PRN  DNR/DNI  Poor Prognosis  Palliative evaluation

## 2021-08-13 NOTE — PROGRESS NOTE ADULT - SUBJECTIVE AND OBJECTIVE BOX
DRAKE HO 81y Male  MRN#: [id number]   Hospital Day: 5d    HPI:  HPI: 82yo M from [facility] w/ pmhx of CHF (EF 40% in July 2021), HTN, anxiety, Alzheimer's dementia, COPD on home 4L O2, T2DM on insulin, hx right nephrectomy and ESRD recently started on dialysis s/p tunnelled catheter, anemia of chronic disease on ROMELIA therapy, brought in by EMS with complaint of SOB and desat at NH. Recently admitted for septic shock secondary to PNA and  metablic acidosis and hyperkalemia and received udall and urgent HD. Pt seen subsequently in ED for similar complaints as well between last month and now.     ED VITALS:  Vital Signs Last 24 Hrs  T(C): 37 (08 Aug 2021 01:22), Max: 37 (08 Aug 2021 01:22)  T(F): 98.6 (08 Aug 2021 01:22), Max: 98.6 (08 Aug 2021 01:22)  HR: 73 (08 Aug 2021 07:35) (73 - 83)  BP: 116/53 (08 Aug 2021 07:09) (116/53 - 145/86)  RR: 24 (08 Aug 2021 07:09) (24 - 24)  SpO2: 97% (08 Aug 2021 07:35) (88% - 100%)    ED IMAGING:   - CXR: R White Out   - POCUS Bedside: IVC not dilated, collapsible w/insp, pt not on any PPV, R Lung: Small pleural effusion, RLL appears atelectatic w/some dynamic bronchograms observed.     ED LABS: notable for WBC 15K, BNP > 70K, Trop 0.12, VBG PCO2 90    ED COURSE:   Pt given empiric abx cefepime + levofloxacin, 125mg solumedrol  MICU consulted pt approved for SDU, pt placed on BiPAP but did not tolerate ripped mask off.  (08 Aug 2021 08:45)      SUBJECTIVE  Patient is a 81y old Male who presents with a chief complaint of Desaturation (13 Aug 2021 11:00)  Currently admitted to medicine with the primary diagnosis of Acute hypercapnic respiratory failure      INTERVAL HPI AND OVERNIGHT EVENTS:  Patient was examined and seen at bedside. This morning he is resting comfortably in bed. He is hard of hearing but patient is able to state his name. He continues to refuse BIPAP overnight. His C-XRAY shows right pleural effusion.     OBJECTIVE  PAST MEDICAL & SURGICAL HISTORY  Diabetes mellitus    COPD (chronic obstructive pulmonary disease)    Lymphedema of both lower extremities    CHF (congestive heart failure)    H/O right nephrectomy  20 yrs ago      ALLERGIES:  No Known Allergies    MEDICATIONS:  STANDING MEDICATIONS  albuterol/ipratropium for Nebulization 3 milliLiter(s) Nebulizer every 6 hours  ascorbic acid 500 milliGRAM(s) Oral daily  aspirin enteric coated 81 milliGRAM(s) Oral daily  atorvastatin 40 milliGRAM(s) Oral at bedtime  calcium acetate 667 milliGRAM(s) Oral three times a day with meals  clopidogrel Tablet 75 milliGRAM(s) Oral daily  donepezil 5 milliGRAM(s) Oral at bedtime  epoetin graham-epbx (RETACRIT) Injectable 5000 Unit(s) IV Push <User Schedule>  furosemide    Tablet 80 milliGRAM(s) Oral two times a day  heparin   Injectable 5000 Unit(s) SubCutaneous every 8 hours  iron sucrose IVPB 50 milliGRAM(s) IV Intermittent <User Schedule>  metoprolol tartrate 25 milliGRAM(s) Oral two times a day  mirtazapine 15 milliGRAM(s) Oral at bedtime  Nephro-maryjane      Nephro-maryjane 1 Tablet(s) Oral daily  pantoprazole    Tablet 40 milliGRAM(s) Oral before breakfast  predniSONE   Tablet 40 milliGRAM(s) Oral daily  sertraline 25 milliGRAM(s) Oral daily  tamsulosin 0.4 milliGRAM(s) Oral at bedtime    PRN MEDICATIONS  ALBUTerol    90 MICROgram(s) HFA Inhaler 2 Puff(s) Inhalation every 6 hours PRN      VITAL SIGNS: Last 24 Hours  T(C): 36.7 (13 Aug 2021 05:02), Max: 37 (12 Aug 2021 20:30)  T(F): 98.1 (13 Aug 2021 05:02), Max: 98.6 (12 Aug 2021 20:30)  HR: 108 (13 Aug 2021 05:02) (62 - 108)  BP: 118/64 (13 Aug 2021 05:02) (110/55 - 126/63)  BP(mean): --  RR: 20 (13 Aug 2021 05:02) (18 - 22)  SpO2: 92% (13 Aug 2021 03:10) (87% - 97%)    LABS:                        9.6    13.45 )-----------( 234      ( 13 Aug 2021 08:25 )             33.4     08-13    140  |  99  |  74<HH>  ----------------------------<  121<H>  4.5   |  26  |  7.4<HH>    Ca    8.3<L>      13 Aug 2021 08:25  Mg     2.4     08-13    RADIOLOGY:    EXAM:  XR CHEST PORTABLE ROUTINE 1V          PROCEDURE DATE:  08/13/2021      INTERPRETATION:  Clinical History / Reason for exam: Shortness of breath.    Comparison : Chest radiograph prior day.    Technique/Positioning: Frontal portable, low lung volumes.    Findings:    Support devices: Right-sided tunneled dialysis catheter    Cardiac/mediastinum/hilum: Unchanged    Lung parenchyma/Pleura: Large right-sided pleural effusion with bilateral opacities    Skeleton/soft tissues: Unchanged    Impression:    Right-sided pleural effusion with bilateral opacifications. Support devices as described.    Unchanged.    --- End of Report ---      PHYSICAL EXAM:  GENERAL: NAD  CHEST/LUNG: decreased breath sounds , right IJ cathether in place   HEART: Regular rate and rhythm; No murmurs, rubs, or gallops  ABDOMEN: Bowel sounds present; Soft, Nontender, Nondistended.   EXTREMITIES:  no edema  NERVOUS SYSTEM:  Alert .   SKIN: warm and well perfused       ASSESSMENT & PLAN  82yo M from [facility] w/ pmhx of CHF (EF 40% in July 2021), HTN, anxiety, Alzheimer's dementia, COPD on home 4L O2, T2DM on insulin, hx right nephrectomy and ESRD recently started on dialysis s/p tunnelled catheter, anemia of chronic disease on ROMELIA therapy, brought in by EMS with complaint of SOB and desat at NH    # Acute on Chronic Hypercapneic RF secondary to Mucus Plug  # Severe COPD on 4 liters O2  - C-XRAY on admission 08/07  - New near complete opacification of the right hemithorax, possibly related to mucous plug. Unchanged left sided interstitial opacities/edema and small left pleural effusion.  - s/p bronchoscopy - copious thick secretions suctioned,  - VA Duplex LE- negative , MRSA Nares negative   - continue Mucomyst inline, chest PT, aspiration precautions  - c/w Duonebs, Prednisone 40mg daily, Albuterol PRN , off Abx  - Cxray - possible worseing effusion on the right. f/u offical read    - c/w Lasix 80 BID   - per pulm - Repeat CXR SP HD today ,Aggressive IV diuresis  - BIPAP at night, patient didn't use BIPAP  - F/u procal, urine strep/legionella, RVP  - Cardiology consult      # ESRD on HD  - nephrology following   - dialysis today    #DMII   - check finger sticks qhs  - start insulin if needed.     #HTN  - c/w BB, ASA    #Dementia alzheimers  - c/w home alzheimer's meds.    # GI PPx - Protonix  # DVT PPx - Heparin 5000 mg SubQ  q8  # Activity -  Increase as Tolerated  # Dispo - Patient to be discharged when medically optimized.  # Code Status-  DNR / DNI

## 2021-08-13 NOTE — PROGRESS NOTE ADULT - ASSESSMENT
ESRD recently started on HD   - access via TDC  acute respiratory failure due to mucous plug / PNA - s/p bronchoscopy  COPD on home O2  HFrEF  dementia   HTN  anemia  right nephrectomy    plan:    Furosemide BID  HD today: 3 hours, opti 160 dialyzer, 2K bath, 3L UF  EPO and Venofer with HD  PhosLo with meals  Renal diet

## 2021-08-14 NOTE — PROGRESS NOTE ADULT - ASSESSMENT
80yo M from [facility] w/ pmhx of CHF (EF 40% in July 2021), HTN, anxiety, Alzheimer's dementia, COPD on home 4L O2, T2DM on insulin, hx right nephrectomy and ESRD recently started on dialysis s/p tunnelled catheter, anemia of chronic disease on ROMELIA therapy, brought in by EMS with complaint of SOB and desat at NH    # Acute on Chronic Hypercapneic RF secondary to Mucus Plug  # Severe COPD on 4 liters O2  - C-XRAY on admission 08/07  - New near complete opacification of the right hemithorax, possibly related to mucous plug. Unchanged left sided interstitial opacities/edema and small left pleural effusion.  - s/p bronchoscopy - copious thick secretions suctioned, f/u CXRs improved  - VA Duplex LE- negative , MRSA Nares negative   - continue Mucomyst inline, chest PT, aspiration precautions  - c/w Duonebs, Prednisone 40mg daily, Albuterol PRN , off Abx    NOW, repeat CXR from 8/12 shows new RLL opacity which suddenly developed (was absent on 8/11 CXR)  CT Chest (Non Con) 8/13: shows R > L bibasal effusions. Apparently some associated atelectasis at Right base. f/u official report.   (explains the exam findings of bronchial breath sounds at Right base. & decreased tactile fremitus.)  No LE edema though.   Might be isolated Right sided CHF??   Started on lasix 40mg IV BID. f/u Cardio    Aggressive incentive spirometry & chest pt to help the associated atelectasis. Able to elicit a good productive cough on exam.   only needing home dose of 4L O2 currently.    - BIPAP at night, if he keeps it on.     #RWMA On Echo:   Cardio consult.       # ESRD on HD (?MWF)  - nephrology following         #DMII   - check finger sticks   - SSI    #HTN  - c/w BB, ASA    #Dementia alzheimers  - c/w home alzheimer's meds.    # GI PPx - Protonix  # DVT PPx - Heparin 5000 mg SubQ  q8  # Activity -  Increase as Tolerated  # Dispo - Patient to be discharged when medically optimized.  # Code Status-  DNR / DNI  Start PT, On IV diuresis

## 2021-08-14 NOTE — PROGRESS NOTE ADULT - SUBJECTIVE AND OBJECTIVE BOX
S: No c/o SOB/n/v/f/c/cp  refuses to use bipap overnight    All other pertinent ROS negative.      08-13-21 @ 07:01  -  08-14-21 @ 07:00  --------------------------------------------------------  IN: 0 mL / OUT: 3000 mL / NET: -3000 mL      Vital Signs Last 24 Hrs  T(C): 36.8 (14 Aug 2021 05:00), Max: 37.1 (13 Aug 2021 21:12)  T(F): 98.2 (14 Aug 2021 05:00), Max: 98.7 (13 Aug 2021 21:12)  HR: 71 (14 Aug 2021 05:00) (64 - 79)  BP: 134/63 (14 Aug 2021 05:00) (119/52 - 134/63)  BP(mean): --  RR: 19 (14 Aug 2021 09:24) (19 - 20)  SpO2: 98% (14 Aug 2021 09:24) (96% - 98%)  PHYSICAL EXAM:    Constitutional: NAD, awake and alert, well-developed  HEENT: PERR, EOMI, Normal Hearing, MMM  Neck: Soft and supple, No LAD, No JVD  Respiratory: decreased breath sounds at both bases, both bases dull to percussion. Bronchial breath sounds at right base.   Cardiovascular: S1 and S2, regular rate and rhythm, no Murmurs, gallops or rubs  Gastrointestinal: Bowel Sounds present, soft, nontender, nondistended, no guarding, no rebound  Extremities: No peripheral edema      MEDICATIONS:  MEDICATIONS  (STANDING):  ALBUTerol    90 MICROgram(s) HFA Inhaler 2 Puff(s) Inhalation every 6 hours  ascorbic acid 500 milliGRAM(s) Oral daily  aspirin enteric coated 81 milliGRAM(s) Oral daily  atorvastatin 40 milliGRAM(s) Oral at bedtime  calcium acetate 667 milliGRAM(s) Oral three times a day with meals  clopidogrel Tablet 75 milliGRAM(s) Oral daily  donepezil 5 milliGRAM(s) Oral at bedtime  epoetin graham-epbx (RETACRIT) Injectable 5000 Unit(s) IV Push <User Schedule>  furosemide    Tablet 80 milliGRAM(s) Oral two times a day  furosemide   Injectable 40 milliGRAM(s) IV Push two times a day  heparin   Injectable 5000 Unit(s) SubCutaneous every 8 hours  iron sucrose IVPB 50 milliGRAM(s) IV Intermittent <User Schedule>  metoprolol tartrate 25 milliGRAM(s) Oral two times a day  mirtazapine 15 milliGRAM(s) Oral at bedtime  Nephro-maryjane 1 Tablet(s) Oral daily  Nephro-maryjane      pantoprazole    Tablet 40 milliGRAM(s) Oral before breakfast  predniSONE   Tablet 40 milliGRAM(s) Oral daily  sertraline 25 milliGRAM(s) Oral daily  tamsulosin 0.4 milliGRAM(s) Oral at bedtime      LABS: All Labs Reviewed:                        8.3    9.48  )-----------( 200      ( 14 Aug 2021 06:49 )             28.6     08-14    142  |  104  |  54<H>  ----------------------------<  113<H>  4.1   |  27  |  5.3<HH>    Ca    8.2<L>      14 Aug 2021 06:49  Mg     2.0     08-14    TPro  5.3<L>  /  Alb  3.3<L>  /  TBili  0.2  /  DBili  x   /  AST  10  /  ALT  <5  /  AlkPhos  64  08-14          Blood Culture:     Radiology: reviewed

## 2021-08-14 NOTE — PROGRESS NOTE ADULT - SUBJECTIVE AND OBJECTIVE BOX
[facility] FOLLOW UP NOTE  --------------------------------------------------------------------------------  Chief Complaint/24 hour events/subjective:    pt seen and examined, no distress    PAST HISTORY  --------------------------------------------------------------------------------  No significant changes to PMH, PSH, FHx, SHx, unless otherwise noted    ALLERGIES & MEDICATIONS  --------------------------------------------------------------------------------  Allergies    No Known Allergies    Intolerances      Standing Inpatient Medications  ALBUTerol    90 MICROgram(s) HFA Inhaler 2 Puff(s) Inhalation every 6 hours  ascorbic acid 500 milliGRAM(s) Oral daily  aspirin enteric coated 81 milliGRAM(s) Oral daily  atorvastatin 40 milliGRAM(s) Oral at bedtime  calcium acetate 667 milliGRAM(s) Oral three times a day with meals  clopidogrel Tablet 75 milliGRAM(s) Oral daily  donepezil 5 milliGRAM(s) Oral at bedtime  epoetin graham-epbx (RETACRIT) Injectable 5000 Unit(s) IV Push <User Schedule>  furosemide    Tablet 80 milliGRAM(s) Oral two times a day  furosemide   Injectable 40 milliGRAM(s) IV Push two times a day  heparin   Injectable 5000 Unit(s) SubCutaneous every 8 hours  iron sucrose IVPB 50 milliGRAM(s) IV Intermittent <User Schedule>  metoprolol tartrate 25 milliGRAM(s) Oral two times a day  mirtazapine 15 milliGRAM(s) Oral at bedtime  Nephro-maryjane 1 Tablet(s) Oral daily  Nephro-maryjane      pantoprazole    Tablet 40 milliGRAM(s) Oral before breakfast  predniSONE   Tablet 40 milliGRAM(s) Oral daily  sertraline 25 milliGRAM(s) Oral daily  tamsulosin 0.4 milliGRAM(s) Oral at bedtime    PRN Inpatient Medications  ALBUTerol    90 MICROgram(s) HFA Inhaler 2 Puff(s) Inhalation every 6 hours PRN      REVIEW OF SYSTEMS  --------------------------------------------------------------------------------    All other systems were reviewed and are negative, except as noted.    VITALS/PHYSICAL EXAM  --------------------------------------------------------------------------------  T(C): 36.8 (08-14-21 @ 05:00), Max: 37.1 (08-13-21 @ 21:12)  HR: 71 (08-14-21 @ 05:00) (64 - 79)  BP: 134/63 (08-14-21 @ 05:00) (119/52 - 134/63)  RR: 19 (08-14-21 @ 09:24) (19 - 20)  SpO2: 98% (08-14-21 @ 09:24) (96% - 98%)  Wt(kg): --        08-13-21 @ 07:01  -  08-14-21 @ 07:00  --------------------------------------------------------  IN: 0 mL / OUT: 3000 mL / NET: -3000 mL      Physical Exam:    	Gen: no distress   	Pulm: CTA B/L  	CV: S1S2; no rub  	Abd: +BS, soft, nontender/nondistended  	LE:  no  edema      LABS/STUDIES  --------------------------------------------------------------------------------              8.3    9.48  >-----------<  200      [08-14-21 @ 06:49]              28.6     142  |  104  |  54  ----------------------------<  113      [08-14-21 @ 06:49]  4.1   |  27  |  5.3        Ca     8.2     [08-14-21 @ 06:49]      Mg     2.0     [08-14-21 @ 06:49]    TPro  5.3  /  Alb  3.3  /  TBili  0.2  /  DBili  x   /  AST  10  /  ALT  <5  /  AlkPhos  64  [08-14-21 @ 06:49]          Creatinine Trend:  SCr 5.3 [08-14 @ 06:49]  SCr 7.4 [08-13 @ 08:25]  SCr 5.3 [08-12 @ 06:44]  SCr 6.8 [08-11 @ 07:59]  SCr 4.9 [08-10 @ 07:14]        Iron 34, TIBC 178, %sat 19      [01-04-21 @ 06:40]  Ferritin 332      [01-04-21 @ 06:40]  PTH -- (Ca 8.9)      [12-24-20 @ 04:30]   54    HBsAg Nonreact      [07-15-21 @ 20:00]  HCV 0.15, Nonreact      [07-15-21 @ 20:00]

## 2021-08-14 NOTE — PROGRESS NOTE ADULT - ASSESSMENT
ESRD recently started on HD   - access via TDC  mucous plug / PNA - s/p bronchoscopy  COPD on home O2  HFrEF  dementia   HTN  anemia  right nephrectomy    plan:    continue Furosemide BID  next HD on Monday,  3 hours, opti 160 dialyzer, 2K bath, 3L UF  EPO and Venofer with HD  PhosLo with meals  Renal diet

## 2021-08-15 NOTE — PROGRESS NOTE ADULT - ATTENDING COMMENTS
80yo M from [facility] w/ pmhx of CHF (EF 40% in July 2021), HTN, anxiety, Alzheimer's dementia, COPD on home 4L O2, T2DM on insulin, hx right nephrectomy and ESRD recently started on dialysis s/p tunnelled catheter, anemia of chronic disease on ROMELIA therapy, brought in by EMS with complaint of SOB and desat at NH    # Acute on Chronic Hypercapneic RF secondary to Mucus Plug  # Severe COPD on 4 liters O2  - C-XRAY on admission 08/07  - New near complete opacification of the right hemithorax, possibly related to mucous plug. Unchanged left sided interstitial opacities/edema and small left pleural effusion.  - s/p bronchoscopy - copious thick secretions suctioned, f/u CXRs improved  - VA Duplex LE- negative , MRSA Nares negative   - continue Mucomyst inline, chest PT, aspiration precautions  - c/w Duonebs, Prednisone 40mg daily, Albuterol PRN , off Abx  - Received IV Solumedrol on 8/8; & PO PRednisone 40 from 8/10 - 8/15. D/nati now. No wheezing on exam.     NOW, repeat CXR from 8/12 shows new RLL opacity which suddenly developed (was absent on 8/11 CXR)  CT Chest (Non Con) 8/13: shows R > L bibasal effusions. Apparently some associated atelectasis at Right base. (also some debris in Right lower lobe bronchus per official report)  (explains the exam findings of bronchial breath sounds at Right base. & decreased tactile fremitus.)  No LE edema though.   Might be isolated Right sided CHF??   Started on lasix 40mg IV BID. f/u Cardio. Strict I/O. HD per renal.    Aggressive incentive spirometry & chest pt to help the associated atelectasis. Able to elicit a good productive cough on exam.   only needing home dose of 4L O2 currently.    - BIPAP at night, if he keeps it on.     #RWMA On Echo:   Cardio consult.       # ESRD on HD (?MWF)  - nephrology following     #Debris in RLL bronchus.   may have aspirated at some point?  ST eval.       #DMII   - check finger sticks   - SSI    #HTN  - c/w BB, ASA    #Dementia alzheimers  - c/w home alzheimer's meds.    # GI PPx - Protonix  # DVT PPx - Heparin 5000 mg SubQ  q8  # Activity -  Increase as Tolerated  # Dispo - Patient to be discharged when medically optimized.  # Code Status-  DNR / DNI  Start PT, On IV diuresis

## 2021-08-15 NOTE — PROGRESS NOTE ADULT - SUBJECTIVE AND OBJECTIVE BOX
[facility] FOLLOW UP NOTE  --------------------------------------------------------------------------------  Chief Complaint/24 hour events/subjective:    pt seen and examined, no disress     PAST HISTORY  --------------------------------------------------------------------------------  No significant changes to PMH, PSH, FHx, SHx, unless otherwise noted    ALLERGIES & MEDICATIONS  --------------------------------------------------------------------------------  Allergies    No Known Allergies    Intolerances      Standing Inpatient Medications  ALBUTerol    90 MICROgram(s) HFA Inhaler 2 Puff(s) Inhalation every 6 hours  ascorbic acid 500 milliGRAM(s) Oral daily  aspirin enteric coated 81 milliGRAM(s) Oral daily  atorvastatin 40 milliGRAM(s) Oral at bedtime  calcium acetate 667 milliGRAM(s) Oral three times a day with meals  clopidogrel Tablet 75 milliGRAM(s) Oral daily  dextrose 40% Gel 15 Gram(s) Oral once  dextrose 5%. 1000 milliLiter(s) IV Continuous <Continuous>  dextrose 5%. 1000 milliLiter(s) IV Continuous <Continuous>  dextrose 50% Injectable 25 Gram(s) IV Push once  dextrose 50% Injectable 12.5 Gram(s) IV Push once  dextrose 50% Injectable 25 Gram(s) IV Push once  donepezil 5 milliGRAM(s) Oral at bedtime  epoetin graham-epbx (RETACRIT) Injectable 5000 Unit(s) IV Push <User Schedule>  furosemide   Injectable 40 milliGRAM(s) IV Push two times a day  glucagon  Injectable 1 milliGRAM(s) IntraMuscular once  heparin   Injectable 5000 Unit(s) SubCutaneous every 8 hours  insulin lispro (ADMELOG) corrective regimen sliding scale   SubCutaneous three times a day before meals  iron sucrose IVPB 50 milliGRAM(s) IV Intermittent <User Schedule>  metoprolol tartrate 25 milliGRAM(s) Oral two times a day  mirtazapine 15 milliGRAM(s) Oral at bedtime  Nephro-maryjane      Nephro-maryjane 1 Tablet(s) Oral daily  pantoprazole    Tablet 40 milliGRAM(s) Oral before breakfast  sertraline 25 milliGRAM(s) Oral daily  tamsulosin 0.4 milliGRAM(s) Oral at bedtime    PRN Inpatient Medications  ALBUTerol    90 MICROgram(s) HFA Inhaler 2 Puff(s) Inhalation every 6 hours PRN      REVIEW OF SYSTEMS  --------------------------------------------------------------------------------    All other systems were reviewed and are negative, except as noted.    VITALS/PHYSICAL EXAM  --------------------------------------------------------------------------------  T(C): 35.8 (08-15-21 @ 05:00), Max: 36.2 (08-14-21 @ 20:14)  HR: 64 (08-15-21 @ 05:00) (64 - 72)  BP: 136/60 (08-15-21 @ 05:00) (136/60 - 161/67)  RR: 19 (08-15-21 @ 08:51) (18 - 19)  SpO2: 97% (08-15-21 @ 08:51) (95% - 97%)  Wt(kg): --        Physical Exam:    	Gen: no distress   	Pulm: CTA B/L  	CV: S1S2; no rub  	Abd: +BS, soft, nontender/nondistended  	LE:  no  edema      LABS/STUDIES  --------------------------------------------------------------------------------              9.4    12.18 >-----------<  206      [08-15-21 @ 07:50]              32.2     142  |  102  |  81  ----------------------------<  145      [08-15-21 @ 07:50]  4.7   |  22  |  7.2        Ca     8.6     [08-15-21 @ 07:50]      Mg     2.2     [08-15-21 @ 07:50]    TPro  5.9  /  Alb  3.5  /  TBili  <0.2  /  DBili  x   /  AST  9   /  ALT  <5  /  AlkPhos  60  [08-15-21 @ 07:50]          Creatinine Trend:  SCr 7.2 [08-15 @ 07:50]  SCr 5.3 [08-14 @ 06:49]  SCr 7.4 [08-13 @ 08:25]  SCr 5.3 [08-12 @ 06:44]  SCr 6.8 [08-11 @ 07:59]        Iron 34, TIBC 178, %sat 19      [01-04-21 @ 06:40]  Ferritin 332      [01-04-21 @ 06:40]  PTH -- (Ca 8.9)      [12-24-20 @ 04:30]   54

## 2021-08-15 NOTE — PROGRESS NOTE ADULT - SUBJECTIVE AND OBJECTIVE BOX
DRAKE HO 81y Male  MRN#: [id number]   Hospital Day: 7d    HPI:  HPI: 80yo M from [facility] w/ pmhx of CHF (EF 40% in July 2021), HTN, anxiety, Alzheimer's dementia, COPD on home 4L O2, T2DM on insulin, hx right nephrectomy and ESRD recently started on dialysis s/p tunnelled catheter, anemia of chronic disease on ROMELIA therapy, brought in by EMS with complaint of SOB and desat at NH. Recently admitted for septic shock secondary to PNA and  metablic acidosis and hyperkalemia and received udall and urgent HD. Pt seen subsequently in ED for similar complaints as well between last month and now.     ED VITALS:  Vital Signs Last 24 Hrs  T(C): 37 (08 Aug 2021 01:22), Max: 37 (08 Aug 2021 01:22)  T(F): 98.6 (08 Aug 2021 01:22), Max: 98.6 (08 Aug 2021 01:22)  HR: 73 (08 Aug 2021 07:35) (73 - 83)  BP: 116/53 (08 Aug 2021 07:09) (116/53 - 145/86)  RR: 24 (08 Aug 2021 07:09) (24 - 24)  SpO2: 97% (08 Aug 2021 07:35) (88% - 100%)    ED IMAGING:   - CXR: R White Out   - POCUS Bedside: IVC not dilated, collapsible w/insp, pt not on any PPV, R Lung: Small pleural effusion, RLL appears atelectatic w/some dynamic bronchograms observed.     ED LABS: notable for WBC 15K, BNP > 70K, Trop 0.12, VBG PCO2 90    ED COURSE:   Pt given empiric abx cefepime + levofloxacin, 125mg solumedrol  MICU consulted pt approved for SDU, pt placed on BiPAP but did not tolerate ripped mask off.  (08 Aug 2021 08:45)      SUBJECTIVE  Patient is a 81y old Male who presents with a chief complaint of Desaturation (14 Aug 2021 13:27)  Currently admitted to medicine with the primary diagnosis of Acute hypercapnic respiratory failure      INTERVAL HPI AND OVERNIGHT EVENTS:  Patient was examined and seen at bedside. This morning he is resting comfortably in bed and reports no issues or overnight events.      OBJECTIVE  PAST MEDICAL & SURGICAL HISTORY  Diabetes mellitus    COPD (chronic obstructive pulmonary disease)    Lymphedema of both lower extremities    CHF (congestive heart failure)    H/O right nephrectomy  20 yrs ago      ALLERGIES:  No Known Allergies    MEDICATIONS:  STANDING MEDICATIONS  ALBUTerol    90 MICROgram(s) HFA Inhaler 2 Puff(s) Inhalation every 6 hours  ascorbic acid 500 milliGRAM(s) Oral daily  aspirin enteric coated 81 milliGRAM(s) Oral daily  atorvastatin 40 milliGRAM(s) Oral at bedtime  calcium acetate 667 milliGRAM(s) Oral three times a day with meals  clopidogrel Tablet 75 milliGRAM(s) Oral daily  dextrose 40% Gel 15 Gram(s) Oral once  dextrose 5%. 1000 milliLiter(s) IV Continuous <Continuous>  dextrose 5%. 1000 milliLiter(s) IV Continuous <Continuous>  dextrose 50% Injectable 25 Gram(s) IV Push once  dextrose 50% Injectable 12.5 Gram(s) IV Push once  dextrose 50% Injectable 25 Gram(s) IV Push once  donepezil 5 milliGRAM(s) Oral at bedtime  epoetin graham-epbx (RETACRIT) Injectable 5000 Unit(s) IV Push <User Schedule>  furosemide    Tablet 80 milliGRAM(s) Oral two times a day  furosemide   Injectable 40 milliGRAM(s) IV Push two times a day  glucagon  Injectable 1 milliGRAM(s) IntraMuscular once  heparin   Injectable 5000 Unit(s) SubCutaneous every 8 hours  insulin lispro (ADMELOG) corrective regimen sliding scale   SubCutaneous three times a day before meals  iron sucrose IVPB 50 milliGRAM(s) IV Intermittent <User Schedule>  metoprolol tartrate 25 milliGRAM(s) Oral two times a day  mirtazapine 15 milliGRAM(s) Oral at bedtime  Nephro-maryjane 1 Tablet(s) Oral daily  Nephro-maryjane      pantoprazole    Tablet 40 milliGRAM(s) Oral before breakfast  predniSONE   Tablet 40 milliGRAM(s) Oral daily  sertraline 25 milliGRAM(s) Oral daily  tamsulosin 0.4 milliGRAM(s) Oral at bedtime    PRN MEDICATIONS  ALBUTerol    90 MICROgram(s) HFA Inhaler 2 Puff(s) Inhalation every 6 hours PRN      VITAL SIGNS: Last 24 Hours  T(C): 36.2 (14 Aug 2021 20:14), Max: 36.8 (14 Aug 2021 05:00)  T(F): 97.2 (14 Aug 2021 20:14), Max: 98.2 (14 Aug 2021 05:00)  HR: 66 (14 Aug 2021 20:14) (66 - 72)  BP: 161/67 (14 Aug 2021 20:14) (134/63 - 161/67)  BP(mean): --  RR: 18 (14 Aug 2021 23:50) (18 - 20)  SpO2: 96% (14 Aug 2021 23:50) (95% - 98%)    LABS:                        8.3    9.48  )-----------( 200      ( 14 Aug 2021 06:49 )             28.6     08-14    142  |  104  |  54<H>  ----------------------------<  113<H>  4.1   |  27  |  5.3<HH>    Ca    8.2<L>      14 Aug 2021 06:49  Mg     2.0     08-14    TPro  5.3<L>  /  Alb  3.3<L>  /  TBili  0.2  /  DBili  x   /  AST  10  /  ALT  <5  /  AlkPhos  64  08-14                  RADIOLOGY:  EXAM:  CT CHEST            PROCEDURE DATE:  08/13/2021      INTERPRETATION:  CLINICAL HISTORY / REASON FOR EXAM: Right pleural effusion.    TECHNIQUE: Multislice helical sections were obtained from the thoracic inlet to the lung bases withoutintravenous contrast. Coronal, sagittal and 3D/MIP reformatted images are also submitted.    CORRELATION: Chest radiograph from August 12, 2021.    FINDINGS:    TUBES/LINES: Dialysis catheter with distal tip within the right atrium.    LUNGS, PLEURA,AND AIRWAYS: Debris within the trachea and right lower lobe intermediate bronchus. Bilateral pleural effusions, right greater than left with compressive atelectasis. Upper lobe predominant, centrilobular emphysematous changes.    PULMONARY NODULES:    Right upper lobe solid nodule measuring 1.2 cm (series 4, image 91).    MEDIASTINUM/LYMPH NODES: No mediastinal or axillary lymphadenopathy. Unremarkable thyroid gland.    HEART/GREAT VESSELS: No pericardial effusion. Heart size unremarkable. Coronary artery and thoracic aorta calcifications. No aneurysmal dilation of the thoracic aorta.    BONES/SOFT TISSUES: Diffuse osteopenia. Degenerative changes of the thoracic spine.    VISUALIZED UPPER ABDOMEN: Unremarkable.      IMPRESSION:    Debris within the trachea and right lower lobe bronchus. Correlate clinically for aspiration.    Moderate bilateral pleural effusions, right greater than left.    Right upper lobe solid pulmonary nodule measuring 1.2 cm. Per Fleischner Society 2017 guidelines, consider follow-up with CT at 3 months, PET/CT or tissue sampling as clinically indicated.    --- End of Report ---      EXAM:  XR CHEST PORTABLE ROUTINE 1V        PROCEDURE DATE:  08/13/2021        INTERPRETATION:  Clinical History / Reason for exam: Shortness of breath.    Comparison : Chest radiograph prior day.    Technique/Positioning: Frontal portable, low lung volumes.    Findings:    Support devices: Right-sided tunneled dialysis catheter    Cardiac/mediastinum/hilum: Unchanged    Lung parenchyma/Pleura: Large right-sided pleural effusion with bilateral opacities    Skeleton/soft tissues: Unchanged    Impression:    Right-sided pleural effusion with bilateral opacifications. Support devices as described.    Unchanged.    --- End of Report ---        PHYSICAL EXAM:  GENERAL: NAD  CHEST/LUNG: decreased breath sounds , right IJ cathether in place   HEART: Regular rate and rhythm; No murmurs, rubs, or gallops  ABDOMEN: Bowel sounds present; Soft, Nontender, Nondistended.   EXTREMITIES:  no edema  NERVOUS SYSTEM:  Alert .   SKIN: warm and well perfused       ASSESSMENT & PLAN  80yo M from [facility] w/ pmhx of CHF (EF 40% in July 2021), HTN, anxiety, Alzheimer's dementia, COPD on home 4L O2, T2DM on insulin, hx right nephrectomy and ESRD recently started on dialysis s/p tunnelled catheter, anemia of chronic disease on ROMELIA therapy, brought in by EMS with complaint of SOB and desat at NH    # Acute on Chronic Hypercapneic RF secondary to Mucus Plug  # Severe COPD on 4 liters O2  - C-XRAY on admission 08/07  - New near complete opacification of the right hemithorax, possibly related to mucous plug. Unchanged left sided interstitial opacities/edema and small left pleural effusion.  - s/p bronchoscopy 08/08 - copious thick secretions suctioned  - CT chest 8/13 - Debris within the trachea and right lower lobe bronchus. Correlate clinically for aspiration. Moderate bilateral pleural effusions, right greater than left. Right upper lobe solid pulmonary nodule measuring 1.2 cm. Per Fleischner Society 2017 guidelines, consider follow-up with CT at 3 months, PET/CT or tissue sampling as clinically indicated.  - VA Duplex LE- negative , MRSA Nares negative   - continue Mucomyst inline, chest PT, aspiration precautions  - c/w Duonebs, Prednisone 40mg daily, Albuterol PRN , off Abx  - Cxray - possible worseing effusion on the right. f/u offical read    - c/w IV lasic 40 BID  - BIPAP at night, (patient is non-compliant)  - F/u procal, urine strep/legionella, RVP  - Cardiology consult    # ESRD on HD  - nephrology following     #DMII   - check finger sticks qhs  - start insulin if needed.     #HTN  - c/w BB, ASA    #Dementia alzheimers  - c/w home alzheimer's meds.    # GI PPx - Protonix  # DVT PPx - Heparin 5000 mg SubQ  q8  # Activity -  Increase as Tolerated  # Dispo - Patient to be discharged when medically optimized.  # Code Status-  DNR / DNI

## 2021-08-15 NOTE — PROGRESS NOTE ADULT - ASSESSMENT
ESRD recently started on HD   - access via TDC  mucous plug / PNA - s/p bronchoscopy  COPD on home O2  HFrEF  dementia   HTN  anemia  right nephrectomy    plan:    HD on Monday,  3 hours, opti 160 dialyzer, 2K bath, 3L UF  PO lasix  fluid restriction  EPO and Venofer with HD  PhosLo with meals  Renal diet

## 2021-08-16 NOTE — SWALLOW BEDSIDE ASSESSMENT ADULT - SLP PERTINENT HISTORY OF CURRENT PROBLEM
pt is an 80 y/o M from [facility] w/ PMHx: CHF, HTN, anxiety, Alzheimer's dementia, COPD on home 4L O2, T2DM on insulin, hx right nephrectomy and ESRD recently started on dialysis s/p tunnelled catheter BIBEMS for SOB and desaturation at NH. pt is being treated for acute on chronic hypercapnic respiratory failure 2' mucus plug.

## 2021-08-16 NOTE — PROGRESS NOTE ADULT - ASSESSMENT
ESRD recently started on HD   - access via TDC  acute respiratory failure due to mucous plug / PNA - s/p bronchoscopy  COPD on home O2  HFrEF  dementia   HTN  anemia  right nephrectomy    plan:    Change Lasix to 80mg IV BID, change to PO on discharge  HD today: 3 hours, opti 160 dialyzer, 2K bath, 3.5L UF  EPO and Venofer with HD  PhosLo with meals  Renal diet

## 2021-08-16 NOTE — CHART NOTE - NSCHARTNOTEFT_GEN_A_CORE
Registered Dietitian Follow-Up     Patient Profile Reviewed                           Yes [x]   No []     Nutrition History Previously Obtained        Yes []  No [x]  Patient noted confused and disoriented; unable to obtain nutrition hx at this time. Called emergency contact number @8: 39; no answer      Pertinent Medical Interventions:  Per internal medicine note:    --- Acute on Chronic Hypercapneic RF secondary to Mucus Plug  --- Severe COPD on 4 liters O2  ---Per nephro: fluid restriction, EPO and Venofer with HD, PhosLo with meals    Diet order: Diet, Consistent Carbohydrate Renal w/Evening Snack:   Supplement Feeding Modality:  Oral  Nepro Cans or Servings Per Day:  1       Frequency:  Daily (08-11-21 @ 15:33) [Active     Anthropometrics:  - Ht. 172.72 cm   - Wt.  70.2 kg -- dosing weight   - %wt change  - BMI 23.7  - IBW 70 kg      Pertinent Lab Data: 8/16: RBC: 3.39, H/H: 9.7/33.2, BUN: 104, Creatinine: 8.4, glucose: 132, eGFR: 5     Pertinent Meds: MEDICATIONS  (STANDING):  ascorbic acid 500 milliGRAM(s) Oral daily  atorvastatin 40 milliGRAM(s) Oral at bedtime  calcium acetate 667 milliGRAM(s) Oral three times a day with meals  clopidogrel Tablet 75 milliGRAM(s) Oral daily  dextrose 40% Gel 15 Gram(s) Oral once  dextrose 5%. 1000 milliLiter(s) (50 mL/Hr) IV Continuous <Continuous>  dextrose 5%. 1000 milliLiter(s) (100 mL/Hr) IV Continuous <Continuous>  dextrose 50% Injectable 25 Gram(s) IV Push once  dextrose 50% Injectable 12.5 Gram(s) IV Push once  dextrose 50% Injectable 25 Gram(s) IV Push once  epoetin graham-epbx (RETACRIT) Injectable 5000 Unit(s) IV Push <User Schedule>  furosemide   Injectable 40 milliGRAM(s) IV Push two times a day  glucagon  Injectable 1 milliGRAM(s) IntraMuscular once  insulin lispro (ADMELOG) corrective regimen sliding scale   SubCutaneous three times a day before meals  iron sucrose IVPB 50 milliGRAM(s) IV Intermittent <User Schedule>  metoprolol tartrate 25 milliGRAM(s) Oral two times a day  Nephro-maryjane      Nephro-maryjane 1 Tablet(s) Oral daily  pantoprazole    Tablet 40 milliGRAM(s) Oral before breakfast             Physical Findings:  - Appearance:  - GI function:  - Tubes:  - Oral/Mouth cavity:  - Skin:     Nutrition Requirements  Weight Used:     Estimated Energy Needs    Continue []  Adjust []  Adjusted Energy Recommendations:   kcal/day        Estimated Protein Needs    Continue []  Adjust []  Adjusted Protein Recommendations:   gm/day        Estimated Fluid Needs        Continue []  Adjust []  Adjusted Fluid Recommendations:   mL/day     Nutrient Intake:        [] Previous Nutrition Diagnosis:            [] Ongoing          [] Resolved    [] No active nutrition diagnosis identified at this time     Nutrition Diagnostic #1  Problem:  Etiology:  Statement:     Nutrition Diagnostic #2  Problem:  Etiology:  Statement:     Nutrition Intervention      Goal/Expected Outcome:     Indicator/Monitoring: Registered Dietitian Follow-Up     Patient Profile Reviewed                           Yes [x]   No []     Nutrition History Previously Obtained        Yes []  No [x]  Patient noted confused and disoriented; unable to obtain nutrition hx at this time. Called emergency contact number @8: 39; no answer      Pertinent Medical Interventions:  Per internal medicine note:  --- Acute on Chronic Hypercapneic RF secondary to Mucus Plug  --- Severe COPD on 4 liters O2  ---Per nephro: fluid restriction, EPO and Venofer with HD, PhosLo with meals    Diet order: Diet, Consistent Carbohydrate Renal w/Evening Snack:   Supplement Feeding Modality:  Oral  Nepro Cans or Servings Per Day:  1       Frequency:  Daily (08-11-21 @ 15:33) [Active     Anthropometrics:  - Ht. 172.72 cm   - Wt.  70.2 kg -- dosing weight   - %wt change  - BMI 23.7  - IBW 70 kg      Pertinent Lab Data: 8/16: RBC: 3.39, H/H: 9.7/33.2, BUN: 104, Creatinine: 8.4, glucose: 132, eGFR: 5     Pertinent Meds: MEDICATIONS  (STANDING):  ascorbic acid 500 milliGRAM(s) Oral daily  atorvastatin 40 milliGRAM(s) Oral at bedtime  calcium acetate 667 milliGRAM(s) Oral three times a day with meals  clopidogrel Tablet 75 milliGRAM(s) Oral daily  dextrose 40% Gel 15 Gram(s) Oral once  dextrose 5%. 1000 milliLiter(s) (50 mL/Hr) IV Continuous <Continuous>  dextrose 5%. 1000 milliLiter(s) (100 mL/Hr) IV Continuous <Continuous>  dextrose 50% Injectable 25 Gram(s) IV Push once  dextrose 50% Injectable 12.5 Gram(s) IV Push once  dextrose 50% Injectable 25 Gram(s) IV Push once  epoetin graham-epbx (RETACRIT) Injectable 5000 Unit(s) IV Push <User Schedule>  furosemide   Injectable 40 milliGRAM(s) IV Push two times a day  glucagon  Injectable 1 milliGRAM(s) IntraMuscular once  insulin lispro (ADMELOG) corrective regimen sliding scale   SubCutaneous three times a day before meals  iron sucrose IVPB 50 milliGRAM(s) IV Intermittent <User Schedule>  metoprolol tartrate 25 milliGRAM(s) Oral two times a day  Nephro-maryjane      Nephro-maryjane 1 Tablet(s) Oral daily  pantoprazole    Tablet 40 milliGRAM(s) Oral before breakfast       Physical Findings:  - Appearance: confused and disoriented per RN flow sheets   - GI function: fecal incontinence, LBM: 8/16 per RN flow sheets   - Tubes: N/A  - Oral/Mouth cavity: No chewing/ swallowing difficulties at this time per staff   - Skin: R buttocks stage 2 pressure ulcer per RN flow sheets/ no edema noted per RN flow sheets      Nutrition Requirements  Weight Used: CBW 71.8 kg --- needs used from initial dietitian assessment on 8/11    Estimated energy needs: 3822-8313 kcal (30-35 kcal/kg),   Estimated protein needs:  g protein (1-1.2 g/kg), fluids per LIP. Increased kcal/protein needs r/t ESRD on HD & stage 2 to R buttock  Fluid restriction per nephro      Nutrient Intake: Patient consuming % of meals.      [] Previous Nutrition Diagnosis:   Increased Nutrient Needs...            [x] Ongoing          [] Resolved       Nutrition Intervention meals and snacks, medical nutrition supplements, vitamin/ mineral supplement      Goal/Expected Outcome: Patient to continue to consume ~75% of his meals and nutrition oral supplements in the next 4 days      Indicator/Monitoring: RD to monitor: diet order, body composition, energy intake, nutrition focused physical finding, renal profile, glucose profile.    Recommendations:     Continue current diet order + nutrition oral supplements. Fluid restriction per nephro

## 2021-08-16 NOTE — PROGRESS NOTE ADULT - SUBJECTIVE AND OBJECTIVE BOX
DRAKE HO 81y Male  MRN#: [id number]   Hospital Day: 8d    HPI:  HPI: 82yo M from [facility] w/ pmhx of CHF (EF 40% in July 2021), HTN, anxiety, Alzheimer's dementia, COPD on home 4L O2, T2DM on insulin, hx right nephrectomy and ESRD recently started on dialysis s/p tunnelled catheter, anemia of chronic disease on ROMELIA therapy, brought in by EMS with complaint of SOB and desat at NH. Recently admitted for septic shock secondary to PNA and  metablic acidosis and hyperkalemia and received udall and urgent HD. Pt seen subsequently in ED for similar complaints as well between last month and now.     ED VITALS:  Vital Signs Last 24 Hrs  T(C): 37 (08 Aug 2021 01:22), Max: 37 (08 Aug 2021 01:22)  T(F): 98.6 (08 Aug 2021 01:22), Max: 98.6 (08 Aug 2021 01:22)  HR: 73 (08 Aug 2021 07:35) (73 - 83)  BP: 116/53 (08 Aug 2021 07:09) (116/53 - 145/86)  RR: 24 (08 Aug 2021 07:09) (24 - 24)  SpO2: 97% (08 Aug 2021 07:35) (88% - 100%)    ED IMAGING:   - CXR: R White Out   - POCUS Bedside: IVC not dilated, collapsible w/insp, pt not on any PPV, R Lung: Small pleural effusion, RLL appears atelectatic w/some dynamic bronchograms observed.     ED LABS: notable for WBC 15K, BNP > 70K, Trop 0.12, VBG PCO2 90    ED COURSE:   Pt given empiric abx cefepime + levofloxacin, 125mg solumedrol  MICU consulted pt approved for SDU, pt placed on BiPAP but did not tolerate ripped mask off.  (08 Aug 2021 08:45)      SUBJECTIVE  Patient is a 81y old Male who presents with a chief complaint of Desaturation (15 Aug 2021 12:17)  Currently admitted to medicine with the primary diagnosis of Acute hypercapnic respiratory failure      INTERVAL HPI AND OVERNIGHT EVENTS:  Patient was examined and seen at bedside. This morning he is resting in bed. He was complaining of SOB. Pulse OX was done at bedside and he was saturating 97% on 4L.   Of note, Patient repeatedly declines to use BiPAP overnight.       OBJECTIVE  PAST MEDICAL & SURGICAL HISTORY  Diabetes mellitus    COPD (chronic obstructive pulmonary disease)    Lymphedema of both lower extremities    CHF (congestive heart failure)    H/O right nephrectomy  20 yrs ago      ALLERGIES:  No Known Allergies    MEDICATIONS:  STANDING MEDICATIONS  ALBUTerol    90 MICROgram(s) HFA Inhaler 2 Puff(s) Inhalation every 6 hours  ascorbic acid 500 milliGRAM(s) Oral daily  aspirin enteric coated 81 milliGRAM(s) Oral daily  atorvastatin 40 milliGRAM(s) Oral at bedtime  calcium acetate 667 milliGRAM(s) Oral three times a day with meals  clopidogrel Tablet 75 milliGRAM(s) Oral daily  dextrose 40% Gel 15 Gram(s) Oral once  dextrose 5%. 1000 milliLiter(s) IV Continuous <Continuous>  dextrose 5%. 1000 milliLiter(s) IV Continuous <Continuous>  dextrose 50% Injectable 25 Gram(s) IV Push once  dextrose 50% Injectable 12.5 Gram(s) IV Push once  dextrose 50% Injectable 25 Gram(s) IV Push once  donepezil 5 milliGRAM(s) Oral at bedtime  epoetin graham-epbx (RETACRIT) Injectable 5000 Unit(s) IV Push <User Schedule>  furosemide   Injectable 40 milliGRAM(s) IV Push two times a day  glucagon  Injectable 1 milliGRAM(s) IntraMuscular once  heparin   Injectable 5000 Unit(s) SubCutaneous every 8 hours  insulin lispro (ADMELOG) corrective regimen sliding scale   SubCutaneous three times a day before meals  iron sucrose IVPB 50 milliGRAM(s) IV Intermittent <User Schedule>  metoprolol tartrate 25 milliGRAM(s) Oral two times a day  mirtazapine 15 milliGRAM(s) Oral at bedtime  Nephro-maryjane      Nephro-maryjane 1 Tablet(s) Oral daily  pantoprazole    Tablet 40 milliGRAM(s) Oral before breakfast  sertraline 25 milliGRAM(s) Oral daily  tamsulosin 0.4 milliGRAM(s) Oral at bedtime    PRN MEDICATIONS  ALBUTerol    90 MICROgram(s) HFA Inhaler 2 Puff(s) Inhalation every 6 hours PRN      VITAL SIGNS: Last 24 Hours  T(C): 36.1 (16 Aug 2021 05:00), Max: 36.4 (15 Aug 2021 20:16)  T(F): 96.9 (16 Aug 2021 05:00), Max: 97.6 (15 Aug 2021 20:16)  HR: 66 (16 Aug 2021 05:00) (66 - 81)  BP: 165/71 (16 Aug 2021 05:00) (132/62 - 165/71)  BP(mean): --  RR: 18 (16 Aug 2021 06:48) (18 - 18)  SpO2: 97% (16 Aug 2021 06:48) (94% - 97%)    LABS:                        9.7    11.12 )-----------( 188      ( 16 Aug 2021 06:42 )             33.2     08-16    144  |  101  |  104<HH>  ----------------------------<  132<H>  4.9   |  23  |  8.4<HH>    Ca    8.9      16 Aug 2021 06:42  Mg     2.2     08-16    TPro  5.9<L>  /  Alb  3.6  /  TBili  0.2  /  DBili  x   /  AST  9   /  ALT  <5  /  AlkPhos  67  08-16                  RADIOLOGY:  EXAM:  XR CHEST PORTABLE ROUTINE 1V          PROCEDURE DATE:  08/15/2021        INTERPRETATION:  CLINICAL HISTORY / REASON FOR EXAM: COPD.    COMPARISON: Chest radiograph and CT chest from August 13, 2021.    TECHNIQUE/POSITIONING: Satisfactory. Single image, AP portable chest radiograph.    FINDINGS:    SUPPORT DEVICES: Dialysis catheter overlies the right chest with distal tip overlying the right atrium.    CARDIAC/MEDIASTINUM/HILUM: Unremarkable cardiac silhouette.    LUNG PARENCHYMA/PLEURA: Interval decrease of moderate right effusion and opacity. Unchanged left-sided opacity/effusion. No pneumothorax.    SKELETON/SOFT TISSUES: Unchanged.      IMPRESSION:    Interval decrease of right-sided effusion and opacity.    Unchanged left-sided opacity/effusion.    --- End of Report ---      PHYSICAL EXAM:  GENERAL: NAD  CHEST/LUNG: decreased breath sounds , right IJ cathether in place   HEART: Regular rate and rhythm; systolic murmurs, rubs, or gallops  ABDOMEN: Bowel sounds present; Soft, Nontender, Nondistended.   EXTREMITIES:  no edema  NERVOUS SYSTEM:  Alert .   SKIN: warm and well perfused       ASSESSMENT & PLAN  82yo M from [facility] w/ pmhx of CHF (EF 40% in July 2021), HTN, anxiety, Alzheimer's dementia, COPD on home 4L O2, T2DM on insulin, hx right nephrectomy and ESRD recently started on dialysis s/p tunnelled catheter, anemia of chronic disease on ROMELIA therapy, brought in by EMS with complaint of SOB and desat at NH    # Acute on Chronic Hypercapneic RF secondary to Mucus Plug  # Severe COPD on 4 liters O2  - C-XRAY on admission 08/07  - New near complete opacification of the right hemithorax, possibly related to mucous plug. Unchanged left sided interstitial opacities/edema and small left pleural effusion.  - C-X ray 8/15 - Interval decrease of right-sided effusion and opacity.  - s/p bronchoscopy 08/08 - copious thick secretions suctioned  - CT chest 8/13 - Debris within the trachea and right lower lobe bronchus. Correlate clinically for aspiration. Moderate bilateral pleural effusions, right greater than left. Right upper lobe solid pulmonary nodule measuring 1.2 cm.  consider follow-up with CT at 3 months, PET/CT or tissue sampling as clinically indicated.  - VA Duplex LE- negative , MRSA Nares negative   - continue Mucomyst inline, chest PT, aspiration precautions  - c/w Duonebs, Prednisone 40mg daily, Albuterol PRN , off Abx   - c/w IV lasic 40 BID  - BIPAP at night, (patient is non-compliant)  - F/u procal, urine strep/legionella, RVP  - Cardiology consult    #Severe AS   - Echo (7/18)   Peak transaortic gradient equals 35.8 mmHg, mean transaortic gradient equals 19.1 mmHg, the calculated aortic valve area equals 0.58 cm² by the continuity equation consistent with severe aortic stenosis.  - per cardio on last admission cont with aspirin, Plavix and metoprolol   - discussion with family regarding GOC given multiple co-morbid conditions  if agreeing to do TAVR, f/u with Dr. Martinez as outpatient.    # ESRD on HD  - nephrology following   - dialysis todat    #DMII   - check finger sticks qhs  - start insulin if needed.     #HTN  - c/w BB, ASA    #Dementia alzheimers  - c/w home alzheimer's meds.    # GI PPx - Protonix  # DVT PPx - Heparin 5000 mg SubQ  q8  # Activity -  Increase as Tolerated  # Dispo - Patient to be discharged when medically optimized.  # Code Status-  DNR / DNI   DRAKE HO 81y Male  MRN#: [id number]   Hospital Day: 8d    HPI:  HPI: 82yo M from [facility] w/ pmhx of CHF (EF 40% in July 2021), HTN, anxiety, Alzheimer's dementia, COPD on home 4L O2, T2DM on insulin, hx right nephrectomy and ESRD recently started on dialysis s/p tunnelled catheter, anemia of chronic disease on ROMELIA therapy, brought in by EMS with complaint of SOB and desat at NH. Recently admitted for septic shock secondary to PNA and  metablic acidosis and hyperkalemia and received udall and urgent HD. Pt seen subsequently in ED for similar complaints as well between last month and now.     ED VITALS:  Vital Signs Last 24 Hrs  T(C): 37 (08 Aug 2021 01:22), Max: 37 (08 Aug 2021 01:22)  T(F): 98.6 (08 Aug 2021 01:22), Max: 98.6 (08 Aug 2021 01:22)  HR: 73 (08 Aug 2021 07:35) (73 - 83)  BP: 116/53 (08 Aug 2021 07:09) (116/53 - 145/86)  RR: 24 (08 Aug 2021 07:09) (24 - 24)  SpO2: 97% (08 Aug 2021 07:35) (88% - 100%)    ED IMAGING:   - CXR: R White Out   - POCUS Bedside: IVC not dilated, collapsible w/insp, pt not on any PPV, R Lung: Small pleural effusion, RLL appears atelectatic w/some dynamic bronchograms observed.     ED LABS: notable for WBC 15K, BNP > 70K, Trop 0.12, VBG PCO2 90    ED COURSE:   Pt given empiric abx cefepime + levofloxacin, 125mg solumedrol  MICU consulted pt approved for SDU, pt placed on BiPAP but did not tolerate ripped mask off.  (08 Aug 2021 08:45)      SUBJECTIVE  Patient is a 81y old Male who presents with a chief complaint of Desaturation (15 Aug 2021 12:17)  Currently admitted to medicine with the primary diagnosis of Acute hypercapnic respiratory failure      INTERVAL HPI AND OVERNIGHT EVENTS:  Patient was examined and seen at bedside. This morning he is resting in bed. He was complaining of SOB. Pulse OX was done at bedside and he was saturating 97% on 4L.   Of note, Patient repeatedly declines to use BiPAP overnight.       OBJECTIVE  PAST MEDICAL & SURGICAL HISTORY  Diabetes mellitus    COPD (chronic obstructive pulmonary disease)    Lymphedema of both lower extremities    CHF (congestive heart failure)    H/O right nephrectomy  20 yrs ago      ALLERGIES:  No Known Allergies    MEDICATIONS:  STANDING MEDICATIONS  ALBUTerol    90 MICROgram(s) HFA Inhaler 2 Puff(s) Inhalation every 6 hours  ascorbic acid 500 milliGRAM(s) Oral daily  aspirin enteric coated 81 milliGRAM(s) Oral daily  atorvastatin 40 milliGRAM(s) Oral at bedtime  calcium acetate 667 milliGRAM(s) Oral three times a day with meals  clopidogrel Tablet 75 milliGRAM(s) Oral daily  dextrose 40% Gel 15 Gram(s) Oral once  dextrose 5%. 1000 milliLiter(s) IV Continuous <Continuous>  dextrose 5%. 1000 milliLiter(s) IV Continuous <Continuous>  dextrose 50% Injectable 25 Gram(s) IV Push once  dextrose 50% Injectable 12.5 Gram(s) IV Push once  dextrose 50% Injectable 25 Gram(s) IV Push once  donepezil 5 milliGRAM(s) Oral at bedtime  epoetin graham-epbx (RETACRIT) Injectable 5000 Unit(s) IV Push <User Schedule>  furosemide   Injectable 40 milliGRAM(s) IV Push two times a day  glucagon  Injectable 1 milliGRAM(s) IntraMuscular once  heparin   Injectable 5000 Unit(s) SubCutaneous every 8 hours  insulin lispro (ADMELOG) corrective regimen sliding scale   SubCutaneous three times a day before meals  iron sucrose IVPB 50 milliGRAM(s) IV Intermittent <User Schedule>  metoprolol tartrate 25 milliGRAM(s) Oral two times a day  mirtazapine 15 milliGRAM(s) Oral at bedtime  Nephro-maryjane      Nephro-maryjane 1 Tablet(s) Oral daily  pantoprazole    Tablet 40 milliGRAM(s) Oral before breakfast  sertraline 25 milliGRAM(s) Oral daily  tamsulosin 0.4 milliGRAM(s) Oral at bedtime    PRN MEDICATIONS  ALBUTerol    90 MICROgram(s) HFA Inhaler 2 Puff(s) Inhalation every 6 hours PRN      VITAL SIGNS: Last 24 Hours  T(C): 36.1 (16 Aug 2021 05:00), Max: 36.4 (15 Aug 2021 20:16)  T(F): 96.9 (16 Aug 2021 05:00), Max: 97.6 (15 Aug 2021 20:16)  HR: 66 (16 Aug 2021 05:00) (66 - 81)  BP: 165/71 (16 Aug 2021 05:00) (132/62 - 165/71)  BP(mean): --  RR: 18 (16 Aug 2021 06:48) (18 - 18)  SpO2: 97% (16 Aug 2021 06:48) (94% - 97%)    LABS:                        9.7    11.12 )-----------( 188      ( 16 Aug 2021 06:42 )             33.2     08-16    144  |  101  |  104<HH>  ----------------------------<  132<H>  4.9   |  23  |  8.4<HH>    Ca    8.9      16 Aug 2021 06:42  Mg     2.2     08-16    TPro  5.9<L>  /  Alb  3.6  /  TBili  0.2  /  DBili  x   /  AST  9   /  ALT  <5  /  AlkPhos  67  08-16                  RADIOLOGY:  EXAM:  XR CHEST PORTABLE ROUTINE 1V          PROCEDURE DATE:  08/15/2021        INTERPRETATION:  CLINICAL HISTORY / REASON FOR EXAM: COPD.    COMPARISON: Chest radiograph and CT chest from August 13, 2021.    TECHNIQUE/POSITIONING: Satisfactory. Single image, AP portable chest radiograph.    FINDINGS:    SUPPORT DEVICES: Dialysis catheter overlies the right chest with distal tip overlying the right atrium.    CARDIAC/MEDIASTINUM/HILUM: Unremarkable cardiac silhouette.    LUNG PARENCHYMA/PLEURA: Interval decrease of moderate right effusion and opacity. Unchanged left-sided opacity/effusion. No pneumothorax.    SKELETON/SOFT TISSUES: Unchanged.      IMPRESSION:    Interval decrease of right-sided effusion and opacity.    Unchanged left-sided opacity/effusion.    --- End of Report ---      PHYSICAL EXAM:  GENERAL: NAD  CHEST/LUNG: decreased breath sounds , right IJ cathether in place   HEART: Regular rate and rhythm; systolic murmurs, rubs, or gallops  ABDOMEN: Bowel sounds present; Soft, Nontender, Nondistended.   EXTREMITIES:  no edema  NERVOUS SYSTEM:  Alert .   SKIN: warm and well perfused       ASSESSMENT & PLAN  82yo M from [facility] w/ pmhx of CHF (EF 40% in July 2021), HTN, anxiety, Alzheimer's dementia, COPD on home 4L O2, T2DM on insulin, hx right nephrectomy and ESRD recently started on dialysis s/p tunnelled catheter, anemia of chronic disease on ROMELIA therapy, brought in by EMS with complaint of SOB and desat at NH    # Acute on Chronic Hypercapneic RF secondary to Mucus Plug  # Severe COPD on 4 liters O2  - C-XRAY on admission 08/07  - New near complete opacification of the right hemithorax, possibly related to mucous plug. Unchanged left sided interstitial opacities/edema and small left pleural effusion.  - C-X ray 8/15 - Interval decrease of right-sided effusion and opacity.  - s/p bronchoscopy 08/08 - copious thick secretions suctioned  - CT chest 8/13 - Debris within the trachea and right lower lobe bronchus. Correlate clinically for aspiration. Moderate bilateral pleural effusions, right greater than left. Right upper lobe solid pulmonary nodule measuring 1.2 cm.  consider follow-up with CT at 3 months, PET/CT or tissue sampling as clinically indicated.  - VA Duplex LE- negative , MRSA Nares negative   - continue Mucomyst inline, chest PT, aspiration precautions  - c/w Duonebs, Prednisone 40mg daily, Albuterol PRN , off Abx   - c/w IV lasic 40 BID  - BIPAP at night, (patient is non-compliant)  - F/u procal, urine strep/legionella, RVP, Speech and Swallow eval  - Cardiology consult    #Severe AS   - Echo (7/18)   Peak transaortic gradient equals 35.8 mmHg, mean transaortic gradient equals 19.1 mmHg, the calculated aortic valve area equals 0.58 cm² by the continuity equation consistent with severe aortic stenosis.  - per cardio on last admission cont with aspirin, Plavix and metoprolol   - discussion with family regarding GOC given multiple co-morbid conditions  if agreeing to do TAVR, f/u with Dr. Martinez as outpatient.    # ESRD on HD  - nephrology following   - dialysis todat    #DMII   - check finger sticks qhs  - start insulin if needed.     #HTN  - c/w BB, ASA    #Dementia alzheimers  - c/w home alzheimer's meds.    # GI PPx - Protonix  # DVT PPx - Heparin 5000 mg SubQ  q8  # Activity -  Increase as Tolerated  # Dispo - Patient to be discharged when medically optimized.  # Code Status-  DNR / DNI

## 2021-08-16 NOTE — SWALLOW BEDSIDE ASSESSMENT ADULT - SLP GENERAL OBSERVATIONS
pt received in bed asleep arousable w/o c/o pain. +3L NC pt received in bed asleep arousable w/o c/o pain. +3L NC; RN reports pt ate breakfast w/o difficulty.

## 2021-08-16 NOTE — PROGRESS NOTE ADULT - SUBJECTIVE AND OBJECTIVE BOX
[place] [facility] FOLLOW UP NOTE  --------------------------------------------------------------------------------  24 hour events/subjective: Patient examined. Appears comfortable.    PAST HISTORY  --------------------------------------------------------------------------------  No significant changes to PMH, PSH, FHx, SHx, unless otherwise noted    ALLERGIES & MEDICATIONS  --------------------------------------------------------------------------------  Allergies    No Known Allergies    Standing Inpatient Medications  ALBUTerol    90 MICROgram(s) HFA Inhaler 2 Puff(s) Inhalation every 6 hours  ascorbic acid 500 milliGRAM(s) Oral daily  aspirin enteric coated 81 milliGRAM(s) Oral daily  atorvastatin 40 milliGRAM(s) Oral at bedtime  calcium acetate 667 milliGRAM(s) Oral three times a day with meals  clopidogrel Tablet 75 milliGRAM(s) Oral daily  dextrose 40% Gel 15 Gram(s) Oral once  dextrose 5%. 1000 milliLiter(s) IV Continuous <Continuous>  dextrose 5%. 1000 milliLiter(s) IV Continuous <Continuous>  dextrose 50% Injectable 25 Gram(s) IV Push once  dextrose 50% Injectable 12.5 Gram(s) IV Push once  dextrose 50% Injectable 25 Gram(s) IV Push once  donepezil 5 milliGRAM(s) Oral at bedtime  epoetin graham-epbx (RETACRIT) Injectable 5000 Unit(s) IV Push <User Schedule>  furosemide   Injectable 40 milliGRAM(s) IV Push two times a day  glucagon  Injectable 1 milliGRAM(s) IntraMuscular once  heparin   Injectable 5000 Unit(s) SubCutaneous every 8 hours  insulin lispro (ADMELOG) corrective regimen sliding scale   SubCutaneous three times a day before meals  iron sucrose IVPB 50 milliGRAM(s) IV Intermittent <User Schedule>  metoprolol tartrate 25 milliGRAM(s) Oral two times a day  mirtazapine 15 milliGRAM(s) Oral at bedtime  Nephro-maryjane      Nephro-maryjane 1 Tablet(s) Oral daily  pantoprazole    Tablet 40 milliGRAM(s) Oral before breakfast  sertraline 25 milliGRAM(s) Oral daily  tamsulosin 0.4 milliGRAM(s) Oral at bedtime    PRN Inpatient Medications  ALBUTerol    90 MICROgram(s) HFA Inhaler 2 Puff(s) Inhalation every 6 hours PRN    VITALS/PHYSICAL EXAM  --------------------------------------------------------------------------------  T(C): 35.7 (08-16-21 @ 13:08), Max: 36.4 (08-15-21 @ 20:16)  HR: 63 (08-16-21 @ 13:08) (63 - 81)  BP: 142/63 (08-16-21 @ 13:08) (142/63 - 165/71)  RR: 17 (08-16-21 @ 13:08) (17 - 18)  SpO2: 100% (08-16-21 @ 13:08) (95% - 100%)    08-16-21 @ 07:01  -  08-16-21 @ 14:04  --------------------------------------------------------  IN: 300 mL / OUT: 0 mL / NET: 300 mL    Physical Exam:  	Gen: NAD  	Pulm: CTA B/L  	CV: RRR, S1S2  	Abd: +BS, soft, nontender/nondistended  	: No suprapubic tenderness  	LE: Warm,  no edema  	Vascular access: TDC    LABS/STUDIES  --------------------------------------------------------------------------------              9.7    11.12 >-----------<  188      [08-16-21 @ 06:42]              33.2     144  |  101  |  104  ----------------------------<  132      [08-16-21 @ 06:42]  4.9   |  23  |  8.4        Ca     8.9     [08-16-21 @ 06:42]      Mg     2.2     [08-16-21 @ 06:42]    TPro  5.9  /  Alb  3.6  /  TBili  0.2  /  DBili  x   /  AST  9   /  ALT  <5  /  AlkPhos  67  [08-16-21 @ 06:42]    Creatinine Trend:  SCr 8.4 [08-16 @ 06:42]  SCr 7.5 [08-15 @ 18:12]  SCr 7.2 [08-15 @ 07:50]  SCr 5.3 [08-14 @ 06:49]  SCr 7.4 [08-13 @ 08:25]    Iron 34, TIBC 178, %sat 19      [01-04-21 @ 06:40]  Ferritin 332      [01-04-21 @ 06:40]  PTH -- (Ca 8.9)      [12-24-20 @ 04:30]   54

## 2021-08-17 NOTE — PROGRESS NOTE ADULT - ASSESSMENT
ESRD recently started on HD   - access via TDC  acute respiratory failure due to mucous plug / PNA - s/p bronchoscopy  COPD on home O2  HFrEF  dementia   HTN  anemia  right nephrectomy    plan:    Continue Lasix   HD tomorrow: 3 hours, opti 160 dialyzer, 2K bath, 3L UF  Patient developed hypotension during dialysis yesterday with aggressive fluid removal  EPO and Venofer with HD  PhosLo with meals  Renal diet

## 2021-08-17 NOTE — CONSULT NOTE ADULT - SUBJECTIVE AND OBJECTIVE BOX
DRAKE HO          MRN-[id number]              HPI:    HPI: 80yo M from [facility] w/ pmhx of CHF (EF 40% in 2021), HTN, anxiety, Alzheimer's dementia, COPD on home 4L O2, T2DM on insulin, hx right nephrectomy and ESRD recently started on dialysis s/p tunnelled catheter, anemia of chronic disease on ROMELIA therapy, brought in by EMS with complaint of SOB and desat at NH. Recently admitted for septic shock secondary to PNA and metabolic acidosis and hyperkalemia and received udall and urgent HD. Pt seen subsequently in ED for similar complaints as well between last month and now.     ED VITALS:  Vital Signs Last 24 Hrs  T(C): 37 (08 Aug 2021 01:22), Max: 37 (08 Aug 2021 01:22)  T(F): 98.6 (08 Aug 2021 01:22), Max: 98.6 (08 Aug 2021 01:22)  HR: 73 (08 Aug 2021 07:35) (73 - 83)  BP: 116/53 (08 Aug 2021 07:09) (116/53 - 145/86)  RR: 24 (08 Aug 2021 07:09) (24 - 24)  SpO2: 97% (08 Aug 2021 07:35) (88% - 100%)    ED IMAGING:   - CXR: R White Out   - POCUS Bedside: IVC not dilated, collapsible w/insp, pt not on any PPV, R Lung: Small pleural effusion, RLL appears atelectatic w/some dynamic bronchograms observed.     ED LABS: notable for WBC 15K, BNP > 70K, Trop 0.12, VBG PCO2 90    ED COURSE:   Pt given empiric abx cefepime + levofloxacin, 125mg solumedrol  MICU consulted pt approved for SDU, pt placed on BiPAP but did not tolerate ripped mask off.  (08 Aug 2021 08:45)      PAST MEDICAL & SURGICAL HISTORY:  Diabetes mellitus    COPD (chronic obstructive pulmonary disease)    Lymphedema of both lower extremities    CHF (congestive heart failure)    H/O right nephrectomy  20 yrs ago        FAMILY HISTORY:  No pertinent family history in first degree relatives     Reviewed and found non contributory in mother or father    SOCIAL HISTORY:   Tobacco/etoh/illicit drug use use reported. Yes [ ]  _________  No [ ]  Pt resides at: home [ ]  facility [X ]  other [ ] _______        ROS:	    Unable to attain due to:                      Dyspnea (Celestine 0-10): 0                       N/V (Y/N): No                             Secretions (Y/N) : No                                          Agitation(Y/N): No                              Pain (Y/N): No                                 -Provocation/Palliation: N/A  -Quality/Quantity: N/A  -Radiating: N/A  -Severity: No pain  -Timing/Frequency: N/A  -Impact on ADLs: N/A    General:  Denied  HEENT:    Denied  Neck:  Denied  CVS:  Denied  Resp:  Denied  GI:  Denied    :  Denied  Musc:  Denied  Neuro:  Denied  Psych:  Denied  Skin:  Denied  Lymph:  Denied    Last BM:      Allergies    No Known Allergies    Intolerances      -iStop reviewed (Y/N):   Ref#:   This report was requested by: Nancy Brito | Reference #: [id number]               Labs:	    CBC:                        9.6    10.93 )-----------( 190      ( 17 Aug 2021 05:55 )             32.7     CMP:        139  |  100  |  53<H>  ----------------------------<  145<H>  4.3   |  27  |  5.8<HH>    Ca    8.3<L>      17 Aug 2021 05:55  Mg     1.9         TPro  5.9<L>  /  Alb  3.6  /  TBili  0.2  /  DBili  x   /  AST  9   /  ALT  <5  /  AlkPhos  67         Radiology:	     < from: Xray Chest 1 View- PORTABLE-Routine (Xray Chest 1 View- PORTABLE-Routine in AM.) (21 @ 06:53) >  IMPRESSION:    Unchanged bilateral opacities/effusions    --- End of Report ---    < end of copied text >      EKG:	    < from: 12 Lead ECG (21 @ 04:47) >    Ventricular Rate 89 BPM    Atrial Rate 89 BPM    P-R Interval 200 ms    QRS Duration 110 ms    Q-T Interval 376 ms    QTC Calculation(Bazett) 457 ms    P Axis 37 degrees    R Axis -48 degrees    T Axis 111 degrees    Diagnosis Line Normal sinus rhythm  Possible Left atrial enlargement  Left axis deviation  Anterior infarct , age undetermined  ST & T wave abnormality, consider lateral ischemia  Abnormal ECG    Confirmed by Paul Tidwell ([id number]) on 2021 10:24:30 AM    < end of copied text >      Imaging Personally Reviewed:  [ ] YES  [ ] NO    Consultant(s) Notes Reviewed:  [ X] YES  [ ] NO  Care Discussed with Consultants/Other Providers [ X] YES  [ ] NO    PEx:	  T(C): 36.6 (21 @ 05:00), Max: 36.6 (21 @ 05:00)  HR: 71 (21 @ 05:00) (63 - 78)  BP: 110/54 (21 @ 05:00) (110/54 - 149/65)  RR: 18 (21 @ 05:00) (17 - 18)  SpO2: 95% (21 @ 05:00) (95% - 100%)  Daily Weight in k.6 (16 Aug 2021 12:06)    General:  found in bed in NAD  Eyes:  PERRL EOMI Non icteric MOM  ENMT: no external oral ulcers, MMM, no thrush   CVS: RR S1S2 No M/G/R  Resp: Unlabored Non tachypneic No increased WOB  GI:  Soft NT ND BS+  :  Voiding / Laura / PrimaFit  Musc: No C/C/E    Neuro: Follows commands No focal deficits  Psych: Calm Pleasant, AAOx3    Skin: Non jaundiced , no rash   Lymph: no adenopathy     Preadmit Karnofsky:  %           Current Karnofsky:     %      Medications:	      MEDICATIONS  (STANDING):  ALBUTerol    90 MICROgram(s) HFA Inhaler 2 Puff(s) Inhalation every 6 hours  ascorbic acid 500 milliGRAM(s) Oral daily  aspirin enteric coated 81 milliGRAM(s) Oral daily  atorvastatin 40 milliGRAM(s) Oral at bedtime  calcium acetate 667 milliGRAM(s) Oral three times a day with meals  chlorhexidine 4% Liquid 1 Application(s) Topical <User Schedule>  clopidogrel Tablet 75 milliGRAM(s) Oral daily  donepezil 5 milliGRAM(s) Oral at bedtime  epoetin graham-epbx (RETACRIT) Injectable 5000 Unit(s) IV Push <User Schedule>  furosemide   Injectable 80 milliGRAM(s) IV Push two times a day  glucagon  Injectable 1 milliGRAM(s) IntraMuscular once  heparin   Injectable 5000 Unit(s) SubCutaneous every 8 hours  insulin lispro (ADMELOG) corrective regimen sliding scale   SubCutaneous three times a day before meals  iron sucrose IVPB 50 milliGRAM(s) IV Intermittent <User Schedule>  metoprolol tartrate 25 milliGRAM(s) Oral two times a day  mirtazapine 15 milliGRAM(s) Oral at bedtime  Nephro-maryjane      Nephro-maryjane 1 Tablet(s) Oral daily  pantoprazole    Tablet 40 milliGRAM(s) Oral before breakfast  sertraline 25 milliGRAM(s) Oral daily  tamsulosin 0.4 milliGRAM(s) Oral at bedtime    MEDICATIONS  (PRN):  ALBUTerol    90 MICROgram(s) HFA Inhaler 2 Puff(s) Inhalation every 6 hours PRN Shortness of Breath and/or Wheezing        Advanced Directives:	     DNR/DNI      Decision maker: The patient is unable to participate in complex medical decision making conversations.   Legal surrogate: Shin Wray    GOALS OF CARE DISCUSSION	       Palliative care info/counseling provided	           Family meeting scheduled         Documentation of GOC/Advanced Care Planning:       See previous Palliative Medicine Note    PSYCHOSOCIAL-SPIRITUAL ASSESSMENT:       Reviewed       See Palliative Care SW/ documentation        	    REFERRALS       Palliative Med        Unit SW/Case Mgmt              Hospice       Speech/Swallow       Nutrition       PT/OT DRAKE HO          MRN-[id number]              HPI:    HPI: 80yo M from [facility] w/ pmhx of CHF (EF 40% in 2021), HTN, anxiety, Alzheimer's dementia, COPD on home 4L O2, T2DM on insulin, hx right nephrectomy and ESRD recently started on dialysis s/p tunnelled catheter, anemia of chronic disease on ROMELIA therapy, brought in by EMS with complaint of SOB and desat at NH. Recently admitted for septic shock secondary to PNA and metabolic acidosis and hyperkalemia and received udall and urgent HD. Pt seen subsequently in ED for similar complaints as well between last month and now.     ED VITALS:  Vital Signs Last 24 Hrs  T(C): 37 (08 Aug 2021 01:22), Max: 37 (08 Aug 2021 01:22)  T(F): 98.6 (08 Aug 2021 01:22), Max: 98.6 (08 Aug 2021 01:22)  HR: 73 (08 Aug 2021 07:35) (73 - 83)  BP: 116/53 (08 Aug 2021 07:09) (116/53 - 145/86)  RR: 24 (08 Aug 2021 07:09) (24 - 24)  SpO2: 97% (08 Aug 2021 07:35) (88% - 100%)    ED IMAGING:   - CXR: R White Out   - POCUS Bedside: IVC not dilated, collapsible w/insp, pt not on any PPV, R Lung: Small pleural effusion, RLL appears atelectatic w/some dynamic bronchograms observed.     ED LABS: notable for WBC 15K, BNP > 70K, Trop 0.12, VBG PCO2 90    ED COURSE:   Pt given empiric abx cefepime + levofloxacin, 125mg solumedrol  MICU consulted pt approved for SDU, pt placed on BiPAP but did not tolerate ripped mask off.  (08 Aug 2021 08:45)      PAST MEDICAL & SURGICAL HISTORY:  Diabetes mellitus    COPD (chronic obstructive pulmonary disease)    Lymphedema of both lower extremities    CHF (congestive heart failure)    H/O right nephrectomy  20 yrs ago        FAMILY HISTORY:  No pertinent family history in first degree relatives     Reviewed and found non contributory in mother or father    SOCIAL HISTORY:   Tobacco/etoh/illicit drug use use reported. Yes [ ]  _________  No [ ]  Pt resides at: home [ ]  facility [X ]  other [ ] _______  Prior to hospitalization had not walked in a long time, gets confused but recognized daughter.       ROS:	    Unable to attain due to:  confusion    Last BM: Today per flowsheet       Allergies    No Known Allergies    Intolerances      -iStop reviewed (Y/N):   Ref#:   This report was requested by: Nancy Brito | Reference #: [id number]               Labs:	    CBC:                        9.6    10.93 )-----------( 190      ( 17 Aug 2021 05:55 )             32.7     CMP:        139  |  100  |  53<H>  ----------------------------<  145<H>  4.3   |  27  |  5.8<HH>    Ca    8.3<L>      17 Aug 2021 05:55  Mg     1.9         TPro  5.9<L>  /  Alb  3.6  /  TBili  0.2  /  DBili  x   /  AST  9   /  ALT  <5  /  AlkPhos  67         Radiology:	     < from: Xray Chest 1 View- PORTABLE-Routine (Xray Chest 1 View- PORTABLE-Routine in AM.) (21 @ 06:53) >  IMPRESSION:    Unchanged bilateral opacities/effusions    --- End of Report ---    < end of copied text >      EKG:	    < from: 12 Lead ECG (21 @ 04:47) >    Ventricular Rate 89 BPM    Atrial Rate 89 BPM    P-R Interval 200 ms    QRS Duration 110 ms    Q-T Interval 376 ms    QTC Calculation(Bazett) 457 ms    P Axis 37 degrees    R Axis -48 degrees    T Axis 111 degrees    Diagnosis Line Normal sinus rhythm  Possible Left atrial enlargement  Left axis deviation  Anterior infarct , age undetermined  ST & T wave abnormality, consider lateral ischemia  Abnormal ECG    Confirmed by Paul Tidwell ([id number]) on 2021 10:24:30 AM    < end of copied text >      Imaging Personally Reviewed:  [ ] YES  [ ] NO    Consultant(s) Notes Reviewed:  [ X] YES  [ ] NO  Care Discussed with Consultants/Other Providers [ X] YES  [ ] NO    PEx:	  T(C): 36.6 (21 @ 05:00), Max: 36.6 (21 @ 05:00)  HR: 71 (21 @ 05:00) (63 - 78)  BP: 110/54 (21 @ 05:00) (110/54 - 149/65)  RR: 18 (21 @ 05:00) (17 - 18)  SpO2: 95% (21 @ 05:00) (95% - 100%)  Daily Weight in k.6 (16 Aug 2021 12:06)    General:  found in bed in NAD  Eyes:  PERRL EOMI Non icteric MOM  ENMT: no external oral ulcers, MMM, no thrush   CVS: RR, no edema   Resp: No labored Non tachypneic No increased WOB, on NC 4L  GI:  Soft NT ND   Musc: No C/C/E    Neuro: Follows commands No focal deficits  Psych: Calm Pleasant, AAOx1  Skin: Non jaundiced , no rash     Preadmit Karnofsky:  40 %           Current Karnofsky:   30 %      Medications:	      MEDICATIONS  (STANDING):  ALBUTerol    90 MICROgram(s) HFA Inhaler 2 Puff(s) Inhalation every 6 hours  ascorbic acid 500 milliGRAM(s) Oral daily  aspirin enteric coated 81 milliGRAM(s) Oral daily  atorvastatin 40 milliGRAM(s) Oral at bedtime  calcium acetate 667 milliGRAM(s) Oral three times a day with meals  chlorhexidine 4% Liquid 1 Application(s) Topical <User Schedule>  clopidogrel Tablet 75 milliGRAM(s) Oral daily  donepezil 5 milliGRAM(s) Oral at bedtime  epoetin graham-epbx (RETACRIT) Injectable 5000 Unit(s) IV Push <User Schedule>  furosemide   Injectable 80 milliGRAM(s) IV Push two times a day  glucagon  Injectable 1 milliGRAM(s) IntraMuscular once  heparin   Injectable 5000 Unit(s) SubCutaneous every 8 hours  insulin lispro (ADMELOG) corrective regimen sliding scale   SubCutaneous three times a day before meals  iron sucrose IVPB 50 milliGRAM(s) IV Intermittent <User Schedule>  metoprolol tartrate 25 milliGRAM(s) Oral two times a day  mirtazapine 15 milliGRAM(s) Oral at bedtime  Nephro-maryjane      Nephro-maryjane 1 Tablet(s) Oral daily  pantoprazole    Tablet 40 milliGRAM(s) Oral before breakfast  sertraline 25 milliGRAM(s) Oral daily  tamsulosin 0.4 milliGRAM(s) Oral at bedtime    MEDICATIONS  (PRN):  ALBUTerol    90 MICROgram(s) HFA Inhaler 2 Puff(s) Inhalation every 6 hours PRN Shortness of Breath and/or Wheezing        Advanced Directives:	     DNR/DNI      Decision maker: The patient is unable to participate in complex medical decision making conversations.   Legal surrogate: Shin Rod    GOALS OF CARE DISCUSSION	   - spoke with Shin rod, on the phone  - introduced palliative care and dtr is open to our involvement  - affirmed decisions of DNR/DNI, No feeding tubes, trial of Bipap OK (which was established in prior GOC discussion)  - plan for now is to continue current medical care and touch base later this week re: GOC    - discussed that we may discuss alternative options if the patient begins to decline further    * 20 minutes spent discussing GOC and advanced care planning     PSYCHOSOCIAL-SPIRITUAL ASSESSMENT:       Reviewed       See Palliative Care SW/ documentation        	    REFERRALS       Palliative Med        Unit SW/Case Mgmt

## 2021-08-17 NOTE — CONSULT NOTE ADULT - ASSESSMENT
82yo M from [facility] w/ pmhx of CHF (EF 40% in July 2021), HTN, anxiety, Alzheimer's dementia, COPD on home 4L O2, T2DM on insulin, hx right nephrectomy and ESRD recently started on dialysis s/p tunnelled catheter, anemia of chronic disease on ROMELIA therapy, brought in by EMS with complaint of SOB and desat at NH. Patient admitted with AHRF 2/2 mucus plugging, severe COPD, severe AS. Palliative consulted for GOC, patient noncompliant with Bipap.       MEDD (morphine equivalent daily dose):       See Recs below.    Please call [phone] with questions or concerns 24/7.   We will continue to follow.     Discussed with primary MD.   82yo M from [facility] w/ pmhx of CHF (EF 40% in July 2021), HTN, anxiety, Alzheimer's dementia, COPD on home 4L O2, T2DM on insulin, hx right nephrectomy and ESRD recently started on dialysis s/p tunnelled catheter, anemia of chronic disease on ROMELIA therapy, brought in by EMS with complaint of SOB and desat at NH. Patient admitted with AHRF 2/2 mucus plugging, severe COPD, severe AS. Palliative consulted for GOC, patient noncompliant with Bipap overnight.     Patient is stable on NC today, per respiratory.   Spoke with dtr to introduce palliative care, she would like to continue his current medical care and is optimistic about him returning to his nursing home.   Dtr is open to palliative care GOC discussions moving forward and us following her father's case.     MEDD (morphine equivalent daily dose):       See Recs below.    Please call [phone] with questions or concerns 24/7.   We will continue to follow.     Discussed with primary MD.

## 2021-08-17 NOTE — CONSULT NOTE ADULT - PROBLEM SELECTOR RECOMMENDATION 2
s/p bronchoscopy, persistent effusions on CXR 8/17  Noncompliant with Bipap at night  On 4 L NC today   Continue current medical management for now per dtr's wishes

## 2021-08-17 NOTE — CONSULT NOTE ADULT - CONSULT REASON
SOB
start conversation on goals of care. Patient is currently DNR/DNI but not comlaint with BIPAP and worseing C-XRAY
ESRD

## 2021-08-17 NOTE — CONSULT NOTE ADULT - PROBLEM SELECTOR RECOMMENDATION 9
DNR/DNI, no feeding tubes, trial of noninvasive ventilation   Continue current medical management  Palliative following for GOC, will re-address as appropriate   Will follow

## 2021-08-17 NOTE — PROGRESS NOTE ADULT - SUBJECTIVE AND OBJECTIVE BOX
DRAKE HO 81y Male  MRN#: [id number]   Hospital Day: 9d    HPI:  HPI: 80yo M from [facility] w/ pmhx of CHF (EF 40% in July 2021), HTN, anxiety, Alzheimer's dementia, COPD on home 4L O2, T2DM on insulin, hx right nephrectomy and ESRD recently started on dialysis s/p tunnelled catheter, anemia of chronic disease on ROMELIA therapy, brought in by EMS with complaint of SOB and desat at NH. Recently admitted for septic shock secondary to PNA and  metablic acidosis and hyperkalemia and received udall and urgent HD. Pt seen subsequently in ED for similar complaints as well between last month and now.     ED VITALS:  Vital Signs Last 24 Hrs  T(C): 37 (08 Aug 2021 01:22), Max: 37 (08 Aug 2021 01:22)  T(F): 98.6 (08 Aug 2021 01:22), Max: 98.6 (08 Aug 2021 01:22)  HR: 73 (08 Aug 2021 07:35) (73 - 83)  BP: 116/53 (08 Aug 2021 07:09) (116/53 - 145/86)  RR: 24 (08 Aug 2021 07:09) (24 - 24)  SpO2: 97% (08 Aug 2021 07:35) (88% - 100%)    ED IMAGING:   - CXR: R White Out   - POCUS Bedside: IVC not dilated, collapsible w/insp, pt not on any PPV, R Lung: Small pleural effusion, RLL appears atelectatic w/some dynamic bronchograms observed.     ED LABS: notable for WBC 15K, BNP > 70K, Trop 0.12, VBG PCO2 90    ED COURSE:   Pt given empiric abx cefepime + levofloxacin, 125mg solumedrol  MICU consulted pt approved for SDU, pt placed on BiPAP but did not tolerate ripped mask off.  (08 Aug 2021 08:45)      SUBJECTIVE  Patient is a 81y old Male who presents with a chief complaint of Desaturation (17 Aug 2021 11:14)  Currently admitted to medicine with the primary diagnosis of Acute hypercapnic respiratory failure      INTERVAL HPI AND OVERNIGHT EVENTS:  Patient was examined and seen at bedside. This morning he is anxious and keeps stating " I can't breathe". Patient was saturating 95% on 5L NC. He remains non- complaint with BiPAP. His daughter has been made aware and palliative is on board.     OBJECTIVE  PAST MEDICAL & SURGICAL HISTORY  Diabetes mellitus    COPD (chronic obstructive pulmonary disease)    Lymphedema of both lower extremities    CHF (congestive heart failure)    H/O right nephrectomy  20 yrs ago      ALLERGIES:  No Known Allergies    MEDICATIONS:  STANDING MEDICATIONS  ALBUTerol    90 MICROgram(s) HFA Inhaler 2 Puff(s) Inhalation every 6 hours  ascorbic acid 500 milliGRAM(s) Oral daily  aspirin enteric coated 81 milliGRAM(s) Oral daily  atorvastatin 40 milliGRAM(s) Oral at bedtime  calcium acetate 667 milliGRAM(s) Oral three times a day with meals  chlorhexidine 4% Liquid 1 Application(s) Topical <User Schedule>  clopidogrel Tablet 75 milliGRAM(s) Oral daily  donepezil 5 milliGRAM(s) Oral at bedtime  epoetin graham-epbx (RETACRIT) Injectable 5000 Unit(s) IV Push <User Schedule>  furosemide   Injectable 80 milliGRAM(s) IV Push two times a day  glucagon  Injectable 1 milliGRAM(s) IntraMuscular once  heparin   Injectable 5000 Unit(s) SubCutaneous every 8 hours  insulin lispro (ADMELOG) corrective regimen sliding scale   SubCutaneous three times a day before meals  iron sucrose IVPB 50 milliGRAM(s) IV Intermittent <User Schedule>  magnesium sulfate  IVPB 2 Gram(s) IV Intermittent once  metoprolol tartrate 25 milliGRAM(s) Oral two times a day  mirtazapine 15 milliGRAM(s) Oral at bedtime  Nephro-maryjane      Nephro-maryjane 1 Tablet(s) Oral daily  pantoprazole    Tablet 40 milliGRAM(s) Oral before breakfast  sertraline 25 milliGRAM(s) Oral daily  tamsulosin 0.4 milliGRAM(s) Oral at bedtime    PRN MEDICATIONS  ALBUTerol    90 MICROgram(s) HFA Inhaler 2 Puff(s) Inhalation every 6 hours PRN      VITAL SIGNS: Last 24 Hours  T(C): 36.6 (17 Aug 2021 05:00), Max: 36.6 (17 Aug 2021 05:00)  T(F): 97.9 (17 Aug 2021 05:00), Max: 97.9 (17 Aug 2021 05:00)  HR: 71 (17 Aug 2021 05:00) (63 - 78)  BP: 110/54 (17 Aug 2021 05:00) (110/54 - 149/65)  BP(mean): --  RR: 18 (17 Aug 2021 05:00) (17 - 18)  SpO2: 95% (17 Aug 2021 05:00) (95% - 100%)    LABS:                        9.6    10.93 )-----------( 190      ( 17 Aug 2021 05:55 )             32.7     08-17    139  |  100  |  53<H>  ----------------------------<  145<H>  4.3   |  27  |  5.8<HH>    Ca    8.3<L>      17 Aug 2021 05:55  Mg     1.9     08-17    TPro  5.9<L>  /  Alb  3.6  /  TBili  0.2  /  DBili  x   /  AST  9   /  ALT  <5  /  AlkPhos  67  08-16                  RADIOLOGY:    EXAM:  XR CHEST PORTABLE ROUTINE 1V            PROCEDURE DATE:  08/16/2021      INTERPRETATION:  CLINICAL INDICATION:  copd    COMPARISON: Chest radiograph dated 8/15/2021    TECHNIQUE: Frontal radiograph the chest.    FINDINGS:    Support devices: Stable right IJ dialysis catheter.    Cardiac/mediastinum/hilum: Stable.    Lung parenchyma/Pleura: Unchanged bilateral opacities/effusions. There is no pneumothorax.    Skeleton/soft tissues: Stable.    IMPRESSION:    Unchanged bilateral opacities/effusions      EXAM:  XR CHEST PORTABLE ROUTINE 1V          PROCEDURE DATE:  08/15/2021      INTERPRETATION:  CLINICAL HISTORY / REASON FOR EXAM: COPD.    COMPARISON: Chest radiograph and CT chest from August 13, 2021.    TECHNIQUE/POSITIONING: Satisfactory. Single image, AP portable chest radiograph.    FINDINGS:    SUPPORT DEVICES: Dialysis catheter overlies the right chest with distal tip overlying the right atrium.    CARDIAC/MEDIASTINUM/HILUM: Unremarkable cardiac silhouette.    LUNG PARENCHYMA/PLEURA: Interval decrease of moderate right effusion and opacity. Unchanged left-sided opacity/effusion. No pneumothorax.    SKELETON/SOFT TISSUES: Unchanged.    IMPRESSION:    Interval decrease of right-sided effusion and opacity.    Unchanged left-sided opacity/effusion.    --- End of Report ---      EXAM:  CT CHEST          PROCEDURE DATE:  08/13/2021      INTERPRETATION:  CLINICAL HISTORY / REASON FOR EXAM: Right pleural effusion.    TECHNIQUE: Multislice helical sections were obtained from the thoracic inlet to the lung bases withoutintravenous contrast. Coronal, sagittal and 3D/MIP reformatted images are also submitted.    CORRELATION: Chest radiograph from August 12, 2021.    FINDINGS:    TUBES/LINES: Dialysis catheter with distal tip within the right atrium.    LUNGS, PLEURA,AND AIRWAYS: Debris within the trachea and right lower lobe intermediate bronchus. Bilateral pleural effusions, right greater than left with compressive atelectasis. Upper lobe predominant, centrilobular emphysematous changes.    PULMONARY NODULES:    Right upper lobe solid nodule measuring 1.2 cm (series 4, image 91).    MEDIASTINUM/LYMPH NODES: No mediastinal or axillary lymphadenopathy. Unremarkable thyroid gland.    HEART/GREAT VESSELS: No pericardial effusion. Heart size unremarkable. Coronary artery and thoracic aorta calcifications. No aneurysmal dilation of the thoracic aorta.    BONES/SOFT TISSUES: Diffuse osteopenia. Degenerative changes of the thoracic spine.    VISUALIZED UPPER ABDOMEN: Unremarkable.      IMPRESSION:    Debris within the trachea and right lower lobe bronchus. Correlate clinically for aspiration.    Moderate bilateral pleural effusions, right greater than left.    Right upper lobe solid pulmonary nodule measuring 1.2 cm. Per Fleischner Society 2017 guidelines, consider follow-up with CT at 3 months, PET/CT or tissue sampling as clinically indicated.    --- End of Report ---        PHYSICAL EXAM:  GENERAL: Anxious , AAOX1  CHEST/LUNG: decreased breath sounds , right IJ catheter in place   HEART: Regular rate and rhythm; systolic murmurs, rubs, or gallops  ABDOMEN: Bowel sounds present; Soft, Nontender, Nondistended.   EXTREMITIES:  no edema  NERVOUS SYSTEM:  Alert .   SKIN: warm and well perfused       ASSESSMENT & PLAN  80yo M from [facility] w/ pmhx of CHF (EF 40% in July 2021), HTN, anxiety, Alzheimer's dementia, COPD on home 4L O2, T2DM on insulin, hx right nephrectomy and ESRD recently started on dialysis s/p tunnelled catheter, anemia of chronic disease on ROMELIA therapy, brought in by EMS with complaint of SOB and desat at NH    # Acute on Chronic Hypercapneic RF secondary to Mucus Plug  # Severe COPD on 4 liters O2  - C-XRAY on admission 08/07  - New near complete opacification of the right hemithorax, possibly related to mucous plug. Unchanged left sided interstitial opacities/edema and small left pleural effusion.  - C-X ray 8/16 - Unchanged bilateral opacities/effusions  - s/p bronchoscopy 08/08 - copious thick secretions suctioned  - CT chest 8/13 - Debris within the trachea and right lower lobe bronchus. Correlate clinically for aspiration. Moderate bilateral pleural effusions, right greater than left. Right upper lobe solid pulmonary nodule measuring 1.2 cm.  consider follow-up with CT at 3 months, PET/CT or tissue sampling as clinically indicated.  - VA Duplex LE- negative , MRSA Nares negative   - continue Mucomyst inline, chest PT, aspiration precautions  - c/w Duonebs, Prednisone 40mg daily, Albuterol PRN , off Abx   - c/w IV lasix 80 BID   - passed speech and swallow eval (8/16)  - BIPAP at night, (patient is non-compliant)  - Palliative consulted    #Severe AS   - Echo (7/18)   Peak transaortic gradient equals 35.8 mmHg, mean transaortic gradient equals 19.1 mmHg, the calculated aortic valve area equals 0.58 cm² by the continuity equation consistent with severe aortic stenosis.  - per cardio on last admission cont with aspirin, Plavix and metoprolol   - discussion with family regarding GOC given multiple co-morbid conditions, if agreeing to do TAVR, f/u with Dr. Martinez as outpatient.    # ESRD on HD  - nephrology following     #DMII   - check finger sticks qhs  - start insulin if needed.     #HTN  - c/w BB, ASA    #Dementia alzheimers  - c/w home alzheimer's meds.    # GI PPx - Protonix  # DVT PPx - Heparin 5000 mg SubQ  q8  # Activity -  Increase as Tolerated  # Dispo - Patient to be discharged when medically optimized.  # Code Status-  DNR / DNI      Dispo:

## 2021-08-17 NOTE — CONSULT NOTE ADULT - PROBLEM SELECTOR RECOMMENDATION 3
On HD   Nephrology following for recommendations   Continue RRT per family wishes   Continue current medical management

## 2021-08-17 NOTE — PROGRESS NOTE ADULT - SUBJECTIVE AND OBJECTIVE BOX
[facility] FOLLOW UP NOTE  --------------------------------------------------------------------------------  24 hour events/subjective: Patient examined. Appears comfortable.    PAST HISTORY  --------------------------------------------------------------------------------  No significant changes to PMH, PSH, FHx, SHx, unless otherwise noted    ALLERGIES & MEDICATIONS  --------------------------------------------------------------------------------  Allergies    No Known Allergies    Standing Inpatient Medications  ALBUTerol    90 MICROgram(s) HFA Inhaler 2 Puff(s) Inhalation every 6 hours  ascorbic acid 500 milliGRAM(s) Oral daily  aspirin enteric coated 81 milliGRAM(s) Oral daily  atorvastatin 40 milliGRAM(s) Oral at bedtime  calcium acetate 667 milliGRAM(s) Oral three times a day with meals  chlorhexidine 4% Liquid 1 Application(s) Topical <User Schedule>  clopidogrel Tablet 75 milliGRAM(s) Oral daily  donepezil 5 milliGRAM(s) Oral at bedtime  epoetin graham-epbx (RETACRIT) Injectable 5000 Unit(s) IV Push <User Schedule>  furosemide   Injectable 80 milliGRAM(s) IV Push two times a day  glucagon  Injectable 1 milliGRAM(s) IntraMuscular once  heparin   Injectable 5000 Unit(s) SubCutaneous every 8 hours  insulin lispro (ADMELOG) corrective regimen sliding scale   SubCutaneous three times a day before meals  iron sucrose IVPB 50 milliGRAM(s) IV Intermittent <User Schedule>  metoprolol tartrate 25 milliGRAM(s) Oral two times a day  mirtazapine 15 milliGRAM(s) Oral at bedtime  Nephro-maryjane      Nephro-maryjane 1 Tablet(s) Oral daily  pantoprazole    Tablet 40 milliGRAM(s) Oral before breakfast  sertraline 25 milliGRAM(s) Oral daily  tamsulosin 0.4 milliGRAM(s) Oral at bedtime    PRN Inpatient Medications  ALBUTerol    90 MICROgram(s) HFA Inhaler 2 Puff(s) Inhalation every 6 hours PRN    VITALS/PHYSICAL EXAM  --------------------------------------------------------------------------------  T(C): 36.6 (08-17-21 @ 05:00), Max: 36.6 (08-17-21 @ 05:00)  HR: 71 (08-17-21 @ 05:00) (63 - 78)  BP: 110/54 (08-17-21 @ 05:00) (110/54 - 149/65)  RR: 18 (08-17-21 @ 05:00) (17 - 18)  SpO2: 95% (08-17-21 @ 05:00) (95% - 100%)    08-16-21 @ 07:01  -  08-17-21 @ 07:00  --------------------------------------------------------  IN: 300 mL / OUT: 3000 mL / NET: -2700 mL    Physical Exam:  	Gen: NAD  	Pulm:  B/L ronchi/wheeze  	CV: RRR, S1S2  	Abd: +BS, soft, nontender/nondistended  	: No suprapubic tenderness  	LE: Warm,  no edema  	Vascular access: IJ TDC    LABS/STUDIES  --------------------------------------------------------------------------------              9.6    10.93 >-----------<  190      [08-17-21 @ 05:55]              32.7     139  |  100  |  53  ----------------------------<  145      [08-17-21 @ 05:55]  4.3   |  27  |  5.8        Ca     8.3     [08-17-21 @ 05:55]      Mg     1.9     [08-17-21 @ 05:55]    TPro  5.9  /  Alb  3.6  /  TBili  0.2  /  DBili  x   /  AST  9   /  ALT  <5  /  AlkPhos  67  [08-16-21 @ 06:42]    Creatinine Trend:  SCr 5.8 [08-17 @ 05:55]  SCr 8.4 [08-16 @ 06:42]  SCr 7.5 [08-15 @ 18:12]  SCr 7.2 [08-15 @ 07:50]  SCr 5.3 [08-14 @ 06:49]    Iron 34, TIBC 178, %sat 19      [01-04-21 @ 06:40]  Ferritin 332      [01-04-21 @ 06:40]  PTH -- (Ca 8.9)      [12-24-20 @ 04:30]   54

## 2021-08-18 NOTE — PROGRESS NOTE ADULT - ASSESSMENT
HPI: 82yo M from [facility] w/ pmhx of CHF (EF 40% in July 2021), HTN, anxiety, Alzheimer's dementia, COPD on home 4L O2, T2DM on insulin, hx right nephrectomy and ESRD recently started on dialysis s/p tunnelled catheter, anemia of chronic disease on ROMELIA therapy, brought in by EMS with complaint of SOB and desat at NH. Recently admitted for septic shock secondary to PNA and  metablic acidosis and hyperkalemia and received udall and urgent HD. Pt seen subsequently in ED for similar complaints as well between last month and now.   82yo M from [facility] w/ pmhx of CHF (EF 40% in July 2021), HTN, anxiety, Alzheimer's dementia, COPD on home 4L O2, T2DM on insulin, hx right nephrectomy and ESRD recently started on dialysis s/p tunnelled catheter, anemia of chronic disease on ROMELIA therapy, brought in by EMS with complaint of SOB and desat at NH    # Acute on Chronic Hypercapneic RF secondary to Mucus Plug  # Severe COPD >> currently on 6L NC.   - C-XRAY on admission 08/07  - New near complete opacification of the right hemithorax, possibly related to mucous plug. Unchanged left sided interstitial opacities/edema and small left pleural effusion.  - C-X ray 8/16 - Unchanged bilateral opacities/effusions  - s/p bronchoscopy 08/08 - copious thick secretions suctioned  - CT chest 8/13 - Debris within the trachea and right lower lobe bronchus. Correlate clinically for aspiration. Moderate bilateral pleural effusions, right greater than left. Right upper lobe solid pulmonary nodule measuring 1.2 cm.  consider follow-up with CT at 3 months, PET/CT or tissue sampling as clinically indicated.  - VA Duplex LE- negative , MRSA Nares negative   - continue Mucomyst inline, chest PT, aspiration precautions  - c/w Duonebs, Prednisone 40mg daily, Albuterol PRN , off Abx   - c/w IV lasix 80 BID   - passed speech and swallow eval (8/16)  - BIPAP at night, (patient is non-compliant)  - Palliative consulted    #Severe AS   - Echo (7/18)   Peak transaortic gradient equals 35.8 mmHg, mean transaortic gradient equals 19.1 mmHg, the calculated aortic valve area equals 0.58 cm² by the continuity equation consistent with severe aortic stenosis.  - per cardio on last admission cont with aspirin, Plavix and metoprolol   - discussion with family regarding GOC given multiple co-morbid conditions, if agreeing to do TAVR, f/u with Dr. Martinez as outpatient.# ESRD on HD  - nephrology following   - patient unable to tolerate any more aggressive attempts during HD for more negative balance secondary to labile pressure and hypotension    #DMII   - check finger sticks qhs  - start insulin if needed.     #HTN  - c/w BB, ASA    #Dementia alzheimers  - c/w home alzheimer's meds.    # GI PPx - Protonix  # DVT PPx - Heparin 5000 mg SubQ  q8  # Activity -  Increase as Tolerated  # Dispo - Patient to be discharged when medically optimized.  # Code Status-  DNR / DNI    #Progress Note Handoff  Pending (specify):  Clinical improvement  Disposition: Unknown at this time.

## 2021-08-18 NOTE — PROGRESS NOTE ADULT - SUBJECTIVE AND OBJECTIVE BOX
DRAKE HO  81y  Male      Patient is a 81y old  Male who presents with a chief complaint of Desaturation.      INTERVAL HPI/OVERNIGHT EVENTS:      ******************************* REVIEW OF SYSTEMS:**********************************************    All other review of systems negative    *********************** VITALS ******************************************    T(F): 97 (08-18-21 @ 14:00)  HR: 71 (08-18-21 @ 14:00) (60 - 77)  BP: 114/53 (08-18-21 @ 14:00) (101/43 - 155/67)  RR: 19 (08-18-21 @ 14:00) (18 - 19)  SpO2: 90% (08-18-21 @ 14:00) (86% - 100%)    08-17-21 @ 07:01  -  08-18-21 @ 07:00  --------------------------------------------------------  IN: 0 mL / OUT: 2 mL / NET: -2 mL    08-18-21 @ 07:01  -  08-18-21 @ 17:22  --------------------------------------------------------  IN: 0 mL / OUT: 2000 mL / NET: -2000 mL            08-17-21 @ 07:01  -  08-18-21 @ 07:00  --------------------------------------------------------  IN: 0 mL / OUT: 2 mL / NET: -2 mL    08-18-21 @ 07:01  -  08-18-21 @ 17:22  --------------------------------------------------------  IN: 0 mL / OUT: 2000 mL / NET: -2000 mL        ******************************** PHYSICAL EXAM:**************************************************  GENERAL: NAD    PSYCH: no agitation,  HEENT: Right IJ line     NERVOUS SYSTEM:  Alert & Oriented x 1    PULMONARY: NGA, decreased R>L    CARDIOVASCULAR: S1S2 RRR    GI: Soft, NT, ND; BS present.    EXTREMITIES:  2+ Peripheral Pulses, No clubbing, cyanosis, or edema    LYMPH: No lymphadenopathy noted    SKIN: No rashes or lesions      **************************** LABS *******************************************************                          9.2    10.43 )-----------( 173      ( 18 Aug 2021 04:30 )             30.6     08-18    142  |  102  |  78<HH>  ----------------------------<  110<H>  4.0   |  23  |  7.1<HH>    Ca    8.1<L>      18 Aug 2021 04:30  Mg     1.9     08-18            Lactate Trend        CAPILLARY BLOOD GLUCOSE      POCT Blood Glucose.: 166 mg/dL (18 Aug 2021 16:51)          **************************Active Medications *******************************************  No Known Allergies      ALBUTerol    90 MICROgram(s) HFA Inhaler 2 Puff(s) Inhalation every 6 hours  ALBUTerol    90 MICROgram(s) HFA Inhaler 2 Puff(s) Inhalation every 6 hours PRN  ascorbic acid 500 milliGRAM(s) Oral daily  aspirin enteric coated 81 milliGRAM(s) Oral daily  atorvastatin 40 milliGRAM(s) Oral at bedtime  calcium acetate 667 milliGRAM(s) Oral three times a day with meals  chlorhexidine 4% Liquid 1 Application(s) Topical <User Schedule>  clopidogrel Tablet 75 milliGRAM(s) Oral daily  donepezil 5 milliGRAM(s) Oral at bedtime  epoetin graham-epbx (RETACRIT) Injectable 5000 Unit(s) IV Push <User Schedule>  furosemide   Injectable 80 milliGRAM(s) IV Push two times a day  glucagon  Injectable 1 milliGRAM(s) IntraMuscular once  heparin   Injectable 5000 Unit(s) SubCutaneous every 8 hours  insulin lispro (ADMELOG) corrective regimen sliding scale   SubCutaneous three times a day before meals  iron sucrose IVPB 50 milliGRAM(s) IV Intermittent <User Schedule>  magnesium sulfate  IVPB 2 Gram(s) IV Intermittent once  metoprolol tartrate 25 milliGRAM(s) Oral two times a day  mirtazapine 15 milliGRAM(s) Oral at bedtime  Nephro-maryjane      Nephro-maryjane 1 Tablet(s) Oral daily  pantoprazole    Tablet 40 milliGRAM(s) Oral before breakfast  sertraline 25 milliGRAM(s) Oral daily  tamsulosin 0.4 milliGRAM(s) Oral at bedtime      ***************************************************  RADIOLOGY & ADDITIONAL TESTS:    Imaging Personally Reviewed:  [ ] YES  [ ] NO    HEALTH ISSUES - PROBLEM Dx:  Palliative care by specialist    Acute respiratory failure with hypoxia    Acute respiratory failure    Sepsis    End stage renal disease    Severe aortic stenosis    Agitation

## 2021-08-18 NOTE — PROGRESS NOTE ADULT - TREATMENT GUIDELINE COMMENT
seen for nutrition support consult. states 25-50% of meals secondary to preferences. NKFA DNR/DNI  No other definitive limitations recoded on MOLST  Continue current medical management seen for nutrition support consult; catabolic state, hypoalbuminemia.  states 25-50% of meals secondary to preferences. last BM yesterday.  NKFA

## 2021-08-18 NOTE — PROGRESS NOTE ADULT - WHAT MATTERS MOST
Abiding by the patient's wishes and needs  Reducing discomfort for the patient   Allowing the patient to participate in decision making as much as he can

## 2021-08-18 NOTE — PROGRESS NOTE ADULT - SUBJECTIVE AND OBJECTIVE BOX
[facility] NEPHROLOGY FOLLOW UP NOTE  --------------------------------------------------------------------------------  24 hour events/subjective: Patient examined during HD. Appears comfortable.    PAST HISTORY  --------------------------------------------------------------------------------  No significant changes to PMH, PSH, FHx, SHx, unless otherwise noted    ALLERGIES & MEDICATIONS  --------------------------------------------------------------------------------  Allergies    No Known Allergies    Standing Inpatient Medications  ALBUTerol    90 MICROgram(s) HFA Inhaler 2 Puff(s) Inhalation every 6 hours  ascorbic acid 500 milliGRAM(s) Oral daily  aspirin enteric coated 81 milliGRAM(s) Oral daily  atorvastatin 40 milliGRAM(s) Oral at bedtime  calcium acetate 667 milliGRAM(s) Oral three times a day with meals  chlorhexidine 4% Liquid 1 Application(s) Topical <User Schedule>  clopidogrel Tablet 75 milliGRAM(s) Oral daily  donepezil 5 milliGRAM(s) Oral at bedtime  epoetin graham-epbx (RETACRIT) Injectable 5000 Unit(s) IV Push <User Schedule>  furosemide   Injectable 80 milliGRAM(s) IV Push two times a day  glucagon  Injectable 1 milliGRAM(s) IntraMuscular once  heparin   Injectable 5000 Unit(s) SubCutaneous every 8 hours  insulin lispro (ADMELOG) corrective regimen sliding scale   SubCutaneous three times a day before meals  iron sucrose IVPB 50 milliGRAM(s) IV Intermittent <User Schedule>  magnesium sulfate  IVPB 2 Gram(s) IV Intermittent once  metoprolol tartrate 25 milliGRAM(s) Oral two times a day  mirtazapine 15 milliGRAM(s) Oral at bedtime  Nephro-maryjane      Nephro-maryjane 1 Tablet(s) Oral daily  pantoprazole    Tablet 40 milliGRAM(s) Oral before breakfast  sertraline 25 milliGRAM(s) Oral daily  tamsulosin 0.4 milliGRAM(s) Oral at bedtime    PRN Inpatient Medications  ALBUTerol    90 MICROgram(s) HFA Inhaler 2 Puff(s) Inhalation every 6 hours PRN    VITALS/PHYSICAL EXAM  --------------------------------------------------------------------------------  T(C): 36.2 (08-18-21 @ 04:30), Max: 37.3 (08-17-21 @ 21:00)  HR: 70 (08-18-21 @ 09:30) (67 - 77)  BP: 142/60 (08-18-21 @ 09:30) (126/60 - 155/67)  RR: 19 (08-18-21 @ 09:30) (18 - 19)  SpO2: 100% (08-18-21 @ 09:30) (86% - 100%)    08-17-21 @ 07:01  -  08-18-21 @ 07:00  --------------------------------------------------------  IN: 0 mL / OUT: 2 mL / NET: -2 mL    Physical Exam:  	Gen: NAD  	Pulm:  B/L linda  	CV: RRR, S1S2  	Abd: +BS, soft, nontender/nondistended  	: No suprapubic tenderness  	LE: Warm,  no edema  	Vascular access: TDC    LABS/STUDIES  --------------------------------------------------------------------------------              9.2    10.43 >-----------<  173      [08-18-21 @ 04:30]              30.6     142  |  102  |  78  ----------------------------<  110      [08-18-21 @ 04:30]  4.0   |  23  |  7.1        Ca     8.1     [08-18-21 @ 04:30]      Mg     1.9     [08-18-21 @ 04:30]    Creatinine Trend:  SCr 7.1 [08-18 @ 04:30]  SCr 5.8 [08-17 @ 05:55]  SCr 8.4 [08-16 @ 06:42]  SCr 7.5 [08-15 @ 18:12]  SCr 7.2 [08-15 @ 07:50]    Iron 34, TIBC 178, %sat 19      [01-04-21 @ 06:40]  Ferritin 332      [01-04-21 @ 06:40]  PTH -- (Ca 8.9)      [12-24-20 @ 04:30]   54

## 2021-08-18 NOTE — PROGRESS NOTE ADULT - PROBLEM SELECTOR PLAN 4
Dtr understands he is not tolerating Bipap at night    -Recommend non-pharmacological interventions to prevent/treat delirium  - maintain day/night light cycles  - optimize sleep-wake cycle, minimize environmental noise  - reorientation frequently  - use verbal redirection as first line  - minimize restraints and lines  - ensure good bladder/bowel function  - ensure adequate pain control  - minimize use of anticholinergic, antihistaminic, and benzodiazepine medications

## 2021-08-18 NOTE — PROGRESS NOTE ADULT - SUBJECTIVE AND OBJECTIVE BOX
DRAKE HO             MRN-[id number]      Patient is a 81y old Male who presents with a chief complaint of Desaturation (18 Aug 2021 09:23)    Currently admitted with the primary diagnosis of: hypoxia        SUBJECTIVE:    - patient seen during HD, lethargic     ROS:  UNABLE TO OBTAIN  due to: lethargic       PEx:   T(C): 36.2 (08-18-21 @ 04:30), Max: 37.3 (08-17-21 @ 21:00)  HR: 70 (08-18-21 @ 09:30) (67 - 77)  BP: 142/60 (08-18-21 @ 09:30) (126/60 - 155/67)  RR: 19 (08-18-21 @ 09:30) (18 - 19)  SpO2: 100% (08-18-21 @ 09:30) (86% - 100%)            General:  found in bed in NAD, ill appearing   Eyes:  PERRL EOMI Non icteric MOM  ENMT: no external oral ulcers, MMM  CVS: RR, no edema   Resp: Unlabored Non tachypneic No increased WOB, on 6 L NC  GI:  Soft NT ND   Musc: No C/C/E    Neuro: Did not follow commands No focal deficits, + lethargy  Psych: Calm Pleasant, AAOx 0  Skin: Non jaundiced , no rash     Last BM: yesterday     ALLERGIES: No Known Allergies      Labs:	    CBC:                        9.2    10.43 )-----------( 173      ( 18 Aug 2021 04:30 )             30.6     CMP:    08-18    142  |  102  |  78<HH>  ----------------------------<  110<H>  4.0   |  23  |  7.1<HH>    Ca    8.1<L>      18 Aug 2021 04:30  Mg     1.9     08-18        RADIOLOGY    < from: Xray Chest 1 View- PORTABLE-Routine (Xray Chest 1 View- PORTABLE-Routine in AM.) (08.17.21 @ 08:13) >  Impression:    Bilateral opacities/effusions, decreased on the left, increased on the right.        --- End of Report ---    < end of copied text >      EKG      Imaging Personally Reviewed:  [ ] YES  [ ] NO    Consultant(s) Notes Reviewed:  [X ] YES  [ ] NO  Care Discussed with Consultants/Other Providers [ X] YES  [ ] NO    Medications:	      MEDICATIONS  (STANDING):  ALBUTerol    90 MICROgram(s) HFA Inhaler 2 Puff(s) Inhalation every 6 hours  ascorbic acid 500 milliGRAM(s) Oral daily  aspirin enteric coated 81 milliGRAM(s) Oral daily  atorvastatin 40 milliGRAM(s) Oral at bedtime  calcium acetate 667 milliGRAM(s) Oral three times a day with meals  chlorhexidine 4% Liquid 1 Application(s) Topical <User Schedule>  clopidogrel Tablet 75 milliGRAM(s) Oral daily  donepezil 5 milliGRAM(s) Oral at bedtime  epoetin graham-epbx (RETACRIT) Injectable 5000 Unit(s) IV Push <User Schedule>  furosemide   Injectable 80 milliGRAM(s) IV Push two times a day  glucagon  Injectable 1 milliGRAM(s) IntraMuscular once  heparin   Injectable 5000 Unit(s) SubCutaneous every 8 hours  insulin lispro (ADMELOG) corrective regimen sliding scale   SubCutaneous three times a day before meals  iron sucrose IVPB 50 milliGRAM(s) IV Intermittent <User Schedule>  magnesium sulfate  IVPB 2 Gram(s) IV Intermittent once  metoprolol tartrate 25 milliGRAM(s) Oral two times a day  mirtazapine 15 milliGRAM(s) Oral at bedtime  Nephro-maryjane      Nephro-maryjane 1 Tablet(s) Oral daily  pantoprazole    Tablet 40 milliGRAM(s) Oral before breakfast  sertraline 25 milliGRAM(s) Oral daily  tamsulosin 0.4 milliGRAM(s) Oral at bedtime    MEDICATIONS  (PRN):  ALBUTerol    90 MICROgram(s) HFA Inhaler 2 Puff(s) Inhalation every 6 hours PRN Shortness of Breath and/or Wheezing        ADVANCED DIRECTIVES:       DNR/DNI         DECISION MAKER: Patient [  ]  Family [ X ]  Other [  ] _______  LEGAL SURROGATE: DtrShin      PSYCHOSOCIAL-SPIRITUAL ASSESSMENT:       Reviewed       See Palliative Care SW/ documentation      CURRENT DISPO PLAN:         WILL REMAIN IN HOSPITAL      REFERRALS	        Palliative Med        Unit SW/Case Mgmt

## 2021-08-18 NOTE — PROGRESS NOTE ADULT - CONVERSATION DETAILS
SPoke with dtr of patient on phone. Reviewed MOLST form. She previously had a GOC conversation during patients admission in July and asked for DNR/DNI, no feeding tubes. IVF and IVB were OK .    Reviewed DNR/DNI orders, which dtr would like to keep.   Shin would not like to add "no artificial feeding/feeding tubes" to MOLST because she would like to decide on this on a case by case basis. If her father is compliant with a feeding tube, she would be okay with it. She is okay with leaving this portion of the new MOLST blank.

## 2021-08-18 NOTE — PROGRESS NOTE ADULT - PROBLEM SELECTOR PLAN 3
Chronic, on HD at nursing [facility]  Nephrology following for recommendations  Continue current medical management/HD per family wishes

## 2021-08-18 NOTE — PROGRESS NOTE ADULT - ASSESSMENT
82yo M from [facility] w/ pmhx of CHF (EF 40% in July 2021), HTN, anxiety, Alzheimer's dementia, COPD on home 4L O2, T2DM on insulin, hx right nephrectomy and ESRD recently started on dialysis s/p tunnelled catheter, anemia of chronic disease on ROMELIA therapy, brought in by EMS with complaint of SOB and desat at NH. Patient admitted with AHRF 2/2 mucus plugging, severe COPD, severe AS. Palliative consulted for GOC, patient noncompliant with Bipap overnight.     Multiple discussions  held with Shin rod. She has decided on DNR/DNI, continue all other medical management for now.   It is important to her to allow her father to refuse tx if he chooses to and not force him into discomfort.       MEDD (morphine equivalent daily dose): 0      See Recs below.    Please call [phone] with questions or concerns 24/7.   We will continue to follow.     Discussed with primary MD, bedside RN.

## 2021-08-18 NOTE — PROGRESS NOTE ADULT - SUBJECTIVE AND OBJECTIVE BOX
DRAKE HO 81y Male  MRN#: [id number]   Hospital Day: 10d    HPI:  HPI: 82yo M from [facility] w/ pmhx of CHF (EF 40% in July 2021), HTN, anxiety, Alzheimer's dementia, COPD on home 4L O2, T2DM on insulin, hx right nephrectomy and ESRD recently started on dialysis s/p tunnelled catheter, anemia of chronic disease on ROMELIA therapy, brought in by EMS with complaint of SOB and desat at NH. Recently admitted for septic shock secondary to PNA and  metablic acidosis and hyperkalemia and received udall and urgent HD. Pt seen subsequently in ED for similar complaints as well between last month and now.     ED VITALS:  Vital Signs Last 24 Hrs  T(C): 37 (08 Aug 2021 01:22), Max: 37 (08 Aug 2021 01:22)  T(F): 98.6 (08 Aug 2021 01:22), Max: 98.6 (08 Aug 2021 01:22)  HR: 73 (08 Aug 2021 07:35) (73 - 83)  BP: 116/53 (08 Aug 2021 07:09) (116/53 - 145/86)  RR: 24 (08 Aug 2021 07:09) (24 - 24)  SpO2: 97% (08 Aug 2021 07:35) (88% - 100%)    ED IMAGING:   - CXR: R White Out   - POCUS Bedside: IVC not dilated, collapsible w/insp, pt not on any PPV, R Lung: Small pleural effusion, RLL appears atelectatic w/some dynamic bronchograms observed.     ED LABS: notable for WBC 15K, BNP > 70K, Trop 0.12, VBG PCO2 90    ED COURSE:   Pt given empiric abx cefepime + levofloxacin, 125mg solumedrol  MICU consulted pt approved for SDU, pt placed on BiPAP but did not tolerate ripped mask off.  (08 Aug 2021 08:45)      INTERVAL HPI AND OVERNIGHT EVENTS:  Patient was examined and seen at bedside. This morning he is resting comfortably in bed and reports no issues or overnight events. Patient remains non-compliant with BIPAP      OBJECTIVE  PAST MEDICAL & SURGICAL HISTORY  Diabetes mellitus    COPD (chronic obstructive pulmonary disease)    Lymphedema of both lower extremities    CHF (congestive heart failure)    H/O right nephrectomy  20 yrs ago      ALLERGIES:  No Known Allergies    MEDICATIONS:  STANDING MEDICATIONS  ALBUTerol    90 MICROgram(s) HFA Inhaler 2 Puff(s) Inhalation every 6 hours  ascorbic acid 500 milliGRAM(s) Oral daily  aspirin enteric coated 81 milliGRAM(s) Oral daily  atorvastatin 40 milliGRAM(s) Oral at bedtime  calcium acetate 667 milliGRAM(s) Oral three times a day with meals  chlorhexidine 4% Liquid 1 Application(s) Topical <User Schedule>  clopidogrel Tablet 75 milliGRAM(s) Oral daily  donepezil 5 milliGRAM(s) Oral at bedtime  epoetin graham-epbx (RETACRIT) Injectable 5000 Unit(s) IV Push <User Schedule>  furosemide   Injectable 80 milliGRAM(s) IV Push two times a day  glucagon  Injectable 1 milliGRAM(s) IntraMuscular once  heparin   Injectable 5000 Unit(s) SubCutaneous every 8 hours  insulin lispro (ADMELOG) corrective regimen sliding scale   SubCutaneous three times a day before meals  iron sucrose IVPB 50 milliGRAM(s) IV Intermittent <User Schedule>  magnesium sulfate  IVPB 2 Gram(s) IV Intermittent once  metoprolol tartrate 25 milliGRAM(s) Oral two times a day  mirtazapine 15 milliGRAM(s) Oral at bedtime  Nephro-maryjane      Nephro-maryjane 1 Tablet(s) Oral daily  pantoprazole    Tablet 40 milliGRAM(s) Oral before breakfast  sertraline 25 milliGRAM(s) Oral daily  tamsulosin 0.4 milliGRAM(s) Oral at bedtime    PRN MEDICATIONS  ALBUTerol    90 MICROgram(s) HFA Inhaler 2 Puff(s) Inhalation every 6 hours PRN      VITAL SIGNS: Last 24 Hours  T(C): 36.2 (18 Aug 2021 04:30), Max: 37.3 (17 Aug 2021 21:00)  T(F): 97.2 (18 Aug 2021 04:30), Max: 99.1 (17 Aug 2021 21:00)  HR: 73 (18 Aug 2021 04:30) (67 - 77)  BP: 155/67 (18 Aug 2021 04:30) (126/60 - 155/67)  BP(mean): --  RR: 18 (18 Aug 2021 04:30) (18 - 18)  SpO2: 100% (18 Aug 2021 04:30) (86% - 100%)    LABS:                        9.2    10.43 )-----------( 173      ( 18 Aug 2021 04:30 )             30.6     08-17    139  |  100  |  53<H>  ----------------------------<  145<H>  4.3   |  27  |  5.8<HH>    Ca    8.3<L>      17 Aug 2021 05:55  Mg     1.9     08-18                    RADIOLOGY:  EXAM:  XR CHEST PORTABLE ROUTINE 1V         PROCEDURE DATE:  08/17/2021      INTERPRETATION:  Clinical History / Reason for exam: Shortness of breath    Comparison : Chest radiograph 8/16/2021.    Technique/Positioning: Frontal chest radiograph.    Findings:    Support devices: Stable right IJ double lumen catheter.    Cardiac/mediastinum/hilum: No significant change.    Lung parenchyma/Pleura: Bilateral opacities/effusions, decreased on the left, increased on the right. No pneumothorax.    Skeleton/soft tissues: No significant change.    Impression:    Bilateral opacities/effusions, decreased on the left, increased on the right.        --- End of Report ---      PHYSICAL EXAM:  GENERAL: Anxious , AAOX1  CHEST/LUNG: decreased breath sounds , right IJ catheter in place   HEART: Regular rate and rhythm; systolic murmurs, rubs, or gallops  ABDOMEN: Bowel sounds present; Soft, Nontender, Nondistended.   EXTREMITIES:  no edema  NERVOUS SYSTEM:  Alert .   SKIN: warm and well perfused       ASSESSMENT & PLAN  82yo M from [facility] w/ pmhx of CHF (EF 40% in July 2021), HTN, anxiety, Alzheimer's dementia, COPD on home 4L O2, T2DM on insulin, hx right nephrectomy and ESRD recently started on dialysis s/p tunnelled catheter, anemia of chronic disease on ROMELIA therapy, brought in by EMS with complaint of SOB and desat at NH    # Acute on Chronic Hypercapneic RF secondary to Mucus Plug  # Severe COPD on 4 liters O2  - C-XRAY on admission 08/07  - New near complete opacification of the right hemithorax, possibly related to mucous plug. Unchanged left sided interstitial opacities/edema and small left pleural effusion.  - C-X ray 8/16 - Unchanged bilateral opacities/effusions  - s/p bronchoscopy 08/08 - copious thick secretions suctioned  - CT chest 8/13 - Debris within the trachea and right lower lobe bronchus. Correlate clinically for aspiration. Moderate bilateral pleural effusions, right greater than left. Right upper lobe solid pulmonary nodule measuring 1.2 cm.  consider follow-up with CT at 3 months, PET/CT or tissue sampling as clinically indicated.  - VA Duplex LE- negative , MRSA Nares negative   - continue Mucomyst inline, chest PT, aspiration precautions  - c/w Duonebs, Prednisone 40mg daily, Albuterol PRN , off Abx   - c/w IV lasix 80 BID   - passed speech and swallow eval (8/16)  - BIPAP at night, (patient is non-compliant)  - Palliative consulted    #Severe AS   - Echo (7/18)   Peak transaortic gradient equals 35.8 mmHg, mean transaortic gradient equals 19.1 mmHg, the calculated aortic valve area equals 0.58 cm² by the continuity equation consistent with severe aortic stenosis.  - per cardio on last admission cont with aspirin, Plavix and metoprolol   - discussion with family regarding GOC given multiple co-morbid conditions, if agreeing to do TAVR, f/u with Dr. Martinez as outpatient.    # ESRD on HD  - nephrology following   - patient unable to tolerate any more aggressive attempts during HD for more negative balance secondary to labile pressure and hypotension      #DMII   - check finger sticks qhs  - start insulin if needed.     #HTN  - c/w BB, ASA    #Dementia alzheimers  - c/w home alzheimer's meds.    # GI PPx - Protonix  # DVT PPx - Heparin 5000 mg SubQ  q8  # Activity -  Increase as Tolerated  # Dispo - Patient to be discharged when medically optimized.  # Code Status-  DNR / DNI      Dispo:

## 2021-08-18 NOTE — PROGRESS NOTE ADULT - ASSESSMENT
ESRD recently started on HD   - access via TDC  acute respiratory failure due to mucous plug / PNA - s/p bronchoscopy  COPD on home O2  HFrEF  dementia   HTN  anemia  right nephrectomy    plan:    Continue Lasix   HD today: 3 hours, opti 160 dialyzer, 2K bath, 2L UF  Patient developed hypotension during dialysis todayday with aggressive fluid removal  EPO and Venofer with HD  PhosLo with meals  Renal diet

## 2021-08-19 NOTE — PROGRESS NOTE ADULT - SUBJECTIVE AND OBJECTIVE BOX
DRAKE HO 81y Male  MRN#: [id number]   Hospital Day: 11d    HPI:  HPI: 82yo M from [facility] w/ pmhx of CHF (EF 40% in July 2021), HTN, anxiety, Alzheimer's dementia, COPD on home 4L O2, T2DM on insulin, hx right nephrectomy and ESRD recently started on dialysis s/p tunnelled catheter, anemia of chronic disease on ROMELIA therapy, brought in by EMS with complaint of SOB and desat at NH. Recently admitted for septic shock secondary to PNA and  metablic acidosis and hyperkalemia and received udall and urgent HD. Pt seen subsequently in ED for similar complaints as well between last month and now.     ED VITALS:  Vital Signs Last 24 Hrs  T(C): 37 (08 Aug 2021 01:22), Max: 37 (08 Aug 2021 01:22)  T(F): 98.6 (08 Aug 2021 01:22), Max: 98.6 (08 Aug 2021 01:22)  HR: 73 (08 Aug 2021 07:35) (73 - 83)  BP: 116/53 (08 Aug 2021 07:09) (116/53 - 145/86)  RR: 24 (08 Aug 2021 07:09) (24 - 24)  SpO2: 97% (08 Aug 2021 07:35) (88% - 100%)    ED IMAGING:   - CXR: R White Out   - POCUS Bedside: IVC not dilated, collapsible w/insp, pt not on any PPV, R Lung: Small pleural effusion, RLL appears atelectatic w/some dynamic bronchograms observed.     ED LABS: notable for WBC 15K, BNP > 70K, Trop 0.12, VBG PCO2 90    ED COURSE:   Pt given empiric abx cefepime + levofloxacin, 125mg solumedrol  MICU consulted pt approved for SDU, pt placed on BiPAP but did not tolerate ripped mask off.  (08 Aug 2021 08:45)      SUBJECTIVE  Patient is a 81y old Male who presents with a chief complaint of Desaturation (18 Aug 2021 17:22)  Currently admitted to medicine with the primary diagnosis of Acute hypercapnic respiratory failure      INTERVAL HPI AND OVERNIGHT EVENTS:  Patient was examined and seen at bedside. This morning he is resting comfortably in bed and reports no issues or overnight events. He is AAOX1. Patient is still refusing BiPAP      OBJECTIVE  PAST MEDICAL & SURGICAL HISTORY  Diabetes mellitus    COPD (chronic obstructive pulmonary disease)    Lymphedema of both lower extremities    CHF (congestive heart failure)    H/O right nephrectomy  20 yrs ago      ALLERGIES:  No Known Allergies    MEDICATIONS:  STANDING MEDICATIONS  ALBUTerol    90 MICROgram(s) HFA Inhaler 2 Puff(s) Inhalation every 6 hours  ascorbic acid 500 milliGRAM(s) Oral daily  aspirin enteric coated 81 milliGRAM(s) Oral daily  atorvastatin 40 milliGRAM(s) Oral at bedtime  calcium acetate 667 milliGRAM(s) Oral three times a day with meals  chlorhexidine 4% Liquid 1 Application(s) Topical <User Schedule>  clopidogrel Tablet 75 milliGRAM(s) Oral daily  donepezil 5 milliGRAM(s) Oral at bedtime  epoetin graham-epbx (RETACRIT) Injectable 5000 Unit(s) IV Push <User Schedule>  furosemide    Tablet 80 milliGRAM(s) Oral two times a day  glucagon  Injectable 1 milliGRAM(s) IntraMuscular once  heparin   Injectable 5000 Unit(s) SubCutaneous every 8 hours  insulin lispro (ADMELOG) corrective regimen sliding scale   SubCutaneous three times a day before meals  iron sucrose IVPB 50 milliGRAM(s) IV Intermittent <User Schedule>  metoprolol tartrate 25 milliGRAM(s) Oral two times a day  mirtazapine 15 milliGRAM(s) Oral at bedtime  Nephro-maryjane      Nephro-maryjane 1 Tablet(s) Oral daily  pantoprazole    Tablet 40 milliGRAM(s) Oral before breakfast  sertraline 25 milliGRAM(s) Oral daily  tamsulosin 0.4 milliGRAM(s) Oral at bedtime    PRN MEDICATIONS  ALBUTerol    90 MICROgram(s) HFA Inhaler 2 Puff(s) Inhalation every 6 hours PRN      VITAL SIGNS: Last 24 Hours  T(C): 36.1 (19 Aug 2021 05:21), Max: 37.6 (18 Aug 2021 21:21)  T(F): 97 (19 Aug 2021 05:21), Max: 99.7 (18 Aug 2021 21:21)  HR: 75 (19 Aug 2021 05:21) (60 - 85)  BP: 148/64 (19 Aug 2021 05:21) (101/43 - 148/64)  BP(mean): --  RR: 18 (19 Aug 2021 05:21) (18 - 19)  SpO2: 100% (19 Aug 2021 05:21) (90% - 100%)    LABS:                        8.9    9.77  )-----------( 179      ( 19 Aug 2021 09:02 )             29.9     08-18    142  |  102  |  78<HH>  ----------------------------<  110<H>  4.0   |  23  |  7.1<HH>    Ca    8.1<L>      18 Aug 2021 04:30  Mg     1.9     08-18    RADIOLOGY:    EXAM:  XR CHEST PORTABLE ROUTINE 1V          PROCEDURE DATE:  08/17/2021      INTERPRETATION:  Clinical History / Reason for exam: Shortness of breath    Comparison : Chest radiograph 8/16/2021.    Technique/Positioning: Frontal chest radiograph.    Findings:    Support devices: Stable right IJ double lumen catheter.    Cardiac/mediastinum/hilum: No significant change.    Lung parenchyma/Pleura: Bilateral opacities/effusions, decreased on the left, increased on the right. No pneumothorax.    Skeleton/soft tissues: No significant change.    Impression:    Bilateral opacities/effusions, decreased on the left, increased on the right.      PHYSICAL EXAM:  GENERAL: Anxious , AAOX1  CHEST/LUNG: decreased breath sounds , right IJ catheter in place   HEART: Regular rate and rhythm; systolic murmurs, rubs, or gallops  ABDOMEN: Bowel sounds present; Soft, Nontender, Nondistended.   EXTREMITIES:  no edema  NERVOUS SYSTEM:  Alert .   SKIN: warm and well perfused       ASSESSMENT & PLAN  82yo M from [facility] w/ pmhx of CHF (EF 40% in July 2021), HTN, anxiety, Alzheimer's dementia, COPD on home 4L O2, T2DM on insulin, hx right nephrectomy and ESRD recently started on dialysis s/p tunnelled catheter, anemia of chronic disease on ROMELIA therapy, brought in by EMS with complaint of SOB and desat at NH    # Acute on Chronic Hypercapneic RF secondary to Mucus Plug  # Severe COPD on 4 liters O2  - C-XRAY on admission 08/07  - New near complete opacification of the right hemithorax, possibly related to mucous plug. Unchanged left sided interstitial opacities/edema and small left pleural effusion.  - s/p bronchoscopy 08/08 - copious thick secretions suctioned  - C-X ray 8/17 -Bilateral opacities/effusions, decreased on the left, increased on the right.  - CT chest 8/13 - Debris within the trachea and right lower lobe bronchus. Correlate clinically for aspiration. Moderate bilateral pleural effusions, right greater than left. Right upper lobe solid pulmonary nodule measuring 1.2 cm.  consider follow-up with CT at 3 months, PET/CT or tissue sampling as clinically indicated.  - VA Duplex LE- negative , MRSA Nares negative   - continue Mucomyst inline, chest PT, aspiration precautions  - c/w Duonebs, Prednisone 40mg daily, Albuterol PRN , off Abx   - changed to PO lasix 80 BID   - passed speech and swallow eval (8/16)  - BIPAP at night, (patient is non-compliant)  - Palliative following    #Severe AS   - Echo (7/18)   Peak transaortic gradient equals 35.8 mmHg, mean transaortic gradient equals 19.1 mmHg, the calculated aortic valve area equals 0.58 cm² by the continuity equation consistent with severe aortic stenosis.  - per cardio on last admission cont with aspirin, Plavix and metoprolol   - discussion with family regarding GOC given multiple co-morbid conditions, if agreeing to do TAVR, f/u with Dr. Martinez as outpatient.    # ESRD on HD  - nephrology following   - patient unable to tolerate any more aggressive attempts during HD for more negative balance secondary to labile pressure and hypotension      #DMII   - check finger sticks qhs  - start insulin if needed.     #HTN  - c/w BB, ASA    #Dementia alzheimers  - c/w home alzheimer's meds.    # GI PPx - Protonix  # DVT PPx - Heparin 5000 mg SubQ  q8  # Activity -  Increase as Tolerated  # Dispo - Patient to be discharged when medically optimized. [facility]  # Code Status-  DNR / DNI

## 2021-08-19 NOTE — PROGRESS NOTE ADULT - ASSESSMENT
ESRD recently started on HD   - access via TDC  acute respiratory failure due to mucous plug / PNA - s/p bronchoscopy  COPD on home O2  HFrEF  dementia   HTN  anemia  right nephrectomy    plan:    Continue Lasix   HD tomorrow: 3 hours, opti 160 dialyzer, 2K bath, 2L UF  Patient developed hypotension during dialysis yesterday with aggressive fluid removal  EPO and Venofer with HD  PhosLo with meals  Renal diet

## 2021-08-19 NOTE — PROGRESS NOTE ADULT - ASSESSMENT
HPI: 82yo M from [facility] w/ pmhx of CHF (EF 40% in July 2021), HTN, anxiety, Alzheimer's dementia, COPD on home 4L O2, T2DM on insulin, hx right nephrectomy and ESRD recently started on dialysis s/p tunnelled catheter, anemia of chronic disease on ROMELIA therapy, brought in by EMS with complaint of SOB and desat at NH. Recently admitted for septic shock secondary to PNA and  metablic acidosis and hyperkalemia and received udall and urgent HD. Pt seen subsequently in ED for similar complaints as well between last month and now.   82yo M from [facility] w/ pmhx of CHF (EF 40% in July 2021), HTN, anxiety, Alzheimer's dementia, COPD on home 4L O2, T2DM on insulin, hx right nephrectomy and ESRD recently started on dialysis s/p tunnelled catheter, anemia of chronic disease on ROMELIA therapy, brought in by EMS with complaint of SOB and desat at NH    # Acute on Chronic Hypercapneic RF secondary to Mucus Plug  # Severe COPD >> currently on 6L NC.   - C-XRAY on admission 08/07  - New near complete opacification of the right hemithorax, possibly related to mucous plug. Unchanged left sided interstitial opacities/edema and small left pleural effusion.  - C-X ray 8/16 - Unchanged bilateral opacities/effusions  - s/p bronchoscopy 08/08 - copious thick secretions suctioned  - CT chest 8/13 - Debris within the trachea and right lower lobe bronchus. Correlate clinically for aspiration. Moderate bilateral pleural effusions, right greater than left. Right upper lobe solid pulmonary nodule measuring 1.2 cm.  consider follow-up with CT at 3 months, PET/CT or tissue sampling as clinically indicated.  - VA Duplex LE- negative , MRSA Nares negative   - continue Mucomyst inline, chest PT, aspiration precautions  - c/w Duonebs, Prednisone 40mg daily, Albuterol PRN , off Abx   - c/w IV lasix 80 BID   - passed speech and swallow eval (8/16)  - BIPAP at night, (patient is non-compliant)  - Palliative consulted    #Severe AS   - Echo (7/18)   Peak transaortic gradient equals 35.8 mmHg, mean transaortic gradient equals 19.1 mmHg, the calculated aortic valve area equals 0.58 cm² by the continuity equation consistent with severe aortic stenosis.  - per cardio on last admission cont with aspirin, Plavix and metoprolol   - IV lasix switched to PO.   - discussion with family regarding GOC given multiple co-morbid conditions, if agreeing to do TAVR, f/u with Dr. Martinez as outpatient.    # ESRD on HD  - nephrology following   - patient unable to tolerate any more aggressive attempts during HD for more negative balance secondary to labile pressure and hypotension    #DMII   - check finger sticks qhs  - start insulin if needed.     #HTN  - c/w BB, ASA    #Dementia alzheimers  - c/w home alzheimer's meds.    # GI PPx - Protonix  # DVT PPx - Heparin 5000 mg SubQ  q8  # Activity -  Increase as Tolerated  # Dispo - Patient to be discharged when medically optimized.  # Code Status-  DNR / DNI    #Progress Note Handoff  Pending (specify): LTC  Placement  Disposition: [facility] on Saturday.

## 2021-08-19 NOTE — PROGRESS NOTE ADULT - SUBJECTIVE AND OBJECTIVE BOX
DRAKE HO  81y  Male      Patient is a 81y old  Male who presents with a chief complaint of Desaturation.      INTERVAL HPI/OVERNIGHT EVENTS:      ******************************* REVIEW OF SYSTEMS:**********************************************    All other review of systems negative    *********************** VITALS ******************************************    T(F): 97.7 (08-19-21 @ 12:32)  HR: 66 (08-19-21 @ 12:32) (66 - 85)  BP: 161/67 (08-19-21 @ 12:32) (145/66 - 161/67)  RR: 18 (08-19-21 @ 12:32) (18 - 18)  SpO2: 99% (08-19-21 @ 12:32) (98% - 100%)    08-18-21 @ 07:01  -  08-19-21 @ 07:00  --------------------------------------------------------  IN: 0 mL / OUT: 2000 mL / NET: -2000 mL            08-18-21 @ 07:01  -  08-19-21 @ 07:00  --------------------------------------------------------  IN: 0 mL / OUT: 2000 mL / NET: -2000 mL        ******************************** PHYSICAL EXAM:**************************************************  GENERAL: NAD    PSYCH: no agitation,   HEENT:     NERVOUS SYSTEM:  Alert & awake x 2, did not answer my questions. Only made eye contact.     PULMONARY: NGA, CTA    CARDIOVASCULAR: S1S2 RRR    GI: Soft, NT, ND; BS present.    EXTREMITIES:  2+ Peripheral Pulses, No clubbing, cyanosis, or edema    LYMPH: No lymphadenopathy noted    SKIN: No rashes or lesions      **************************** LABS *******************************************************                          8.9    9.77  )-----------( 179      ( 19 Aug 2021 09:02 )             29.9     08-19    143  |  104  |  47<H>  ----------------------------<  148<H>  3.6   |  26  |  5.1<HH>    Ca    8.1<L>      19 Aug 2021 09:02  Phos  3.2     08-19  Mg     1.8     08-19    TPro  5.4<L>  /  Alb  3.3<L>  /  TBili  0.3  /  DBili  x   /  AST  13  /  ALT  7   /  AlkPhos  77  08-19          Lactate Trend        CAPILLARY BLOOD GLUCOSE      POCT Blood Glucose.: 171 mg/dL (19 Aug 2021 11:38)          **************************Active Medications *******************************************  No Known Allergies      ALBUTerol    90 MICROgram(s) HFA Inhaler 2 Puff(s) Inhalation every 6 hours  ALBUTerol    90 MICROgram(s) HFA Inhaler 2 Puff(s) Inhalation every 6 hours PRN  ascorbic acid 500 milliGRAM(s) Oral daily  aspirin enteric coated 81 milliGRAM(s) Oral daily  atorvastatin 40 milliGRAM(s) Oral at bedtime  calcium acetate 667 milliGRAM(s) Oral three times a day with meals  chlorhexidine 4% Liquid 1 Application(s) Topical <User Schedule>  clopidogrel Tablet 75 milliGRAM(s) Oral daily  donepezil 5 milliGRAM(s) Oral at bedtime  epoetin graham-epbx (RETACRIT) Injectable 5000 Unit(s) IV Push <User Schedule>  furosemide    Tablet 80 milliGRAM(s) Oral two times a day  glucagon  Injectable 1 milliGRAM(s) IntraMuscular once  heparin   Injectable 5000 Unit(s) SubCutaneous every 8 hours  insulin lispro (ADMELOG) corrective regimen sliding scale   SubCutaneous three times a day before meals  iron sucrose IVPB 50 milliGRAM(s) IV Intermittent <User Schedule>  magnesium sulfate  IVPB 2 Gram(s) IV Intermittent once  metoprolol tartrate 25 milliGRAM(s) Oral two times a day  mirtazapine 15 milliGRAM(s) Oral at bedtime  Nephro-maryjane      Nephro-maryjane 1 Tablet(s) Oral daily  pantoprazole    Tablet 40 milliGRAM(s) Oral before breakfast  sertraline 25 milliGRAM(s) Oral daily  tamsulosin 0.4 milliGRAM(s) Oral at bedtime      ***************************************************  RADIOLOGY & ADDITIONAL TESTS:    Imaging Personally Reviewed:  [ ] YES  [ ] NO    HEALTH ISSUES - PROBLEM Dx:  Palliative care by specialist    Acute respiratory failure with hypoxia    Acute respiratory failure    Sepsis    End stage renal disease    Severe aortic stenosis    Agitation

## 2021-08-19 NOTE — PROGRESS NOTE ADULT - SUBJECTIVE AND OBJECTIVE BOX
[facility] FOLLOW UP NOTE  --------------------------------------------------------------------------------  24 hour events/subjective: Patient examined. Appears comfortable.    PAST HISTORY  --------------------------------------------------------------------------------  No significant changes to PMH, PSH, FHx, SHx, unless otherwise noted    ALLERGIES & MEDICATIONS  --------------------------------------------------------------------------------  Allergies    No Known Allergies    Standing Inpatient Medications  ALBUTerol    90 MICROgram(s) HFA Inhaler 2 Puff(s) Inhalation every 6 hours  ascorbic acid 500 milliGRAM(s) Oral daily  aspirin enteric coated 81 milliGRAM(s) Oral daily  atorvastatin 40 milliGRAM(s) Oral at bedtime  calcium acetate 667 milliGRAM(s) Oral three times a day with meals  chlorhexidine 4% Liquid 1 Application(s) Topical <User Schedule>  clopidogrel Tablet 75 milliGRAM(s) Oral daily  donepezil 5 milliGRAM(s) Oral at bedtime  epoetin graham-epbx (RETACRIT) Injectable 5000 Unit(s) IV Push <User Schedule>  furosemide    Tablet 80 milliGRAM(s) Oral two times a day  glucagon  Injectable 1 milliGRAM(s) IntraMuscular once  heparin   Injectable 5000 Unit(s) SubCutaneous every 8 hours  insulin lispro (ADMELOG) corrective regimen sliding scale   SubCutaneous three times a day before meals  iron sucrose IVPB 50 milliGRAM(s) IV Intermittent <User Schedule>  magnesium sulfate  IVPB 2 Gram(s) IV Intermittent once  metoprolol tartrate 25 milliGRAM(s) Oral two times a day  mirtazapine 15 milliGRAM(s) Oral at bedtime  Nephro-maryjane      Nephro-maryjane 1 Tablet(s) Oral daily  pantoprazole    Tablet 40 milliGRAM(s) Oral before breakfast  sertraline 25 milliGRAM(s) Oral daily  tamsulosin 0.4 milliGRAM(s) Oral at bedtime    PRN Inpatient Medications  ALBUTerol    90 MICROgram(s) HFA Inhaler 2 Puff(s) Inhalation every 6 hours PRN    VITALS/PHYSICAL EXAM  --------------------------------------------------------------------------------  T(C): 36.1 (08-19-21 @ 05:21), Max: 37.6 (08-18-21 @ 21:21)  HR: 75 (08-19-21 @ 05:21) (60 - 85)  BP: 148/64 (08-19-21 @ 05:21) (101/43 - 148/64)  RR: 18 (08-19-21 @ 05:21) (18 - 19)  SpO2: 100% (08-19-21 @ 05:21) (90% - 100%)    08-18-21 @ 07:01  -  08-19-21 @ 07:00  --------------------------------------------------------  IN: 0 mL / OUT: 2000 mL / NET: -2000 mL    Physical Exam:  	Gen: NAD  	Pulm: B/L linda  	CV: RRR, S1S2  	Abd: +BS, soft, nontender/nondistended  	: No suprapubic tenderness  	LE: Warm, no edema  	Vascular access: TDC    LABS/STUDIES  --------------------------------------------------------------------------------              8.9    9.77  >-----------<  179      [08-19-21 @ 09:02]              29.9     143  |  104  |  47  ----------------------------<  148      [08-19-21 @ 09:02]  3.6   |  26  |  5.1        Ca     8.1     [08-19-21 @ 09:02]      Mg     1.8     [08-19-21 @ 09:02]      Phos  3.2     [08-19-21 @ 09:02]    TPro  5.4  /  Alb  3.3  /  TBili  0.3  /  DBili  x   /  AST  13  /  ALT  7   /  AlkPhos  77  [08-19-21 @ 09:02]    Creatinine Trend:  SCr 5.1 [08-19 @ 09:02]  SCr 7.1 [08-18 @ 04:30]  SCr 5.8 [08-17 @ 05:55]  SCr 8.4 [08-16 @ 06:42]  SCr 7.5 [08-15 @ 18:12]    Iron 34, TIBC 178, %sat 19      [01-04-21 @ 06:40]  Ferritin 332      [01-04-21 @ 06:40]  PTH -- (Ca 8.9)      [12-24-20 @ 04:30]   54

## 2021-08-20 NOTE — PROGRESS NOTE ADULT - SUBJECTIVE AND OBJECTIVE BOX
[facility] NEPHROLOGY FOLLOW UP NOTE  --------------------------------------------------------------------------------  24 hour events/subjective: Patient examined during HD. Appears comfortable.    PAST HISTORY  --------------------------------------------------------------------------------  No significant changes to PMH, PSH, FHx, SHx, unless otherwise noted    ALLERGIES & MEDICATIONS  --------------------------------------------------------------------------------  Allergies    No Known Allergies    Standing Inpatient Medications  ALBUTerol    90 MICROgram(s) HFA Inhaler 2 Puff(s) Inhalation every 6 hours  ascorbic acid 500 milliGRAM(s) Oral daily  aspirin enteric coated 81 milliGRAM(s) Oral daily  atorvastatin 40 milliGRAM(s) Oral at bedtime  calcium acetate 667 milliGRAM(s) Oral three times a day with meals  chlorhexidine 4% Liquid 1 Application(s) Topical <User Schedule>  clopidogrel Tablet 75 milliGRAM(s) Oral daily  donepezil 5 milliGRAM(s) Oral at bedtime  epoetin graham-epbx (RETACRIT) Injectable 5000 Unit(s) IV Push <User Schedule>  furosemide    Tablet 80 milliGRAM(s) Oral two times a day  glucagon  Injectable 1 milliGRAM(s) IntraMuscular once  heparin   Injectable 5000 Unit(s) SubCutaneous every 8 hours  insulin lispro (ADMELOG) corrective regimen sliding scale   SubCutaneous three times a day before meals  iron sucrose IVPB 50 milliGRAM(s) IV Intermittent <User Schedule>  metoprolol tartrate 25 milliGRAM(s) Oral two times a day  mirtazapine 15 milliGRAM(s) Oral at bedtime  Nephro-maryjane      Nephro-maryjane 1 Tablet(s) Oral daily  pantoprazole    Tablet 40 milliGRAM(s) Oral before breakfast  sertraline 25 milliGRAM(s) Oral daily  tamsulosin 0.4 milliGRAM(s) Oral at bedtime    PRN Inpatient Medications  ALBUTerol    90 MICROgram(s) HFA Inhaler 2 Puff(s) Inhalation every 6 hours PRN    VITALS/PHYSICAL EXAM  --------------------------------------------------------------------------------  T(C): 36.2 (08-20-21 @ 04:50), Max: 37.2 (08-19-21 @ 22:35)  HR: 60 (08-20-21 @ 11:30) (60 - 70)  BP: 133/60 (08-20-21 @ 11:30) (126/61 - 175/76)  RR: 22 (08-20-21 @ 11:30) (18 - 22)  SpO2: 97% (08-20-21 @ 11:30) (97% - 100%)  Weight (kg): 74.5 (08-20-21 @ 08:59)    08-20-21 @ 07:01  -  08-20-21 @ 17:00  --------------------------------------------------------  IN: 0 mL / OUT: 1100 mL / NET: -1100 mL    Physical Exam:  	Gen: NAD  	Pulm:  B/L scant rales  	CV: RRR, S1S2  	Abd: +BS, soft, nontender/nondistended  	: No suprapubic tenderness  	LE: Warm,  no edema  	Vascular access: TDC    LABS/STUDIES  --------------------------------------------------------------------------------              9.1    10.00 >-----------<  182      [08-20-21 @ 09:34]              30.0     141  |  101  |  70  ----------------------------<  137      [08-20-21 @ 09:34]  4.1   |  27  |  6.5        Ca     8.4     [08-20-21 @ 09:34]      Mg     2.2     [08-20-21 @ 09:34]      Phos  4.7     [08-20-21 @ 09:34]    TPro  5.7  /  Alb  3.4  /  TBili  0.3  /  DBili  x   /  AST  14  /  ALT  7   /  AlkPhos  65  [08-20-21 @ 09:34]    Creatinine Trend:  SCr 6.5 [08-20 @ 09:34]  SCr 5.1 [08-19 @ 09:02]  SCr 7.1 [08-18 @ 04:30]  SCr 5.8 [08-17 @ 05:55]  SCr 8.4 [08-16 @ 06:42]    Iron 34, TIBC 178, %sat 19      [01-04-21 @ 06:40]  Ferritin 332      [01-04-21 @ 06:40]  PTH -- (Ca 8.9)      [12-24-20 @ 04:30]   54

## 2021-08-20 NOTE — PROGRESS NOTE ADULT - REASON FOR ADMISSION
Desaturation
Acute Respiratory failure
Desaturation
Negative

## 2021-08-20 NOTE — DISCHARGE NOTE PROVIDER - CARE PROVIDER_API CALL
NUBIA WILDER  Internal Medicine  [address] [place] [address]  Phone: [phone]  Fax: [phone]  Follow Up Time: 2 weeks    David Cole  CRITICAL CARE MEDICINE  [address] [place] [address], [address] [address]  Phone: [phone]  Fax: [phone]  Follow Up Time: 2 weeks

## 2021-08-20 NOTE — PROGRESS NOTE ADULT - ASSESSMENT
ESRD recently started on HD   - access via TDC  acute respiratory failure due to mucous plug / PNA - s/p bronchoscopy  COPD on home O2  HFrEF  dementia   HTN  anemia  right nephrectomy    plan:    Continue Lasix   HD today: 3 hours, opti 160 dialyzer, 2K bath, 1L UF  EPO and Venofer with HD  PhosLo with meals  Renal diet

## 2021-08-20 NOTE — DISCHARGE NOTE PROVIDER - PROVIDER TOKENS
PROVIDER:[TOKEN:[[id number]],FOLLOWUP:[2 weeks]],PROVIDER:[TOKEN:[[id number]],FOLLOWUP:[2 weeks]]

## 2021-08-20 NOTE — PROGRESS NOTE ADULT - PROVIDER SPECIALTY LIST ADULT
Hospitalist
Internal Medicine
Nephrology
Internal Medicine
Nephrology
Nephrology
Hospitalist
Hospitalist
Internal Medicine
Nephrology
Pulmonology
Hospitalist
Nephrology
Palliative Care

## 2021-08-20 NOTE — DISCHARGE NOTE PROVIDER - NSDCCPCAREPLAN_GEN_ALL_CORE_FT
PRINCIPAL DISCHARGE DIAGNOSIS  Diagnosis: Acute hypercapnic respiratory failure  Assessment and Plan of Treatment: Phlegm is mucus that is naturally secreted by the glands in the lungs, and is an essential part of keeping the lungs healthy. Phlegm in the lungs traps and removes inhaled particles, cellular debris, and dead and aging cells.  Mucus can accumulate in the lungs and can plug up the airways, reducing air flow. In the smaller airways, mucus plugs can lead to collapsed air sacs (alveoli), impacting oxygen levels. If the mucus plugs are in the larger, upper airways, this can lead to a shortness of breath or a choking sensation.   You had a procedure known as bronscopy that was done to rremove the mucus plug  It is important that you use your BIPAP AT NIGHT AND AS NEEDED.   SEEK IMMEDIATE MEDICAL CARE IF YOU HAVE ANY OF THE FOLLOWING SYMPTOMS:  Cough, Shortness of breath, Choking sensation , Wheezing, Shallow breathing, Rapid breathing        SECONDARY DISCHARGE DIAGNOSES  Diagnosis: End stage COPD  Assessment and Plan of Treatment: Chronic obstructive pulmonary disease (COPD) is a lung condition in which airflow from the lungs is limited. Causes include smoking, secondhand smoke exposure, genetics, or recurrent infections. Take all medicines (inhaled or pills) as directed by your health care provider. Avoid exposure to irritants such as smoke, chemicals, and fumes that aggravate your breathing.  If you are a smoker, the most important thing that you can do is stop smoking. Continuing to smoke will cause further lung damage and breathing trouble. Ask your health care provider for help with quitting smoking.  Please continue to take your medication as presscribed   SEEK IMMEDIATE MEDICAL CARE IF YOU HAVE ANY OF THE FOLLOWING SYMPTOMS: shortness of breath at rest or when talking, bluish discoloration of lips, skin, fever, worsening cough, unexplained chest pain, or lightheadedness/dizziness.      Diagnosis: Lung nodule  Assessment and Plan of Treatment: You had a  solid pulmonary nodule measuring 1.2 cm.  consider follow-up with CT at 3 months, PET/CT or tissue sampling as clinically indicatedFollow up with your primary care physician. If a lung nodule is new or has changed in size, shape or appearance, your doctor may recommend further testing — such as a CT scan, positron emission tomography (PET) scan, bronchoscopy or tissue biopsy — to determine if it's cancerous.      Diagnosis: CHF (congestive heart failure)  Assessment and Plan of Treatment:   Congestive heart failure (CHF) is a chronic condition in which the heart has trouble pumping blood. In some cases of heart failure, fluid may back up into your lungs or you may have swelling (edema) in your lower legs. There are many causes of heart failure including high blood pressure, coronary artery disease, abnormal heart valves, heart muscle disease, lung disease, diabetes, etc. Symptoms include shortness of breath with activity or when lying flat, cough, swelling of the legs, fatigue, or increased urination during the night.   Treatment is aimed at managing the symptoms of heart failure and may include lifestyle changes, medications, or surgical procedures. Take medicines only as directed by your health care provider and do not stop unless instructed to do so. Eat heart-healthy foods with low or no trans/saturated fats, cholesterol and salt. Weigh yourself every day for early recognition of fluid accumulation.  Please continue to take your medicine as prescribed.   SEEK IMMEDIATE MEDICAL CARE IF YOU HAVE ANY OF THE FOLLOWING SYMPTOMS: shortness of breath, change in mental status, chest pain, lightheadedness/dizziness/fainting, or worsening of symptoms including not being able to conduct normal physical activity.

## 2021-08-20 NOTE — PROGRESS NOTE ADULT - ASSESSMENT
80 y/o M from [facility] w/ pmhx of CHF (EF 40% in July 2021), HTN, anxiety, Alzheimer's dementia, COPD on home 4L O2, T2DM on insulin, hx right nephrectomy and ESRD recently started on dialysis s/p tunnelled catheter, anemia of chronic disease on ROMELIA therapy was brought in by EMS with complaint of SOB and desaturation at NH. Recently admitted for septic shock secondary to PNA and  metabolic acidosis and hyperkalemia and received udall and urgent HD. Pt seen subsequently in ED for similar complaints as well between last month and now.     1. Acute on Chronic Hypercapnic respiratory failure due to Mucus Plug and volume overload  Severe COPD - now titrated to O2 at 3L NC   - CXR on admission 08/07  - New near complete opacification of the right hemithorax, possibly related to mucous plug. Unchanged left sided interstitial opacities/edema and small left pleural effusion.  - s/p bronchoscopy 08/08 - copious thick secretions suctioned  - CT chest 8/13 - Debris within the trachea and right lower lobe bronchus. Correlate clinically for aspiration. Moderate bilateral pleural effusions, right greater than left. Right upper lobe solid pulmonary nodule measuring 1.2 cm.  consider follow-up with CT at 3 months, PET/CT or tissue sampling as clinically indicated.  - VA Duplex LE negative , MRSA Nares negative   - continue chest PT, aspiration precautions  - c/w Albuterol PRN    - c/w PO lasix 80 BID   - passed speech and swallow eval (8/16)  - BIPAP at night, (patient is non-compliant per chart)  - seen by Palliative Care: DNR/DNI status  - guarded prognosis but overall improved  - stable for discharge to SNF today  - at risk for readmission due to multiple comorbidities    2. Severe AS   - Echo (7/18)   Peak transaortic gradient equals 35.8 mmHg, mean transaortic gradient equals 19.1 mmHg, the calculated aortic valve area equals 0.58 cm² by the continuity equation consistent with severe aortic stenosis.  - per cardio on last admission cont with aspirin, Plavix and metoprolol   - PO lasix   - family can f/u with Dr. Martinez as outpatient re: TAVR    3. ESRD on HD  - nephrology following   - patient unable to tolerate any more aggressive attempts during HD for more negative balance secondary to labile pressure and hypotension  - 1100 cc today    4. DM type 2 - on insulin    5. HTN on metoprolol    6. Volume overload/ acute over chronic HFrEF  - fluid removal with HD and on PO lasix    7. Dementia on Aricept    8. Anemia on venofer and Epogen        medication reconciliation and discharge papers reviewed    spent 40 minutes on the discharge process of this pt

## 2021-08-20 NOTE — PROGRESS NOTE ADULT - SUBJECTIVE AND OBJECTIVE BOX
Discharge note    DRAKE HO  81y Male    INTERVAL HPI/OVERNIGHT EVENTS:    Pt seen after HD today  lying in bed in no distress    T(F): 97.2 (08-20-21 @ 04:50), Max: 99 (08-19-21 @ 22:35)  HR: 60 (08-20-21 @ 11:30) (60 - 70)  BP: 133/60 (08-20-21 @ 11:30) (126/61 - 175/76)  RR: 22 (08-20-21 @ 11:30) (18 - 22)  SpO2: 97% (08-20-21 @ 11:30) (97% - 100%) on 3L NC    I&O's Summary    20 Aug 2021 07:01  -  20 Aug 2021 15:18  --------------------------------------------------------  IN: 0 mL / OUT: 1100 mL / NET: -1100 mL      CAPILLARY BLOOD GLUCOSE      POCT Blood Glucose.: 190 mg/dL (20 Aug 2021 12:56)  POCT Blood Glucose.: 120 mg/dL (20 Aug 2021 07:27)  POCT Blood Glucose.: 306 mg/dL (19 Aug 2021 20:57)  POCT Blood Glucose.: 144 mg/dL (19 Aug 2021 17:03)        PHYSICAL EXAM:  GENERAL: NAD  HEAD:  Normocephalic  EYES:  conjunctiva and sclera clear  ENMT: Moist mucous membranes  NERVOUS SYSTEM:  Alert, awake, Good concentration  CHEST/LUNG: CTA b/l (only anterior auscultated)  HEART: Regular rate and rhythm  right chest wall HD catheter  ABDOMEN: Soft, Nontender, Nondistended  EXTREMITIES:   + SCD b/l      Consultant(s) Notes Reviewed:  [x ] YES  [ ] NO  Care Discussed with Consultants/Other Providers [ x] YES  [ ] NO    MEDICATIONS  (STANDING):  ALBUTerol    90 MICROgram(s) HFA Inhaler 2 Puff(s) Inhalation every 6 hours  ascorbic acid 500 milliGRAM(s) Oral daily  aspirin enteric coated 81 milliGRAM(s) Oral daily  atorvastatin 40 milliGRAM(s) Oral at bedtime  calcium acetate 667 milliGRAM(s) Oral three times a day with meals  chlorhexidine 4% Liquid 1 Application(s) Topical <User Schedule>  clopidogrel Tablet 75 milliGRAM(s) Oral daily  donepezil 5 milliGRAM(s) Oral at bedtime  epoetin graham-epbx (RETACRIT) Injectable 5000 Unit(s) IV Push <User Schedule>  furosemide    Tablet 80 milliGRAM(s) Oral two times a day  glucagon  Injectable 1 milliGRAM(s) IntraMuscular once  heparin   Injectable 5000 Unit(s) SubCutaneous every 8 hours  insulin lispro (ADMELOG) corrective regimen sliding scale   SubCutaneous three times a day before meals  iron sucrose IVPB 50 milliGRAM(s) IV Intermittent <User Schedule>  metoprolol tartrate 25 milliGRAM(s) Oral two times a day  mirtazapine 15 milliGRAM(s) Oral at bedtime  Nephro-maryjane      Nephro-maryjane 1 Tablet(s) Oral daily  pantoprazole    Tablet 40 milliGRAM(s) Oral before breakfast  sertraline 25 milliGRAM(s) Oral daily  tamsulosin 0.4 milliGRAM(s) Oral at bedtime    MEDICATIONS  (PRN):  ALBUTerol    90 MICROgram(s) HFA Inhaler 2 Puff(s) Inhalation every 6 hours PRN Shortness of Breath and/or Wheezing      LABS:                        9.1    10.00 )-----------( 182      ( 20 Aug 2021 09:34 )             30.0     08-20    141  |  101  |  70<HH>  ----------------------------<  137<H>  4.1   |  27  |  6.5<HH>    Ca    8.4<L>      20 Aug 2021 09:34  Phos  4.7     08-20  Mg     2.2     08-20    TPro  5.7<L>  /  Alb  3.4<L>  /  TBili  0.3  /  DBili  x   /  AST  14  /  ALT  7   /  AlkPhos  65  08-20          RADIOLOGY & ADDITIONAL TESTS:    Imaging or report Personally Reviewed:  [ ] YES  [ ] NO    < from: Xray Chest 1 View- PORTABLE-Routine (Xray Chest 1 View- PORTABLE-Routine in AM.) (08.20.21 @ 06:01) >  Impression:    Bilateral opacities/effusions, increased from prior exam.    CXRs 8/19 and 8/20 reviewed by me: overall decreased PVC and right pleural effusion compared to 8/17    < end of copied text >      < from: TTE Echo Complete w/o Contrast w/ Doppler (07.18.21 @ 14:47) >  Summary:   1. Technically difficult study.   2. Moderately decreased global left ventricular systolic function.   3. Entire apex, mid and apical anterior septum, mid and apical inferior septum, and mid and apical inferior wall are abnormal as described above.   4. LV Ejection Fraction by Bishop's Method with a biplane EF of 40 %.   5. Moderately increased LV wall thickness.   6. Normal left ventricular internal cavity size.   7. Moderately enlarged left atrium.   8. Normal right atrial size.   9. There is no evidence of pericardial effusion.  10. Mild to moderate mitral annular calcification.  11. Mild to moderate mitral valve regurgitation.  12. Thickening and calcification of the anterior and posterior mitral valve leaflets.  13. Moderate tricuspid regurgitation.  14. Pulmonary hypertension is present.  15. Peak transaortic gradient equals 35.8 mmHg, mean transaortic gradient equals 19.1 mmHg, the calculated aortic valve area equals 0.58 cm² by the continuity equation consistent with severe aortic stenosis.    < end of copied text >      < from: CT Chest No Cont (08.13.21 @ 21:28) >  IMPRESSION:    Debris within the trachea and right lower lobe bronchus. Correlate clinically for aspiration.    Moderate bilateral pleural effusions, right greater than left.    Right upper lobe solid pulmonary nodule measuring 1.2 cm. Per Fleischner Society 2017 guidelines, consider follow-up with CT at 3 months, PET/CT or tissue sampling as clinically indicated.    < end of copied text >      Case discussed with resident today

## 2021-08-20 NOTE — DISCHARGE NOTE NURSING/CASE MANAGEMENT/SOCIAL WORK - NSDCPEFALRISK_GEN_ALL_CORE
For information on Fall & injury Prevention, visit [email or web address]

## 2021-08-20 NOTE — DISCHARGE NOTE NURSING/CASE MANAGEMENT/SOCIAL WORK - PATIENT PORTAL LINK FT
You can access the FollowMyHealth Patient Portal offered by [facility] by registering at the following website: [email or web address]. By joining [employer]’s FollowMyHealth portal, you will also be able to view your health information using other applications (apps) compatible with our system.

## 2021-08-20 NOTE — DISCHARGE NOTE PROVIDER - NSDCMRMEDTOKEN_GEN_ALL_CORE_FT
albuterol 90 mcg/inh inhalation aerosol: 2 puff(s) inhaled every 6 hours, As needed, Shortness of Breath and/or Wheezing  albuterol 90 mcg/inh inhalation aerosol: 2 puff(s) inhaled every 6 hours  ascorbic acid 500 mg oral tablet: 1 tab(s) orally once a day  aspirin 81 mg oral delayed release tablet: 1 tab(s) orally once a day  atorvastatin 40 mg oral tablet: 1 tab(s) orally once a day (at bedtime)  Breo Ellipta 200 mcg-25 mcg/inh inhalation powder: 1 puff(s) inhaled once a day  calcium acetate 667 mg oral tablet:  orally   clopidogrel 75 mg oral tablet: 1 tab(s) orally once a day  donepezil 5 mg oral tablet: 1 tab(s) orally once a day (at bedtime)  epoetin graham: 5000 unit(s) injectable Monday, Wednesday, and Friday  furosemide 80 mg oral tablet: 1 tab(s) orally 2 times a day  insulin lispro 100 units/mL injectable solution:  injectable   iron sucrose 20 mg/mL intravenous solution: 2.5 milliliter(s) intravenous   metoprolol tartrate 25 mg oral tablet: 1 tab(s) orally 2 times a day  mirtazapine 15 mg oral tablet: 1 tab(s) orally once a day (at bedtime)  multivitamin:   pantoprazole 40 mg oral delayed release tablet: 1 tab(s) orally once a day (before a meal)  Senna 8.6 mg oral tablet: 2 tab(s) orally once a day (at bedtime), As Needed  sertraline 25 mg oral tablet: 1 tab(s) orally once a day  tamsulosin 0.4 mg oral capsule: 1 cap(s) orally once a day (at bedtime)   albuterol 90 mcg/inh inhalation aerosol: 2 puff(s) inhaled every 6 hours, As needed, Shortness of Breath and/or Wheezing  albuterol 90 mcg/inh inhalation aerosol: 2 puff(s) inhaled every 6 hours  ascorbic acid 500 mg oral tablet: 1 tab(s) orally once a day  aspirin 81 mg oral delayed release tablet: 1 tab(s) orally once a day  atorvastatin 40 mg oral tablet: 1 tab(s) orally once a day (at bedtime)  Breo Ellipta 200 mcg-25 mcg/inh inhalation powder: 1 puff(s) inhaled once a day  calcium acetate 667 mg oral tablet: 1 tab(s) orally 3 times a day (with meals)  clopidogrel 75 mg oral tablet: 1 tab(s) orally once a day  donepezil 5 mg oral tablet: 1 tab(s) orally once a day (at bedtime)  epoetin graahm: 5000 unit(s) injectable Monday, Wednesday, and Friday  furosemide 80 mg oral tablet: 1 tab(s) orally 2 times a day  insulin lispro 100 units/mL injectable solution: injectable 3 times a day (before meals)  1 unit 150-200  2 units 201-250  3 units 251-300  4 units 301-350  5 units 351-400  over 400 notify MD  iron sucrose 20 mg/mL intravenous solution: 2.5 milliliter(s) intravenous Monday, Wednesday, and Friday  metoprolol tartrate 25 mg oral tablet: 1 tab(s) orally 2 times a day  mirtazapine 15 mg oral tablet: 1 tab(s) orally once a day (at bedtime)  multivitamin: 1 tab(s) orally once a day  pantoprazole 40 mg oral delayed release tablet: 1 tab(s) orally once a day (before a meal)  Senna 8.6 mg oral tablet: 2 tab(s) orally once a day (at bedtime), As Needed  sertraline 25 mg oral tablet: 1 tab(s) orally once a day  tamsulosin 0.4 mg oral capsule: 1 cap(s) orally once a day (at bedtime)

## 2021-08-20 NOTE — DISCHARGE NOTE PROVIDER - HOSPITAL COURSE
80yo M from [facility] w/ pmhx of CHF (EF 40% in July 2021), HTN, anxiety, Alzheimer's dementia, COPD on home 4L O2, T2DM on insulin, hx right nephrectomy and ESRD recently started on dialysis s/p tunnelled catheter, anemia of chronic disease on ROMELIA therapy, brought in by EMS with complaint of SOB and desat at NH. Recently admitted for septic shock secondary to PNA and  metablic acidosis and hyperkalemia and received udall and urgent HD. Pt seen subsequently in ED for similar complaints as well between last month and now    HOSPITAL COURSE   Patient was admitted for Acute on Chronic Hypercapnic respiratory failure secondary to Mucus Plug. C-X RAY on admission 08/07 showed New near complete opacification of the right hemithorax, possibly related to mucous plug. Unchanged left sided interstitial opacities/edema and small left pleural effusion. VA Duplex LE was negative. MRSA Nares was negative. A bronchoscopy (08/08) was done and copious thick secretions suctioned. There was improvement in the repeat Chest x-ray at that time. Patient was treated with Unasyn and Levofloxacin. He was also treated with Duonebs, Prednisone 40mg daily, Albuterol PRN. Patient was supposed to be placed on BIPAP overnight, however patient was not complaint. Subsequent C- Xrays showed increased pleural effusion and CT chest 8/13 - Debris within the trachea and right lower lobe bronchus. Correlate clinically for aspiration. Moderate bilateral pleural effusions, right greater than left. Right upper lobe solid pulmonary nodule measuring 1.2 cm.  consider follow-up with CT at 3 months, PET/CT or tissue sampling as clinically indicated.  Patient was started on IV Lasix 80 BID to help with diuresis in addition to HD.  C-x ray (8/19) showed Diminished right pleural effusion/opacity. Stable smaller left lung opacity. No pneumothorax. Patient was switched back to his home dose of lasix.    Of note patient has a history of sever AS which was worked up on his last admission (07/2021).  Echo (7/18) showed  Peak transaortic gradient equals 35.8 mmHg, mean transaortic gradient equals 19.1 mmHg, the calculated aortic valve area equals 0.58 cm² by the continuity equation consistent with severe aortic stenosis. per cardio on last admission cont with aspirin, Plavix and metoprolol. discussion was had  with family regarding GOC given multiple co-morbid conditions, if agreeing to do TAVR, f/u with Dr. Martinez as outpatient.    Patient is currently saturating 98% on 2L NC. He is medically stable and cleared for discharge.            80yo M from [facility] w/ pmhx of CHF (EF 40% in July 2021), HTN, anxiety, Alzheimer's dementia, COPD on home 4L O2, T2DM on insulin, hx right nephrectomy and ESRD recently started on dialysis s/p tunnelled catheter, anemia of chronic disease on ROMELIA therapy, brought in by EMS with complaint of SOB and desat at NH. Recently admitted for septic shock secondary to PNA and  metablic acidosis and hyperkalemia and received udall and urgent HD. Pt seen subsequently in ED for similar complaints as well between last month and now    HOSPITAL COURSE   Patient was admitted for Acute on Chronic Hypercapnic respiratory failure secondary to Mucus Plug. C-X RAY on admission 08/07 showed New near complete opacification of the right hemithorax, possibly related to mucous plug. Unchanged left sided interstitial opacities/edema and small left pleural effusion. VA Duplex LE was negative. MRSA Nares was negative. A bronchoscopy (08/08) was done and copious thick secretions suctioned. There was improvement in the repeat Chest x-ray at that time. Patient was treated with Unasyn and Levofloxacin. He was also treated with Duonebs, Prednisone 40mg daily, Albuterol PRN. Patient was supposed to be placed on BIPAP overnight, however patient was not complaint. Subsequent C- Xrays showed increased pleural effusion and CT chest 8/13 - Debris within the trachea and right lower lobe bronchus. Correlate clinically for aspiration. Moderate bilateral pleural effusions, right greater than left. Right upper lobe solid pulmonary nodule measuring 1.2 cm.  consider follow-up with CT at 3 months, PET/CT or tissue sampling as clinically indicated.  Patient was started on IV Lasix 80 BID to help with diuresis in addition to HD.  C-x ray (8/19) showed Diminished right pleural effusion/opacity. Stable smaller left lung opacity. No pneumothorax. Patient was switched back to his home dose of lasix.    Of note patient has a history of severe AS which was worked up on his last admission (07/2021).  Echo (7/18) showed  Peak transaortic gradient equals 35.8 mmHg, mean transaortic gradient equals 19.1 mmHg, the calculated aortic valve area equals 0.58 cm² by the continuity equation consistent with severe aortic stenosis. per cardio on last admission cont with aspirin, Plavix and metoprolol. discussion was had  with family regarding GOC given multiple co-morbid conditions, if agreeing to do TAVR, f/u with Dr. Martinez as outpatient.    Patient is currently saturating 98% on 2L NC. He is medically stable and cleared for discharge.

## 2021-08-25 NOTE — ED ADULT TRIAGE NOTE - CHIEF COMPLAINT QUOTE
Pt sent from nursing home for shortness of breath. Pt has a history of copd and O2 at 4L was 94%. Pt has dementia and often takes off his oxygen at night.

## 2021-08-26 NOTE — ED PROVIDER NOTE - OBJECTIVE STATEMENT
82 yo male hx of COPD on Home O2 4L/ CHF/ DM/ ESRD on HD BIBA from NH 2/2 hypoxia and sob during HD. as per daughter, patient became more agitated/restless after the HD so NH called EMS to bring patient to ED for evaluation. patient c/o SOB but denies other complaints.

## 2021-08-26 NOTE — CONSULT NOTE ADULT - SUBJECTIVE AND OBJECTIVE BOX
[place] [facility] INITIAL CONSULT NOTE  --------------------------------------------------------------------------------  HPI:  80 yo male hx of COPD on Home O2 4L, HFrEF (EF 40% in July 2021), T2DM, hx right nephrectomy, ESRD on HD MWF at [facility] under the care of Dr Mccurdy, HTN, anxiety, Alzheimer's dementia, anemia of chronic disease on ROMELIA therapy BIBA from NH due to hypoxia and SOB during HD session yesterday. As per daughter, NH reported that he became hypoxic to the 85s, and was agitated/restless during HD session, so NH called EMS to bring patient to ED for evaluation.   In ED, VS: /63, HR 82, RR 22, T 97.8F, SpO2 98% On RA.    PAST HISTORY  --------------------------------------------------------------------------------  PAST MEDICAL & SURGICAL HISTORY:  Diabetes mellitus  COPD (chronic obstructive pulmonary disease)  Lymphedema of both lower extremities  CHF (congestive heart failure)  ESRD on dialysis  H/O right nephrectomy  20 yrs ago    FAMILY HISTORY:  Neagtive for kidney disease    SOCIAL HISTORY:  No current ETOH, tobacco, or illicit drug use    ALLERGIES & MEDICATIONS  --------------------------------------------------------------------------------  Allergies    No Known Allergies    Standing Inpatient Medications  albuterol/ipratropium for Nebulization 3 milliLiter(s) Nebulizer every 6 hours  ascorbic acid 500 milliGRAM(s) Oral daily  aspirin enteric coated 81 milliGRAM(s) Oral daily  atorvastatin 40 milliGRAM(s) Oral at bedtime  clopidogrel Tablet 75 milliGRAM(s) Oral daily  donepezil 5 milliGRAM(s) Oral at bedtime  furosemide   Injectable 40 milliGRAM(s) IV Push two times a day  heparin   Injectable 5000 Unit(s) SubCutaneous every 8 hours  metoprolol tartrate 25 milliGRAM(s) Oral two times a day  mirtazapine 15 milliGRAM(s) Oral at bedtime  pantoprazole    Tablet 40 milliGRAM(s) Oral before breakfast  sertraline 25 milliGRAM(s) Oral daily  tamsulosin 0.4 milliGRAM(s) Oral at bedtime    PRN Inpatient Medications  ALBUTerol    90 MICROgram(s) HFA Inhaler 2 Puff(s) Inhalation every 6 hours PRN    REVIEW OF SYSTEMS  --------------------------------------------------------------------------------  Unable to obtain, patient refusing to answer questions    VITALS/PHYSICAL EXAM  --------------------------------------------------------------------------------  T(C): 36.6 (08-26-21 @ 09:02), Max: 37.4 (08-26-21 @ 07:32)  HR: 80 (08-26-21 @ 09:02) (80 - 86)  BP: 132/75 (08-26-21 @ 09:02) (132/75 - 149/67)  RR: 18 (08-26-21 @ 09:02) (18 - 22)  SpO2: 95% (08-26-21 @ 09:25) (95% - 99%)  Height (cm): 172.7 (08-25-21 @ 22:50)  Weight (kg): 76.9 (08-26-21 @ 09:02)  BMI (kg/m2): 25.8 (08-26-21 @ 09:02)  BSA (m2): 1.91 (08-26-21 @ 09:02)      Physical Exam:  	Gen: NAD  	Pulm:  B/L scant rales  	CV: RRR, S1S2  	Back: No  CVA tenderness; no sacral edema  	Abd: +BS, soft, nontender/nondistended  	: No suprapubic tenderness  	LE: Warm, no edema  	Neuro: AA  	Vascular access: TDC    LABS/STUDIES  --------------------------------------------------------------------------------              9.9    11.53 >-----------<  188      [08-26-21 @ 00:50]              33.5     132  |  95  |  26  ----------------------------<  134      [08-26-21 @ 00:50]  4.8   |  26  |  4.0        Ca     7.9     [08-26-21 @ 00:50]      Mg     2.0     [08-26-21 @ 00:50]    TPro  5.9  /  Alb  3.7  /  TBili  0.2  /  DBili  x   /  AST  27  /  ALT  10  /  AlkPhos  80  [08-26-21 @ 00:50]    PT/INR: PT 12.30, INR 1.07       [08-26-21 @ 00:50]  PTT: 31.6       [08-26-21 @ 00:50]    Troponin 0.11      [08-26-21 @ 00:50]    Creatinine Trend:  SCr 4.0 [08-26 @ 00:50]  SCr 6.5 [08-20 @ 09:34]  SCr 5.1 [08-19 @ 09:02]  SCr 7.1 [08-18 @ 04:30]  SCr 5.8 [08-17 @ 05:55]    Urinalysis - [07-12-21 @ 13:07]      Color Yellow / Appearance Slightly Turbid / SG 1.014 / pH 6.0      Gluc Negative / Ketone Negative  / Bili Negative / Urobili <2 mg/dL       Blood Small / Protein 300 mg/dL / Leuk Est Large / Nitrite Negative      RBC 14 / WBC 73 / Hyaline 3 / Gran  / Sq Epi  / Non Sq Epi 1 / Bacteria Few    Iron 34, TIBC 178, %sat 19      [01-04-21 @ 06:40]  Ferritin 332      [01-04-21 @ 06:40]  PTH -- (Ca 8.9)      [12-24-20 @ 04:30]   54    HBsAb Nonreact      [07-12-21 @ 18:05]  HBsAg Nonreact      [07-15-21 @ 20:00]  HCV 0.15, Nonreact      [07-15-21 @ 20:00]

## 2021-08-26 NOTE — ED PROVIDER NOTE - ATTENDING CONTRIBUTION TO CARE
I personally evaluated the patient. I reviewed the Resident’s or Physician Assistant’s note (as assigned above), and agree with the findings and plan except as documented in my note.  81 M with hx of dementia, COPD on 4 L NC, ESRD on HD, last today was sent from NH with complaints of hypoxia. NH papers states that he became hypoxic to the 80s. No fever, syncope, vomiting, diarrhea. VS reviewed, pt non-toxic appearing, NAD. Head ncat, MMM, neck supple, no JVD, normal ROM, normal s1s2 without any murmurs, Lungs with bibasilar crackles, normal work of breathing. abd +BS, s/nd/nt, extremities wnl, neuro exam grossly normal. No acute skin rashes. Plan is ekg, labs, monitor, imaging and dispo accordingly.

## 2021-08-26 NOTE — CONSULT NOTE ADULT - ASSESSMENT
IMPRESSION:    Chronic Hypercapnic Respiratory Failure secondary to Volume Overload, resolved  COPD on 4L NC home oxygen  HO HFrEF exacerbation  HO Recent Legionella Pneumonia  HO Pulmonary Hypertension  HO Severe Aortic Stenosis  HO ESRD on HD  HO Alzheimer's Dementia    PLAN:    Strict intake and outputs   HD per Nephrology  NIV at night  DVT prophylaxis  Pulmonary toilet  Nebs PRN  DNR/DNI       IMPRESSION:    Chronic Hypercapnic Respiratory Failure secondary to Volume Overload, resolved  COPD on 4L NC home oxygen  HO HFrEF exacerbation  HO Legionella Pneumonia  HO Pulmonary Hypertension  HO Severe Aortic Stenosis  HO ESRD on HD  HO Alzheimer's Dementia    PLAN:    Strict intake and outputs   HD per Nephrology  NIV at night  DVT prophylaxis  Pulmonary toilet  Nebs PRN  DNR/DNI       IMPRESSION:    Chronic Hypercapnic Respiratory Failure secondary to Volume Overload  Moderate Bilateral Pleural Effusions on bedside US  COPD on 4L NC home oxygen  HO HFrEF exacerbation  HO Legionella Pneumonia  HO Pulmonary Hypertension  HO Severe Aortic Stenosis  HO ESRD on HD  HO Alzheimer's Dementia    PLAN:    Continue IV diuresis   Possible thoracentesis   Strict intake and outputs   HD per Nephrology  DVT prophylaxis  Pulmonary toilet  Nebs PRN  DNR/DNI       IMPRESSION:    Chronic Hypercapnic Respiratory Failure secondary to Volume Overload  Moderate Bilateral Pleural Effusions on bedside US  COPD on 4L NC home oxygen  HO HFrEF exacerbation  HO Legionella Pneumonia  HO Pulmonary Hypertension  HO Severe Aortic Stenosis  HO ESRD on HD  HO Alzheimer's Dementia    PLAN:    Continue IV diuresis   Possible thoracentesis   Check D-dimer  Repeat LE doppler  Strict intake and outputs   HD per Nephrology  DVT prophylaxis  Pulmonary toilet  Nebs PRN  DNR/DNI       IMPRESSION:    Chronic Hypercapnic Respiratory Failure secondary to Volume Overload  Moderate Bilateral Pleural Effusions on bedside US  COPD on 4L NC home oxygen  HO HFrEF exacerbation  HO Legionella Pneumonia  HO Pulmonary Hypertension  HO Severe Aortic Stenosis  HO ESRD on HD  HO Alzheimer's Dementia    PLAN:    Continue IV diuresis   Possible thoracentesis   Check D-dimer  Repeat LE doppler  Strict intake and outputs   NIV at night  HD per Nephrology  DVT prophylaxis  Pulmonary toilet  Nebs PRN  DNR/DNI       IMPRESSION:    Chronic Hypercapnic Respiratory Failure secondary to Volume Overload  Stable Moderate Bilateral Pleural Effusions on bedside US  COPD on 4L NC home oxygen  HO HFrEF exacerbation  HO Legionella Pneumonia  HO Pulmonary Hypertension  HO Severe Aortic Stenosis  HO ESRD on HD  HO Alzheimer's Dementia    PLAN:    Continue IV diuresis   No need for thoracentesis at this time  Check D-dimer  Repeat LE doppler  Strict intake and outputs   NIV at night  HD per Nephrology  DVT prophylaxis  Pulmonary toilet  Nebs PRN  DNR/DNI       IMPRESSION:    Chronic Hypercapnic Respiratory Failure secondary to Volume Overload  Stable Moderate Bilateral Pleural Effusions on bedside US  COPD on 4L NC home oxygen  HO HFrEF exacerbation  HO Legionella Pneumonia  HO Pulmonary Hypertension  HO Severe Aortic Stenosis  HO ESRD on HD  HO Alzheimer's Dementia    PLAN:    keep neg balance  No need for thoracentesis at this time  Strict intake and outputs   NIV at night  HD per Nephrology  DVT prophylaxis  Pulmonary toilet  Nebs PRN  DNR/DNI

## 2021-08-26 NOTE — H&P ADULT - HISTORY OF PRESENT ILLNESS
80 yo male hx of COPD on Home O2 4L, / CHF (EF 40% in July 2021)/ T2DM/hx right nephrectomy, ESRD on HD, s/p tunnelled catheter, HTN, anxiety, Alzheimer's dementia, anemia of chronic disease on ROMELIA therapy BIBA from NH 2/2 hypoxia and sob during HD. as per daughter, patient became more agitated/restless after the HD so NH called EMS to bring patient to ED for evaluation. patient c/o SOB but denies other complaints  NH papers states that he became hypoxic to the 80s.       In ED, VS: /63, HR 82, RR 22, T 97.8F, SpO2 98% On Ra 80 yo male hx of COPD on Home O2 4L, HFrEF (EF 40% in July 2021),  T2DM, hx right nephrectomy, ESRD on HD, HTN, anxiety, Alzheimer's dementia, anemia of chronic disease on ROMELIA therapy BIBA from NH due to hypoxia and SOB during HD session yesterday. As per daughter, NH reported that he became hypoxic to the 85s, and was agitated/restless during HD session, so NH called EMS to bring patient to ED for evaluation. Daughter denies any fever, syncope, chest pain , abdominal pain, nausea, vomiting, diarrhea, dysuria. Daughter reports that patient recently complained of left heel pain.     In ED, VS: /63, HR 82, RR 22, T 97.8F, SpO2 98% On RA.  On labs: troponins 0.11, pro- BNP 18346 ( at baseline), WBC 11.53, Hgb 9.9( at baseline), Na 132, Cr 4.0.  VBG showed pH 76.21, pCO2 72, HCO3 30.  CXR showed increased bibasilar opacities b/l.  CT head showed no CT evidence for acute intracranial hemorrhage, territorial infarct or midline shift; Chronic microvascular ischemic changes and chronic lacunar infarcts.  Pt was given 40 mg IV lasix in ED.

## 2021-08-26 NOTE — ED ADULT NURSE NOTE - OBJECTIVE STATEMENT
PT BIBEMS for shortness of breath. PT per family member has AMS and isnt acting right compared to normal. PT normally wears O2 and can become confused and take it off at night per nursing home.

## 2021-08-26 NOTE — H&P ADULT - NSICDXPASTMEDICALHX_GEN_ALL_CORE_FT
PAST MEDICAL HISTORY:  CHF (congestive heart failure)     COPD (chronic obstructive pulmonary disease)     Diabetes mellitus     Lymphedema of both lower extremities      PAST MEDICAL HISTORY:  CHF (congestive heart failure)     COPD (chronic obstructive pulmonary disease)     Diabetes mellitus     ESRD on dialysis     Lymphedema of both lower extremities

## 2021-08-26 NOTE — ED PROVIDER NOTE - CARE PLAN
1 Principal Discharge DX:	Fluid overload  Secondary Diagnosis:	Acute on chronic systolic heart failure  Secondary Diagnosis:	ESRD on hemodialysis

## 2021-08-26 NOTE — CONSULT NOTE ADULT - SUBJECTIVE AND OBJECTIVE BOX
Patient is a 81y old  Male who presents with a chief complaint of SOB (26 Aug 2021 08:43)      HPI: 80 yo M of COPD on 4L NC home oxyegn, Chronic HFrEF (EF 40%),  DM II, ESRD on HD, Right Nephrectomy, Anxiety, Alzheimer's Dementia, Anemia of Chronic Disease BIBA from NH due to hypoxia and SOB during HD session. As per daughter, NH noted patient was hypoxic around 85% and was agitated/restless.     PAST MEDICAL & SURGICAL HISTORY:  Diabetes mellitus    COPD (chronic obstructive pulmonary disease)    Lymphedema of both lower extremities    CHF (congestive heart failure)    ESRD on dialysis    H/O right nephrectomy  20 yrs ago        SOCIAL HX:   Unable to obtain    FAMILY HISTORY:  :  No known cardiovascular family history     Review Of Systems:     All ROS are negative except per HPI       Allergies    No Known Allergies    Intolerances          PHYSICAL EXAM    ICU Vital Signs Last 24 Hrs  T(C): 36.6 (26 Aug 2021 09:02), Max: 37.4 (26 Aug 2021 07:32)  T(F): 97.9 (26 Aug 2021 09:02), Max: 99.4 (26 Aug 2021 07:32)  HR: 80 (26 Aug 2021 09:02) (80 - 86)  BP: 132/75 (26 Aug 2021 09:02) (132/75 - 149/67)  BP(mean): --  ABP: --  ABP(mean): --  RR: 18 (26 Aug 2021 09:02) (18 - 22)  SpO2: 95% (26 Aug 2021 09:25) (95% - 99%)      CONSTITUTIONAL:  NAD    ENT:   Airway patent,   Mouth with normal mucosa.   No thrush      CARDIAC:   Normal rate,   Regular rhythm.    No edema      Vascular:   normal systolic impulse  no bruits    RESPIRATORY:   No wheezing  Bilateral BS   Not tachypneic,  No use of accessory muscles    GASTROINTESTINAL:  Abdomen soft,   Non-tender,   No guarding,   + BS      NEUROLOGICAL:   Alert and oriented x1  No motor deficits.    SKIN:   Skin normal color for race,   No evidence of rash.      HEME LYMPH: .  No cervical  lymphadenopathy.  No inguinal lymphadenopathy              LABS:                          9.9    11.53 )-----------( 188      ( 26 Aug 2021 00:50 )             33.5                                               08-26    132<L>  |  95<L>  |  26<H>  ----------------------------<  134<H>  4.8   |  26  |  4.0<H>    Ca    7.9<L>      26 Aug 2021 00:50  Mg     2.0     08-26    TPro  5.9<L>  /  Alb  3.7  /  TBili  0.2  /  DBili  x   /  AST  27  /  ALT  10  /  AlkPhos  80  08-26      PT/INR - ( 26 Aug 2021 00:50 )   PT: 12.30 sec;   INR: 1.07 ratio         PTT - ( 26 Aug 2021 00:50 )  PTT:31.6 sec                                           CARDIAC MARKERS ( 26 Aug 2021 00:50 )  x     / 0.11 ng/mL / x     / x     / x                                                LIVER FUNCTIONS - ( 26 Aug 2021 00:50 )  Alb: 3.7 g/dL / Pro: 5.9 g/dL / ALK PHOS: 80 U/L / ALT: 10 U/L / AST: 27 U/L / GGT: x                                                                                           MEDICATIONS  (STANDING):  albuterol/ipratropium for Nebulization 3 milliLiter(s) Nebulizer every 6 hours  ascorbic acid 500 milliGRAM(s) Oral daily  aspirin enteric coated 81 milliGRAM(s) Oral daily  atorvastatin 40 milliGRAM(s) Oral at bedtime  clopidogrel Tablet 75 milliGRAM(s) Oral daily  donepezil 5 milliGRAM(s) Oral at bedtime  furosemide   Injectable 40 milliGRAM(s) IV Push two times a day  heparin   Injectable 5000 Unit(s) SubCutaneous every 8 hours  metoprolol tartrate 25 milliGRAM(s) Oral two times a day  mirtazapine 15 milliGRAM(s) Oral at bedtime  pantoprazole    Tablet 40 milliGRAM(s) Oral before breakfast  sertraline 25 milliGRAM(s) Oral daily  tamsulosin 0.4 milliGRAM(s) Oral at bedtime    MEDICATIONS  (PRN):  ALBUTerol    90 MICROgram(s) HFA Inhaler 2 Puff(s) Inhalation every 6 hours PRN Shortness of Breath and/or Wheezing        Xray reviewed          Patient is a 81y old  Male who presents with a chief complaint of SOB (26 Aug 2021 08:43)      HPI: 82 yo M of COPD on 4L NC home oxygen Chronic HFrEF (EF 40%),  DM II, ESRD on HD, Right Nephrectomy, Anxiety, Alzheimer's Dementia, Anemia of Chronic Disease recently admitted and discharged on 8/20 for acute hypoxemic respiratory failure secondary to mucous plug, BIBA from NH due to hypoxia and SOB during HD session. As per daughter, NH noted patient was hypoxic around 85% and was agitated/restless.     PAST MEDICAL & SURGICAL HISTORY:  Diabetes mellitus    COPD (chronic obstructive pulmonary disease)    Lymphedema of both lower extremities    CHF (congestive heart failure)    ESRD on dialysis    H/O right nephrectomy  20 yrs ago        SOCIAL HX:   Unable to obtain    FAMILY HISTORY:  :  No known cardiovascular family history     Review Of Systems:     All ROS are negative except per HPI       Allergies    No Known Allergies    Intolerances          PHYSICAL EXAM    ICU Vital Signs Last 24 Hrs  T(C): 36.6 (26 Aug 2021 09:02), Max: 37.4 (26 Aug 2021 07:32)  T(F): 97.9 (26 Aug 2021 09:02), Max: 99.4 (26 Aug 2021 07:32)  HR: 80 (26 Aug 2021 09:02) (80 - 86)  BP: 132/75 (26 Aug 2021 09:02) (132/75 - 149/67)  BP(mean): --  ABP: --  ABP(mean): --  RR: 18 (26 Aug 2021 09:02) (18 - 22)  SpO2: 95% (26 Aug 2021 09:25) (95% - 99%)      CONSTITUTIONAL:  NAD    ENT:   Airway patent,   Mouth with normal mucosa.   No thrush      CARDIAC:   Normal rate,   Regular rhythm.    No edema      Vascular:   normal systolic impulse  no bruits    RESPIRATORY:   Bibasilar crackles  No wheezing  Bilateral BS   Not tachypneic,  No use of accessory muscles    GASTROINTESTINAL:  Abdomen soft,   Non-tender,   No guarding,   + BS      NEUROLOGICAL:   Alert and oriented x1  No motor deficits.    SKIN:   Skin normal color for race,   No evidence of rash.      HEME LYMPH: .  No cervical  lymphadenopathy.  No inguinal lymphadenopathy              LABS:                          9.9    11.53 )-----------( 188      ( 26 Aug 2021 00:50 )             33.5                                               08-26    132<L>  |  95<L>  |  26<H>  ----------------------------<  134<H>  4.8   |  26  |  4.0<H>    Ca    7.9<L>      26 Aug 2021 00:50  Mg     2.0     08-26    TPro  5.9<L>  /  Alb  3.7  /  TBili  0.2  /  DBili  x   /  AST  27  /  ALT  10  /  AlkPhos  80  08-26      PT/INR - ( 26 Aug 2021 00:50 )   PT: 12.30 sec;   INR: 1.07 ratio         PTT - ( 26 Aug 2021 00:50 )  PTT:31.6 sec                                           CARDIAC MARKERS ( 26 Aug 2021 00:50 )  x     / 0.11 ng/mL / x     / x     / x                                                LIVER FUNCTIONS - ( 26 Aug 2021 00:50 )  Alb: 3.7 g/dL / Pro: 5.9 g/dL / ALK PHOS: 80 U/L / ALT: 10 U/L / AST: 27 U/L / GGT: x                                                                                           MEDICATIONS  (STANDING):  albuterol/ipratropium for Nebulization 3 milliLiter(s) Nebulizer every 6 hours  ascorbic acid 500 milliGRAM(s) Oral daily  aspirin enteric coated 81 milliGRAM(s) Oral daily  atorvastatin 40 milliGRAM(s) Oral at bedtime  clopidogrel Tablet 75 milliGRAM(s) Oral daily  donepezil 5 milliGRAM(s) Oral at bedtime  furosemide   Injectable 40 milliGRAM(s) IV Push two times a day  heparin   Injectable 5000 Unit(s) SubCutaneous every 8 hours  metoprolol tartrate 25 milliGRAM(s) Oral two times a day  mirtazapine 15 milliGRAM(s) Oral at bedtime  pantoprazole    Tablet 40 milliGRAM(s) Oral before breakfast  sertraline 25 milliGRAM(s) Oral daily  tamsulosin 0.4 milliGRAM(s) Oral at bedtime    MEDICATIONS  (PRN):  ALBUTerol    90 MICROgram(s) HFA Inhaler 2 Puff(s) Inhalation every 6 hours PRN Shortness of Breath and/or Wheezing        Xray reviewed          Patient is a 81y old  Male who presents with a chief complaint of SOB (26 Aug 2021 08:43)      HPI: 82 yo M of COPD on 4L NC home oxygen Chronic HFrEF (EF 40%),  DM II, ESRD on HD, Right Nephrectomy, Anxiety, Alzheimer's Dementia, Anemia of Chronic Disease recently admitted and discharged on 8/20 for acute hypoxemic respiratory failure secondary to mucous plug, BIBA from NH due to hypoxia and SOB during HD session day prior to admission. As per daughter, NH noted patient was hypoxic around 85% and was agitated/restless.     PAST MEDICAL & SURGICAL HISTORY:  Diabetes mellitus    COPD (chronic obstructive pulmonary disease)    Lymphedema of both lower extremities    CHF (congestive heart failure)    ESRD on dialysis    H/O right nephrectomy  20 yrs ago        SOCIAL HX:   Unable to obtain    FAMILY HISTORY:  :  No known cardiovascular family history     Review Of Systems:     All ROS are negative except per HPI       Allergies    No Known Allergies    Intolerances          PHYSICAL EXAM    ICU Vital Signs Last 24 Hrs  T(C): 36.6 (26 Aug 2021 09:02), Max: 37.4 (26 Aug 2021 07:32)  T(F): 97.9 (26 Aug 2021 09:02), Max: 99.4 (26 Aug 2021 07:32)  HR: 80 (26 Aug 2021 09:02) (80 - 86)  BP: 132/75 (26 Aug 2021 09:02) (132/75 - 149/67)  BP(mean): --  ABP: --  ABP(mean): --  RR: 18 (26 Aug 2021 09:02) (18 - 22)  SpO2: 95% (26 Aug 2021 09:25) (95% - 99%)      CONSTITUTIONAL:  NAD    ENT:   Airway patent,   Mouth with normal mucosa.   No thrush      CARDIAC:   Normal rate,   Regular rhythm.    No edema      Vascular:   normal systolic impulse  no bruits    RESPIRATORY:   Bibasilar crackles  No wheezing  Bilateral BS   Not tachypneic,  No use of accessory muscles    GASTROINTESTINAL:  Abdomen soft,   Non-tender,   No guarding,   + BS      NEUROLOGICAL:   Alert and oriented x1  No motor deficits.    SKIN:   Skin normal color for race,   No evidence of rash.      HEME LYMPH: .  No cervical  lymphadenopathy.  No inguinal lymphadenopathy              LABS:                          9.9    11.53 )-----------( 188      ( 26 Aug 2021 00:50 )             33.5                                               08-26    132<L>  |  95<L>  |  26<H>  ----------------------------<  134<H>  4.8   |  26  |  4.0<H>    Ca    7.9<L>      26 Aug 2021 00:50  Mg     2.0     08-26    TPro  5.9<L>  /  Alb  3.7  /  TBili  0.2  /  DBili  x   /  AST  27  /  ALT  10  /  AlkPhos  80  08-26      PT/INR - ( 26 Aug 2021 00:50 )   PT: 12.30 sec;   INR: 1.07 ratio         PTT - ( 26 Aug 2021 00:50 )  PTT:31.6 sec                                           CARDIAC MARKERS ( 26 Aug 2021 00:50 )  x     / 0.11 ng/mL / x     / x     / x                                                LIVER FUNCTIONS - ( 26 Aug 2021 00:50 )  Alb: 3.7 g/dL / Pro: 5.9 g/dL / ALK PHOS: 80 U/L / ALT: 10 U/L / AST: 27 U/L / GGT: x                                                                                           MEDICATIONS  (STANDING):  albuterol/ipratropium for Nebulization 3 milliLiter(s) Nebulizer every 6 hours  ascorbic acid 500 milliGRAM(s) Oral daily  aspirin enteric coated 81 milliGRAM(s) Oral daily  atorvastatin 40 milliGRAM(s) Oral at bedtime  clopidogrel Tablet 75 milliGRAM(s) Oral daily  donepezil 5 milliGRAM(s) Oral at bedtime  furosemide   Injectable 40 milliGRAM(s) IV Push two times a day  heparin   Injectable 5000 Unit(s) SubCutaneous every 8 hours  metoprolol tartrate 25 milliGRAM(s) Oral two times a day  mirtazapine 15 milliGRAM(s) Oral at bedtime  pantoprazole    Tablet 40 milliGRAM(s) Oral before breakfast  sertraline 25 milliGRAM(s) Oral daily  tamsulosin 0.4 milliGRAM(s) Oral at bedtime    MEDICATIONS  (PRN):  ALBUTerol    90 MICROgram(s) HFA Inhaler 2 Puff(s) Inhalation every 6 hours PRN Shortness of Breath and/or Wheezing        Xray reviewed          Patient is a 81y old  Male who presents with a chief complaint of SOB (26 Aug 2021 08:43)      HPI: 80 yo M of COPD on 4L [place] home oxygen Chronic HFrEF (EF 40%),  DM II, ESRD on HD, Right Nephrectomy, Anxiety, Alzheimer's Dementia, Anemia of Chronic Disease recently admitted and discharged on 8/20 for acute hypoxemic respiratory failure secondary to mucous plug, BIBA from NH due to hypoxia and SOB during HD session day prior to admission. As per daughter, NH noted patient was hypoxic around 85% and was agitated/restless. admitted to floor called to evaluate for pleural effuion    PAST MEDICAL & SURGICAL HISTORY:  Diabetes mellitus    COPD (chronic obstructive pulmonary disease)    Lymphedema of both lower extremities    CHF (congestive heart failure)    ESRD on dialysis    H/O right nephrectomy  20 yrs ago        SOCIAL HX:   Unable to obtain    FAMILY HISTORY:  :  No known cardiovascular family history     Review Of Systems:     All ROS are negative except per HPI       Allergies    No Known Allergies    Intolerances          PHYSICAL EXAM    ICU Vital Signs Last 24 Hrs  T(C): 36.6 (26 Aug 2021 09:02), Max: 37.4 (26 Aug 2021 07:32)  T(F): 97.9 (26 Aug 2021 09:02), Max: 99.4 (26 Aug 2021 07:32)  HR: 80 (26 Aug 2021 09:02) (80 - 86)  BP: 132/75 (26 Aug 2021 09:02) (132/75 - 149/67)  RR: 18 (26 Aug 2021 09:02) (18 - 22)  SpO2: 95% (26 Aug 2021 09:25) (95% - 99%)      CONSTITUTIONAL:  chronically ill looking    ENT:   Airway patent,   Mouth with normal mucosa.   No thrush      CARDIAC:   neli 2.6      RESPIRATORY:   Bibasilar crackles  No wheezing  Bilateral BS   Not tachypneic,  No use of accessory muscles    GASTROINTESTINAL:  Abdomen soft,   Non-tender,   No guarding,   + BS      NEUROLOGICAL:   Alert and oriented x1  No motor deficits.                LABS:                          9.9    11.53 )-----------( 188      ( 26 Aug 2021 00:50 )             33.5                                               08-26    132<L>  |  95<L>  |  26<H>  ----------------------------<  134<H>  4.8   |  26  |  4.0<H>    Ca    7.9<L>      26 Aug 2021 00:50  Mg     2.0     08-26    TPro  5.9<L>  /  Alb  3.7  /  TBili  0.2  /  DBili  x   /  AST  27  /  ALT  10  /  AlkPhos  80  08-26      PT/INR - ( 26 Aug 2021 00:50 )   PT: 12.30 sec;   INR: 1.07 ratio         PTT - ( 26 Aug 2021 00:50 )  PTT:31.6 sec                                           CARDIAC MARKERS ( 26 Aug 2021 00:50 )  x     / 0.11 ng/mL / x     / x     / x                                                LIVER FUNCTIONS - ( 26 Aug 2021 00:50 )  Alb: 3.7 g/dL / Pro: 5.9 g/dL / ALK PHOS: 80 U/L / ALT: 10 U/L / AST: 27 U/L / GGT: x                                                                                           MEDICATIONS  (STANDING):  albuterol/ipratropium for Nebulization 3 milliLiter(s) Nebulizer every 6 hours  ascorbic acid 500 milliGRAM(s) Oral daily  aspirin enteric coated 81 milliGRAM(s) Oral daily  atorvastatin 40 milliGRAM(s) Oral at bedtime  clopidogrel Tablet 75 milliGRAM(s) Oral daily  donepezil 5 milliGRAM(s) Oral at bedtime  furosemide   Injectable 40 milliGRAM(s) IV Push two times a day  heparin   Injectable 5000 Unit(s) SubCutaneous every 8 hours  metoprolol tartrate 25 milliGRAM(s) Oral two times a day  mirtazapine 15 milliGRAM(s) Oral at bedtime  pantoprazole    Tablet 40 milliGRAM(s) Oral before breakfast  sertraline 25 milliGRAM(s) Oral daily  tamsulosin 0.4 milliGRAM(s) Oral at bedtime    MEDICATIONS  (PRN):  ALBUTerol    90 MICROgram(s) HFA Inhaler 2 Puff(s) Inhalation every 6 hours PRN Shortness of Breath and/or Wheezing        Xray reviewed

## 2021-08-26 NOTE — H&P ADULT - ATTENDING COMMENTS
Patient is seen and examined independently. I agree with resident note above and plan of care -edited and corrected where applicable- with addition:    PAST MEDICAL & SURGICAL HISTORY:  Diabetes mellitus    COPD (chronic obstructive pulmonary disease)    Lymphedema of both lower extremities    CHF (congestive heart failure)    ESRD on dialysis    H/O right nephrectomy  20 yrs ago        Vitals:  T(F): 97 (08-26-21 @ 13:41)  HR: 80 (08-26-21 @ 13:41)  BP: 127/65 (08-26-21 @ 13:41)  RR: 18 (08-26-21 @ 13:41)  SpO2: --    TESTS & MEASUREMENTS:                        9.9    11.53 )-----------( 188      ( 26 Aug 2021 00:50 )             33.5     PT/INR - ( 26 Aug 2021 00:50 )   PT: 12.30 sec;   INR: 1.07 ratio         PTT - ( 26 Aug 2021 00:50 )  PTT:31.6 sec  08-26    132<L>  |  95<L>  |  26<H>  ----------------------------<  134<H>  4.8   |  26  |  4.0<H>    Ca    7.9<L>      26 Aug 2021 00:50  Mg     2.0     08-26    TPro  5.9<L>  /  Alb  3.7  /  TBili  0.2  /  DBili  x   /  AST  27  /  ALT  10  /  AlkPhos  80  08-26    LIVER FUNCTIONS - ( 26 Aug 2021 00:50 )  Alb: 3.7 g/dL / Pro: 5.9 g/dL / ALK PHOS: 80 U/L / ALT: 10 U/L / AST: 27 U/L / GGT: x           CARDIAC MARKERS ( 26 Aug 2021 00:50 )  x     / 0.11 ng/mL / x     / x     / x        In summary:  HEALTH ISSUES - PROBLEM Dx:  .. pleural effusion/ worsening acute lung edema due to fluid overload; patient with challenging hemodynamics during hemodialysis that is prohibiting optimal volume extraction.   .. frailty/ recent legionella infection  .. chronic Heart Failure with Reduced Ejection Fraction   .. ESRD on renal replacement therapy   .. Alzheimer type dementia    PLAN  .. attempt to increase diuretics therapy   .. renal replacement therapy and volume extraction  .. F/U clinical response, as per Pulmonary & Critical Care Medicine team   .. O2 support to keep SpO2 at rest around 90-96% at rest.   .. at high risk for pressure skin injury despite all care due to limited mobility.

## 2021-08-26 NOTE — H&P ADULT - ASSESSMENT
82 yo male hx of COPD on Home O2 4L, HFrEF (EF 40% in July 2021), severe AS, T2DM, hx right nephrectomy, ESRD on HD, HTN, anxiety, Alzheimer's dementia, anemia of chronic disease on ROMELIA therapy BIBA from NH due to hypoxia and SOB during HD session.     # Acute on Chronic Hypercapnic respiratory failure secondary fluid overload in setting of worsened b/l pleural opacities/effusions vs Volume Overload secondary to HFrEF exacerbation  # Hx of COPD on 4 liters O2  - Chest X-RAY on admission:  bibasilar opacities/effusions b/l (Unofficial read), f/u official read  - On prior admission was found to have mucus Plug, s/p bronchoscopy 08/08/21  - c/w Albuterol PRN, Duonebs Q6H  - s/p 40 mg IV lasix in ED  - on PO lasix 80 BID at NH  - c/w 40 mg IV lasix BID  - F/u Pulm consult    # ESRD on HD  - Follows Dr. NANCY Saba and Dr. STEPHANIA Vasquez OP  - avoid volume overload  - c/w HD as scheduled  - Nephrology consult    # HFrEF, possibly in volume overload  - elevated troponins 0.11 (improved from prior admission)  - pro- BNP 51423 ( at baseline)  - ECHO 7/2021: EF 40%, Mild to moderate mitral valve regurgitation.  Moderate tricuspid regurgitation; Pulmonary hypertension, severe aortic stenosis.  - daily weight,   - Strict Is and Os  - cont with aspirin, Plavix and metoprolol   - s/p Lasix 40 mg IV in ED once  - c/w lasix 40mg IV BID (at NH on 80 mg PO BID)    #Severe AS   - Echo (7/18/21)   Peak transaortic gradient equals 35.8 mmHg, mean transaortic gradient equals 19.1 mmHg, the calculated aortic valve area equals 0.58 cm² by the continuity equation consistent with severe aortic stenosis.  - Pt was seen by cardio prior admission- cont with aspirin, Plavix and metoprolol   - Pt was referred to Dr. Martinez as outpatient for TAVR- has not followed yet    #DMII   - check finger sticks qhs  - A1C 5.0 in July 2021  - start insulin if FS > 180    #HTN  - c/w metoprolol 24 mg BID  - F/u lipid profile in AM    # anemia of chronic disease  - on Retacrit 5000 Unit MWF    #Dementia Alzheimer's  - c/w donepezil    # Diet: DASH/TLC, CC, renal  # GI PPx - Protonix  # DVT PPx - Heparin 5000 mg SubQ  q8  # Activity -  Increase as Tolerated  # Dispo -admit to medicine  # Code Status-  DNR / DNI (verified with daughter)

## 2021-08-26 NOTE — ED PROVIDER NOTE - PHYSICAL EXAMINATION
CONSTITUTIONAL: + chronic ill elderly male; in no apparent distress.   EYES: PERRL; EOM intact.   CARDIOVASCULAR: Normal S1, S2; no murmurs, rubs, or gallops.   RESPIRATORY: Normal chest excursion with respiration; breath sounds clear and equal bilaterally; no wheezes, rhonchi, or rales.  GI/: Normal bowel sounds; non-distended; non-tender; no palpable organomegaly.   MS: pitting edema to b/l LE  SKIN: stage 2 decubitus to left heel   NEURO/PSYCH: A & Oriented to her daughter; move all extremities.

## 2021-08-26 NOTE — ED PROVIDER NOTE - CLINICAL SUMMARY MEDICAL DECISION MAKING FREE TEXT BOX
Pt with hx of COPD presents with hypoxia. Was also found to be in fluid overload. Will be admitted for further management.

## 2021-08-26 NOTE — H&P ADULT - NSHPPHYSICALEXAM_GEN_ALL_CORE
PHYSICAL EXAM:  GENERAL: NAD, AAO x 4, 81y M  HEAD:  Atraumatic, Normocephalic  EYES: EOMI, conjunctiva and sclera white  NECK: Supple, No JVD  CHEST/LUNG: Clear to auscultation bilaterally; No wheeze; No crackles; No accessory muscles used  HEART: Regular rate and rhythm; No murmurs;   ABDOMEN: Soft, Nontender, Nondistended; Bowel sounds present; No guarding, No organomegaly  EXTREMITIES:  2+ Peripheral Pulses, No cyanosis or edema  NEUROLOGY: non-focal PHYSICAL EXAM:  GENERAL: NAD, Awake. alert, 81y M  HEAD:  Atraumatic, Normocephalic  EYES: EOMI, conjunctiva and sclera white  NECK: Supple, No JVD  CHEST/LUNG: +bibasilar crackles bilaterally; No wheeze; No accessory muscles used, on 2L NC  HEART: Regular rate and rhythm; + systolic murmurs;   ABDOMEN: Soft, Nontender, Nondistended; Bowel sounds present; No guarding, No organomegaly  EXTREMITIES:  2+ Peripheral Pulses, No cyanosis or edema  NEUROLOGY: non-focal

## 2021-08-26 NOTE — CONSULT NOTE ADULT - ASSESSMENT
ESRD on HD   - access via TDC   - fluid removal during HD has been limited due to intradialytic hypotension  acute respiratory failure due to CHF?  COPD on home O2  HFrEF  dementia   HTN  anemia  right nephrectomy    plan:    Change Lasix to 80mg IV TID  Add metolazone 5mg PO daily  Will add midodrine prior to HD  HD tomorrow: 3 hours, opti 160 dialyzer, 2K bath, 1-2L UF  EPO and Venofer with HD  Renal diet ESRD on HD   - access via TDC   - fluid removal during HD has been limited due to intradialytic hypotension  acute respiratory failure due to CHF?  COPD on home O2  HFrEF  dementia   HTN  anemia  right nephrectomy    plan:    Change Lasix to 80mg IV TID  Add metolazone 5mg PO daily  Will add midodrine prior to HD  HD tomorrow: 3 hours, opti 160 dialyzer, 2K bath, 1-2L UF  EPO and Venofer with HD  Renal diet  STRICT FLUID RESTRICTION

## 2021-08-26 NOTE — H&P ADULT - NSHPPOADEEPVENOUSTHROMB_GEN_A_CORE
Use wet compresses to the bottom at least 3-4 times per day.   Take Doxycycline twice daily for the next 10 days.   See [facility] for wound recheck and to establish care.   Return here for fevers, worsening pain.   Soften your stools and do not strain to poop.        suspected

## 2021-08-26 NOTE — H&P ADULT - NSHPLABSRESULTS_GEN_ALL_CORE
LABS:                        9.9    11.53 )-----------( 188      ( 26 Aug 2021 00:50 )             33.5       08-26    132<L>  |  95<L>  |  26<H>  ----------------------------<  134<H>  4.8   |  26  |  4.0<H>    Ca    7.9<L>      26 Aug 2021 00:50  Mg     2.0     08-26    TPro  5.9<L>  /  Alb  3.7  /  TBili  0.2  /  DBili  x   /  AST  27  /  ALT  10  /  AlkPhos  80  08-26          PT/INR - ( 26 Aug 2021 00:50 )   PT: 12.30 sec;   INR: 1.07 ratio         PTT - ( 26 Aug 2021 00:50 )  PTT:31.6 sec    Lactate Trend      CARDIAC MARKERS ( 26 Aug 2021 00:50 )  x     / 0.11 ng/mL / x     / x     / x            CAPILLARY BLOOD GLUCOSE    RADIOLOGY:  < from: CT Head No Cont (08.26.21 @ 02:26) >    IMPRESSION:    No CT evidence for acute intracranial hemorrhage, territorial infarct or midline shift.  Chronic microvascular ischemic changes and chronic lacunar infarcts      < end of copied text > LABS:                        9.9    11.53 )-----------( 188      ( 26 Aug 2021 00:50 )             33.5       08-26    132<L>  |  95<L>  |  26<H>  ----------------------------<  134<H>  4.8   |  26  |  4.0<H>    Ca    7.9<L>      26 Aug 2021 00:50  Mg     2.0     08-26    TPro  5.9<L>  /  Alb  3.7  /  TBili  0.2  /  DBili  x   /  AST  27  /  ALT  10  /  AlkPhos  80  08-26          PT/INR - ( 26 Aug 2021 00:50 )   PT: 12.30 sec;   INR: 1.07 ratio         PTT - ( 26 Aug 2021 00:50 )  PTT:31.6 sec    Lactate Trend      CARDIAC MARKERS ( 26 Aug 2021 00:50 )  x     / 0.11 ng/mL / x     / x     / x            CAPILLARY BLOOD GLUCOSE    RADIOLOGY:  < from: CT Head No Cont (08.26.21 @ 02:26) >    IMPRESSION:    No CT evidence for acute intracranial hemorrhage, territorial infarct or midline shift.  Chronic microvascular ischemic changes and chronic lacunar infarcts      < end of copied text >    < from: TTE Echo Complete w/o Contrast w/ Doppler (07.18.21 @ 14:47) >      Summary:   1. Technically difficult study.   2. Moderately decreased global left ventricular systolic function.   3. Entire apex, mid and apical anterior septum, mid and apical inferior septum, and mid and apical inferior wall are abnormal as described above.   4. LV Ejection Fraction by Bishop's Method with a biplane EF of 40 %.   5. Moderately increased LV wall thickness.   6. Normal left ventricular internal cavity size.   7. Moderately enlarged left atrium.   8. Normal right atrial size.   9. There is no evidence of pericardial effusion.  10. Mild to moderate mitral annular calcification.  11. Mild to moderate mitral valve regurgitation.  12. Thickening and calcification of the anterior and posterior mitral valve leaflets.  13. Moderate tricuspid regurgitation.  14. Pulmonary hypertension is present.  15. Peak transaortic gradient equals 35.8 mmHg, mean transaortic gradient equals 19.1 mmHg, the calculated aortic valve area equals 0.58 cm² by the continuity equation consistent with severe aortic stenosis.      < end of copied text >

## 2021-08-27 NOTE — DIETITIAN INITIAL EVALUATION ADULT. - DIET TYPE
low sodium/consistent carbohydrate (no snacks)/no concentrated potassium/no concentrated phosphorus/1000ml/dysphagia 3, soft, thin liquids

## 2021-08-27 NOTE — DIETITIAN INITIAL EVALUATION ADULT. - OTHER CALCULATIONS
used weight from NH and IBW (154#) to estimate nutrient needs; edema noted, did not use current weight

## 2021-08-27 NOTE — PROGRESS NOTE ADULT - SUBJECTIVE AND OBJECTIVE BOX
[facility] FOLLOW UP NOTE  --------------------------------------------------------------------------------  24 hour events/subjective: Patient examined during HD. Appears comfortable.    PAST HISTORY  --------------------------------------------------------------------------------  No significant changes to PMH, PSH, FHx, SHx, unless otherwise noted    ALLERGIES & MEDICATIONS  --------------------------------------------------------------------------------  Allergies    No Known Allergies    Standing Inpatient Medications  albuterol/ipratropium for Nebulization 3 milliLiter(s) Nebulizer every 6 hours  ascorbic acid 500 milliGRAM(s) Oral daily  aspirin enteric coated 81 milliGRAM(s) Oral daily  atorvastatin 40 milliGRAM(s) Oral at bedtime  clopidogrel Tablet 75 milliGRAM(s) Oral daily  donepezil 5 milliGRAM(s) Oral at bedtime  epoetin graham-epbx (RETACRIT) Injectable 5000 Unit(s) IV Push <User Schedule>  furosemide   Injectable 80 milliGRAM(s) IV Push three times a day  heparin   Injectable 5000 Unit(s) SubCutaneous every 8 hours  iron sucrose IVPB 50 milliGRAM(s) IV Intermittent <User Schedule>  metolazone 5 milliGRAM(s) Oral daily  metoprolol tartrate 25 milliGRAM(s) Oral two times a day  midodrine. 10 milliGRAM(s) Oral <User Schedule>  mirtazapine 15 milliGRAM(s) Oral at bedtime  pantoprazole    Tablet 40 milliGRAM(s) Oral before breakfast  sertraline 25 milliGRAM(s) Oral daily  tamsulosin 0.4 milliGRAM(s) Oral at bedtime    PRN Inpatient Medications  ALBUTerol    90 MICROgram(s) HFA Inhaler 2 Puff(s) Inhalation every 6 hours PRN    VITALS/PHYSICAL EXAM  --------------------------------------------------------------------------------  T(C): 36.7 (08-27-21 @ 05:31), Max: 36.7 (08-27-21 @ 05:31)  HR: 84 (08-27-21 @ 05:31) (60 - 84)  BP: 145/64 (08-27-21 @ 05:31) (115/58 - 145/64)  RR: 18 (08-27-21 @ 05:31) (18 - 18)  SpO2: 97% (08-27-21 @ 10:01) (80% - 100%)  Height (cm): 172.7 (08-25-21 @ 22:50)  Weight (kg): 76.9 (08-26-21 @ 09:02)  BMI (kg/m2): 25.8 (08-26-21 @ 09:02)  BSA (m2): 1.91 (08-26-21 @ 09:02)    Physical Exam:  	Gen: NAD  	Pulm: CTA B/L  	CV: RRR, S1S2  	Abd: +BS, soft, nontender/nondistended  	: No suprapubic tenderness  	LE: Warm,  no edema  	Vascular access: TDC    LABS/STUDIES  --------------------------------------------------------------------------------              9.9    8.45  >-----------<  211      [08-27-21 @ 04:30]              33.2     131  |  93  |  44  ----------------------------<  80      [08-27-21 @ 04:30]  5.1   |  23  |  6.0        Ca     8.2     [08-27-21 @ 04:30]      Mg     2.2     [08-27-21 @ 04:30]    TPro  5.6  /  Alb  3.6  /  TBili  0.2  /  DBili  x   /  AST  16  /  ALT  8   /  AlkPhos  69  [08-27-21 @ 04:30]    PT/INR: PT 12.30, INR 1.07       [08-26-21 @ 00:50]  PTT: 31.6       [08-26-21 @ 00:50]    Troponin 0.11      [08-27-21 @ 04:30]    Creatinine Trend:  SCr 6.0 [08-27 @ 04:30]  SCr 4.0 [08-26 @ 00:50]  SCr 6.5 [08-20 @ 09:34]  SCr 5.1 [08-19 @ 09:02]  SCr 7.1 [08-18 @ 04:30]    Iron 34, TIBC 178, %sat 19      [01-04-21 @ 06:40]  Ferritin 332      [01-04-21 @ 06:40]  PTH -- (Ca 8.9)      [12-24-20 @ 04:30]   54  Lipid: chol 114, , HDL 38, LDL --      [08-27-21 @ 04:30]

## 2021-08-27 NOTE — DIETITIAN INITIAL EVALUATION ADULT. - PHYSCIAL ASSESSMENT
resting in bed/debilitated Skin: stage 2 pressure ulcer at B/L buttocks, suspected DTI @ left heel   Oral: did not observe dentition, patient sleeping at visit   Cognition: dementia, sleeping at visit   GI: last BM 8/27

## 2021-08-27 NOTE — PROGRESS NOTE ADULT - SUBJECTIVE AND OBJECTIVE BOX
DRAKE HO  81y  Male  ***My note supersedes ALL resident notes that I sign.  My corrections for their notes are in my note.***    I can be reached directly on [email or web address]. My office number is [phone]. My personal cell number is [phone].    INTERVAL EVENTS: Here for f/u of volume overload. Pt did look at me and was able to move his hands, but he did speak at all. He seemed a bit tired. He was taken to HD.  RN reports poor UO, even w/ high-dose lasix IV.  Pt is max asst for ADLs.    T(F): 98 (08-27-21 @ 05:31), Max: 98 (08-27-21 @ 05:31)  HR: 82 (08-27-21 @ 13:00) (60 - 84)  BP: 131/61 (08-27-21 @ 13:00) (115/58 - 145/64)  RR: 18 (08-27-21 @ 13:00) (18 - 18)  SpO2: 96% (08-27-21 @ 13:00) (80% - 100%)    Gen: NAD  HEENT: PERRL, mouth clr, nose clr  Neck: no nodes, no JVD, thyroid nl  chest: rt Tesio  lungs: bibasilar crackles  hrt: s1 s2 rrr no murmur  abd: soft, NT/ND, no HS megaly  ext: tr edema, no c/c  neuro: awake, but not talking, can move all 4 ext when I touch them    LABS:                      9.9     (    97.6   8.45  )-----------( ---------      211      ( 27 Aug 2021 04:30 )             33.2    (    16.4     131   (   93   (   80      08-27-21 @ 04:30  ----------------------               5.1   (   23   (   44                             -----                        6.0  Ca  8.2   Mg  2.2    P   --     LFT  5.6  (  0.2  (  16       08-27-21 @ 04:30  -------------------------  3.6  (  69  (  8    PT/INR - ( 26 Aug 2021 00:50 )   PT: 12.30 sec;   INR: 1.07 ratio    PTT - ( 26 Aug 2021 00:50 )  PTT: 31.6 sec    CARDIAC MARKERS ( 27 Aug 2021 04:30 )  x     / 0.11 ng/mL / x     / x     / x      CARDIAC MARKERS ( 26 Aug 2021 00:50 )  x     / 0.11 ng/mL / x     / x     / x        ABG - ( 26 Aug 2021 23:35 )  pH, Arterial: 7.53  pH, Blood: x     /  pCO2: 21    /  pO2: 198   / HCO3: 18    / Base Excess: -3.1  /  SaO2: x         CAPILLARY BLOOD GLUCOSE  POCT Blood Glucose.: 122 (08-27-21 @ 11:32)  POCT Blood Glucose.: 83 (08-27-21 @ 08:08)  POCT Blood Glucose.: 119 (08-26-21 @ 21:47)  POCT Blood Glucose.: 127 (08-26-21 @ 16:53)    RADIOLOGY & ADDITIONAL TESTS:  < from: CT Head No Cont (08.26.21 @ 02:26) >  IMPRESSION:    No CT evidence for acute intracranial hemorrhage, territorial infarct or midline shift.  Chronic microvascular ischemic changes and chronic lacunar infarcts    < end of copied text >    < from: Xray Chest 1 View- PORTABLE-Urgent (Xray Chest 1 View- PORTABLE-Urgent .) (08.26.21 @ 01:47) >  Impression:    Bilateral opacifications and effusions, worsening.    < end of copied text >    < from: TTE Echo Complete w/o Contrast w/ Doppler (07.18.21 @ 14:47) >  Summary:   1. Technically difficult study.   2. Moderately decreased global left ventricular systolic function.   3. Entire apex, mid and apical anterior septum, mid and apical inferior septum, and mid and apical inferior wall are abnormal as described above.   4. LV Ejection Fraction by Bishop's Method with a biplane EF of 40 %.   5. Moderately increased LV wall thickness.   6. Normal left ventricular internal cavity size.   7. Moderately enlarged left atrium.   8. Normal right atrial size.   9. There is no evidence of pericardial effusion.  10. Mild to moderate mitral annular calcification.  11. Mild to moderate mitral valve regurgitation.  12. Thickening and calcification of the anterior and posterior mitral valve leaflets.  13. Moderate tricuspid regurgitation.  14. Pulmonary hypertension is present.  15. Peak transaortic gradient equals 35.8 mmHg, mean transaortic gradient equals 19.1 mmHg, the calculated aortic valve area equals 0.58 cm² by the continuity equation consistent with severe aortic stenosis.    < end of copied text >    MEDICATIONS:    ALBUTerol    90 MICROgram(s) HFA Inhaler 2 Puff(s) Inhalation every 6 hours PRN  albuterol/ipratropium for Nebulization 3 milliLiter(s) Nebulizer every 6 hours  ascorbic acid 500 milliGRAM(s) Oral daily  aspirin enteric coated 81 milliGRAM(s) Oral daily  atorvastatin 40 milliGRAM(s) Oral at bedtime  clopidogrel Tablet 75 milliGRAM(s) Oral daily  donepezil 5 milliGRAM(s) Oral at bedtime  epoetin graham-epbx (RETACRIT) Injectable 5000 Unit(s) IV Push <User Schedule>  heparin   Injectable 5000 Unit(s) SubCutaneous every 8 hours  iron sucrose IVPB 50 milliGRAM(s) IV Intermittent <User Schedule>  metoprolol tartrate 25 milliGRAM(s) Oral two times a day  midodrine. 10 milliGRAM(s) Oral <User Schedule>  mirtazapine 15 milliGRAM(s) Oral at bedtime  pantoprazole    Tablet 40 milliGRAM(s) Oral before breakfast  sertraline 25 milliGRAM(s) Oral daily  tamsulosin 0.4 milliGRAM(s) Oral at bedtime

## 2021-08-27 NOTE — DIETITIAN INITIAL EVALUATION ADULT. - ORAL INTAKE PTA/DIET HISTORY
From [facility]; chopped diet, thin liquids, carbohydrate consistent, renal diet, no pork or ham, prostat 30mL once per day

## 2021-08-27 NOTE — PROGRESS NOTE ADULT - ASSESSMENT
ESRD on HD   - access via TDC   - fluid removal during HD has been limited due to intradialytic hypotension  acute respiratory failure due to CHF?  COPD on home O2  HFrEF  dementia   HTN  anemia  right nephrectomy    plan:    Can DC diuretics as patient with minimal urine output  Midodrine prior to HD for intradialytic hypotension  HD today: 3 hours, opti 160 dialyzer, 2K bath, 2.5L UF  EPO and Venofer with HD  Renal diet  STRICT FLUID RESTRICTION

## 2021-08-27 NOTE — PROGRESS NOTE ADULT - SUBJECTIVE AND OBJECTIVE BOX
HPI:   80 yo male hx of COPD on Home O2 4L, HFrEF (EF 40% in July 2021),  T2DM, hx right nephrectomy, ESRD on HD, HTN, anxiety, Alzheimer's dementia, anemia of chronic disease on ROMELIA therapy BIBA from NH due to hypoxia and SOB during HD session yesterday. As per daughter, NH reported that he became hypoxic to the 85s, and was agitated/restless during HD session, so NH called EMS to bring patient to ED for evaluation. Daughter denies any fever, syncope, chest pain , abdominal pain, nausea, vomiting, diarrhea, dysuria. Daughter reports that patient recently complained of left heel pain.     In ED, VS: /63, HR 82, RR 22, T 97.8F, SpO2 98% On RA.    Subjective:  no complaints   Overnight events : acute desaturation, patient back to baseline O2    Objective:   Vital Signs Last 24 Hrs  T(C): 36.7 (27 Aug 2021 05:31), Max: 36.7 (27 Aug 2021 05:31)  T(F): 98 (27 Aug 2021 05:31), Max: 98 (27 Aug 2021 05:31)  HR: 84 (27 Aug 2021 05:31) (60 - 84)  BP: 145/64 (27 Aug 2021 05:31) (115/58 - 145/64)  BP(mean): --  RR: 18 (27 Aug 2021 05:31) (18 - 18)  SpO2: 93% (27 Aug 2021 07:49) (80% - 100%)    Physical Examination:   GENERAL: NAD, Awake. alert  HEAD:  Atraumatic, Normocephalic  EYES: EOMI, conjunctiva and sclera white  NECK: Supple, No JVD  CHEST/LUNG: +bibasilar crackles bilaterally; No wheeze; No accessory muscles used, on 2L NC  HEART: Regular rate and rhythm; + systolic murmurs;   ABDOMEN: Soft, Nontender, Nondistended; Bowel sounds present; No guarding, No organomegaly  EXTREMITIES:  2+ Peripheral Pulses, No cyanosis or edema  NEUROLOGY: non-focal motor deficits     Labs:                         9.9    8.45  )-----------( 211      ( 27 Aug 2021 04:30 )             33.2   08-27    131<L>  |  93<L>  |  44<H>  ----------------------------<  80  5.1<H>   |  23  |  x     Ca    8.2<L>      27 Aug 2021 04:30  Mg     2.2     08-27    TPro  5.6<L>  /  Alb  3.6  /  TBili  0.2  /  DBili  x   /  AST  16  /  ALT  8   /  AlkPhos  69  08-27    Imaging:   none today    Cultures: none    Assessment & Plan:   80 yo male hx of COPD on Home O2 4L, HFrEF (EF 40% in July 2021), severe AS, T2DM, hx right nephrectomy, ESRD on HD, HTN, anxiety, Alzheimer's dementia, anemia of chronic disease on ROMELIA therapy BIBA from NH due to hypoxia and SOB during HD session.     # Acute on Chronic Hypercapnic respiratory failure secondary fluid overload in setting of worsened b/l pleural opacities/effusions vs Volume Overload secondary to HFrEF exacerbation  # Hx of COPD on 4 liters O2  - Chest X-RAY on admission:  bibasilar opacities/effusions b/l (Unofficial read), f/u official read  - On prior admission was found to have mucus Plug, s/p bronchoscopy 08/08/21 >> Pulm consult n bronchoscopy this admission, maintain pulmonary toilet  - c/w Albuterol PRN, Duonebs Q6H  - Patient lasixs 80mg IV TID with minimal urine      # ESRD on HD  - Follows Dr. NANCY Saba and Dr. STEPHANIA Vasquez OP  - avoid volume overload  - c/w HD as scheduled  - Nephrology consult: today HD and keep lasixs 80mg IV TID    # HFrEF, possibly in volume overload  - elevated troponins 0.11 (improved from prior admission)  - pro- BNP 20400 ( at baseline)  - ECHO 7/2021: EF 40%, Mild to moderate mitral valve regurgitation.  Moderate tricuspid regurgitation; Pulmonary hypertension, severe aortic stenosis.  - daily weight,   - Strict Is and Os  - cont with aspirin, Plavix and metoprolol   - s/p Lasix 40 mg IV in ED once  - NH on 80 mg PO BID    #Severe AS   - Echo (7/18/21)   Peak transaortic gradient equals 35.8 mmHg, mean transaortic gradient equals 19.1 mmHg, the calculated aortic valve area equals 0.58 cm² by the continuity equation consistent with severe aortic stenosis.  - Pt was seen by cardio prior admission- cont with aspirin, Plavix and metoprolol   - Pt was referred to Dr. Martinez as outpatient for TAVR- has not followed yet    #DMII   - check finger sticks qhs  - A1C 5.0 in July 2021  - start insulin if FS > 180    #HTN  - c/w metoprolol 24 mg BID  - F/u lipid profile in AM    # anemia of chronic disease  - on Retacrit 5000 Unit MWF    #Dementia Alzheimer's  - c/w donepezil    # Diet: DASH/TLC, CC, renal  # GI PPx - Protonix  # DVT PPx - Heparin 5000 mg SubQ  q8  # Activity -  Increase as Tolerated  # Dispo - acute status, needs HD   # Code Status-  DNR / DNI (verified with daughter)

## 2021-08-27 NOTE — DIETITIAN INITIAL EVALUATION ADULT. - OTHER INFO
From [facility], admitted with respiratory failure and agitation, with B/L pleural opacities vs fluid overload due to CHF exacerbation, receiving lasix; fluid removal during HD is limited per Nephro due to intradialytic hypotension; strict fluid restriction (1L/day); HD scheduled for today; no need for thoracentesis per pulmonary.  Received consult for pressure ulcer, increased nutrient needs due to HD tx and skin integrity.  patient sleeping at visit, RN reported about 50% meal completion at breakfast, 1:1 feed, aide going to assist patient for lunch; patient did not eat well yesterday due to agitation.

## 2021-08-27 NOTE — DIETITIAN INITIAL EVALUATION ADULT. - ADD RECOMMEND
1. Recommend changing diet to Renal, Carb Consistent, 1L Fluid Restriction, Dysphagia 3 Chopped, Nepro BID.  2. Monitor appetite and po intake, acceptance of supplement, weight maintenance, skin integrity

## 2021-08-28 NOTE — PROGRESS NOTE ADULT - SUBJECTIVE AND OBJECTIVE BOX
DRAKE HO  81y  Male      Patient is a 81y old  Male who presents with a chief complaint of hypoxia, SOB (27 Aug 2021 13:32)      INTERVAL HPI/OVERNIGHT EVENTS: no acute events overnight. no nursing concerns.       REVIEW OF SYSTEMS:  CONSTITUTIONAL: No fever, weight loss, or fatigue  RESPIRATORY: No cough, wheezing, chills or hemoptysis; No shortness of breath  CARDIOVASCULAR: No chest pain, palpitations, dizziness, or leg swelling  GASTROINTESTINAL: No abdominal or epigastric pain. No nausea, vomiting, or hematemesis; No diarrhea or constipation. No melena or hematochezia.  GENITOURINARY: No dysuria, frequency, hematuria, or incontinence  NEUROLOGICAL: No headaches, memory loss, loss of strength, numbness, or tremors  SKIN: No itching, burning, rashes, or lesions   MUSCULOSKELETAL: No joint pain or swelling; No muscle, back, or extremity pain  PSYCHIATRIC: No depression, anxiety, mood swings, or difficulty sleeping  All other review of systems negative    VITALS  T(C): 37.3 (08-28-21 @ 05:00), Max: 37.3 (08-28-21 @ 05:00)  HR: 78 (08-28-21 @ 05:00) (56 - 78)  BP: 135/58 (08-28-21 @ 05:00) (110/57 - 135/58)  RR: 18 (08-28-21 @ 05:00) (18 - 91)  SpO2: 97% (08-27-21 @ 16:00) (96% - 97%)  Wt(kg): --Vital Signs Last 24 Hrs  T(C): 37.3 (28 Aug 2021 05:00), Max: 37.3 (28 Aug 2021 05:00)  T(F): 99.1 (28 Aug 2021 05:00), Max: 99.1 (28 Aug 2021 05:00)  HR: 78 (28 Aug 2021 05:00) (56 - 78)  BP: 135/58 (28 Aug 2021 05:00) (110/57 - 135/58)  BP(mean): --  RR: 18 (28 Aug 2021 05:00) (18 - 91)  SpO2: 97% (27 Aug 2021 16:00) (96% - 97%)      08-27-21 @ 07:01  -  08-28-21 @ 07:00  --------------------------------------------------------  IN: 0 mL / OUT: 2500 mL / NET: -2500 mL      PHYSICAL EXAM:  GENERAL: elderly M, NAD, non toxic  EYES: anicteric sclera, non injected conjunctiva, EOMI  ENT: hearing grossly intact, oropharynx clear, MMM  PSYCH: no agitation, baseline mentation  NERVOUS SYSTEM:  Alert & Oriented X3, CN 2-12 grossly intact  PULMONARY: Clear to percussion bilaterally; No rales, rhonchi, wheezing, or rubs  CARDIOVASCULAR: Regular rate and rhythm; No murmurs, rubs, or gallops  GI: Soft, Nontender, Nondistended; Bowel sounds present  EXTREMITIES:  2+ Peripheral Pulses, No clubbing, cyanosis, spontaneously moving all 4 ext against gravity  LYMPH: No lymphadenopathy noted  SKIN: No rashes or lesions    Consultant(s) Notes Reviewed:  [x ] YES  [ ] NO    Discussed with Consultants/Other Providers [ x] YES     LABS                          10.0   7.61  )-----------( 229      ( 28 Aug 2021 07:56 )             33.5     08-27    131<L>  |  93<L>  |  44<H>  ----------------------------<  80  5.1<H>   |  23  |  6.0<HH>    Ca    8.2<L>      27 Aug 2021 04:30  Mg     2.2     08-27    TPro  5.6<L>  /  Alb  3.6  /  TBili  0.2  /  DBili  x   /  AST  16  /  ALT  8   /  AlkPhos  69  08-27    ABG - ( 26 Aug 2021 23:35 )  pH, Arterial: 7.53  pH, Blood: x     /  pCO2: 21    /  pO2: 198   / HCO3: 18    / Base Excess: -3.1  /  SaO2: x         CARDIAC MARKERS ( 27 Aug 2021 04:30 )  x     / 0.11 ng/mL / x     / x     / x        POCT Blood Glucose.: 106 mg/dL (28 Aug 2021 08:01)    RADIOLOGY & ADDITIONAL TESTS:  CT Head No Cont (08.26.21 @ 02:26) >  IMPRESSION:  No CT evidence for acute intracranial hemorrhage, territorial infarct or midline shift.  Chronic microvascular ischemic changes and chronic lacunar infarcts    Xray Chest 1 View- PORTABLE-Urgent (Xray Chest 1 View- PORTABLE-Urgent .) (08.26.21 @ 01:47) >  Impression:  Bilateral opacifications and effusions, worsening.      HEALTH ISSUES - PROBLEM Dx:  Suspected pulmonary embolism  Suspected deep vein thrombosis (DVT)      MEDICATIONS  (STANDING):  albuterol/ipratropium for Nebulization 3 milliLiter(s) Nebulizer every 6 hours  ascorbic acid 500 milliGRAM(s) Oral daily  aspirin enteric coated 81 milliGRAM(s) Oral daily  atorvastatin 40 milliGRAM(s) Oral at bedtime  clopidogrel Tablet 75 milliGRAM(s) Oral daily  donepezil 5 milliGRAM(s) Oral at bedtime  epoetin graham-epbx (RETACRIT) Injectable 5000 Unit(s) IV Push <User Schedule>  heparin   Injectable 5000 Unit(s) SubCutaneous every 8 hours  iron sucrose IVPB 50 milliGRAM(s) IV Intermittent <User Schedule>  metoprolol tartrate 25 milliGRAM(s) Oral two times a day  midodrine. 10 milliGRAM(s) Oral <User Schedule>  mirtazapine 15 milliGRAM(s) Oral at bedtime  pantoprazole    Tablet 40 milliGRAM(s) Oral before breakfast  sertraline 25 milliGRAM(s) Oral daily  tamsulosin 0.4 milliGRAM(s) Oral at bedtime    MEDICATIONS  (PRN):  ALBUTerol    90 MICROgram(s) HFA Inhaler 2 Puff(s) Inhalation every 6 hours PRN Shortness of Breath and/or Wheezing

## 2021-08-28 NOTE — PROGRESS NOTE ADULT - ASSESSMENT
ESRD on HD   - access via TDC   - fluid removal during HD has been limited due to intradialytic hypotension  acute respiratory failure due to CHF?  COPD on home O2  HFrEF  dementia   HTN  anemia  right nephrectomy    plan:    Midodrine prior to HD for intradialytic hypotension  HD on Monday,  3 hours, opti 160 dialyzer, 2K bath, 2.5L UF  EPO and Venofer with HD  Renal diet  fluid restriction    d/w hs  STRICT FLUID RESTRICTION

## 2021-08-28 NOTE — PROGRESS NOTE ADULT - ASSESSMENT
80 yo male hx of COPD on Home O2 4L, HFrEF (EF 40% in July 2021), severe AS, T2DM, hx right nephrectomy, ESRD on HD, HTN, anxiety, Alzheimer's dementia, anemia of chronic disease on ROMELIA therapy BIBA from NH due to hypoxia and SOB during HD session.  Remains HDS and afebrile, satting well at baseline 02 settings.     # Acute on Chronic Hypercapnic respiratory failure   # Hx of COPD on 4 liters O2  - likely secondary fluid overload in setting of worsened b/l pleural opacities/effusions vs Volume Overload secondary to HFrEF exacerbation  - Chest X-RAY on admission: worsening bibasilar opacities/effusions b/l   - On prior admission was found to have mucus Plug, s/p bronchoscopy 08/08/21 >> Pulm consult n bronchoscopy this admission, maintain pulmonary toilet  - c/w Albuterol PRN, Duonebs Q6H  - Pulm following      keep patient net negative. no need for thoracentesis  - Neph following     Change Lasix to 80mg IV TID metolazone 5mg PO daily; d/c'd by primary team on 8/27 d/t limited efficacy      add midodrine prior to HD     s/p HD on 8/28      # ESRD on HD  - Follows Dr. NANCY Saba and Dr. STEPHANIA Vasquez OP  - avoid volume overload  - c/w HD as scheduled  - Nephrology following; as above  - EPO and Venofer with HD  - Renal diet    # Hyponatremia, IMPROVING  - 132/131 on admission-->138  - likely 2/2 to dilution in setting of volume overload  - improved with diuresis and volume removal with HD  - continue to monitor for rapid correction  - serial BMPs    # HFrEF, possibly in volume overload, IMPROVING  - elevated troponins 0.11 (improved from prior admission)  - pro- BNP 46773 ( at baseline)  - ECHO 7/2021: EF 40%, Mild to moderate mitral valve regurgitation.  Moderate tricuspid regurgitation; Pulmonary hypertension, severe aortic stenosis.  - daily weight; no dry weight available but weight on admission was 75.3kg  - Strict Is and Os; net negative 2500(not accounting for HD removal)  - cont with aspirin, Plavix and metoprolol   - NH on 80 mg PO BID  - inpatient regimen as above    # Troponinemia  - elevated at 0.11  - no chest pain, no EKG changes with ST-T wave changes  - likely demand ischemia and decreased renal clearance in setting of CKD     #Severe AS   - Echo (7/18/21)   Peak transaortic gradient equals 35.8 mmHg, mean transaortic gradient equals 19.1 mmHg, the calculated aortic valve area equals 0.58 cm² by the continuity equation consistent with severe aortic stenosis.  - Pt was seen by cardio prior admission- cont with aspirin, Plavix and metoprolol   - Pt was referred to Dr. Martinez as outpatient for TAVR- has not followed yet    #DMII   - check finger sticks qhs  - A1C 5.0 in July 2021  - start insulin if FS > 180    #HTN  - c/w metoprolol 24 mg BID  - F/u lipid profile in AM    # anemia of chronic disease  - on Retacrit 5000 Unit MWF    #Dementia Alzheimer's  - c/w donepezil    # Diet: DASH/TLC, CC, renal  # GI PPx - Protonix  # DVT PPx - Heparin 5000 mg SubQ  q8  # Activity -  Increase as Tolerated  # Dispo - acute status, needs HD   # Code Status-  DNR / DNI (verified with daughter)  #Progress Note Handoff  Pending (specify):  Neph follow up, Pulm follow up  Family discussion: patient updated on plan.  Disposition: Unknown at this time________will need plan for outpatient follow up with Neph for ongoing need for HD

## 2021-08-28 NOTE — PROGRESS NOTE ADULT - SUBJECTIVE AND OBJECTIVE BOX
[facility] FOLLOW UP NOTE  --------------------------------------------------------------------------------  Chief Complaint/24 hour events/subjective:    pt seen and examined, no distress    PAST HISTORY  --------------------------------------------------------------------------------  No significant changes to PMH, PSH, FHx, SHx, unless otherwise noted    ALLERGIES & MEDICATIONS  --------------------------------------------------------------------------------  Allergies    No Known Allergies    Intolerances      Standing Inpatient Medications  albuterol/ipratropium for Nebulization 3 milliLiter(s) Nebulizer every 6 hours  ascorbic acid 500 milliGRAM(s) Oral daily  aspirin enteric coated 81 milliGRAM(s) Oral daily  atorvastatin 40 milliGRAM(s) Oral at bedtime  clopidogrel Tablet 75 milliGRAM(s) Oral daily  donepezil 5 milliGRAM(s) Oral at bedtime  epoetin graham-epbx (RETACRIT) Injectable 5000 Unit(s) IV Push <User Schedule>  heparin   Injectable 5000 Unit(s) SubCutaneous every 8 hours  iron sucrose IVPB 50 milliGRAM(s) IV Intermittent <User Schedule>  metoprolol tartrate 25 milliGRAM(s) Oral two times a day  midodrine. 10 milliGRAM(s) Oral <User Schedule>  mirtazapine 15 milliGRAM(s) Oral at bedtime  pantoprazole    Tablet 40 milliGRAM(s) Oral before breakfast  sertraline 25 milliGRAM(s) Oral daily  tamsulosin 0.4 milliGRAM(s) Oral at bedtime    PRN Inpatient Medications  ALBUTerol    90 MICROgram(s) HFA Inhaler 2 Puff(s) Inhalation every 6 hours PRN      REVIEW OF SYSTEMS  --------------------------------------------------------------------------------    All other systems were reviewed and are negative, except as noted.    VITALS/PHYSICAL EXAM  --------------------------------------------------------------------------------  T(C): 37.3 (08-28-21 @ 05:00), Max: 37.3 (08-28-21 @ 05:00)  HR: 78 (08-28-21 @ 05:00) (56 - 78)  BP: 135/58 (08-28-21 @ 05:00) (110/57 - 135/58)  RR: 18 (08-28-21 @ 05:00) (18 - 91)  SpO2: 97% (08-27-21 @ 16:00) (96% - 97%)  Wt(kg): --        08-27-21 @ 07:01  -  08-28-21 @ 07:00  --------------------------------------------------------  IN: 0 mL / OUT: 2500 mL / NET: -2500 mL      Physical Exam:    	Gen: no distress   	Pulm: B/L low BS  	CV: S1S2; no rub  	Abd: +BS, soft, nontender/nondistended  	LE:  no  edema      LABS/STUDIES  --------------------------------------------------------------------------------              10.0   7.61  >-----------<  229      [08-28-21 @ 07:56]              33.5     138  |  101  |  28  ----------------------------<  99      [08-28-21 @ 07:56]  4.5   |  25  |  4.9        Ca     8.0     [08-28-21 @ 07:56]      Mg     2.2     [08-28-21 @ 07:56]    TPro  5.6  /  Alb  3.6  /  TBili  0.2  /  DBili  x   /  AST  16  /  ALT  8   /  AlkPhos  69  [08-27-21 @ 04:30]        Troponin 0.11      [08-27-21 @ 04:30]    Creatinine Trend:  SCr 4.9 [08-28 @ 07:56]  SCr 6.0 [08-27 @ 04:30]  SCr 4.0 [08-26 @ 00:50]  SCr 6.5 [08-20 @ 09:34]  SCr 5.1 [08-19 @ 09:02]        Iron 34, TIBC 178, %sat 19      [01-04-21 @ 06:40]  Ferritin 332      [01-04-21 @ 06:40]  PTH -- (Ca 8.9)      [12-24-20 @ 04:30]   54  Lipid: chol 114, , HDL 38, LDL --      [08-27-21 @ 04:30]

## 2021-08-29 NOTE — PROGRESS NOTE ADULT - SUBJECTIVE AND OBJECTIVE BOX
DRAKE HO  81y Male    CHIEF COMPLAINT:    Patient is a 81y old  Male who presents with a chief complaint of hypoxia, SOB (28 Aug 2021 11:18)      INTERVAL HPI/OVERNIGHT EVENTS:    Patient seen and examined. No acute events overnight. Lethargic but arousable     ROS: All other systems are negative.    Vital Signs:    T(F): 98.5 (08-28-21 @ 20:00), Max: 98.5 (08-28-21 @ 20:00)  HR: 66 (08-28-21 @ 20:00) (66 - 77)  BP: 137/63 (08-28-21 @ 20:00) (128/57 - 137/63)  RR: 18 (08-28-21 @ 20:00) (18 - 18)  SpO2: 100% (08-28-21 @ 17:47) (100% - 100%)    POCT Blood Glucose.: 144 mg/dL (29 Aug 2021 11:26)  POCT Blood Glucose.: 117 mg/dL (29 Aug 2021 07:46)  POCT Blood Glucose.: 116 mg/dL (28 Aug 2021 21:52)    PHYSICAL EXAM:    GENERAL:  NAD  SKIN: No rashes or lesions  HEENT: Atraumatic. Normocephalic.    NECK: Supple, No JVD.    PULMONARY: CTA B/L. No wheezing. No rales  CVS: Normal S1, S2. Rate and Rhythm are regular.   ABDOMEN/GI: Soft, Nontender, Nondistended   MSK: No clubbing or cyanosis   NEUROLOGIC: Moves all extremities  PSYCH: lethargic, arousable     Consultant(s) Notes Reviewed:  [x ] YES  [ ] NO  Care Discussed with Consultants/Other Providers [ x] YES  [ ] NO    LABS:                        10.0   7.61  )-----------( 229      ( 28 Aug 2021 07:56 )             33.5     138  |  101  |  28<H>  ----------------------------<  99  4.5   |  25  |  4.9<HH>    Ca    8.0<L>      28 Aug 2021 07:56  Mg     2.2     08-28    Serum Pro-Brain Natriuretic Peptide: >14917 pg/mL (08-26-21 @ 00:50)    Trop 0.11, CKMB --, CK --, 08-27-21 @ 04:30    RADIOLOGY & ADDITIONAL TESTS:  Imaging or report Personally Reviewed:  [x] YES  [ ] NO  EKG reviewed: [x] YES  [ ] NO    Medications:  Standing  albuterol/ipratropium for Nebulization 3 milliLiter(s) Nebulizer every 6 hours  ascorbic acid 500 milliGRAM(s) Oral daily  aspirin enteric coated 81 milliGRAM(s) Oral daily  atorvastatin 40 milliGRAM(s) Oral at bedtime  clopidogrel Tablet 75 milliGRAM(s) Oral daily  donepezil 5 milliGRAM(s) Oral at bedtime  epoetin graham-epbx (RETACRIT) Injectable 5000 Unit(s) IV Push <User Schedule>  heparin   Injectable 5000 Unit(s) SubCutaneous every 8 hours  iron sucrose IVPB 50 milliGRAM(s) IV Intermittent <User Schedule>  metoprolol tartrate 25 milliGRAM(s) Oral two times a day  midodrine. 10 milliGRAM(s) Oral <User Schedule>  mirtazapine 15 milliGRAM(s) Oral at bedtime  pantoprazole    Tablet 40 milliGRAM(s) Oral before breakfast  sertraline 25 milliGRAM(s) Oral daily  tamsulosin 0.4 milliGRAM(s) Oral at bedtime    PRN Meds  ALBUTerol    90 MICROgram(s) HFA Inhaler 2 Puff(s) Inhalation every 6 hours PRN

## 2021-08-29 NOTE — PROGRESS NOTE ADULT - SUBJECTIVE AND OBJECTIVE BOX
[facility] FOLLOW UP NOTE  --------------------------------------------------------------------------------  Chief Complaint/24 hour events/subjective:    pt seen and examined, no sidtress    PAST HISTORY  --------------------------------------------------------------------------------  No significant changes to PMH, PSH, FHx, SHx, unless otherwise noted    ALLERGIES & MEDICATIONS  --------------------------------------------------------------------------------  Allergies    No Known Allergies    Intolerances      Standing Inpatient Medications  albuterol/ipratropium for Nebulization 3 milliLiter(s) Nebulizer every 6 hours  ascorbic acid 500 milliGRAM(s) Oral daily  aspirin enteric coated 81 milliGRAM(s) Oral daily  atorvastatin 40 milliGRAM(s) Oral at bedtime  clopidogrel Tablet 75 milliGRAM(s) Oral daily  donepezil 5 milliGRAM(s) Oral at bedtime  epoetin graham-epbx (RETACRIT) Injectable 5000 Unit(s) IV Push <User Schedule>  heparin   Injectable 5000 Unit(s) SubCutaneous every 8 hours  iron sucrose IVPB 50 milliGRAM(s) IV Intermittent <User Schedule>  metoprolol tartrate 25 milliGRAM(s) Oral two times a day  midodrine. 10 milliGRAM(s) Oral <User Schedule>  mirtazapine 15 milliGRAM(s) Oral at bedtime  pantoprazole    Tablet 40 milliGRAM(s) Oral before breakfast  sertraline 25 milliGRAM(s) Oral daily  tamsulosin 0.4 milliGRAM(s) Oral at bedtime    PRN Inpatient Medications  ALBUTerol    90 MICROgram(s) HFA Inhaler 2 Puff(s) Inhalation every 6 hours PRN      REVIEW OF SYSTEMS  --------------------------------------------------------------------------------    All other systems were reviewed and are negative, except as noted.    VITALS/PHYSICAL EXAM  --------------------------------------------------------------------------------  T(C): 36.4 (08-29-21 @ 13:21), Max: 36.9 (08-28-21 @ 20:00)  HR: 67 (08-29-21 @ 13:21) (66 - 77)  BP: 126/68 (08-29-21 @ 13:21) (126/68 - 137/63)  RR: 18 (08-29-21 @ 13:21) (18 - 18)  SpO2: 100% (08-28-21 @ 17:47) (100% - 100%)  Wt(kg): --        Physical Exam:    	Gen: no distress   	Pulm:  B/L BS  	CV: S1S2; no rub  	Abd: +BS, soft, nontender/nondistended  	LE:  no  edema      LABS/STUDIES  --------------------------------------------------------------------------------              10.0   7.61  >-----------<  229      [08-28-21 @ 07:56]              33.5     138  |  101  |  28  ----------------------------<  99      [08-28-21 @ 07:56]  4.5   |  25  |  4.9        Ca     8.0     [08-28-21 @ 07:56]      Mg     2.2     [08-28-21 @ 07:56]            Creatinine Trend:  SCr 4.9 [08-28 @ 07:56]  SCr 6.0 [08-27 @ 04:30]  SCr 4.0 [08-26 @ 00:50]  SCr 6.5 [08-20 @ 09:34]  SCr 5.1 [08-19 @ 09:02]        Iron 34, TIBC 178, %sat 19      [01-04-21 @ 06:40]  Ferritin 332      [01-04-21 @ 06:40]  PTH -- (Ca 8.9)      [12-24-20 @ 04:30]   54  Lipid: chol 114, , HDL 38, LDL --      [08-27-21 @ 04:30]

## 2021-08-29 NOTE — PROGRESS NOTE ADULT - ASSESSMENT
ESRD on HD   - access via TDC   - fluid removal during HD has been limited due to intradialytic hypotension  acute respiratory failure due to CHF?  COPD on home O2  HFrEF  dementia   HTN  anemia  right nephrectomy    plan:    Midodrine prior to HD for intradialytic hypotension  HD on Monday,  3 hours, opti 160 dialyzer, 2K bath, 2.5L UF  EPO and Venofer with HD  Renal diet  fluid restriction      STRICT FLUID RESTRICTION

## 2021-08-29 NOTE — PROGRESS NOTE ADULT - ASSESSMENT
80 yo male hx of COPD on Home O2 4L, HFrEF (EF 40% in July 2021), severe AS, T2DM, hx right nephrectomy, ESRD on HD, HTN, anxiety, Alzheimer's dementia, anemia of chronic disease on ROMELIA therapy BIBA from NH due to hypoxia and SOB during HD session.  Remains HDS and afebrile, satting well at baseline 02 settings.     Acute on Chronic Hypercapnic respiratory failure, multifactorial due to volume overload/ESRD/HFrEF exacerbation  Hx of COPD on 4 liters O2  - BNP and troponins chronically elevated   - ECHO 7/2021: EF 40%, Mild to moderate mitral valve regurgitation.  Moderate tricuspid regurgitation; Pulmonary hypertension, severe aortic stenosis  - currently at baseline 4L 02, baseline dementia, currently AAO x1 and lethargic  - Chest X-RAY on admission: worsening bibasilar opacities/effusions b/l   - does not respond to diurhetics, which were discontinued 2 days ago  - strict fluid restriction to 1 L/day, daily weights  - Midodrine 10mg prior to HD for optimal fluid removal    ESRD on HD  Anemia of chronic disease   - Follows Dr. NANCY Saba and Dr. STEPHANIA Vasquez OP  - avoid volume overload  - c/w HD as scheduled  - EPO and Venofer with HD  - Renal diet    Hyponatremia resolved     Severe AS   - Echo (7/18/21)   Peak transaortic gradient equals 35.8 mmHg, mean transaortic gradient equals 19.1 mmHg, the calculated aortic valve area equals 0.58 cm² by the continuity equation consistent with severe aortic stenosis.  - Pt was seen by cardio prior admission- cont with aspirin, Plavix and metoprolol   - Pt was referred to Dr. Martinez as outpatient for TAVR    DMII   - check finger sticks qhs  - A1C 5.0 in July 2021  - start insulin if FS > 180    Dementia Alzheimer's, advanced  - c/w donepezil, pt fully dependent with adls    #Progress Note Handoff  Pending (specify):   hd tomorrow, NH placement   Disposition:  Long term facility     Debbie Ortega MD  s. [phone]

## 2021-08-30 NOTE — CONSULT NOTE ADULT - PROBLEM SELECTOR RECOMMENDATION 2
Exacerbation improved   HF team consulted for recommendations Exacerbation improved   HF team consulted for recommendations  Recommendations for dyspnea as below

## 2021-08-30 NOTE — PROGRESS NOTE ADULT - SUBJECTIVE AND OBJECTIVE BOX
DRAKE HO             MRN-[id number]      Patient is a 81y old Male who presents with a chief complaint of hypoxia, SOB (30 Aug 2021 09:40)    Currently admitted with the primary diagnosis of: hypoxia       SUBJECTIVE:    - re-consult for symptom management   - known to palliative team from earlier this hospitalization, dtr wishes to continue ongoing med mgmt for the time being  - dtr spoke with primary team and interested in transition to hospice care     ROS:  UNABLE TO OBTAIN  due to:    DYSPNEA: Y / N	  NAUS/VOM: Y / N	  SECRETIONS: Y / N	  AGITATION: Y / N  Pain (Y/N):       -Provocation/Palliation:  -Quality/Quantity:  -Radiating:  -Severity:  -Timing/Frequency:  -Impact on ADLs:    General:  Denied  HEENT:    Denied  Neck:  Denied  CVS:  Denied  Resp:  Denied  GI:  Denied    :  Denied  Musc:  Denied  Neuro:  Denied  Psych:  Denied  Skin:  Denied  Lymph:  Denied      PEx:   T(C): 36.4 (08-30-21 @ 04:50), Max: 36.7 (08-29-21 @ 21:50)  HR: 69 (08-30-21 @ 11:15) (67 - 80)  BP: 114/56 (08-30-21 @ 11:15) (114/56 - 145/63)  RR: 18 (08-30-21 @ 11:15) (18 - 20)  SpO2: 96% (08-30-21 @ 11:15) (96% - 98%)            General:  found in bed in NAD  Eyes:  PERRL EOMI Non icteric MOM  ENMT: no external oral ulcers, MMM, no thrush   CVS: RR S1S2 No M/G/R  Resp: Unlabored Non tachypneic No increased WOB  GI:  Soft NT ND BS+  :  Voiding / Laura / PrimaFit  Musc: No C/C/E    Neuro: Follows commands No focal deficits  Psych: Calm Pleasant, AAOx3    Skin: Non jaundiced , no rash   Lymph: no adenopathy     Last BM:     ALLERGIES: No Known Allergies            Labs:	    CBC:                        10.3   9.96  )-----------( 247      ( 30 Aug 2021 05:32 )             35.3     CMP:    08-30    136  |  96<L>  |  57<H>  ----------------------------<  171<H>  5.5<H>   |  23  |  7.8<HH>    Ca    7.9<L>      30 Aug 2021 05:32  Mg     2.2     08-30      RADIOLOGY    < from: TTE Echo Complete w/o Contrast w/ Doppler (07.18.21 @ 14:47) >  Summary:   1. Technically difficult study.   2. Moderately decreased global left ventricular systolic function.   3. Entire apex, mid and apical anterior septum, mid and apical inferior septum, and mid and apical inferior wall are abnormal as described above.   4. LV Ejection Fraction by Bishop's Method with a biplane EF of 40 %.   5. Moderately increased LV wall thickness.   6. Normal left ventricular internal cavity size.   7. Moderately enlarged left atrium.   8. Normal right atrial size.   9. There is no evidence of pericardial effusion.  10. Mild to moderate mitral annular calcification.  11. Mild to moderate mitral valve regurgitation.  12. Thickening and calcification of the anterior and posterior mitral valve leaflets.  13. Moderate tricuspid regurgitation.  14. Pulmonary hypertension is present.  15. Peak transaortic gradient equals 35.8 mmHg, mean transaortic gradient equals 19.1 mmHg, the calculated aortic valve area equals 0.58 cm² by the continuity equation consistent with severe aortic stenosis.    < end of copied text >      EKG  12 Lead ECG:   Ventricular Rate 75 BPM    Atrial Rate 75 BPM    P-R Interval 160 ms    QRS Duration 106 ms    Q-T Interval 400 ms    QTC Calculation(Bazett) 446 ms    P Axis 38 degrees    R Axis -52 degrees    T Axis 144 degrees    Diagnosis Line Normal sinus rhythm  Possible Left atrial enlargement  Left axis deviation  Left ventricular hypertrophy with repolarization abnormality  Cannot rule out Septal infarct , age undetermined  Abnormal ECG    Confirmed by Demian Gregorio ([id number]) on 8/26/2021 10:32:46 AM (08-26-21 @ 10:08)      Imaging Personally Reviewed:  [ ] YES  [ ] NO    Consultant(s) Notes Reviewed:  [X ] YES  [ ] NO  Care Discussed with Consultants/Other Providers [ X] YES  [ ] NO    Medications:	      MEDICATIONS  (STANDING):  albuterol/ipratropium for Nebulization 3 milliLiter(s) Nebulizer every 6 hours  ascorbic acid 500 milliGRAM(s) Oral daily  aspirin enteric coated 81 milliGRAM(s) Oral daily  atorvastatin 40 milliGRAM(s) Oral at bedtime  clopidogrel Tablet 75 milliGRAM(s) Oral daily  donepezil 5 milliGRAM(s) Oral at bedtime  epoetin graham-epbx (RETACRIT) Injectable 5000 Unit(s) IV Push <User Schedule>  heparin   Injectable 5000 Unit(s) SubCutaneous every 8 hours  iron sucrose IVPB 50 milliGRAM(s) IV Intermittent <User Schedule>  metoprolol tartrate 25 milliGRAM(s) Oral two times a day  midodrine. 10 milliGRAM(s) Oral <User Schedule>  mirtazapine 15 milliGRAM(s) Oral at bedtime  pantoprazole    Tablet 40 milliGRAM(s) Oral before breakfast  sertraline 25 milliGRAM(s) Oral daily  tamsulosin 0.4 milliGRAM(s) Oral at bedtime    MEDICATIONS  (PRN):  ALBUTerol    90 MICROgram(s) HFA Inhaler 2 Puff(s) Inhalation every 6 hours PRN Shortness of Breath and/or Wheezing  HYDROmorphone   Tablet 2 milliGRAM(s) Oral every 4 hours PRN Moderate Pain (4 - 6)        ADVANCED DIRECTIVES:              DNR DNI         DECISION MAKER: Patient [  ]  Family [ X ]  Other [  ] _______  LEGAL SURROGATE: StrAutumnressa       GOALS OF CARE DISCUSSION       Palliative care info/counseling provided	           Family meeting scheduled 	           Documentation of GOC/Advanced Care Planning:       See previous Palliative Medicine Note    PSYCHOSOCIAL-SPIRITUAL ASSESSMENT:       Reviewed       See Palliative Care SW/ documentation      CURRENT DISPO PLAN:         WILL REMAIN IN HOSPITAL      PAWEL      Hospice      Home      REFERRALS	        Palliative Med        Unit SW/Case Mgmt              Hospice

## 2021-08-30 NOTE — CONSULT NOTE ADULT - ASSESSMENT
81yMale with PMH including COPD on Home O2 4L, HFrEF (EF 40% in July 2021),  T2DM, hx right nephrectomy, ESRD on HD, HTN, anxiety, Alzheimer's dementia, anemia of chronic disease on ROMELIA therapy BIBA from NH due to hypoxia during HD session, currently being treated for pleural effusions, fluid overload. He is know to palliative team, has had multiple recent admissions.     Met with dtr Shin     MEDD (morphine equivalent daily dose):      See Recs below.    Please call [phone] with questions or concerns 24/7.   We will continue to follow.     Discussed with primary MD.   81yMale with PMH including COPD on Home O2 4L, HFrEF (EF 40% in July 2021),  T2DM, hx right nephrectomy, ESRD on HD, HTN, anxiety, Alzheimer's dementia, anemia of chronic disease on ROMELIA therapy BIBA from NH due to hypoxia during HD session, currently being treated for pleural effusions, fluid overload. He is know to palliative team, has had multiple recent admissions.     Met with dtr Shin, who is awaiting input from nephrology to hear recommendations moving forward.   She is open to hospice care at home in the near future but is not sure she wants it right now.      MEDD (morphine equivalent daily dose): 0      See Recs below.    Please call [phone] with questions or concerns 24/7.   We will continue to follow.     Discussed with primary MD.

## 2021-08-30 NOTE — PROGRESS NOTE ADULT - ASSESSMENT
ESRD on HD   - access via TDC   - fluid removal during HD has been limited due to intradialytic hypotension  acute respiratory failure due to CHF?  COPD on home O2  HFrEF  dementia   HTN  anemia  right nephrectomy    plan:    Midodrine prior to HD for intradialytic hypotension  HD today: 3 hours, opti 160 dialyzer, 2K bath, 2L UF - patient again became hypotensive during HD  EPO and Venofer with HD  Renal diet  Patient being evaluated for hospice  STRICT FLUID RESTRICTION

## 2021-08-30 NOTE — CHART NOTE - NSCHARTNOTEFT_GEN_A_CORE
82 yo male hx of COPD on Home O2 4L, HFrEF (EF 40% in July 2021), severe AS, T2DM, hx right nephrectomy, ESRD on HD, HTN, anxiety, Alzheimer's dementia, anemia of chronic disease on ROMELIA therapy BIBA from NH due to hypoxia and SOB during HD session.    Was notified by RN that patient is endorsing SOB. Patient was saturating 94% on 4L NC. On physical exam is significant for B/L crackles on right upper lung field upon auscultation.   Patient is getting nebulizing treatment and chest x-ray has been ordered. 80 yo male hx of COPD on Home O2 4L, HFrEF (EF 40% in July 2021), severe AS, T2DM, hx right nephrectomy, ESRD on HD, HTN, anxiety, Alzheimer's dementia, anemia of chronic disease on ROMELIA therapy BIBA from NH due to hypoxia and SOB during HD session.    Was notified by RN that patient is endorsing SOB. Patient was saturating 94% on 4L NC. On physical exam is significant for B/L crackles on right upper lung field upon auscultation.   Patient is getting nebulizing treatment and chest x-ray has been ordered.  F/u chest x-ray

## 2021-08-30 NOTE — PROGRESS NOTE ADULT - ASSESSMENT
ASSESSMENT & PLAN    80 yo male hx of COPD on Home O2 4L, HFrEF (EF 40% in July 2021), severe AS, T2DM, hx right nephrectomy, ESRD on HD, HTN, anxiety, Alzheimer's dementia, anemia of chronic disease on ROMELIA therapy BIBA from NH due to hypoxia and SOB during HD session.  Remains HDS and afebrile, satting well at baseline O2 settings, with dyspnea complaints 2/2 to ESRD, HRrEF, valvular heart disease, COPD on 4lL home O2.     Acute on Chronic Hypercapnic respiratory failure, multifactorial due to volume overload/ESRD/HFrEF exacerbation  Hx of COPD on 4 liters O2  - CXR showed increasing R effusion and stable L effusions. Pulm was consulted. Palliative thoracentesis tomorrow  - switched metoprolol tartrate to metoprolol succinate 25mg PO QHS  - Had hemodialysis today.   - consulted palliative today after discussion with daughter about GOC. daughter agreeable to consider hospice if dialysis is stopped. for comfort care regarding pain or respiratory distress, started 2mg dilaudid IV q4H PRN  - strict fluid restriction to 1 L/day, daily weights  - BNP and troponins chronically elevated   - ECHO 7/2021: EF 40%, Mild to moderate mitral valve regurgitation.  Moderate tricuspid regurgitation; Pulmonary hypertension, severe aortic stenosis  - currently at baseline 4L O2, baseline dementia, currently AAO x1 and lethargic  - does not respond to diuretics which were discontinued 2 days ago  - cont Midodrine 10mg prior to HD for optimal fluid removal. Per Dr. Saba and Dr. Diego discussion, patient cannot tolerate a full dialysis session (BP drops after 1.5L)  - d/c-ed plavix  - c/w albuterol PRN, standing duoneb q6H    ESRD on HD  Anemia of chronic disease   - Follows Dr. NANCY Saba and Dr. STEPHANIA Vasquez OP  - iron supplementation q1week  - avoid volume overload  - c/w HD as scheduled  - EPO and Venofer with HD  - Renal diet    Hyponatremia resolved. Na increased to 140 as of 5PM      Severe AS   - Echo (7/18/21)   Peak transaortic gradient equals 35.8 mmHg, mean transaortic gradient equals 19.1 mmHg, the calculated aortic valve area equals 0.58 cm² by the continuity equation consistent with severe aortic stenosis.  - Pt was seen by cardio prior admission- cont with aspirin, Plavix and metoprolol   - Pt was referred to Dr. Martinez as outpatient for TAVR. Patient may not be an ideal TAVR candidate as per current assessment    DMII: Currently blood glucose well controlled off meds  - stopped FS  - A1C 5.0 in July 2021    DLD  - c/w atorvastatin 40mg QHS    Dementia Alzheimer's, advanced  - c/w donepezil qHS, mirtazpine 15mg qHS, sertraline 25mg QD  - pt fully dependent with adls    #BPH  - c/w flomax 0.4mg QHS    #Misc  - cont vitamin C supplementation    # DVT prophylaxis: heparin 5000 q8H    # GI prophylaxis: pantoprazole 40mg QD    # Diet: dysphagia 3 diet w/ carb/fluid/renal restriction    # Activity Score (AM-PAC)    # Code status: DNR/DNI    # Disposition: acute, plan for home vs SNF                                                                                  ----------------------------------------------------   ASSESSMENT & PLAN    80 yo male hx of COPD on Home O2 4L, HFrEF (EF 40% in July 2021), severe AS, T2DM, hx right nephrectomy, ESRD on HD, HTN, anxiety, Alzheimer's dementia, anemia of chronic disease on ROMELIA therapy BIBA from NH due to hypoxia and SOB during HD session.  Remains HDS and afebrile, satting well at baseline O2 settings, with dyspnea complaints 2/2 to ESRD, HRrEF, valvular heart disease, COPD on 4lL home O2.     Acute on Chronic Hypercapnic respiratory failure, multifactorial due to volume overload/ESRD/HFrEF exacerbation  Hx of COPD on 4 liters O2  - CXR showed increasing R effusion and stable L effusions. Pulm was consulted. Palliative thoracentesis tomorrow  - switched metoprolol tartrate to metoprolol succinate 25mg PO QHS  - Had hemodialysis today.   - consulted palliative today after discussion with daughter about GOC. daughter agreeable to consider hospice if dialysis is stopped. for comfort care regarding pain or respiratory distress, started 2mg dilaudid IV q4H PRN  - strict fluid restriction to 1 L/day, daily weights  - BNP and troponins chronically elevated   - ECHO 7/2021: EF 40%, Mild to moderate mitral valve regurgitation.  Moderate tricuspid regurgitation; Pulmonary hypertension, severe aortic stenosis  - currently at baseline 4L O2, baseline dementia, currently AAO x1 and lethargic  - does not respond to diuretics which were discontinued 2 days ago  - cont Midodrine 10mg prior to HD for optimal fluid removal. Per Dr. Saba and Dr. Diego discussion, patient cannot tolerate a full dialysis session (BP drops after 1.5L)  - d/c-ed plavix  - c/w albuterol PRN, standing duoneb q6H    ESRD on HD  Anemia of chronic disease   - Follows Dr. NANCY Saba and Dr. STEPHANIA Vasquez OP  - iron supplementation q1week  - avoid volume overload  - c/w HD as scheduled  - EPO and Venofer with HD  - Renal diet    #Hyponatremia resolved. Na increased to 140 as of 5PM    #Wound management of IAD + DTPI  - Incontinence associated dermatitis on sacrum, per wound care team started barrier cream daily  - Deep tissue pressure injury on left heel, started betadine 10% solution daily on AA    #Severe AS   - Echo (7/18/21)   Peak transaortic gradient equals 35.8 mmHg, mean transaortic gradient equals 19.1 mmHg, the calculated aortic valve area equals 0.58 cm² by the continuity equation consistent with severe aortic stenosis.  - Pt was seen by cardio prior admission- cont with aspirin, Plavix and metoprolol   - Pt was referred to Dr. Martinez as outpatient for TAVR. Patient may not be an ideal TAVR candidate as per current assessment    DMII: Currently blood glucose well controlled off meds  - stopped FS  - A1C 5.0 in July 2021    DLD  - c/w atorvastatin 40mg QHS    Dementia Alzheimer's, advanced  - c/w donepezil qHS, mirtazpine 15mg qHS, sertraline 25mg QD  - pt fully dependent with adls    #BPH  - c/w flomax 0.4mg QHS    #Misc  - cont vitamin C supplementation    # DVT prophylaxis: heparin 5000 q8H    # GI prophylaxis: pantoprazole 40mg QD    # Diet: dysphagia 3 diet w/ carb/fluid/renal restriction    # Activity Score (AM-PAC)    # Code status: DNR/DNI    # Disposition: acute, plan for home vs SNF                                                                                  ----------------------------------------------------

## 2021-08-30 NOTE — CONSULT NOTE ADULT - PROBLEM SELECTOR RECOMMENDATION 9
DNR/DNI  Continue current medical management for now   Will re-address GOC as appropriate   Will follow

## 2021-08-30 NOTE — CONSULT NOTE ADULT - PROBLEM SELECTOR RECOMMENDATION 3
Reporting ongoing dyspnea  Agree with Dilaudid 2 mg PO Q 4 H PRN for dyspnea or pain Reporting ongoing dyspnea  Agree with Dilaudid 2 mg PO Q 4 H PRN for dyspnea or pain  If patient cannot tolerate PO meds, recommend dilaudid 0.25mg IV q4h PRN for dyspnea/pain

## 2021-08-30 NOTE — ADVANCED PRACTICE NURSE CONSULT - RECOMMEDATIONS
1. IAD b/l buttock- intergluteal cleft- Cleanse skin w/ perineal cleanser gently pat dry. Continue applying Coloplast Cachorro Protect moisture barrier cream daily and prn after each incontinent episode.    2. DTPI L heel- Cleanse wound bed w/ normal saline. Gently pat dry. Swab w/ Betadine 10% daily to maintain clean, dry wound beds and allow devitalized tissue to uproof naturally. Leave open to air.  -Assess skin/wound qshift, report changes to primary provider.     Additional recs/PI prevention:  -Continue turning/positioning patient from side-to-side q2h while in bed, q1h when/if OOB chair, or in accordance w/ pt's plan of care. Continue utilizing pillows to assist w/ turning/positioning. When/if OOB chair, utilize pillows or chair cushion to offload pressure.   -Continue to offload heels from bed surface with soft pillow under calfs or by applying offloading boots to BLEs.   -Continue utilizing one underpad underneath patient to contain incontinence episodes; change pad when saturated/soiled.   -Continue nutrition consult & tight glucose control for optimal wound healing & nutritional status.     Plan of Care: Primary RN Stacey made aware of above recs. Spoke w/ covering/primary MD Stover (at [phone]) in regards to above. No further needs/recs from [facility] service at this time. Staff RN to perform routine skin/wound assessment and manage wound care. Questions or concerns or if wound worsens and reconsult needed, please contact [facility], [phone].

## 2021-08-30 NOTE — CONSULT NOTE ADULT - SUBJECTIVE AND OBJECTIVE BOX
DRAKE HO          MRN-[id number]              HPI:  82 yo male hx of COPD on Home O2 4L, HFrEF (EF 40% in July 2021),  T2DM, hx right nephrectomy, ESRD on HD, HTN, anxiety, Alzheimer's dementia, anemia of chronic disease on ROMELIA therapy BIBA from NH due to hypoxia and SOB during HD session yesterday. As per daughter, NH reported that he became hypoxic to the 85s, and was agitated/restless during HD session, so NH called EMS to bring patient to ED for evaluation. Daughter denies any fever, syncope, chest pain , abdominal pain, nausea, vomiting, diarrhea, dysuria. Daughter reports that patient recently complained of left heel pain.     In ED, VS: /63, HR 82, RR 22, T 97.8F, SpO2 98% On RA.  On labs: troponins 0.11, pro- BNP 70756 ( at baseline), WBC 11.53, Hgb 9.9( at baseline), Na 132, Cr 4.0.  VBG showed pH 76.21, pCO2 72, HCO3 30.  CXR showed increased bibasilar opacities b/l.  CT head showed no CT evidence for acute intracranial hemorrhage, territorial infarct or midline shift; Chronic microvascular ischemic changes and chronic lacunar infarcts.  Pt was given 40 mg IV lasix in ED.         PAST MEDICAL & SURGICAL HISTORY:  Diabetes mellitus    COPD (chronic obstructive pulmonary disease)    Lymphedema of both lower extremities    CHF (congestive heart failure)    ESRD on dialysis    H/O right nephrectomy  20 yrs ago        FAMILY HISTORY:   Reviewed and found non contributory in mother or father    SOCIAL HISTORY:   Tobacco/etoh/illicit drug use use reported. Yes [ ]  _________  No [ ]  Pt resides at: home [ ]  facility [X ]  other [ ] _______  Living in long term care 2/2 dementia, on chronic HD.       ROS:	    Dyspnea (Celestine 0-10): Reports he "cant breath" and feels he "needs more air"              N/V (Y/N): No                             Secretions (Y/N) : No                                          Agitation(Y/N): No                              Pain (Y/N): No                                 -Provocation/Palliation: N/A  -Quality/Quantity: N/A  -Radiating: N/A  -Severity: No pain  -Timing/Frequency: N/A  -Impact on ADLs: N/A    General:  Denied  HEENT:    Denied  Neck:  Denied  CVS:  Denied  Resp:  Denied  GI:  Denied    :  Denied  Musc:  Denied  Neuro:  Denied  Psych:  Denied  Skin:  Denied  Lymph:  Denied    Last BM: today per flowsheet      Allergies    No Known Allergies    Intolerances      -iStop reviewed (Y/N):   Ref#:                Labs:	    CBC:                        10.3   9.96  )-----------( 247      ( 30 Aug 2021 05:32 )             35.3     CMP:    08-30    136  |  96<L>  |  57<H>  ----------------------------<  171<H>  5.5<H>   |  23  |  7.8<HH>    Ca    7.9<L>      30 Aug 2021 05:32  Mg     2.2     08-30      Radiology:	     < from: Xray Chest 1 View- PORTABLE-Urgent (Xray Chest 1 View- PORTABLE-Urgent .) (08.30.21 @ 01:51) >    Impression:    Increasing right effusion. Stable left effusion and interstitial edema.      < end of copied text >    < from: TTE Echo Complete w/o Contrast w/ Doppler (07.18.21 @ 14:47) >    Summary:   1. Technically difficult study.   2. Moderately decreased global left ventricular systolic function.   3. Entire apex, mid and apical anterior septum, mid and apical inferior septum, and mid and apical inferior wall are abnormal as described above.   4. LV Ejection Fraction by Bishop's Method with a biplane EF of 40 %.   5. Moderately increased LV wall thickness.   6. Normal left ventricular internal cavity size.   7. Moderately enlarged left atrium.   8. Normal right atrial size.   9. There is no evidence of pericardial effusion.  10. Mild to moderate mitral annular calcification.  11. Mild to moderate mitral valve regurgitation.  12. Thickening and calcification of the anterior and posterior mitral valve leaflets.  13. Moderate tricuspid regurgitation.  14. Pulmonary hypertension is present.  15. Peak transaortic gradient equals 35.8 mmHg, mean transaortic gradient equals 19.1 mmHg, the calculated aortic valve area equals 0.58 cm² by the continuity equation consistent with severe aortic stenosis.    < end of copied text >      EKG:	      Imaging Personally Reviewed:  [X ] YES  [ ] NO    Consultant(s) Notes Reviewed:  [X ] YES  [ ] NO  Care Discussed with Consultants/Other Providers [ X] YES  [ ] NO    PEx:	  T(C): 36.6 (08-30-21 @ 14:18), Max: 36.7 (08-29-21 @ 21:50)  HR: 67 (08-30-21 @ 14:18) (67 - 80)  BP: 137/62 (08-30-21 @ 14:18) (114/56 - 145/63)  RR: 18 (08-30-21 @ 14:18) (18 - 20)  SpO2: 96% (08-30-21 @ 11:15) (96% - 98%)    General:  found in bed in distress, intermittently crying out   Eyes:  PERRL EOMI Non icteric MOM  ENMT: no external oral ulcers, MMM, no thrush   CVS: RR, no edema   Resp: Unlabored Non tachypneic No increased WOB, on NC, satting well   GI:  Soft NT ND   Musc: No C/C/E    Neuro: Confused, answers some questions, alert   Psych: Frantic and panicked, AAOx0  Skin: Non jaundiced , no rash       Preadmit Karnofsky:   50%           Current Karnofsky:   40  %      Medications:	      MEDICATIONS  (STANDING):  albuterol/ipratropium for Nebulization 3 milliLiter(s) Nebulizer every 6 hours  ascorbic acid 500 milliGRAM(s) Oral daily  aspirin enteric coated 81 milliGRAM(s) Oral daily  atorvastatin 40 milliGRAM(s) Oral at bedtime  clopidogrel Tablet 75 milliGRAM(s) Oral daily  donepezil 5 milliGRAM(s) Oral at bedtime  epoetin graham-epbx (RETACRIT) Injectable 5000 Unit(s) IV Push <User Schedule>  heparin   Injectable 5000 Unit(s) SubCutaneous every 8 hours  iron sucrose IVPB 50 milliGRAM(s) IV Intermittent <User Schedule>  metoprolol tartrate 25 milliGRAM(s) Oral two times a day  midodrine. 10 milliGRAM(s) Oral <User Schedule>  mirtazapine 15 milliGRAM(s) Oral at bedtime  pantoprazole    Tablet 40 milliGRAM(s) Oral before breakfast  povidone iodine 10% Solution 1 Application(s) Topical daily  sertraline 25 milliGRAM(s) Oral daily  tamsulosin 0.4 milliGRAM(s) Oral at bedtime    MEDICATIONS  (PRN):  ALBUTerol    90 MICROgram(s) HFA Inhaler 2 Puff(s) Inhalation every 6 hours PRN Shortness of Breath and/or Wheezing  HYDROmorphone   Tablet 2 milliGRAM(s) Oral every 4 hours PRN Moderate Pain (4 - 6)        Advanced Directives:	     DNR/DNI    Decision maker: The patient is unable to participate in complex medical decision making conversations 2/2 dementia.   Legal surrogate: DtShin ramsey     PSYCHOSOCIAL-SPIRITUAL ASSESSMENT:       Reviewed       See Palliative Care SW/ documentation        	    REFERRALS       Palliative Med        Unit SW/Case Mgmt              Hospice       Speech/Swallow       Nutrition       PT/OT DRAKE HO          MRN-[id number]              HPI:  80 yo male hx of COPD on Home O2 4L, HFrEF (EF 40% in July 2021),  T2DM, hx right nephrectomy, ESRD on HD, HTN, anxiety, Alzheimer's dementia, anemia of chronic disease on ROMELIA therapy BIBA from NH due to hypoxia and SOB during HD session yesterday. As per daughter, NH reported that he became hypoxic to the 85s, and was agitated/restless during HD session, so NH called EMS to bring patient to ED for evaluation. Daughter denies any fever, syncope, chest pain , abdominal pain, nausea, vomiting, diarrhea, dysuria. Daughter reports that patient recently complained of left heel pain.     In ED, VS: /63, HR 82, RR 22, T 97.8F, SpO2 98% On RA.  On labs: troponins 0.11, pro- BNP 46762 ( at baseline), WBC 11.53, Hgb 9.9( at baseline), Na 132, Cr 4.0.  VBG showed pH 76.21, pCO2 72, HCO3 30.  CXR showed increased bibasilar opacities b/l.  CT head showed no CT evidence for acute intracranial hemorrhage, territorial infarct or midline shift; Chronic microvascular ischemic changes and chronic lacunar infarcts.  Pt was given 40 mg IV lasix in ED.       PAST MEDICAL & SURGICAL HISTORY:  Diabetes mellitus    COPD (chronic obstructive pulmonary disease)    Lymphedema of both lower extremities    CHF (congestive heart failure)    ESRD on dialysis    H/O right nephrectomy  20 yrs ago        FAMILY HISTORY:   Reviewed and found non contributory in mother or father    SOCIAL HISTORY:   Tobacco/etoh/illicit drug use use reported. Yes [ ]  _________  No [ ]  Pt resides at: home [ ]  facility [X ]  other [ ] _______  Living in long term care 2/2 dementia at [facility] on HD.       ROS:	    Dyspnea (Celestine 0-10): Reports he "cant breath" and feels he "needs more air"              N/V (Y/N): No                             Secretions (Y/N) : No                                          Agitation(Y/N): No                              Pain (Y/N): No                                 -Provocation/Palliation: N/A  -Quality/Quantity: N/A  -Radiating: N/A  -Severity: No pain  -Timing/Frequency: N/A  -Impact on ADLs: N/A    General:  Denied  HEENT:    Denied  Neck:  Denied  CVS:  Denied  Resp:  Denied  GI:  Denied    :  Denied  Musc:  Denied  Neuro:  Denied  Psych:  Denied  Skin:  Denied  Lymph:  Denied    Last BM: today per flowsheet      Allergies    No Known Allergies    Intolerances      -iStop reviewed (Y/N):   Ref#:         This report was requested by: Nancy Brito | Reference #: [id number]       Labs:	    CBC:                        10.3   9.96  )-----------( 247      ( 30 Aug 2021 05:32 )             35.3     CMP:    08-30    136  |  96<L>  |  57<H>  ----------------------------<  171<H>  5.5<H>   |  23  |  7.8<HH>    Ca    7.9<L>      30 Aug 2021 05:32  Mg     2.2     08-30      Radiology:	     < from: Xray Chest 1 View- PORTABLE-Urgent (Xray Chest 1 View- PORTABLE-Urgent .) (08.30.21 @ 01:51) >    Impression:    Increasing right effusion. Stable left effusion and interstitial edema.      < end of copied text >    < from: TTE Echo Complete w/o Contrast w/ Doppler (07.18.21 @ 14:47) >    Summary:   1. Technically difficult study.   2. Moderately decreased global left ventricular systolic function.   3. Entire apex, mid and apical anterior septum, mid and apical inferior septum, and mid and apical inferior wall are abnormal as described above.   4. LV Ejection Fraction by Bishop's Method with a biplane EF of 40 %.   5. Moderately increased LV wall thickness.   6. Normal left ventricular internal cavity size.   7. Moderately enlarged left atrium.   8. Normal right atrial size.   9. There is no evidence of pericardial effusion.  10. Mild to moderate mitral annular calcification.  11. Mild to moderate mitral valve regurgitation.  12. Thickening and calcification of the anterior and posterior mitral valve leaflets.  13. Moderate tricuspid regurgitation.  14. Pulmonary hypertension is present.  15. Peak transaortic gradient equals 35.8 mmHg, mean transaortic gradient equals 19.1 mmHg, the calculated aortic valve area equals 0.58 cm² by the continuity equation consistent with severe aortic stenosis.    < end of copied text >      EKG:	      Imaging Personally Reviewed:  [X ] YES  [ ] NO    Consultant(s) Notes Reviewed:  [X ] YES  [ ] NO  Care Discussed with Consultants/Other Providers [ X] YES  [ ] NO    PEx:	  T(C): 36.6 (08-30-21 @ 14:18), Max: 36.7 (08-29-21 @ 21:50)  HR: 67 (08-30-21 @ 14:18) (67 - 80)  BP: 137/62 (08-30-21 @ 14:18) (114/56 - 145/63)  RR: 18 (08-30-21 @ 14:18) (18 - 20)  SpO2: 96% (08-30-21 @ 11:15) (96% - 98%)    General:  found in bed in distress, intermittently crying out   Eyes:  PERRL EOMI Non icteric MOM  ENMT: no external oral ulcers, MMM, no thrush   CVS: RR, no edema   Resp: Unlabored Non tachypneic No increased WOB, on NC, satting well   GI:  Soft NT ND   Musc: No C/C/E    Neuro: Confused, answers some questions, alert   Psych: Frantic and panicked, AAOx0  Skin: Non jaundiced , no rash       Preadmit Karnofsky:   50%           Current Karnofsky:   40  %      Medications:	      MEDICATIONS  (STANDING):  albuterol/ipratropium for Nebulization 3 milliLiter(s) Nebulizer every 6 hours  ascorbic acid 500 milliGRAM(s) Oral daily  aspirin enteric coated 81 milliGRAM(s) Oral daily  atorvastatin 40 milliGRAM(s) Oral at bedtime  clopidogrel Tablet 75 milliGRAM(s) Oral daily  donepezil 5 milliGRAM(s) Oral at bedtime  epoetin graham-epbx (RETACRIT) Injectable 5000 Unit(s) IV Push <User Schedule>  heparin   Injectable 5000 Unit(s) SubCutaneous every 8 hours  iron sucrose IVPB 50 milliGRAM(s) IV Intermittent <User Schedule>  metoprolol tartrate 25 milliGRAM(s) Oral two times a day  midodrine. 10 milliGRAM(s) Oral <User Schedule>  mirtazapine 15 milliGRAM(s) Oral at bedtime  pantoprazole    Tablet 40 milliGRAM(s) Oral before breakfast  povidone iodine 10% Solution 1 Application(s) Topical daily  sertraline 25 milliGRAM(s) Oral daily  tamsulosin 0.4 milliGRAM(s) Oral at bedtime    MEDICATIONS  (PRN):  ALBUTerol    90 MICROgram(s) HFA Inhaler 2 Puff(s) Inhalation every 6 hours PRN Shortness of Breath and/or Wheezing  HYDROmorphone   Tablet 2 milliGRAM(s) Oral every 4 hours PRN Moderate Pain (4 - 6)        Advanced Directives:	     DNR/DNI    Decision maker: The patient is unable to participate in complex medical decision making conversations 2/2 dementia.   Legal surrogate: Dtr, Shin Nguyen (patient has living wife but defers to dtr)    PSYCHOSOCIAL-SPIRITUAL ASSESSMENT:       Reviewed       See Palliative Care SW/ documentation        	    REFERRALS       Palliative Med        Unit SW/Case Mgmt   DRAKE HO          MRN-[id number]              CC: hypoxia/SOB    HPI:  82 yo male hx of COPD on Home O2 4L, HFrEF (EF 40% in 2021),  T2DM, hx right nephrectomy, ESRD on HD, HTN, anxiety, Alzheimer's dementia, anemia of chronic disease on ROMELIA therapy BIBA from NH due to hypoxia and SOB during HD session yesterday. As per daughter, NH reported that he became hypoxic to the 85s, and was agitated/restless during HD session, so NH called EMS to bring patient to ED for evaluation. Daughter denies any fever, syncope, chest pain , abdominal pain, nausea, vomiting, diarrhea, dysuria. Daughter reports that patient recently complained of left heel pain.     In ED, VS: /63, HR 82, RR 22, T 97.8F, SpO2 98% On RA.  On labs: troponins 0.11, pro- BNP 18483 ( at baseline), WBC 11.53, Hgb 9.9( at baseline), Na 132, Cr 4.0.  VBG showed pH 76.21, pCO2 72, HCO3 30.  CXR showed increased bibasilar opacities b/l.  CT head showed no CT evidence for acute intracranial hemorrhage, territorial infarct or midline shift; Chronic microvascular ischemic changes and chronic lacunar infarcts.  Pt was given 40 mg IV lasix in ED.       PAST MEDICAL & SURGICAL HISTORY:  Diabetes mellitus    COPD (chronic obstructive pulmonary disease)    Lymphedema of both lower extremities    CHF (congestive heart failure)    ESRD on dialysis    H/O right nephrectomy  20 yrs ago        FAMILY HISTORY:   Reviewed and found non contributory in mother or father    SOCIAL HISTORY:  Pt resides at: home [ ]  facility [X ]  other [ ] _______  Living in long term care 2/2 dementia at [facility] on HD.       ROS:	    Dyspnea (Celestine 0-10): Reports he "cant breath" and feels he "needs more air"              N/V (Y/N): No                             Secretions (Y/N) : No                                          Agitation(Y/N): No                              Pain (Y/N): No                                 -Provocation/Palliation: N/A  -Quality/Quantity: N/A  -Radiating: N/A  -Severity: No pain  -Timing/Frequency: N/A  -Impact on ADLs: N/A    General:  Denied  HEENT:    Denied  Neck:  Denied  CVS:  Denied  Resp:  Denied  GI:  Denied    :  Denied  Musc:  Denied  Neuro:  Denied  Psych:  Denied  Skin:  Denied  Lymph:  Denied    Last BM: today per flowsheet      Allergies  No Known Allergies      -iStop reviewed (Y/N):   Ref#:         This report was requested by: Nancy Brito | Reference #: [id number]       Labs:	    CBC:                        10.3   9.96  )-----------( 247      ( 30 Aug 2021 05:32 )             35.3     CMP:        136  |  96<L>  |  57<H>  ----------------------------<  171<H>  5.5<H>   |  23  |  7.8<HH>    Ca    7.9<L>      30 Aug 2021 05:32  Mg     2.2           Radiology:	     < from: Xray Chest 1 View- PORTABLE-Urgent (Xray Chest 1 View- PORTABLE-Urgent .) (21 @ 01:51) >    Impression:    Increasing right effusion. Stable left effusion and interstitial edema.      < end of copied text >    < from: TTE Echo Complete w/o Contrast w/ Doppler (21 @ 14:47) >    Summary:   1. Technically difficult study.   2. Moderately decreased global left ventricular systolic function.   3. Entire apex, mid and apical anterior septum, mid and apical inferior septum, and mid and apical inferior wall are abnormal as described above.   4. LV Ejection Fraction by Bishop's Method with a biplane EF of 40 %.   5. Moderately increased LV wall thickness.   6. Normal left ventricular internal cavity size.   7. Moderately enlarged left atrium.   8. Normal right atrial size.   9. There is no evidence of pericardial effusion.  10. Mild to moderate mitral annular calcification.  11. Mild to moderate mitral valve regurgitation.  12. Thickening and calcification of the anterior and posterior mitral valve leaflets.  13. Moderate tricuspid regurgitation.  14. Pulmonary hypertension is present.  15. Peak transaortic gradient equals 35.8 mmHg, mean transaortic gradient equals 19.1 mmHg, the calculated aortic valve area equals 0.58 cm² by the continuity equation consistent with severe aortic stenosis.    < end of copied text >      EK/26 interpreted by me: NSR    Imaging Personally Reviewed:  [X ] YES  [ ] NO    Consultant(s) Notes Reviewed:  [X ] YES  [ ] NO  Care Discussed with Consultants/Other Providers [ X] YES  [ ] NO    PEx:	  T(C): 36.6 (21 @ 14:18), Max: 36.7 (21 @ 21:50)  HR: 67 (21 @ 14:18) (67 - 80)  BP: 137/62 (21 @ 14:18) (114/56 - 145/63)  RR: 18 (21 @ 14:18) (18 - 20)  SpO2: 96% (21 @ 11:15) (96% - 98%)    General:  found in bed in distress, intermittently crying out   Eyes:  PERRL EOMI Non icteric MOM  ENMT: no external oral ulcers, MMM, no thrush   CVS: RR, no edema   Resp: Unlabored Non tachypneic No increased WOB, on NC, satting well   GI:  Soft NT ND   Musc: No C/C/E    Neuro: Confused, answers some questions, alert   Psych: Frantic and panicked, AAOx0  Skin: Non jaundiced , no rash       Preadmit Karnofsky:   50%           Current Karnofsky:   40  %      Medications:	      MEDICATIONS  (STANDING):  albuterol/ipratropium for Nebulization 3 milliLiter(s) Nebulizer every 6 hours  ascorbic acid 500 milliGRAM(s) Oral daily  aspirin enteric coated 81 milliGRAM(s) Oral daily  atorvastatin 40 milliGRAM(s) Oral at bedtime  clopidogrel Tablet 75 milliGRAM(s) Oral daily  donepezil 5 milliGRAM(s) Oral at bedtime  epoetin graham-epbx (RETACRIT) Injectable 5000 Unit(s) IV Push <User Schedule>  heparin   Injectable 5000 Unit(s) SubCutaneous every 8 hours  iron sucrose IVPB 50 milliGRAM(s) IV Intermittent <User Schedule>  metoprolol tartrate 25 milliGRAM(s) Oral two times a day  midodrine. 10 milliGRAM(s) Oral <User Schedule>  mirtazapine 15 milliGRAM(s) Oral at bedtime  pantoprazole    Tablet 40 milliGRAM(s) Oral before breakfast  povidone iodine 10% Solution 1 Application(s) Topical daily  sertraline 25 milliGRAM(s) Oral daily  tamsulosin 0.4 milliGRAM(s) Oral at bedtime    MEDICATIONS  (PRN):  ALBUTerol    90 MICROgram(s) HFA Inhaler 2 Puff(s) Inhalation every 6 hours PRN Shortness of Breath and/or Wheezing  HYDROmorphone   Tablet 2 milliGRAM(s) Oral every 4 hours PRN Moderate Pain (4 - 6)        Advanced Directives:	     DNR/DNI    Decision maker: The patient is unable to participate in complex medical decision making conversations 2/2 dementia.   Legal surrogate: DtrShin (patient has living wife but defers to dtr)    PSYCHOSOCIAL-SPIRITUAL ASSESSMENT:       Reviewed       See Palliative Care SW/ documentation        	    REFERRALS       Palliative Med        Unit SW/Case Mgmt

## 2021-08-30 NOTE — CONSULT NOTE ADULT - CONSULT REASON
Heart Failure
Hypoxia
ESRD
patent has poor prognosis, after discussing with GOC with daughter and Dr. Diego, daughter is agreeable to transitioning towards hospice care. consulted to manage pain and respiratory distress

## 2021-08-30 NOTE — CONSULT NOTE ADULT - PROBLEM SELECTOR RECOMMENDATION 5
Tolerating HD poorly   Nephrology following for recommendations, to speak with Shin this admission  Would qualify for hospice if HD is stopped

## 2021-08-30 NOTE — PROGRESS NOTE ADULT - SUBJECTIVE AND OBJECTIVE BOX
[facility] FOLLOW UP NOTE  --------------------------------------------------------------------------------  24 hour events/subjective: Patient examined during HD. Appears comfortable.    PAST HISTORY  --------------------------------------------------------------------------------  No significant changes to PMH, PSH, FHx, SHx, unless otherwise noted    ALLERGIES & MEDICATIONS  --------------------------------------------------------------------------------  Allergies    No Known Allergies    Standing Inpatient Medications  albuterol/ipratropium for Nebulization 3 milliLiter(s) Nebulizer every 6 hours  ascorbic acid 500 milliGRAM(s) Oral daily  aspirin enteric coated 81 milliGRAM(s) Oral daily  atorvastatin 40 milliGRAM(s) Oral at bedtime  donepezil 5 milliGRAM(s) Oral at bedtime  epoetin graham-epbx (RETACRIT) Injectable 5000 Unit(s) IV Push <User Schedule>  heparin   Injectable 5000 Unit(s) SubCutaneous every 8 hours  iron sucrose IVPB 50 milliGRAM(s) IV Intermittent <User Schedule>  metoprolol succinate ER 25 milliGRAM(s) Oral at bedtime  midodrine. 10 milliGRAM(s) Oral <User Schedule>  mirtazapine 15 milliGRAM(s) Oral at bedtime  pantoprazole    Tablet 40 milliGRAM(s) Oral before breakfast  povidone iodine 10% Solution 1 Application(s) Topical daily  sertraline 25 milliGRAM(s) Oral daily  tamsulosin 0.4 milliGRAM(s) Oral at bedtime    PRN Inpatient Medications  ALBUTerol    90 MICROgram(s) HFA Inhaler 2 Puff(s) Inhalation every 6 hours PRN  HYDROmorphone   Tablet 2 milliGRAM(s) Oral every 4 hours PRN    VITALS/PHYSICAL EXAM  --------------------------------------------------------------------------------  T(C): 36.6 (08-30-21 @ 14:18), Max: 36.7 (08-29-21 @ 21:50)  HR: 67 (08-30-21 @ 14:18) (67 - 80)  BP: 137/62 (08-30-21 @ 14:18) (114/56 - 145/63)  RR: 18 (08-30-21 @ 14:18) (18 - 20)  SpO2: 96% (08-30-21 @ 11:15) (96% - 98%)    08-30-21 @ 07:01  -  08-30-21 @ 19:27  --------------------------------------------------------  IN: 0 mL / OUT: 2300 mL / NET: -2300 mL    Physical Exam:  	Gen: NAD  	Pulm: CTA B/L  	CV: RRR, S1S2  	Abd: +BS, soft, nontender/nondistended  	: No suprapubic tenderness  	LE: Warm, no edema  	Vascular access: TDC    LABS/STUDIES  --------------------------------------------------------------------------------              10.3   9.96  >-----------<  247      [08-30-21 @ 05:32]              35.3     136  |  96  |  57  ----------------------------<  171      [08-30-21 @ 05:32]  5.5   |  23  |  7.8        Ca     7.9     [08-30-21 @ 05:32]      Mg     2.2     [08-30-21 @ 05:32]    Creatinine Trend:  SCr 7.8 [08-30 @ 05:32]  SCr 4.9 [08-28 @ 07:56]  SCr 6.0 [08-27 @ 04:30]  SCr 4.0 [08-26 @ 00:50]  SCr 6.5 [08-20 @ 09:34]    Iron 34, TIBC 178, %sat 19      [01-04-21 @ 06:40]  Ferritin 332      [01-04-21 @ 06:40]  PTH -- (Ca 8.9)      [12-24-20 @ 04:30]   54  Lipid: chol 114, , HDL 38, LDL --      [08-27-21 @ 04:30]

## 2021-08-30 NOTE — PROGRESS NOTE ADULT - SUBJECTIVE AND OBJECTIVE BOX
Patient is a 81y old  Male who presents with a chief complaint of hypoxia, SOB (29 Aug 2021 13:48)      SUBJECTIVE: Patient remains comfortable on 4L NC, currently on HD with goal of 2L off      REVIEW OF SYSTEMS:    All Pertinent ROS are negative except per HPI       PHYSICAL EXAM  Vital Signs Last 24 Hrs  T(C): 36.4 (30 Aug 2021 04:50), Max: 36.7 (29 Aug 2021 21:50)  T(F): 97.5 (30 Aug 2021 04:50), Max: 98.1 (29 Aug 2021 21:50)  HR: 76 (30 Aug 2021 08:30) (67 - 80)  BP: 133/46 (30 Aug 2021 08:30) (126/68 - 145/63)  BP(mean): --  RR: 18 (30 Aug 2021 08:30) (18 - 20)  SpO2: 98% (30 Aug 2021 07:46) (98% - 98%)    CONSTITUTIONAL:  NAD    ENT:   Airway patent,   No thrush    CARDIAC:   Normal rate,   regular rhythm.    no edema      RESPIRATORY:   Bibasilar crackles   No Wheezing  Normal chest expansion  Not tachypneic,  No use of accessory muscles    GASTROINTESTINAL:  Abdomen soft,   non-tender,   no guarding,   + BS    MUSCULOSKELETAL:   range of motion is not limited,  no clubbing, cyanosis    NEUROLOGICAL:   Alert and oriented x1  no motor or deficits.    SKIN:   Skin normal color for race,   warm, dry   No evidence of rash.        LABS:                          10.3   9.96  )-----------( 247      ( 30 Aug 2021 05:32 )             35.3                                               08-30    136  |  96<L>  |  57<H>  ----------------------------<  171<H>  5.5<H>   |  23  |  7.8<HH>    Ca    7.9<L>      30 Aug 2021 05:32  Mg     2.2     08-30                          MEDICATIONS  (STANDING):  albuterol/ipratropium for Nebulization 3 milliLiter(s) Nebulizer every 6 hours  ascorbic acid 500 milliGRAM(s) Oral daily  aspirin enteric coated 81 milliGRAM(s) Oral daily  atorvastatin 40 milliGRAM(s) Oral at bedtime  clopidogrel Tablet 75 milliGRAM(s) Oral daily  donepezil 5 milliGRAM(s) Oral at bedtime  epoetin graham-epbx (RETACRIT) Injectable 5000 Unit(s) IV Push <User Schedule>  heparin   Injectable 5000 Unit(s) SubCutaneous every 8 hours  iron sucrose IVPB 50 milliGRAM(s) IV Intermittent <User Schedule>  metoprolol tartrate 25 milliGRAM(s) Oral two times a day  midodrine. 10 milliGRAM(s) Oral <User Schedule>  mirtazapine 15 milliGRAM(s) Oral at bedtime  pantoprazole    Tablet 40 milliGRAM(s) Oral before breakfast  sertraline 25 milliGRAM(s) Oral daily  tamsulosin 0.4 milliGRAM(s) Oral at bedtime    MEDICATIONS  (PRN):  ALBUTerol    90 MICROgram(s) HFA Inhaler 2 Puff(s) Inhalation every 6 hours PRN Shortness of Breath and/or Wheezing      X-Rays reviewed: increasing right pleural effusion, stable left  Patient is a 81y old  Male who presents with a chief complaint of hypoxia, SOB (29 Aug 2021 13:48)      SUBJECTIVE: Patient remains comfortable on 4L NC, currently on HD with goal of 2L off        PHYSICAL EXAM  Vital Signs Last 24 Hrs  T(C): 36.4 (30 Aug 2021 04:50), Max: 36.7 (29 Aug 2021 21:50)  T(F): 97.5 (30 Aug 2021 04:50), Max: 98.1 (29 Aug 2021 21:50)  HR: 76 (30 Aug 2021 08:30) (67 - 80)  BP: 133/46 (30 Aug 2021 08:30) (126/68 - 145/63)  RR: 18 (30 Aug 2021 08:30) (18 - 20)  SpO2: 98% (30 Aug 2021 07:46) (98% - 98%)    CONSTITUTIONAL:  ILL looking    ENT:   Airway patent,   No thrush    CARDIAC:   SHEN 3/6      RESPIRATORY:   DEC BS R SIDE    GASTROINTESTINAL:  Abdomen soft,   non-tender,   no guarding,   + BS    MUSCULOSKELETAL:   range of motion is not limited,  no clubbing, cyanosis    NEUROLOGICAL:   Alert and oriented x1  no motor or deficits.        LABS:                          10.3   9.96  )-----------( 247      ( 30 Aug 2021 05:32 )             35.3                                               08-30    136  |  96<L>  |  57<H>  ----------------------------<  171<H>  5.5<H>   |  23  |  7.8<HH>    Ca    7.9<L>      30 Aug 2021 05:32  Mg     2.2     08-30                          MEDICATIONS  (STANDING):  albuterol/ipratropium for Nebulization 3 milliLiter(s) Nebulizer every 6 hours  ascorbic acid 500 milliGRAM(s) Oral daily  aspirin enteric coated 81 milliGRAM(s) Oral daily  atorvastatin 40 milliGRAM(s) Oral at bedtime  clopidogrel Tablet 75 milliGRAM(s) Oral daily  donepezil 5 milliGRAM(s) Oral at bedtime  epoetin graham-epbx (RETACRIT) Injectable 5000 Unit(s) IV Push <User Schedule>  heparin   Injectable 5000 Unit(s) SubCutaneous every 8 hours  iron sucrose IVPB 50 milliGRAM(s) IV Intermittent <User Schedule>  metoprolol tartrate 25 milliGRAM(s) Oral two times a day  midodrine. 10 milliGRAM(s) Oral <User Schedule>  mirtazapine 15 milliGRAM(s) Oral at bedtime  pantoprazole    Tablet 40 milliGRAM(s) Oral before breakfast  sertraline 25 milliGRAM(s) Oral daily  tamsulosin 0.4 milliGRAM(s) Oral at bedtime    MEDICATIONS  (PRN):  ALBUTerol    90 MICROgram(s) HFA Inhaler 2 Puff(s) Inhalation every 6 hours PRN Shortness of Breath and/or Wheezing      X-Rays reviewed: increasing right pleural effusion, stable left

## 2021-08-30 NOTE — PROGRESS NOTE ADULT - ASSESSMENT
IMPRESSION:    Chronic Hypercapnic Respiratory Failure secondary to Volume Overload  Moderate Bilateral Pleural Effusions on bedside US  COPD on 4L NC home oxygen  HO HFrEF exacerbation  HO Legionella Pneumonia  HO Pulmonary Hypertension  HO Severe Aortic Stenosis  HO ESRD on HD  HO Alzheimer's Dementia    PLAN:    Possible Thoracentesis today  Strict intake and outputs   NIV at night  HD per Nephrology  DVT prophylaxis  Pulmonary toilet  Nebs PRN  DNR/DNI     IMPRESSION:    Chronic Hypercapnic Respiratory Failure secondary to Volume Overload  Moderate Bilateral Pleural Effusions on bedside US  COPD on 4L NC home oxygen  HO HFrEF exacerbation  HO Legionella Pneumonia  HO Pulmonary Hypertension  HO Severe Aortic Stenosis  HO ESRD on HD  HO Alzheimer's Dementia    PLAN:    Plan for thoracentesis tomorrow   Strict intake and outputs   NIV at night  HD per Nephrology  DVT prophylaxis  Pulmonary toilet  Nebs PRN  DNR/DNI    Discussed case with patient's daughter, Shin, present by bedside.

## 2021-08-30 NOTE — CONSULT NOTE ADULT - CONVERSATION DETAILS
Spoke with dtr of patient at bedside. Discussion options moving forward for the patient. She understands he is nearing end of life and has a poor prognosis. He has been in and out of the hospital multiple times in the past few months. She feels that in an ideal world, she would take the patient home to die on hospice when he no longer tolerates HD.     We discussed hospice as an option and that it may be provided at home, and that she may need to hire supplemental aids for assistance with the patient at home. Discussed that he would have to stop HD in order to qualify for hospice. She feels she is not quite ready for this now and would like to continue to medically manage the patient as long as nephrology is willing to dialyze the patient. If the medical team tells her he can no longer receive HD, she would probably opt for hospice.     Shin would like to speak with nephro this week to hear their input.

## 2021-08-30 NOTE — CONSULT NOTE ADULT - PROBLEM SELECTOR RECOMMENDATION 4
-Recommend non-pharmacological interventions to prevent/treat delirium  - maintain day/night light cycles  - optimize sleep-wake cycle, minimize environmental noise  - reorientation frequently  - use verbal redirection as first line  - minimize restraints and lines  - ensure good bladder/bowel function  - ensure adequate pain control  - minimize use of anticholinergic, antihistaminic, and benzodiazepine medications History of ?dementia as on donepazil   -Recommend non-pharmacological interventions to prevent/treat delirium  - maintain day/night light cycles  - optimize sleep-wake cycle, minimize environmental noise  - reorientation frequently  - use verbal redirection as first line  - minimize restraints and lines  - ensure good bladder/bowel function  - ensure adequate pain control  - minimize use of anticholinergic, antihistaminic, and benzodiazepine medications

## 2021-08-30 NOTE — PROGRESS NOTE ADULT - SUBJECTIVE AND OBJECTIVE BOX
DRAKE FORTUNEBALAJ 81y Male  MRN#: [id number]   CODE STATUS: DNR    Hospital Day: 4d    Pt is currently admitted with the primary diagnosis of acute on chronic respiratory failure    SUBJECTIVE  Hospital Course    Overnight events: Night resident was alerted by nurse of [facility] (SpO2 94% on 4L NC). PE was significant for b/l crackles on RUL and was given nebulizing treatment and CXR was ordered- showing worsening right effusion.     Upon seeing patient this morning, patient was endorsing complaints of having trouble breathing. Was given albuterol treatment, but patient would take off mask after a few minutes. SpO2 was recorded, and was 98% on 4L O2    Subjective complaints     Present Today:   - Laura:  No [X  ], Yes [   ] : Indication:     - Type of IV Access:       .. CVC/Piccline:  No [X  ], Yes [   ] : Indication:       .. Midline: No [X  ], Yes [   ] : Indication:                                             ----------------------------------------------------------  OBJECTIVE  PAST MEDICAL & SURGICAL HISTORY  Diabetes mellitus    COPD (chronic obstructive pulmonary disease)    Lymphedema of both lower extremities    CHF (congestive heart failure)    ESRD on dialysis    H/O right nephrectomy  20 yrs ago                                              -----------------------------------------------------------  ALLERGIES:  No Known Allergies                                            ------------------------------------------------------------    HOME MEDICATIONS  Home Medications:  albuterol 90 mcg/inh inhalation aerosol: 2 puff(s) inhaled every 6 hours (20 Aug 2021 09:36)  ascorbic acid 500 mg oral tablet: 1 tab(s) orally once a day (20 Aug 2021 09:36)  aspirin 81 mg oral delayed release tablet: 1 tab(s) orally once a day (20 Aug 2021 09:36)  atorvastatin 40 mg oral tablet: 1 tab(s) orally once a day (at bedtime) (20 Aug 2021 09:36)  Breo Ellipta 200 mcg-25 mcg/inh inhalation powder: 1 puff(s) inhaled once a day (26 Jul 2021 10:25)  calcium acetate 667 mg oral tablet: 1 tab(s) orally 3 times a day (with meals) (20 Aug 2021 13:54)  clopidogrel 75 mg oral tablet: 1 tab(s) orally once a day (20 Aug 2021 09:36)  donepezil 5 mg oral tablet: 1 tab(s) orally once a day (at bedtime) (20 Aug 2021 09:36)  epoetin graham: 5000 unit(s) injectable Monday, Wednesday, and Friday (20 Aug 2021 11:05)  furosemide 80 mg oral tablet: 1 tab(s) orally 2 times a day (20 Aug 2021 09:36)  insulin lispro 100 units/mL injectable solution: injectable 3 times a day (before meals)  1 unit 150-200  2 units 201-250  3 units 251-300  4 units 301-350  5 units 351-400  over 400 notify MD (20 Aug 2021 13:54)  metoprolol tartrate 25 mg oral tablet: 1 tab(s) orally 2 times a day (20 Aug 2021 09:36)  mirtazapine 15 mg oral tablet: 1 tab(s) orally once a day (at bedtime) (20 Aug 2021 09:36)  multivitamin: 1 tab(s) orally once a day (20 Aug 2021 13:54)  pantoprazole 40 mg oral delayed release tablet: 1 tab(s) orally once a day (before a meal) (20 Aug 2021 09:36)  Senna 8.6 mg oral tablet: 2 tab(s) orally once a day (at bedtime), As Needed (26 Jul 2021 10:25)  sertraline 25 mg oral tablet: 1 tab(s) orally once a day (20 Aug 2021 09:36)  tamsulosin 0.4 mg oral capsule: 1 cap(s) orally once a day (at bedtime) (20 Aug 2021 09:36)                           MEDICATIONS:  STANDING MEDICATIONS  albuterol/ipratropium for Nebulization 3 milliLiter(s) Nebulizer every 6 hours  ascorbic acid 500 milliGRAM(s) Oral daily  aspirin enteric coated 81 milliGRAM(s) Oral daily  atorvastatin 40 milliGRAM(s) Oral at bedtime  donepezil 5 milliGRAM(s) Oral at bedtime  epoetin graham-epbx (RETACRIT) Injectable 5000 Unit(s) IV Push <User Schedule>  heparin   Injectable 5000 Unit(s) SubCutaneous every 8 hours  iron sucrose IVPB 50 milliGRAM(s) IV Intermittent <User Schedule>  metoprolol succinate ER 25 milliGRAM(s) Oral at bedtime  midodrine. 10 milliGRAM(s) Oral <User Schedule>  mirtazapine 15 milliGRAM(s) Oral at bedtime  pantoprazole    Tablet 40 milliGRAM(s) Oral before breakfast  povidone iodine 10% Solution 1 Application(s) Topical daily  sertraline 25 milliGRAM(s) Oral daily  tamsulosin 0.4 milliGRAM(s) Oral at bedtime    PRN MEDICATIONS  ALBUTerol    90 MICROgram(s) HFA Inhaler 2 Puff(s) Inhalation every 6 hours PRN  HYDROmorphone   Tablet 2 milliGRAM(s) Oral every 4 hours PRN                                            ------------------------------------------------------------  VITAL SIGNS: Last 24 Hours  T(C): 36.6 (30 Aug 2021 14:18), Max: 36.7 (29 Aug 2021 21:50)  T(F): 97.9 (30 Aug 2021 14:18), Max: 98.1 (29 Aug 2021 21:50)  HR: 67 (30 Aug 2021 14:18) (67 - 80)  BP: 137/62 (30 Aug 2021 14:18) (114/56 - 145/63)  BP(mean): --  RR: 18 (30 Aug 2021 14:18) (18 - 20)  SpO2: 96% (30 Aug 2021 11:15) (96% - 98%)      08-30-21 @ 07:01  -  08-30-21 @ 16:59  --------------------------------------------------------  IN: 0 mL / OUT: 2300 mL / NET: -2300 mL                                             --------------------------------------------------------------  LABS:                        10.3   9.96  )-----------( 247      ( 30 Aug 2021 05:32 )             35.3     08-30    136  |  96<L>  |  57<H>  ----------------------------<  171<H>  5.5<H>   |  23  |  7.8<HH>    Ca    7.9<L>      30 Aug 2021 05:32  Mg     2.2     08-30                                                                -------------------------------------------------------------  RADIOLOGY:    < from: Xray Chest 1 View- PORTABLE-Urgent (Xray Chest 1 View- PORTABLE-Urgent .) (08.30.21 @ 01:51) >  Impression:    Increasing right effusion. Stable left effusion and interstitial edema.    < end of copied text >  < from: CT Head No Cont (08.26.21 @ 02:26) >  IMPRESSION:    No CT evidence for acute intracranial hemorrhage, territorial infarct or midline shift.  Chronic microvascular ischemic changes and chronic lacunar infarcts    < end of copied text >                                            --------------------------------------------------------------    PHYSICAL EXAM:  General: Well appearing, not comfortable, breathing through mouth  LUNGS: decreased lung sounds b/lm wheezing at base, on 4L NC  HEART: RRR, systolic murmur, loudest on aortic  ABDOMEN: NBS, soft, nontender to palpation  EXT: no peripheral pitting edema                                           --------------------------------------------------------------

## 2021-08-30 NOTE — CONSULT NOTE ADULT - ASSESSMENT
82 y/o M with h/o COPD, HFpEF, ESRD on HD admitted with hypoxia, fluid overload.       Chronic systolic HF / ESRD on HD / COPD / Anemia     TTE in July showed LVEF ~40% with RWMA   Euvolemic on exam   Continue BB   Continue HD per nephrology   Palliative care evaluation

## 2021-08-30 NOTE — PROGRESS NOTE ADULT - SUBJECTIVE AND OBJECTIVE BOX
DRAKE HO  81y  Male  ***My note supersedes ALL resident notes that I sign.  My corrections for their notes are in my note.***    I can be reached directly on [email or web address] [id number]. My office number is [phone]. My personal cell number is [phone].    INTERVAL EVENTS: Here for f/u of SOB. Pt was SOB this AM and then better later on. Pt is O2 dependent. Pt on HD.    T(F): 97.9 (08-30-21 @ 14:18), Max: 98.1 (08-29-21 @ 21:50)  HR: 67 (08-30-21 @ 14:18) (67 - 80)  BP: 137/62 (08-30-21 @ 14:18) (114/56 - 145/63)  RR: 18 (08-30-21 @ 14:18) (18 - 20)  SpO2: 96% (08-30-21 @ 11:15) (96% - 98%)    Gen: NAD  HEENT: PERRL, mouth clr, nose clr  Neck: no nodes, no JVD, thyroid nl  chest: rt Tesio  lungs: bibasilar crackles  hrt: s1 s2 rrr 1/6 sys murmur murmur  abd: soft, NT/ND, no HS megaly  ext: tr edema, no c/c  neuro: awake, talking a little,    LABS:                      10.3    (    100.3  9.96  )-----------( ---------      247      ( 30 Aug 2021 05:32 )             35.3    (    16.9     136   (   96   (   171      08-30-21 @ 05:32  ----------------------               5.5   (   23   (   57                             -----                        7.8  Ca  7.9   Mg  2.2    P   --     CAPILLARY BLOOD GLUCOSE  POCT Blood Glucose.: 135 (08-30-21 @ 07:50)  POCT Blood Glucose.: 117 (08-29-21 @ 20:36)  POCT Blood Glucose.: 144 (08-29-21 @ 16:30)  POCT Blood Glucose.: 144 (08-29-21 @ 11:26)  POCT Blood Glucose.: 117 (08-29-21 @ 07:46)  POCT Blood Glucose.: 116 (08-28-21 @ 21:52)  POCT Blood Glucose.: 163 (08-28-21 @ 11:15)  POCT Blood Glucose.: 106 (08-28-21 @ 08:01)    RADIOLOGY & ADDITIONAL TESTS:  < from: Xray Chest 1 View- PORTABLE-Urgent (Xray Chest 1 View- PORTABLE-Urgent .) (08.30.21 @ 01:51) >  Impression:    Increasing right effusion. Stable left effusion and interstitial edema.    < end of copied text >    MEDICATIONS:    ALBUTerol    90 MICROgram(s) HFA Inhaler 2 Puff(s) Inhalation every 6 hours PRN  albuterol/ipratropium for Nebulization 3 milliLiter(s) Nebulizer every 6 hours  ascorbic acid 500 milliGRAM(s) Oral daily  aspirin enteric coated 81 milliGRAM(s) Oral daily  atorvastatin 40 milliGRAM(s) Oral at bedtime  clopidogrel Tablet 75 milliGRAM(s) Oral daily  donepezil 5 milliGRAM(s) Oral at bedtime  epoetin graham-epbx (RETACRIT) Injectable 5000 Unit(s) IV Push <User Schedule>  heparin   Injectable 5000 Unit(s) SubCutaneous every 8 hours  HYDROmorphone   Tablet 2 milliGRAM(s) Oral every 4 hours PRN  iron sucrose IVPB 50 milliGRAM(s) IV Intermittent <User Schedule>  metoprolol tartrate 25 milliGRAM(s) Oral two times a day  midodrine. 10 milliGRAM(s) Oral <User Schedule>  mirtazapine 15 milliGRAM(s) Oral at bedtime  pantoprazole    Tablet 40 milliGRAM(s) Oral before breakfast  povidone iodine 10% Solution 1 Application(s) Topical daily  sertraline 25 milliGRAM(s) Oral daily  tamsulosin 0.4 milliGRAM(s) Oral at bedtime

## 2021-08-30 NOTE — CONSULT NOTE ADULT - SUBJECTIVE AND OBJECTIVE BOX
Date of Admission: 8/26/21    HISTORY OF PRESENT ILLNESS:   80 yo male hx of COPD on Home O2 4L, HFrEF (EF 40% in July 2021),  T2DM, hx right nephrectomy, ESRD on HD, HTN, anxiety, Alzheimer's dementia, anemia of chronic disease on [facility] therapy BIBA from NH due to hypoxia and SOB during HD session yesterday. As per daughter, NH reported that he became hypoxic to the 85s, and was agitated/restless during HD session, so NH called EMS to bring patient to ED for evaluation. Daughter denies any fever, syncope, chest pain , abdominal pain, nausea, vomiting, diarrhea, dysuria. Daughter reports that patient recently complained of left heel pain.    PAST MEDICAL & SURGICAL HISTORY:    Diabetes mellitus  COPD (chronic obstructive pulmonary disease)  Lymphedema of both lower extremities  CHF (congestive heart failure)  ESRD on dialysis  H/O right nephrectomy  20 yrs ago      FAMILY HISTORY:    Unable to obtain information patient is confused    SOCIAL HISTORY:      Unable to obtain information patient is confused    Allergies    No Known Allergies    Intolerances    ROS:    Unable to obtain information patient is confused    PHYSICAL EXAM:    General appearance: cachectic 	  Neck: normal JVP, no bruit.   Eyes: Extra Ocular muscles intact.   Cardiovascular: regular rate and rhythm S1 S2, No JVD, No murmurs, No edema  Respiratory: Lungs clear to auscultation	  Psychiatry: Alert and oriented x 3, Mood & affect appropriate  Gastrointestinal:  Soft, Non-tender  Skin/Integumen: No rashes, No ecchymoses, No cyanosis	  Neurologic: Non-focal  Musculoskeletal/ extremities: Normal range of motion, No clubbing, cyanosis or edema  Vascular: Peripheral pulses palpable 2+ bilaterally Right chest HD catheter      PREVIOUS DIAGNOSTIC TESTING:      TTE 7/18/21    Summary:   1. Technically difficult study.   2. Moderately decreased global left ventricular systolic function.   3. Entire apex, mid and apical anterior septum, mid and apical inferior septum, and mid and apical inferior wall are abnormal as described above.   4. LV Ejection Fraction by Bishop's Method with a biplane EF of 40 %.   5. Moderately increased LV wall thickness.   6. Normal left ventricular internal cavity size.   7. Moderately enlarged left atrium.   8. Normal right atrial size.   9. There is no evidence of pericardial effusion.  10. Mild to moderate mitral annular calcification.  11. Mild to moderate mitral valve regurgitation.  12. Thickening and calcification of the anterior and posterior mitral valve leaflets.  13. Moderate tricuspid regurgitation.  14. Pulmonary hypertension is present.  15. Peak transaortic gradient equals 35.8 mmHg, mean transaortic gradient equals 19.1 mmHg, the calculated aortic valve area equals 0.58 cm² by the continuity equation consistent with severe aortic stenosis.  	    Home Medications:  albuterol 90 mcg/inh inhalation aerosol: 2 puff(s) inhaled every 6 hours (20 Aug 2021 09:36)  ascorbic acid 500 mg oral tablet: 1 tab(s) orally once a day (20 Aug 2021 09:36)  aspirin 81 mg oral delayed release tablet: 1 tab(s) orally once a day (20 Aug 2021 09:36)  atorvastatin 40 mg oral tablet: 1 tab(s) orally once a day (at bedtime) (20 Aug 2021 09:36)  Breo Ellipta 200 mcg-25 mcg/inh inhalation powder: 1 puff(s) inhaled once a day (26 Jul 2021 10:25)  calcium acetate 667 mg oral tablet: 1 tab(s) orally 3 times a day (with meals) (20 Aug 2021 13:54)  clopidogrel 75 mg oral tablet: 1 tab(s) orally once a day (20 Aug 2021 09:36)  donepezil 5 mg oral tablet: 1 tab(s) orally once a day (at bedtime) (20 Aug 2021 09:36)  epoetin graham: 5000 unit(s) injectable Monday, Wednesday, and Friday (20 Aug 2021 11:05)  furosemide 80 mg oral tablet: 1 tab(s) orally 2 times a day (20 Aug 2021 09:36)  insulin lispro 100 units/mL injectable solution: injectable 3 times a day (before meals)  1 unit 150-200  2 units 201-250  3 units 251-300  4 units 301-350  5 units 351-400  over 400 pat GRULLON (20 Aug 2021 13:54)  metoprolol tartrate 25 mg oral tablet: 1 tab(s) orally 2 times a day (20 Aug 2021 09:36)  mirtazapine 15 mg oral tablet: 1 tab(s) orally once a day (at bedtime) (20 Aug 2021 09:36)  multivitamin: 1 tab(s) orally once a day (20 Aug 2021 13:54)  pantoprazole 40 mg oral delayed release tablet: 1 tab(s) orally once a day (before a meal) (20 Aug 2021 09:36)  Senna 8.6 mg oral tablet: 2 tab(s) orally once a day (at bedtime), As Needed (26 Jul 2021 10:25)  sertraline 25 mg oral tablet: 1 tab(s) orally once a day (20 Aug 2021 09:36)  tamsulosin 0.4 mg oral capsule: 1 cap(s) orally once a day (at bedtime) (20 Aug 2021 09:36)    MEDICATIONS  (STANDING):  albuterol/ipratropium for Nebulization 3 milliLiter(s) Nebulizer every 6 hours  ascorbic acid 500 milliGRAM(s) Oral daily  aspirin enteric coated 81 milliGRAM(s) Oral daily  atorvastatin 40 milliGRAM(s) Oral at bedtime  donepezil 5 milliGRAM(s) Oral at bedtime  epoetin graham-epbx (RETACRIT) Injectable 5000 Unit(s) IV Push <User Schedule>  heparin   Injectable 5000 Unit(s) SubCutaneous every 8 hours  iron sucrose IVPB 50 milliGRAM(s) IV Intermittent <User Schedule>  metoprolol succinate ER 25 milliGRAM(s) Oral at bedtime  midodrine. 10 milliGRAM(s) Oral <User Schedule>  mirtazapine 15 milliGRAM(s) Oral at bedtime  pantoprazole    Tablet 40 milliGRAM(s) Oral before breakfast  povidone iodine 10% Solution 1 Application(s) Topical daily  sertraline 25 milliGRAM(s) Oral daily  tamsulosin 0.4 milliGRAM(s) Oral at bedtime    MEDICATIONS  (PRN):  ALBUTerol    90 MICROgram(s) HFA Inhaler 2 Puff(s) Inhalation every 6 hours PRN Shortness of Breath and/or Wheezing  HYDROmorphone   Tablet 2 milliGRAM(s) Oral every 4 hours PRN Moderate Pain (4 - 6)

## 2021-08-30 NOTE — PROGRESS NOTE ADULT - ASSESSMENT
82 yo man hx of COPD on Home O2 4L, HFrEF (EF 40% in July 2021), severe AS, T2DM, hx right nephrectomy, ESRD on HD, HTN, anxiety, Alzheimer's dementia, anemia of chronic disease on ROMELIA therapy BIBA from NH due to hypoxia and SOB during HD session.     # Acute on Chronic (O2 dependent COPD) Hypoxic resp failure 2/2 vol overload and worsened b/l pleural opacities/effusions 2/2 ESRD, HFrEF exac, Pulm HTN, sev AS, HTN  usually on 4 liters O2 - cont NC O2 as needed  BNP always very high  CXR: bibasilar opacities/effusions and getting worse  On prior admission was found to have mucus Plug, s/p bronchoscopy 08/08/21  pulm noted - will do palliative thoracentesis tomorrow  pt NOT able to tolerate more than 1.5 or so liters of removal at HD (BP drops) - I spoke w/ Dr Saba  c/w Albuterol PRN, Duonebs Q6H  can d/c lasix and zaroxylyn (per renal, these are not working)  strict fluid restriction to 1 L/day  can cont midodrine pre-HD  pt on DAPT -> not sure why (d/c plavix)  BB - change to Toprol XL 25mg po qhs  I (and renal) think pt is a very poor TAVR candidate  dilaudid 2mg po q4 prn dyspnea or pain    # ESRD on HD  Follows Dr. NANCY Saba and Dr. 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 dementia  seems severe  poor baseline  max asst ADLs  c/w donepezil, remeron and zoloft    # BPH on flomax    # can cont Vit C    # GI PPx - Protonix    # DVT PPx - Heparin 5000 mg SubQ  q8    # Activity: basically bedbound    # Code Status-  DNR / DNI (verified with daughter)    # GOC: I spoke w/ dtr at length; I explained that there is no way to fix his valvular dz and he cannot tolerate large fluid removal at HD; I told dtr that pt is going to die from these diseases in prob < 6 mo or so; we reviewed that pt could be hospice pt, but not while on HD; we reviewed role of HD and possibility of stopping HD - I also spoke w/ renal about this option as well; pt is getting LTC at SNF, but dtr also expressed about taking pt home to die; pt agreeable to speaking w/ pall care    # Dispo: f/u pall care; f/u w/ dtr re LTC at SNF vs home (without hospice); c/w midodrine pre-HD; f/u renal; d/c plavix; d/c FS; change BB;   pt seems relatively comfortable, f/u w/ dtr re decision to go home vs back to SNF (the hospital has very little to offer in terms of reversibility of myriad of comorbidities    Overall prog is very poor. Dtr is aware.

## 2021-08-30 NOTE — CONSULT NOTE ADULT - ATTENDING COMMENTS
events noted, bl effusion ( present prior), in a patient with multiple co morbidities, including HD, no need for thora, keep neg balance, palliative care eval
Patient with history as above  Seen at bedside  He was agitated and stating he was SOB  He otherwise did not participate in exam  See recommendations above for dyspnea and agitation  Hospice discussed with patient's daughter-  not ready right now, but would like to speak with renal moving forward  She'd like to speak with renal prior to any additional GOC discussions moving forward

## 2021-08-30 NOTE — ADVANCED PRACTICE NURSE CONSULT - ASSESSMENT
80 yo male hx of COPD on Home O2 4L, HFrEF (EF 40% in July 2021),  T2DM, hx right nephrectomy, ESRD on HD, HTN, anxiety, Alzheimer's dementia, anemia of chronic disease on ROMELIA therapy BIBA from NH (8/26) due to hypoxia and SOB during HD session yesterday. As per daughter, NH reported that he became hypoxic to the 85s, and was agitated/restless during HD session, so NH called EMS to bring patient to ED for evaluation. Daughter denies any fever, syncope, chest pain , abdominal pain, nausea, vomiting, diarrhea, dysuria. Daughter reports that patient recently complained of left heel pain.   In ED, VS: /63, HR 82, RR 22, T 97.8F, SpO2 98% On RA. On labs: troponins 0.11, pro- BNP 15611 ( at baseline), WBC 11.53, Hgb 9.9( at baseline), Na 132, Cr 4.0.  VBG showed pH 76.21, pCO2 72, HCO3 30. CXR showed increased bibasilar opacities b/l.  CT head showed no CT evidence for acute intracranial hemorrhage, territorial infarct or midline shift; Chronic microvascular ischemic changes and chronic lacunar infarcts. Pt was given 40 mg IV lasix in ED.     Patient seen by WOCN during previous 7/2021 admission.     Received patient on 3B, laying supine in bed, turned to left side (pillow under right side), pt asleep, arouseable but drowsy, following commands, daughter at bedside, both made aware of purpose of WOCN visit, agreeable to consult. With assistance from daughter, turned patient completely left side for skin assessment.     Sacral & coccyx skin intact. Incontinence associated dermatitis to b/l buttock-intergluteal cleft area -skin denuded & slightly macerated  partial thickness skin loss/opening x 2 in linear pattern (~1cm x 0.2cm x 0.2cm) to left buttock & intergluteal cleft, open wound beds w/light pink tissue. No drainage, odor, induration, warmth present on assessment. Patient w/ c/o tenderness during assessment.      Type of wound: Deep tissue pressure injury/DTPI; POA  Location: left heel   Wound measurements: 2 areas in close proximity to each other & measured together at 4cm x 5cm x 0cm, true depth indeterminable at this time    Tunneling/Undermining: none  Wound bed: dry, intact maroon colored scabbed over tissue   Wound edges: attached, flush, irregular  Periwound: dry peeling skin  Wound exudate: none  Wound odor: none  Induration, erythema, warmth: none   Wound pain: pt reports tenderness to area during assessment     Patient mostly bedbound, able to turn/position in bed w/ assistance, anuric-on HD-reports some urinary incontinence, incontinence of small amount of loose stool during WOCN visit. Ordered for dysphagia diet, probably inadequate intake as per reported Celso score.

## 2021-08-30 NOTE — CONSULT NOTE ADULT - WHAT MATTERS MOST
Patient wishes to go home.   Family wishes to keep medically treating the patient until he is unable to continue HD. They would like him home to die.

## 2021-08-31 NOTE — PROGRESS NOTE ADULT - ASSESSMENT
ESRD on HD   - access via TDC   - fluid removal during HD has been limited due to intradialytic hypotension  acute respiratory failure due to CHF?  COPD on home O2  HFrEF  dementia   HTN  anemia  right nephrectomy    plan:    Midodrine prior to HD for intradialytic hypotension  HD tomorrow: 3 hours, opti 160 dialyzer, 2K bath, 2L UF   EPO and Venofer with HD  Renal diet  Patient being evaluated for hospice - spoke with daughter, she is not ready to commit yet  STRICT FLUID RESTRICTION

## 2021-08-31 NOTE — PROGRESS NOTE ADULT - PROBLEM SELECTOR PLAN 4
Nephrology following for recommendations  Family would like to continue HD Nephrology following for recommendations  Family would like to continue HD but are considering hospice  will follow up tomorrow

## 2021-08-31 NOTE — PHARMACOTHERAPY INTERVENTION NOTE - COMMENTS
Spoke to MD [id number]. povidone-iodine 10% solution is NF. Will change order to 10% ointment as per MD approval

## 2021-08-31 NOTE — PROGRESS NOTE ADULT - PROBLEM SELECTOR PLAN 1
DNR/DNI  Continue current med mgmt  No hospice  Will re-address GOC as appropriate   Will sign off for now DNR/DNI  Continue current med mgmt  No hospice yet, family to discuss among themselves  Will re-address GOC as appropriate DNR/DNI  Continue current med mgmt  No hospice yet, family to discuss among themselves tonight  Will re-address with family tomorrow

## 2021-08-31 NOTE — PROGRESS NOTE ADULT - SUBJECTIVE AND OBJECTIVE BOX
DRAKE GJONBALAJ 81y Male  MRN#: [id number]   CODE STATUS: full code    Hospital Day: 5d    Pt is currently admitted with the primary diagnosis of acute on chronic respiratory failure    SUBJECTIVE  Hospital Course    Overnight events: No acute events overnight. Patient denied chest pain. Not complaining of shortness of breath in the morning before going back to sleep. Thoracentesis planned for today.     Subjective complaints     Present Today:   - Laura:  No [ X ], Yes [   ] : Indication:     - Type of IV Access:       .. CVC/Piccline:  No [ X ], Yes [   ] : Indication:       .. Midline: No [X  ], Yes [   ] : Indication:                                             ----------------------------------------------------------  OBJECTIVE  PAST MEDICAL & SURGICAL HISTORY  Diabetes mellitus    COPD (chronic obstructive pulmonary disease)    Lymphedema of both lower extremities    CHF (congestive heart failure)    ESRD on dialysis    H/O right nephrectomy  20 yrs ago                                              -----------------------------------------------------------  ALLERGIES:  No Known Allergies                                            ------------------------------------------------------------    HOME MEDICATIONS  Home Medications:  albuterol 90 mcg/inh inhalation aerosol: 2 puff(s) inhaled every 6 hours (20 Aug 2021 09:36)  ascorbic acid 500 mg oral tablet: 1 tab(s) orally once a day (20 Aug 2021 09:36)  aspirin 81 mg oral delayed release tablet: 1 tab(s) orally once a day (20 Aug 2021 09:36)  atorvastatin 40 mg oral tablet: 1 tab(s) orally once a day (at bedtime) (20 Aug 2021 09:36)  Breo Ellipta 200 mcg-25 mcg/inh inhalation powder: 1 puff(s) inhaled once a day (26 Jul 2021 10:25)  calcium acetate 667 mg oral tablet: 1 tab(s) orally 3 times a day (with meals) (20 Aug 2021 13:54)  clopidogrel 75 mg oral tablet: 1 tab(s) orally once a day (20 Aug 2021 09:36)  donepezil 5 mg oral tablet: 1 tab(s) orally once a day (at bedtime) (20 Aug 2021 09:36)  epoetin graham: 5000 unit(s) injectable Monday, Wednesday, and Friday (20 Aug 2021 11:05)  furosemide 80 mg oral tablet: 1 tab(s) orally 2 times a day (20 Aug 2021 09:36)  insulin lispro 100 units/mL injectable solution: injectable 3 times a day (before meals)  1 unit 150-200  2 units 201-250  3 units 251-300  4 units 301-350  5 units 351-400  over 400 notifcarlos GRULLON (20 Aug 2021 13:54)  metoprolol tartrate 25 mg oral tablet: 1 tab(s) orally 2 times a day (20 Aug 2021 09:36)  mirtazapine 15 mg oral tablet: 1 tab(s) orally once a day (at bedtime) (20 Aug 2021 09:36)  multivitamin: 1 tab(s) orally once a day (20 Aug 2021 13:54)  pantoprazole 40 mg oral delayed release tablet: 1 tab(s) orally once a day (before a meal) (20 Aug 2021 09:36)  Senna 8.6 mg oral tablet: 2 tab(s) orally once a day (at bedtime), As Needed (26 Jul 2021 10:25)  sertraline 25 mg oral tablet: 1 tab(s) orally once a day (20 Aug 2021 09:36)  tamsulosin 0.4 mg oral capsule: 1 cap(s) orally once a day (at bedtime) (20 Aug 2021 09:36)                           MEDICATIONS:  STANDING MEDICATIONS  albuterol/ipratropium for Nebulization 3 milliLiter(s) Nebulizer every 6 hours  ascorbic acid 500 milliGRAM(s) Oral daily  aspirin enteric coated 81 milliGRAM(s) Oral daily  atorvastatin 40 milliGRAM(s) Oral at bedtime  donepezil 5 milliGRAM(s) Oral at bedtime  epoetin graham-epbx (RETACRIT) Injectable 5000 Unit(s) IV Push <User Schedule>  heparin   Injectable 5000 Unit(s) SubCutaneous every 8 hours  iron sucrose IVPB 50 milliGRAM(s) IV Intermittent <User Schedule>  metoprolol succinate ER 25 milliGRAM(s) Oral at bedtime  midodrine. 10 milliGRAM(s) Oral <User Schedule>  mirtazapine 15 milliGRAM(s) Oral at bedtime  pantoprazole    Tablet 40 milliGRAM(s) Oral before breakfast  povidone iodine 10% Ointment 1 Application(s) Topical <User Schedule>  sertraline 25 milliGRAM(s) Oral daily  tamsulosin 0.4 milliGRAM(s) Oral at bedtime    PRN MEDICATIONS  ALBUTerol    90 MICROgram(s) HFA Inhaler 2 Puff(s) Inhalation every 6 hours PRN  HYDROmorphone   Tablet 2 milliGRAM(s) Oral every 4 hours PRN                                            ------------------------------------------------------------  VITAL SIGNS: Last 24 Hours  T(C): 36.9 (31 Aug 2021 20:00), Max: 36.9 (31 Aug 2021 20:00)  T(F): 98.5 (31 Aug 2021 20:00), Max: 98.5 (31 Aug 2021 20:00)  HR: 85 (31 Aug 2021 20:00) (80 - 85)  BP: 140/66 (31 Aug 2021 20:00) (132/67 - 140/66)  BP(mean): --  RR: 22 (31 Aug 2021 20:00) (18 - 22)  SpO2: 96% (31 Aug 2021 07:34) (96% - 96%)      08-30-21 @ 07:01  -  08-31-21 @ 07:00  --------------------------------------------------------  IN: 0 mL / OUT: 2300 mL / NET: -2300 mL                                             --------------------------------------------------------------  LABS:                        9.0    9.34  )-----------( 209      ( 31 Aug 2021 06:22 )             30.9     08-31    140  |  101  |  38<H>  ----------------------------<  116<H>  4.4   |  27  |  5.6<HH>    Ca    8.1<L>      31 Aug 2021 06:22  Mg     2.1     08-31    TPro  5.4<L>  /  Alb  3.3<L>  /  TBili  0.3  /  DBili  x   /  AST  14  /  ALT  9   /  AlkPhos  75  08-31                                                              -------------------------------------------------------------  RADIOLOGY:                                            --------------------------------------------------------------    PHYSICAL EXAM (in the morning):  General: Well appearing, not in acute distress  LUNGS: Decreased lungs at base, on 4L O2  HEART: RRR, systolic murmur heard  ABDOMEN: NBS, soft, nontender to palpation  EXT: no peripheral pitting edema b/l for LE                                           --------------------------------------------------------------

## 2021-08-31 NOTE — PROGRESS NOTE ADULT - ASSESSMENT
82 yo man hx of COPD on Home O2 4L, HFrEF (EF 40% in July 2021), severe AS, T2DM, hx right nephrectomy, ESRD on HD, HTN, anxiety, Alzheimer's dementia, anemia of chronic disease on ROMELIA therapy BIBA from NH due to hypoxia and SOB during HD session.     # Acute on Chronic (O2 dependent COPD) Hypoxic resp failure 2/2 vol overload and worsened b/l pleural opacities/effusions 2/2 ESRD, HFrEF exac, Pulm HTN, sev AS, HTN  usually on 4 liters O2 - cont NC O2 as needed  BNP always very high  pulm did pall tap (1L) today  CXR: better after tap; no PTX  On prior admission was found to have mucus Plug, s/p bronchoscopy 08/08/21  pt NOT able to tolerate more than 1.5 or so liters of removal at HD (BP drops) - I spoke w/ Dr Saba  c/w Albuterol PRN, Duonebs Q6H  can d/c lasix and zaroxylyn (per renal, these are not working)  strict fluid restriction to 1 L/day  can cont midodrine pre-HD  pt on DAPT -> not sure why (d/c plavix)  BB - change to Toprol XL 25mg po qhs  I (and renal) think pt is a very poor TAVR candidate  dilaudid 2mg po q4 prn dyspnea or pain    # ESRD on HD  Follows Dr. NANCY Saba and Dr. STEPHANIA aVsquez OP    # DM II   diabetic/renal diet  d/c FS - they are basically nl off meds  A1C 5.0 in July 2021    # DLD  on statin  LDL 52    # anemia of chronic disease  ROMELIA per renal  got iv iron    # Dementia Alzheimer's and likely vasc dementia  seems severe  poor baseline  max asst ADLs  c/w donepezil, remeron and zoloft    # BPH on flomax    # can cont Vit C    # GI PPx - Protonix    # DVT PPx - Heparin 5000 mg SubQ  q8    # Activity: basically bedbound    # Code Status-  DNR / DNI (verified with daughter)    # GOC: I spoke w/  other dtr at length today; I again explained that there is no way to fix his valvular dz and he cannot tolerate large fluid removal at HD; I told dtr that pt is going to die from these diseases in prob < 6 mo or so; we reviewed that pt could be hospice pt, but not while on HD; family wants to cont HD for now; pt is going to cont LTC at SNF; family agreeable to speaking w/ pall care    # Dispo: f/u pall care; c/w midodrine pre-HD; f/u renal; anticipate d/c tomorrow (COV 8/31 neg)     Overall prog is very poor. Dtrs are aware.

## 2021-08-31 NOTE — PROGRESS NOTE ADULT - SUBJECTIVE AND OBJECTIVE BOX
[place] NEPHROLOGY FOLLOW UP NOTE  --------------------------------------------------------------------------------  24 hour events/subjective: Patient examined. Appears comfortable.    PAST HISTORY  --------------------------------------------------------------------------------  No significant changes to PMH, PSH, FHx, SHx, unless otherwise noted    ALLERGIES & MEDICATIONS  --------------------------------------------------------------------------------  Allergies    No Known Allergies    Standing Inpatient Medications  albuterol/ipratropium for Nebulization 3 milliLiter(s) Nebulizer every 6 hours  ascorbic acid 500 milliGRAM(s) Oral daily  aspirin enteric coated 81 milliGRAM(s) Oral daily  atorvastatin 40 milliGRAM(s) Oral at bedtime  donepezil 5 milliGRAM(s) Oral at bedtime  epoetin graham-epbx (RETACRIT) Injectable 5000 Unit(s) IV Push <User Schedule>  heparin   Injectable 5000 Unit(s) SubCutaneous every 8 hours  iron sucrose IVPB 50 milliGRAM(s) IV Intermittent <User Schedule>  metoprolol succinate ER 25 milliGRAM(s) Oral at bedtime  midodrine. 10 milliGRAM(s) Oral <User Schedule>  mirtazapine 15 milliGRAM(s) Oral at bedtime  pantoprazole    Tablet 40 milliGRAM(s) Oral before breakfast  povidone iodine 10% Ointment 1 Application(s) Topical <User Schedule>  sertraline 25 milliGRAM(s) Oral daily  tamsulosin 0.4 milliGRAM(s) Oral at bedtime    PRN Inpatient Medications  ALBUTerol    90 MICROgram(s) HFA Inhaler 2 Puff(s) Inhalation every 6 hours PRN  HYDROmorphone   Tablet 2 milliGRAM(s) Oral every 4 hours PRN    VITALS/PHYSICAL EXAM  --------------------------------------------------------------------------------  T(C): 36.9 (08-30-21 @ 19:52), Max: 36.9 (08-30-21 @ 19:52)  HR: 81 (08-31-21 @ 07:34) (67 - 81)  BP: 133/62 (08-30-21 @ 19:52) (133/62 - 137/62)  RR: 18 (08-30-21 @ 19:52) (18 - 18)  SpO2: 96% (08-31-21 @ 07:34) (96% - 96%)    08-30-21 @ 07:01  -  08-31-21 @ 07:00  --------------------------------------------------------  IN: 0 mL / OUT: 2300 mL / NET: -2300 mL    Physical Exam:  	Gen: NAD  	Pulm: CTA B/L  	CV: RRR, S1S2  	Abd: +BS, soft, nontender/nondistended  	: No suprapubic tenderness  	LE: Warm, no edema  	Vascular access: TDC    LABS/STUDIES  --------------------------------------------------------------------------------              9.0    9.34  >-----------<  209      [08-31-21 @ 06:22]              30.9     140  |  101  |  38  ----------------------------<  116      [08-31-21 @ 06:22]  4.4   |  27  |  5.6        Ca     8.1     [08-31-21 @ 06:22]      Mg     2.1     [08-31-21 @ 06:22]    TPro  5.4  /  Alb  3.3  /  TBili  0.3  /  DBili  x   /  AST  14  /  ALT  9   /  AlkPhos  75  [08-31-21 @ 06:22]          [08-31-21 @ 10:49]    Creatinine Trend:  SCr 5.6 [08-31 @ 06:22]  SCr 7.8 [08-30 @ 05:32]  SCr 4.9 [08-28 @ 07:56]  SCr 6.0 [08-27 @ 04:30]  SCr 4.0 [08-26 @ 00:50]    Iron 34, TIBC 178, %sat 19      [01-04-21 @ 06:40]  Ferritin 332      [01-04-21 @ 06:40]  PTH -- (Ca 8.9)      [12-24-20 @ 04:30]   54  Lipid: chol 114, , HDL 38, LDL --      [08-27-21 @ 04:30]

## 2021-08-31 NOTE — CHART NOTE - NSCHARTNOTEFT_GEN_A_CORE
PALLIATIVE MEDICINE INTERDISCIPLINARY TEAM NOTE    Provider:  [  x ]Social Work   [   ]          [ x  ] Initial visit [   ] Follow up    Family or contact name / phone #   Met with: [  x ] Patient:  patient was observed to be resting comfortably  [ x  ] Family:  T/C to daughter, Shin Huang  [   ] Other:    Primary Language: [ x  ] English [   ] Other*:                      *Interpretation provided by:    SUPPORT DIAGNOSES            (Check all that apply)  [   ] Psychosocial spiritual assessment (PSSA)  [   ] EOL issues  [   ] Cultural / spiritual concerns  [   ] Pain / suffering  [   ] Dementia / AMS  [   ] Other:  [   ] AD issues  [  x ] Grief / loss / sadness  [ x  ] Discharge issues  [   ] Distress / coping    PSYCHOSOCIAL ASSESSMENT OF PATIENT         (Check all that apply)  [ x  ] Initial Assessment            [   ] Reassessment          [   ] Not Applicable this visit    Pain/suffering acuity:  unable to assess  [   ] None to mild (0-3)           [   ] Moderate (4-6)        [   ] High (7-10)    Mental Status:  unable to assess  [   ] Alert/oriented (x3)          [   ] Confused/Altered(x2/x1)         [   ] Non-resp    Functional status:  [   ] Independent w ADLs      [   ] Needs Assistance             [  x ] Bedbound/Full Care    Coping:  unable to assess  [   ] Coping well                     [   ] Coping w/difficulty            [   ] Poor coping    Support system:  [   x] Strong                              [   ] Adequate                        [   ] Inadequate      Past history and medications for:   none, as per daughter    [ ] Anxiety       [ ] Depression    [ ] Sleep disorders     SPIRITUAL ASSESSMENT  Lutheran/Spiritual practice: ___________________________    Role of organized Restorationist:  [   ] Important                     [   ] Some (fam tradition, cultural)               [   ] None    Effects on medical care:  [   ] Yes, _____________________________________                         [   ] None    Cultural/Mormonism need:  [   ] Yes, _____________________________________                         [   ] None    Refer to Pastoral Care:  [   ] Yes           [   ] No, not at this time    SERVICE PROVIDED  [   ]PSSA                                                                             [  x ]Discharge support / facilitation  [   ]AD / goals of care counseling                                  [   ]EOL / death / bereavement counseling  [x   ]Counseling / support:  daughter                                               [   ] Family meeting  [   ]Prayer / sacrament / ritual                                      [   ] Referral   [   ]Other                                                                       NOTE and Plan of Care (PoC):    Patient is an 81 year old,  male.  Patient was admitted on 8/26/21 from [facility], dx:  Hypoxia, SOB.  Patient has PMH of COPD, T2DM, ESRD on HD, HTN, Alzheimer's Dementia.    Patient was observed to be resting comfortably.  T/C to daughter, Shin Huang:  daughter expressed sadness and concerns regarding patient's deterioration.  Daughter discussed that she would like patient to be discharged home with home care.  Provided information regarding same.

## 2021-08-31 NOTE — PROCEDURE NOTE - NSPROCDETAILS_GEN_ALL_CORE
location identified, draped/prepped, sterile technique used, needle inserted/introduced/Seldinger technique/catheter inserted over needle/connection to syringe/connection to evacuated glass bottle/ultrasound assessment of effusion (localization)

## 2021-08-31 NOTE — CHART NOTE - NSCHARTNOTEFT_GEN_A_CORE
Registered Dietitian Follow-Up     Patient Profile Reviewed                           Yes [x]   No []     Nutrition History Previously Obtained        Yes [x]  No []       Pertinent Medical Interventions: Acute on Chronic Hypercapnic respiratory failure, multifactorial due to volume overload/ESRD/HFrEF exacerbation as per progress notes. ESRD on HD noted. Anemia of chronic disease. Noted on EPO & venofer with HD. Noted on iron supplement 1x/week. Hyponatremia noted resolved per progress notes.     Diet order: Dysphagia 3 Soft diet with thin liquids + Consistent Carbohydrate (no snacks) + 1 L fluid restriction; no concentrated potassium, no concentratetd phosphorus, low sodium, no pork + Nepro q12hrs.     Anthropometrics:  - Ht. 172.7 cm.  - Wt. 76.9 kg.  - %wt change  - BMI 25.8  - IBW 70 kg.     Pertinent Lab Data: 8/31: RBC-3.14, H/H-9.0/30.9, BUN-38, creat-5.6, gluc-116, POCT gluc-151 (16:58) vs 143 (11:17) vs 118 (7:42)     Pertinent Meds: heparin, metoprolol, ascorbic acid, attorvastatin, iron sucrose, protonix; received lasix earlier this admit     Physical Findings:  - Appearance: confused, disoriented  - GI function: last BM 8/30  - Tubes: no feeding tubes  - Oral/Mouth cavity:  - Skin: B/L sacrum stage II pressure ulcer     Nutrition Requirements  Weight Used: 70 kg IBW     Estimated Energy Needs    Continue [x]  Adjust []  0043-7676 kcal/day (30-35 kcal/kg IBW)        Estimated Protein Needs    Continue []  Adjust []  Adjusted Protein Recommendations:   gm/day        Estimated Fluid Needs        Continue [x]  Adjust []  2685-3241 mL/day (20-25 mL/kg IBW)     Nutrient Intake:        [] Previous Nutrition Diagnosis:            [] Ongoing          [] Resolved    [] No active nutrition diagnosis identified at this time     Nutrition Diagnostic #1  Problem:  Etiology:  Statement:     Nutrition Diagnostic #2  Problem:  Etiology:  Statement:     Nutrition Intervention      Goal/Expected Outcome:     Indicator/Monitoring: Registered Dietitian Follow-Up     Patient Profile Reviewed                           Yes [x]   No []     Nutrition History Previously Obtained        Yes [x]  No []       Pertinent Medical Interventions: Acute on Chronic Hypercapnic respiratory failure, multifactorial due to volume overload/ESRD/HFrEF exacerbation as per progress notes. ESRD on HD noted. Anemia of chronic disease. Noted on EPO & venofer with HD. Noted on iron supplement 1x/week. Hyponatremia noted resolved per progress notes.     Diet order: Dysphagia 3 Soft diet with thin liquids + Consistent Carbohydrate (no snacks) + 1 L fluid restriction; no concentrated potassium, no concentratetd phosphorus, low sodium, no pork + Nepro q12hrs.     Anthropometrics:  - Ht. 172.7 cm.  - Wt. 76.9 kg. (8/26) vs 75.3 kg (8/27) vs 72.1 kg (8/28) - wt changes suspected to be r/t a combination of fluid shifts & inadequate oral intake  - BMI 25.8  - IBW 70 kg.     Pertinent Lab Data: 8/31: RBC-3.14, H/H-9.0/30.9, BUN-38, creat-5.6, gluc-116, POCT gluc-151 (16:58) vs 143 (11:17) vs 118 (7:42); per previous admit records: 7/13/2021 HgbA1C 5.0%     Pertinent Meds: heparin, metoprolol, ascorbic acid, attorvastatin, iron sucrose, protonix; received lasix earlier this admit     Physical Findings:  - Appearance: confused, disoriented  - GI function: last BM 8/30  - Tubes: no feeding tubes  - Oral/Mouth cavity: difficulty swallowing reported per RN  - Skin: B/L sacrum stage II pressure ulcer     Nutrition Requirements  Weight Used: 70 kg IBW     Estimated Energy Needs    Continue [x]  Adjust []  8404-0460 kcal/day (30-35 kcal/kg IBW)        Estimated Protein Needs    Continue [x]  Adjust []   g/day (1.3-1.6 g/kg IBW)        Estimated Fluid Needs        Continue [x]  Adjust []  4573-2543 mL/day (20-25 mL/kg IBW)     Nutrient Intake: Per RN, pt with poor intake at this time; consuming 25% of meals. Swallowing difficulty reported.        [x] Previous Nutrition Diagnosis: Inadequate Oral Intake            [x] Ongoing          [] Resolved    Nutrition Intervention: Coordination of Care     Goal/Expected Outcome: Pt to demonstrate tolerance to diet, with at least 50% po intake over next 4 days.     Indicator/Monitoring: Diet order, energy intake, glucose profile, renal profile, nutrition focused physical findings, body composition.    Recommendation: Consult SLP services to evaluate swallow function in setting of poor po intake, confusion & reported swallowing difficulty. Diet order texture/consistency per SLP. Given HgbA1C in July was 5.0%, would consider removing Consistent Carbohydrate diet modifier to optimize energy intake.

## 2021-08-31 NOTE — PROGRESS NOTE ADULT - SUBJECTIVE AND OBJECTIVE BOX
DRAKE HO             MRN-[id number]      Patient is a 81y old Male who presents with a chief complaint of hypoxia, SOB (31 Aug 2021 11:21)    Currently admitted with the primary diagnosis of: SOB       SUBJECTIVE:    - patient seen at bedside, sleeping comfortably   - dtr wishes to continue HD if possible and recommended by nephrology   - patient has been more awake and comfortable today   - s/p thoracentesis today     ROS:  UNABLE TO OBTAIN  due to: sleeping      PEx:   T(C): 36.7 (08-31-21 @ 13:28), Max: 36.9 (08-30-21 @ 19:52)  HR: 80 (08-31-21 @ 13:28) (67 - 81)  BP: 132/67 (08-31-21 @ 13:28) (132/67 - 137/62)  RR: 18 (08-31-21 @ 13:28) (18 - 18)  SpO2: 96% (08-31-21 @ 07:34) (96% - 96%)             General:  found in bed in NAD  ENMT: no external oral ulcers, MMM  CVS: no edema   Resp: Unlabored Non tachypneic No increased WOB  GI:  Soft NT ND  Musc: No C/C/E    Neuro: Follows commands No focal deficits  Psych: Calm Pleasant  Skin: Non jaundiced , no rash        ALLERGIES: No Known Allergies      Labs:	    CBC:                        9.0    9.34  )-----------( 209      ( 31 Aug 2021 06:22 )             30.9     CMP:    08-31    140  |  101  |  38<H>  ----------------------------<  116<H>  4.4   |  27  |  5.6<HH>    Ca    8.1<L>      31 Aug 2021 06:22  Mg     2.1     08-31    TPro  5.4<L>  /  Alb  3.3<L>  /  TBili  0.3  /  DBili  x   /  AST  14  /  ALT  9   /  AlkPhos  75  08-31           RADIOLOGY    < from: Xray Chest 1 View-PORTABLE IMMEDIATE (Xray Chest 1 View-PORTABLE IMMEDIATE .) (08.31.21 @ 12:09) >    Impression:    Resolved right pleural effusion on frontal view. Unchanged left pleural effusion and diffuse opacities. No pneumothorax.    --- End of Report ---    < end of copied text >      EKG  12 Lead ECG:   Ventricular Rate 75 BPM    Atrial Rate 75 BPM    P-R Interval 160 ms    QRS Duration 106 ms    Q-T Interval 400 ms    QTC Calculation(Bazett) 446 ms    P Axis 38 degrees    R Axis -52 degrees    T Axis 144 degrees    Diagnosis Line Normal sinus rhythm  Possible Left atrial enlargement  Left axis deviation  Left ventricular hypertrophy with repolarization abnormality  Cannot rule out Septal infarct , age undetermined  Abnormal ECG    Confirmed by Demian Gregorio ([id number]) on 8/26/2021 10:32:46 AM (08-26-21 @ 10:08)      Imaging Personally Reviewed:  [X ] YES  [ ] NO    Consultant(s) Notes Reviewed:  [X ] YES  [ ] NO  Care Discussed with Consultants/Other Providers [X ] YES  [ ] NO    Medications:	      MEDICATIONS  (STANDING):  albuterol/ipratropium for Nebulization 3 milliLiter(s) Nebulizer every 6 hours  ascorbic acid 500 milliGRAM(s) Oral daily  aspirin enteric coated 81 milliGRAM(s) Oral daily  atorvastatin 40 milliGRAM(s) Oral at bedtime  donepezil 5 milliGRAM(s) Oral at bedtime  epoetin graham-epbx (RETACRIT) Injectable 5000 Unit(s) IV Push <User Schedule>  heparin   Injectable 5000 Unit(s) SubCutaneous every 8 hours  iron sucrose IVPB 50 milliGRAM(s) IV Intermittent <User Schedule>  metoprolol succinate ER 25 milliGRAM(s) Oral at bedtime  midodrine. 10 milliGRAM(s) Oral <User Schedule>  mirtazapine 15 milliGRAM(s) Oral at bedtime  pantoprazole    Tablet 40 milliGRAM(s) Oral before breakfast  povidone iodine 10% Ointment 1 Application(s) Topical <User Schedule>  sertraline 25 milliGRAM(s) Oral daily  tamsulosin 0.4 milliGRAM(s) Oral at bedtime    MEDICATIONS  (PRN):  ALBUTerol    90 MICROgram(s) HFA Inhaler 2 Puff(s) Inhalation every 6 hours PRN Shortness of Breath and/or Wheezing  HYDROmorphone   Tablet 2 milliGRAM(s) Oral every 4 hours PRN Moderate Pain (4 - 6)        ADVANCED DIRECTIVES:            DNR DNI           DECISION MAKER: Patient [  ]  Family [  ]  Other [  ] _______  LEGAL SURROGATE: Dtr, Shpressa      GOALS OF CARE DISCUSSION  - DNR/DNI  - dtr wants to continue HD for now, declines hospice  - open to hospice should he no longer be a candidate for HD     PSYCHOSOCIAL-SPIRITUAL ASSESSMENT:       Reviewed       See Palliative Care SW/ documentation      CURRENT DISPO PLAN:         WILL REMAIN IN HOSPITAL      PAWEL      REFERRALS	        Palliative Med        Unit SW/Case Mgmt          DRAKE HO             MRN-[id number]      Patient is a 81y old Male who presents with a chief complaint of hypoxia, SOB (31 Aug 2021 11:21)    Currently admitted with the primary diagnosis of: SOB       SUBJECTIVE:  - patient seen at bedside, sleeping comfortably   - dtr wishes to continue HD if possible and recommended by nephrology   - patient has been more awake and comfortable today   - s/p thoracentesis today     ROS:  UNABLE TO OBTAIN  due to: sleeping      PEx:   T(C): 36.7 (08-31-21 @ 13:28), Max: 36.9 (08-30-21 @ 19:52)  HR: 80 (08-31-21 @ 13:28) (67 - 81)  BP: 132/67 (08-31-21 @ 13:28) (132/67 - 137/62)  RR: 18 (08-31-21 @ 13:28) (18 - 18)  SpO2: 96% (08-31-21 @ 07:34) (96% - 96%)             General:  found in bed in NAD  ENMT: no external oral ulcers, MMM  CVS: no edema   Resp: Unlabored Non tachypneic No increased WOB  GI:  Soft NT ND  Musc: No C/C/E    Neuro: Follows commands No focal deficits  Psych: Calm Pleasant  Skin: Non jaundiced , no rash        ALLERGIES: No Known Allergies      Labs:	    CBC:                        9.0    9.34  )-----------( 209      ( 31 Aug 2021 06:22 )             30.9     CMP:    08-31    140  |  101  |  38<H>  ----------------------------<  116<H>  4.4   |  27  |  5.6<HH>    Ca    8.1<L>      31 Aug 2021 06:22  Mg     2.1     08-31    TPro  5.4<L>  /  Alb  3.3<L>  /  TBili  0.3  /  DBili  x   /  AST  14  /  ALT  9   /  AlkPhos  75  08-31      RADIOLOGY    < from: Xray Chest 1 View-PORTABLE IMMEDIATE (Xray Chest 1 View-PORTABLE IMMEDIATE .) (08.31.21 @ 12:09) >    Impression:    Resolved right pleural effusion on frontal view. Unchanged left pleural effusion and diffuse opacities. No pneumothorax.    --- End of Report ---    < end of copied text >      EKG  12 Lead ECG:   Ventricular Rate 75 BPM    Atrial Rate 75 BPM    P-R Interval 160 ms    QRS Duration 106 ms    Q-T Interval 400 ms    QTC Calculation(Bazett) 446 ms    P Axis 38 degrees    R Axis -52 degrees    T Axis 144 degrees    Diagnosis Line Normal sinus rhythm  Possible Left atrial enlargement  Left axis deviation  Left ventricular hypertrophy with repolarization abnormality  Cannot rule out Septal infarct , age undetermined  Abnormal ECG    Confirmed by Demian Gregorio ([id number]) on 8/26/2021 10:32:46 AM (08-26-21 @ 10:08)      Imaging Personally Reviewed:  [X ] YES  [ ] NO    Consultant(s) Notes Reviewed:  [X ] YES  [ ] NO  Care Discussed with Consultants/Other Providers [X ] YES  [ ] NO    Medications:	      MEDICATIONS  (STANDING):  albuterol/ipratropium for Nebulization 3 milliLiter(s) Nebulizer every 6 hours  ascorbic acid 500 milliGRAM(s) Oral daily  aspirin enteric coated 81 milliGRAM(s) Oral daily  atorvastatin 40 milliGRAM(s) Oral at bedtime  donepezil 5 milliGRAM(s) Oral at bedtime  epoetin graham-epbx (RETACRIT) Injectable 5000 Unit(s) IV Push <User Schedule>  heparin   Injectable 5000 Unit(s) SubCutaneous every 8 hours  iron sucrose IVPB 50 milliGRAM(s) IV Intermittent <User Schedule>  metoprolol succinate ER 25 milliGRAM(s) Oral at bedtime  midodrine. 10 milliGRAM(s) Oral <User Schedule>  mirtazapine 15 milliGRAM(s) Oral at bedtime  pantoprazole    Tablet 40 milliGRAM(s) Oral before breakfast  povidone iodine 10% Ointment 1 Application(s) Topical <User Schedule>  sertraline 25 milliGRAM(s) Oral daily  tamsulosin 0.4 milliGRAM(s) Oral at bedtime    MEDICATIONS  (PRN):  ALBUTerol    90 MICROgram(s) HFA Inhaler 2 Puff(s) Inhalation every 6 hours PRN Shortness of Breath and/or Wheezing  HYDROmorphone   Tablet 2 milliGRAM(s) Oral every 4 hours PRN Moderate Pain (4 - 6)        ADVANCED DIRECTIVES:            DNR DNI           DECISION MAKER: Patient [  ]  Family [ x ]  Other [  ] _______  LEGAL SURROGATE: Dtr, Shpressa      GOALS OF CARE DISCUSSION  - DNR/DNI  - dtr wants to continue HD for now, has declined hospice previously     PSYCHOSOCIAL-SPIRITUAL ASSESSMENT:       Reviewed       See Palliative Care SW/ documentation      CURRENT DISPO PLAN:         WILL REMAIN IN HOSPITAL      PAWEL      REFERRALS	        Palliative Med        Unit SW/Case Mgmt

## 2021-08-31 NOTE — PROGRESS NOTE ADULT - ATTENDING COMMENTS
Events noted, persistent r effusion, will plan thora
Family considering hospice; will discuss among themselves; they understand this means discontinuing HD  Will follow up tomorrow with family

## 2021-08-31 NOTE — PROGRESS NOTE ADULT - SUBJECTIVE AND OBJECTIVE BOX
[facility]ER[facility] PROGRESS UPDATE NOTE    [address]  [address] [facility] [place], [address]   Phone: [phone]             Fax: [phone]      Date: 1/28/2021      Initial Evaluation Date: 7Dec. 2020     Patient Les Dye     Restrictions/Precautions:  fall risk, left wrist support splint  Diagnosis:  Lymphedema (F03.5)                                                        Insurance/Certification information:  Medicare Part A and B  Referring Marcus Thomas MD  Plan of care signed (Y/N):  No  Visit# / total visits: visit #9     Time In: 1300                 Time Out: 2371        Total Visits to date:  9 Cancels/No-shows to date: 6            Significant Findings At Last Visit/Comments:    Patient made steady gains toward goals. Patient exhibits cognition issues that will decrease ability to reach all goals. Patient does live with son but has limited assistance from son or other family members regarding her home program carry over. Patient has shown some improvement but has reached a plateau at this time. Therapist has attempted to discuss need for properly fitting compression garment with patient and son. Patient current upper extremity measurements as follows:    Lymphedema Upper Extremity Measurements     Measurements are made in 4cm increments and are circumferential.       CM Evaluation  Left - 7Dec. 2020  Follow up #1  Left -  Follow up #  Left -  Follow up #  Left -  Evaluation Right - 7Dec. 2020 Follow up #2  Right -  Follow up #  Right -  Follow up #  Right -  Follow up # Right -                          wrist 18.5  19     19  18.5         4cm 19  20     19.25  19         8cm 21.25  20.5     22  21. 5         12cm 25  24.5     24.5  25.75         16cm 28  27.5     27.25  28         20cm 28  29     28  28.75       24cm 34.25  34.25     34.5  34.5         28cm 36  35.5     36  36         32cm 37.5  37     37  38         36cm 38.5  38.5     37  38. 5         40cm 39  38     36  39         44cm                                                                 plam 20.5  20.5     20  20. 5            Fingers are measured in cm and between mp and pip and between pip and dip each digit.     Digit Follow up #4  Left -  Follow up #3  Left -  Follow up #2  Left -  Follow up #1  Left -  Evaluation -  Left -  Follow up #4  Right -  Follow up #3  Right -  Follow up #2  Right -  Follow up #1  Right -                                                First Digit 7.5, 5.75  7.25, 6     7, 6  7.5, 6         Second Digit 7.75, 6.5  7.5, 6.5     7.25, 6.25  7.5, 6.5         Third Digit 6.75, 6  ring, 6     6.5, 6  ring, 6         Fourth Digit 6, 5.25  6, 5.25     6.25, 5.25  6, 5         Fifth Digit 7  7     7  7                                    Goals Status:    Patient Goal: to be able to do more for herself     STG: 3 weeks  1.  Patient will demonstrate knowledge and understanding for lymphedema precautions, skin care and self management to decrease progression of lymphedema and risk of infection. Therapist continued patient education regarding lymphedema precautions and skin care / self management. Patient continues to bite nails and finger tips when nervous. Patient understands infection issues with this behavior. Patient goal partially met. 2.  Patient will present with decreased limb volume in the involved extremity from mild to trace for improved functional mobility. Patient demonstrated an increase in measurements since last measured. Therapist will monitor to determine further treatment needs. Patient goal partially met. 3.  Patient will demonstrate compliance with compression therapy for independent management of lymphedema. Patient does present with increased measurements this treatment session. Therapist to monitor to determine compression needs.          LT weeks  1.  Patient will be independent with self management of lymphedema including understanding garment wear schedule, self compression bandaging, HEP and self care. Therapist continued patient education regarding lymphedema self management. Therapist has reservations regarding ability to manage independently secondary to cognition and decreased family assistance at home. Therapist continued to educate patient on importance of lymphedema surveillance, self care, and home exercise program.  Patient demonstrates improvement with her home exercises. Therapist has also encouraged son to assist with patient home programming to assist with carry over. Patient son able to verbalize understanding. Patient goal partially met. 2.  Patient will be fitted for appropriate compression garments for long term management of lymphedema. Therapist continued patient education regarding being fitted by a certified fitter. Patient stated she gave her size to her son and he went to [address] and picked up garments. She has not looked at what he received but will check. Therapist discussed need to be seen by fitter to determine brand / type of garment needed to cover area of fluid. 3.  Patient will present with decreased limb volume in the involved extremity from trace to none  for improved functional mobility. Therapist to monitor needs. 4.  Patient will maximize right shoulder flexion/abduction to maximize independence with daily life tasks.   DRAKE HO  81y  Male  ***My note supersedes ALL resident notes that I sign.  My corrections for their notes are in my note.***    I can be reached directly on [id number]. My office number is [phone]. My personal cell number is [phone].    INTERVAL EVENTS: Here for f/u of CHF. Pt had pleural tap today - did well. Breathing easier. Speaks w/ dtr.    T(F): 98 (08-31-21 @ 13:28), Max: 98.5 (08-30-21 @ 19:52)  HR: 80 (08-31-21 @ 13:28) (73 - 81)  BP: 132/67 (08-31-21 @ 13:28) (132/67 - 133/62)  RR: 18 (08-31-21 @ 13:28) (18 - 18)  SpO2: 96% (08-31-21 @ 07:34) (96% - 96%)    Gen: NAD  HEENT: PERRL, mouth clr, nose clr  Neck: no nodes, no JVD, thyroid nl  chest: rt Tesio  lungs: bibasilar crackles  hrt: s1 s2 rrr 1/6 sys murmur murmur  abd: soft, NT/ND, no HS megaly  ext: tr edema, no c/c  neuro: awake, talking a little    LABS:                      9.0     (    98.4   9.34  )-----------( ---------      209      ( 31 Aug 2021 06:22 )             30.9    (    17.4     140   (   101   (   116      08-31-21 @ 06:22  ----------------------               4.4   (   27   (   38                             -----                        5.6  Ca  8.1   Mg  2.1    P   --     LFT  5.4  (  0.3  (  14       08-31-21 @ 06:22  -------------------------  3.3  (  75  (  9    CAPILLARY BLOOD GLUCOSE  POCT Blood Glucose.: 151 (08-31-21 @ 16:58)  POCT Blood Glucose.: 143 (08-31-21 @ 11:17)  POCT Blood Glucose.: 118 (08-31-21 @ 07:42)  POCT Blood Glucose.: 130 (08-30-21 @ 21:37)  POCT Blood Glucose.: 140 (08-30-21 @ 17:03)  POCT Blood Glucose.: 135 (08-30-21 @ 07:50)  POCT Blood Glucose.: 117 (08-29-21 @ 20:36)  POCT Blood Glucose.: 144 (08-29-21 @ 16:30)    RADIOLOGY & ADDITIONAL TESTS:  < from: Xray Chest 1 View-PORTABLE IMMEDIATE (Xray Chest 1 View-PORTABLE IMMEDIATE .) (08.31.21 @ 12:09) >  Impression:    Resolved right pleural effusion on frontal view. Unchanged left pleural effusion and diffuse opacities. No pneumothorax.    < end of copied text >    MEDICATIONS:    ALBUTerol    90 MICROgram(s) HFA Inhaler 2 Puff(s) Inhalation every 6 hours PRN  albuterol/ipratropium for Nebulization 3 milliLiter(s) Nebulizer every 6 hours  ascorbic acid 500 milliGRAM(s) Oral daily  aspirin enteric coated 81 milliGRAM(s) Oral daily  atorvastatin 40 milliGRAM(s) Oral at bedtime  donepezil 5 milliGRAM(s) Oral at bedtime  epoetin graham-epbx (RETACRIT) Injectable 5000 Unit(s) IV Push <User Schedule>  heparin   Injectable 5000 Unit(s) SubCutaneous every 8 hours  HYDROmorphone   Tablet 2 milliGRAM(s) Oral every 4 hours PRN  iron sucrose IVPB 50 milliGRAM(s) IV Intermittent <User Schedule>  metoprolol succinate ER 25 milliGRAM(s) Oral at bedtime  midodrine. 10 milliGRAM(s) Oral <User Schedule>  mirtazapine 15 milliGRAM(s) Oral at bedtime  pantoprazole    Tablet 40 milliGRAM(s) Oral before breakfast  povidone iodine 10% Ointment 1 Application(s) Topical <User Schedule>  sertraline 25 milliGRAM(s) Oral daily  tamsulosin 0.4 milliGRAM(s) Oral at bedtime   Therapist continued patient education regarding range of motion / stretch exercises. Patient stated she has been performing exercises daily. Patient current active right shoulder flexion/abudction 0 - 142 degrres; left active shoulder flexion/abduction 0 - 139 degrees. Patient stated she is able to do more at home. Patient son stated she is doing more at home as well. Patient goal partially met. Frequency/Duration:  # Days per week:  1-2 # Weeks: 12 wks     Rehab Potential: [] Excellent [] Good [] Fair  [] Poor     Goal Status:  [] Achieved [] Partially Achieved  [] Not Achieved     Patient Status: [x] Patient and therapist agree to continue patient plan of care one to two times per week for twelve weeks from [id number] [place] [address] Blvd. 2021 through 22Apr. 2021. Electronically signed by: JEANNE Simons/L, LAUREN          For Medicare Patient: Signature is needed on discharge. If you have any questions or concerns, please don't hesitate to call.   Thank you for your referral.    Physician Signature:________________________________Date:__________________  By signing above, therapists plan is approved by physician

## 2021-08-31 NOTE — PROGRESS NOTE ADULT - ASSESSMENT
ASSESSMENT & PLAN    80 yo male hx of COPD on Home O2 4L, HFrEF (EF 40% in July 2021), severe AS, T2DM, hx right nephrectomy, ESRD on HD, HTN, anxiety, Alzheimer's dementia, anemia of chronic disease on ROMELIA therapy BIBA from NH due to hypoxia and SOB during HD session.  Remains HDS and afebrile, satting well at baseline O2 settings, with dyspnea complaints 2/2 to ESRD, HRrEF, valvular heart disease, COPD on 4lL home O2, s/p thoracentesis without complications.     Acute on Chronic Hypercapnic respiratory failure, multifactorial due to volume overload/ESRD/HFrEF exacerbation  Hx of COPD on 4 liters O2  - previous CXR showed increasing R effusion and stable L effusions s/p thoracentesis 1L. Pleural fluid was sent for lab analysis. no pneumothorax on stat CXR.   - cont metoprolol succinate 25mg PO QHS  - hemodialysis tomorrow per nephro. daughter not ready for hospice yet.   - consulted palliative today after discussion with daughter about GOC. daughter agreeable to consider hospice if dialysis is stopped. for comfort care regarding pain or respiratory distress, c/w 2mg dilaudid IV q4H PRN  - strict fluid restriction to 1 L/day, daily weights  - BNP and troponins chronically elevated   - ECHO 7/2021: EF 40%, Mild to moderate mitral valve regurgitation.  Moderate tricuspid regurgitation; Pulmonary hypertension, severe aortic stenosis  - currently at baseline 4L O2, baseline dementia, currently AAO x1 and lethargic  - off lasix and zaroxylyn.   - cont Midodrine 10mg prior to HD for optimal fluid removal. Per Dr. Saba and Dr. Diego discussion, patient cannot tolerate a full dialysis session (BP drops after 1.5L)  - d/c-ed plavix  - c/w albuterol PRN, standing duoneb q6H    ESRD on HD  Anemia of chronic disease   - Follows Dr. NANCY Saba and Dr. STEPHANIA Vasquez OP  - iron supplementation q1week  - avoid volume overload  - c/w HD as scheduled  - EPO and Venofer with HD  - Renal diet    #Hyponatremia resolved. Na stable    #Wound management of IAD + DTPI  - Incontinence associated dermatitis on sacrum, per wound care team started barrier cream daily  - Deep tissue pressure injury on left heel, started betadine 10% solution daily on AA    #Severe AS   - Echo (7/18/21)   Peak transaortic gradient equals 35.8 mmHg, mean transaortic gradient equals 19.1 mmHg, the calculated aortic valve area equals 0.58 cm² by the continuity equation consistent with severe aortic stenosis.  - Pt was seen by cardio prior admission- cont with aspirin, Plavix and metoprolol   - Pt was referred to Dr. Martinez as outpatient for TAVR. Patient may not be an ideal TAVR candidate as per current assessment    DMII: Currently blood glucose well controlled off meds  - stopped FS  - A1C 5.0 in July 2021    DLD  - c/w atorvastatin 40mg QHS    Dementia Alzheimer's, advanced  - c/w donepezil qHS, mirtazpine 15mg qHS, sertraline 25mg QD  - pt fully dependent with adls    #BPH  - c/w flomax 0.4mg QHS    #Misc  - cont vitamin C supplementation    # DVT prophylaxis: heparin 5000 q8H    # GI prophylaxis: pantoprazole 40mg QD    # Diet: dysphagia 3 diet w/ carb/fluid/renal restriction    # Activity Score (AM-PAC)    # Code status: DNR/DNI    # Disposition: . no plan for hospice currently. will c/w dialysis. anticipate DC tomorrow for LTC at [facility]

## 2021-08-31 NOTE — PROGRESS NOTE ADULT - ASSESSMENT
81yMale with PMH including COPD on Home O2 4L, HFrEF (EF 40% in July 2021),  T2DM, hx right nephrectomy, ESRD on HD, HTN, anxiety, Alzheimer's dementia, anemia of chronic disease on ROMELIA therapy BIBA from NH due to hypoxia during HD session, currently being treated for pleural effusions, fluid overload. He is know to palliative team, has had multiple recent admissions. Plan is for patient to continue with current medical management and HD for now, per families wishes. Family is open to ongoing GOC discussions in the future.       MEDD (morphine equivalent daily dose): 32 mg       See Recs below.    Please call [phone] with questions or concerns 24/7.   We will continue to follow.     Discussed with primary MD.

## 2021-09-01 NOTE — PROGRESS NOTE ADULT - ASSESSMENT
IMPRESSION:    Chronic Hypercapnic Respiratory Failure secondary to Volume Overload  Pleural effusion  COPD on 4L NC home oxygen  HO HFrEF exacerbation  HO Pulmonary Hypertension  HO Severe Aortic Stenosis  HO ESRD on HD  HO Alzheimer's Dementia    PLAN:    f/up cxr  Strict intake and outputs   NIV at night  HD per Nephrology  DVT prophylaxis  Pulmonary toilet  Nebs PRN  DNR/DNI    dc planning

## 2021-09-01 NOTE — PROGRESS NOTE ADULT - ASSESSMENT
81yMale with PMH including COPD on Home O2 4L, HFrEF (EF 40% in July 2021),  T2DM, hx right nephrectomy, ESRD on HD, HTN, anxiety, Alzheimer's dementia, anemia of chronic disease on ROMELIA therapy BIBA from NH due to hypoxia during HD session, currently being treated for pleural effusions, fluid overload. Palliative care consulted for GOC.    Patient seen at bedside eating lunch. Denies pain, SOB, anxiety on exam. No nonverbal signs of pain or distress on exam.    Primary team, renal team, and palliative care team has been in contact with family regarding goals of care. Yesterday, Dr. Saba spoke with family regarding treatment options, including ongoing aggressive medical management and hospice.  At this time, family wishes for ongoing medical management and ongoing dialysis, but will continue to discuss it among themselves.  Patient be prepared for disposition in coming days      MEDD (morphine equivalent daily dose): 0mg       See Recs below.    Please call [phone] with questions or concerns 24/7.     Palliative care will sign off at this time. If patient's family wishes to discontinue dialysis and pursue hospice, please place hospice consult. Otherwise, patient will continue medical management and dialysis.

## 2021-09-01 NOTE — DISCHARGE NOTE PROVIDER - NSDCMRMEDTOKEN_GEN_ALL_CORE_FT
albuterol 90 mcg/inh inhalation aerosol: 2 puff(s) inhaled every 6 hours  ascorbic acid 500 mg oral tablet: 1 tab(s) orally once a day  aspirin 81 mg oral delayed release tablet: 1 tab(s) orally once a day  atorvastatin 40 mg oral tablet: 1 tab(s) orally once a day (at bedtime)  Breo Ellipta 200 mcg-25 mcg/inh inhalation powder: 1 puff(s) inhaled once a day  calcium acetate 667 mg oral tablet: 1 tab(s) orally 3 times a day (with meals)  donepezil 5 mg oral tablet: 1 tab(s) orally once a day (at bedtime)  epoetin graham: 5000 unit(s) injectable Monday, Wednesday, and Friday  HYDROmorphone 2 mg oral tablet: 1 tab(s) orally every 4 hours, As needed, Moderate Pain (4 - 6)  midodrine 10 mg oral tablet: 1 tab(s) orally   mirtazapine 15 mg oral tablet: 1 tab(s) orally once a day (at bedtime)  multivitamin: 1 tab(s) orally once a day  pantoprazole 40 mg oral delayed release tablet: 1 tab(s) orally once a day (before a meal)  povidone iodine 10% topical ointment: 1 application topically   Senna 8.6 mg oral tablet: 2 tab(s) orally once a day (at bedtime), As Needed  sertraline 25 mg oral tablet: 1 tab(s) orally once a day  tamsulosin 0.4 mg oral capsule: 1 cap(s) orally once a day (at bedtime)  Toprol-XL 25 mg oral tablet, extended release: 1 tab(s) orally once a day (at bedtime)   albuterol 90 mcg/inh inhalation aerosol: 2 puff(s) inhaled every 6 hours  ascorbic acid 500 mg oral tablet: 1 tab(s) orally once a day  aspirin 81 mg oral delayed release tablet: 1 tab(s) orally once a day  atorvastatin 40 mg oral tablet: 1 tab(s) orally once a day (at bedtime)  Breo Ellipta 200 mcg-25 mcg/inh inhalation powder: 1 puff(s) inhaled once a day  calcium acetate 667 mg oral tablet: 1 tab(s) orally 3 times a day (with meals)  donepezil 5 mg oral tablet: 1 tab(s) orally once a day (at bedtime)  epoetin graham: 5000 unit(s) injectable Monday, Wednesday, and Friday  heparin: 5000 unit(s) subcutaneous every 12 hours  HYDROmorphone 2 mg oral tablet: 1 tab(s) orally every 4 hours, As needed, Moderate Pain (4 - 6)  midodrine 10 mg oral tablet: 1 tab(s) orally   mirtazapine 15 mg oral tablet: 1 tab(s) orally once a day (at bedtime)  multivitamin: 1 tab(s) orally once a day  pantoprazole 40 mg oral delayed release tablet: 1 tab(s) orally once a day (before a meal)  povidone iodine 10% topical ointment: 1 application topically   Senna 8.6 mg oral tablet: 2 tab(s) orally once a day (at bedtime), As Needed  sertraline 25 mg oral tablet: 1 tab(s) orally once a day  tamsulosin 0.4 mg oral capsule: 1 cap(s) orally once a day (at bedtime)  Toprol-XL 25 mg oral tablet, extended release: 1 tab(s) orally once a day (at bedtime)

## 2021-09-01 NOTE — DISCHARGE NOTE PROVIDER - HOSPITAL COURSE
82 yo man hx of COPD on Home O2 4L, HFrEF (EF 40% in July 2021), severe AS, T2DM, hx right nephrectomy, ESRD on HD, HTN, anxiety, Alzheimer's dementia, anemia of chronic disease on ROMELIA therapy BIBA from NH due to hypoxia and SOB during HD session, admitted for acute on chronic respiratory failure that is multifactorial from his COPD, severe AS, ESRD, HFrEF. During his hospital course, patient was started on midodrine for pre-dialysis to prevent hypotension during hemodialysis. However, patient still had incomplete dialysis sessions. Additionally, patient was having worsening dyspnea and imaging showed worsening effusion. he received hemodialysis, and had a palliative thoracentesis which relieved some of his dyspnea after 1L of fluid was removed. Based on patient's poor prognosis, goals of care were discussed, stating that patient may have <6 mo of life left. Patient's daughter ultimately decided to stay with dialysis so no hospice. Patient was started on dilaudid PO for respiratory distress symptoms. Patient remained hemodynamically stable and SpO2 remained optimal on baseline 4L O2. Patient will continue LTC at SNF.     82 yo man hx of COPD on Home O2 4L, HFrEF (EF 40% in July 2021), severe AS, T2DM, hx right nephrectomy, ESRD on HD, HTN, anxiety, Alzheimer's dementia, anemia of chronic disease on ROMELIA therapy BIBA from NH due to hypoxia and SOB during HD session, admitted for acute on chronic respiratory failure that is multifactorial from his COPD, severe AS, ESRD, HFrEF. During his hospital course, patient was started on midodrine for pre-dialysis to prevent hypotension during hemodialysis. However, patient still had incomplete dialysis sessions. Additionally, patient was having worsening dyspnea and imaging showed worsening effusion. he received hemodialysis, and had a palliative thoracentesis which relieved some of his dyspnea after 1L of fluid was removed. Based on patient's poor prognosis, goals of care were discussed, stating that patient may have <6 mo of life left. Patient's daughter ultimately decided to stay with dialysis so no hospice. Patient was started on dilaudid PO for respiratory distress symptoms. Patient remained hemodynamically stable and SpO2 remained optimal on baseline 4L O2. Patient will continue LTC at [facility].      # Acute on Chronic (O2 dependent COPD) Hypoxic resp failure 2/2 vol overload and worsened b/l pleural opacities/effusions 2/2 ESRD, HFrEF exac, Pulm HTN, sev AS, HTN  usually on 4 liters O2 - cont NC O2 as needed  BNP always very high  pulm did pall tap (1L) today  CXR: better after tap; no PTX  On prior admission was found to have mucus Plug, s/p bronchoscopy 08/08/21  pt NOT able to tolerate more than 1.5 or so liters of removal at HD (BP drops) - I spoke w/ Dr Saba  c/w Albuterol PRN, Duonebs Q6H  can d/c lasix and zaroxylyn (per renal, these are not working)  strict fluid restriction to 1 L/day  can cont midodrine pre-HD  pt on DAPT -> not sure why (d/c plavix)  BB - change to Toprol XL 25mg po qhs  I (and renal) think pt is a very poor TAVR candidate  dilaudid 2mg po q4 prn dyspnea or pain    # ESRD on HD  Follows Dr. NANCY Saba and Dr. 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  seems severe  poor baseline  max asst ADLs  c/w donepezil, remeron and zoloft    # BPH on flomax    # can cont Vit C    # GI PPx - Protonix    # DVT PPx - Heparin 5000 mg SubQ  q8    # Activity: basically bedbound    # Code Status-  DNR / DNI (verified with daughter)    # GOC: I spoke w/  other dtr at length today; I again explained that there is no way to fix his valvular dz and he cannot tolerate large fluid removal at HD; I told dtr that pt is going to die from these diseases in prob < 6 mo or so; we reviewed that pt could be hospice pt, but not while on HD; family wants to cont HD for now; pt is going to cont LTC at SNF; family agreeable to speaking w/ pall care    # Dispo: f/u pall care; c/w midodrine pre-HD; f/u renal; anticipate d/c today (COV 8/31 neg)     Overall prog is very poor. Dtrs are aware.

## 2021-09-01 NOTE — PROGRESS NOTE ADULT - PROVIDER SPECIALTY LIST ADULT
Internal Medicine
Nephrology
Nephrology
Palliative Care
Hospitalist
Internal Medicine
Nephrology
Nephrology
Pulmonology
Pulmonology
Internal Medicine
Internal Medicine
Nephrology
Nephrology
Internal Medicine
Palliative Care
Palliative Care

## 2021-09-01 NOTE — PROGRESS NOTE ADULT - SUBJECTIVE AND OBJECTIVE BOX
[facility] FOLLOW UP NOTE  --------------------------------------------------------------------------------  24 hour events/subjective: Patient examined during HD. Appears comfortable.    PAST HISTORY  --------------------------------------------------------------------------------  No significant changes to PMH, PSH, FHx, SHx, unless otherwise noted    ALLERGIES & MEDICATIONS  --------------------------------------------------------------------------------  Allergies    No Known Allergies    Standing Inpatient Medications  albuterol/ipratropium for Nebulization 3 milliLiter(s) Nebulizer every 6 hours  ascorbic acid 500 milliGRAM(s) Oral daily  aspirin enteric coated 81 milliGRAM(s) Oral daily  atorvastatin 40 milliGRAM(s) Oral at bedtime  donepezil 5 milliGRAM(s) Oral at bedtime  epoetin graham-epbx (RETACRIT) Injectable 5000 Unit(s) IV Push <User Schedule>  heparin   Injectable 5000 Unit(s) SubCutaneous every 8 hours  iron sucrose IVPB 50 milliGRAM(s) IV Intermittent <User Schedule>  metoprolol succinate ER 25 milliGRAM(s) Oral at bedtime  midodrine 10 milliGRAM(s) Oral <User Schedule>  mirtazapine 15 milliGRAM(s) Oral at bedtime  pantoprazole    Tablet 40 milliGRAM(s) Oral before breakfast  povidone iodine 10% Ointment 1 Application(s) Topical <User Schedule>  sertraline 25 milliGRAM(s) Oral daily  tamsulosin 0.4 milliGRAM(s) Oral at bedtime    PRN Inpatient Medications  ALBUTerol    90 MICROgram(s) HFA Inhaler 2 Puff(s) Inhalation every 6 hours PRN  HYDROmorphone   Tablet 2 milliGRAM(s) Oral every 4 hours PRN    VITALS/PHYSICAL EXAM  --------------------------------------------------------------------------------  T(C): 35.9 (09-01-21 @ 05:35), Max: 36.9 (08-31-21 @ 20:00)  HR: 68 (09-01-21 @ 09:00) (68 - 85)  BP: 116/44 (09-01-21 @ 09:00) (116/44 - 140/66)  RR: 18 (09-01-21 @ 05:35) (18 - 20)  SpO2: 95% (09-01-21 @ 05:10) (95% - 95%)    Physical Exam:  	Gen: NAD  	Pulm: CTA B/L  	CV: RRR, S1S2  	Abd: +BS, soft, nontender/nondistended  	: No suprapubic tenderness  	LE: Warm, no edema  	Vascular access: TDC    LABS/STUDIES  --------------------------------------------------------------------------------              9.0    8.84  >-----------<  196      [09-01-21 @ 05:52]              30.8     137  |  99  |  53  ----------------------------<  90      [09-01-21 @ 05:52]  4.9   |  26  |  7.6        Ca     8.3     [09-01-21 @ 05:52]      Mg     2.2     [09-01-21 @ 05:52]    TPro  5.3  /  Alb  3.3  /  TBili  0.3  /  DBili  x   /  AST  13  /  ALT  8   /  AlkPhos  65  [09-01-21 @ 05:52]          [08-31-21 @ 10:49]    Creatinine Trend:  SCr 7.6 [09-01 @ 05:52]  SCr 5.6 [08-31 @ 06:22]  SCr 7.8 [08-30 @ 05:32]  SCr 4.9 [08-28 @ 07:56]  SCr 6.0 [08-27 @ 04:30]    Iron 34, TIBC 178, %sat 19      [01-04-21 @ 06:40]  Ferritin 332      [01-04-21 @ 06:40]  PTH -- (Ca 8.9)      [12-24-20 @ 04:30]   54  Lipid: chol 114, , HDL 38, LDL --      [08-27-21 @ 04:30]

## 2021-09-01 NOTE — PROGRESS NOTE ADULT - TIME BILLING
extensive lab and med review    care coord w/ renal
d/c plan    care [facility]    med rec
very long GOC d/w dtr    care coord w/ renal and pall care    med review

## 2021-09-01 NOTE — PROGRESS NOTE ADULT - PROBLEM SELECTOR PLAN 3
-Recommend non-pharmacological interventions to prevent/treat delirium  - maintain day/night light cycles  - optimize sleep-wake cycle, minimize environmental noise  - reorientation frequently  - use verbal redirection as first line  - minimize restraints and lines  - ensure good bladder/bowel function  - ensure adequate pain control  - minimize use of anticholinergic, antihistaminic, and benzodiazepine medications
-Recommend non-pharmacological interventions to prevent/treat delirium  - maintain day/night light cycles  - optimize sleep-wake cycle, minimize environmental noise  - reorientation frequently  - use verbal redirection as first line  - minimize restraints and lines  - ensure good bladder/bowel function  - ensure adequate pain control  - minimize use of anticholinergic, antihistaminic, and benzodiazepine medications

## 2021-09-01 NOTE — PROGRESS NOTE ADULT - SUBJECTIVE AND OBJECTIVE BOX
DRAKE HO             MRN-[id number]      Patient is a 81y old Male who presents with a chief complaint of hypoxia, SOB (31 Aug 2021 11:21)    Currently admitted with the primary diagnosis of: SOB       SUBJECTIVE:  - patient seen at bedside eating lunch  - denies pain, SOB, anxiety on exam  - no nonverbal signs of pain or distress on exam     ROS:  Denies SOB, anxiety, pain, secretions, or nausea      PEx:   T(C): 36.7 (08-31-21 @ 13:28), Max: 36.9 (08-30-21 @ 19:52)  HR: 80 (08-31-21 @ 13:28) (67 - 81)  BP: 132/67 (08-31-21 @ 13:28) (132/67 - 137/62)  RR: 18 (08-31-21 @ 13:28) (18 - 18)  SpO2: 96% (08-31-21 @ 07:34) (96% - 96%)             General:  found in bed in NAD  ENMT: no external oral ulcers, MMM  CVS: no edema   Resp: Unlabored Non tachypneic No increased WOB  GI:  Soft NT ND  Musc: No C/C/E    Neuro: Follows commands No focal deficits  Psych: Calm Pleasant  Skin: Non jaundiced , no rash        ALLERGIES: No Known Allergies      Labs:	                          9.0    8.84  )-----------( 196      ( 01 Sep 2021 05:52 )             30.8       09-01    137  |  99  |  53<H>  ----------------------------<  90  4.9   |  26  |  7.6<HH>    Ca    8.3<L>      01 Sep 2021 05:52  Mg     2.2     09-01    TPro  5.3<L>  /  Alb  3.3<L>  /  TBili  0.3  /  DBili  x   /  AST  13  /  ALT  8   /  AlkPhos  65  09-01            RADIOLOGY    < from: Xray Chest 1 View-PORTABLE IMMEDIATE (Xray Chest 1 View-PORTABLE IMMEDIATE .) (08.31.21 @ 12:09) >    Impression:    Resolved right pleural effusion on frontal view. Unchanged left pleural effusion and diffuse opacities. No pneumothorax.    --- End of Report ---    < end of copied text >      EKG  12 Lead ECG:   Ventricular Rate 75 BPM    Atrial Rate 75 BPM    P-R Interval 160 ms    QRS Duration 106 ms    Q-T Interval 400 ms    QTC Calculation(Bazett) 446 ms    P Axis 38 degrees    R Axis -52 degrees    T Axis 144 degrees    Diagnosis Line Normal sinus rhythm  Possible Left atrial enlargement  Left axis deviation  Left ventricular hypertrophy with repolarization abnormality  Cannot rule out Septal infarct , age undetermined  Abnormal ECG    Confirmed by Demian Gregorio ([id number]) on 8/26/2021 10:32:46 AM (08-26-21 @ 10:08)      Imaging Personally Reviewed:  [X ] YES  [ ] NO  Consultant(s) Notes Reviewed:  [X ] YES  [ ] NO  Care Discussed with Consultants/Other Providers [X ] YES  [ ] NO    Medications:	      MEDICATIONS  (STANDING):  albuterol/ipratropium for Nebulization 3 milliLiter(s) Nebulizer every 6 hours  ascorbic acid 500 milliGRAM(s) Oral daily  aspirin enteric coated 81 milliGRAM(s) Oral daily  atorvastatin 40 milliGRAM(s) Oral at bedtime  donepezil 5 milliGRAM(s) Oral at bedtime  epoetin graham-epbx (RETACRIT) Injectable 5000 Unit(s) IV Push <User Schedule>  heparin   Injectable 5000 Unit(s) SubCutaneous every 8 hours  iron sucrose IVPB 50 milliGRAM(s) IV Intermittent <User Schedule>  metoprolol succinate ER 25 milliGRAM(s) Oral at bedtime  midodrine. 10 milliGRAM(s) Oral <User Schedule>  mirtazapine 15 milliGRAM(s) Oral at bedtime  pantoprazole    Tablet 40 milliGRAM(s) Oral before breakfast  povidone iodine 10% Ointment 1 Application(s) Topical <User Schedule>  sertraline 25 milliGRAM(s) Oral daily  tamsulosin 0.4 milliGRAM(s) Oral at bedtime    MEDICATIONS  (PRN):  ALBUTerol    90 MICROgram(s) HFA Inhaler 2 Puff(s) Inhalation every 6 hours PRN Shortness of Breath and/or Wheezing  HYDROmorphone   Tablet 2 milliGRAM(s) Oral every 4 hours PRN Moderate Pain (4 - 6)      ADVANCED DIRECTIVES:            DNR DNI           DECISION MAKER: Patient [  ]  Family [ x ]  Other [  ] _______  LEGAL SURROGATE: Dtr, Shpressa      GOALS OF CARE DISCUSSION  - DNR/DNI    PSYCHOSOCIAL-SPIRITUAL ASSESSMENT:       Reviewed       See Palliative Care SW/ documentation      CURRENT DISPO PLAN:         WILL REMAIN IN HOSPITAL      PAWEL      REFERRALS	        Palliative Med        Unit SW/Case Mgmt

## 2021-09-01 NOTE — PROGRESS NOTE ADULT - REASON FOR ADMISSION
hypoxia, SOB

## 2021-09-01 NOTE — DISCHARGE NOTE PROVIDER - PROVIDER TOKENS
PROVIDER:[TOKEN:[[id number]],FOLLOWUP:[1 week]],PROVIDER:[TOKEN:[[id number]],FOLLOWUP:[1 week],ESTABLISHEDPATIENT:[T]]

## 2021-09-01 NOTE — DISCHARGE NOTE NURSING/CASE MANAGEMENT/SOCIAL WORK - PATIENT PORTAL LINK FT
You can access the FollowMyHealth Patient Portal offered by [facility] by registering at the following website: [email or web address]. By joining [facility]’s FollowMyHealth portal, you will also be able to view your health information using other applications (apps) compatible with our system.

## 2021-09-01 NOTE — SWALLOW BEDSIDE ASSESSMENT ADULT - SWALLOW EVAL: DIAGNOSIS
+toleration of soft solids and puree w/o overt s/s of penetration/aspiration, pt refused all trials of thin liquids, RN reported toleration

## 2021-09-01 NOTE — DISCHARGE NOTE PROVIDER - NSDCCPCAREPLAN_GEN_ALL_CORE_FT
PRINCIPAL DISCHARGE DIAGNOSIS  Diagnosis: Fluid overload  Assessment and Plan of Treatment: You were found to have fluid overload causing you to be shotr of breath. Durign your hospital stay, you received dialysis, and we extracted 1L fluid from your lungs. We started him on oral dilaudid. His respiratory distress improved. You remained stable while on the medicine floor. If you start having severe shortness of breath, chest pain return back to the hospital.   # Acute on Chronic (O2 dependent COPD) Hypoxic resp failure 2/2 vol overload and worsened b/l pleural opacities/effusions 2/2 ESRD, HFrEF exac, Pulm HTN, sev AS, HTN  -c/w Albuterol PRN, Duonebs Q6H  -strict fluid restriction to 1 L/day  can cont midodrine pre-HD  c/w Toprol XL 25mg po qhs  -dilaudid 2mg po q4 prn dyspnea or pain  # ESRD on HD  Follows Dr. NANCY Saba and Dr. STEPHANIA Vasquez OP. 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 Dtrs are aware.        SECONDARY DISCHARGE DIAGNOSES  Diagnosis: Acute on chronic systolic heart failure  Assessment and Plan of Treatment:     Diagnosis: ESRD on hemodialysis  Assessment and Plan of Treatment:

## 2021-09-01 NOTE — DISCHARGE NOTE PROVIDER - DISCHARGE DIET
Consistent Carbohydrate Diabetic Diets/Renal Diets (for dialysis)/Mechanical Soft Diet/Other Diet Instructions

## 2021-09-01 NOTE — PROGRESS NOTE ADULT - PROBLEM SELECTOR PLAN 1
DNR/DNI  Continue current med mgmt  Patient does not wish for hospice yet, family to discuss among themselves moving forward  Palliative care will sign off

## 2021-09-01 NOTE — DISCHARGE NOTE PROVIDER - INSTRUCTIONS
Soft diet with Thin liquids-  consistent carbs, renal diet, no pork, with nepro cans two times a day

## 2021-09-01 NOTE — PROGRESS NOTE ADULT - SUBJECTIVE AND OBJECTIVE BOX
Over Night Events: events noted, ori vallejo    PHYSICAL EXAM    ICU Vital Signs Last 24 Hrs  T(C): 37.4 (01 Sep 2021 13:30), Max: 37.4 (01 Sep 2021 13:30)  T(F): 99.4 (01 Sep 2021 13:30), Max: 99.4 (01 Sep 2021 13:30)  HR: 75 (01 Sep 2021 13:30) (68 - 85)  BP: 130/61 (01 Sep 2021 13:30) (116/44 - 140/66)  RR: 18 (01 Sep 2021 13:30) (18 - 20)  SpO2: 95% (01 Sep 2021 05:10) (95% - 95%)      General: ill looking  HEENT: LAWRENCE             Lymph Nodes: No cervical LN   Lungs: dec bs l base  Cardiovascular: SHEN 2/6  Abdomen: Soft, Positive BS  Extremities: No clubbing   Skin: Warm  Neurological: Non focal       09-01-21 @ 07:01  -  09-01-21 @ 18:56  --------------------------------------------------------  IN:  Total IN: 0 mL    OUT:    Other (mL): 1500 mL  Total OUT: 1500 mL    Total NET: -1500 mL          LABS:                          9.0    8.84  )-----------( 196      ( 01 Sep 2021 05:52 )             30.8                                               09-01    137  |  99  |  53<H>  ----------------------------<  90  4.9   |  26  |  7.6<HH>    Ca    8.3<L>      01 Sep 2021 05:52  Mg     2.2     09-01    TPro  5.3<L>  /  Alb  3.3<L>  /  TBili  0.3  /  DBili  x   /  AST  13  /  ALT  8   /  AlkPhos  65  09-01                                                                                           LIVER FUNCTIONS - ( 01 Sep 2021 05:52 )  Alb: 3.3 g/dL / Pro: 5.3 g/dL / ALK PHOS: 65 U/L / ALT: 8 U/L / AST: 13 U/L / GGT: x                                                  Culture - Fungal, Body Fluid (collected 31 Aug 2021 10:30)  Source: .Body Fluid Pleural Fluid  Preliminary Report (01 Sep 2021 08:16):    Testing in progress    Culture - Body Fluid with Gram Stain (collected 31 Aug 2021 10:30)  Source: .Body Fluid Pleural Fluid  Gram Stain (31 Aug 2021 23:07):    polymorphonuclear leukocytes seen    No organisms seen    by cytocentrifuge  Preliminary Report (01 Sep 2021 16:22):    No growth                                                                                           MEDICATIONS  (STANDING):  albuterol/ipratropium for Nebulization 3 milliLiter(s) Nebulizer every 6 hours  ascorbic acid 500 milliGRAM(s) Oral daily  aspirin enteric coated 81 milliGRAM(s) Oral daily  atorvastatin 40 milliGRAM(s) Oral at bedtime  donepezil 5 milliGRAM(s) Oral at bedtime  epoetin graham-epbx (RETACRIT) Injectable 5000 Unit(s) IV Push <User Schedule>  heparin   Injectable 5000 Unit(s) SubCutaneous every 8 hours  iron sucrose IVPB 50 milliGRAM(s) IV Intermittent <User Schedule>  metoprolol succinate ER 25 milliGRAM(s) Oral at bedtime  midodrine. 10 milliGRAM(s) Oral <User Schedule>  mirtazapine 15 milliGRAM(s) Oral at bedtime  pantoprazole    Tablet 40 milliGRAM(s) Oral before breakfast  povidone iodine 10% Ointment 1 Application(s) Topical <User Schedule>  sertraline 25 milliGRAM(s) Oral daily  tamsulosin 0.4 milliGRAM(s) Oral at bedtime    MEDICATIONS  (PRN):  ALBUTerol    90 MICROgram(s) HFA Inhaler 2 Puff(s) Inhalation every 6 hours PRN Shortness of Breath and/or Wheezing  HYDROmorphone   Tablet 2 milliGRAM(s) Oral every 4 hours PRN Moderate Pain (4 - 6)

## 2021-09-01 NOTE — PROGRESS NOTE ADULT - PROBLEM SELECTOR PLAN 4
Nephrology following for recommendations  Family would like to continue HD but are considering hospice in the future  Palliative care will sign off

## 2021-09-01 NOTE — DISCHARGE NOTE PROVIDER - CARE PROVIDER_API CALL
Bowen Saba  INTERNAL MEDICINE  [address] [place] [address]  Phone: [phone]  Fax: [phone]  Follow Up Time: 1 week    Yanira Vasquez  Internal Medicine  [address] [place] [id number]  Phone: [phone]  Fax: [phone]  Established Patient  Follow Up Time: 1 week

## 2021-09-01 NOTE — PROGRESS NOTE ADULT - SUBJECTIVE AND OBJECTIVE BOX
DRAKE HO  81y  Male  ***My note supersedes ALL resident notes that I sign.  My corrections for their notes are in my note.***    I can be reached directly on [id number]. My office number is [phone]. My personal cell number is [phone].    INTERVAL EVENTS: Here for f/u of ESRD. Pt says that he does not feel great. Asked me to call his dtr. Pt did HD today. Overall, not doing great.    T(F): 99.4 (09-01-21 @ 13:30), Max: 99.4 (09-01-21 @ 13:30)  HR: 75 (09-01-21 @ 13:30) (68 - 85)  BP: 130/61 (09-01-21 @ 13:30) (116/44 - 140/66)  RR: 18 (09-01-21 @ 13:30) (18 - 20)  SpO2: 95% (09-01-21 @ 05:10) (95% - 95%)    Gen: NAD  HEENT: PERRL, mouth clr, nose clr  Neck: no nodes, no JVD, thyroid nl  chest: rt Tesio  lungs: bibasilar crackles  hrt: s1 s2 rrr 2/6 sys murmur murmur  abd: soft, NT/ND, no HS megaly  ext: tr edema, no c/c  neuro: awake, talking a little    LABS:                      9.0     (    100.7  8.84  )-----------( ---------      196      ( 01 Sep 2021 05:52 )             30.8    (    17.2     137   (   99   (   90      09-01-21 @ 05:52  ----------------------               4.9   (   26   (   53                             -----                        7.6  Ca  8.3   Mg  2.2    P   --     LFT  5.3  (  0.3  (  13       09-01-21 @ 05:52  -------------------------  3.3  (  65  (  8    CAPILLARY BLOOD GLUCOSE  POCT Blood Glucose.: 101 (09-01-21 @ 07:39)  POCT Blood Glucose.: 117 (08-31-21 @ 21:40)  POCT Blood Glucose.: 151 (08-31-21 @ 16:58)  POCT Blood Glucose.: 143 (08-31-21 @ 11:17)  POCT Blood Glucose.: 118 (08-31-21 @ 07:42)  POCT Blood Glucose.: 130 (08-30-21 @ 21:37)  POCT Blood Glucose.: 140 (08-30-21 @ 17:03)  POCT Blood Glucose.: 135 (08-30-21 @ 07:50)    RADIOLOGY & ADDITIONAL TESTS:    MEDICATIONS:    ALBUTerol    90 MICROgram(s) HFA Inhaler 2 Puff(s) Inhalation every 6 hours PRN  albuterol/ipratropium for Nebulization 3 milliLiter(s) Nebulizer every 6 hours  ascorbic acid 500 milliGRAM(s) Oral daily  aspirin enteric coated 81 milliGRAM(s) Oral daily  atorvastatin 40 milliGRAM(s) Oral at bedtime  donepezil 5 milliGRAM(s) Oral at bedtime  epoetin graham-epbx (RETACRIT) Injectable 5000 Unit(s) IV Push <User Schedule>  heparin   Injectable 5000 Unit(s) SubCutaneous every 8 hours  HYDROmorphone   Tablet 2 milliGRAM(s) Oral every 4 hours PRN  iron sucrose IVPB 50 milliGRAM(s) IV Intermittent <User Schedule>  metoprolol succinate ER 25 milliGRAM(s) Oral at bedtime  midodrine. 10 milliGRAM(s) Oral <User Schedule>  mirtazapine 15 milliGRAM(s) Oral at bedtime  pantoprazole    Tablet 40 milliGRAM(s) Oral before breakfast  povidone iodine 10% Ointment 1 Application(s) Topical <User Schedule>  sertraline 25 milliGRAM(s) Oral daily  tamsulosin 0.4 milliGRAM(s) Oral at bedtime

## 2021-09-01 NOTE — PROGRESS NOTE ADULT - ASSESSMENT
ESRD on HD   - access via TDC   - fluid removal during HD has been limited due to intradialytic hypotension  acute respiratory failure due to CHF?  COPD on home O2  HFrEF  dementia   HTN  anemia  right nephrectomy    plan:    Midodrine prior to HD for intradialytic hypotension  HD today: 3 hours, opti 160 dialyzer, 2K bath, 2L UF   EPO and Venofer with HD  Renal diet  Patient being evaluated for hospice - spoke with daughter, she is not ready to commit yet  STRICT FLUID RESTRICTION

## 2021-09-01 NOTE — PROGRESS NOTE ADULT - ASSESSMENT
82 yo man hx of COPD on Home O2 4L, HFrEF (EF 40% in July 2021), severe AS, T2DM, hx right nephrectomy, ESRD on HD, HTN, anxiety, Alzheimer's dementia, anemia of chronic disease on ROMELIA therapy BIBA from NH due to hypoxia and SOB during HD session.     # Acute on Chronic (O2 dependent COPD) Hypoxic resp failure 2/2 vol overload and worsened b/l pleural opacities/effusions 2/2 ESRD, HFrEF exac, Pulm HTN, sev AS, HTN  usually on 4 liters O2 - cont NC O2 as needed  BNP always very high  pulm did pall tap (1L) 8/31  CXR: better after tap; no PTX  On prior admission was found to have mucus Plug, s/p bronchoscopy 08/08/21  pt NOT able to tolerate more than 1.5 or so liters of removal at HD (BP drops) - per Dr Saba  c/w Albuterol PRN, Duonebs Q6H  can d/c lasix and zaroxylyn (per renal, these are not working)  strict fluid restriction to 1 L/day  can cont midodrine pre-HD  pt on DAPT -> not sure why (d/c plavix)  BB - change to Toprol XL 25mg po qhs  I (and renal) think pt is a very poor TAVR candidate  dilaudid 2mg po q4 prn dyspnea or pain    # ESRD on HD  Follows Dr. NANCY Saba and Dr. 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  seems severe  poor baseline  max asst ADLs  c/w donepezil, remeron and zoloft    # BPH on flomax    # can cont Vit C    # skin care  betadine and allevyn pad to heel q24  barrier cr to sacrum/buttocks q24    # GI PPx - Protonix    # DVT PPx - Heparin 5000 mg SubQ  q8    # Activity: basically bedbound    # Code Status-  DNR / DNI (verified with daughter)    # GOC: previously spoke w/ dtrs; I explained that there is no way to fix his valvular dz and he cannot tolerate large fluid removal at HD; I told dtrs that pt is going to die from these diseases in prob < 6 mo or so (likely sooner); we reviewed that pt could be hospice pt, but not while on HD; family wants to cont HD for now; pt is going to cont LTC at SNF;     # Dispo: c/w midodrine pre-HD; f/u renal; d/c today to NH w/ LTC - palliative intent (COV 8/31 neg)     Overall prog is extremely poor. Dtrs are aware. Pt at very high risk for early return to hospital.

## 2021-09-01 NOTE — SWALLOW BEDSIDE ASSESSMENT ADULT - SLP PERTINENT HISTORY OF CURRENT PROBLEM
Pt admitted w/ SOB, acute on chronic respiratory failure, s/p HD today PMH: DM, COPD, CHF, ESRD on HD, alzheimers

## 2021-09-01 NOTE — DISCHARGE NOTE PROVIDER - NPI NUMBER (FOR SYSADMIN USE ONLY) :
[[id number]],[[id number]] [General Appearance - In No Acute Distress] : no acute distress [Low Lying Soft Palate] : low lying soft palate [Elongated Uvula] : elongated uvula [Enlarged Base of the Tongue] : enlargement of the base of the tongue [Erythema] : erythema of the pharynx [IV] : IV [Neck Appearance] : the appearance of the neck was normal [] : the neck was supple [Jugular Venous Distention Increased] : there was no jugular-venous distention [Heart Sounds] : normal S1 and S2 [Murmurs] : no murmurs present [Respiration, Rhythm And Depth] : normal respiratory rhythm and effort [Exaggerated Use Of Accessory Muscles For Inspiration] : no accessory muscle use [Scoliosis] : scoliosis [Bowel Sounds] : normal bowel sounds [Abdomen Soft] : soft [Abnormal Walk] : normal gait [Nail Clubbing] : no clubbing of the fingernails [Cyanosis, Localized] : no localized cyanosis [Cranial Nerves] : cranial nerves 2-12 were intact [No Focal Deficits] : no focal deficits [Oriented To Time, Place, And Person] : oriented to person, place, and time [Affect] : the affect was normal [FreeTextEntry1] : no edema

## 2021-09-02 PROBLEM — N18.6 END STAGE RENAL DISEASE: Chronic | Status: ACTIVE | Noted: 2021-01-01

## 2021-09-02 NOTE — ED PROVIDER NOTE - CARE PROVIDER_API CALL
TAE BUCKLEY  Internal Medicine  [facility] [address] [facility] [address]  [place] [address]  Phone: [phone]  Fax: [phone]  Established Patient  Follow Up Time: 1-3 Days

## 2021-09-02 NOTE — ED PROVIDER NOTE - PHYSICAL EXAMINATION
CONSTITUTIONAL: well-appearing, in NAD  SKIN: Warm dry, normal skin turgor  HEAD: NCAT; no raccoon eyes, hemotympanum, or richey sign  EYES: EOMI, PERRLA, no scleral icterus, conjunctiva pink  ENT: normal pharynx with no erythema or exudates  NECK: Supple; non tender. Full ROM. no c/t/l/s tenderness  CARD: RRR, no murmurs. right chest wall tesio, nonerythematous, non edematous, nontender; no chest wall tenderness  RESP: clear to ausculation b/l. No crackles or wheezing.  ABD: soft, non-tender, non-distended, no rebound or guarding.  EXT: Full ROM, no bony tenderness, no pedal edema, no calf tenderness  NEURO: normal motor. normal sensory. moving all extremities  PSYCH: Cooperative, appropriate.

## 2021-09-02 NOTE — ED ADULT NURSE NOTE - NSIMPLEMENTINTERV_GEN_ALL_ED
Implemented All Fall with Harm Risk Interventions:  [place] to call system. Call bell, personal items and telephone within reach. Instruct patient to call for assistance. Room bathroom lighting operational. Non-slip footwear when patient is off stretcher. Physically safe environment: no spills, clutter or unnecessary equipment. Stretcher in lowest position, wheels locked, appropriate side rails in place. Provide visual cue, wrist band, yellow gown, etc. Monitor gait and stability. Monitor for mental status changes and reorient to person, place, and time. Review medications for side effects contributing to fall risk. Reinforce activity limits and safety measures with patient and family. Provide visual clues: red socks.

## 2021-09-02 NOTE — ED PROVIDER NOTE - CLINICAL SUMMARY MEDICAL DECISION MAKING FREE TEXT BOX
81M with PMH COPD on Home O2 4L, HFrEF (EF 40% in July 2021), severe AS, T2DM, hx right nephrectomy, ESRD on HD, HTN, anxiety, Alzheimer's dementia, anemia of chronic disease on ROMELIA therapy, DVT on heparin 5000U BID who presents to [facility]  from [facility] for witnessed fall from his wheelchair after getting agitated, falling back from ground level. HCT negative for ICH. Labs reviewed and similar to baseline. CT shows actual improvement in pleural effusions. Lung nodule noted, given instructions to have repeat CT. I have fully discussed the medical management and delivery of care with the patient. I have discussed any available labs, imaging and treatment options with the patient. All Questions answered at the bedside and printed copies of all results provided and recommended to review with PCP. Patient confirms understanding and has been given detailed return precautions. Patient instructed to return to the ED should symptoms persist or worsen. Patient has demonstrated capacity and has verbalized understanding. Patient is well appearing upon discharge, ambulatory with a steady gait. 81M with PMH COPD on Home O2 4L, HFrEF (EF 40% in July 2021), severe AS, T2DM, hx right nephrectomy, ESRD on HD, HTN, anxiety, Alzheimer's dementia, anemia of chronic disease on ROMELIA therapy, DVT on heparin 5000U BID who presents to [facility]  from [facility] for witnessed fall from his wheelchair after getting agitated, falling back from ground level. HCT negative for ICH. Labs reviewed and similar to baseline. CT shows actual improvement in pleural effusions. Lung nodule noted, given instructions to have repeat CT. I have fully discussed the medical management and delivery of care with the patient. I have discussed any available labs, imaging and treatment options with the patient. All Questions answered at the bedside and printed copies of all results provided and recommended to review with PCP. Patient confirms understanding and has been given detailed return precautions. Patient instructed to return to the ED should symptoms persist or worsen. Patient has demonstrated capacity and has verbalized understanding. Patient is stable, medical condition at his baseline.

## 2021-09-02 NOTE — ED PROVIDER NOTE - OBJECTIVE STATEMENT
82 yo M with PMH of COPD 4L home O2, HFrEF (EF 40% in July 2021), severe AS, DM, hx right nephrectomy, ESRD on HD with R chest wall tesio, HTN, anxiety, Alzheimer's dementia, anemia of chronic disease on ROMELIA therapy, DVT on heparin 5000U BID BIBEMS from NH for witnessed fall. Patient was sitting in his wheelchair when he got agitated and fell backward hitting his head. Upon arrival, patient has no 80 yo M with PMH of COPD 4L home O2, HFrEF (EF 40% in July 2021), severe AS, DM, hx right nephrectomy, ESRD on HD with R chest wall tesio, HTN, anxiety, Alzheimer's dementia, anemia of chronic disease on ROMELIA therapy, DVT on heparin 5000U BID BIBEMS from NH for witnessed fall. Patient was sitting in his wheelchair when he got agitated and fell backward hitting his head. No LOC. Upon arrival, patient has no complaints, including headache, dizziness, numbness, weakness, tingling, CP, SOB, NVD.

## 2021-09-02 NOTE — CHART NOTE - NSCHARTNOTEFT_GEN_A_CORE
Responded to Trauma Alert prenotification. Alert called from dispatch due to fall in NH w/ +HT on AC.  Upon arrival, NH documentation notes pt is on HSQ 5000 Q12, no other AC.   Given pt is on a less-than prophylactic dose, and not a therapeutic dose. Trauma alert cancelled.  D/w. ED attg Reyes, ED resident Sukdheep, Trauma attg Dr. Gupta, all in agreement.  If injuries identified, please recall as trauma consult.

## 2021-09-02 NOTE — ED ADULT TRIAGE NOTE - CHIEF COMPLAINT QUOTE
81 year male s/p fall back in his wheelchair hit the back of his head.-LOC. patient on heparin, trauma alert called

## 2021-09-02 NOTE — ED PROVIDER NOTE - ATTENDING CONTRIBUTION TO CARE
81M with PMH t2dm, esrd, chf, hld, Alzheimer's dementia, COPD not O2 dependent, DVT on heparin 5000U BID who presents to [facility]  from [facility] for witnessed fall from his wheelchair after getting agitated, falling back from ground level. Per report pt is mentating baseline (A&O x2) and vs stable en route. Trauma alert called in the field.     Gen - NAD, Head - NCAT, Pharynx - clear, MMM, Heart - RRR, no m/g/r, Lungs - CTAB, no w/c/r, Abdomen - soft, NT, ND, Skin - No rash, Extremities - FROM, no edema, erythema, ecchymosis, brisk cap refill, Neuro - A&O x2, equal strength and sensation, non-focal exam    a/p: HCT, CT C-spine, c/a/p, basic labs and will reassess 81M with PMH COPD on Home O2 4L, HFrEF (EF 40% in July 2021), severe AS, T2DM, hx right nephrectomy, ESRD on HD, HTN, anxiety, Alzheimer's dementia, anemia of chronic disease on ROMELIA therapy, DVT on heparin 5000U BID who presents to [facility]  from [facility] for witnessed fall from his wheelchair after getting agitated, falling back from ground level. Per report pt is mentating baseline (A&O x2) and vs stable en route. Trauma alert called in the field.     Gen - NAD, Head - NCAT, Pharynx - clear, MMM, Heart - RRR, no m/g/r, Lungs - CTAB, no w/c/r, Abdomen - soft, NT, ND, Skin - No rash, Extremities - FROM, no edema, erythema, ecchymosis, brisk cap refill, Neuro - A&O x2, equal strength and sensation, non-focal exam    a/p: HCT, CT C-spine, c/a/p, basic labs and will reassess

## 2021-09-02 NOTE — ED PROVIDER NOTE - NSFOLLOWUPINSTRUCTIONS_ED_ALL_ED_FT
Pulmonary Nodule  A pulmonary nodule is a small, round growth of tissue in the lung. It is sometimes referred to as a "shadow" or "spot on the lung." Nodules range in size from less than 1/5 of an inch (4 mm) to a little bigger than an inch (30 mm).    Pulmonary nodules can be either noncancerous (benign) or cancerous (malignant). Most are noncancerous. Smaller nodules in people who do not smoke and do not have any other risk factors for lung cancer are more likely to be noncancerous. Larger, irregular nodules in people who smoke or who have a strong family history of lung cancer are more likely to be cancerous.    What are the causes?  This condition may be caused by:  A bacterial, fungal, or viral infection, such as tuberculosis. The infection is usually an old and inactive one.  A noncancerous mass of tissue.  Inflammation from conditions such as rheumatoid arthritis.  Abnormal blood vessels in the lungs.  Cancerous tissue, such as lung cancer or a cancer in another part of the body that has spread to the lung.  What are the signs or symptoms?  This condition usually does not cause symptoms. If symptoms appear, they are usually related to the underlying cause. For example, if the condition is caused by an infection, you may have a cough or fever.    How is this diagnosed?  This condition is usually diagnosed with an X-ray or CT scan. To help determine whether a pulmonary nodule is benign or malignant, your health care provider will:  Take your medical history.  Perform a physical exam.  Order tests, including:  Blood tests.  A skin test called a tuberculin test. This test is done to check if you have been exposed to the germ that causes tuberculosis.  Chest X-rays.  A CT scan. This test shows smaller pulmonary nodules more clearly and with more detail than an X-ray.  A positron emission tomography (PET) scan. This test is done to check if the nodule is cancerous. During the test, a safe amount of a radioactive substance is injected into the bloodstream. Then a picture is taken.  Biopsy. In this test, a tiny piece of the pulmonary nodule is removed and then examined under a microscope.  How is this treated?  Treatment for this condition depends on whether the pulmonary nodule is malignant or benign as well as your risk of getting cancer.  Noncancerous nodules usually do not need to be treated, but they may need to be monitored with CT scans. If a CT scan shows that the pulmonary nodule got bigger, more tests may be done.  Some nodules need to be removed. If this is the case, you may have a procedure called a thoractomy. During the procedure, your health care provider will make an incision in your chest and remove the part of the lung where the nodule is located.  Follow these instructions at home:  Image   Take over-the-counter and prescription medicines only as told by your health care provider.  Do not use any products that contain nicotine or tobacco, such as cigarettes and e-cigarettes. If you need help quitting, ask your health care provider.  Keep all follow-up visits as told by your health care provider. This is important.  Contact a health care provider if:  You have trouble breathing when you are active.  You feel sick or unusually tired.  You do not feel like eating.  You lose weight without trying.  You develop chills or night sweats.  Get help right away if:  You cannot catch your breath.  You begin wheezing.  You cannot stop coughing.  You cough up blood.  You become dizzy or feel like you are going to faint.  You have sudden chest pain.  You have a fever or persistent symptoms for more than 2–3 days.  You have a fever and your symptoms suddenly get worse.  Summary  A pulmonary nodule is a small, round growth of tissue in the lung. Most pulmonary nodules are noncancerous.  This condition is usually diagnosed with an X-ray or CT scan.  Common causes of pulmonary nodules include infection, inflammation, and noncancerous growths.  Though less common, if a nodule is found to be cancerous, you will need specific diagnostic tests and treatment options as directed by your medical provider.  Treatment for this condition depends on whether the pulmonary nodule is benign or malignant as well as your risk of getting cancer.  This information is not intended to replace advice given to you by your health care provider. Make sure

## 2021-09-02 NOTE — ED PROVIDER NOTE - PATIENT PORTAL LINK FT
You can access the FollowMyHealth Patient Portal offered by [facility] by registering at the following website: [email or web address]. By joining [employer]’s FollowMyHealth portal, you will also be able to view your health information using other applications (apps) compatible with our system.

## 2021-09-21 NOTE — H&P ADULT - NSHPPHYSICALEXAM_GEN_ALL_CORE
- Physical Exam in ED  * General Appearance: Alert but lethargic, not cooperative, not interactive, not oriented to time, place, and person, mouth breathing on nasal canula at 4LPM  * Head: Normocephalic, without obvious abnormality, atraumatic  * Eyes: PERRL, conjunctiva/corneas clear, EOM's intact, fundi benign, both eyes  * Neck: Supple, symmetrical, trachea midline, no adenopathy;   * Thyroid:  No enlargement/tenderness/nodules; no carotid bruit or JVD  * Lungs: mouth breathing on nasal canula at 4LPM, Good bilateral air entry, diffusely audible rhonchi (even without auscultation) and crackles bilaterally, no audible wheezes   * Heart: Regular Rate and Rhythm, normal S1 and S2, no audible murmur, rub, or gallop  * Abdomen: Symmetric, non-distended, no scar, soft, non-tender, bowel sounds active all four quadrants, no masses, no organomegaly (no hepatosplenomegaly)  * Extremities: Extremities normal, atraumatic, no cyanosis, no lower extremity pitting edema bilaterally, adequate dorsalis pedis pulses  * Pulses: 2+ and symmetric all extremities  * Skin: Skin color, texture, turgor normal, no rashes or lesions - Physical Exam in ED  * General Appearance: Alert but lethargic, not cooperative, not interactive, not oriented to time, place, and person, mouth breathing on nasal canula at 4LPM  * Head: Normocephalic, without obvious abnormality, atraumatic  * Eyes: PERRL, conjunctiva/corneas clear, EOM's intact, fundi benign, both eyes  * Neck: Supple, symmetrical, trachea midline, no adenopathy;   * Thyroid:  No enlargement/tenderness/nodules; no carotid bruit or JVD  * Lungs: mouth breathing on nasal canula at 4LPM, Good bilateral air entry, diffusely audible rhonchi (even without auscultation) and crackles bilaterally, no audible wheezes   * Heart: Regular Rate and Rhythm, normal S1 and S2, no audible murmur, rub, or gallop  * Abdomen: Symmetric, non-distended, no scar, soft, non-tender, bowel sounds active all four quadrants, no masses, no organomegaly (no hepatosplenomegaly)  * Extremities: Extremities normal, atraumatic, no cyanosis, no lower extremity pitting edema bilaterally, adequate dorsalis pedis pulses  * Pulses: 2+ and symmetric all extremities  * Skin: clean tessio site, Skin color, texture, turgor normal, no rashes or lesions

## 2021-09-21 NOTE — ED PROVIDER NOTE - ATTENDING CONTRIBUTION TO CARE
82 y/o male, NHR dnr/dni, with h/o ESRD on HD m/w/f, dm, dementia, anemia, HFrEF, COPD on 4L home O2, severe AS > sent to ER from NH for eval of low BP, decreased UO, and fever. Per daughter, pt with cough, had recent thoracentesis during admission last month.  Pt poor historian, but denies any complaints.  denies cp/sob. denies abd pain. 82 y/o male, NHR dnr/dni, with h/o ESRD on HD m/w/f, dm, dementia, anemia, HFrEF, COPD on 4L home O2, severe AS > sent to ER from NH for eval of low BP, decreased UO, and fever. Per daughter, pt with cough, had recent thoracentesis during admission last month.  Pt poor historian, but denies any complaints.  denies cp/sob. denies abd pain. + decreased PO intake. Had full HD yesterday.   PE - nad, nc/at, eomi, op - mm dry, normal WOB, + b/l rhonchi, rrr, abd - soft, nt/nd, nabs, from x 4, awake and alert, follows commands, moves all extremities, no focal neuro deficits.  -check labs, cxr, cultures

## 2021-09-21 NOTE — ED ADULT NURSE REASSESSMENT NOTE - NS ED NURSE REASSESS COMMENT FT1
Pt transferred to critical care area d/t hypotension despite 2 liters of IVF. Daughter at bedside & agreeable w/ plan. Pt is DNR/DNI. Endorsed to crit RN.

## 2021-09-21 NOTE — ED ADULT NURSE NOTE - CHIEF COMPLAINT QUOTE
pt sent from [facility] for low BP, fever and no UO since 0600, given 250 cc NS by EMS, dialysis patient but makes urine at baseline.

## 2021-09-21 NOTE — ED ADULT NURSE NOTE - OBJECTIVE STATEMENT
Pt sent in from [facility] for hypotension, temp 100.5, and no urine output since 0600. As per daughter, pt has not made urine in several weeks d/t ESRD on HD. Pt goes to HD M/W/F w/ a MOJGAN vargas.

## 2021-09-21 NOTE — ED PROVIDER NOTE - NS ED ROS FT
pt is a poor historian     CONST: No fever, chills or bodyaches  EYES: No pain, redness, drainage or visual changes.  ENT: No ear pain or discharge, nasal discharge or congestion. No sore throat  CARD: No chest pain, palpitations  RESP: No SOB, cough, hemoptysis. No hx of asthma or COPD  GI: No abdominal pain, N/V/D  : No urinary symptoms  MS: No joint pain, back pain or extremity pain/injury  SKIN: No rashes  NEURO: No headache, dizziness, paresthesias or LOC

## 2021-09-21 NOTE — ED PROVIDER NOTE - CRITICAL CARE ATTENDING CONTRIBUTION TO CARE
I personally evaluated the patient. I reviewed the Resident’s or Physician Assistant’s note (as assigned above), and agree with the findings and plan except as documented in my note.    82 y/o male, NHR dnr/dni, with h/o ESRD on HD m/w/f, dm, dementia, anemia, HFrEF, COPD on 4L home O2, severe AS > sent to ER from NH for eval of low BP, decreased UO, and fever. Per daughter, pt with cough, had recent thoracentesis during admission last month.  Pt poor historian, but denies any complaints.  denies cp/sob. denies abd pain. + decreased PO intake. Had full HD yesterday.   PE - nad, nc/at, eomi, op - mm dry, normal WOB, + b/l rhonchi, rrr, abd - soft, nt/nd, nabs, from x 4, awake and alert, follows commands, moves all extremities, no focal neuro deficits.  -check labs, cxr, cultures

## 2021-09-21 NOTE — ED ADULT NURSE REASSESSMENT NOTE - NS ED NURSE REASSESS COMMENT FT1
PT transferred from [facility] to  due to hypotension - pt now in Trendelenburg position and started on Levo peripherally.  Pt has second iv access placed LFA 18g.  Will continue to monitor and assess no further interventions at this time.

## 2021-09-21 NOTE — ED PROVIDER NOTE - OBJECTIVE STATEMENT
82 y/o male with a PMH of ESRD on dialysis via tessio catheter on MWF at [facility], CHF, COPD on 4L daily, and DM presents to the ED from [facility] for evaluation of hypotension, and low pulse. pt is a poor historian and is asking for us to call daughter. pt is DNR/DNI. as per [facility] papers, pt has not urinated since 6AM, but as per daughter pt does not make urine. 80 y/o male with a PMH of ESRD on dialysis via tessio catheter on MWF at [facility], CHF, COPD on 4L daily, and DM presents to the ED from [facility] for evaluation of hypotension, and low pulse. pt is a poor historian and is asking for us to call daughter. pt is DNR/DNI. as per [facility] papers, pt has not urinated since 6AM, but as per daughter pt does not make urine and has been confused since this weekend.

## 2021-09-21 NOTE — ED ADULT TRIAGE NOTE - CHIEF COMPLAINT QUOTE
pt sent from [facility] for low BP, fever and no UO since 0600, given 250 cc NS by EMS, dialysis patient but makes urine at baseline.

## 2021-09-21 NOTE — H&P ADULT - ASSESSMENT
Assessment and Plan  Case of an 81 year old DNR/DNI male patient with Alzheimer Dementia known to have multiple comorbidities including ESRD and ACD on HD, HFrEF, severe AS, COPD on home O2 4LPM NC, DM II, BPH, DL, and recent admission  for multilobar pneumonia with effusion s/p palliative thoracocentesis s/p discharge on 09/02 on levaquin currently brought to us from [facility] for evaluation of hypotension and fever in setting of recent confusion, reduced PO intake, and lethargy, with stay complicated by hypotension requiring pressors, found to have leukocytosis, HAGMA, to be admitted for septic shock with Unclear Infectious Etiology and further investigations and monitoring. Currently on Levophed at 0/04 and NC at 4LPM.      Septic Shock with Unclear Infectious Etiology  Rule Out Pneumonia in setting of recent admission for multilobar pneumonia s/p palliative thoracocentesis  Rule Out  infection Versus GI source  * Recent admission for multilobar pneumonia with effusion s/p palliative thoracocentesis and removal of 1L fluids with cytopathology being indicative of reactive effusion positive for CK7, CK5,6, calretin, and CD 68 s/p discharge on 09/02 on levaquin. During that admission, GOC were discussed and palliative was on board: daughter refused Hospice and opted to continued with HD sessions  * Presentation: History goes back to Saturday when the patient spiked a fever of 100.1 degrees; associated with lethargy, reduced PO intake, and confusion; complaining of mild epigastric discomfort in the absence of any nausea, vomiting, shortness of breath, increased cough, increased oxygen requirements, change in bowel movements (diarrhea or constipation), or urinary symptoms (no much urine output at baseline). Today, in the AM of 09/21, patient spiked a T of 100.5 at [facility] and was found to have a low SBP in 80's mmHg along with low pulse. EMS was contacted and patient was brought to the ED after the administration of 250 cc NS bolus.  * Sepsis present on admission:   * Vital Signs in ED /75 mmHg --> dropped to 75/42 mmHg at 17:30 PM s/p 1L LR bolus in ED followed by Levophed initiation at a rate of 0.04 right now,  bpm, RR 18  bpm, T 37.8, SaO2 97% on RA  * Investigations in ED  WBC 14.19, Hb 11.8, Plt 287, Na 131, K 4.8, BUN 51, Cr 6.0, AG 17, eGFR 8-9  * VBG pH 7.25, pCO2 56, pO2 35, HCO3 25, %61.1, LA venous 1.7  * Chest X Ray: CM, improved left sided effusion, improved interstitial opacification   * COVID received  * Blood cultures x2 sets received  * Urinalysis and urine culture pending collection (if possible)    - Monitor T for fever      - Alzheimer Dementia. From [facility]. Home med Donepezil 5mg QD, Sertraline 25mg QD, Mirtazepine 15mg QD  - ESRD and Anemia of chronic Disease. On HD every MWF via Tess[facility] at [facility] complicated by hypotension s/p initiation of Midodrine 10mg in AM. Receives ROMELIA 5000 units every MWF with HD. Follows with Dr Saba and Dr Vasquez. Home med calcium acetate 667mg TID, Midodrine 10mg in AM  - COPD on 4LPM NC at NH. Home med Albuterol 2 puffs Q6h and Breo Ellipta 200-25mg QD  - DM Type II. Last Hba1c 5.0 07/13/2021. Not on medication/ Diet controlled  - HFrEF and severe AS. Last TTE 07/18/21 EF 40%, mild-mod MR, mod TR, severe AS (Area 0.58cm2), WMA. Home med Toprol 25mg QD  - BPH. Home med Tamsulosin 0.4mg QD  - Dyslipidemia. Last lipid profile 08/27/21 , Chol 114, LDL 52, HDL 38. Home med Atorvastatin 40mg QD, Aspirin 81mg QD      History was obtained from the daughter (Ms Vidal) over the phone [phone] since patient seems lethargic and is not cooperative.  He was brought to the ED from [facility] by EMS on 09/21 for evaluation of hypotension and fever.  History goes back to Saturday when the patient spiked a fever of 100.1 degrees.  This was associated with lethargy, reduced PO intake, and confusion.  Daughter notes that patient was complaining of mild epigastric discomfort in the absence of any nausea, vomiting, shortness of breath, increased cough, increased oxygen requirements, change in bowel movements (diarrhea or constipation), or urinary symptoms (no much urine output at baseline).  Today, in the AM of 09/21, patient spiked a T of 100.5 at [facility] and was found to have a low SBP in 80's mmHg along with low pulse.  EMS was contacted and patient was brought to the ED after the administration of 250 cc NS bolus.  No sick contacts.  No recent travel or exposure to recent travelers.        In the setting of drop in BP from 133/75 to 75/42 mmHg at 17:30PM, 1L LR bolus was administered followed by levophed at 0.04.  Patient was given a dose of IV Cefepime 2g and IV Vancomycin 1g in ED  He will be admitted to the floor as a case of septic shock for management with broad coverage antibiotics and pressors and for further investigations and monitoring.          Others  - DVT Prophylaxis: Lovenox 40mg Subcutaneously daily  - GI Prophylaxis: Pantoprazole 40mg PO QD  - IV Hydration with D5 0.45 NSS at a rate of 20cc/hour   - Diet: Regular  - Code Status: Full    Barriers to learning: YES/NO  Discharge Planning: Patient will be discharged once stable and  Plan was communicated with   Education given to:   Educated about:       Carol Craft MD  PGY - 1 Internal Medicine   [facility]   Assessment and Plan  Case of an 81 year old DNR/DNI male patient with Alzheimer Dementia known to have multiple comorbidities including ESRD and ACD on HD, HFrEF, severe AS, COPD on home O2 4LPM NC, DM II, BPH, DL, and recent admission  for multilobar pneumonia with effusion s/p palliative thoracocentesis s/p discharge on 09/02 on levaquin currently brought to us from [facility] for evaluation of hypotension and fever in setting of recent confusion, reduced PO intake, and lethargy, with stay complicated by hypotension requiring pressors, found to have leukocytosis, HAGMA, to be admitted for septic shock with Unclear Infectious Etiology and further investigations and monitoring. Currently on Levophed at 0/04 and NC at 4LPM.      Septic Shock with Unclear Infectious Etiology  Rule Out Pneumonia in setting of recent admission for multilobar pneumonia s/p palliative thoracocentesis  Rule Out  infection Versus GI source  * Recent admission for multilobar pneumonia with effusion s/p palliative thoracocentesis and removal of 1L fluids with cytopathology being indicative of reactive effusion positive for CK7, CK5,6, calretin, and CD 68 s/p discharge on 09/02 on levaquin. During that admission, GOC were discussed and palliative was on board: daughter refused Hospice and opted to continued with HD sessions  * Presentation: History goes back to Saturday when the patient spiked a fever of 100.1 degrees; associated with lethargy, reduced PO intake, and confusion; complaining of mild epigastric discomfort in the absence of any nausea, vomiting, shortness of breath, increased cough, increased oxygen requirements, change in bowel movements (diarrhea or constipation), or urinary symptoms (no much urine output at baseline). Today, in the AM of 09/21, patient spiked a T of 100.5 at [facility] and was found to have a low SBP in 80's mmHg along with low pulse. EMS was contacted and patient was brought to the ED after the administration of 250 cc NS bolus.  * Sepsis present on admission:   * Vital Signs in ED /75 mmHg --> dropped to 75/42 mmHg at 17:30 PM s/p 1L LR bolus in ED followed by Levophed initiation at a rate of 0.04 right now,  bpm, RR 18  bpm, T 37.8, SaO2 97% on RA  * Investigations in ED  WBC 14.19, Hb 11.8, Plt 287, Na 131, K 4.8, BUN 51, Cr 6.0, AG 17, eGFR 8-9  * VBG pH 7.25, pCO2 56, pO2 35, HCO3 25, %61.1, LA venous 1.7  * Chest X Ray: CM, improved left sided effusion, improved interstitial opacification   * COVID received  * Blood cultures x2 sets received  * Urinalysis and urine culture pending collection (if possible)  * In the setting of drop in BP from 133/75 to 75/42 mmHg at 17:30PM, 1L LR bolus was administered followed by levophed at 0.04.  * S/P a dose of IV Cefepime 2g and IV Vancomycin 1g in ED    - Follow up Infectious Disease recommendations  - Monitor T for fever  - Trend WBC for leukocytosis  - Will start IV Cefepime and IV Vancomycin with renally adjusted doses. S/P a dose of IV Cefepime 2g and IV Vancomycin 1g in ED  - Monitor BP and continue levophed: currently at 0.04. s/p 1L LR bolus in ED. Avoid volume overload   - Follow up cultures  -->  Blood cultures x2 sets received  --> Urinalysis and urine culture pending collection (if possible)  --> Sputum culture if possible  --> Urine legionella and strep  --> Fungitell  - Monitor SaO2 and Oxygen Requirements: 4LPM NC currently. BiPAP 12/5 FiO2 40% overnight  - Follow up Chest X Ray on 09/22  - Check LA  - Trend Inflammatory Markers:  --> ESR   --> D-dimer  --> Procalcitonin   --> C-reactive protein  --> LDH   --> Ferritin   --> Fibrinogen   - Will consult palliative care team: since was candidate for Hospice during last admission but daughter opted to continue HD and refuse Hospice back then (end of August-Early September 2021)      ESRD and Anemia of chronic Disease  * On HD every MWF via [facility]io at [facility] complicated by hypotension s/p initiation of Midodrine 10mg in AM.   * Receives ROMELIA 5000 units every MWF with HD.  * Follows with Dr Saba and Dr Vasquez.   * Home med calcium acetate 667mg TID, Midodrine 10mg in AM      COPD   * On 4LPM NC at NH.   * Home med Albuterol 2 puffs Q6h and Breo Ellipta 200-25mg QD      DM Type II  * Last Hba1c 5.0 07/13/2021.   * Not on medication/ Diet controlled      HFrEF and severe AS.   * Last TTE 07/18/21 EF 40%, mild-mod MR, mod TR, severe AS (Area 0.58cm2), WMA. Home med Toprol 25mg QD  - BPH. Home med Tamsulosin 0.4mg QD  - Dyslipidemia. Last lipid profile 08/27/21 , Chol 114, LDL 52, HDL 38. Home med Atorvastatin 40mg QD, Aspirin 81mg QD   Alzheimer Dementia. From [facility]. Home med Donepezil 5mg QD, Sertraline 25mg QD, Mirtazepine 15mg QD      Others  - DVT Prophylaxis: Lovenox 40mg Subcutaneously daily  - GI Prophylaxis: Pantoprazole 40mg PO QD  - IV Hydration with D5 0.45 NSS at a rate of 20cc/hour   - Diet: Regular  - Code Status: Full    Barriers to learning: YES/NO  Discharge Planning: Patient will be discharged once stable and  Plan was communicated with   Education given to:   Educated about:       Carol Craft MD  PGY - 1 Internal Medicine   [facility]   Assessment and Plan  Case of an 81 year old DNR/DNI male patient with Alzheimer Dementia known to have multiple comorbidities including ESRD and ACD on HD, HFrEF, severe AS, COPD on home O2 4LPM NC, DM II, BPH, DL, and recent admission  for multilobar pneumonia with effusion s/p palliative thoracocentesis s/p discharge on 09/02 on levaquin currently brought to us from [facility] for evaluation of hypotension and fever in setting of recent confusion, reduced PO intake, and lethargy, with stay complicated by hypotension requiring pressors, found to have leukocytosis, HAGMA, to be admitted for septic shock with Unclear Infectious Etiology and further investigations and monitoring. Currently on Levophed at 0/04 and NC at 4LPM.      Septic Shock with Unclear Infectious Etiology  Rule Out Pneumonia in setting of recent admission for multilobar pneumonia s/p palliative thoracocentesis  Rule Out  infection Versus GI source  * Recent admission for multilobar pneumonia with effusion s/p palliative thoracocentesis and removal of 1L fluids with cytopathology being indicative of reactive effusion positive for CK7, CK5,6, calretin, and CD 68 s/p discharge on 09/02 on levaquin. During that admission, GOC were discussed and palliative was on board: daughter refused Hospice and opted to continued with HD sessions  * Presentation: History goes back to Saturday when the patient spiked a fever of 100.1 degrees; associated with lethargy, reduced PO intake, and confusion; complaining of mild epigastric discomfort in the absence of any nausea, vomiting, shortness of breath, increased cough, increased oxygen requirements, change in bowel movements (diarrhea or constipation), or urinary symptoms (no much urine output at baseline). Today, in the AM of 09/21, patient spiked a T of 100.5 at [facility] and was found to have a low SBP in 80's mmHg along with low pulse. EMS was contacted and patient was brought to the ED after the administration of 250 cc NS bolus.  * Sepsis present on admission:   * Vital Signs in ED /75 mmHg --> dropped to 75/42 mmHg at 17:30 PM s/p 1L LR bolus in ED followed by Levophed initiation at a rate of 0.04 right now,  bpm, RR 18  bpm, T 37.8, SaO2 97% on RA  * Investigations in ED  WBC 14.19, Hb 11.8, Plt 287, Na 131, K 4.8, BUN 51, Cr 6.0, AG 17, eGFR 8-9  * VBG pH 7.25, pCO2 56, pO2 35, HCO3 25, %61.1, LA venous 1.7  * Chest X Ray: CM, improved left sided effusion, improved interstitial opacification   * COVID received  * Blood cultures x2 sets received  * Urinalysis and urine culture pending collection (if possible)  * In the setting of drop in BP from 133/75 to 75/42 mmHg at 17:30PM, 1L LR bolus was administered followed by levophed at 0.04.  * S/P a dose of IV Cefepime 2g and IV Vancomycin 1g in ED    - Follow up Infectious Disease recommendations  - Monitor T for fever  - Trend WBC for leukocytosis  - Will start IV Cefepime and IV Vancomycin with renally adjusted doses. S/P a dose of IV Cefepime 2g and IV Vancomycin 1g in ED  - Monitor BP and continue levophed: currently at 0.04. s/p 1L LR bolus in ED. Avoid volume overload   - Follow up cultures  -->  Blood cultures x2 sets received  --> Urinalysis and urine culture pending collection (if possible)  --> Sputum culture if possible  --> Urine legionella and strep  --> Fungitell  - Monitor SaO2 and Oxygen Requirements: 4LPM NC currently. BiPAP 12/5 FiO2 40% overnight  - Follow up Chest X Ray on 09/22  - Check LA  - Trend Inflammatory Markers:  --> ESR   --> D-dimer  --> Procalcitonin   --> C-reactive protein  --> LDH   --> Ferritin   --> Fibrinogen   - Will consult palliative care team: since was candidate for Hospice during last admission but daughter opted to continue HD and refuse Hospice back then (end of August-Early September 2021)      ESRD and Anemia of chronic Disease  Hyponatremia  * Volume status: congested lungs, rhonchi and crackles no pitting edema in LE  * On HD every MWF via Tessio at [facility] complicated by hypotension s/p initiation of Midodrine 10mg in AM.   * Receives ROMELIA 5000 units every MWF with HD.  * Follows with Dr Saba and Dr Vasquez.   * Home med calcium acetate 667mg TID, Midodrine 10mg in AM  - Monitor BUN and Cr with P daily  - Follow up Nephrology Team recommendations regarding HD schedule: on MWF schedule  - Continue midodrine 10mg QD in AM to avoid hypotension  - Will consult palliative care team: since was candidate for Hospice during last admission but daughter opted to continue HD and refuse Hospice back then (end of August-Early September 2021)  - Avoid volume overload and restrict fluids  - Follow up Urine Studies  - For anemia of chronic disease: monitor CBC and continue ROMELIA with HD 5000 units. Check iron stores      COPD  Respiratory Acidosis on VBG   * On 4LPM NC at NH.   * Home med Albuterol 2 puffs Q6h and Breo Ellipta 200-25mg QD  * VBG pH 7.25, pCO2 56, pO2 35, HCO3 25, %61.1, LA venous 1.7  - Monitor SaO2 and Oxygen Requirements: 4LPM NC currently. BiPAP 12/5 FiO2 40% overnight  - Follow up Chest X Ray on 09/22  - Resume nebulizers      DM Type II  * Last Hba1c 5.0 07/13/2021.   * Not on medication/ Diet controlled  - Monitor POCT premeals and at bedtime  - Sliding Scale B      HFrEF and severe AS.   * Last TTE 07/18/21 EF 40%, mild-mod MR, mod TR, severe AS (Area 0.58cm2), WMA.   * Home med Toprol 25mg QD  - Monitor in/out  - Monitor daily weight  - Monitor daily CXR  - Monitor SaO2 and Oxygen Requirements: 4LPM NC currently. BiPAP 12/5 FiO2 40% overnight  - Avoid volume overload  - Follow up TTE  - Hold Toprol 25mg QD. Currently on pressors      BPH  * Home med Tamsulosin 0.4mg QD  - Resume Tamsulosin 0,4 QD      Dyslipidemia  * Last lipid profile 08/27/21 , Chol 114, LDL 52, HDL 38.   * Home med Atorvastatin 40mg QD, Aspirin 81mg QD  - Resume Atorvastatin 40mg QD  - Resume Aspirin 81mg QD      Alzheimer Dementia  * From [facility].   * Home med Donepezil 5mg QD, Sertraline 25mg QD, Mirtazepine 15mg QD  - Resume Home meds      Others  - DVT Prophylaxis: Heparin 5000 Subcutaneously BID  - GI Prophylaxis: Pantoprazole 40mg PO QD  - Diet: Renal  - Code Status: DNR DNI per discussion with daughter       Barriers to learning: YES AMS  Discharge Planning: Patient will be discharged once stable and  Plan was communicated with daughter and ED team      Carol Craft MD  PGY - 2 Internal Medicine   [facility]   Assessment and Plan  Case of an 81 year old DNR/DNI male patient with Alzheimer Dementia known to have multiple comorbidities including ESRD and ACD on HD, HFrEF, severe AS, COPD on home O2 4LPM NC, DM II, BPH, DL, and recent admission  for multilobar pneumonia with effusion s/p palliative thoracocentesis s/p discharge on 09/02 on levaquin currently brought to us from [facility] for evaluation of hypotension and fever in setting of recent confusion, reduced PO intake, and lethargy, with stay complicated by hypotension requiring pressors, found to have leukocytosis, HAGMA, to be admitted for septic shock with Unclear Infectious Etiology and further investigations and monitoring. Currently on Levophed at 0/04 and NC at 4LPM.      Septic Shock with Unclear Infectious Etiology  Rule Out Pneumonia in setting of recent admission for multilobar pneumonia s/p palliative thoracocentesis  Rule Out  infection Versus GI source  * Recent admission for multilobar pneumonia with effusion s/p palliative thoracocentesis and removal of 1L fluids with cytopathology being indicative of reactive effusion positive for CK7, CK5,6, calretin, and CD 68 s/p discharge on 09/02 on levaquin. During that admission, GOC were discussed and palliative was on board: daughter refused Hospice and opted to continued with HD sessions  * Presentation: History goes back to Saturday when the patient spiked a fever of 100.1 degrees; associated with lethargy, reduced PO intake, and confusion; complaining of mild epigastric discomfort in the absence of any nausea, vomiting, shortness of breath, increased cough, increased oxygen requirements, change in bowel movements (diarrhea or constipation), or urinary symptoms (no much urine output at baseline). Today, in the AM of 09/21, patient spiked a T of 100.5 at [facility] and was found to have a low SBP in 80's mmHg along with low pulse. EMS was contacted and patient was brought to the ED after the administration of 250 cc NS bolus.  * Sepsis present on admission:   * Vital Signs in ED /75 mmHg --> dropped to 75/42 mmHg at 17:30 PM s/p 1L LR bolus in ED followed by Levophed initiation at a rate of 0.04 right now,  bpm, RR 18  bpm, T 37.8, SaO2 97% on RA  * Investigations in ED  WBC 14.19, Hb 11.8, Plt 287, Na 131, K 4.8, BUN 51, Cr 6.0, AG 17, eGFR 8-9  * VBG pH 7.25, pCO2 56, pO2 35, HCO3 25, %61.1, LA venous 1.7  * Chest X Ray: CM, improved left sided effusion, improved interstitial opacification   * COVID received  * Blood cultures x2 sets received  * Urinalysis and urine culture pending collection (if possible)  * In the setting of drop in BP from 133/75 to 75/42 mmHg at 17:30PM, 1L LR bolus was administered followed by levophed at 0.04.  * S/P a dose of IV Cefepime 2g and IV Vancomycin 1g in ED    - Follow up Infectious Disease recommendations  - Monitor T for fever  - Trend WBC for leukocytosis  - Will start IV Cefepime 1g daily and IV Vancomycin 750mg Q48h on HD days with renally adjusted doses. S/P a dose of IV Cefepime 2g and IV Vancomycin 1g in ED  - Monitor BP and continue levophed: currently at 0.04. s/p 1L LR bolus in ED. Avoid volume overload   - Follow up cultures  -->  Blood cultures x2 sets received  --> Urinalysis and urine culture pending collection (if possible)  --> Sputum culture if possible  --> Urine legionella and strep  --> Fungitell  - Monitor SaO2 and Oxygen Requirements: 4LPM NC currently. BiPAP 12/5 FiO2 40% overnight  - Follow up Chest X Ray on 09/22  - Check LA  - Trend Inflammatory Markers:  --> ESR   --> D-dimer  --> Procalcitonin   --> C-reactive protein  --> LDH   --> Ferritin   --> Fibrinogen   - Will consult palliative care team: since was candidate for Hospice during last admission but daughter opted to continue HD and refuse Hospice back then (end of August-Early September 2021)      ESRD and Anemia of chronic Disease  Hyponatremia  * Volume status: congested lungs, rhonchi and crackles no pitting edema in LE  * On HD every MWF via Tessio at [facility] complicated by hypotension s/p initiation of Midodrine 10mg in AM.   * Receives ROMELIA 5000 units every MWF with HD.  * Follows with Dr Saba and Dr Vasquez.   * Home med calcium acetate 667mg TID, Midodrine 10mg in AM  - Monitor BUN and Cr with P daily  - Follow up Nephrology Team recommendations regarding HD schedule: on MWF schedule  - Continue midodrine 10mg QD in AM to avoid hypotension  - Will consult palliative care team: since was candidate for Hospice during last admission but daughter opted to continue HD and refuse Hospice back then (end of August-Early September 2021)  - Avoid volume overload and restrict fluids  - Follow up Urine Studies  - For anemia of chronic disease: monitor CBC and continue ROMELIA with HD 5000 units. Check iron stores      COPD  Respiratory Acidosis on VBG   * On 4LPM NC at NH.   * Home med Albuterol 2 puffs Q6h and Breo Ellipta 200-25mg QD  * VBG pH 7.25, pCO2 56, pO2 35, HCO3 25, %61.1, LA venous 1.7  - Monitor SaO2 and Oxygen Requirements: 4LPM NC currently. BiPAP 12/5 FiO2 40% overnight  - Follow up Chest X Ray on 09/22  - Resume nebulizers      DM Type II  * Last Hba1c 5.0 07/13/2021.   * Not on medication/ Diet controlled  - Monitor POCT premeals and at bedtime  - Sliding Scale B      HFrEF and severe AS.   * Last TTE 07/18/21 EF 40%, mild-mod MR, mod TR, severe AS (Area 0.58cm2), WMA.   * Home med Toprol 25mg QD  - Monitor in/out  - Monitor daily weight  - Monitor daily CXR  - Monitor SaO2 and Oxygen Requirements: 4LPM NC currently. BiPAP 12/5 FiO2 40% overnight  - Avoid volume overload  - Follow up TTE  - Hold Toprol 25mg QD. Currently on pressors      BPH  * Home med Tamsulosin 0.4mg QD  - Resume Tamsulosin 0,4 QD      Dyslipidemia  * Last lipid profile 08/27/21 , Chol 114, LDL 52, HDL 38.   * Home med Atorvastatin 40mg QD, Aspirin 81mg QD  - Resume Atorvastatin 40mg QD  - Resume Aspirin 81mg QD      Alzheimer Dementia  * From [facility].   * Home med Donepezil 5mg QD, Sertraline 25mg QD, Mirtazepine 15mg QD  - Resume Home meds      Others  - DVT Prophylaxis: Heparin 5000 Subcutaneously BID  - GI Prophylaxis: Pantoprazole 40mg PO QD  - Diet: Renal  - Code Status: DNR DNI per discussion with daughter       Barriers to learning: YES AMS  Discharge Planning: Patient will be discharged once stable and  Plan was communicated with daughter and ED team      Carol Craft MD  PGY - 2 Internal Medicine   [facility]   Assessment and Plan  Case of an 81 year old DNR/DNI male patient with Alzheimer Dementia known to have multiple comorbidities including ESRD and ACD on HD, HFrEF, severe AS, COPD on home O2 4LPM NC, DM II, BPH, DL, and recent admission  for multilobar pneumonia with effusion s/p palliative thoracocentesis s/p discharge on 09/02 on levaquin currently brought to us from [facility] beds [facility] for evaluation of hypotension and fever in setting of recent confusion, reduced PO intake, and lethargy, with stay complicated by hypotension requiring pressors, found to have leukocytosis, HAGMA, to be admitted for septic shock with Unclear Infectious Etiology and further investigations and monitoring. Currently on Levophed at 0/04 and NC at 4LPM.      Septic Shock with Unclear Infectious Etiology  Rule Out Pneumonia in setting of recent admission for multilobar pneumonia s/p palliative thoracocentesis  Rule Out  infection Versus GI source  * Recent admission for multilobar pneumonia with effusion s/p palliative thoracocentesis and removal of 1L fluids with cytopathology being indicative of reactive effusion positive for CK7, CK5,6, calretin, and CD 68 s/p discharge on 09/02 on levaquin. During that admission, GOC were discussed and palliative was on board: daughter refused Hospice and opted to continued with HD sessions  * Presentation: History goes back to Saturday when the patient spiked a fever of 100.1 degrees; associated with lethargy, reduced PO intake, and confusion; complaining of mild epigastric discomfort in the absence of any nausea, vomiting, shortness of breath, increased cough, increased oxygen requirements, change in bowel movements (diarrhea or constipation), or urinary symptoms (no much urine output at baseline). Today, in the AM of 09/21, patient spiked a T of 100.5 at [facility] and was found to have a low SBP in 80's mmHg along with low pulse. EMS was contacted and patient was brought to the ED after the administration of 250 cc NS bolus.  * Sepsis present on admission:   * Vital Signs in ED /75 mmHg --> dropped to 75/42 mmHg at 17:30 PM s/p 1L LR bolus in ED followed by Levophed initiation at a rate of 0.04 right now,  bpm, RR 18  bpm, T 37.8, SaO2 97% on RA  * Investigations in ED  WBC 14.19, Hb 11.8, Plt 287, Na 131, K 4.8, BUN 51, Cr 6.0, AG 17, eGFR 8-9  * VBG pH 7.25, pCO2 56, pO2 35, HCO3 25, %61.1, LA venous 1.7  * Chest X Ray: CM, improved left sided effusion, improved interstitial opacification   * COVID received  * Blood cultures x2 sets received  * Urinalysis and urine culture pending collection (if possible)  * In the setting of drop in BP from 133/75 to 75/42 mmHg at 17:30PM, 1L LR bolus was administered followed by levophed at 0.04.  * S/P a dose of IV Cefepime 2g and IV Vancomycin 1g in ED    - Follow up Infectious Disease recommendations  - Monitor T for fever  - Trend WBC for leukocytosis  - Will start IV Cefepime 1g daily and IV Vancomycin 750mg Q48h on HD days with renally adjusted doses. S/P a dose of IV Cefepime 2g and IV Vancomycin 1g in ED  - Monitor BP and continue levophed: currently at 0.04. s/p 1L LR bolus in ED. Avoid volume overload   - Consider LP to rule out meningitis if source remains unclear  - Follow up cultures  -->  Blood cultures x2 sets received  --> Urinalysis and urine culture pending collection (if possible)  --> Sputum culture if possible  --> Urine legionella and strep  --> Fungitell  - Monitor SaO2 and Oxygen Requirements: 4LPM NC currently. BiPAP 12/5 FiO2 40% overnight  - Follow up Chest X Ray on 09/22  - Check LA  - Trend Inflammatory Markers:  --> ESR   --> D-dimer  --> Procalcitonin   --> C-reactive protein  --> LDH   --> Ferritin   --> Fibrinogen   - Will consult palliative care team: since was candidate for Hospice during last admission but daughter opted to continue HD and refuse Hospice back then (end of August-Early September 2021)      ESRD and Anemia of chronic Disease  Hyponatremia  * Volume status: congested lungs, rhonchi and crackles no pitting edema in LE  * On HD every MWF via Tessio at [facility] complicated by hypotension s/p initiation of Midodrine 10mg in AM.   * Receives ROMELIA 5000 units every MWF with HD.  * Follows with Dr Saba and Dr Vasquez.   * Home med calcium acetate 667mg TID, Midodrine 10mg in AM  - Monitor BUN and Cr with P daily  - Follow up Nephrology Team recommendations regarding HD schedule: on MWF schedule  - Continue midodrine 10mg QD in AM to avoid hypotension  - Will consult palliative care team: since was candidate for Hospice during last admission but daughter opted to continue HD and refuse Hospice back then (end of August-Early September 2021)  - Avoid volume overload and restrict fluids  - Follow up Urine Studies  - For anemia of chronic disease: monitor CBC and continue ROMELIA with HD 5000 units. Check iron stores      COPD  Respiratory Acidosis on VBG   * On 4LPM NC at NH.   * Home med Albuterol 2 puffs Q6h and Breo Ellipta 200-25mg QD  * VBG pH 7.25, pCO2 56, pO2 35, HCO3 25, %61.1, LA venous 1.7  - Monitor SaO2 and Oxygen Requirements: 4LPM NC currently. BiPAP 12/5 FiO2 40% overnight  - Follow up Chest X Ray on 09/22  - Resume nebulizers      DM Type II  * Last Hba1c 5.0 07/13/2021.   * Not on medication/ Diet controlled  - Monitor POCT premeals and at bedtime  - Sliding Scale B      HFrEF and severe AS.   * Last TTE 07/18/21 EF 40%, mild-mod MR, mod TR, severe AS (Area 0.58cm2), WMA.   * Home med Toprol 25mg QD  - Monitor in/out  - Monitor daily weight  - Monitor daily CXR  - Monitor SaO2 and Oxygen Requirements: 4LPM NC currently. BiPAP 12/5 FiO2 40% overnight  - Avoid volume overload  - Follow up TTE  - Hold Toprol 25mg QD. Currently on pressors      BPH  * Home med Tamsulosin 0.4mg QD  - Resume Tamsulosin 0,4 QD      Dyslipidemia  * Last lipid profile 08/27/21 , Chol 114, LDL 52, HDL 38.   * Home med Atorvastatin 40mg QD, Aspirin 81mg QD  - Resume Atorvastatin 40mg QD  - Resume Aspirin 81mg QD      Alzheimer Dementia  * From [facility].   * Home med Donepezil 5mg QD, Sertraline 25mg QD, Mirtazepine 15mg QD  - Resume Home meds      Others  - DVT Prophylaxis: Heparin 5000 Subcutaneously BID  - GI Prophylaxis: Pantoprazole 40mg PO QD  - Diet: Renal  - Code Status: DNR DNI per discussion with daughter       Barriers to learning: YES AMS  Discharge Planning: Patient will be discharged once stable and  Plan was communicated with daughter and ED team      Carol Craft MD  PGY - 2 Internal Medicine   [facility]   Assessment and Plan  Case of an 81 year old DNR/DNI male patient with Alzheimer Dementia known to have multiple comorbidities including ESRD and ACD on HD, HFrEF, severe AS, COPD on home O2 4LPM NC, DM II, BPH, DL, and recent admission  for multilobar pneumonia with effusion s/p palliative thoracocentesis s/p discharge on 09/02 on levaquin currently brought to us from [facility] for evaluation of hypotension and fever in setting of recent confusion, reduced PO intake, and lethargy, with stay complicated by hypotension requiring pressors, found to have leukocytosis, HAGMA, to be admitted for septic shock with Unclear Infectious Etiology and further investigations and monitoring. Currently on Levophed at 0/04 and NC at 4LPM.      Septic Shock with Unclear Infectious Etiology  Rule Out bacteremia in setting of Tessio  Rule out GI abscess in setting of epigastric pain and guarding  Rule Out Pneumonia in setting of recent admission for multilobar pneumonia s/p palliative thoracocentesis  Rule Out  infection  * Recent admission for multilobar pneumonia with effusion s/p palliative thoracocentesis and removal of 1L fluids with cytopathology being indicative of reactive effusion positive for CK7, CK5,6, calretin, and CD 68 s/p discharge on 09/02 on levaquin. During that admission, GOC were discussed and palliative was on board: daughter refused Hospice and opted to continued with HD sessions  * Presentation: History goes back to Saturday when the patient spiked a fever of 100.1 degrees; associated with lethargy, reduced PO intake, and confusion; complaining of mild epigastric discomfort in the absence of any nausea, vomiting, shortness of breath, increased cough, increased oxygen requirements, change in bowel movements (diarrhea or constipation), or urinary symptoms (no much urine output at baseline). Today, in the AM of 09/21, patient spiked a T of 100.5 at [facility] and was found to have a low SBP in 80's mmHg along with low pulse. EMS was contacted and patient was brought to the ED after the administration of 250 cc NS bolus.  * Exam guarding on abdominal palpation, no erythema around tessio  * Sepsis present on admission:   * Vital Signs in ED /75 mmHg --> dropped to 75/42 mmHg at 17:30 PM s/p 1L LR bolus in ED followed by Levophed initiation at a rate of 0.04 right now,  bpm, RR 18  bpm, T 37.8, SaO2 97% on RA  * Investigations in ED  WBC 14.19, Hb 11.8, Plt 287, Na 131, K 4.8, BUN 51, Cr 6.0, AG 17, eGFR 8-9  * VBG pH 7.25, pCO2 56, pO2 35, HCO3 25, %61.1, LA venous 1.7  * Chest X Ray: CM, improved left sided effusion, improved interstitial opacification   * COVID received  * Blood cultures x2 sets received  * Urinalysis and urine culture pending collection (if possible)  * In the setting of drop in BP from 133/75 to 75/42 mmHg at 17:30PM, 1L LR bolus was administered followed by levophed at 0.04.  * S/P a dose of IV Cefepime 2g and IV Vancomycin 1g in ED    - Follow up Infectious Disease recommendations  - Monitor T for fever  - Trend WBC for leukocytosis  - Will start IV Cefepime 1g daily, IV Metro for GI anaerobic coverage, and IV Vancomycin 750mg Q48h on HD days with renally adjusted doses. S/P a dose of IV Cefepime 2g and IV Vancomycin 1g in ED  - Monitor BP and continue levophed: currently at 0.04. s/p 1L LR bolus in ED. Avoid volume overload   - Consider LP to rule out meningitis if source remains unclear  - Follow up cultures  -->  Blood cultures x2 sets received  --> Urinalysis and urine culture pending collection (if possible)  --> Sputum culture if possible  --> Urine legionella and strep  --> Fungitell  - Monitor SaO2 and Oxygen Requirements: 4LPM NC currently. BiPAP 12/5 FiO2 40% overnight  - Follow up Chest X Ray on 09/22  - Check LA  - Trend Inflammatory Markers:  --> ESR   --> D-dimer  --> Procalcitonin   --> C-reactive protein  --> LDH   --> Ferritin   --> Fibrinogen   - Will consult palliative care team: since was candidate for Hospice during last admission but daughter opted to continue HD and refuse Hospice back then (end of August-Early September 2021)      ESRD and Anemia of chronic Disease  Hyponatremia  * Volume status: congested lungs, rhonchi and crackles no pitting edema in LE  * On HD every MWF via Tessio at [facility] complicated by hypotension s/p initiation of Midodrine 10mg in AM.   * Receives ROMELIA 5000 units every MWF with HD.  * Follows with Dr Saba and Dr Vasquez.   * Home med calcium acetate 667mg TID, Midodrine 10mg in AM  - Monitor BUN and Cr with P daily  - Follow up Nephrology Team recommendations regarding HD schedule: on MWF schedule  - Continue midodrine 10mg QD in AM to avoid hypotension  - Will consult palliative care team: since was candidate for Hospice during last admission but daughter opted to continue HD and refuse Hospice back then (end of August-Early September 2021)  - Avoid volume overload and restrict fluids  - Follow up Urine Studies  - For anemia of chronic disease: monitor CBC and continue ROMELIA with HD 5000 units. Check iron stores      COPD  Respiratory Acidosis on VBG   * On 4LPM NC at NH.   * Home med Albuterol 2 puffs Q6h and Breo Ellipta 200-25mg QD  * VBG pH 7.25, pCO2 56, pO2 35, HCO3 25, %61.1, LA venous 1.7  - Monitor SaO2 and Oxygen Requirements: 4LPM NC currently. BiPAP 12/5 FiO2 40% overnight  - Follow up Chest X Ray on 09/22  - Resume nebulizers      DM Type II  * Last Hba1c 5.0 07/13/2021.   * Not on medication/ Diet controlled  - Monitor POCT premeals and at bedtime  - Sliding Scale B      HFrEF and severe AS.   * Last TTE 07/18/21 EF 40%, mild-mod MR, mod TR, severe AS (Area 0.58cm2), WMA.   * Home med Toprol 25mg QD  - Monitor in/out  - Monitor daily weight  - Monitor daily CXR  - Monitor SaO2 and Oxygen Requirements: 4LPM NC currently. BiPAP 12/5 FiO2 40% overnight  - Avoid volume overload  - Follow up TTE  - Hold Toprol 25mg QD. Currently on pressors      BPH  * Home med Tamsulosin 0.4mg QD  - Resume Tamsulosin 0,4 QD      Dyslipidemia  * Last lipid profile 08/27/21 , Chol 114, LDL 52, HDL 38.   * Home med Atorvastatin 40mg QD, Aspirin 81mg QD  - Resume Atorvastatin 40mg QD  - Resume Aspirin 81mg QD      Alzheimer Dementia  * From [facility].   * Home med Donepezil 5mg QD, Sertraline 25mg QD, Mirtazepine 15mg QD  - Resume Home meds      Others  - DVT Prophylaxis: Heparin 5000 Subcutaneously BID  - GI Prophylaxis: Pantoprazole 40mg PO QD  - Diet: Renal  - Code Status: DNR DNI per discussion with daughter       Barriers to learning: YES AMS  Discharge Planning: Patient will be discharged once stable and  Plan was communicated with daughter and ED team      Carol Craft MD  PGY - 2 Internal Medicine   [facility]

## 2021-09-21 NOTE — ED ADULT NURSE NOTE - NSICDXPASTMEDICALHX_GEN_ALL_CORE_FT
PAST MEDICAL HISTORY:  CHF (congestive heart failure)     COPD (chronic obstructive pulmonary disease)     Diabetes mellitus     ESRD on dialysis     Lymphedema of both lower extremities

## 2021-09-21 NOTE — H&P ADULT - NSHPSOCIALHISTORY_GEN_ALL_CORE
- Smoking History: never per patient but he is confused  - Alcohol Intake: never per patient but he is confused  - Substance Abuse: never per patient but he is confused  - Marital Status:    - Living Conditions: lives at [facility]

## 2021-09-21 NOTE — H&P ADULT - HISTORY OF PRESENT ILLNESS
History of Present Illness    Mr. Douglas is an 81 year old DNR/DNI male patient known to have:  - Alzheimer Dementia. From [facility]. Home med Donepezil 5mg QD, Sertraline 25mg QD, Mirtazepine 15mg QD  - ESRD and Anemia of chronic Disease. On HD every MWF via Tessio at [facility] complicated by hypotension s/p initiation of Midodrine 10mg in AM. Receives ROMELIA 5000 units every MWF with HD. Follows with Dr Saba and Dr Vasquez. Home med calcium acetate 667mg TID, Midodrine 10mg in AM  - COPD on 4LPM NC at NH. Home med Albuterol 2 puffs Q6h and Breo Ellipta 200-25mg QD  - DM Type II. Last Hba1c 5.0 07/13/2021. Not on medication/ Diet controlled  - HFrEF and severe AS. Last TTE 07/18/21 EF 40%, mild-mod MR, mod TR, severe AS (Area 0.58cm2), WMA. Home med Toprol 25mg QD  - BPH. Home med Tamsulosin 0.4mg QD  - Dyslipidemia. Last lipid profile 08/27/21 , Chol 114, LDL 52, HDL 38. Home med Atorvastatin 40mg QD, Aspirin 81mg QD  - Recent admission for multilobar pneumonia with effusion s/p palliative thoracocentesis and removal of 1L fluids with cytopathology being indicative of reactive effusion positive for CK7, CK5,6, calretin, and CD 68 s/p discharge on 09/02 on levaquin. During that admission, GOC were discussed and palliative was on board: daughter refused Hospice and opted to continued with HD sessions      History was obtained from the daughter (Ms Vidal) over the phone [phone] since patient seems lethargic and is not cooperative.  [facility] evaluation of hypotension and fever.  History goes back to Saturday when the patient spiked a fever of 100.1 degrees.  This was associated with lethargy, reduced PO intake, and confusion.  Daughter notes that patient was complaining of mild epigastric discomfort in the absence of any nausea, vomiting, shortness of breath, increased cough, increased oxygen requirements, change in bowel movements (diarrhea or constipation), or urinary symptoms (no much urine output at baseline).  Today, in the AM of 09/21, patient spiked a T of 100.5 at [facility] and was found to have a low SBP in 80's mmHg along with low pulse.  NH staff noted that patient has not voided since 06:00 AM.  EMS was contacted and patient was brought to the ED after the administration of 250 cc NS bolus.  No sick contacts.  No recent travel or exposure to recent travelers.      Upon presentation to the ED, the patient's Vital Signs in ED were as follows  - /75 mmHg --> dropped to 75/42 mmHg at 17:30 PM s/p 1L LR bolus in ED followed by Levophed initiation at a rate of 0.04 right now  -  bpm  - RR 18  bpm  - T 37.8  - SaO2 97% on RA      Investigations in ED   - CBC  --> WBC 14.19, Hb 11.8, Plt 287    - Chemistry  --> Na 131, K 4.8, BUN 51, Cr 6.0, AG 17, eGFR 8-9  --> VBG pH 7.25, pCO2 56, pO2 35, HCO3 25, %61.1, LA venous 1.7    - Imaging  --> Chest X Ray: CM, improved left sided effusion, improved interstitial opacification     - Microbiology  --> COVID received  --> Blood cultures x2 sets received  --> Urinalysis and urine culture pending collection (if possible)      In the setting of drop in BP from 133/75 to 75/42 mmHg at 17:30PM, 1L LR bolus was administered followed by levophed at 0.04.  Patient was given a dose of IV Cefepime 2g and IV Vancomycin 1g in ED  He will be admitted to the floor as a case of septic shock with Unclear Infectious Etiology for management with broad coverage antibiotics and pressors and for further investigations and monitoring.

## 2021-09-22 NOTE — CONSULT NOTE ADULT - SUBJECTIVE AND OBJECTIVE BOX
DRAKE DOUGLAS          MRN-[id number]              HPI:  History of Present Illness    Mr. Douglas is an 81 year old DNR/DNI male patient known to have:  - Alzheimer Dementia. From [facility]. Home med Donepezil 5mg QD, Sertraline 25mg QD, Mirtazepine 15mg QD  - ESRD and Anemia of chronic Disease. On HD every MWF via Tessio at [facility] complicated by hypotension s/p initiation of Midodrine 10mg in AM. Receives ROMELIA 5000 units every MWF with HD. Follows with Dr Saba and Dr Vasquez. Home med calcium acetate 667mg TID, Midodrine 10mg in AM  - COPD on 4LPM NC at NH. Home med Albuterol 2 puffs Q6h and Breo Ellipta 200-25mg QD  - DM Type II. Last Hba1c 5.0 2021. Not on medication/ Diet controlled  - HFrEF and severe AS. Last TTE 21 EF 40%, mild-mod MR, mod TR, severe AS (Area 0.58cm2), WMA. Home med Toprol 25mg QD  - BPH. Home med Tamsulosin 0.4mg QD  - Dyslipidemia. Last lipid profile 21 , Chol 114, LDL 52, HDL 38. Home med Atorvastatin 40mg QD, Aspirin 81mg QD  - Recent admission for multilobar pneumonia with effusion s/p palliative thoracocentesis and removal of 1L fluids with cytopathology being indicative of reactive effusion positive for CK7, CK5,6, calretin, and CD 68 s/p discharge on  on levaquin. During that admission, GOC were discussed and palliative was on board: daughter refused Hospice and opted to continued with HD sessions      History was obtained from the daughter (Ms Vidal) over the phone [phone] since patient seems lethargic and is not cooperative.  He was brought to the ED from [facility] by EMS on  for evaluation of hypotension and fever.  History goes back to Saturday when the patient spiked a fever of 100.1 degrees.  This was associated with lethargy, reduced PO intake, and confusion.  Daughter notes that patient was complaining of mild epigastric discomfort in the absence of any nausea, vomiting, shortness of breath, increased cough, increased oxygen requirements, change in bowel movements (diarrhea or constipation), or urinary symptoms (no much urine output at baseline).  Today, in the AM of , patient spiked a T of 100.5 at [facility] and was found to have a low SBP in 80's mmHg along with low pulse.  NH staff noted that patient has not voided since 06:00 AM.  EMS was contacted and patient was brought to the ED after the administration of 250 cc NS bolus.  No sick contacts.  No recent travel or exposure to recent travelers.      Upon presentation to the ED, the patient's Vital Signs in ED were as follows  - /75 mmHg --> dropped to 75/42 mmHg at 17:30 PM s/p 1L LR bolus in ED followed by Levophed initiation at a rate of 0.04 right now  -  bpm  - RR 18  bpm  - T 37.8  - SaO2 97% on RA      Investigations in ED   - CBC  --> WBC 14.19, Hb 11.8, Plt 287    - Chemistry  --> Na 131, K 4.8, BUN 51, Cr 6.0, AG 17, eGFR 8-9  --> VBG pH 7.25, pCO2 56, pO2 35, HCO3 25, %61.1, LA venous 1.7    - Imaging  --> Chest X Ray: CM, improved left sided effusion, improved interstitial opacification     - Microbiology  --> COVID received  --> Blood cultures x2 sets received  --> Urinalysis and urine culture pending collection (if possible)      In the setting of drop in BP from 133/75 to 75/42 mmHg at 17:30PM, 1L LR bolus was administered followed by levophed at 0.04.  Patient was given a dose of IV Cefepime 2g and IV Vancomycin 1g in ED  He will be admitted to the floor as a case of septic shock with Unclear Infectious Etiology for management with broad coverage antibiotics and pressors and for further investigations and monitoring.   (21 Sep 2021 20:17)      PAST MEDICAL & SURGICAL HISTORY:  Diabetes mellitus    COPD (chronic obstructive pulmonary disease)    Lymphedema of both lower extremities    CHF (congestive heart failure)    ESRD on dialysis    H/O right nephrectomy  20 yrs ago        FAMILY HISTORY:   Reviewed and found non contributory in mother or father    SOCIAL HISTORY:   Tobacco/etoh/illicit drug use use reported. Yes [ ]  _________  No [ ]  Pt resides at: home [ ]  facility [X- long term care  ]  other [ ] _______  Dtrs make decisions for him     ROS:	      Unable to obtain due to AMS/nonverbal.     Last BM: none documented       Allergies    No Known Allergies    Intolerances      -iStop reviewed (Y/N):   Ref#:  This report was requested by: Nancy Brito | Reference #: [id number]          Patient Name: Drake MunozjBirth Date: 1940  Address: [address] [place] [id number]: Male  Rx Written	Rx Dispensed	Drug	Quantity	Days Supply	Prescriber Name	Prescriber Pao #	Payment Method	Dispenser  2021	hydromorphone 2 mg tablet	12	2	Jules Moran MD	[id number]	Insurance	[employer]      Labs:	    CBC:                        10.1   12.06 )-----------( 294      ( 22 Sep 2021 08:39 )             33.0     CMP:        134<L>  |  97<L>  |  57<H>  ----------------------------<  110<H>  4.5   |  21  |  6.5<HH>    Ca    8.9      22 Sep 2021 08:39  Phos  4.8       Mg     1.9         TPro  5.9<L>  /  Alb  3.0<L>  /  TBili  0.3  /  DBili  x   /  AST  19  /  ALT  13  /  AlkPhos  94  -       PT/INR - ( 21 Sep 2021 15:20 )   PT: 13.20 sec;   INR: 1.15 ratio         PTT - ( 21 Sep 2021 15:20 )  PTT:21.6 sec     Urinalysis Basic - ( 22 Sep 2021 02:00 )    Color: Yellow / Appearance: Turbid / S.015 / pH: x  Gluc: x / Ketone: Small  / Bili: Negative / Urobili: <2 mg/dL   Blood: x / Protein: 300 mg/dL / Nitrite: Negative   Leuk Esterase: Large / RBC: >720 /HPF / WBC >720 /HPF   Sq Epi: x / Non Sq Epi: x / Bacteria: x          Radiology:	     no new radiology from this admission     EK Lead ECG:   Ventricular Rate 87 BPM    Atrial Rate 87 BPM    P-R Interval 154 ms    QRS Duration 108 ms    Q-T Interval 344 ms    QTC Calculation(Bazett) 413 ms    P Axis 50 degrees    R Axis -41 degrees    T Axis 157 degrees    Diagnosis Line Normal sinus rhythm  Left axis deviation  Left anterior fascicular block  Incomplete right bundle branch block  Left ventricular hypertrophy with repolarization abnormality  Abnormal ECG    Confirmed by ADEBAYO MURRAY MD ([id number]) on 2021 4:00:34 PM (21 @ 15:31)      Imaging Personally Reviewed:  [ ] YES  [ ] NO    Consultant(s) Notes Reviewed:  [X ] YES  [ ] NO  Care Discussed with Consultants/Other Providers [X ] YES  [ ] NO    PEx:	  T(C): 36.6 (21 @ 11:30), Max: 38.2 (21 @ 15:45)  HR: 63 (21 @ 11:30) (61 - 108)  BP: 113/52 (21 @ 11:30) (75/42 - 133/75)  RR: 18 (21 @ 11:30) (16 - 18)  SpO2: 99% (21 @ 11:30) (95% - 100%)  Daily Height in cm: 172.72 (21 Sep 2021 15:05)      General:  found in bed in NAD, sleeping comfortably   Eyes:  PERRL EOMI Non icteric MOM  ENMT: no external oral ulcers, MMM, no thrush   CVS: RR, no edema   Resp: Unlabored Non tachypneic No increased WOB  GI:  Soft NT ND   Musc: No C/C/E    Neuro: Lethargic, minimally responsive   Psych: Calm no agitation, AAOx30  Skin: Non jaundiced , no rash   Lymph: no adenopathy     Preadmit Karnofsky:  %           Current Karnofsky:   20  %      Medications:	      MEDICATIONS  (STANDING):  ascorbic acid 500 milliGRAM(s) Oral daily  aspirin enteric coated 81 milliGRAM(s) Oral daily  atorvastatin 40 milliGRAM(s) Oral at bedtime  calcium acetate 667 milliGRAM(s) Oral three times a day with meals  cefepime   IVPB 1000 milliGRAM(s) IV Intermittent daily  chlorhexidine 4% Liquid 1 Application(s) Topical <User Schedule>  dextrose 40% Gel 15 Gram(s) Oral once  dextrose 5%. 1000 milliLiter(s) (50 mL/Hr) IV Continuous <Continuous>  dextrose 5%. 1000 milliLiter(s) (100 mL/Hr) IV Continuous <Continuous>  dextrose 50% Injectable 25 Gram(s) IV Push once  dextrose 50% Injectable 12.5 Gram(s) IV Push once  dextrose 50% Injectable 25 Gram(s) IV Push once  donepezil 5 milliGRAM(s) Oral at bedtime  epoetin graham-epbx (RETACRIT) Injectable 5000 Unit(s) IV Push <User Schedule>  glucagon  Injectable 1 milliGRAM(s) IntraMuscular once  heparin SubCutaneous Injection - Peds 5000 Unit(s) SubCutaneous every 12 hours  insulin lispro (ADMELOG) corrective regimen sliding scale   SubCutaneous three times a day before meals  midodrine. 10 milliGRAM(s) Oral three times a day  mirtazapine 15 milliGRAM(s) Oral at bedtime  multivitamin Oral Tab/Cap - Peds 1 Tablet(s) Oral daily  norepinephrine Infusion 0.05 MICROgram(s)/kG/Min (6.56 mL/Hr) IV Continuous <Continuous>  pantoprazole    Tablet 40 milliGRAM(s) Oral before breakfast  sertraline 25 milliGRAM(s) Oral daily  tamsulosin 0.4 milliGRAM(s) Oral at bedtime  vancomycin  IVPB 750 milliGRAM(s) IV Intermittent <User Schedule>    MEDICATIONS  (PRN):  acetaminophen   Tablet .. 650 milliGRAM(s) Oral every 6 hours PRN Temp greater or equal to 38C (100.4F), Mild Pain (1 - 3), Moderate Pain (4 - 6)  ALBUTerol  90 MICROgram(s) HFA Inhaler - Peds 2 Puff(s) Inhalation every 4 hours PRN Bronchospasm        Advanced Directives:	     DNR/DNI    Decision maker: The patient is unable to participate in complex medical decision making conversations.   Legal surrogate: Children (Shin and Marcy)    GOALS OF CARE DISCUSSION	  - patient known to palliative team from previous admissions  - called Shin to re-introduce self. she is awaiting workup as her father was just admitted yesterday for sepsis  - she plans to get PCR test to come in to see patient, will call us when she knows what day/time she will be in  - open to palliative involvement    PSYCHOSOCIAL-SPIRITUAL ASSESSMENT:       Reviewed       See Palliative Care SW/ documentation        	    REFERRALS       Palliative Med        Unit SW/Case Mgmt        DRAKE DOUGLAS          MRN-[id number]              HPI:  History of Present Illness    Mr. Douglas is an 81 year old DNR/DNI male patient known to have:  - Alzheimer Dementia. From [facility]. Home med Donepezil 5mg QD, Sertraline 25mg QD, Mirtazepine 15mg QD  - ESRD and Anemia of chronic Disease. On HD every MWF via Tessio at [facility] complicated by hypotension s/p initiation of Midodrine 10mg in AM. Receives ROMELIA 5000 units every MWF with HD. Follows with Dr Saba and Dr Vasquez. Home med calcium acetate 667mg TID, Midodrine 10mg in AM  - COPD on 4LPM NC at NH. Home med Albuterol 2 puffs Q6h and Breo Ellipta 200-25mg QD  - DM Type II. Last Hba1c 5.0 2021. Not on medication/ Diet controlled  - HFrEF and severe AS. Last TTE 21 EF 40%, mild-mod MR, mod TR, severe AS (Area 0.58cm2), WMA. Home med Toprol 25mg QD  - BPH. Home med Tamsulosin 0.4mg QD  - Dyslipidemia. Last lipid profile 21 , Chol 114, LDL 52, HDL 38. Home med Atorvastatin 40mg QD, Aspirin 81mg QD  - Recent admission for multilobar pneumonia with effusion s/p palliative thoracocentesis and removal of 1L fluids with cytopathology being indicative of reactive effusion positive for CK7, CK5,6, calretin, and CD 68 s/p discharge on  on levaquin. During that admission, GOC were discussed and palliative was on board: daughter refused Hospice and opted to continued with HD sessions      History was obtained from the daughter (Ms Vidal) over the phone [phone] since patient seems lethargic and is not cooperative.  He was brought to the ED from [facility] by EMS on  for evaluation of hypotension and fever.  History goes back to Saturday when the patient spiked a fever of 100.1 degrees.  This was associated with lethargy, reduced PO intake, and confusion.  Daughter notes that patient was complaining of mild epigastric discomfort in the absence of any nausea, vomiting, shortness of breath, increased cough, increased oxygen requirements, change in bowel movements (diarrhea or constipation), or urinary symptoms (no much urine output at baseline).  Today, in the AM of , patient spiked a T of 100.5 at [facility] and was found to have a low SBP in 80's mmHg along with low pulse.  NH staff noted that patient has not voided since 06:00 AM.  EMS was contacted and patient was brought to the ED after the administration of 250 cc NS bolus.  No sick contacts.  No recent travel or exposure to recent travelers.      Upon presentation to the ED, the patient's Vital Signs in ED were as follows  - /75 mmHg --> dropped to 75/42 mmHg at 17:30 PM s/p 1L LR bolus in ED followed by Levophed initiation at a rate of 0.04 right now  -  bpm  - RR 18  bpm  - T 37.8  - SaO2 97% on RA      Investigations in ED   - CBC  --> WBC 14.19, Hb 11.8, Plt 287    - Chemistry  --> Na 131, K 4.8, BUN 51, Cr 6.0, AG 17, eGFR 8-9  --> VBG pH 7.25, pCO2 56, pO2 35, HCO3 25, %61.1, LA venous 1.7    - Imaging  --> Chest X Ray: CM, improved left sided effusion, improved interstitial opacification     - Microbiology  --> COVID received  --> Blood cultures x2 sets received  --> Urinalysis and urine culture pending collection (if possible)      In the setting of drop in BP from 133/75 to 75/42 mmHg at 17:30PM, 1L LR bolus was administered followed by levophed at 0.04.  Patient was given a dose of IV Cefepime 2g and IV Vancomycin 1g in ED  He will be admitted to the floor as a case of septic shock with Unclear Infectious Etiology for management with broad coverage antibiotics and pressors and for further investigations and monitoring.   (21 Sep 2021 20:17)      PAST MEDICAL & SURGICAL HISTORY:  Diabetes mellitus    COPD (chronic obstructive pulmonary disease)    Lymphedema of both lower extremities    CHF (congestive heart failure)    ESRD on dialysis    H/O right nephrectomy  20 yrs ago        FAMILY HISTORY:   no DM or HTN     SOCIAL HISTORY:   Tobacco/etoh/illicit drug use use reported. Yes [ ]  _________  No [x ]  Pt resides at: home [ ]  facility [X- long term care  ]  other [ ] _______  Dtrs make decisions for him     ROS:	      Unable to obtain due to AMS/nonverbal.     Last BM: none documented       Allergies    No Known Allergies    Intolerances      -iStop reviewed (Y/N):   Ref#:  This report was requested by: Nancy Brito | Reference #: [id number]          Patient Name: Drake DouglasBirth Date: 1940  Address: [address] [place] [id number]: Male  Rx Written	Rx Dispensed	Drug	Quantity	Days Supply	Prescriber Name	Prescriber Pao #	Payment Method	Dispenser  2021	hydromorphone 2 mg tablet	12	2	Jules Moran MD	[id number]	Insurance	[employer]      Labs:	    CBC:                        10.1   12.06 )-----------( 294      ( 22 Sep 2021 08:39 )             33.0     CMP:        134<L>  |  97<L>  |  57<H>  ----------------------------<  110<H>  4.5   |  21  |  6.5<HH>    Ca    8.9      22 Sep 2021 08:39  Phos  4.8       Mg     1.9         TPro  5.9<L>  /  Alb  3.0<L>  /  TBili  0.3  /  DBili  x   /  AST  19  /  ALT  13  /  AlkPhos  94         PT/INR - ( 21 Sep 2021 15:20 )   PT: 13.20 sec;   INR: 1.15 ratio         PTT - ( 21 Sep 2021 15:20 )  PTT:21.6 sec     Urinalysis Basic - ( 22 Sep 2021 02:00 )    Color: Yellow / Appearance: Turbid / S.015 / pH: x  Gluc: x / Ketone: Small  / Bili: Negative / Urobili: <2 mg/dL   Blood: x / Protein: 300 mg/dL / Nitrite: Negative   Leuk Esterase: Large / RBC: >720 /HPF / WBC >720 /HPF   Sq Epi: x / Non Sq Epi: x / Bacteria: x          Radiology:	     no new radiology from this admission     EK Lead ECG:   Ventricular Rate 87 BPM    Atrial Rate 87 BPM    P-R Interval 154 ms    QRS Duration 108 ms    Q-T Interval 344 ms    QTC Calculation(Bazett) 413 ms    P Axis 50 degrees    R Axis -41 degrees    T Axis 157 degrees    Diagnosis Line Normal sinus rhythm  Left axis deviation  Left anterior fascicular block  Incomplete right bundle branch block  Left ventricular hypertrophy with repolarization abnormality  Abnormal ECG    Confirmed by ADEBAYO MURRAY MD ([id number]) on 2021 4:00:34 PM (21 @ 15:31)      Imaging Personally Reviewed:  [ x] YES  [ ] NO    Consultant(s) Notes Reviewed:  [X ] YES  [ ] NO  Care Discussed with Consultants/Other Providers [X ] YES  [ ] NO    PEx:	  T(C): 36.6 (21 @ 11:30), Max: 38.2 (21 @ 15:45)  HR: 63 (21 @ 11:30) (61 - 108)  BP: 113/52 (21 @ 11:30) (75/42 - 133/75)  RR: 18 (21 @ 11:30) (16 - 18)  SpO2: 99% (21 @ 11:30) (95% - 100%)  Daily Height in cm: 172.72 (21 Sep 2021 15:05)      General:  found in bed in NAD, sleeping comfortably   Eyes:  PERRL EOMI Non icteric MOM  ENMT: no external oral ulcers, MMM, no thrush   CVS: RR, no edema   Resp: Unlabored Non tachypneic No increased WOB  GI:  Soft NT ND   Musc: No C/C/E    Neuro: Lethargic, minimally responsive   Psych: Calm no agitation, AAOx30  Skin: Non jaundiced , no rash   Lymph: no adenopathy     Preadmit Karnofsky:  %           Current Karnofsky:   20  %      Medications:	      MEDICATIONS  (STANDING):  ascorbic acid 500 milliGRAM(s) Oral daily  aspirin enteric coated 81 milliGRAM(s) Oral daily  atorvastatin 40 milliGRAM(s) Oral at bedtime  calcium acetate 667 milliGRAM(s) Oral three times a day with meals  cefepime   IVPB 1000 milliGRAM(s) IV Intermittent daily  chlorhexidine 4% Liquid 1 Application(s) Topical <User Schedule>  dextrose 40% Gel 15 Gram(s) Oral once  dextrose 5%. 1000 milliLiter(s) (50 mL/Hr) IV Continuous <Continuous>  dextrose 5%. 1000 milliLiter(s) (100 mL/Hr) IV Continuous <Continuous>  dextrose 50% Injectable 25 Gram(s) IV Push once  dextrose 50% Injectable 12.5 Gram(s) IV Push once  dextrose 50% Injectable 25 Gram(s) IV Push once  donepezil 5 milliGRAM(s) Oral at bedtime  epoetin graham-epbx (RETACRIT) Injectable 5000 Unit(s) IV Push <User Schedule>  glucagon  Injectable 1 milliGRAM(s) IntraMuscular once  heparin SubCutaneous Injection - Peds 5000 Unit(s) SubCutaneous every 12 hours  insulin lispro (ADMELOG) corrective regimen sliding scale   SubCutaneous three times a day before meals  midodrine. 10 milliGRAM(s) Oral three times a day  mirtazapine 15 milliGRAM(s) Oral at bedtime  multivitamin Oral Tab/Cap - Peds 1 Tablet(s) Oral daily  norepinephrine Infusion 0.05 MICROgram(s)/kG/Min (6.56 mL/Hr) IV Continuous <Continuous>  pantoprazole    Tablet 40 milliGRAM(s) Oral before breakfast  sertraline 25 milliGRAM(s) Oral daily  tamsulosin 0.4 milliGRAM(s) Oral at bedtime  vancomycin  IVPB 750 milliGRAM(s) IV Intermittent <User Schedule>    MEDICATIONS  (PRN):  acetaminophen   Tablet .. 650 milliGRAM(s) Oral every 6 hours PRN Temp greater or equal to 38C (100.4F), Mild Pain (1 - 3), Moderate Pain (4 - 6)  ALBUTerol  90 MICROgram(s) HFA Inhaler - Peds 2 Puff(s) Inhalation every 4 hours PRN Bronchospasm        Advanced Directives:	     DNR/DNI    Decision maker: The patient is unable to participate in complex medical decision making conversations.   Legal surrogate: Children (Shin and Marcy)    GOALS OF CARE DISCUSSION	  - patient known to palliative team from previous admissions  - called Shin to re-introduce self. she is awaiting workup as her father was just admitted yesterday for sepsis  - she plans to get PCR test to come in to see patient, will call us when she knows what day/time she will be in  - open to palliative involvement    PSYCHOSOCIAL-SPIRITUAL ASSESSMENT:       Reviewed       See Palliative Care SW/ documentation        	    REFERRALS       Palliative Med        Unit SW/Case Mgmt

## 2021-09-22 NOTE — CONSULT NOTE ADULT - ATTENDING COMMENTS
Patient seen and examined at bedside with NP.  Patient is not able to participate. Now has sepsis and hypotensive.  Called daughter to address goc.  She will let us know when she can come in to address goc.  Wants to continue medical management in the mean time.  Will f/u.

## 2021-09-22 NOTE — PROGRESS NOTE ADULT - SUBJECTIVE AND OBJECTIVE BOX
DRAKE GJONBALAJ 81y Male  MRN#: [id number]   CODE STATUS:________    Hospital Day: 1d    Pt is currently admitted with the primary diagnosis of     SUBJECTIVE  Overnight/Interval Events:                                              ----------------------------------------------------------  OBJECTIVE  PAST MEDICAL & SURGICAL HISTORY  Diabetes mellitus    COPD (chronic obstructive pulmonary disease)    Lymphedema of both lower extremities    CHF (congestive heart failure)    ESRD on dialysis    H/O right nephrectomy  20 yrs ago                                              -----------------------------------------------------------  ALLERGIES:  No Known Allergies                                            ------------------------------------------------------------    HOME MEDICATIONS  Home Medications:  albuterol 90 mcg/inh inhalation aerosol: 2 puff(s) inhaled every 6 hours (20 Aug 2021 09:36)  ascorbic acid 500 mg oral tablet: 1 tab(s) orally once a day (20 Aug 2021 09:36)  aspirin 81 mg oral delayed release tablet: 1 tab(s) orally once a day (20 Aug 2021 09:36)  atorvastatin 40 mg oral tablet: 1 tab(s) orally once a day (at bedtime) (20 Aug 2021 09:36)  Breo Ellipta 200 mcg-25 mcg/inh inhalation powder: 1 puff(s) inhaled once a day (2021 10:25)  calcium acetate 667 mg oral tablet: 1 tab(s) orally 3 times a day (with meals) (20 Aug 2021 13:54)  donepezil 5 mg oral tablet: 1 tab(s) orally once a day (at bedtime) (20 Aug 2021 09:36)  epoetin graham: 5000 unit(s) injectable Monday, Wednesday, and Friday (20 Aug 2021 11:05)  heparin: 5000 unit(s) subcutaneous every 12 hours (01 Sep 2021 13:34)  HYDROmorphone 2 mg oral tablet: 1 tab(s) orally every 4 hours, As needed, Moderate Pain (4 - 6) (01 Sep 2021 12:01)  midodrine 10 mg oral tablet: 1 tab(s) orally  (01 Sep 2021 12:01)  mirtazapine 15 mg oral tablet: 1 tab(s) orally once a day (at bedtime) (20 Aug 2021 09:36)  multivitamin: 1 tab(s) orally once a day (20 Aug 2021 13:54)  pantoprazole 40 mg oral delayed release tablet: 1 tab(s) orally once a day (before a meal) (20 Aug 2021 09:36)  povidone iodine 10% topical ointment: 1 application topically  (01 Sep 2021 12:01)  Senna 8.6 mg oral tablet: 2 tab(s) orally once a day (at bedtime), As Needed (2021 10:25)  sertraline 25 mg oral tablet: 1 tab(s) orally once a day (20 Aug 2021 09:36)  tamsulosin 0.4 mg oral capsule: 1 cap(s) orally once a day (at bedtime) (20 Aug 2021 09:36)  Toprol-XL 25 mg oral tablet, extended release: 1 tab(s) orally once a day (at bedtime) (01 Sep 2021 12:01)                           MEDICATIONS:  STANDING MEDICATIONS  ascorbic acid 500 milliGRAM(s) Oral daily  aspirin enteric coated 81 milliGRAM(s) Oral daily  atorvastatin 40 milliGRAM(s) Oral at bedtime  calcium acetate 667 milliGRAM(s) Oral three times a day with meals  cefepime   IVPB 1000 milliGRAM(s) IV Intermittent daily  chlorhexidine 4% Liquid 1 Application(s) Topical <User Schedule>  dextrose 40% Gel 15 Gram(s) Oral once  dextrose 5%. 1000 milliLiter(s) IV Continuous <Continuous>  dextrose 5%. 1000 milliLiter(s) IV Continuous <Continuous>  dextrose 50% Injectable 25 Gram(s) IV Push once  dextrose 50% Injectable 12.5 Gram(s) IV Push once  dextrose 50% Injectable 25 Gram(s) IV Push once  donepezil 5 milliGRAM(s) Oral at bedtime  glucagon  Injectable 1 milliGRAM(s) IntraMuscular once  heparin SubCutaneous Injection - Peds 5000 Unit(s) SubCutaneous every 12 hours  insulin lispro (ADMELOG) corrective regimen sliding scale   SubCutaneous three times a day before meals  metroNIDAZOLE  IVPB 500 milliGRAM(s) IV Intermittent every 8 hours  midodrine. 10 milliGRAM(s) Oral three times a day  mirtazapine 15 milliGRAM(s) Oral at bedtime  multivitamin Oral Tab/Cap - Peds 1 Tablet(s) Oral daily  norepinephrine Infusion 0.05 MICROgram(s)/kG/Min IV Continuous <Continuous>  pantoprazole    Tablet 40 milliGRAM(s) Oral before breakfast  sertraline 25 milliGRAM(s) Oral daily  tamsulosin 0.4 milliGRAM(s) Oral at bedtime  vancomycin  IVPB 750 milliGRAM(s) IV Intermittent <User Schedule>    PRN MEDICATIONS  acetaminophen   Tablet .. 650 milliGRAM(s) Oral every 6 hours PRN  ALBUTerol  90 MICROgram(s) HFA Inhaler - Peds 2 Puff(s) Inhalation every 4 hours PRN                                            ------------------------------------------------------------  VITAL SIGNS: Last 24 Hours  T(C): 37 (22 Sep 2021 07:00), Max: 38.2 (21 Sep 2021 15:45)  T(F): 98.6 (22 Sep 2021 07:00), Max: 100.8 (21 Sep 2021 15:45)  HR: 61 (22 Sep 2021 09:00) (61 - 108)  BP: 112/51 (22 Sep 2021 09:00) (75/42 - 133/75)  BP(mean): 73 (22 Sep 2021 09:00) (60 - 90)  RR: 18 (22 Sep 2021 09:00) (16 - 18)  SpO2: 99% (22 Sep 2021 09:00) (95% - 100%)      21 @ 07:01  -  21 @ 07:00  --------------------------------------------------------  IN: 22.4 mL / OUT: 50 mL / NET: -27.6 mL    21 @ 07:01  -  21 @ 09:39  --------------------------------------------------------  IN: 5.3 mL / OUT: 100 mL / NET: -94.7 mL                                             --------------------------------------------------------------  LABS:                        10.1   12.06 )-----------( 294      ( 22 Sep 2021 08:39 )             33.0         131<L>  |  94<L>  |  51<H>  ----------------------------<  140<H>  4.8   |  20  |  6.0<HH>    Ca    9.0      21 Sep 2021 15:20    TPro  6.8  /  Alb  3.6  /  TBili  0.3  /  DBili  x   /  AST  24  /  ALT  15  /  AlkPhos  116<H>      PT/INR - ( 21 Sep 2021 15:20 )   PT: 13.20 sec;   INR: 1.15 ratio         PTT - ( 21 Sep 2021 15:20 )  PTT:21.6 sec  Urinalysis Basic - ( 22 Sep 2021 02:00 )    Color: Yellow / Appearance: Turbid / S.015 / pH: x  Gluc: x / Ketone: Small  / Bili: Negative / Urobili: <2 mg/dL   Blood: x / Protein: 300 mg/dL / Nitrite: Negative   Leuk Esterase: Large / RBC: >720 /HPF / WBC >720 /HPF   Sq Epi: x / Non Sq Epi: x / Bacteria: x                                                            -------------------------------------------------------------  RADIOLOGY:                                            --------------------------------------------------------------    PHYSICAL EXAM:  General:   HEENT:  LUNGS:  HEART:  ABDOMEN:  EXT:  NEURO:  SKIN:                                           --------------------------------------------------------------    ASSESSMENT & PLAN    Past medical history and hospital course                                                                                                           ----------------------------------------------------  # DVT prophylaxis     # GI prophylaxis     # Diet     # Activity Score (AM-PAC)    # Code status     # Disposition                                                                              --------------------------------------------------------    # Handoff      DRAKE FORTUNEBALAJ 81y Male  MRN#: [id number]   CODE STATUS: DNR/DNI    Hospital Day: 1d    Pt is currently admitted with the primary diagnosis of sepsis    SUBJECTIVE  Overnight/Interval Events: No acute events overnight. This morning, patient is on levophed 0.05 and on 4L nasal canula.                                            ----------------------------------------------------------  OBJECTIVE  PAST MEDICAL & SURGICAL HISTORY  Diabetes mellitus  COPD (chronic obstructive pulmonary disease)  Lymphedema of both lower extremities  CHF (congestive heart failure)  ESRD on dialysis  H/O right nephrectomy  20 yrs ago                                          -----------------------------------------------------------  ALLERGIES:  No Known Allergies                                          ------------------------------------------------------------    HOME MEDICATIONS  Home Medications:  albuterol 90 mcg/inh inhalation aerosol: 2 puff(s) inhaled every 6 hours (20 Aug 2021 09:36)  ascorbic acid 500 mg oral tablet: 1 tab(s) orally once a day (20 Aug 2021 09:36)  aspirin 81 mg oral delayed release tablet: 1 tab(s) orally once a day (20 Aug 2021 09:36)  atorvastatin 40 mg oral tablet: 1 tab(s) orally once a day (at bedtime) (20 Aug 2021 09:36)  Breo Ellipta 200 mcg-25 mcg/inh inhalation powder: 1 puff(s) inhaled once a day (2021 10:25)  calcium acetate 667 mg oral tablet: 1 tab(s) orally 3 times a day (with meals) (20 Aug 2021 13:54)  donepezil 5 mg oral tablet: 1 tab(s) orally once a day (at bedtime) (20 Aug 2021 09:36)  epoetin graham: 5000 unit(s) injectable Monday, Wednesday, and Friday (20 Aug 2021 11:05)  heparin: 5000 unit(s) subcutaneous every 12 hours (01 Sep 2021 13:34)  HYDROmorphone 2 mg oral tablet: 1 tab(s) orally every 4 hours, As needed, Moderate Pain (4 - 6) (01 Sep 2021 12:01)  midodrine 10 mg oral tablet: 1 tab(s) orally  (01 Sep 2021 12:01)  mirtazapine 15 mg oral tablet: 1 tab(s) orally once a day (at bedtime) (20 Aug 2021 09:36)  multivitamin: 1 tab(s) orally once a day (20 Aug 2021 13:54)  pantoprazole 40 mg oral delayed release tablet: 1 tab(s) orally once a day (before a meal) (20 Aug 2021 09:36)  povidone iodine 10% topical ointment: 1 application topically  (01 Sep 2021 12:01)  Senna 8.6 mg oral tablet: 2 tab(s) orally once a day (at bedtime), As Needed (2021 10:25)  sertraline 25 mg oral tablet: 1 tab(s) orally once a day (20 Aug 2021 09:36)  tamsulosin 0.4 mg oral capsule: 1 cap(s) orally once a day (at bedtime) (20 Aug 2021 09:36)  Toprol-XL 25 mg oral tablet, extended release: 1 tab(s) orally once a day (at bedtime) (01 Sep 2021 12:01)                           MEDICATIONS:  STANDING MEDICATIONS  ascorbic acid 500 milliGRAM(s) Oral daily  aspirin enteric coated 81 milliGRAM(s) Oral daily  atorvastatin 40 milliGRAM(s) Oral at bedtime  calcium acetate 667 milliGRAM(s) Oral three times a day with meals  cefepime   IVPB 1000 milliGRAM(s) IV Intermittent daily  chlorhexidine 4% Liquid 1 Application(s) Topical <User Schedule>  dextrose 40% Gel 15 Gram(s) Oral once  dextrose 5%. 1000 milliLiter(s) IV Continuous <Continuous>  dextrose 5%. 1000 milliLiter(s) IV Continuous <Continuous>  dextrose 50% Injectable 25 Gram(s) IV Push once  dextrose 50% Injectable 12.5 Gram(s) IV Push once  dextrose 50% Injectable 25 Gram(s) IV Push once  donepezil 5 milliGRAM(s) Oral at bedtime  glucagon  Injectable 1 milliGRAM(s) IntraMuscular once  heparin SubCutaneous Injection - Peds 5000 Unit(s) SubCutaneous every 12 hours  insulin lispro (ADMELOG) corrective regimen sliding scale   SubCutaneous three times a day before meals  metroNIDAZOLE  IVPB 500 milliGRAM(s) IV Intermittent every 8 hours  midodrine. 10 milliGRAM(s) Oral three times a day  mirtazapine 15 milliGRAM(s) Oral at bedtime  multivitamin Oral Tab/Cap - Peds 1 Tablet(s) Oral daily  norepinephrine Infusion 0.05 MICROgram(s)/kG/Min IV Continuous <Continuous>  pantoprazole    Tablet 40 milliGRAM(s) Oral before breakfast  sertraline 25 milliGRAM(s) Oral daily  tamsulosin 0.4 milliGRAM(s) Oral at bedtime  vancomycin  IVPB 750 milliGRAM(s) IV Intermittent <User Schedule>    PRN MEDICATIONS  acetaminophen   Tablet .. 650 milliGRAM(s) Oral every 6 hours PRN  ALBUTerol  90 MICROgram(s) HFA Inhaler - Peds 2 Puff(s) Inhalation every 4 hours PRN                                            ------------------------------------------------------------  VITAL SIGNS: Last 24 Hours  T(C): 37 (22 Sep 2021 07:00), Max: 38.2 (21 Sep 2021 15:45)  T(F): 98.6 (22 Sep 2021 07:00), Max: 100.8 (21 Sep 2021 15:45)  HR: 61 (22 Sep 2021 09:00) (61 - 108)  BP: 112/51 (22 Sep 2021 09:00) (75/42 - 133/75)  BP(mean): 73 (22 Sep 2021 09:00) (60 - 90)  RR: 18 (22 Sep 2021 09:00) (16 - 18)  SpO2: 99% (22 Sep 2021 09:00) (95% - 100%)      21 @ 07:01  -  21 @ 07:00  --------------------------------------------------------  IN: 22.4 mL / OUT: 50 mL / NET: -27.6 mL    21 @ 07:01  -  21 @ 09:39  --------------------------------------------------------  IN: 5.3 mL / OUT: 100 mL / NET: -94.7 mL                                             --------------------------------------------------------------  LABS:                        10.1   12.06 )-----------( 294      ( 22 Sep 2021 08:39 )             33.0         131<L>  |  94<L>  |  51<H>  ----------------------------<  140<H>  4.8   |  20  |  6.0<HH>    Ca    9.0      21 Sep 2021 15:20    TPro  6.8  /  Alb  3.6  /  TBili  0.3  /  DBili  x   /  AST  24  /  ALT  15  /  AlkPhos  116<H>      PT/INR - ( 21 Sep 2021 15:20 )   PT: 13.20 sec;   INR: 1.15 ratio         PTT - ( 21 Sep 2021 15:20 )  PTT:21.6 sec  Urinalysis Basic - ( 22 Sep 2021 02:00 )    Color: Yellow / Appearance: Turbid / S.015 / pH: x  Gluc: x / Ketone: Small  / Bili: Negative / Urobili: <2 mg/dL   Blood: x / Protein: 300 mg/dL / Nitrite: Negative   Leuk Esterase: Large / RBC: >720 /HPF / WBC >720 /HPF   Sq Epi: x / Non Sq Epi: x / Bacteria: x                                            -------------------------------------------------------------  RADIOLOGY:                                            --------------------------------------------------------------    PHYSICAL EXAM:    General: lethargic, minimally cooperative, no acute distress  HEENT: normocephalic, atraumatic, PERRLA, EOMi, no thyromegaly  Lungs: on 4L NC, diffuse rhonchi, bilateral crackles, no wheezes  Heart: regular rate and rhythm, normal S1 and S2  Abdomen: soft, nontender, +bowel sounds  Extremities: no lower extremity edema  Skin: no rash or lesion                                         --------------------------------------------------------------    ASSESSMENT & PLAN    81 year old male patient from [facility] with PMH Alzheimer Dementia, ESRD and 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 Currently on Levophed at 0/04 and NC at 4LPM.      Septic Shock with Unclear Infectious Etiology  most likely  infection. differential includes GI abscess, recurrent pneumonia, bacteremia 2/2 tesio infection    * VBG pH 7.25, pCO2 56, pO2 35, HCO3 25, %61.1, LA venous 1.7  * Chest X Ray: CM, improved left sided effusion, improved interstitial opacification   * Blood cultures x2 sets   * Urinalysis and urine culture  * S/P a dose of IV Cefepime 2g and IV Vancomycin 1g in ED    - Follow up Infectious Disease recommendations  - Monitor T for fever  - Trend WBC for leukocytosis  - Will start IV Cefepime 1g daily, IV Metro for GI anaerobic coverage, and IV Vancomycin 750mg Q48h on HD days with renally adjusted doses. S/P a dose of IV Cefepime 2g and IV Vancomycin 1g in ED  - Monitor BP and continue levophed: currently at 0.04. s/p 1L LR bolus in ED. Avoid volume overload   - Consider LP to rule out meningitis if source remains unclear  - Follow up cultures  -->  Blood cultures x2 sets received  --> Urinalysis and urine culture pending collection (if possible)  --> Sputum culture if possible  --> Urine legionella and strep  --> Fungitell  - Monitor SaO2 and Oxygen Requirements: 4LPM NC currently. BiPAP 12/5 FiO2 40% overnight  - Follow up Chest X Ray on   - Check LA  - Trend Inflammatory Markers:  --> ESR   --> D-dimer  --> Procalcitonin   --> C-reactive protein  --> LDH   --> Ferritin   --> Fibrinogen   - Will consult palliative care team: since was candidate for Hospice during last admission but daughter opted to continue HD and refuse Hospice back then (end of August-Early 2021)      ESRD and Anemia of chronic Disease  Hyponatremia  * Volume status: congested lungs, rhonchi and crackles no pitting edema in LE  * On HD every MWF via [facility] at [facility] complicated by hypotension s/p initiation of Midodrine 10mg in AM.   * Receives ROMELIA 5000 units every MWF with HD.  * Follows with Dr Saba and Dr Vasquez.   * Home med calcium acetate 667mg TID, Midodrine 10mg in AM  - Monitor BUN and Cr with P daily  - Follow up Nephrology Team recommendations regarding HD schedule: on MWF schedule  - Continue midodrine 10mg QD in AM to avoid hypotension  - Will consult palliative care team: since was candidate for Hospice during last admission but daughter opted to continue HD and refuse Hospice back then (end of August-Early 2021)  - Avoid volume overload and restrict fluids  - Follow up Urine Studies  - For anemia of chronic disease: monitor CBC and continue ROMELIA with HD 5000 units. Check iron stores      COPD  Respiratory Acidosis on VBG   * On 4LPM NC at NH.   * Home med Albuterol 2 puffs Q6h and Breo Ellipta 200-25mg QD  * VBG pH 7.25, pCO2 56, pO2 35, HCO3 25, %61.1, LA venous 1.7  - Monitor SaO2 and Oxygen Requirements: 4LPM NC currently. BiPAP 12/5 FiO2 40% overnight  - Follow up Chest X Ray on   - Resume nebulizers      DM Type II  * Last Hba1c 5.0 2021.   * Not on medication/ Diet controlled  - Monitor POCT premeals and at bedtime  - Sliding Scale B      HFrEF and severe AS.   * Last TTE 21 EF 40%, mild-mod MR, mod TR, severe AS (Area 0.58cm2), WMA.   * Home med Toprol 25mg QD  - Monitor in/out  - Monitor daily weight  - Monitor daily CXR  - Monitor SaO2 and Oxygen Requirements: 4LPM NC currently. BiPAP 12/5 FiO2 40% overnight  - Avoid volume overload  - Follow up TTE  - Hold Toprol 25mg QD. Currently on pressors      BPH  * Home med Tamsulosin 0.4mg QD  - Resume Tamsulosin 0,4 QD      Dyslipidemia  * Last lipid profile 21 , Chol 114, LDL 52, HDL 38.   * Home med Atorvastatin 40mg QD, Aspirin 81mg QD  - Resume Atorvastatin 40mg QD  - Resume Aspirin 81mg QD      Alzheimer Dementia  * From [facility].   * Home med Donepezil 5mg QD, Sertraline 25mg QD, Mirtazepine 15mg QD  - Resume Home meds      Others  - DVT Prophylaxis: Heparin 5000 Subcutaneously BID  - GI Prophylaxis: Pantoprazole 40mg PO QD  - Diet: Renal  - Code Status: DNR DNI per discussion with daughter       Barriers to learning: YES AMS  Discharge Planning: Patient will be discharged once stable and  Plan was communicated with daughter and ED team                             DRAKE FORTUNEBALAJ 81y Male  MRN#: [id number]   CODE STATUS: DNR/DNI    Hospital Day: 1d    Pt is currently admitted with the primary diagnosis of sepsis    SUBJECTIVE  Overnight/Interval Events: No acute events overnight. This morning, patient is on levophed 0.05 and on 4L nasal canula.                                            ----------------------------------------------------------  OBJECTIVE  PAST MEDICAL & SURGICAL HISTORY  Diabetes mellitus  COPD (chronic obstructive pulmonary disease)  Lymphedema of both lower extremities  CHF (congestive heart failure)  ESRD on dialysis  H/O right nephrectomy  20 yrs ago                                          -----------------------------------------------------------  ALLERGIES:  No Known Allergies                                          ------------------------------------------------------------    HOME MEDICATIONS  Home Medications:  albuterol 90 mcg/inh inhalation aerosol: 2 puff(s) inhaled every 6 hours (20 Aug 2021 09:36)  ascorbic acid 500 mg oral tablet: 1 tab(s) orally once a day (20 Aug 2021 09:36)  aspirin 81 mg oral delayed release tablet: 1 tab(s) orally once a day (20 Aug 2021 09:36)  atorvastatin 40 mg oral tablet: 1 tab(s) orally once a day (at bedtime) (20 Aug 2021 09:36)  Breo Ellipta 200 mcg-25 mcg/inh inhalation powder: 1 puff(s) inhaled once a day (2021 10:25)  calcium acetate 667 mg oral tablet: 1 tab(s) orally 3 times a day (with meals) (20 Aug 2021 13:54)  donepezil 5 mg oral tablet: 1 tab(s) orally once a day (at bedtime) (20 Aug 2021 09:36)  epoetin graham: 5000 unit(s) injectable Monday, Wednesday, and Friday (20 Aug 2021 11:05)  heparin: 5000 unit(s) subcutaneous every 12 hours (01 Sep 2021 13:34)  HYDROmorphone 2 mg oral tablet: 1 tab(s) orally every 4 hours, As needed, Moderate Pain (4 - 6) (01 Sep 2021 12:01)  midodrine 10 mg oral tablet: 1 tab(s) orally  (01 Sep 2021 12:01)  mirtazapine 15 mg oral tablet: 1 tab(s) orally once a day (at bedtime) (20 Aug 2021 09:36)  multivitamin: 1 tab(s) orally once a day (20 Aug 2021 13:54)  pantoprazole 40 mg oral delayed release tablet: 1 tab(s) orally once a day (before a meal) (20 Aug 2021 09:36)  povidone iodine 10% topical ointment: 1 application topically  (01 Sep 2021 12:01)  Senna 8.6 mg oral tablet: 2 tab(s) orally once a day (at bedtime), As Needed (2021 10:25)  sertraline 25 mg oral tablet: 1 tab(s) orally once a day (20 Aug 2021 09:36)  tamsulosin 0.4 mg oral capsule: 1 cap(s) orally once a day (at bedtime) (20 Aug 2021 09:36)  Toprol-XL 25 mg oral tablet, extended release: 1 tab(s) orally once a day (at bedtime) (01 Sep 2021 12:01)                           MEDICATIONS:  STANDING MEDICATIONS  ascorbic acid 500 milliGRAM(s) Oral daily  aspirin enteric coated 81 milliGRAM(s) Oral daily  atorvastatin 40 milliGRAM(s) Oral at bedtime  calcium acetate 667 milliGRAM(s) Oral three times a day with meals  cefepime   IVPB 1000 milliGRAM(s) IV Intermittent daily  chlorhexidine 4% Liquid 1 Application(s) Topical <User Schedule>  dextrose 40% Gel 15 Gram(s) Oral once  dextrose 5%. 1000 milliLiter(s) IV Continuous <Continuous>  dextrose 5%. 1000 milliLiter(s) IV Continuous <Continuous>  dextrose 50% Injectable 25 Gram(s) IV Push once  dextrose 50% Injectable 12.5 Gram(s) IV Push once  dextrose 50% Injectable 25 Gram(s) IV Push once  donepezil 5 milliGRAM(s) Oral at bedtime  glucagon  Injectable 1 milliGRAM(s) IntraMuscular once  heparin SubCutaneous Injection - Peds 5000 Unit(s) SubCutaneous every 12 hours  insulin lispro (ADMELOG) corrective regimen sliding scale   SubCutaneous three times a day before meals  metroNIDAZOLE  IVPB 500 milliGRAM(s) IV Intermittent every 8 hours  midodrine. 10 milliGRAM(s) Oral three times a day  mirtazapine 15 milliGRAM(s) Oral at bedtime  multivitamin Oral Tab/Cap - Peds 1 Tablet(s) Oral daily  norepinephrine Infusion 0.05 MICROgram(s)/kG/Min IV Continuous <Continuous>  pantoprazole    Tablet 40 milliGRAM(s) Oral before breakfast  sertraline 25 milliGRAM(s) Oral daily  tamsulosin 0.4 milliGRAM(s) Oral at bedtime  vancomycin  IVPB 750 milliGRAM(s) IV Intermittent <User Schedule>    PRN MEDICATIONS  acetaminophen   Tablet .. 650 milliGRAM(s) Oral every 6 hours PRN  ALBUTerol  90 MICROgram(s) HFA Inhaler - Peds 2 Puff(s) Inhalation every 4 hours PRN                                            ------------------------------------------------------------  VITAL SIGNS: Last 24 Hours  T(C): 37 (22 Sep 2021 07:00), Max: 38.2 (21 Sep 2021 15:45)  T(F): 98.6 (22 Sep 2021 07:00), Max: 100.8 (21 Sep 2021 15:45)  HR: 61 (22 Sep 2021 09:00) (61 - 108)  BP: 112/51 (22 Sep 2021 09:00) (75/42 - 133/75)  BP(mean): 73 (22 Sep 2021 09:00) (60 - 90)  RR: 18 (22 Sep 2021 09:00) (16 - 18)  SpO2: 99% (22 Sep 2021 09:00) (95% - 100%)      21 @ 07:01  -  21 @ 07:00  --------------------------------------------------------  IN: 22.4 mL / OUT: 50 mL / NET: -27.6 mL    21 @ 07:01  -  21 @ 09:39  --------------------------------------------------------  IN: 5.3 mL / OUT: 100 mL / NET: -94.7 mL                                             --------------------------------------------------------------  LABS:                        10.1   12.06 )-----------( 294      ( 22 Sep 2021 08:39 )             33.0         131<L>  |  94<L>  |  51<H>  ----------------------------<  140<H>  4.8   |  20  |  6.0<HH>    Ca    9.0      21 Sep 2021 15:20    TPro  6.8  /  Alb  3.6  /  TBili  0.3  /  DBili  x   /  AST  24  /  ALT  15  /  AlkPhos  116<H>      PT/INR - ( 21 Sep 2021 15:20 )   PT: 13.20 sec;   INR: 1.15 ratio         PTT - ( 21 Sep 2021 15:20 )  PTT:21.6 sec  Urinalysis Basic - ( 22 Sep 2021 02:00 )    Color: Yellow / Appearance: Turbid / S.015 / pH: x  Gluc: x / Ketone: Small  / Bili: Negative / Urobili: <2 mg/dL   Blood: x / Protein: 300 mg/dL / Nitrite: Negative   Leuk Esterase: Large / RBC: >720 /HPF / WBC >720 /HPF   Sq Epi: x / Non Sq Epi: x / Bacteria: x                                            -------------------------------------------------------------  RADIOLOGY:                                            --------------------------------------------------------------    PHYSICAL EXAM:    General: lethargic, minimally cooperative, no acute distress  HEENT: normocephalic, atraumatic, PERRLA, EOMi, no thyromegaly  Lungs: on 4L NC, diffuse rhonchi, bilateral crackles, no wheezes  Heart: regular rate and rhythm, normal S1 and S2  Abdomen: soft, nontender, +bowel sounds  Extremities: no lower extremity edema  Skin: no rash or lesion                                         --------------------------------------------------------------    ASSESSMENT & PLAN    81 [facility] PMH Alzheimer Dementia, ESRD and 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 He was recently admitted for multilobar pneumonia and treated with levofloxacin.     #Septic Shock   etiology most likely  infection. differential includes GI abscess, recurrent pneumonia, bacteremia 2/2 tesio infection    Chest X Ray: CM, improved left sided effusion, improved interstitial opacification   f/u blood cultures x2 sets   f/u urinalysis and urine culture  per ID, vanc 1g q24h and cefepime 1g q24h  f/u inflam markers  f/u fungitell, urine legionella, strep  f/u palliative care recs; during last admission, was hospice candidate but daughter opted to continue HD and refused hospice    #ESRD and Anemia of chronic Disease  #Hyponatremia  * Volume status: congested lungs, rhonchi and crackles no pitting edema in LE  * On HD every MWF via Tessio at [facility] complicated by hypotension s/p initiation of Midodrine 10mg in AM.   * Receives ROMELIA 5000 units every MWF with HD.  * Follows with Dr Saba and Dr Vasquez.   * Home med calcium acetate 667mg TID, Midodrine 10mg in AM  - Monitor BUN and Cr with P daily  - Follow up Nephrology Team recommendations regarding HD schedule: on MWF schedule  - Continue midodrine 10mg QD in AM to avoid hypotension  - Will consult palliative care team: since was candidate for Hospice during last admission but daughter opted to continue HD and refuse Hospice back then (end -Early 2021)  - Avoid volume overload and restrict fluids  - Follow up Urine Studies  - For anemia of chronic disease: monitor CBC and continue ROMELIA with HD 5000 units. Check iron stores      #COPD  Respiratory Acidosis on VBG   * On 4LPM NC at NH.   * Home med Albuterol 2 puffs Q6h and Breo Ellipta 200-25mg QD  * VBG pH 7.25, pCO2 56, pO2 35, HCO3 25, %61.1, LA venous 1.7  - Monitor SaO2 and Oxygen Requirements: 4LPM NC currently. BiPAP 12/5 FiO2 40% overnight  - Follow up Chest X Ray on   - Resume nebulizers      #DM Type II  * Last Hba1c 5.0 2021.   * Not on medication/ Diet controlled  - Monitor POCT premeals and at bedtime  - Sliding Scale B    #HFrEF and severe AS.   * Last TTE 21 EF 40%, mild-mod MR, mod TR, severe AS (Area 0.58cm2), WMA.   * Home med Toprol 25mg QD  - Monitor in/out  - Monitor daily weight  - Monitor daily CXR  - Monitor SaO2 and Oxygen Requirements: 4LPM NC currently. BiPAP 12/5 FiO2 40% overnight  - Avoid volume overload  - Follow up TTE  - Hold Toprol 25mg QD. Currently on pressors    #BPH  Tamsulosin 0,4 QD    #Dyslipidemia  Atorvastatin 40mg QD  Aspirin 81mg QD    #Alzheimer Dementia  Donepezil 5mg QD, Sertraline 25mg QD, Mirtazepine 15mg QD      #DVT Prophylaxis: Heparin 5000 Subcutaneously BID  #GI Prophylaxis: Pantoprazole 40mg PO QD  #Diet: Renal  #Code Status: DNR/DNI per discussion with daughter     Barriers to learning: YES AMS  Discharge Planning: Patient will be discharged once stable and  Plan was communicated with daughter and ED team                             DRAKE FORTUNEBALAJ 81y Male  MRN#: [id number]   CODE STATUS: DNR/DNI    Hospital Day: 1d  Pt is currently admitted with the primary diagnosis of sepsis    SUBJECTIVE  Overnight/Interval Events: No acute events overnight. This morning, patient is on levophed 0.05 and on 4L nasal canula.                                            ----------------------------------------------------------  OBJECTIVE  PAST MEDICAL & SURGICAL HISTORY  Diabetes mellitus  COPD (chronic obstructive pulmonary disease)  Lymphedema of both lower extremities  CHF (congestive heart failure)  ESRD on dialysis  H/O right nephrectomy  20 yrs ago                                          -----------------------------------------------------------  ALLERGIES:  No Known Allergies                                          ------------------------------------------------------------    HOME MEDICATIONS  Home Medications:  albuterol 90 mcg/inh inhalation aerosol: 2 puff(s) inhaled every 6 hours (20 Aug 2021 09:36)  ascorbic acid 500 mg oral tablet: 1 tab(s) orally once a day (20 Aug 2021 09:36)  aspirin 81 mg oral delayed release tablet: 1 tab(s) orally once a day (20 Aug 2021 09:36)  atorvastatin 40 mg oral tablet: 1 tab(s) orally once a day (at bedtime) (20 Aug 2021 09:36)  Breo Ellipta 200 mcg-25 mcg/inh inhalation powder: 1 puff(s) inhaled once a day (2021 10:25)  calcium acetate 667 mg oral tablet: 1 tab(s) orally 3 times a day (with meals) (20 Aug 2021 13:54)  donepezil 5 mg oral tablet: 1 tab(s) orally once a day (at bedtime) (20 Aug 2021 09:36)  epoetin graham: 5000 unit(s) injectable Monday, Wednesday, and Friday (20 Aug 2021 11:05)  heparin: 5000 unit(s) subcutaneous every 12 hours (01 Sep 2021 13:34)  HYDROmorphone 2 mg oral tablet: 1 tab(s) orally every 4 hours, As needed, Moderate Pain (4 - 6) (01 Sep 2021 12:01)  midodrine 10 mg oral tablet: 1 tab(s) orally  (01 Sep 2021 12:01)  mirtazapine 15 mg oral tablet: 1 tab(s) orally once a day (at bedtime) (20 Aug 2021 09:36)  multivitamin: 1 tab(s) orally once a day (20 Aug 2021 13:54)  pantoprazole 40 mg oral delayed release tablet: 1 tab(s) orally once a day (before a meal) (20 Aug 2021 09:36)  povidone iodine 10% topical ointment: 1 application topically  (01 Sep 2021 12:01)  Senna 8.6 mg oral tablet: 2 tab(s) orally once a day (at bedtime), As Needed (2021 10:25)  sertraline 25 mg oral tablet: 1 tab(s) orally once a day (20 Aug 2021 09:36)  tamsulosin 0.4 mg oral capsule: 1 cap(s) orally once a day (at bedtime) (20 Aug 2021 09:36)  Toprol-XL 25 mg oral tablet, extended release: 1 tab(s) orally once a day (at bedtime) (01 Sep 2021 12:01)                           MEDICATIONS:  STANDING MEDICATIONS  ascorbic acid 500 milliGRAM(s) Oral daily  aspirin enteric coated 81 milliGRAM(s) Oral daily  atorvastatin 40 milliGRAM(s) Oral at bedtime  calcium acetate 667 milliGRAM(s) Oral three times a day with meals  cefepime   IVPB 1000 milliGRAM(s) IV Intermittent daily  chlorhexidine 4% Liquid 1 Application(s) Topical <User Schedule>  dextrose 40% Gel 15 Gram(s) Oral once  dextrose 5%. 1000 milliLiter(s) IV Continuous <Continuous>  dextrose 5%. 1000 milliLiter(s) IV Continuous <Continuous>  dextrose 50% Injectable 25 Gram(s) IV Push once  dextrose 50% Injectable 12.5 Gram(s) IV Push once  dextrose 50% Injectable 25 Gram(s) IV Push once  donepezil 5 milliGRAM(s) Oral at bedtime  glucagon  Injectable 1 milliGRAM(s) IntraMuscular once  heparin SubCutaneous Injection - Peds 5000 Unit(s) SubCutaneous every 12 hours  insulin lispro (ADMELOG) corrective regimen sliding scale   SubCutaneous three times a day before meals  metroNIDAZOLE  IVPB 500 milliGRAM(s) IV Intermittent every 8 hours  midodrine. 10 milliGRAM(s) Oral three times a day  mirtazapine 15 milliGRAM(s) Oral at bedtime  multivitamin Oral Tab/Cap - Peds 1 Tablet(s) Oral daily  norepinephrine Infusion 0.05 MICROgram(s)/kG/Min IV Continuous <Continuous>  pantoprazole    Tablet 40 milliGRAM(s) Oral before breakfast  sertraline 25 milliGRAM(s) Oral daily  tamsulosin 0.4 milliGRAM(s) Oral at bedtime  vancomycin  IVPB 750 milliGRAM(s) IV Intermittent <User Schedule>    PRN MEDICATIONS  acetaminophen   Tablet .. 650 milliGRAM(s) Oral every 6 hours PRN  ALBUTerol  90 MICROgram(s) HFA Inhaler - Peds 2 Puff(s) Inhalation every 4 hours PRN                                            ------------------------------------------------------------  VITAL SIGNS: Last 24 Hours  T(C): 37 (22 Sep 2021 07:00), Max: 38.2 (21 Sep 2021 15:45)  T(F): 98.6 (22 Sep 2021 07:00), Max: 100.8 (21 Sep 2021 15:45)  HR: 61 (22 Sep 2021 09:00) (61 - 108)  BP: 112/51 (22 Sep 2021 09:00) (75/42 - 133/75)  BP(mean): 73 (22 Sep 2021 09:00) (60 - 90)  RR: 18 (22 Sep 2021 09:00) (16 - 18)  SpO2: 99% (22 Sep 2021 09:00) (95% - 100%)      21 @ 07:01  -  21 @ 07:00  --------------------------------------------------------  IN: 22.4 mL / OUT: 50 mL / NET: -27.6 mL    21 @ 07:01  -  21 @ 09:39  --------------------------------------------------------  IN: 5.3 mL / OUT: 100 mL / NET: -94.7 mL                                             --------------------------------------------------------------  LABS:                        10.1   12.06 )-----------( 294      ( 22 Sep 2021 08:39 )             33.0         131<L>  |  94<L>  |  51<H>  ----------------------------<  140<H>  4.8   |  20  |  6.0<HH>    Ca    9.0      21 Sep 2021 15:20    TPro  6.8  /  Alb  3.6  /  TBili  0.3  /  DBili  x   /  AST  24  /  ALT  15  /  AlkPhos  116<H>      PT/INR - ( 21 Sep 2021 15:20 )   PT: 13.20 sec;   INR: 1.15 ratio         PTT - ( 21 Sep 2021 15:20 )  PTT:21.6 sec  Urinalysis Basic - ( 22 Sep 2021 02:00 )    Color: Yellow / Appearance: Turbid / S.015 / pH: x  Gluc: x / Ketone: Small  / Bili: Negative / Urobili: <2 mg/dL   Blood: x / Protein: 300 mg/dL / Nitrite: Negative   Leuk Esterase: Large / RBC: >720 /HPF / WBC >720 /HPF   Sq Epi: x / Non Sq Epi: x / Bacteria: x                                            -------------------------------------------------------------  RADIOLOGY:                                            --------------------------------------------------------------    PHYSICAL EXAM:    General: lethargic, minimally cooperative, no acute distress  HEENT: normocephalic, atraumatic, PERRLA, EOMi, no thyromegaly  Lungs: on 4L NC, diffuse rhonchi, bilateral crackles, no wheezes  Heart: regular rate and rhythm, normal S1 and S2  Abdomen: soft, nontender, +bowel sounds  Extremities: no lower extremity edema  Skin: no rash or lesion                                         --------------------------------------------------------------    ASSESSMENT & PLAN    81 year old male patient from [facility] with PMH Alzheimer Dementia, ESRD and 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 He was recently admitted for multilobar pneumonia and treated with levofloxacin.     #Septic Shock   etiology most likely  infection. differential includes GI abscess, recurrent pneumonia, bacteremia 2/2 tesio infection    Chest X Ray: CM, improved left sided effusion, improved interstitial opacification   f/u blood cultures x2 sets   f/u urinalysis and urine culture  per ID, vanc 1g q24h and cefepime 1g q24h  f/u inflam markers  f/u fungitell, urine legionella, strep  f/u palliative care recs; during last admission, was hospice candidate but daughter opted to continue HD and refused hospice    #ESRD and Anemia of chronic Disease  #Hyponatremia  * Volume status: congested lungs, rhonchi and crackles no pitting edema in LE  * On HD every MWF via Tessio at [facility] complicated by hypotension s/p initiation of Midodrine 10mg in AM.   * Receives ROMELIA 5000 units every MWF with HD.  * Follows with Dr Saba and Dr Vasquez.   * Home med calcium acetate 667mg TID, Midodrine 10mg in AM  - Monitor BUN and Cr with P daily  - Follow up Nephrology Team recommendations regarding HD schedule: on MWF schedule  - Continue midodrine 10mg QD in AM to avoid hypotension  - Will consult palliative care team: since was candidate for Hospice during last admission but daughter opted to continue HD and refuse Hospice back then (end -Early 2021)  - Avoid volume overload and restrict fluids  - Follow up Urine Studies  - For anemia of chronic disease: monitor CBC and continue ROMELIA with HD 5000 units. Check iron stores      #COPD  Respiratory Acidosis on VBG   * On 4LPM NC at NH.   * Home med Albuterol 2 puffs Q6h and Breo Ellipta 200-25mg QD  * VBG pH 7.25, pCO2 56, pO2 35, HCO3 25, %61.1, LA venous 1.7  - Monitor SaO2 and Oxygen Requirements: 4LPM NC currently. BiPAP 12/5 FiO2 40% overnight  - Follow up Chest X Ray on   - Resume nebulizers      #DM Type II  * Last Hba1c 5.0 2021.   * Not on medication/ Diet controlled  - Monitor POCT premeals and at bedtime  - Sliding Scale B    #HFrEF and severe AS.   * Last TTE 21 EF 40%, mild-mod MR, mod TR, severe AS (Area 0.58cm2), WMA.   * Home med Toprol 25mg QD  - Monitor in/out  - Monitor daily weight  - Monitor daily CXR  - Monitor SaO2 and Oxygen Requirements: 4LPM NC currently. BiPAP 12/5 FiO2 40% overnight  - Avoid volume overload  - Follow up TTE  - Hold Toprol 25mg QD. Currently on pressors    #BPH  Tamsulosin 0,4 QD    #Dyslipidemia  Atorvastatin 40mg QD  Aspirin 81mg QD    #Alzheimer Dementia  Donepezil 5mg QD, Sertraline 25mg QD, Mirtazepine 15mg QD      #DVT Prophylaxis: Heparin 5000 Subcutaneously BID  #GI Prophylaxis: Pantoprazole 40mg PO QD  #Diet: Renal  #Code Status: DNR/DNI per discussion with daughter     Barriers to learning: YES AMS  Discharge Planning: Patient will be discharged once stable and  Plan was communicated with daughter and ED team                             DRAKE FORTUNEBALAJ 81y Male  MRN#: [id number]   CODE STATUS: DNR/DNI    Hospital Day: 1d  Pt is currently admitted with the primary diagnosis of sepsis    SUBJECTIVE  Overnight/Interval Events: No acute events overnight. This morning, patient is on levophed 0.05 and on 4L nasal canula. Spoke with daughter on phone and she wants all medical treatment but patient is DNR/DNI. She says he is more conversant when family is present and has been compliant with food and medication at NH until he became sick. She says he is hard of hearing.                                             ----------------------------------------------------------  OBJECTIVE  PAST MEDICAL & SURGICAL HISTORY  Diabetes mellitus  COPD (chronic obstructive pulmonary disease)  Lymphedema of both lower extremities  CHF (congestive heart failure)  ESRD on dialysis  H/O right nephrectomy  20 yrs ago                                          -----------------------------------------------------------  ALLERGIES:  No Known Allergies                                          ------------------------------------------------------------    HOME MEDICATIONS  Home Medications:  albuterol 90 mcg/inh inhalation aerosol: 2 puff(s) inhaled every 6 hours (20 Aug 2021 09:36)  ascorbic acid 500 mg oral tablet: 1 tab(s) orally once a day (20 Aug 2021 09:36)  aspirin 81 mg oral delayed release tablet: 1 tab(s) orally once a day (20 Aug 2021 09:36)  atorvastatin 40 mg oral tablet: 1 tab(s) orally once a day (at bedtime) (20 Aug 2021 09:36)  Breo Ellipta 200 mcg-25 mcg/inh inhalation powder: 1 puff(s) inhaled once a day (2021 10:25)  calcium acetate 667 mg oral tablet: 1 tab(s) orally 3 times a day (with meals) (20 Aug 2021 13:54)  donepezil 5 mg oral tablet: 1 tab(s) orally once a day (at bedtime) (20 Aug 2021 09:36)  epoetin graham: 5000 unit(s) injectable Monday, Wednesday, and Friday (20 Aug 2021 11:05)  heparin: 5000 unit(s) subcutaneous every 12 hours (01 Sep 2021 13:34)  HYDROmorphone 2 mg oral tablet: 1 tab(s) orally every 4 hours, As needed, Moderate Pain (4 - 6) (01 Sep 2021 12:01)  midodrine 10 mg oral tablet: 1 tab(s) orally  (01 Sep 2021 12:01)  mirtazapine 15 mg oral tablet: 1 tab(s) orally once a day (at bedtime) (20 Aug 2021 09:36)  multivitamin: 1 tab(s) orally once a day (20 Aug 2021 13:54)  pantoprazole 40 mg oral delayed release tablet: 1 tab(s) orally once a day (before a meal) (20 Aug 2021 09:36)  povidone iodine 10% topical ointment: 1 application topically  (01 Sep 2021 12:01)  Senna 8.6 mg oral tablet: 2 tab(s) orally once a day (at bedtime), As Needed (2021 10:25)  sertraline 25 mg oral tablet: 1 tab(s) orally once a day (20 Aug 2021 09:36)  tamsulosin 0.4 mg oral capsule: 1 cap(s) orally once a day (at bedtime) (20 Aug 2021 09:36)  Toprol-XL 25 mg oral tablet, extended release: 1 tab(s) orally once a day (at bedtime) (01 Sep 2021 12:01)                           MEDICATIONS:  STANDING MEDICATIONS  ascorbic acid 500 milliGRAM(s) Oral daily  aspirin enteric coated 81 milliGRAM(s) Oral daily  atorvastatin 40 milliGRAM(s) Oral at bedtime  calcium acetate 667 milliGRAM(s) Oral three times a day with meals  cefepime   IVPB 1000 milliGRAM(s) IV Intermittent daily  chlorhexidine 4% Liquid 1 Application(s) Topical <User Schedule>  dextrose 40% Gel 15 Gram(s) Oral once  dextrose 5%. 1000 milliLiter(s) IV Continuous <Continuous>  dextrose 5%. 1000 milliLiter(s) IV Continuous <Continuous>  dextrose 50% Injectable 25 Gram(s) IV Push once  dextrose 50% Injectable 12.5 Gram(s) IV Push once  dextrose 50% Injectable 25 Gram(s) IV Push once  donepezil 5 milliGRAM(s) Oral at bedtime  glucagon  Injectable 1 milliGRAM(s) IntraMuscular once  heparin SubCutaneous Injection - Peds 5000 Unit(s) SubCutaneous every 12 hours  insulin lispro (ADMELOG) corrective regimen sliding scale   SubCutaneous three times a day before meals  metroNIDAZOLE  IVPB 500 milliGRAM(s) IV Intermittent every 8 hours  midodrine. 10 milliGRAM(s) Oral three times a day  mirtazapine 15 milliGRAM(s) Oral at bedtime  multivitamin Oral Tab/Cap - Peds 1 Tablet(s) Oral daily  norepinephrine Infusion 0.05 MICROgram(s)/kG/Min IV Continuous <Continuous>  pantoprazole    Tablet 40 milliGRAM(s) Oral before breakfast  sertraline 25 milliGRAM(s) Oral daily  tamsulosin 0.4 milliGRAM(s) Oral at bedtime  vancomycin  IVPB 750 milliGRAM(s) IV Intermittent <User Schedule>    PRN MEDICATIONS  acetaminophen   Tablet .. 650 milliGRAM(s) Oral every 6 hours PRN  ALBUTerol  90 MICROgram(s) HFA Inhaler - Peds 2 Puff(s) Inhalation every 4 hours PRN                                            ------------------------------------------------------------  VITAL SIGNS: Last 24 Hours  T(C): 37 (22 Sep 2021 07:00), Max: 38.2 (21 Sep 2021 15:45)  T(F): 98.6 (22 Sep 2021 07:00), Max: 100.8 (21 Sep 2021 15:45)  HR: 61 (22 Sep 2021 09:00) (61 - 108)  BP: 112/51 (22 Sep 2021 09:00) (75/42 - 133/75)  BP(mean): 73 (22 Sep 2021 09:00) (60 - 90)  RR: 18 (22 Sep 2021 09:00) (16 - 18)  SpO2: 99% (22 Sep 2021 09:00) (95% - 100%)      21 @ 07:01  -  21 @ 07:00  --------------------------------------------------------  IN: 22.4 mL / OUT: 50 mL / NET: -27.6 mL    21 @ 07:01  -  21 @ 09:39  --------------------------------------------------------  IN: 5.3 mL / OUT: 100 mL / NET: -94.7 mL                                             --------------------------------------------------------------  LABS:                        10.1   12.06 )-----------( 294      ( 22 Sep 2021 08:39 )             33.0         131<L>  |  94<L>  |  51<H>  ----------------------------<  140<H>  4.8   |  20  |  6.0<HH>    Ca    9.0      21 Sep 2021 15:20    TPro  6.8  /  Alb  3.6  /  TBili  0.3  /  DBili  x   /  AST  24  /  ALT  15  /  AlkPhos  116<H>      PT/INR - ( 21 Sep 2021 15:20 )   PT: 13.20 sec;   INR: 1.15 ratio         PTT - ( 21 Sep 2021 15:20 )  PTT:21.6 sec  Urinalysis Basic - ( 22 Sep 2021 02:00 )    Color: Yellow / Appearance: Turbid / S.015 / pH: x  Gluc: x / Ketone: Small  / Bili: Negative / Urobili: <2 mg/dL   Blood: x / Protein: 300 mg/dL / Nitrite: Negative   Leuk Esterase: Large / RBC: >720 /HPF / WBC >720 /HPF   Sq Epi: x / Non Sq Epi: x / Bacteria: x                                            -------------------------------------------------------------  RADIOLOGY:                                            --------------------------------------------------------------    PHYSICAL EXAM:    General: lethargic, minimally cooperative, no acute distress  HEENT: normocephalic, atraumatic, PERRLA, EOMi, no thyromegaly  Lungs: on 4L NC, diffuse rhonchi, bilateral crackles, no wheezes  Heart: regular rate and rhythm, normal S1 and S2  Abdomen: soft, nontender, +bowel sounds  Extremities: no lower extremity edema  Skin: no rash or lesion                                         --------------------------------------------------------------    ASSESSMENT & PLAN    81 year old male patient from [facility] with PMH Alzheimer Dementia, ESRD and 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 He was recently admitted for multilobar pneumonia and treated with levofloxacin.     #Septic Shock   etiology most likely  infection. differential includes GI abscess, recurrent pneumonia, bacteremia 2/2 tesio infection    Chest X Ray: CM, improved left sided effusion, improved interstitial opacification   f/u blood cultures x2 sets   f/u urinalysis and urine culture  per ID, vanc 1g q24h and cefepime 1g q24h  f/u inflam markers  f/u fungitell, urine legionella, strep  f/u palliative care recs; during last admission, was hospice candidate but daughter opted to continue HD and refused hospice    #ESRD and Anemia of chronic Disease  #Hyponatremia  * Volume status: congested lungs, rhonchi and crackles no pitting edema in LE  * On HD every MWF via Tessio at [facility] complicated by hypotension s/p initiation of Midodrine 10mg in AM.   * Receives ROMELIA 5000 units every MWF with HD.  * Follows with Dr Saba and Dr Vasquez.   * Home med calcium acetate 667mg TID, Midodrine 10mg in AM  - Monitor BUN and Cr with P daily  - Follow up Nephrology Team recommendations regarding HD schedule: on MWF schedule  - Continue midodrine 10mg QD in AM to avoid hypotension  - Will consult palliative care team: since was candidate for Hospice during last admission but daughter opted to continue HD and refuse Hospice back then (end -Early 2021)  - Avoid volume overload and restrict fluids  - Follow up Urine Studies  - For anemia of chronic disease: monitor CBC and continue ROMELIA with HD 5000 units. Check iron stores      #COPD  Respiratory Acidosis on VBG   * On 4LPM NC at NH.   * Home med Albuterol 2 puffs Q6h and Breo Ellipta 200-25mg QD  * VBG pH 7.25, pCO2 56, pO2 35, HCO3 25, %61.1, LA venous 1.7  - Monitor SaO2 and Oxygen Requirements: 4LPM NC currently. BiPAP 12/5 FiO2 40% overnight  - Follow up Chest X Ray on   - Resume nebulizers      #DM Type II  * Last Hba1c 5.0 2021.   * Not on medication/ Diet controlled  - Monitor POCT premeals and at bedtime  - Sliding Scale B    #HFrEF and severe AS.   * Last TTE 21 EF 40%, mild-mod MR, mod TR, severe AS (Area 0.58cm2), WMA.   * Home med Toprol 25mg QD  - Monitor in/out  - Monitor daily weight  - Monitor daily CXR  - Monitor SaO2 and Oxygen Requirements: 4LPM NC currently. BiPAP 12/5 FiO2 40% overnight  - Avoid volume overload  - Follow up TTE  - Hold Toprol 25mg QD. Currently on pressors    #BPH  Tamsulosin 0,4 QD    #Dyslipidemia  Atorvastatin 40mg QD  Aspirin 81mg QD    #Alzheimer Dementia  Donepezil 5mg QD, Sertraline 25mg QD, Mirtazepine 15mg QD      #DVT Prophylaxis: Heparin 5000 Subcutaneously BID  #GI Prophylaxis: Pantoprazole 40mg PO QD  #Diet: Renal  #Code Status: DNR/DNI per discussion with daughter     Barriers to learning: YES AMS  Discharge Planning: Patient will be discharged once stable and  Plan was communicated with daughter and ED team                             DRAKE FORTUNEBALAJ 81y Male  MRN#: [id number]   CODE STATUS: DNR/DNI    Hospital Day: 1d  Pt is currently admitted with the primary diagnosis of sepsis    SUBJECTIVE  Overnight/Interval Events: No acute events overnight. This morning, patient is on levophed 0.05 and on 4L nasal canula. Spoke with daughter on phone and she wants all medical treatment but patient is DNR/DNI. She says he is more conversant when family is present and has been compliant with food and medication at NH until he became sick. She says he is hard of hearing.                                             ----------------------------------------------------------  OBJECTIVE  PAST MEDICAL & SURGICAL HISTORY  Diabetes mellitus  COPD (chronic obstructive pulmonary disease)  Lymphedema of both lower extremities  CHF (congestive heart failure)  ESRD on dialysis  H/O right nephrectomy  20 yrs ago                                          -----------------------------------------------------------  ALLERGIES:  No Known Allergies                                          ------------------------------------------------------------    HOME MEDICATIONS  Home Medications:  albuterol 90 mcg/inh inhalation aerosol: 2 puff(s) inhaled every 6 hours (20 Aug 2021 09:36)  ascorbic acid 500 mg oral tablet: 1 tab(s) orally once a day (20 Aug 2021 09:36)  aspirin 81 mg oral delayed release tablet: 1 tab(s) orally once a day (20 Aug 2021 09:36)  atorvastatin 40 mg oral tablet: 1 tab(s) orally once a day (at bedtime) (20 Aug 2021 09:36)  Breo Ellipta 200 mcg-25 mcg/inh inhalation powder: 1 puff(s) inhaled once a day (2021 10:25)  calcium acetate 667 mg oral tablet: 1 tab(s) orally 3 times a day (with meals) (20 Aug 2021 13:54)  donepezil 5 mg oral tablet: 1 tab(s) orally once a day (at bedtime) (20 Aug 2021 09:36)  epoetin graham: 5000 unit(s) injectable Monday, Wednesday, and Friday (20 Aug 2021 11:05)  heparin: 5000 unit(s) subcutaneous every 12 hours (01 Sep 2021 13:34)  HYDROmorphone 2 mg oral tablet: 1 tab(s) orally every 4 hours, As needed, Moderate Pain (4 - 6) (01 Sep 2021 12:01)  midodrine 10 mg oral tablet: 1 tab(s) orally  (01 Sep 2021 12:01)  mirtazapine 15 mg oral tablet: 1 tab(s) orally once a day (at bedtime) (20 Aug 2021 09:36)  multivitamin: 1 tab(s) orally once a day (20 Aug 2021 13:54)  pantoprazole 40 mg oral delayed release tablet: 1 tab(s) orally once a day (before a meal) (20 Aug 2021 09:36)  povidone iodine 10% topical ointment: 1 application topically  (01 Sep 2021 12:01)  Senna 8.6 mg oral tablet: 2 tab(s) orally once a day (at bedtime), As Needed (2021 10:25)  sertraline 25 mg oral tablet: 1 tab(s) orally once a day (20 Aug 2021 09:36)  tamsulosin 0.4 mg oral capsule: 1 cap(s) orally once a day (at bedtime) (20 Aug 2021 09:36)  Toprol-XL 25 mg oral tablet, extended release: 1 tab(s) orally once a day (at bedtime) (01 Sep 2021 12:01)                           MEDICATIONS:  STANDING MEDICATIONS  ascorbic acid 500 milliGRAM(s) Oral daily  aspirin enteric coated 81 milliGRAM(s) Oral daily  atorvastatin 40 milliGRAM(s) Oral at bedtime  calcium acetate 667 milliGRAM(s) Oral three times a day with meals  cefepime   IVPB 1000 milliGRAM(s) IV Intermittent daily  chlorhexidine 4% Liquid 1 Application(s) Topical <User Schedule>  dextrose 40% Gel 15 Gram(s) Oral once  dextrose 5%. 1000 milliLiter(s) IV Continuous <Continuous>  dextrose 5%. 1000 milliLiter(s) IV Continuous <Continuous>  dextrose 50% Injectable 25 Gram(s) IV Push once  dextrose 50% Injectable 12.5 Gram(s) IV Push once  dextrose 50% Injectable 25 Gram(s) IV Push once  donepezil 5 milliGRAM(s) Oral at bedtime  glucagon  Injectable 1 milliGRAM(s) IntraMuscular once  heparin SubCutaneous Injection - Peds 5000 Unit(s) SubCutaneous every 12 hours  insulin lispro (ADMELOG) corrective regimen sliding scale   SubCutaneous three times a day before meals  metroNIDAZOLE  IVPB 500 milliGRAM(s) IV Intermittent every 8 hours  midodrine. 10 milliGRAM(s) Oral three times a day  mirtazapine 15 milliGRAM(s) Oral at bedtime  multivitamin Oral Tab/Cap - Peds 1 Tablet(s) Oral daily  norepinephrine Infusion 0.05 MICROgram(s)/kG/Min IV Continuous <Continuous>  pantoprazole    Tablet 40 milliGRAM(s) Oral before breakfast  sertraline 25 milliGRAM(s) Oral daily  tamsulosin 0.4 milliGRAM(s) Oral at bedtime  vancomycin  IVPB 750 milliGRAM(s) IV Intermittent <User Schedule>    PRN MEDICATIONS  acetaminophen   Tablet .. 650 milliGRAM(s) Oral every 6 hours PRN  ALBUTerol  90 MICROgram(s) HFA Inhaler - Peds 2 Puff(s) Inhalation every 4 hours PRN                                            ------------------------------------------------------------  VITAL SIGNS: Last 24 Hours  T(C): 37 (22 Sep 2021 07:00), Max: 38.2 (21 Sep 2021 15:45)  T(F): 98.6 (22 Sep 2021 07:00), Max: 100.8 (21 Sep 2021 15:45)  HR: 61 (22 Sep 2021 09:00) (61 - 108)  BP: 112/51 (22 Sep 2021 09:00) (75/42 - 133/75)  BP(mean): 73 (22 Sep 2021 09:00) (60 - 90)  RR: 18 (22 Sep 2021 09:00) (16 - 18)  SpO2: 99% (22 Sep 2021 09:00) (95% - 100%)      21 @ 07:01  -  21 @ 07:00  --------------------------------------------------------  IN: 22.4 mL / OUT: 50 mL / NET: -27.6 mL    21 @ 07:01  -  21 @ 09:39  --------------------------------------------------------  IN: 5.3 mL / OUT: 100 mL / NET: -94.7 mL                                             --------------------------------------------------------------  LABS:                        10.1   12.06 )-----------( 294      ( 22 Sep 2021 08:39 )             33.0         131<L>  |  94<L>  |  51<H>  ----------------------------<  140<H>  4.8   |  20  |  6.0<HH>    Ca    9.0      21 Sep 2021 15:20    TPro  6.8  /  Alb  3.6  /  TBili  0.3  /  DBili  x   /  AST  24  /  ALT  15  /  AlkPhos  116<H>      PT/INR - ( 21 Sep 2021 15:20 )   PT: 13.20 sec;   INR: 1.15 ratio         PTT - ( 21 Sep 2021 15:20 )  PTT:21.6 sec  Urinalysis Basic - ( 22 Sep 2021 02:00 )    Color: Yellow / Appearance: Turbid / S.015 / pH: x  Gluc: x / Ketone: Small  / Bili: Negative / Urobili: <2 mg/dL   Blood: x / Protein: 300 mg/dL / Nitrite: Negative   Leuk Esterase: Large / RBC: >720 /HPF / WBC >720 /HPF   Sq Epi: x / Non Sq Epi: x / Bacteria: x                                            -------------------------------------------------------------  RADIOLOGY:                                            --------------------------------------------------------------    PHYSICAL EXAM:    General: lethargic, minimally cooperative, no acute distress  HEENT: normocephalic, atraumatic, PERRLA, EOMi, no thyromegaly  Lungs: on 4L NC, diffuse rhonchi, bilateral crackles, no wheezes  Heart: regular rate and rhythm, normal S1 and S2  Abdomen: soft, nontender, +bowel sounds  Extremities: no lower extremity edema  Skin: no rash or lesion                                         --------------------------------------------------------------    ASSESSMENT & PLAN    81 [facility] with PMH Alzheimer Dementia, ESRD and 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 He was recently admitted for multilobar pneumonia and treated with levofloxacin.     #Septic Shock   etiology most likely  infection. differential includes GI abscess, recurrent pneumonia, bacteremia 2/2 tesio infection    Chest X Ray: CM, improved left sided effusion, improved interstitial opacification   f/u blood cultures x2 sets   f/u urinalysis and urine culture  per ID, vanc 1g q24h and cefepime 1g [id number]  f/u fungitell, urine legionella, strep  f/u recs per pall care    #ESRD and Anemia of chronic Disease  #Hyponatremia  creat 6.5, BUN 57  Na 134    -on HD three times/week  -HD today: 3 hours, opti 160 dialyzer, 2K bath, 1-2L UF  -epo  -renal diet  -continue midodrine 10mg PO TID      #COPD  Respiratory Acidosis on VBG   * On 4LPM NC at NH.   * Home med Albuterol 2 puffs Q6h and Breo Ellipta 200-25mg QD  * VBG pH 7.25, pCO2 56, pO2 35, HCO3 25, %61.1, LA venous 1.7  - Monitor SaO2 and Oxygen Requirements: 4LPM NC currently. BiPAP 12/5 FiO2 40% overnight  - Follow up Chest X Ray on   - Resume nebulizers      #DM Type II  * Last Hba1c 5.0 2021.   * Not on medication/ Diet controlled  - Monitor POCT premeals and at bedtime  - Sliding Scale B    #HFrEF and severe AS.   * Last TTE 21 EF 40%, mild-mod MR, mod TR, severe AS (Area 0.58cm2), WMA.   * Home med Toprol 25mg QD  - Monitor in/out  - Monitor daily weight  - Monitor daily CXR  - Monitor SaO2 and Oxygen Requirements: 4LPM NC currently. BiPAP 12/5 FiO2 40% overnight  - Avoid volume overload  - Follow up TTE  - Hold Toprol 25mg QD. Currently on pressors    #BPH  Tamsulosin 0,4 QD    #Dyslipidemia  Atorvastatin 40mg QD  Aspirin 81mg QD    #Alzheimer Dementia  Donepezil 5mg QD, Sertraline 25mg QD, Mirtazepine 15mg QD      #DVT Prophylaxis: Heparin 5000 Subcutaneously BID  #GI Prophylaxis: Pantoprazole 40mg PO QD  #Diet: Renal  #Code Status: DNR/DNI per discussion with daughter     Barriers to learning: YES AMS  Discharge Planning: Patient will be discharged once stable and  Plan was communicated with daughter and ED team

## 2021-09-22 NOTE — CONSULT NOTE ADULT - PROBLEM SELECTOR RECOMMENDATION 4
on HD  renal following for recommendations  family wishes to continue HD for now, will re-address GOC  would be hospice appropriate if they wished to stop HD at any point

## 2021-09-22 NOTE — CONSULT NOTE ADULT - PROBLEM SELECTOR RECOMMENDATION 9
DNR/DNI  Continue current medical management  Will re-address GOC with dtr(s) when available either at bedside or by phone  Will follow

## 2021-09-22 NOTE — CONSULT NOTE ADULT - PROBLEM SELECTOR RECOMMENDATION 2
differential includes , pna  ID following for recommendations  requiring pressors   continue current workup and med mgmt per family wishes

## 2021-09-22 NOTE — CONSULT NOTE ADULT - SUBJECTIVE AND OBJECTIVE BOX
VIC, ISMAIL  81y, Male  Allergy: No Known Allergies      All historical available data reviewed.    HPI:  History of Present Illness    Mr. Douglas is an 81 year old DNR/DNI male patient known to have:  - Alzheimer Dementia. From [facility]. Home med Donepezil 5mg QD, Sertraline 25mg QD, Mirtazepine 15mg QD  - ESRD and Anemia of chronic Disease. On HD every MWF via [facility] at [facility] complicated by hypotension s/p initiation of Midodrine 10mg in AM. Receives ROMELIA 5000 units every MWF with HD. Follows with Dr Saba and Dr Vasquez. Home med calcium acetate 667mg TID, Midodrine 10mg in AM  - COPD on 4LPM NC at NH. Home med Albuterol 2 puffs Q6h and Breo Ellipta 200-25mg QD  - DM Type II. Last Hba1c 5.0 2021. Not on medication/ Diet controlled  - HFrEF and severe AS. Last TTE 21 EF 40%, mild-mod MR, mod TR, severe AS (Area 0.58cm2), WMA. Home med Toprol 25mg QD  - BPH. Home med Tamsulosin 0.4mg QD  - Dyslipidemia. Last lipid profile 21 , Chol 114, LDL 52, HDL 38. Home med Atorvastatin 40mg QD, Aspirin 81mg QD  - Recent admission for multilobar pneumonia with effusion s/p palliative thoracocentesis and removal of 1L fluids with cytopathology being indicative of reactive effusion positive for CK7, CK5,6, calretin, and CD 68 s/p discharge on  on levaquin. During that admission, GOC were discussed and palliative was on board: daughter refused Hospice and opted to continued with HD sessions      History was obtained from the daughter (Ms Vidal) over the phone [phone] since patient seems lethargic and is not cooperative.  He was brought to the ED from [facility] by EMS on  for evaluation of hypotension and fever.  History goes back to Saturday when the patient spiked a fever of 100.1 degrees.  This was associated with lethargy, reduced PO intake, and confusion.  Daughter notes that patient was complaining of mild epigastric discomfort in the absence of any nausea, vomiting, shortness of breath, increased cough, increased oxygen requirements, change in bowel movements (diarrhea or constipation), or urinary symptoms (no much urine output at baseline).  Today, in the AM of , patient spiked a T of 100.5 at [facility] and was found to have a low SBP in 80's mmHg along with low pulse.  NH staff noted that patient has not voided since 06:00 AM.  EMS was contacted and patient was brought to the ED after the administration of 250 cc NS bolus.  No sick contacts.  No recent travel or exposure to recent travelers.      Upon presentation to the ED, the patient's Vital Signs in ED were as follows  - /75 mmHg --> dropped to 75/42 mmHg at 17:30 PM s/p 1L LR bolus in ED followed by Levophed initiation at a rate of 0.04 right now  -  bpm  - RR 18  bpm  - T 37.8  - SaO2 97% on RA      Investigations in ED   - CBC  --> WBC 14.19, Hb 11.8, Plt 287    - Chemistry  --> Na 131, K 4.8, BUN 51, Cr 6.0, AG 17, eGFR 8-9  --> VBG pH 7.25, pCO2 56, pO2 35, HCO3 25, %61.1, LA venous 1.7    - Imaging  --> Chest X Ray: CM, improved left sided effusion, improved interstitial opacification     - Microbiology  --> COVID received  --> Blood cultures x2 sets received  --> Urinalysis and urine culture pending collection (if possible)      In the setting of drop in BP from 133/75 to 75/42 mmHg at 17:30PM, 1L LR bolus was administered followed by levophed at 0.04.  Patient was given a dose of IV Cefepime 2g and IV Vancomycin 1g in ED  He will be admitted to the floor as a case of septic shock with Unclear Infectious Etiology for management with broad coverage antibiotics and pressors and for further investigations and monitoring.   (21 Sep 2021 20:17)    FAMILY HISTORY:    PAST MEDICAL & SURGICAL HISTORY:  Diabetes mellitus    COPD (chronic obstructive pulmonary disease)    Lymphedema of both lower extremities    CHF (congestive heart failure)    ESRD on dialysis    H/O right nephrectomy  20 yrs ago          VITALS:  T(F): 98.6, Max: 100.8 (21 @ 15:45)  HR: 61  BP: 112/51  RR: 18Vital Signs Last 24 Hrs  T(C): 37 (22 Sep 2021 07:00), Max: 38.2 (21 Sep 2021 15:45)  T(F): 98.6 (22 Sep 2021 07:00), Max: 100.8 (21 Sep 2021 15:45)  HR: 61 (22 Sep 2021 09:00) (61 - 108)  BP: 112/51 (22 Sep 2021 09:00) (75/42 - 133/75)  BP(mean): 73 (22 Sep 2021 09:00) (60 - 90)  RR: 18 (22 Sep 2021 09:00) (16 - 18)  SpO2: 99% (22 Sep 2021 09:00) (95% - 100%)    TESTS & MEASUREMENTS:                        10.1   12.06 )-----------( 294      ( 22 Sep 2021 08:39 )             33.0     09-21    131<L>  |  94<L>  |  51<H>  ----------------------------<  140<H>  4.8   |  20  |  6.0<HH>    Ca    9.0      21 Sep 2021 15:20    TPro  6.8  /  Alb  3.6  /  TBili  0.3  /  DBili  x   /  AST  24  /  ALT  15  /  AlkPhos  116<H>  09-21    LIVER FUNCTIONS - ( 21 Sep 2021 15:20 )  Alb: 3.6 g/dL / Pro: 6.8 g/dL / ALK PHOS: 116 U/L / ALT: 15 U/L / AST: 24 U/L / GGT: x             Urinalysis Basic - ( 22 Sep 2021 02:00 )    Color: Yellow / Appearance: Turbid / S.015 / pH: x  Gluc: x / Ketone: Small  / Bili: Negative / Urobili: <2 mg/dL   Blood: x / Protein: 300 mg/dL / Nitrite: Negative   Leuk Esterase: Large / RBC: >720 /HPF / WBC >720 /HPF   Sq Epi: x / Non Sq Epi: x / Bacteria: x          RADIOLOGY & ADDITIONAL TESTS:  Personal review of radiological diagnostics performed  Echo and EKG results noted when applicable.     MEDICATIONS:  acetaminophen   Tablet .. 650 milliGRAM(s) Oral every 6 hours PRN  ALBUTerol  90 MICROgram(s) HFA Inhaler - Peds 2 Puff(s) Inhalation every 4 hours PRN  ascorbic acid 500 milliGRAM(s) Oral daily  aspirin enteric coated 81 milliGRAM(s) Oral daily  atorvastatin 40 milliGRAM(s) Oral at bedtime  calcium acetate 667 milliGRAM(s) Oral three times a day with meals  cefepime   IVPB 1000 milliGRAM(s) IV Intermittent daily  chlorhexidine 4% Liquid 1 Application(s) Topical <User Schedule>  dextrose 40% Gel 15 Gram(s) Oral once  dextrose 5%. 1000 milliLiter(s) IV Continuous <Continuous>  dextrose 5%. 1000 milliLiter(s) IV Continuous <Continuous>  dextrose 50% Injectable 25 Gram(s) IV Push once  dextrose 50% Injectable 12.5 Gram(s) IV Push once  dextrose 50% Injectable 25 Gram(s) IV Push once  donepezil 5 milliGRAM(s) Oral at bedtime  glucagon  Injectable 1 milliGRAM(s) IntraMuscular once  heparin SubCutaneous Injection - Peds 5000 Unit(s) SubCutaneous every 12 hours  insulin lispro (ADMELOG) corrective regimen sliding scale   SubCutaneous three times a day before meals  metroNIDAZOLE  IVPB 500 milliGRAM(s) IV Intermittent every 8 hours  midodrine. 10 milliGRAM(s) Oral three times a day  mirtazapine 15 milliGRAM(s) Oral at bedtime  multivitamin Oral Tab/Cap - Peds 1 Tablet(s) Oral daily  norepinephrine Infusion 0.05 MICROgram(s)/kG/Min IV Continuous <Continuous>  pantoprazole    Tablet 40 milliGRAM(s) Oral before breakfast  sertraline 25 milliGRAM(s) Oral daily  tamsulosin 0.4 milliGRAM(s) Oral at bedtime  vancomycin  IVPB 750 milliGRAM(s) IV Intermittent <User Schedule>      ANTIBIOTICS:  cefepime   IVPB 1000 milliGRAM(s) IV Intermittent daily  metroNIDAZOLE  IVPB 500 milliGRAM(s) IV Intermittent every 8 hours  vancomycin  IVPB 750 milliGRAM(s) IV Intermittent <User Schedule>

## 2021-09-22 NOTE — CONSULT NOTE ADULT - COMMENTS
unable to obtain history secondary to patient's mental status and/or sedation  Has abdominal pain  appears in NAD

## 2021-09-22 NOTE — CONSULT NOTE ADULT - ASSESSMENT
ESRD on HD   - access via TDC   - fluid removal during HD has been limited due to intradialytic hypotension  UTI  COPD on home O2  HFrEF  dementia   HTN  anemia  right nephrectomy    plan:    Followup blood and urine cultures  Abx per ID  Continue midodrine  HD today: 3 hours, opti 160 dialyzer, 2K bath, 1-2L UF  EPO and Venofer with HD  Renal diet ESRD on HD   - access via TDC   - fluid removal during HD has been limited due to intradialytic hypotension  UTI  COPD on home O2  HFrEF  dementia   HTN  anemia  right nephrectomy    plan:    Followup blood and urine cultures  Abx per ID  Continue midodrine  HD today: 3 hours, opti 160 dialyzer, 2K bath, 1-2L UF  EPO with HD  Renal diet

## 2021-09-22 NOTE — CONSULT NOTE ADULT - ASSESSMENT
IMPRESSION:    Sepsis present on admission   Septic Shock improving   UTI/Pyelonephritis  RLL Pneumonia.  Possible aspiration  HO Alzheimer's Dementia  ESRD on HD     PLAN:    CNS: Avoid sedation    HEENT: Oral care.      PULMONARY:  HOB @ 45 degrees.  Aspiration precautions.  Wean O2 as tolerated       CARDIOVASCULAR:  Wean  Levophed,  Keep MAP > 60.      GI: GI prophylaxis.  Feeding per speech.  Bowel regimen.  Enemas     RENAL:  Follow up lytes.  Correct as needed. HD per renal      INFECTIOUS DISEASE: Follow up cultures.  ABX per ID.  Nasal MRSA.      HEMATOLOGICAL:  DVT prophylaxis.  Dimer.      ENDOCRINE:  Follow up FS.     MUSCULOSKELETAL: Bed rest     SDU     Prognosis poor     GOC

## 2021-09-22 NOTE — PROGRESS NOTE ADULT - SUBJECTIVE AND OBJECTIVE BOX
DRAKE HO  81y Male    CHIEF COMPLAINT:    Patient is a 81y old  Male who presents with a chief complaint of Hypotension and Fever (22 Sep 2021 13:00)    INTERVAL HPI/OVERNIGHT EVENTS:    Patient seen and examined. No acute events overnight. remains on low dose levo    ROS: All other systems are negative.    Vital Signs:    T(F): 97.8 (09-22-21 @ 11:30), Max: 100.8 (09-21-21 @ 15:45)  HR: 60 (09-22-21 @ 13:30) (60 - 96)  BP: 99/41 (09-22-21 @ 13:30) (75/42 - 127/60)  RR: 18 (09-22-21 @ 13:30) (16 - 18)  SpO2: 96% (09-22-21 @ 13:30) (95% - 100%)  I&O's Summary    21 Sep 2021 07:01  -  22 Sep 2021 07:00  --------------------------------------------------------  IN: 22.4 mL / OUT: 50 mL / NET: -27.6 mL    22 Sep 2021 07:01  -  22 Sep 2021 15:07  --------------------------------------------------------  IN: 10.6 mL / OUT: 150 mL / NET: -139.4 mL    POCT Blood Glucose.: 99 mg/dL (22 Sep 2021 11:55)  POCT Blood Glucose.: 120 mg/dL (22 Sep 2021 11:15)  POCT Blood Glucose.: 114 mg/dL (22 Sep 2021 07:33)    PHYSICAL EXAM:    GENERAL:  NAD  SKIN: No rashes or lesions  HEENT: Atraumatic. Normocephalic.   NECK: Supple, No JVD.   PULMONARY: Coarse breath sounds b/L. No wheezing. No rales  CVS: Normal S1, S2. Rate and Rhythm are regular.   ABDOMEN/GI: Soft, Nontender, Nondistended; BS present  MSK:  No clubbing or cyanosis   NEUROLOGIC:  moves all extremities  PSYCH: AAO x0, responds to verbal stimuli    Consultant(s) Notes Reviewed:  [x ] YES  [ ] NO  Care Discussed with Consultants/Other Providers [ x] YES  [ ] NO    LABS:                        10.1   12.06 )-----------( 294      ( 22 Sep 2021 08:39 )             33.0     134<L>  |  97<L>  |  57<H>  ----------------------------<  110<H>  4.5   |  21  |  6.5<HH>    Ca    8.9      22 Sep 2021 08:39  Phos  4.8     09-22  Mg     1.9     09-22    TPro  5.9<L>  /  Alb  3.0<L>  /  TBili  0.3  /  DBili  x   /  AST  19  /  ALT  13  /  AlkPhos  94  09-22    PT/INR - ( 21 Sep 2021 15:20 )   PT: 13.20 sec;   INR: 1.15 ratio      PTT - ( 21 Sep 2021 15:20 )  PTT:21.6 sec    Trop 0.20, CKMB --, CK --, 09-22-21 @ 08:39    RADIOLOGY & ADDITIONAL TESTS:  Imaging or report Personally Reviewed:  [x] YES  [ ] NO  EKG reviewed: [x] YES  [ ] NO    Medications:  Standing  ascorbic acid 500 milliGRAM(s) Oral daily  aspirin enteric coated 81 milliGRAM(s) Oral daily  atorvastatin 40 milliGRAM(s) Oral at bedtime  calcium acetate 667 milliGRAM(s) Oral three times a day with meals  cefepime   IVPB 1000 milliGRAM(s) IV Intermittent daily  chlorhexidine 4% Liquid 1 Application(s) Topical <User Schedule>  dextrose 40% Gel 15 Gram(s) Oral once  dextrose 5%. 1000 milliLiter(s) IV Continuous <Continuous>  dextrose 5%. 1000 milliLiter(s) IV Continuous <Continuous>  dextrose 50% Injectable 25 Gram(s) IV Push once  dextrose 50% Injectable 12.5 Gram(s) IV Push once  dextrose 50% Injectable 25 Gram(s) IV Push once  donepezil 5 milliGRAM(s) Oral at bedtime  epoetin graham-epbx (RETACRIT) Injectable 5000 Unit(s) IV Push <User Schedule>  glucagon  Injectable 1 milliGRAM(s) IntraMuscular once  heparin SubCutaneous Injection - Peds 5000 Unit(s) SubCutaneous every 12 hours  insulin lispro (ADMELOG) corrective regimen sliding scale   SubCutaneous three times a day before meals  midodrine. 10 milliGRAM(s) Oral three times a day  mirtazapine 15 milliGRAM(s) Oral at bedtime  multivitamin Oral Tab/Cap - Peds 1 Tablet(s) Oral daily  norepinephrine Infusion 0.05 MICROgram(s)/kG/Min IV Continuous <Continuous>  pantoprazole    Tablet 40 milliGRAM(s) Oral before breakfast  sertraline 25 milliGRAM(s) Oral daily  tamsulosin 0.4 milliGRAM(s) Oral at bedtime  vancomycin  IVPB 750 milliGRAM(s) IV Intermittent <User Schedule>    PRN Meds  acetaminophen   Tablet .. 650 milliGRAM(s) Oral every 6 hours PRN  ALBUTerol  90 MICROgram(s) HFA Inhaler - Peds 2 Puff(s) Inhalation every 4 hours PRN

## 2021-09-22 NOTE — CONSULT NOTE ADULT - PROBLEM SELECTOR RECOMMENDATION 3
advancing dementia, able to converse with family but confused  requires 24 H care in long term care facility   would be hospice appropriate if within GOC  physically bedbound

## 2021-09-22 NOTE — CONSULT NOTE ADULT - ASSESSMENT
82 y/o gentleman with a past medical history of Alzheimer's dementia, ESRD on HD (oliguric), Anemia of chronic disease, COPD on 4L O2, DM II, Hfw/REF and Severe AS, HLD, and recently discharge for multi-lobar pneumonia admitted for septic shock from [facility]. Began having fevers on Sunday and complaining of abdominal pain, without any other significant symptoms (n/v/d, SOB, CP, cough). Today he was noted to be febrile and hypotensive, and anuric.   Still found to be hypotensive in the ED after 1L IVF and started on peripheral levophed, WBC 14, CXR appears improved from last on 9/2.   Easily arousable but confused. He reports pain when pressing his abdomen, otherwise is he does not offer much help other than asking for help.   Laura checked with 10cc of grossly purulent and opaque urinary output.     IMPRESSION;  Sepsis secondary to possible acute pyelonephritis  No PNA  Tesio : no evidence of infection  Abdominal pain related to  infection ?  Doubt cholecystitis/cholangitis    RECOMMENDATIONS;  BCx  UCx  RUQ sono  Vancomycin 1 gm iv post HD  Cefepime 1 gm iv q24h ( on days of HD dose post HD )

## 2021-09-22 NOTE — CONSULT NOTE ADULT - ASSESSMENT
81yMale with ESRD on HD, CHF, COPD, dementia, multiple recent hospitalizations, being evaluated for GOC in the setting of admission for sepsis, currently undergoing workup and tx with abx. Patient known to palliative care team from prior hospitalizations, family opted against hospice and stopping HD last admission. Reached out to dtr Shin today to re-introduce, will attempt to meet at bedside sometime this week to discuss GOC. At this time the patient's children are focused on getting medical updates/workup from primary team. Will FU later this week.     MEDD (morphine equivalent daily dose): 0      See Recs below.    Please call [phone] with questions or concerns 24/7.   We will continue to follow.     Discussed with primary MD.

## 2021-09-22 NOTE — PROGRESS NOTE ADULT - ASSESSMENT
80 y/o gentleman with a past medical history of Alzheimer's dementia, ESRD on HD (oliguric), Anemia of chronic disease, COPD on 4L O2, DM II, Hfw/REF and Severe AS, HLD, and recently discharge for multi-lobar pneumonia admitted for septic shock from [facility]. Began having fevers on Sunday and complaining of abdominal pain, without any other significant symptoms (n/v/d, SOB, CP, cough). The following day he was noted to be febrile and hypotensive, and anuric.   Still found to be hypotensive in the ED after 1L IVF and started on peripheral levophed, WBC 14, CXR appears improved from last on 9/2.     Septic Shock: concern for underlying bacteremia, possibly due to UTI/pyelo versus stercoral colitis  Metabolic Encephalopathy  Ervin pus in maldonado, remains on low dose levophed  < from: CT Abdomen and Pelvis w/ IV Cont (09.22.21 @ 13:24) >  .  Findings compatible with acute cystitis and left-sided ascending urinary tract infection; partially distended urinary bladder despite presence of a Maldonado catheter. Correlate clinically for catheter function.  2.  Large diffuse colonic stool burden with moderately distended rectum and mild presacral edema.  3.  Incompletely characterized cystic lesion in the pancreatic head. This can be further evaluated with an outpatient contrast-enhanced MRI/MRCP.  f/u blood cultures and urine cultures. 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     ESRD on HD (oliguric):  HAGMA  Hyponatremia   -Nephro following, for HD today   -Cont ca-acetate and midodrine    Alzheimer's dementia  -as per family, patient's mentation improved   -Cont donepezil, sertraline, and mirtazepine     #Progress Note Handoff  Pending (specify):   clinical improvement   Disposition:  from NH    Debbie Ortega MD  s. [phone]

## 2021-09-22 NOTE — CONSULT NOTE ADULT - SUBJECTIVE AND OBJECTIVE BOX
[facility] INITIAL CONSULT NOTE  --------------------------------------------------------------------------------  HPI:  Mr. Douglas is an 81 year old DNR/DNI male patient known to have:  - Alzheimer Dementia. From [facility]. Home med Donepezil 5mg QD, Sertraline 25mg QD, Mirtazepine 15mg QD  - ESRD and Anemia of chronic Disease. On HD every MWF via Tess[facility] at [facility] complicated by hypotension s/p initiation of Midodrine 10mg in AM. Receives ROMELIA 5000 units every MWF with HD. Follows with Dr Mccurdy. Home med calcium acetate 667mg TID  - COPD on 4LPM NC at NH. Home med Albuterol 2 puffs Q6h and Breo Ellipta 200-25mg QD  - DM Type II. Last Hba1c 5.0 07/13/2021. Not on medication/ Diet controlled  - HFrEF and severe AS. Last TTE 07/18/21 EF 40%, mild-mod MR, mod TR, severe AS (Area 0.58cm2), WMA. Home med Toprol 25mg QD  - BPH. Home med Tamsulosin 0.4mg QD  - Dyslipidemia. Last lipid profile 08/27/21 , Chol 114, LDL 52, HDL 38. Home med Atorvastatin 40mg QD, Aspirin 81mg QD  - Recent admission for multilobar pneumonia with effusion s/p palliative thoracocentesis and removal of 1L fluids with cytopathology being indicative of reactive effusion positive for CK7, CK5,6, calretin, and CD 68 s/p discharge on 09/02 on levaquin. During that admission, GOC were discussed and palliative was on board: daughter refused Hospice and opted to continued with HD sessions    He was brought to the ED from [facility] by EMS on 09/21 for evaluation of hypotension and fever.  History goes back to Saturday when the patient spiked a fever of 100.1 degrees.  This was associated with lethargy, reduced PO intake, and confusion.  Daughter notes that patient was complaining of mild epigastric discomfort in the absence of any nausea, vomiting, shortness of breath, increased cough, increased oxygen requirements, change in bowel movements (diarrhea or constipation), or urinary symptoms (essentially anuric at baseline).    Upon presentation to the ED, the patient's Vital Signs in ED were as follows  - /75 mmHg --> dropped to 75/42 mmHg at 17:30 PM s/p 1L LR bolus in ED followed by Levophed drip  -  bpm  - RR 18  bpm  - T 37.8  - SaO2 97% on RA    PAST HISTORY  --------------------------------------------------------------------------------  PAST MEDICAL & SURGICAL HISTORY:  Diabetes mellitus  COPD (chronic obstructive pulmonary disease)  Lymphedema of both lower extremities  CHF (congestive heart failure)  ESRD on dialysis  H/O right nephrectomy    FAMILY HISTORY:  Negative for kidney disease    SOCIAL HISTORY:  No current ETOH, tobacco, or illicit drug use    ALLERGIES & MEDICATIONS  --------------------------------------------------------------------------------  Allergies    No Known Allergies    Standing Inpatient Medications  ascorbic acid 500 milliGRAM(s) Oral daily  aspirin enteric coated 81 milliGRAM(s) Oral daily  atorvastatin 40 milliGRAM(s) Oral at bedtime  calcium acetate 667 milliGRAM(s) Oral three times a day with meals  cefepime   IVPB 1000 milliGRAM(s) IV Intermittent daily  chlorhexidine 4% Liquid 1 Application(s) Topical <User Schedule>  dextrose 40% Gel 15 Gram(s) Oral once  dextrose 5%. 1000 milliLiter(s) IV Continuous <Continuous>  dextrose 5%. 1000 milliLiter(s) IV Continuous <Continuous>  dextrose 50% Injectable 25 Gram(s) IV Push once  dextrose 50% Injectable 12.5 Gram(s) IV Push once  dextrose 50% Injectable 25 Gram(s) IV Push once  donepezil 5 milliGRAM(s) Oral at bedtime  glucagon  Injectable 1 milliGRAM(s) IntraMuscular once  heparin SubCutaneous Injection - Peds 5000 Unit(s) SubCutaneous every 12 hours  insulin lispro (ADMELOG) corrective regimen sliding scale   SubCutaneous three times a day before meals  metroNIDAZOLE  IVPB 500 milliGRAM(s) IV Intermittent every 8 hours  midodrine 10 milliGRAM(s) Oral three times a day  mirtazapine 15 milliGRAM(s) Oral at bedtime  multivitamin Oral Tab/Cap - Peds 1 Tablet(s) Oral daily  norepinephrine Infusion 0.05 MICROgram(s)/kG/Min IV Continuous <Continuous>  pantoprazole    Tablet 40 milliGRAM(s) Oral before breakfast  sertraline 25 milliGRAM(s) Oral daily  tamsulosin 0.4 milliGRAM(s) Oral at bedtime  vancomycin  IVPB 750 milliGRAM(s) IV Intermittent <User Schedule>    PRN Inpatient Medications  acetaminophen   Tablet .. 650 milliGRAM(s) Oral every 6 hours PRN  ALBUTerol  90 MICROgram(s) HFA Inhaler - Peds 2 Puff(s) Inhalation every 4 hours PRN    REVIEW OF SYSTEMS  --------------------------------------------------------------------------------  Unable to obtain    VITALS/PHYSICAL EXAM  --------------------------------------------------------------------------------  T(C): 37 (09-22-21 @ 07:00), Max: 38.2 (09-21-21 @ 15:45)  HR: 61 (09-22-21 @ 09:00) (61 - 108)  BP: 112/51 (09-22-21 @ 09:00) (75/42 - 133/75)  RR: 18 (09-22-21 @ 09:00) (16 - 18)  SpO2: 99% (09-22-21 @ 09:00) (95% - 100%)  Height (cm): 172.7 (09-21-21 @ 15:05)  Weight (kg): 70 (09-21-21 @ 16:45)  BMI (kg/m2): 23.5 (09-21-21 @ 16:45)  BSA (m2): 1.83 (09-21-21 @ 16:45)    09-21-21 @ 07:01  -  09-22-21 @ 07:00  --------------------------------------------------------  IN: 22.4 mL / OUT: 50 mL / NET: -27.6 mL    09-22-21 @ 07:01  -  09-22-21 @ 10:34  --------------------------------------------------------  IN: 5.3 mL / OUT: 100 mL / NET: -94.7 mL    Physical Exam:  	Gen: NAD  	Pulm: CTA B/L  	CV: RRR, S1S2  	Back: No CVA tenderness; no sacral edema  	Abd: +BS, soft, nontender/nondistended  	DARRYL: Valentín  	LE: Warm,  no edema  	Neuro: AAO x3  	Vascular access: TDC    LABS/STUDIES  --------------------------------------------------------------------------------              10.1   12.06 >-----------<  294      [09-22-21 @ 08:39]              33.0     134  |  97  |  57  ----------------------------<  110      [09-22-21 @ 08:39]  4.5   |  21  |  6.5        Ca     8.9     [09-22-21 @ 08:39]      Mg     1.9     [09-22-21 @ 08:39]      Phos  4.8     [09-22-21 @ 08:39]    TPro  5.9  /  Alb  3.0  /  TBili  0.3  /  DBili  x   /  AST  19  /  ALT  13  /  AlkPhos  94  [09-22-21 @ 08:39]    PT/INR: PT 13.20, INR 1.15       [09-21-21 @ 15:20]  PTT: 21.6       [09-21-21 @ 15:20]    Troponin 0.20      [09-22-21 @ 08:39]        [09-22-21 @ 08:39]  Serum Osmolality 292      [09-22-21 @ 08:39]    Creatinine Trend:  SCr 6.5 [09-22 @ 08:39]  SCr 6.0 [09-21 @ 15:20]  SCr 5.1 [09-02 @ 11:00]  SCr 7.6 [09-01 @ 05:52]  SCr 5.6 [08-31 @ 06:22]    Urinalysis - [09-22-21 @ 02:00]      Color Yellow / Appearance Turbid / SG 1.015 / pH 6.5      Gluc Negative / Ketone Small  / Bili Negative / Urobili <2 mg/dL       Blood Large / Protein 300 mg/dL / Leuk Est Large / Nitrite Negative      RBC >720 / WBC >720 / Hyaline  / Gran  / Sq Epi  / Non Sq Epi  / Bacteria     Urine Osmolality 395      [09-22-21 @ 02:00]    Iron 10, TIBC 119, %sat 8      [09-22-21 @ 08:39]  Ferritin 332      [01-04-21 @ 06:40]  PTH -- (Ca 8.9)      [12-24-20 @ 04:30]   54  Lipid: chol 114, , HDL 38, LDL --      [08-27-21 @ 04:30]    HBsAb Nonreact      [07-12-21 @ 18:05]  HBsAg Nonreact      [07-15-21 @ 20:00]  HCV 0.15, Nonreact      [07-15-21 @ 20:00]

## 2021-09-22 NOTE — CONSULT NOTE ADULT - SUBJECTIVE AND OBJECTIVE BOX
Patient is a 81y old  Male who presents with a chief complaint of Hypotension and Fever (22 Sep 2021 15:04)      HPI:  History of Present Illness    Mr. Douglas is an 81 year old DNR/DNI male patient known to have:  - Alzheimer Dementia. From [facility]. Home med Donepezil 5mg QD, Sertraline 25mg QD, Mirtazepine 15mg QD  - ESRD and Anemia of chronic Disease. On HD every MWF via [facility] at [facility] complicated by hypotension s/p initiation of Midodrine 10mg in AM. Receives ROMELIA 5000 units every MWF with HD. Follows with Dr Saba and Dr Vasquez. Home med calcium acetate 667mg TID, Midodrine 10mg in AM  - COPD on 4LPM NC at NH. Home med Albuterol 2 puffs Q6h and Breo Ellipta 200-25mg QD  - DM Type II. Last Hba1c 5.0 2021. Not on medication/ Diet controlled  - HFrEF and severe AS. Last TTE 21 EF 40%, mild-mod MR, mod TR, severe AS (Area 0.58cm2), WMA. Home med Toprol 25mg QD  - BPH. Home med Tamsulosin 0.4mg QD  - Dyslipidemia. Last lipid profile 21 , Chol 114, LDL 52, HDL 38. Home med Atorvastatin 40mg QD, Aspirin 81mg QD  - Recent admission for multilobar pneumonia with effusion s/p palliative thoracocentesis and removal of 1L fluids with cytopathology being indicative of reactive effusion positive for CK7, CK5,6, calretin, and CD 68 s/p discharge on  on levaquin. During that admission, GOC were discussed and palliative was on board: daughter refused Hospice and opted to continued with HD sessions      History was obtained from the daughter (Ms Vidal) over the phone [phone] since patient seems lethargic and is not cooperative.  He was brought to the ED from [facility] by EMS on  for evaluation of hypotension and fever.  History goes back to Saturday when the patient spiked a fever of 100.1 degrees.  This was associated with lethargy, reduced PO intake, and confusion.  Daughter notes that patient was complaining of mild epigastric discomfort in the absence of any nausea, vomiting, shortness of breath, increased cough, increased oxygen requirements, change in bowel movements (diarrhea or constipation), or urinary symptoms (no much urine output at baseline).  Today, in the AM of , patient spiked a T of 100.5 at [facility] and was found to have a low SBP in 80's mmHg along with low pulse.  NH staff noted that patient has not voided since 06:00 AM.  EMS was contacted and patient was brought to the ED after the administration of 250 cc NS bolus.  No sick contacts.  No recent travel or exposure to recent travelers.      Upon presentation to the ED, the patient's Vital Signs in ED were as follows  - /75 mmHg --> dropped to 75/42 mmHg at 17:30 PM s/p 1L LR bolus in ED followed by Levophed initiation at a rate of 0.04 right now  -  bpm  - RR 18  bpm  - T 37.8  - SaO2 97% on RA      Investigations in ED   - CBC  --> WBC 14.19, Hb 11.8, Plt 287    - Chemistry  --> Na 131, K 4.8, BUN 51, Cr 6.0, AG 17, eGFR 8-9  --> VBG pH 7.25, pCO2 56, pO2 35, HCO3 25, %61.1, LA venous 1.7    - Imaging  --> Chest X Ray: CM, improved left sided effusion, improved interstitial opacification     - Microbiology  --> COVID received  --> Blood cultures x2 sets received  --> Urinalysis and urine culture pending collection (if possible)      In the setting of drop in BP from 133/75 to 75/42 mmHg at 17:30PM, 1L LR bolus was administered followed by levophed at 0.04.  Patient was given a dose of IV Cefepime 2g and IV Vancomycin 1g in ED  He will be admitted to the floor as a case of septic shock with Unclear Infectious Etiology for management with broad coverage antibiotics and pressors and for further investigations and monitoring.   (21 Sep 2021 20:17)      PAST MEDICAL & SURGICAL HISTORY:  Diabetes mellitus    COPD (chronic obstructive pulmonary disease)    Lymphedema of both lower extremities    CHF (congestive heart failure)    ESRD on dialysis    H/O right nephrectomy  20 yrs ago        SOCIAL HX:   Smoking      No                   ETOH                            Other    FAMILY HISTORY:  :  No known cardiovacular family hisotry     Review Of Systems:     All ROS are negative except per HPI       Allergies    No Known Allergies    Intolerances          PHYSICAL EXAM    ICU Vital Signs Last 24 Hrs  T(C): 35.7 (22 Sep 2021 17:23), Max: 38.1 (22 Sep 2021 04:00)  T(F): 96.3 (22 Sep 2021 17:23), Max: 100.6 (22 Sep 2021 04:00)  HR: 71 (22 Sep 2021 17:23) (60 - 88)  BP: 110/71 (22 Sep 2021 17:23) (85/43 - 127/60)  BP(mean): 73 (22 Sep 2021 09:00) (60 - 90)  ABP: --  ABP(mean): --  RR: 18 (22 Sep 2021 16:59) (18 - 18)  SpO2: 99% (22 Sep 2021 17:) (96% - 100%)      CONSTITUTIONAL:  Ill appearing    NAD    ENT:   Airway patent,   Mouth with normal mucosa.   No thrush      CARDIAC:   Normal rate,   Regular rhythm.    No edema      Vascular:   normal systolic impulse  no bruits    RESPIRATORY:   No wheezing  Bilateral BS   Not tachypneic,  No use of accessory muscles    GASTROINTESTINAL:  Abdomen soft,   Non-tender,   No guarding,   + BS      NEUROLOGICAL:   Alert   DOes not follow commands     SKIN:   Skin normal color for race,   No evidence of rash.      HEME LYMPH: .  No cervical  lymphadenopathy.  No inguinal lymphadenopathy            21 @ 07:  -  21 @ 07:00  --------------------------------------------------------  IN:    Norepinephrine: 22.4 mL  Total IN: 22.4 mL    OUT:    Voided (mL): 50 mL  Total OUT: 50 mL    Total NET: -27.6 mL      21 @ 07:01  -  21 @ 20:38  --------------------------------------------------------  IN:    IV PiggyBack: 300 mL    Norepinephrine: 10.6 mL  Total IN: 310.6 mL    OUT:    Indwelling Catheter - Urethral (mL): 200 mL    Other (mL): 2000 mL  Total OUT: 2200 mL    Total NET: -1889.4 mL          LABS:                          10.1   12.06 )-----------( 294      ( 22 Sep 2021 08:39 )             33.0                                                   134<L>  |  97<L>  |  57<H>  ----------------------------<  110<H>  4.5   |  21  |  6.5<HH>    Creatinine Trend  BUN 57, Cr 6.5, (21 @ 08:39)  Creatinine Trend  BUN 51, Cr 6.0, (21 @ 15:20)      Ca    8.9      22 Sep 2021 08:39  Phos  4.8       Mg     1.9         TPro  5.9<L>  /  Alb  3.0<L>  /  TBili  0.3  /  DBili  x   /  AST  19  /  ALT  13  /  AlkPhos  94        PT/INR - ( 21 Sep 2021 15:20 )   PT: 13.20 sec;   INR: 1.15 ratio         PTT - ( 21 Sep 2021 15:20 )  PTT:21.6 sec                                       Urinalysis Basic - ( 22 Sep 2021 02:00 )    Color: Yellow / Appearance: Turbid / S.015 / pH: x  Gluc: x / Ketone: Small  / Bili: Negative / Urobili: <2 mg/dL   Blood: x / Protein: 300 mg/dL / Nitrite: Negative   Leuk Esterase: Large / RBC: >720 /HPF / WBC >720 /HPF   Sq Epi: x / Non Sq Epi: x / Bacteria: x        CARDIAC MARKERS ( 22 Sep 2021 08:39 )  x     / 0.20 ng/mL / x     / x     / x                                                LIVER FUNCTIONS - ( 22 Sep 2021 08:39 )  Alb: 3.0 g/dL / Pro: 5.9 g/dL / ALK PHOS: 94 U/L / ALT: 13 U/L / AST: 19 U/L / GGT: x                                                                                                                                       X-Rays reviewed                                                                                     ECHO    CXR interpreted by me :  Left sided effusion     MEDICATIONS  (STANDING):  ascorbic acid 500 milliGRAM(s) Oral daily  aspirin enteric coated 81 milliGRAM(s) Oral daily  atorvastatin 40 milliGRAM(s) Oral at bedtime  calcium acetate 667 milliGRAM(s) Oral three times a day with meals  cefepime   IVPB 1000 milliGRAM(s) IV Intermittent daily  chlorhexidine 4% Liquid 1 Application(s) Topical <User Schedule>  dextrose 40% Gel 15 Gram(s) Oral once  dextrose 5%. 1000 milliLiter(s) (50 mL/Hr) IV Continuous <Continuous>  dextrose 5%. 1000 milliLiter(s) (100 mL/Hr) IV Continuous <Continuous>  dextrose 50% Injectable 25 Gram(s) IV Push once  dextrose 50% Injectable 12.5 Gram(s) IV Push once  dextrose 50% Injectable 25 Gram(s) IV Push once  donepezil 5 milliGRAM(s) Oral at bedtime  epoetin graham-epbx (RETACRIT) Injectable 5000 Unit(s) IV Push <User Schedule>  glucagon  Injectable 1 milliGRAM(s) IntraMuscular once  heparin SubCutaneous Injection - Peds 5000 Unit(s) SubCutaneous every 12 hours  insulin lispro (ADMELOG) corrective regimen sliding scale   SubCutaneous three times a day before meals  midodrine. 10 milliGRAM(s) Oral three times a day  mirtazapine 15 milliGRAM(s) Oral at bedtime  multivitamin Oral Tab/Cap - Peds 1 Tablet(s) Oral daily  norepinephrine Infusion 0.05 MICROgram(s)/kG/Min (6.56 mL/Hr) IV Continuous <Continuous>  pantoprazole    Tablet 40 milliGRAM(s) Oral before breakfast  sertraline 25 milliGRAM(s) Oral daily  tamsulosin 0.4 milliGRAM(s) Oral at bedtime  vancomycin  IVPB 750 milliGRAM(s) IV Intermittent <User Schedule>    MEDICATIONS  (PRN):  acetaminophen   Tablet .. 650 milliGRAM(s) Oral every 6 hours PRN Temp greater or equal to 38C (100.4F), Mild Pain (1 - 3), Moderate Pain (4 - 6)  ALBUTerol  90 MICROgram(s) HFA Inhaler - Peds 2 Puff(s) Inhalation every 4 hours PRN Bronchospasm

## 2021-09-22 NOTE — CONSULT NOTE ADULT - PROBLEM SELECTOR RECOMMENDATION 5
-Recommend non-pharmacological interventions to prevent/treat delirium  - maintain day/night light cycles  - optimize sleep-wake cycle, minimize environmental noise  - reorientation frequently  - use verbal redirection as first line  - minimize restraints and lines  - ensure good bladder/bowel function  - ensure adequate pain control  - minimize use of anticholinergic, antihistaminic, and benzodiazepine medications

## 2021-09-23 NOTE — PROGRESS NOTE ADULT - SUBJECTIVE AND OBJECTIVE BOX
DRAKE HO  81y Male    CHIEF COMPLAINT:    Patient is a 81y old  Male who presents with a chief complaint of Hypotension and Fever (23 Sep 2021 13:03)      INTERVAL HPI/OVERNIGHT EVENTS:    Patient seen and examined. No acute events overnight. More awake today, eating breakfast     ROS: All other systems are negative.    Vital Signs:    T(F): 98.3 (09-23-21 @ 04:00), Max: 98.3 (09-23-21 @ 04:00)  HR: 92 (09-23-21 @ 08:09) (60 - 92)  BP: 126/61 (09-23-21 @ 08:09) (99/41 - 126/61)  RR: 18 (09-23-21 @ 08:09) (18 - 18)  SpO2: 97% (09-23-21 @ 08:09) (96% - 99%)    22 Sep 2021 07:01  -  23 Sep 2021 07:00  --------------------------------------------------------  IN: 310.6 mL / OUT: 2200 mL / NET: -1889.4 mL    POCT Blood Glucose.: 189 mg/dL (23 Sep 2021 11:54)  POCT Blood Glucose.: 149 mg/dL (23 Sep 2021 07:33)  POCT Blood Glucose.: 146 mg/dL (22 Sep 2021 20:59)  POCT Blood Glucose.: 121 mg/dL (22 Sep 2021 17:13)    PHYSICAL EXAM:    GENERAL:  NAD  SKIN: No rashes or lesions  HEENT: Atraumatic. Normocephalic.    NECK: Supple, No JVD.   PULMONARY: decreased breath sounds b/l. No wheezing.   CVS: Normal S1, S2. Rate and Rhythm are regular.    ABDOMEN/GI: Soft, Nontender, Nondistended; BS present  MSK:  No clubbing or cyanosis   NEUROLOGIC: Moves all extremities  PSYCH: Alert & oriented x2, engages in conversation     Consultant(s) Notes Reviewed:  [x ] YES  [ ] NO  Care Discussed with Consultants/Other Providers [ x] YES  [ ] NO    LABS:                        10.1   10.25 )-----------( 290      ( 23 Sep 2021 04:30 )             33.3     135  |  98  |  40<H>  ----------------------------<  184<H>  3.8   |  22  |  5.4<HH>    Ca    8.6      23 Sep 2021 04:30  Phos  3.2     09-23  Mg     1.9     09-23    TPro  5.9<L>  /  Alb  3.1<L>  /  TBili  <0.2  /  DBili  x   /  AST  24  /  ALT  15  /  AlkPhos  102  09-23    PT/INR - ( 21 Sep 2021 15:20 )   PT: 13.20 sec;   INR: 1.15 ratio      PTT - ( 21 Sep 2021 15:20 )  PTT:21.6 sec    Trop 0.20, CKMB --, CK --, 09-22-21 @ 08:39    Culture - Blood (collected 21 Sep 2021 16:55)  Source: .Blood Blood-Peripheral  Gram Stain (23 Sep 2021 05:26):    Growth in aerobic bottle: Gram Positive Cocci in Clusters    Growth in anaerobic bottle: Gram Positive Cocci in Clusters  Preliminary Report (23 Sep 2021 05:26):    Growth in anaerobic bottle: Gram Positive Cocci in Clusters    Growth in aerobic bottle: Gram Positive Cocci in Clusters    ***Blood Panel PCR results on this specimen are available    approximately 3 hours after the Gram stain result.***    Gram stain, PCR, and/or culture results may not always    correspond due to difference in methodologies.    ************************************************************    This PCR assay was performed by multiplex PCR. This    Assay tests for 66 bacterial and resistance gene targets.    Please refer to the [facility] test directory    at [email or web address] for details.  Organism: Blood Culture PCR (23 Sep 2021 02:24)  Organism: Blood Culture PCR (23 Sep 2021 02:24)    Culture - Blood (collected 21 Sep 2021 16:40)  Source: .Blood Blood-Peripheral  Preliminary Report (22 Sep 2021 23:02):    No growth to date.    RADIOLOGY & ADDITIONAL TESTS:  Imaging or report Personally Reviewed:  [x] YES  [ ] NO  EKG reviewed: [x] YES  [ ] NO    Medications:  Standing  ascorbic acid 500 milliGRAM(s) Oral daily  aspirin enteric coated 81 milliGRAM(s) Oral daily  atorvastatin 40 milliGRAM(s) Oral at bedtime  calcium acetate 667 milliGRAM(s) Oral three times a day with meals  chlorhexidine 4% Liquid 1 Application(s) Topical <User Schedule>  dextrose 40% Gel 15 Gram(s) Oral once  dextrose 5%. 1000 milliLiter(s) IV Continuous <Continuous>  dextrose 5%. 1000 milliLiter(s) IV Continuous <Continuous>  dextrose 50% Injectable 25 Gram(s) IV Push once  dextrose 50% Injectable 12.5 Gram(s) IV Push once  dextrose 50% Injectable 25 Gram(s) IV Push once  donepezil 5 milliGRAM(s) Oral at bedtime  epoetin graham-epbx (RETACRIT) Injectable 5000 Unit(s) IV Push <User Schedule>  glucagon  Injectable 1 milliGRAM(s) IntraMuscular once  heparin SubCutaneous Injection - Peds 5000 Unit(s) SubCutaneous every 12 hours  insulin lispro (ADMELOG) corrective regimen sliding scale   SubCutaneous three times a day before meals  levoFLOXacin IVPB 250 milliGRAM(s) IV Intermittent every 24 hours  midodrine. 10 milliGRAM(s) Oral three times a day  mirtazapine 15 milliGRAM(s) Oral at bedtime  multivitamin Oral Tab/Cap - Peds 1 Tablet(s) Oral daily  pantoprazole    Tablet 40 milliGRAM(s) Oral before breakfast  polyethylene glycol 3350 17 Gram(s) Oral two times a day  senna 2 Tablet(s) Oral at bedtime  sertraline 25 milliGRAM(s) Oral daily  tamsulosin 0.4 milliGRAM(s) Oral at bedtime    PRN Meds  acetaminophen   Tablet .. 650 milliGRAM(s) Oral every 6 hours PRN  ALBUTerol  90 MICROgram(s) HFA Inhaler - Peds 2 Puff(s) Inhalation every 4 hours PRN

## 2021-09-23 NOTE — PROGRESS NOTE ADULT - SUBJECTIVE AND OBJECTIVE BOX
Patient is a 81y old  Male who presents with a chief complaint of Hypotension and Fever (22 Sep 2021 20:37)        Over Night Events:  Remains on Low dose Levophed.  No SOB         ROS:     All ROS are negative except HPI         PHYSICAL EXAM    ICU Vital Signs Last 24 Hrs  T(C): 36.8 (23 Sep 2021 04:00), Max: 36.8 (23 Sep 2021 00:11)  T(F): 98.3 (23 Sep 2021 04:00), Max: 98.3 (23 Sep 2021 04:00)  HR: 80 (23 Sep 2021 04:00) (60 - 87)  BP: 102/54 (23 Sep 2021 04:00) (85/43 - 123/50)  BP(mean): 73 (22 Sep 2021 09:00) (60 - 78)  ABP: --  ABP(mean): --  RR: 18 (23 Sep 2021 04:00) (18 - 18)  SpO2: 99% (23 Sep 2021 04:00) (96% - 99%)      CONSTITUTIONAL:  Ill appearing.  In NAD    ENT:   Airway patent,   Mouth with normal mucosa.   No thrush    EYES:   Pupils equal,   Round and reactive to light.    CARDIAC:   Normal rate,   Regular rhythm.    No edema      Vascular:  Normal systolic impulse  No Carotid bruits    RESPIRATORY:   No wheezing  Bilateral BS  Normal chest expansion  Not tachypneic,  No use of accessory muscles    GASTROINTESTINAL:  Abdomen soft,   Non-tender,   No guarding,   + BS    MUSCULOSKELETAL:   Range of motion is not limited,  No clubbing, cyanosis    NEUROLOGICAL:   Alert  No motor  deficits.    SKIN:   Skin normal color for race,   Warm and dry   No evidence of rash.    PSYCHIATRIC:   Normal mood and affect.   No apparent risk to self or others.    HEMATOLOGICAL:  No cervical  lymphadenopathy.  no inguinal lymphadenopathy      21 @ 07:01  -  21 @ 07:00  --------------------------------------------------------  IN:    IV PiggyBack: 300 mL    Norepinephrine: 10.6 mL  Total IN: 310.6 mL    OUT:    Indwelling Catheter - Urethral (mL): 200 mL    Other (mL): 2000 mL  Total OUT: 2200 mL    Total NET: -1889.4 mL          LABS:                            10.1   12.06 )-----------( 294      ( 22 Sep 2021 08:39 )             33.0                                               09-    134<L>  |  97<L>  |  57<H>  ----------------------------<  110<H>  4.5   |  21  |  6.5<HH>    Ca    8.9      22 Sep 2021 08:39  Phos  4.8       Mg     1.9         TPro  5.9<L>  /  Alb  3.0<L>  /  TBili  0.3  /  DBili  x   /  AST  19  /  ALT  13  /  AlkPhos  94        PT/INR - ( 21 Sep 2021 15:20 )   PT: 13.20 sec;   INR: 1.15 ratio         PTT - ( 21 Sep 2021 15:20 )  PTT:21.6 sec                                       Urinalysis Basic - ( 22 Sep 2021 02:00 )    Color: Yellow / Appearance: Turbid / S.015 / pH: x  Gluc: x / Ketone: Small  / Bili: Negative / Urobili: <2 mg/dL   Blood: x / Protein: 300 mg/dL / Nitrite: Negative   Leuk Esterase: Large / RBC: >720 /HPF / WBC >720 /HPF   Sq Epi: x / Non Sq Epi: x / Bacteria: x        CARDIAC MARKERS ( 22 Sep 2021 08:39 )  x     / 0.20 ng/mL / x     / x     / x                                                LIVER FUNCTIONS - ( 22 Sep 2021 08:39 )  Alb: 3.0 g/dL / Pro: 5.9 g/dL / ALK PHOS: 94 U/L / ALT: 13 U/L / AST: 19 U/L / GGT: x                                                  Culture - Blood (collected 21 Sep 2021 16:55)  Source: .Blood Blood-Peripheral  Gram Stain (23 Sep 2021 05:26):    Growth in aerobic bottle: Gram Positive Cocci in Clusters    Growth in anaerobic bottle: Gram Positive Cocci in Clusters  Preliminary Report (23 Sep 2021 05:26):    Growth in anaerobic bottle: Gram Positive Cocci in Clusters    Growth in aerobic bottle: Gram Positive Cocci in Clusters    ***Blood Panel PCR results on this specimen are available    approximately 3 hours after the Gram stain result.***    Gram stain, PCR, and/or culture results may not always    correspond due to difference in methodologies.    ************************************************************    This PCR assay was performed by multiplex PCR. This    Assay tests for 66 bacterial and resistance gene targets.    Please refer to the [facility] test directory    at [email or web address] for details.  Organism: Blood Culture PCR (23 Sep 2021 02:24)  Organism: Blood Culture PCR (23 Sep 2021 02:24)    Culture - Blood (collected 21 Sep 2021 16:40)  Source: .Blood Blood-Peripheral  Preliminary Report (22 Sep 2021 23:02):    No growth to date.                                                                                           MEDICATIONS  (STANDING):  ascorbic acid 500 milliGRAM(s) Oral daily  aspirin enteric coated 81 milliGRAM(s) Oral daily  atorvastatin 40 milliGRAM(s) Oral at bedtime  calcium acetate 667 milliGRAM(s) Oral three times a day with meals  cefepime   IVPB 1000 milliGRAM(s) IV Intermittent daily  chlorhexidine 4% Liquid 1 Application(s) Topical <User Schedule>  dextrose 40% Gel 15 Gram(s) Oral once  dextrose 5%. 1000 milliLiter(s) (50 mL/Hr) IV Continuous <Continuous>  dextrose 5%. 1000 milliLiter(s) (100 mL/Hr) IV Continuous <Continuous>  dextrose 50% Injectable 25 Gram(s) IV Push once  dextrose 50% Injectable 12.5 Gram(s) IV Push once  dextrose 50% Injectable 25 Gram(s) IV Push once  donepezil 5 milliGRAM(s) Oral at bedtime  epoetin graham-epbx (RETACRIT) Injectable 5000 Unit(s) IV Push <User Schedule>  glucagon  Injectable 1 milliGRAM(s) IntraMuscular once  heparin SubCutaneous Injection - Peds 5000 Unit(s) SubCutaneous every 12 hours  insulin lispro (ADMELOG) corrective regimen sliding scale   SubCutaneous three times a day before meals  midodrine. 10 milliGRAM(s) Oral three times a day  mirtazapine 15 milliGRAM(s) Oral at bedtime  multivitamin Oral Tab/Cap - Peds 1 Tablet(s) Oral daily  norepinephrine Infusion 0.04 MICROgram(s)/kG/Min (5.25 mL/Hr) IV Continuous <Continuous>  pantoprazole    Tablet 40 milliGRAM(s) Oral before breakfast  sertraline 25 milliGRAM(s) Oral daily  tamsulosin 0.4 milliGRAM(s) Oral at bedtime  vancomycin  IVPB 750 milliGRAM(s) IV Intermittent <User Schedule>    MEDICATIONS  (PRN):  acetaminophen   Tablet .. 650 milliGRAM(s) Oral every 6 hours PRN Temp greater or equal to 38C (100.4F), Mild Pain (1 - 3), Moderate Pain (4 - 6)  ALBUTerol  90 MICROgram(s) HFA Inhaler - Peds 2 Puff(s) Inhalation every 4 hours PRN Bronchospasm      New X-rays reviewed:                                                                                  ECHO    CXR interpreted by me:

## 2021-09-23 NOTE — PROGRESS NOTE ADULT - SUBJECTIVE AND OBJECTIVE BOX
DRAKE HO             MRN-[id number]      Patient is a 81y old Male who presents with a chief complaint of Hypotension and Fever (23 Sep 2021 12:29)    Currently admitted with the primary diagnosis of: sepsis       SUBJECTIVE:    - patient seen at bedside  - more awake today, no complaints  - asks me to call his dtr, Shin, so she knows he is in the hospital    ROS:  Denies discomfort.     General:  Denied  HEENT:    Denied  Neck:  Denied  CVS:  Denied  Resp:  Denied  GI:  Denied    :  Denied  Musc:  Denied  Neuro:  Denied  Psych:  Denied  Skin:  Denied  Lymph:  Denied      PEx:   T(C): 36.8 (09-23-21 @ 04:00), Max: 36.8 (09-23-21 @ 00:11)  HR: 92 (09-23-21 @ 08:09) (60 - 92)  BP: 126/61 (09-23-21 @ 08:09) (99/41 - 126/61)  RR: 18 (09-23-21 @ 08:09) (18 - 18)  SpO2: 97% (09-23-21 @ 08:09) (96% - 99%)            General:  found in bed in NAD  Eyes:  PERRL EOMI Non icteric MOM  ENMT: no external oral ulcers, MMM  CVS: RR S1S2 No M/G/R  Resp: Unlabored Non tachypneic No increased WOB  GI:  Soft NT ND   Musc: No C/C/E    Neuro: Follows commands No focal deficits, confused   Psych: Calm Pleasant, AAOx2   Skin: Non jaundiced , no rash     Last BM: unknown     ALLERGIES: No nown Allergies      Labs:	    CBC:                        10.1   10.25 )-----------( 290      ( 23 Sep 2021 04:30 )             33.3     CMP:    09-23    135  |  98  |  40<H>  ----------------------------<  184<H>  3.8   |  22  |  5.4<HH>    Ca    8.6      23 Sep 2021 04:30  Phos  3.2     09-23  Mg     1.9     09-23    TPro  5.9<L>  /  Alb  3.1<L>  /  TBili  <0.2  /  DBili  x   /  AST  24  /  ALT  15  /  AlkPhos  102  09-23       PT/INR - ( 21 Sep 2021 15:20 )   PT: 13.20 sec;   INR: 1.15 ratio         PTT - ( 21 Sep 2021 15:20 )  PTT:21.6 sec       RADIOLOGY    < from: CT Abdomen and Pelvis w/ IV Cont (09.22.21 @ 13:24) >      IMPRESSION:  1.  Findings compatible with acute cystitis and left-sided ascending urinary tract infection; partially distended urinary bladder despite presence of a Laura catheter. Correlate clinically for catheter function.    2.  Large diffuse colonic stool burden with moderately distended rectum and mild presacral edema.    3.  Incompletely characterized cystic lesion in the pancreatic head. This can be further evaluated with an outpatient contrast-enhanced MRI/MRCP.    < end of copied text >      EKG  12 Lead ECG:   Ventricular Rate 87 BPM    Atrial Rate 87 BPM    P-R Interval 154 ms    QRS Duration 108 ms    Q-T Interval 344 ms    QTC Calculation(Bazett) 413 ms    P Axis 50 degrees    R Axis -41 degrees    T Axis 157 degrees    Diagnosis Line Normal sinus rhythm  Left axis deviation  Left anterior fascicular block  Incomplete right bundle branch block  Left ventricular hypertrophy with repolarization abnormality  Abnormal ECG    Confirmed by ADEBAYO MURRAY MD ([id number]) on 9/21/2021 4:00:34 PM (09-21-21 @ 15:31)      Imaging Personally Reviewed:  [X] YES  [ ] NO    Consultant(s) Notes Reviewed:  [ X YES  [ ] NO  Care Discussed with Consultants/Other Providers [X] YES  [ ] NO    Medications:	      MEDICATIONS  (STANDING):  ascorbic acid 500 milliGRAM(s) Oral daily  aspirin enteric coated 81 milliGRAM(s) Oral daily  atorvastatin 40 milliGRAM(s) Oral at bedtime  calcium acetate 667 milliGRAM(s) Oral three times a day with meals  chlorhexidine 4% Liquid 1 Application(s) Topical <User Schedule>  dextrose 40% Gel 15 Gram(s) Oral once  dextrose 5%. 1000 milliLiter(s) (50 mL/Hr) IV Continuous <Continuous>  dextrose 5%. 1000 milliLiter(s) (100 mL/Hr) IV Continuous <Continuous>  dextrose 50% Injectable 25 Gram(s) IV Push once  dextrose 50% Injectable 12.5 Gram(s) IV Push once  dextrose 50% Injectable 25 Gram(s) IV Push once  donepezil 5 milliGRAM(s) Oral at bedtime  epoetin graham-epbx (RETACRIT) Injectable 5000 Unit(s) IV Push <User Schedule>  glucagon  Injectable 1 milliGRAM(s) IntraMuscular once  heparin SubCutaneous Injection - Peds 5000 Unit(s) SubCutaneous every 12 hours  insulin lispro (ADMELOG) corrective regimen sliding scale   SubCutaneous three times a day before meals  levoFLOXacin IVPB 250 milliGRAM(s) IV Intermittent every 24 hours  midodrine. 10 milliGRAM(s) Oral three times a day  mirtazapine 15 milliGRAM(s) Oral at bedtime  multivitamin Oral Tab/Cap - Peds 1 Tablet(s) Oral daily  pantoprazole    Tablet 40 milliGRAM(s) Oral before breakfast  polyethylene glycol 3350 17 Gram(s) Oral two times a day  senna 2 Tablet(s) Oral at bedtime  sertraline 25 milliGRAM(s) Oral daily  tamsulosin 0.4 milliGRAM(s) Oral at bedtime    MEDICATIONS  (PRN):  acetaminophen   Tablet .. 650 milliGRAM(s) Oral every 6 hours PRN Temp greater or equal to 38C (100.4F), Mild Pain (1 - 3), Moderate Pain (4 - 6)  ALBUTerol  90 MICROgram(s) HFA Inhaler - Peds 2 Puff(s) Inhalation every 4 hours PRN Bronchospasm        ADVANCED DIRECTIVES:                DNR DNI         DECISION MAKER: Patient [  ]  Family [ X]  Other [  ] _______  LEGAL SURROGATE: Shin Wray      GOALS OF CARE DISCUSSION  - known to palliative from prior hospitalizations  - dtrs want DNR/DNI continue med mgmt. i have spoken in the past with dtr about hospice and stopping HD as an option which she is not yet ready for  - spoke with viola Melissa yesterday on phone and gave her our #, she will call when she is coming in to speak in person  - will re-address [facility] as appropriate       PSYCHOSOCIAL-SPIRITUAL ASSESSMENT:       Reviewed       See Palliative Care SW/ documentation      CURRENT DISPO PLAN:         WILL REMAIN IN HOSPITAL      PAWEL      REFERRALS	        Palliative Med        Unit SW/Case Mgmt

## 2021-09-23 NOTE — ADVANCED PRACTICE NURSE CONSULT - RECOMMEDATIONS
1. MASD/IAD b/l buttock- intergluteal cleft- Cleanse skin w/ perineal cleanser gently pat dry. Apply thin layer of Coloplast Triad hydrophilic ointment and Coloplast Cachorro protective barrier cream to absorb wound exudate and provide protective layer. To keep paste in place to wound bed, may cover w/ ABD pad. Apply Triad & Cachorro and change dressing q12h and prn for strike-through drainage or soiling. If top layer of Triad/Cachorro soiled, gently remove w/ perineal cleanser and re-apply ointment. Do not scrub off as this may cause further skin breakdown.   2. Stage 4 L heel pressure injury- Cleanse wound bed w/ normal saline. Gently pat dry.  Apply Cavilon no-sting barrier film to wound edges & periwound to prevent maceration. Lightly pack wound bed  w/ hydrofiber/Aquacel to fill in wound depth and absorb wound exudate (upon removal of old dressing, it will be in gel form since it gels when in contact w/ wound exudate). Cover w/ dry gauze & silicone foam Allevyn dressing. Change dressing daily and prn for strike-through drainage or soiling.   -Recommend podiatry consult for further evaluation &  treatment - ? osteomyelitis.    -Assess skin/wound qshift, report changes to primary provider.     Additional recs/PI prevention:  -Continue turning/positioning patient from side-to-side q2h while in bed, q1h when/if OOB chair, or in accordance w/ pt's plan of care. Continue utilizing pillows to assist w/ turning/positioning. When/if OOB chair, utilize pillows or chair cushion to offload pressure.   -Continue to offload heels from bed surface with soft pillow under calfs or by applying offloading boots to BLEs.   -Continue utilizing one underpad underneath patient to contain incontinence episodes; change pad when saturated/soiled.   -Continue nutrition consult & tight glucose control for optimal wound healing & nutritional status.     Plan of Care: Covering RN Bianka made aware of above recs, will endorse to primary YSABEL Mckinley. Spoke w/ covering/primary MD Reno in regards to above. No further needs/recs from WOCN service at this time. Staff RN to perform routine skin/wound assessment and manage wound care. Questions or concerns or if wound worsens and reconsult needed, please contact W[facility]N, [facility][phone].

## 2021-09-23 NOTE — PROGRESS NOTE ADULT - ASSESSMENT
82 y/o gentleman with a past medical history of Alzheimer's dementia, ESRD on HD (oliguric), Anemia of chronic disease, COPD on 4L O2, DM II, Hfw/REF and Severe AS, HLD, and recently discharge for multi-lobar pneumonia admitted for septic shock from [facility]. Began having fevers on Sunday and complaining of abdominal pain, without any other significant symptoms (n/v/d, SOB, CP, cough). The following day he was noted to be febrile and hypotensive, and anuric.   Still found to be hypotensive in the ED after 1L IVF and started on peripheral levophed, WBC 14, CXR appears improved from last on 9/2.     Septic Shock due to Legionella PNA and ORSA bacteremia, shock resolved  Metabolic Encephalopathy, improved  Ervin pus in maldonado, remains on low dose levophed  < from: CT Abdomen and Pelvis w/ IV Cont (09.22.21 @ 13:24) >  .  Findings compatible with acute cystitis and left-sided ascending urinary tract infection; partially distended urinary bladder despite presence of a Maldonado catheter. Correlate clinically for catheter function.  2.  Large diffuse colonic stool burden with moderately distended rectum and mild presacral edema.  3.  Incompletely characterized cystic lesion in the pancreatic head. This can be further evaluated with an outpatient contrast-enhanced MRI/MRCP.  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     ESRD on HD (oliguric):  HAGMA  Hyponatremia   -Nephro following, for HD tomorrow  -Cont ca-acetate and midodrine    Alzheimer's dementia  -as per family, patient's mentation improved   -Cont donepezil, sertraline, and mirtazepine     #Progress Note Handoff  Pending (specify):   clinical improvement   Disposition:  from NH    Debbie Ortega MD  s. [phone]

## 2021-09-23 NOTE — PROGRESS NOTE ADULT - SUBJECTIVE AND OBJECTIVE BOX
[facility]ROLOGY FOLLOW UP NOTE  --------------------------------------------------------------------------------  24 hour events/subjective: Patient examined. Appears comfortable.    PAST HISTORY  --------------------------------------------------------------------------------  No significant changes to PMH, PSH, FHx, SHx, unless otherwise noted    ALLERGIES & MEDICATIONS  --------------------------------------------------------------------------------  Allergies    No Known Allergies    Standing Inpatient Medications  ascorbic acid 500 milliGRAM(s) Oral daily  aspirin enteric coated 81 milliGRAM(s) Oral daily  atorvastatin 40 milliGRAM(s) Oral at bedtime  calcium acetate 667 milliGRAM(s) Oral three times a day with meals  chlorhexidine 4% Liquid 1 Application(s) Topical <User Schedule>  dextrose 40% Gel 15 Gram(s) Oral once  dextrose 5%. 1000 milliLiter(s) IV Continuous <Continuous>  dextrose 5%. 1000 milliLiter(s) IV Continuous <Continuous>  dextrose 50% Injectable 25 Gram(s) IV Push once  dextrose 50% Injectable 12.5 Gram(s) IV Push once  dextrose 50% Injectable 25 Gram(s) IV Push once  donepezil 5 milliGRAM(s) Oral at bedtime  epoetin graham-epbx (RETACRIT) Injectable 5000 Unit(s) IV Push <User Schedule>  glucagon  Injectable 1 milliGRAM(s) IntraMuscular once  heparin SubCutaneous Injection - Peds 5000 Unit(s) SubCutaneous every 12 hours  insulin lispro (ADMELOG) corrective regimen sliding scale   SubCutaneous three times a day before meals  levoFLOXacin IVPB 250 milliGRAM(s) IV Intermittent every 24 hours  midodrine. 10 milliGRAM(s) Oral three times a day  mirtazapine 15 milliGRAM(s) Oral at bedtime  multivitamin Oral Tab/Cap - Peds 1 Tablet(s) Oral daily  pantoprazole    Tablet 40 milliGRAM(s) Oral before breakfast  polyethylene glycol 3350 17 Gram(s) Oral two times a day  senna 2 Tablet(s) Oral at bedtime  sertraline 25 milliGRAM(s) Oral daily  tamsulosin 0.4 milliGRAM(s) Oral at bedtime    PRN Inpatient Medications  acetaminophen   Tablet .. 650 milliGRAM(s) Oral every 6 hours PRN  ALBUTerol  90 MICROgram(s) HFA Inhaler - Peds 2 Puff(s) Inhalation every 4 hours PRN    VITALS/PHYSICAL EXAM  --------------------------------------------------------------------------------  T(C): 36.8 (09-23-21 @ 04:00), Max: 36.8 (09-23-21 @ 00:11)  HR: 92 (09-23-21 @ 08:09) (60 - 92)  BP: 126/61 (09-23-21 @ 08:09) (99/41 - 126/61)  RR: 18 (09-23-21 @ 08:09) (18 - 18)  SpO2: 97% (09-23-21 @ 08:09) (96% - 99%)  Height (cm): 172.7 (09-21-21 @ 15:05)  Weight (kg): 70 (09-21-21 @ 16:45)  BMI (kg/m2): 23.5 (09-21-21 @ 16:45)  BSA (m2): 1.83 (09-21-21 @ 16:45)    09-22-21 @ 07:01  -  09-23-21 @ 07:00  --------------------------------------------------------  IN: 310.6 mL / OUT: 2200 mL / NET: -1889.4 mL    Physical Exam:  	Gen: NAD  	Pulm:  B/L wheeze  	CV: RRR, S1S2  	Abd: +BS, soft, nontender/nondistended  	: No suprapubic tenderness  	LE: Warm, no edema  	Vascular access: TDC    LABS/STUDIES  --------------------------------------------------------------------------------              10.1   10.25 >-----------<  290      [09-23-21 @ 04:30]              33.3     135  |  98  |  40  ----------------------------<  184      [09-23-21 @ 04:30]  3.8   |  22  |  5.4        Ca     8.6     [09-23-21 @ 04:30]      Mg     1.9     [09-23-21 @ 04:30]      Phos  3.2     [09-23-21 @ 04:30]    TPro  5.9  /  Alb  3.1  /  TBili  <0.2  /  DBili  x   /  AST  24  /  ALT  15  /  AlkPhos  102  [09-23-21 @ 04:30]    PT/INR: PT 13.20, INR 1.15       [09-21-21 @ 15:20]  PTT: 21.6       [09-21-21 @ 15:20]    Troponin 0.20      [09-22-21 @ 08:39]        [09-22-21 @ 08:39]  Serum Osmolality 292      [09-22-21 @ 08:39]    Creatinine Trend:  SCr 5.4 [09-23 @ 04:30]  SCr 6.5 [09-22 @ 08:39]  SCr 6.0 [09-21 @ 15:20]  SCr 5.1 [09-02 @ 11:00]  SCr 7.6 [09-01 @ 05:52]    Urinalysis - [09-22-21 @ 02:00]      Color Yellow / Appearance Turbid / SG 1.015 / pH 6.5      Gluc Negative / Ketone Small  / Bili Negative / Urobili <2 mg/dL       Blood Large / Protein 300 mg/dL / Leuk Est Large / Nitrite Negative      RBC >720 / WBC >720 / Hyaline  / Gran  / Sq Epi  / Non Sq Epi  / Bacteria     Urine Osmolality 395      [09-22-21 @ 02:00]    Iron 10, TIBC 119, %sat 8      [09-22-21 @ 08:39]  Ferritin 938      [09-22-21 @ 08:39]  PTH -- (Ca 8.9)      [12-24-20 @ 04:30]   54  Lipid: chol 114, , HDL 38, LDL --      [08-27-21 @ 04:30]

## 2021-09-23 NOTE — ADVANCED PRACTICE NURSE CONSULT - ASSESSMENT
81 year old DNR/DNI male patient with Alzheimer Dementia known to have multiple comorbidities including ESRD and ACD on HD, HFrEF, severe AS, COPD on home O2 4LPM NC, DM II, BPH, DL, and recent admission  for multilobar pneumonia with effusion s/p palliative thoracocentesis s/p discharge on 09/02 on Levaquin currently brought to the ED (9/21) from [facility] by EMS for evaluation of hypotension and fever in setting of recent confusion, reduced PO intake, and lethargy, with stay complicated by hypotension requiring pressors, found to have leukocytosis, HAGMA, to be admitted for septic shock with Unclear Infectious Etiology and further investigations and monitoring. Currently on Levophed at 0/04 and NC at 4LPM.  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   Patient seen by WOCN during previous 8/2021 admission.     Received patient in CEU, laying supine in bed, HOB elevated 30 degrees. Patient awake, alert, following commands, made aware of purpose of WOCN visit, agreeable to consult. With assistance from PCA, turned patient to right side for skin assessment.     Sacral & coccyx skin intact. Moisture associated skim damage (MASD)/incontinence associated dermatitis(IAD) to b/l buttock-intergluteal cleft area-skin denuded, macerated  peeling, multiple areas of partial thickness skin loss throughout,  open wound beds w/ dark pink tissue. Moderate amount of serous drainage present on bedpad underneath patient. No odor, induration, or warmth present on assessment. Patient w/ c/o tenderness during assessment.      Kerlex wrap dressing to left heel in place, dressing removed. Aquacel in gel form to wound bed in place, dressing removed.    Type of wound: Stage 4 pressure injury; POA  Location: left heel   Wound measurements: 3cm x 4cm x 0.3cm  Tunneling/Undermining: none  Wound bed: moist, pink tissue w./ exposed bone-? osteomyelitis   Wound edges: attached, irregular macerated   Periwound: macerated  Wound exudate: minimal amount of active sanguinous drainage present from wound bed  Wound odor: none  Induration, erythema, warmth: none   Wound pain: pt reports severe pain to area on assessment     Patient mostly bedbound, able to turn/position in bed w/ assistance, anuric-on HD + maldonado, incontinence of large amount of loose stool during WOCN visit. Ordered for renal diet, probably inadequate intake as per reported Celso score.

## 2021-09-23 NOTE — PROGRESS NOTE ADULT - ASSESSMENT
· Assessment	  80 y/o gentleman with a past medical history of Alzheimer's dementia, ESRD on HD (oliguric), Anemia of chronic disease, COPD on 4L O2, DM II, Hfw/REF and Severe AS, HLD, and recently discharge for multi-lobar pneumonia admitted for septic shock from [facility]. Began having fevers on Sunday and complaining of abdominal pain, without any other significant symptoms (n/v/d, SOB, CP, cough). Today he was noted to be febrile and hypotensive, and anuric.   Still found to be hypotensive in the ED after 1L IVF and started on peripheral levophed, WBC 14, CXR appears improved from last on 9/2.   Easily arousable but confused. He reports pain when pressing his abdomen, otherwise is he does not offer much help other than asking for help.   Laura checked with 10cc of grossly purulent and opaque urinary output.     IMPRESSION;  Sepsis secondary to ORSA bacteremia/legionella PNA  Tesio : no evidence of obvious  infection  R/o endocarditis  9/21 BCx ORSA  9/22 urine positive fo legionella ag    RECOMMENDATIONS;  D/c bird vargas  ECHO  Daily BCx till repeatedly negative   Vancomycin 1 gm iv post HD . Level  d/c Cefepime   Levo 250 mg iv q24h

## 2021-09-23 NOTE — CONSULT NOTE ADULT - SUBJECTIVE AND OBJECTIVE BOX
VASCULAR SURGERY CONSULT NOTE      HPI:  History of Present Illness    Mr. Douglas is an 81 year old DNR/DNI male patient known to have:  - Alzheimer Dementia. From [facility]. Home med Donepezil 5mg QD, Sertraline 25mg QD, Mirtazepine 15mg QD  - ESRD and Anemia of chronic Disease. On HD every MWF via Tessio at [facility] complicated by hypotension s/p initiation of Midodrine 10mg in AM. Receives ROMELIA 5000 units every MWF with HD. Follows with Dr Saba and Dr Vasquez. Home med calcium acetate 667mg TID, Midodrine 10mg in AM  - COPD on 4LPM NC at NH. Home med Albuterol 2 puffs Q6h and Breo Ellipta 200-25mg QD  - DM Type II. Last Hba1c 5.0 2021. Not on medication/ Diet controlled  - HFrEF and severe AS. Last TTE 21 EF 40%, mild-mod MR, mod TR, severe AS (Area 0.58cm2), WMA. Home med Toprol 25mg QD  - BPH. Home med Tamsulosin 0.4mg QD  - Dyslipidemia. Last lipid profile 21 , Chol 114, LDL 52, HDL 38. Home med Atorvastatin 40mg QD, Aspirin 81mg QD  - Recent admission for multilobar pneumonia with effusion s/p palliative thoracocentesis and removal of 1L fluids with cytopathology being indicative of reactive effusion positive for CK7, CK5,6, calretin, and CD 68 s/p discharge on  on levaquin. During that admission, GOC were discussed and palliative was on board: daughter refused Hospice and opted to continued with HD sessions      History was obtained from the daughter (Ms Vidal) over the phone [phone] since patient seems lethargic and is not cooperative.  He was brought to the ED from [facility] by EMS on  for evaluation of hypotension and fever.  History goes back to Saturday when the patient spiked a fever of 100.1 degrees.  This was associated with lethargy, reduced PO intake, and confusion.  Daughter notes that patient was complaining of mild epigastric discomfort in the absence of any nausea, vomiting, shortness of breath, increased cough, increased oxygen requirements, change in bowel movements (diarrhea or constipation), or urinary symptoms (no much urine output at baseline).  Today, in the AM of , patient spiked a T of 100.5 at [facility] and was found to have a low SBP in 80's mmHg along with low pulse.  NH staff noted that patient has not voided since 06:00 AM.  EMS was contacted and patient was brought to the ED after the administration of 250 cc NS bolus.  No sick contacts.  No recent travel or exposure to recent travelers.      Upon presentation to the ED, the patient's Vital Signs in ED were as follows  - /75 mmHg --> dropped to 75/42 mmHg at 17:30 PM s/p 1L LR bolus in ED followed by Levophed initiation at a rate of 0.04 right now  -  bpm  - RR 18  bpm  - T 37.8  - SaO2 97% on RA      Investigations in ED   - CBC  --> WBC 14.19, Hb 11.8, Plt 287    - Chemistry  --> Na 131, K 4.8, BUN 51, Cr 6.0, AG 17, eGFR 8-9  --> VBG pH 7.25, pCO2 56, pO2 35, HCO3 25, %61.1, LA venous 1.7    - Imaging  --> Chest X Ray: CM, improved left sided effusion, improved interstitial opacification     - Microbiology  --> COVID received  --> Blood cultures x2 sets received  --> Urinalysis and urine culture pending collection (if possible)      In the setting of drop in BP from 133/75 to 75/42 mmHg at 17:30PM, 1L LR bolus was administered followed by levophed at 0.04.  Patient was given a dose of IV Cefepime 2g and IV Vancomycin 1g in ED  He will be admitted to the floor as a case of septic shock with Unclear Infectious Etiology for management with broad coverage antibiotics and pressors and for further investigations and monitoring.   (21 Sep 2021 20:17)    Vascular surgery was consulted for removal of Tessio placed on  by Dr. Rossi as patient is bacteriemic.          PAST MEDICAL & SURGICAL HISTORY:  Diabetes mellitus    COPD (chronic obstructive pulmonary disease)    Lymphedema of both lower extremities    CHF (congestive heart failure)    ESRD on dialysis    H/O right nephrectomy  20 yrs ago      No Known Allergies    Home Medications:  albuterol 90 mcg/inh inhalation aerosol: 2 puff(s) inhaled every 6 hours (20 Aug 2021 09:36)  ascorbic acid 500 mg oral tablet: 1 tab(s) orally once a day (20 Aug 2021 09:36)  aspirin 81 mg oral delayed release tablet: 1 tab(s) orally once a day (20 Aug 2021 09:36)  atorvastatin 40 mg oral tablet: 1 tab(s) orally once a day (at bedtime) (20 Aug 2021 09:36)  Breo Ellipta 200 mcg-25 mcg/inh inhalation powder: 1 puff(s) inhaled once a day (2021 10:25)  calcium acetate 667 mg oral tablet: 1 tab(s) orally 3 times a day (with meals) (20 Aug 2021 13:54)  donepezil 5 mg oral tablet: 1 tab(s) orally once a day (at bedtime) (20 Aug 2021 09:36)  epoetin graham: 5000 unit(s) injectable Monday, Wednesday, and Friday (20 Aug 2021 11:05)  heparin: 5000 unit(s) subcutaneous every 12 hours (01 Sep 2021 13:34)  HYDROmorphone 2 mg oral tablet: 1 tab(s) orally every 4 hours, As needed, Moderate Pain (4 - 6) (01 Sep 2021 12:01)  midodrine 10 mg oral tablet: 1 tab(s) orally  (01 Sep 2021 12:01)  mirtazapine 15 mg oral tablet: 1 tab(s) orally once a day (at bedtime) (20 Aug 2021 09:36)  multivitamin: 1 tab(s) orally once a day (20 Aug 2021 13:54)  pantoprazole 40 mg oral delayed release tablet: 1 tab(s) orally once a day (before a meal) (20 Aug 2021 09:36)  povidone iodine 10% topical ointment: 1 application topically  (01 Sep 2021 12:01)  Senna 8.6 mg oral tablet: 2 tab(s) orally once a day (at bedtime), As Needed (2021 10:25)  sertraline 25 mg oral tablet: 1 tab(s) orally once a day (20 Aug 2021 09:36)  tamsulosin 0.4 mg oral capsule: 1 cap(s) orally once a day (at bedtime) (20 Aug 2021 09:36)  Toprol-XL 25 mg oral tablet, extended release: 1 tab(s) orally once a day (at bedtime) (01 Sep 2021 12:01)    No permtinent family history of PVD    REVIEW OF SYSTEMS:  GENERAL:                                         negative  SKIN:                                                 negative  OPTHALMOLOGIC:                          negative  ENMT:                                               negative  RESPIRATORY AND THORAX:        negative  CARDIOVASCULAR:                         negative  GASTROINTESTINAL:                       negative  NEPHROLOGY:                                  negative  MUSCULOSKELETAL:                       negative  NEUROLOGIC:                                   negative  PSYCHIATRIC:                                    negative  HEMATOLOGY/LYMPHATICS:         negative  ENDOCRINE:                                     negative  ALLERGIC/IMMUNOLOGIC:            negative    12 point ROS otherwise normal except as stated in HPI    PHYSICAL EXAM  Vital Signs Last 24 Hrs  T(C): 36.8 (23 Sep 2021 04:00), Max: 36.8 (23 Sep 2021 00:11)  T(F): 98.3 (23 Sep 2021 04:00), Max: 98.3 (23 Sep 2021 04:00)  HR: 92 (23 Sep 2021 08:09) (60 - 92)  BP: 126/61 (23 Sep 2021 08:09) (99/41 - 126/61)  BP(mean): --  RR: 18 (23 Sep 2021 08:09) (18 - 18)  SpO2: 97% (23 Sep 2021 08:09) (96% - 99%)    General: no acute distress, lying in bed  HEENT: normocephalic, atraumatic, PERRLA, EOMI  Lungs: on 4L NC, diffuse rhonchi, bilateral crackles, no wheezes  Heart: regular rate and rhythm, normal S1 and S2  Abdomen: soft, nontender, +bowel sounds  Neuro: awake, alert, does not follow commands  Extremities: no lower extremity edema. Pulses present.   Skin: no rash or lesion. TDC in place.       MEDICATIONS:   MEDICATIONS  (STANDING):  ascorbic acid 500 milliGRAM(s) Oral daily  aspirin enteric coated 81 milliGRAM(s) Oral daily  atorvastatin 40 milliGRAM(s) Oral at bedtime  calcium acetate 667 milliGRAM(s) Oral three times a day with meals  chlorhexidine 4% Liquid 1 Application(s) Topical <User Schedule>  dextrose 40% Gel 15 Gram(s) Oral once  dextrose 5%. 1000 milliLiter(s) (50 mL/Hr) IV Continuous <Continuous>  dextrose 5%. 1000 milliLiter(s) (100 mL/Hr) IV Continuous <Continuous>  dextrose 50% Injectable 25 Gram(s) IV Push once  dextrose 50% Injectable 12.5 Gram(s) IV Push once  dextrose 50% Injectable 25 Gram(s) IV Push once  donepezil 5 milliGRAM(s) Oral at bedtime  epoetin graham-epbx (RETACRIT) Injectable 5000 Unit(s) IV Push <User Schedule>  glucagon  Injectable 1 milliGRAM(s) IntraMuscular once  heparin SubCutaneous Injection - Peds 5000 Unit(s) SubCutaneous every 12 hours  insulin lispro (ADMELOG) corrective regimen sliding scale   SubCutaneous three times a day before meals  levoFLOXacin IVPB 250 milliGRAM(s) IV Intermittent every 24 hours  midodrine. 10 milliGRAM(s) Oral three times a day  mirtazapine 15 milliGRAM(s) Oral at bedtime  multivitamin Oral Tab/Cap - Peds 1 Tablet(s) Oral daily  pantoprazole    Tablet 40 milliGRAM(s) Oral before breakfast  polyethylene glycol 3350 17 Gram(s) Oral two times a day  senna 2 Tablet(s) Oral at bedtime  sertraline 25 milliGRAM(s) Oral daily  tamsulosin 0.4 milliGRAM(s) Oral at bedtime    MEDICATIONS  (PRN):  acetaminophen   Tablet .. 650 milliGRAM(s) Oral every 6 hours PRN Temp greater or equal to 38C (100.4F), Mild Pain (1 - 3), Moderate Pain (4 - 6)  ALBUTerol  90 MICROgram(s) HFA Inhaler - Peds 2 Puff(s) Inhalation every 4 hours PRN Bronchospasm      LAB/STUDIES:                        10.1   10.25 )-----------( 290      ( 23 Sep 2021 04:30 )             33.3         135  |  98  |  40<H>  ----------------------------<  184<H>  3.8   |  22  |  5.4<HH>    Ca    8.6      23 Sep 2021 04:30  Phos  3.2       Mg     1.9         TPro  5.9<L>  /  Alb  3.1<L>  /  TBili  <0.2  /  DBili  x   /  AST  24  /  ALT  15  /  AlkPhos  102      PT/INR - ( 21 Sep 2021 15:20 )   PT: 13.20 sec;   INR: 1.15 ratio         PTT - ( 21 Sep 2021 15:20 )  PTT:21.6 sec  LIVER FUNCTIONS - ( 23 Sep 2021 04:30 )  Alb: 3.1 g/dL / Pro: 5.9 g/dL / ALK PHOS: 102 U/L / ALT: 15 U/L / AST: 24 U/L / GGT: x             Urinalysis Basic - ( 22 Sep 2021 02:00 )    Color: Yellow / Appearance: Turbid / S.015 / pH: x  Gluc: x / Ketone: Small  / Bili: Negative / Urobili: <2 mg/dL   Blood: x / Protein: 300 mg/dL / Nitrite: Negative   Leuk Esterase: Large / RBC: >720 /HPF / WBC >720 /HPF   Sq Epi: x / Non Sq Epi: x / Bacteria: x      CARDIAC MARKERS ( 22 Sep 2021 08:39 )  x     / 0.20 ng/mL / x     / x     / x                  Culture - Blood (collected 21 Sep 2021 16:55)  Source: .Blood Blood-Peripheral  Gram Stain (23 Sep 2021 05:26):    Growth in aerobic bottle: Gram Positive Cocci in Clusters    Growth in anaerobic bottle: Gram Positive Cocci in Clusters  Preliminary Report (23 Sep 2021 05:26):    Growth in anaerobic bottle: Gram Positive Cocci in Clusters    Growth in aerobic bottle: Gram Positive Cocci in Clusters    ***Blood Panel PCR results on this specimen are available    approximately 3 hours after the Gram stain result.***    Gram stain, PCR, and/or culture results may not always    correspond due to difference in methodologies.    ************************************************************    This PCR assay was performed by multiplex PCR. This    Assay tests for 66 bacterial and resistance gene targets.    Please refer to the [facility] test directory    at [email or web address] for details.  Organism: Blood Culture PCR (23 Sep 2021 02:24)  Organism: Blood Culture PCR (23 Sep 2021 02:24)    Culture - Blood (collected 21 Sep 2021 16:40)  Source: .Blood Blood-Peripheral  Preliminary Report (22 Sep 2021 23:02):    No growth to date.        IMAGING:    < from: CT Abdomen and Pelvis w/ IV Cont (21 @ 13:24) >  1.  Findings compatible with acute cystitis and left-sided ascending urinary tract infection; partially distended urinary bladder despite presence of a Laura catheter. Correlate clinically for catheter function.    2.  Large diffuse colonic stool burden with moderately distended rectum and mild presacral edema.    3.  Incompletely characterized cystic lesion in the pancreatic head. This can be further evaluated with an outpatient contrast-enhanced MRI/MRCP.    < end of copied text >

## 2021-09-23 NOTE — PROGRESS NOTE ADULT - ASSESSMENT
IMPRESSION:    Sepsis present on admission   Septic Shock  G+ cocci bacteremia   UTI/Pyelonephritis  RLL Pneumonia.  Possible aspiration  HO Alzheimer's Dementia  ESRD on HD     PLAN:    CNS: Avoid sedation    HEENT: Oral care.      PULMONARY:  HOB @ 45 degrees.  Aspiration precautions.  Wean O2 as tolerated       CARDIOVASCULAR:  Wean  Levophed,  Keep MAP > 60.      GI: GI prophylaxis.  Feeding per speech.  Bowel regimen.  Enemas     RENAL:  Follow up lytes.  Correct as needed. HD per renal      INFECTIOUS DISEASE: Follow up final cultures.  ABX per ID.  Nasal MRSA.     HEMATOLOGICAL:  DVT prophylaxis.  Dimer noted.  LE duplex.      ENDOCRINE:  Follow up FS.     MUSCULOSKELETAL: Bed rest     SDU     Prognosis poor     GOC   IMPRESSION:    Sepsis present on admission   Septic Shock  G+ cocci bacteremia   UTI/Pyelonephritis  RLL Pneumonia.  Possible aspiration  HO Alzheimer's Dementia  ESRD on HD     PLAN:    CNS: Avoid sedation    HEENT: Oral care.      PULMONARY:  HOB @ 45 degrees.  Aspiration precautions.  Wean O2 as tolerated       CARDIOVASCULAR:  Wean  Levophed,  Keep MAP > 60.      GI: GI prophylaxis.  Feeding per speech.  Bowel regimen.  Enemas     RENAL:  Follow up lytes.  Correct as needed. HD per renal      INFECTIOUS DISEASE: Follow up final cultures.  ABX per ID.  Nasal MRSA.  FU Vanc trough    HEMATOLOGICAL:  DVT prophylaxis.  Dimer noted.  LE duplex.      ENDOCRINE:  Follow up FS.     MUSCULOSKELETAL: Bed rest     SDU     Prognosis poor     GOC

## 2021-09-23 NOTE — CONSULT NOTE ADULT - ASSESSMENT
ASSESSMENT: Patient is a 81 year old with Tesio placement on 7/20/21.   Vascular surgery consulted for removal of Tesio catheter due to bacteremia.     PLAN:   - Remove Tesio today.   - Place udall 9/24 for dialysis   - HD on 9/24  - Primary management per CEU team   - Plan Discussed with Fellow, Dr. Blackwood  - Plan to be discussed with Attending, Dr. Rossi       VASCULAR TEAM SPECTRA: [id number]

## 2021-09-23 NOTE — PROGRESS NOTE ADULT - ASSESSMENT
ESRD on HD   - access via TDC   - fluid removal during HD has been limited due to intradialytic hypotension  Bacteremia  UTI  COPD on home O2  HFrEF  dementia   HTN  anemia  right nephrectomy    plan:    Remove Tesio per ID - consult Dr Gris Campbell per ID  Continue midodrine  HD tomorrow: 3 hours, opti 160 dialyzer, 2K bath, 1-2L UF  EPO with HD  Renal diet

## 2021-09-23 NOTE — PROGRESS NOTE ADULT - SUBJECTIVE AND OBJECTIVE BOX
VIC, DRAKE  81y, Male    All available historical data reviewed    OVERNIGHT EVENTS:  no fevers  alert, weak  feels well and has no new complaints     ROS:  General: Denies rigors, nightsweats  HEENT: Denies headache, rhinorrhea, sore throat, eye pain  CV: Denies CP, palpitations  PULM: Denies wheezing, hemoptysis  GI: Denies hematemesis, hematochezia, melena  : Denies discharge, hematuria  MSK: Denies arthralgias, myalgias  SKIN: Denies rash, lesions  NEURO: Denies paresthesias, weakness  PSYCH: Denies depression, anxiety    VITALS:  T(F): 98.3, Max: 98.3 (21 @ 04:00)  HR: 92  BP: 126/61  RR: 18Vital Signs Last 24 Hrs  T(C): 36.8 (23 Sep 2021 04:00), Max: 36.8 (23 Sep 2021 00:11)  T(F): 98.3 (23 Sep 2021 04:00), Max: 98.3 (23 Sep 2021 04:00)  HR: 92 (23 Sep 2021 08:09) (60 - 92)  BP: 126/61 (23 Sep 2021 08:09) (99/41 - 126/61)  BP(mean): --  RR: 18 (23 Sep 2021 08:09) (18 - 18)  SpO2: 97% (23 Sep 2021 08:09) (96% - 99%)    TESTS & MEASUREMENTS:                        10.1   10.25 )-----------( 290      ( 23 Sep 2021 04:30 )             33.3         135  |  98  |  40<H>  ----------------------------<  184<H>  3.8   |  22  |  5.4<HH>    Ca    8.6      23 Sep 2021 04:30  Phos  3.2       Mg     1.9         TPro  5.9<L>  /  Alb  3.1<L>  /  TBili  <0.2  /  DBili  x   /  AST  24  /  ALT  15  /  AlkPhos  102  23    LIVER FUNCTIONS - ( 23 Sep 2021 04:30 )  Alb: 3.1 g/dL / Pro: 5.9 g/dL / ALK PHOS: 102 U/L / ALT: 15 U/L / AST: 24 U/L / GGT: x             Culture - Blood (collected 21 @ 16:55)  Source: .Blood Blood-Peripheral  Gram Stain (21 @ 05:26):    Growth in aerobic bottle: Gram Positive Cocci in Clusters    Growth in anaerobic bottle: Gram Positive Cocci in Clusters  Preliminary Report (21 @ 05:26):    Growth in anaerobic bottle: Gram Positive Cocci in Clusters    Growth in aerobic bottle: Gram Positive Cocci in Clusters    ***Blood Panel PCR results on this specimen are available    approximately 3 hours after the Gram stain result.***    Gram stain, PCR, and/or culture results may not always    correspond due to difference in methodologies.    ************************************************************    This PCR assay was performed by multiplex PCR. This    Assay tests for 66 bacterial and resistance gene targets.    Please refer to the [facility] test directory    at [email or web address] for details.  Organism: Blood Culture PCR (21 @ 02:24)  Organism: Blood Culture PCR (21 @ 02:24)      -  Staphylococcus epidermidis, Methicillin resistant: Detec      Method Type: PCR    Culture - Blood (collected 21 @ 16:40)  Source: .Blood Blood-Peripheral  Preliminary Report (21 @ 23:02):    No growth to date.      Urinalysis Basic - ( 22 Sep 2021 02:00 )    Color: Yellow / Appearance: Turbid / S.015 / pH: x  Gluc: x / Ketone: Small  / Bili: Negative / Urobili: <2 mg/dL   Blood: x / Protein: 300 mg/dL / Nitrite: Negative   Leuk Esterase: Large / RBC: >720 /HPF / WBC >720 /HPF   Sq Epi: x / Non Sq Epi: x / Bacteria: x          RADIOLOGY & ADDITIONAL TESTS:  Personal review of radiological diagnostics performed  Echo and EKG results noted when applicable.     MEDICATIONS:  acetaminophen   Tablet .. 650 milliGRAM(s) Oral every 6 hours PRN  ALBUTerol  90 MICROgram(s) HFA Inhaler - Peds 2 Puff(s) Inhalation every 4 hours PRN  ascorbic acid 500 milliGRAM(s) Oral daily  aspirin enteric coated 81 milliGRAM(s) Oral daily  atorvastatin 40 milliGRAM(s) Oral at bedtime  calcium acetate 667 milliGRAM(s) Oral three times a day with meals  chlorhexidine 4% Liquid 1 Application(s) Topical <User Schedule>  dextrose 40% Gel 15 Gram(s) Oral once  dextrose 5%. 1000 milliLiter(s) IV Continuous <Continuous>  dextrose 5%. 1000 milliLiter(s) IV Continuous <Continuous>  dextrose 50% Injectable 25 Gram(s) IV Push once  dextrose 50% Injectable 12.5 Gram(s) IV Push once  dextrose 50% Injectable 25 Gram(s) IV Push once  donepezil 5 milliGRAM(s) Oral at bedtime  epoetin graham-epbx (RETACRIT) Injectable 5000 Unit(s) IV Push <User Schedule>  glucagon  Injectable 1 milliGRAM(s) IntraMuscular once  heparin SubCutaneous Injection - Peds 5000 Unit(s) SubCutaneous every 12 hours  insulin lispro (ADMELOG) corrective regimen sliding scale   SubCutaneous three times a day before meals  levoFLOXacin IVPB 250 milliGRAM(s) IV Intermittent every 24 hours  midodrine. 10 milliGRAM(s) Oral three times a day  mirtazapine 15 milliGRAM(s) Oral at bedtime  multivitamin Oral Tab/Cap - Peds 1 Tablet(s) Oral daily  pantoprazole    Tablet 40 milliGRAM(s) Oral before breakfast  polyethylene glycol 3350 17 Gram(s) Oral two times a day  senna 2 Tablet(s) Oral at bedtime  sertraline 25 milliGRAM(s) Oral daily  tamsulosin 0.4 milliGRAM(s) Oral at bedtime      ANTIBIOTICS:  levoFLOXacin IVPB 250 milliGRAM(s) IV Intermittent every 24 hours

## 2021-09-23 NOTE — PROGRESS NOTE ADULT - ASSESSMENT
81yMale with ESRD on HD, CHF, COPD, dementia, multiple recent hospitalizations, being evaluated for GOC in the setting of admission for sepsis, currently undergoing workup and tx with abx. Patient known to palliative care team from prior hospitalizations, family opted against hospice and stopping HD last admission. Reached out to dtr Shin yesterday to re-introduce, will attempt to meet at bedside sometime this week to discuss GOC. At this time the patient's family is focused on getting medical updates/workup from primary team and want full medical management.     Patient appears acutely improved, in good spirits today. Assured him that his daughters know where her is.     MEDD (morphine equivalent daily dose):  0    See Recs below.    Please call [phone] with questions or concerns 24/7.   We will continue to follow.     Discussed with primary MD.

## 2021-09-23 NOTE — PROGRESS NOTE ADULT - SUBJECTIVE AND OBJECTIVE BOX
DRAKE FORTUNEBALAJ 81y Male  MRN#: [id number]   CODE STATUS: DNR/DNI    Hospital Day: 1d  Pt is currently admitted with the primary diagnosis of sepsis    SUBJECTIVE  Overnight/Interval Events: No acute events overnight. This morning, patient is on levophed 0.05 and on 4L nasal canula. Spoke with daughter on phone and she wants all medical treatment but patient is DNR/DNI. She says he is more conversant when family is present and has been compliant with food and medication at NH until he became sick. She says he is hard of hearing.                                             ----------------------------------------------------------  OBJECTIVE  PAST MEDICAL & SURGICAL HISTORY  Diabetes mellitus  COPD (chronic obstructive pulmonary disease)  Lymphedema of both lower extremities  CHF (congestive heart failure)  ESRD on dialysis  H/O right nephrectomy  20 yrs ago                                          -----------------------------------------------------------  ALLERGIES:  No Known Allergies                                          ------------------------------------------------------------    HOME MEDICATIONS  Home Medications:  albuterol 90 mcg/inh inhalation aerosol: 2 puff(s) inhaled every 6 hours (20 Aug 2021 09:36)  ascorbic acid 500 mg oral tablet: 1 tab(s) orally once a day (20 Aug 2021 09:36)  aspirin 81 mg oral delayed release tablet: 1 tab(s) orally once a day (20 Aug 2021 09:36)  atorvastatin 40 mg oral tablet: 1 tab(s) orally once a day (at bedtime) (20 Aug 2021 09:36)  Breo Ellipta 200 mcg-25 mcg/inh inhalation powder: 1 puff(s) inhaled once a day (2021 10:25)  calcium acetate 667 mg oral tablet: 1 tab(s) orally 3 times a day (with meals) (20 Aug 2021 13:54)  donepezil 5 mg oral tablet: 1 tab(s) orally once a day (at bedtime) (20 Aug 2021 09:36)  epoetin graham: 5000 unit(s) injectable Monday, Wednesday, and Friday (20 Aug 2021 11:05)  heparin: 5000 unit(s) subcutaneous every 12 hours (01 Sep 2021 13:34)  HYDROmorphone 2 mg oral tablet: 1 tab(s) orally every 4 hours, As needed, Moderate Pain (4 - 6) (01 Sep 2021 12:01)  midodrine 10 mg oral tablet: 1 tab(s) orally  (01 Sep 2021 12:01)  mirtazapine 15 mg oral tablet: 1 tab(s) orally once a day (at bedtime) (20 Aug 2021 09:36)  multivitamin: 1 tab(s) orally once a day (20 Aug 2021 13:54)  pantoprazole 40 mg oral delayed release tablet: 1 tab(s) orally once a day (before a meal) (20 Aug 2021 09:36)  povidone iodine 10% topical ointment: 1 application topically  (01 Sep 2021 12:01)  Senna 8.6 mg oral tablet: 2 tab(s) orally once a day (at bedtime), As Needed (2021 10:25)  sertraline 25 mg oral tablet: 1 tab(s) orally once a day (20 Aug 2021 09:36)  tamsulosin 0.4 mg oral capsule: 1 cap(s) orally once a day (at bedtime) (20 Aug 2021 09:36)  Toprol-XL 25 mg oral tablet, extended release: 1 tab(s) orally once a day (at bedtime) (01 Sep 2021 12:01)                           MEDICATIONS:  STANDING MEDICATIONS  ascorbic acid 500 milliGRAM(s) Oral daily  aspirin enteric coated 81 milliGRAM(s) Oral daily  atorvastatin 40 milliGRAM(s) Oral at bedtime  calcium acetate 667 milliGRAM(s) Oral three times a day with meals  cefepime   IVPB 1000 milliGRAM(s) IV Intermittent daily  chlorhexidine 4% Liquid 1 Application(s) Topical <User Schedule>  dextrose 40% Gel 15 Gram(s) Oral once  dextrose 5%. 1000 milliLiter(s) IV Continuous <Continuous>  dextrose 5%. 1000 milliLiter(s) IV Continuous <Continuous>  dextrose 50% Injectable 25 Gram(s) IV Push once  dextrose 50% Injectable 12.5 Gram(s) IV Push once  dextrose 50% Injectable 25 Gram(s) IV Push once  donepezil 5 milliGRAM(s) Oral at bedtime  glucagon  Injectable 1 milliGRAM(s) IntraMuscular once  heparin SubCutaneous Injection - Peds 5000 Unit(s) SubCutaneous every 12 hours  insulin lispro (ADMELOG) corrective regimen sliding scale   SubCutaneous three times a day before meals  metroNIDAZOLE  IVPB 500 milliGRAM(s) IV Intermittent every 8 hours  midodrine. 10 milliGRAM(s) Oral three times a day  mirtazapine 15 milliGRAM(s) Oral at bedtime  multivitamin Oral Tab/Cap - Peds 1 Tablet(s) Oral daily  norepinephrine Infusion 0.05 MICROgram(s)/kG/Min IV Continuous <Continuous>  pantoprazole    Tablet 40 milliGRAM(s) Oral before breakfast  sertraline 25 milliGRAM(s) Oral daily  tamsulosin 0.4 milliGRAM(s) Oral at bedtime  vancomycin  IVPB 750 milliGRAM(s) IV Intermittent <User Schedule>    PRN MEDICATIONS  acetaminophen   Tablet .. 650 milliGRAM(s) Oral every 6 hours PRN  ALBUTerol  90 MICROgram(s) HFA Inhaler - Peds 2 Puff(s) Inhalation every 4 hours PRN                                            ------------------------------------------------------------  VITAL SIGNS: Last 24 Hours  T(C): 37 (22 Sep 2021 07:00), Max: 38.2 (21 Sep 2021 15:45)  T(F): 98.6 (22 Sep 2021 07:00), Max: 100.8 (21 Sep 2021 15:45)  HR: 61 (22 Sep 2021 09:00) (61 - 108)  BP: 112/51 (22 Sep 2021 09:00) (75/42 - 133/75)  BP(mean): 73 (22 Sep 2021 09:00) (60 - 90)  RR: 18 (22 Sep 2021 09:00) (16 - 18)  SpO2: 99% (22 Sep 2021 09:00) (95% - 100%)      21 @ 07:01  -  21 @ 07:00  --------------------------------------------------------  IN: 22.4 mL / OUT: 50 mL / NET: -27.6 mL    21 @ 07:01  -  21 @ 09:39  --------------------------------------------------------  IN: 5.3 mL / OUT: 100 mL / NET: -94.7 mL                                             --------------------------------------------------------------  LABS:                        10.1   12.06 )-----------( 294      ( 22 Sep 2021 08:39 )             33.0         131<L>  |  94<L>  |  51<H>  ----------------------------<  140<H>  4.8   |  20  |  6.0<HH>    Ca    9.0      21 Sep 2021 15:20    TPro  6.8  /  Alb  3.6  /  TBili  0.3  /  DBili  x   /  AST  24  /  ALT  15  /  AlkPhos  116<H>      PT/INR - ( 21 Sep 2021 15:20 )   PT: 13.20 sec;   INR: 1.15 ratio         PTT - ( 21 Sep 2021 15:20 )  PTT:21.6 sec  Urinalysis Basic - ( 22 Sep 2021 02:00 )    Color: Yellow / Appearance: Turbid / S.015 / pH: x  Gluc: x / Ketone: Small  / Bili: Negative / Urobili: <2 mg/dL   Blood: x / Protein: 300 mg/dL / Nitrite: Negative   Leuk Esterase: Large / RBC: >720 /HPF / WBC >720 /HPF   Sq Epi: x / Non Sq Epi: x / Bacteria: x                                            -------------------------------------------------------------  RADIOLOGY:                                            --------------------------------------------------------------    PHYSICAL EXAM:    General: lethargic, minimally cooperative, no acute distress  HEENT: normocephalic, atraumatic, PERRLA, EOMi, no thyromegaly  Lungs: on 4L NC, diffuse rhonchi, bilateral crackles, no wheezes  Heart: regular rate and rhythm, normal S1 and S2  Abdomen: soft, nontender, +bowel sounds  Extremities: no lower extremity edema  Skin: no rash or lesion                                         --------------------------------------------------------------    ASSESSMENT & PLAN    81 year old male patient from [facility] with PMH Alzheimer Dementia, ESRD and 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 He was recently admitted for multilobar pneumonia and treated with levofloxacin.     #Septic Shock   etiology most likely  infection. differential includes GI abscess, recurrent pneumonia, bacteremia 2/2 tesio infection    Chest X Ray: CM, improved left sided effusion, improved interstitial opacification   f/u blood cultures x2 sets   f/u urinalysis and urine culture  per ID, vanc 1g q24h and cefepime 1g [id number]  f/u fungitell, urine legionella, strep  f/u recs per pall care    #ESRD and Anemia of chronic Disease  #Hyponatremia  creat 6.5, BUN 57  Na 134    -on HD three times/week  -HD today: 3 hours, opti 160 dialyzer, 2K bath, 1-2L UF  -epo  -renal diet  -continue midodrine 10mg PO TID      #COPD  Respiratory Acidosis on VBG   * On 4LPM NC at NH.   * Home med Albuterol 2 puffs Q6h and Breo Ellipta 200-25mg QD  * VBG pH 7.25, pCO2 56, pO2 35, HCO3 25, %61.1, LA venous 1.7  - Monitor SaO2 and Oxygen Requirements: 4LPM NC currently. BiPAP 12/5 FiO2 40% overnight  - Follow up Chest X Ray on   - Resume nebulizers      #DM Type II  * Last Hba1c 5.0 2021.   * Not on medication/ Diet controlled  - Monitor POCT premeals and at bedtime  - Sliding Scale B    #HFrEF and severe AS.   * Last TTE 21 EF 40%, mild-mod MR, mod TR, severe AS (Area 0.58cm2), WMA.   * Home med Toprol 25mg QD  - Monitor in/out  - Monitor daily weight  - Monitor daily CXR  - Monitor SaO2 and Oxygen Requirements: 4LPM NC currently. BiPAP 12/5 FiO2 40% overnight  - Avoid volume overload  - Follow up TTE  - Hold Toprol 25mg QD. Currently on pressors    #BPH  Tamsulosin 0,4 QD    #Dyslipidemia  Atorvastatin 40mg QD  Aspirin 81mg QD    #Alzheimer Dementia  Donepezil 5mg QD, Sertraline 25mg QD, Mirtazepine 15mg QD      #DVT Prophylaxis: Heparin 5000 Subcutaneously BID  #GI Prophylaxis: Pantoprazole 40mg PO QD  #Diet: Renal  #Code Status: DNR/DNI per discussion with daughter     Barriers to learning: YES AMS  Discharge Planning: Patient will be discharged once stable and  Plan was communicated with daughter and ED team                       DRAKE GJONBALAJ 81y Male  MRN#: [id number]   CODE STATUS: DNR/DNI    Hospital Day: 1d  Pt is currently admitted with the primary diagnosis of sepsis    SUBJECTIVE  Overnight/Interval Events: Overnight patient hypotensive w/MAP 60 and restarted on levophed by night team. Mental status improved this morning and he was taken off levophed. Updated daughter on phone that patient's urine legionella came back positive and she gives consent for tesio to be removed and uldall to be placed as part of infectious work-up.                                             ----------------------------------------------------------  OBJECTIVE  PAST MEDICAL & SURGICAL HISTORY  Diabetes mellitus  COPD (chronic obstructive pulmonary disease)  Lymphedema of both lower extremities  CHF (congestive heart failure)  ESRD on dialysis  H/O right nephrectomy  20 yrs ago                                          -----------------------------------------------------------  ALLERGIES:  No Known Allergies                                          ------------------------------------------------------------    HOME MEDICATIONS  Home Medications:  albuterol 90 mcg/inh inhalation aerosol: 2 puff(s) inhaled every 6 hours (20 Aug 2021 09:36)  ascorbic acid 500 mg oral tablet: 1 tab(s) orally once a day (20 Aug 2021 09:36)  aspirin 81 mg oral delayed release tablet: 1 tab(s) orally once a day (20 Aug 2021 09:36)  atorvastatin 40 mg oral tablet: 1 tab(s) orally once a day (at bedtime) (20 Aug 2021 09:36)  Breo Ellipta 200 mcg-25 mcg/inh inhalation powder: 1 puff(s) inhaled once a day (2021 10:25)  calcium acetate 667 mg oral tablet: 1 tab(s) orally 3 times a day (with meals) (20 Aug 2021 13:54)  donepezil 5 mg oral tablet: 1 tab(s) orally once a day (at bedtime) (20 Aug 2021 09:36)  epoetin graham: 5000 unit(s) injectable Monday, Wednesday, and Friday (20 Aug 2021 11:05)  heparin: 5000 unit(s) subcutaneous every 12 hours (01 Sep 2021 13:34)  HYDROmorphone 2 mg oral tablet: 1 tab(s) orally every 4 hours, As needed, Moderate Pain (4 - 6) (01 Sep 2021 12:01)  midodrine 10 mg oral tablet: 1 tab(s) orally  (01 Sep 2021 12:01)  mirtazapine 15 mg oral tablet: 1 tab(s) orally once a day (at bedtime) (20 Aug 2021 09:36)  multivitamin: 1 tab(s) orally once a day (20 Aug 2021 13:54)  pantoprazole 40 mg oral delayed release tablet: 1 tab(s) orally once a day (before a meal) (20 Aug 2021 09:36)  povidone iodine 10% topical ointment: 1 application topically  (01 Sep 2021 12:01)  Senna 8.6 mg oral tablet: 2 tab(s) orally once a day (at bedtime), As Needed (2021 10:25)  sertraline 25 mg oral tablet: 1 tab(s) orally once a day (20 Aug 2021 09:36)  tamsulosin 0.4 mg oral capsule: 1 cap(s) orally once a day (at bedtime) (20 Aug 2021 09:36)  Toprol-XL 25 mg oral tablet, extended release: 1 tab(s) orally once a day (at bedtime) (01 Sep 2021 12:01)                           MEDICATIONS:  STANDING MEDICATIONS  ascorbic acid 500 milliGRAM(s) Oral daily  aspirin enteric coated 81 milliGRAM(s) Oral daily  atorvastatin 40 milliGRAM(s) Oral at bedtime  calcium acetate 667 milliGRAM(s) Oral three times a day with meals  cefepime   IVPB 1000 milliGRAM(s) IV Intermittent daily  chlorhexidine 4% Liquid 1 Application(s) Topical <User Schedule>  dextrose 40% Gel 15 Gram(s) Oral once  dextrose 5%. 1000 milliLiter(s) IV Continuous <Continuous>  dextrose 5%. 1000 milliLiter(s) IV Continuous <Continuous>  dextrose 50% Injectable 25 Gram(s) IV Push once  dextrose 50% Injectable 12.5 Gram(s) IV Push once  dextrose 50% Injectable 25 Gram(s) IV Push once  donepezil 5 milliGRAM(s) Oral at bedtime  glucagon  Injectable 1 milliGRAM(s) IntraMuscular once  heparin SubCutaneous Injection - Peds 5000 Unit(s) SubCutaneous every 12 hours  insulin lispro (ADMELOG) corrective regimen sliding scale   SubCutaneous three times a day before meals  metroNIDAZOLE  IVPB 500 milliGRAM(s) IV Intermittent every 8 hours  midodrine. 10 milliGRAM(s) Oral three times a day  mirtazapine 15 milliGRAM(s) Oral at bedtime  multivitamin Oral Tab/Cap - Peds 1 Tablet(s) Oral daily  norepinephrine Infusion 0.05 MICROgram(s)/kG/Min IV Continuous <Continuous>  pantoprazole    Tablet 40 milliGRAM(s) Oral before breakfast  sertraline 25 milliGRAM(s) Oral daily  tamsulosin 0.4 milliGRAM(s) Oral at bedtime  vancomycin  IVPB 750 milliGRAM(s) IV Intermittent <User Schedule>    PRN MEDICATIONS  acetaminophen   Tablet .. 650 milliGRAM(s) Oral every 6 hours PRN  ALBUTerol  90 MICROgram(s) HFA Inhaler - Peds 2 Puff(s) Inhalation every 4 hours PRN                                            ------------------------------------------------------------  VITAL SIGNS: Last 24 Hours  T(C): 37 (22 Sep 2021 07:00), Max: 38.2 (21 Sep 2021 15:45)  T(F): 98.6 (22 Sep 2021 07:00), Max: 100.8 (21 Sep 2021 15:45)  HR: 61 (22 Sep 2021 09:00) (61 - 108)  BP: 112/51 (22 Sep 2021 09:00) (75/42 - 133/75)  BP(mean): 73 (22 Sep 2021 09:00) (60 - 90)  RR: 18 (22 Sep 2021 09:00) (16 - 18)  SpO2: 99% (22 Sep 2021 09:00) (95% - 100%)      21 @ 07:01  -  21 @ 07:00  --------------------------------------------------------  IN: 22.4 mL / OUT: 50 mL / NET: -27.6 mL    21 @ 07:01  -  21 @ 09:39  --------------------------------------------------------  IN: 5.3 mL / OUT: 100 mL / NET: -94.7 mL                                             --------------------------------------------------------------  LABS:                        10.1   12.06 )-----------( 294      ( 22 Sep 2021 08:39 )             33.0         131<L>  |  94<L>  |  51<H>  ----------------------------<  140<H>  4.8   |  20  |  6.0<HH>    Ca    9.0      21 Sep 2021 15:20    TPro  6.8  /  Alb  3.6  /  TBili  0.3  /  DBili  x   /  AST  24  /  ALT  15  /  AlkPhos  116<H>      PT/INR - ( 21 Sep 2021 15:20 )   PT: 13.20 sec;   INR: 1.15 ratio         PTT - ( 21 Sep 2021 15:20 )  PTT:21.6 sec  Urinalysis Basic - ( 22 Sep 2021 02:00 )    Color: Yellow / Appearance: Turbid / S.015 / pH: x  Gluc: x / Ketone: Small  / Bili: Negative / Urobili: <2 mg/dL   Blood: x / Protein: 300 mg/dL / Nitrite: Negative   Leuk Esterase: Large / RBC: >720 /HPF / WBC >720 /HPF   Sq Epi: x / Non Sq Epi: x / Bacteria: x                                            -------------------------------------------------------------  RADIOLOGY:    CXR : Stable bilateral opacities. No pneumothorax    CTA&P w/IV contrast :     1.  Findings compatible with acute cystitis and left-sided ascending urinary tract infection; partially distended urinary bladder despite presence of a Laura catheter. Correlate clinically for catheter function.  2.  Large diffuse colonic stool burden with moderately distended rectum and mild presacral edema.  3.  Incompletely characterized cystic lesion in the pancreatic head. This can be further evaluated with an outpatient contrast-enhanced MRI/MRCP.    CT chest w/IV contrast :    Right lower lobar consolidation likely combination of atelectasis and infection with obliteration of interspersed airways and proximal bronchial debris.    Bilateral left greater than right pleural effusions have slightly decreased on the prior exam.    Unchanged right upper lobe nodule.                                          --------------------------------------------------------------    PHYSICAL EXAM:    General: no acute distress, lying in bed  HEENT: normocephalic, atraumatic, PERRLA, EOMi, no thyromegaly  Lungs: on 4L NC, diffuse rhonchi, bilateral crackles, no wheezes  Heart: regular rate and rhythm, normal S1 and S2  Abdomen: soft, nontender, +bowel sounds  Neuro: awake, alert, does not follow commands  Extremities: no lower extremity edema  Skin: no rash or lesion                                         --------------------------------------------------------------    ASSESSMENT & PLAN    81 year old male patient from [facility] with PMH Alzheimer Dementia, ESRD and 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 He was recently admitted for multilobar pneumonia and treated with levofloxacin.     #Septic Shock   etiology most likely  infection. differential includes GI abscess, recurrent pneumonia, bacteremia 2/2 tesio infection    Chest X Ray: CM, improved left sided effusion, improved interstitial opacification   f/u blood cultures x2 sets   f/u urinalysis and urine culture  per ID, vanc 1g q24h and cefepime 1g [id number]  f/u fungitell, urine legionella, strep  f/u recs per pall care    #ESRD and Anemia of chronic Disease  #Hyponatremia  creat 6.5, BUN 57  Na 134    -on HD three times/week  -HD today: 3 hours, opti 160 dialyzer, 2K bath, 1-2L UF  -epo  -renal diet  -continue midodrine 10mg PO TID      #COPD  Respiratory Acidosis on VBG   * On 4LPM NC at NH.   * Home med Albuterol 2 puffs Q6h and Breo Ellipta 200-25mg QD  * VBG pH 7.25, pCO2 56, pO2 35, HCO3 25, %61.1, LA venous 1.7  - Monitor SaO2 and Oxygen Requirements: 4LPM NC currently. BiPAP 12/5 FiO2 40% overnight  - Follow up Chest X Ray on   - Resume nebulizers      #DM Type II  * Last Hba1c 5.0 2021.   * Not on medication/ Diet controlled  - Monitor POCT premeals and at bedtime  - Sliding Scale B    #HFrEF and severe AS.   * Last TTE 21 EF 40%, mild-mod MR, mod TR, severe AS (Area 0.58cm2), WMA.   * Home med Toprol 25mg QD  - Monitor in/out  - Monitor daily weight  - Monitor daily CXR  - Monitor SaO2 and Oxygen Requirements: 4LPM NC currently. BiPAP 12/5 FiO2 40% overnight  - Avoid volume overload  - Follow up TTE  - Hold Toprol 25mg QD. Currently on pressors    #BPH  Tamsulosin 0,4 QD    #Dyslipidemia  Atorvastatin 40mg QD  Aspirin 81mg QD    #Alzheimer Dementia  Donepezil 5mg QD, Sertraline 25mg QD, Mirtazepine 15mg QD      #DVT Prophylaxis: Heparin 5000 Subcutaneously BID  #GI Prophylaxis: Pantoprazole 40mg PO QD  #Diet: Renal  #Code Status: DNR/DNI per discussion with daughter     Barriers to learning: YES AMS  Discharge Planning: Patient will be discharged once stable and  Plan was communicated with daughter and ED team                       DRAKE GJONBALAJ 81y Male  MRN#: [id number]   CODE STATUS: DNR/DNI    Hospital Day: 1d  Pt is currently admitted with the primary diagnosis of sepsis    SUBJECTIVE  Overnight/Interval Events: Overnight patient hypotensive w/MAP 60 and restarted on levophed by night team. Mental status improved this morning and he was taken off levophed. Updated daughter on phone that patient's urine legionella came back positive and that tesio will be removed and uldall to placed.                                             ----------------------------------------------------------  OBJECTIVE  PAST MEDICAL & SURGICAL HISTORY  Diabetes mellitus  COPD (chronic obstructive pulmonary disease)  Lymphedema of both lower extremities  CHF (congestive heart failure)  ESRD on dialysis  H/O right nephrectomy  20 yrs ago                                          -----------------------------------------------------------  ALLERGIES:  No Known Allergies                                          ------------------------------------------------------------    HOME MEDICATIONS  Home Medications:  albuterol 90 mcg/inh inhalation aerosol: 2 puff(s) inhaled every 6 hours (20 Aug 2021 09:36)  ascorbic acid 500 mg oral tablet: 1 tab(s) orally once a day (20 Aug 2021 09:36)  aspirin 81 mg oral delayed release tablet: 1 tab(s) orally once a day (20 Aug 2021 09:36)  atorvastatin 40 mg oral tablet: 1 tab(s) orally once a day (at bedtime) (20 Aug 2021 09:36)  Breo Ellipta 200 mcg-25 mcg/inh inhalation powder: 1 puff(s) inhaled once a day (2021 10:25)  calcium acetate 667 mg oral tablet: 1 tab(s) orally 3 times a day (with meals) (20 Aug 2021 13:54)  donepezil 5 mg oral tablet: 1 tab(s) orally once a day (at bedtime) (20 Aug 2021 09:36)  epoetin graham: 5000 unit(s) injectable Monday, Wednesday, and Friday (20 Aug 2021 11:05)  heparin: 5000 unit(s) subcutaneous every 12 hours (01 Sep 2021 13:34)  HYDROmorphone 2 mg oral tablet: 1 tab(s) orally every 4 hours, As needed, Moderate Pain (4 - 6) (01 Sep 2021 12:01)  midodrine 10 mg oral tablet: 1 tab(s) orally  (01 Sep 2021 12:01)  mirtazapine 15 mg oral tablet: 1 tab(s) orally once a day (at bedtime) (20 Aug 2021 09:36)  multivitamin: 1 tab(s) orally once a day (20 Aug 2021 13:54)  pantoprazole 40 mg oral delayed release tablet: 1 tab(s) orally once a day (before a meal) (20 Aug 2021 09:36)  povidone iodine 10% topical ointment: 1 application topically  (01 Sep 2021 12:01)  Senna 8.6 mg oral tablet: 2 tab(s) orally once a day (at bedtime), As Needed (2021 10:25)  sertraline 25 mg oral tablet: 1 tab(s) orally once a day (20 Aug 2021 09:36)  tamsulosin 0.4 mg oral capsule: 1 cap(s) orally once a day (at bedtime) (20 Aug 2021 09:36)  Toprol-XL 25 mg oral tablet, extended release: 1 tab(s) orally once a day (at bedtime) (01 Sep 2021 12:01)                           MEDICATIONS:  STANDING MEDICATIONS  ascorbic acid 500 milliGRAM(s) Oral daily  aspirin enteric coated 81 milliGRAM(s) Oral daily  atorvastatin 40 milliGRAM(s) Oral at bedtime  calcium acetate 667 milliGRAM(s) Oral three times a day with meals  cefepime   IVPB 1000 milliGRAM(s) IV Intermittent daily  chlorhexidine 4% Liquid 1 Application(s) Topical <User Schedule>  dextrose 40% Gel 15 Gram(s) Oral once  dextrose 5%. 1000 milliLiter(s) IV Continuous <Continuous>  dextrose 5%. 1000 milliLiter(s) IV Continuous <Continuous>  dextrose 50% Injectable 25 Gram(s) IV Push once  dextrose 50% Injectable 12.5 Gram(s) IV Push once  dextrose 50% Injectable 25 Gram(s) IV Push once  donepezil 5 milliGRAM(s) Oral at bedtime  glucagon  Injectable 1 milliGRAM(s) IntraMuscular once  heparin SubCutaneous Injection - Peds 5000 Unit(s) SubCutaneous every 12 hours  insulin lispro (ADMELOG) corrective regimen sliding scale   SubCutaneous three times a day before meals  metroNIDAZOLE  IVPB 500 milliGRAM(s) IV Intermittent every 8 hours  midodrine. 10 milliGRAM(s) Oral three times a day  mirtazapine 15 milliGRAM(s) Oral at bedtime  multivitamin Oral Tab/Cap - Peds 1 Tablet(s) Oral daily  norepinephrine Infusion 0.05 MICROgram(s)/kG/Min IV Continuous <Continuous>  pantoprazole    Tablet 40 milliGRAM(s) Oral before breakfast  sertraline 25 milliGRAM(s) Oral daily  tamsulosin 0.4 milliGRAM(s) Oral at bedtime  vancomycin  IVPB 750 milliGRAM(s) IV Intermittent <User Schedule>    PRN MEDICATIONS  acetaminophen   Tablet .. 650 milliGRAM(s) Oral every 6 hours PRN  ALBUTerol  90 MICROgram(s) HFA Inhaler - Peds 2 Puff(s) Inhalation every 4 hours PRN                                            ------------------------------------------------------------  VITAL SIGNS: Last 24 Hours  T(C): 37 (22 Sep 2021 07:00), Max: 38.2 (21 Sep 2021 15:45)  T(F): 98.6 (22 Sep 2021 07:00), Max: 100.8 (21 Sep 2021 15:45)  HR: 61 (22 Sep 2021 09:00) (61 - 108)  BP: 112/51 (22 Sep 2021 09:00) (75/42 - 133/75)  BP(mean): 73 (22 Sep 2021 09:00) (60 - 90)  RR: 18 (22 Sep 2021 09:00) (16 - 18)  SpO2: 99% (22 Sep 2021 09:00) (95% - 100%)      21 @ 07:01  -  21 @ 07:00  --------------------------------------------------------  IN: 22.4 mL / OUT: 50 mL / NET: -27.6 mL    21 @ 07:01  -  21 @ 09:39  --------------------------------------------------------  IN: 5.3 mL / OUT: 100 mL / NET: -94.7 mL                                             --------------------------------------------------------------  LABS:                        10.1   12.06 )-----------( 294      ( 22 Sep 2021 08:39 )             33.0         131<L>  |  94<L>  |  51<H>  ----------------------------<  140<H>  4.8   |  20  |  6.0<HH>    Ca    9.0      21 Sep 2021 15:20    TPro  6.8  /  Alb  3.6  /  TBili  0.3  /  DBili  x   /  AST  24  /  ALT  15  /  AlkPhos  116<H>      PT/INR - ( 21 Sep 2021 15:20 )   PT: 13.20 sec;   INR: 1.15 ratio         PTT - ( 21 Sep 2021 15:20 )  PTT:21.6 sec  Urinalysis Basic - ( 22 Sep 2021 02:00 )    Color: Yellow / Appearance: Turbid / S.015 / pH: x  Gluc: x / Ketone: Small  / Bili: Negative / Urobili: <2 mg/dL   Blood: x / Protein: 300 mg/dL / Nitrite: Negative   Leuk Esterase: Large / RBC: >720 /HPF / WBC >720 /HPF   Sq Epi: x / Non Sq Epi: x / Bacteria: x                                            -------------------------------------------------------------  RADIOLOGY:    CXR : Stable bilateral opacities. No pneumothorax    CTA&P w/IV contrast :     1.  Findings compatible with acute cystitis and left-sided ascending urinary tract infection; partially distended urinary bladder despite presence of a Laura catheter. Correlate clinically for catheter function.  2.  Large diffuse colonic stool burden with moderately distended rectum and mild presacral edema.  3.  Incompletely characterized cystic lesion in the pancreatic head. This can be further evaluated with an outpatient contrast-enhanced MRI/MRCP.    CT chest w/IV contrast :    Right lower lobar consolidation likely combination of atelectasis and infection with obliteration of interspersed airways and proximal bronchial debris.    Bilateral left greater than right pleural effusions have slightly decreased on the prior exam.    Unchanged right upper lobe nodule.                                          --------------------------------------------------------------    PHYSICAL EXAM:    General: no acute distress, lying in bed  HEENT: normocephalic, atraumatic, PERRLA, EOMi, no thyromegaly  Lungs: on 4L NC, diffuse rhonchi, bilateral crackles, no wheezes  Heart: regular rate and rhythm, normal S1 and S2  Abdomen: soft, nontender, +bowel sounds  Neuro: awake, alert, does not follow commands  Extremities: no lower extremity edema  Skin: no rash or lesion                                         --------------------------------------------------------------    ASSESSMENT & PLAN    81 year old male patient from [facility] with PMH Alzheimer Dementia, ESRD and 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 He was recently admitted for multilobar pneumonia and treated with levofloxacin.     #Septic Shock   #legionella infectin  concern for bacteremia due to infected tesio catheter    CXR : stable bilateral opacities  UA: +LE, hematuria  +urine legionalla antigen  levofloxacin 250mg IV q24h  blood cx : gram+ cocci in clusters; staph epi, methicillin resistant  vascular surgery to remove Tesio today and place elvadall  for HD tmrw      #ESRD and Anemia of chronic Disease  #Hyponatremia - resolved  creat 6.5, BUN 57  Na 135    -on HD three times/week  -HD yesterday  -epo  -renal diet  -continue midodrine 10mg PO TID    #COPD  Respiratory Acidosis on VBG   -on 4LPM NC at NH.   - albuterol and breo-ellipta as needed    #DM Type II  -Last Hba1c 5.0 2021.   -not on medication/ Diet controlled  - Monitor POCT premeals and at bedtime  - Sliding Scale B    #HFrEF and severe AS.   * Last TTE 21 EF 40%, mild-mod MR, mod TR, severe AS (Area 0.58cm2), WMA.   - Hold Toprol 25mg QD. Currently on pressors    #BPH  Tamsulosin 0,4 QD    #Dyslipidemia  Atorvastatin 40mg QD  Aspirin 81mg QD    #Alzheimer Dementia  Donepezil 5mg QD, Sertraline 25mg QD, Mirtazepine 15mg QD    #DVT Prophylaxis: Heparin 5000 Subcutaneously BID  #GI Prophylaxis: Pantoprazole 40mg PO QD  #Diet: Renal  #Code Status: DNR/DNI per discussion with daughter

## 2021-09-24 NOTE — PROGRESS NOTE ADULT - ASSESSMENT
· Assessment	  80 y/o gentleman with a past medical history of Alzheimer's dementia, ESRD on HD (oliguric), Anemia of chronic disease, COPD on 4L O2, DM II, Hfw/REF and Severe AS, HLD, and recently discharge for multi-lobar pneumonia admitted for septic shock from [facility]. Began having fevers on Sunday and complaining of abdominal pain, without any other significant symptoms (n/v/d, SOB, CP, cough). Today he was noted to be febrile and hypotensive, and anuric.   Still found to be hypotensive in the ED after 1L IVF and started on peripheral levophed, WBC 14, CXR appears improved from last on 9/2.   Easily arousable but confused. He reports pain when pressing his abdomen, otherwise is he does not offer much help other than asking for help.   Laura checked with 10cc of grossly purulent and opaque urinary output.     IMPRESSION;  Resolvd sepsis secondary to ORSEbacteremia/legionella PNA  9/24 Tesio removed  R/o endocarditis  9/21 BCx ORSE  9/22 urine positive fo legionella ag  9/21 UC P mirabilis    RECOMMENDATIONS;  F/u sensitivities of P mirabilis  ECHO  Daily BCx till repeatedly negative   Vancomycin 1 gm iv post HD . Level  Cefepime 1 gm iv q24h  Levo 250 mg iv q24h

## 2021-09-24 NOTE — PROCEDURE NOTE - NSINFORMCONSENT_GEN_A_CORE
From Daughter/Benefits, risks, and possible complications of procedure explained to patient/caregiver who verbalized understanding and gave written consent.

## 2021-09-24 NOTE — PROGRESS NOTE ADULT - ASSESSMENT
IMPRESSION:    Sepsis present on admission   Septic Shock resolved   Staph epi bacteremia, possible contaminant   UTI/Pyelonephritis proteus mirabilis   RLL Pneumonia.  Urine Legionella positive   HO Alzheimer's Dementia  ESRD on HD     PLAN:    CNS: Avoid sedation    HEENT: Oral care.      PULMONARY:  HOB @ 45 degrees.  Aspiration precautions.  Wean O2 as tolerated       CARDIOVASCULAR:  Keep MAP > 60.  Avoid volume overload     GI: GI prophylaxis.  Feeding per speech.  Bowel regimen.  Enemas     RENAL:  Follow up lytes.  Correct as needed. HD per renal      INFECTIOUS DISEASE: Follow up final cultures.  ABX per ID.  FU Nasal MRSA.  FU Vanc trough.  DW with ID>  Would continue Cefepime for now     HEMATOLOGICAL:  DVT prophylaxis.  Dimer noted.  FU LE duplex.      ENDOCRINE:  Follow up FS.     MUSCULOSKELETAL: Bed rest     SDU     Prognosis poor     GOC

## 2021-09-24 NOTE — PROGRESS NOTE ADULT - ASSESSMENT
81yMale with ESRD on HD, CHF, COPD, dementia, multiple recent hospitalizations, being evaluated for GOC in the setting of admission for sepsis, currently undergoing workup and tx with abx. Patient known to palliative care team from prior hospitalizations, family opted against hospice and stopping HD last admission. Reached out to dtr Shin yesterday to re-introduce, will attempt to meet at bedside sometime this week to discuss GOC. At this time the patient's family is focused on getting medical updates/workup from primary team and want full medical management.     MEDD (morphine equivalent daily dose):  0    See Recs below.    Please call [phone] with questions or concerns 24/7.   We will continue to follow.     Discussed with primary MD.

## 2021-09-24 NOTE — PROGRESS NOTE ADULT - SUBJECTIVE AND OBJECTIVE BOX
DRAKE HO  81y Male    CHIEF COMPLAINT:    Patient is a 81y old  Male who presents with a chief complaint of Hypotension and Fever (24 Sep 2021 11:57)      INTERVAL HPI/OVERNIGHT EVENTS:    Patient seen and examined. No acute events overnight. Mental status continues to improve    ROS: All other systems are negative.    Vital Signs:    T(F): 97.9 (09-24-21 @ 08:28), Max: 98 (09-23-21 @ 16:00)  HR: 73 (09-24-21 @ 08:28) (57 - 73)  BP: 99/55 (09-24-21 @ 08:28) (99/55 - 168/79)  RR: 18 (09-24-21 @ 08:28) (18 - 18)  SpO2: 96% (09-24-21 @ 08:28) (96% - 100%)    23 Sep 2021 07:01  -  24 Sep 2021 07:00  --------------------------------------------------------  IN: 340 mL / OUT: 100 mL / NET: 240 mL    POCT Blood Glucose.: 125 mg/dL (24 Sep 2021 07:43)  POCT Blood Glucose.: 165 mg/dL (23 Sep 2021 20:59)  POCT Blood Glucose.: 109 mg/dL (23 Sep 2021 17:14)    PHYSICAL EXAM:    GENERAL:  NAD  SKIN: No rashes or lesions  HEENT: Atraumatic. Normocephalic.    NECK: Supple, No JVD.    PULMONARY: Coarse breath sounds b/l. No wheezing. No rales  CVS: Normal S1, S2. Rate and Rhythm are regular. .   ABDOMEN/GI: Soft, Nontender, Nondistended; BS present  MSK:  No clubbing or cyanosis. L heel ulcer with green/bloody discharge  NEUROLOGIC:  moves all extremities  PSYCH: Alert & oriented x 2    Consultant(s) Notes Reviewed:  [x ] YES  [ ] NO  Care Discussed with Consultants/Other Providers [ x] YES  [ ] NO    LABS:                        10.9   11.48 )-----------( 288      ( 24 Sep 2021 04:30 )             36.4     135  |  98  |  61<HH>  ----------------------------<  112<H>  4.5   |  20  |  7.1<HH>    Ca    8.9      24 Sep 2021 04:30  Phos  3.2     09-23  Mg     2.0     09-24    TPro  5.9<L>  /  Alb  3.1<L>  /  TBili  <0.2  /  DBili  x   /  AST  18  /  ALT  13  /  AlkPhos  92  09-24    PT/INR - ( 24 Sep 2021 12:05 )   PT: 12.50 sec;   INR: 1.09 ratio      PTT - ( 24 Sep 2021 12:05 )  PTT:20.8 sec    Trop 0.20, CKMB --, CK --, 09-22-21 @ 08:39    Culture - Urine (collected 21 Sep 2021 23:20)  Source: Clean Catch Clean Catch (Midstream)  Preliminary Report (24 Sep 2021 05:57):    >100,000 CFU/ml Proteus mirabilis    Culture - Blood (collected 21 Sep 2021 16:55)  Source: .Blood Blood-Peripheral  Gram Stain (23 Sep 2021 05:26):    Growth in aerobic bottle: Gram Positive Cocci in Clusters    Growth in anaerobic bottle: Gram Positive Cocci in Clusters  Preliminary Report (24 Sep 2021 00:08):    Growth in aerobic bottle: Staphylococcus epidermidis    Growth in aerobic bottle: Gram Positive Cocci in Clusters    ***Blood Panel PCR results on this specimen are available    approximately 3 hours after the Gram stain result.***    Gram stain, PCR, and/or culture results may not always    correspond due to difference in methodologies.    ************************************************************    This PCR assay was performed by multiplex PCR. This    Assay tests for 66 bacterial and resistance gene targets.    Please refer to the [facility] test directory    at [email or web address] for details.  Organism: Blood Culture PCR (23 Sep 2021 02:24)  Organism: Blood Culture PCR (23 Sep 2021 02:24)    Culture - Blood (collected 21 Sep 2021 16:40)  Source: .Blood Blood-Peripheral  Preliminary Report (22 Sep 2021 23:02):    No growth to date.    RADIOLOGY & ADDITIONAL TESTS:  Imaging or report Personally Reviewed:  [x] YES  [ ] NO  EKG reviewed: [x] YES  [ ] NO    Medications:  Standing  ascorbic acid 500 milliGRAM(s) Oral daily  aspirin enteric coated 81 milliGRAM(s) Oral daily  atorvastatin 40 milliGRAM(s) Oral at bedtime  calcium acetate 667 milliGRAM(s) Oral three times a day with meals  cefepime   IVPB 1000 milliGRAM(s) IV Intermittent every 24 hours  chlorhexidine 4% Liquid 1 Application(s) Topical <User Schedule>  dextrose 40% Gel 15 Gram(s) Oral once  dextrose 5%. 1000 milliLiter(s) IV Continuous <Continuous>  dextrose 5%. 1000 milliLiter(s) IV Continuous <Continuous>  dextrose 50% Injectable 25 Gram(s) IV Push once  dextrose 50% Injectable 12.5 Gram(s) IV Push once  dextrose 50% Injectable 25 Gram(s) IV Push once  donepezil 5 milliGRAM(s) Oral at bedtime  epoetin graham-epbx (RETACRIT) Injectable 5000 Unit(s) IV Push <User Schedule>  glucagon  Injectable 1 milliGRAM(s) IntraMuscular once  heparin SubCutaneous Injection - Peds 5000 Unit(s) SubCutaneous every 12 hours  insulin lispro (ADMELOG) corrective regimen sliding scale   SubCutaneous three times a day before meals  levoFLOXacin IVPB 250 milliGRAM(s) IV Intermittent every 24 hours  midodrine. 10 milliGRAM(s) Oral three times a day  mirtazapine 15 milliGRAM(s) Oral at bedtime  multivitamin Oral Tab/Cap - Peds 1 Tablet(s) Oral daily  pantoprazole    Tablet 40 milliGRAM(s) Oral before breakfast  polyethylene glycol 3350 17 Gram(s) Oral two times a day  senna 2 Tablet(s) Oral at bedtime  sertraline 25 milliGRAM(s) Oral daily  tamsulosin 0.4 milliGRAM(s) Oral at bedtime  vancomycin  IVPB 1000 milliGRAM(s) IV Intermittent <User Schedule>    PRN Meds  acetaminophen   Tablet .. 650 milliGRAM(s) Oral every 6 hours PRN  ALBUTerol  90 MICROgram(s) HFA Inhaler - Peds 2 Puff(s) Inhalation every 4 hours PRN

## 2021-09-24 NOTE — PROGRESS NOTE ADULT - SUBJECTIVE AND OBJECTIVE BOX
DRAKE HO             MRN-[id number]      Patient is a 81y old Male who presents with a chief complaint of Hypotension and Fever (23 Sep 2021 12:29)    Currently admitted with the primary diagnosis of: sepsis       SUBJECTIVE:    - patient seen at bedside  - more awake today, no complaints  - appears comfortable     ROS:  Denies discomfort.     General:  Denied  HEENT:    Denied  Neck:  Denied  CVS:  Denied  Resp:  Denied  GI:  Denied    :  Denied  Musc:  Denied  Neuro:  Denied  Psych:  Denied  Skin:  Denied  Lymph:  Denied      PEx:   Vital Signs Last 24 Hrs  T(C): 36.6 (24 Sep 2021 08:28), Max: 36.7 (23 Sep 2021 16:00)  T(F): 97.9 (24 Sep 2021 08:28), Max: 98 (23 Sep 2021 16:00)  HR: 73 (24 Sep 2021 08:28) (57 - 73)  BP: 99/55 (24 Sep 2021 08:28) (99/55 - 168/79)  BP(mean): --  RR: 18 (24 Sep 2021 08:28) (18 - 18)  SpO2: 96% (24 Sep 2021 08:28) (96% - 100%)              General:  found in bed in NAD  Eyes:  PERRL EOMI Non icteric MOM  ENMT: no external oral ulcers, MMM  CVS: RR S1S2 No M/G/R  Resp: Unlabored Non tachypneic No increased WOB  GI:  Soft NT ND   Musc: No C/C/E    Neuro: Follows commands No focal deficits, confused   Psych: Calm Pleasant, AAOx2   Skin: Non jaundiced , no rash     Last BM: unknown     ALLERGIES: No nown Allergies      Labs:	       Labs    09-24    135  |  98  |  61<HH>  ----------------------------<  112<H>  4.5   |  20  |  7.1<HH>    Ca    8.9      24 Sep 2021 04:30  Phos  3.2     09-23  Mg     2.0     09-24    TPro  5.9<L>  /  Alb  3.1<L>  /  TBili  <0.2  /  DBili  x   /  AST  18  /  ALT  13  /  AlkPhos  92  09-24                            10.9   11.48 )-----------( 288      ( 24 Sep 2021 04:30 )             36.4         RADIOLOGY    < from: CT Abdomen and Pelvis w/ IV Cont (09.22.21 @ 13:24) >      IMPRESSION:  1.  Findings compatible with acute cystitis and left-sided ascending urinary tract infection; partially distended urinary bladder despite presence of a Laura catheter. Correlate clinically for catheter function.    2.  Large diffuse colonic stool burden with moderately distended rectum and mild presacral edema.    3.  Incompletely characterized cystic lesion in the pancreatic head. This can be further evaluated with an outpatient contrast-enhanced MRI/MRCP.        EKG  12 Lead ECG:   Ventricular Rate 87 BPM    Atrial Rate 87 BPM    P-R Interval 154 ms    QRS Duration 108 ms    Q-T Interval 344 ms    QTC Calculation(Bazett) 413 ms    P Axis 50 degrees    R Axis -41 degrees    T Axis 157 degrees    Diagnosis Line Normal sinus rhythm  Left axis deviation  Left anterior fascicular block  Incomplete right bundle branch block  Left ventricular hypertrophy with repolarization abnormality  Abnormal ECG        Imaging Personally Reviewed:  [X] YES  [ ] NO    Consultant(s) Notes Reviewed:  [ X YES  [ ] NO  Care Discussed with Consultants/Other Providers [X] YES  [ ] NO    Medications:	      MEDICATIONS  (STANDING):  ascorbic acid 500 milliGRAM(s) Oral daily  aspirin enteric coated 81 milliGRAM(s) Oral daily  atorvastatin 40 milliGRAM(s) Oral at bedtime  calcium acetate 667 milliGRAM(s) Oral three times a day with meals  cefepime   IVPB 1000 milliGRAM(s) IV Intermittent every 24 hours  chlorhexidine 4% Liquid 1 Application(s) Topical <User Schedule>  dextrose 40% Gel 15 Gram(s) Oral once  dextrose 5%. 1000 milliLiter(s) (50 mL/Hr) IV Continuous <Continuous>  dextrose 5%. 1000 milliLiter(s) (100 mL/Hr) IV Continuous <Continuous>  dextrose 50% Injectable 25 Gram(s) IV Push once  dextrose 50% Injectable 12.5 Gram(s) IV Push once  dextrose 50% Injectable 25 Gram(s) IV Push once  donepezil 5 milliGRAM(s) Oral at bedtime  epoetin graham-epbx (RETACRIT) Injectable 5000 Unit(s) IV Push <User Schedule>  glucagon  Injectable 1 milliGRAM(s) IntraMuscular once  heparin SubCutaneous Injection - Peds 5000 Unit(s) SubCutaneous every 12 hours  insulin lispro (ADMELOG) corrective regimen sliding scale   SubCutaneous three times a day before meals  levoFLOXacin IVPB 250 milliGRAM(s) IV Intermittent every 24 hours  midodrine. 10 milliGRAM(s) Oral three times a day  mirtazapine 15 milliGRAM(s) Oral at bedtime  multivitamin Oral Tab/Cap - Peds 1 Tablet(s) Oral daily  pantoprazole    Tablet 40 milliGRAM(s) Oral before breakfast  polyethylene glycol 3350 17 Gram(s) Oral two times a day  senna 2 Tablet(s) Oral at bedtime  sertraline 25 milliGRAM(s) Oral daily  tamsulosin 0.4 milliGRAM(s) Oral at bedtime  vancomycin  IVPB 1000 milliGRAM(s) IV Intermittent <User Schedule>    MEDICATIONS  (PRN):  acetaminophen   Tablet .. 650 milliGRAM(s) Oral every 6 hours PRN Temp greater or equal to 38C (100.4F), Mild Pain (1 - 3), Moderate Pain (4 - 6)  ALBUTerol  90 MICROgram(s) HFA Inhaler - Peds 2 Puff(s) Inhalation every 4 hours PRN Bronchospasm        ADVANCED DIRECTIVES:                DNR DNI         DECISION MAKER: Patient [  ]  Family [ X]  Other [  ] _______  LEGAL SURROGATE: Dtr, Shin Nguyen      GOALS OF CARE DISCUSSION  - known to palliative from prior hospitalizations  - dtrs want DNR/DNI continue med mgmt. team has spoken in the past with dtr about hospice and stopping HD as an option which she is not yet ready for  - spoke with dtr  on phone and gave her our #, she will call when she is coming in to speak in person  - will re-address [facility] as appropriate       PSYCHOSOCIAL-SPIRITUAL ASSESSMENT:       Reviewed       See Palliative Care SW/ documentation      CURRENT DISPO PLAN:         WILL REMAIN IN HOSPITAL      PAWEL      REFERRALS	        Palliative Med        Unit SW/Case Mgmt

## 2021-09-24 NOTE — PROGRESS NOTE ADULT - PROBLEM SELECTOR PLAN 4
on HD  nephrology following for recommendations  dtrs wish to continue HD for now  would qualify for hospice only if they stopped HD
on HD  nephrology following for recommendations  dtrs wish to continue HD for now  would qualify for hospice only if they stopped HD

## 2021-09-24 NOTE — CONSULT NOTE ADULT - CONSULT REQUESTED DATE/TIME
24-Sep-2021 11:57
23-Sep-2021
22-Sep-2021 10:33
22-Sep-2021 10:01
22-Sep-2021 20:38
22-Sep-2021 13:00

## 2021-09-24 NOTE — PROGRESS NOTE ADULT - PROBLEM SELECTOR PLAN 3
completely dependent at baseline  frequent infections  lives in long term care facility with 24 H care  would be hospice appropriate if within GOC
completely dependent at baseline  frequent infections  lives in long term care facility with 24 H care  would be hospice appropriate if within GOC

## 2021-09-24 NOTE — PROGRESS NOTE ADULT - PROBLEM SELECTOR PLAN 2
improved  off pressors  ID following for recommendations  continue current medical management
improved  off pressors  ID following for recommendations  continue current medical management

## 2021-09-24 NOTE — PROGRESS NOTE ADULT - SUBJECTIVE AND OBJECTIVE BOX
DRAKE HO  81y Male    CHIEF COMPLAINT:    Patient is a 81y old  Male who presents with a chief complaint of Hypotension and Fever (23 Sep 2021 13:03)      INTERVAL HPI/OVERNIGHT EVENTS:    Patient seen and examined. No acute events overnight. More awake today, eating breakfast     ROS: All other systems are negative.    Vital Signs:    T(F): 98.3 (09-23-21 @ 04:00), Max: 98.3 (09-23-21 @ 04:00)  HR: 92 (09-23-21 @ 08:09) (60 - 92)  BP: 126/61 (09-23-21 @ 08:09) (99/41 - 126/61)  RR: 18 (09-23-21 @ 08:09) (18 - 18)  SpO2: 97% (09-23-21 @ 08:09) (96% - 99%)    22 Sep 2021 07:01  -  23 Sep 2021 07:00  --------------------------------------------------------  IN: 310.6 mL / OUT: 2200 mL / NET: -1889.4 mL    POCT Blood Glucose.: 189 mg/dL (23 Sep 2021 11:54)  POCT Blood Glucose.: 149 mg/dL (23 Sep 2021 07:33)  POCT Blood Glucose.: 146 mg/dL (22 Sep 2021 20:59)  POCT Blood Glucose.: 121 mg/dL (22 Sep 2021 17:13)    PHYSICAL EXAM:    GENERAL:  NAD  SKIN: No rashes or lesions  HEENT: Atraumatic. Normocephalic.    NECK: Supple, No JVD.   PULMONARY: decreased breath sounds b/l. No wheezing.   CVS: Normal S1, S2. Rate and Rhythm are regular.    ABDOMEN/GI: Soft, Nontender, Nondistended; BS present  MSK:  No clubbing or cyanosis   NEUROLOGIC: Moves all extremities  PSYCH: Alert & oriented x2, engages in conversation     Consultant(s) Notes Reviewed:  [x ] YES  [ ] NO  Care Discussed with Consultants/Other Providers [ x] YES  [ ] NO    LABS:                        10.1   10.25 )-----------( 290      ( 23 Sep 2021 04:30 )             33.3     135  |  98  |  40<H>  ----------------------------<  184<H>  3.8   |  22  |  5.4<HH>    Ca    8.6      23 Sep 2021 04:30  Phos  3.2     09-23  Mg     1.9     09-23    TPro  5.9<L>  /  Alb  3.1<L>  /  TBili  <0.2  /  DBili  x   /  AST  24  /  ALT  15  /  AlkPhos  102  09-23    PT/INR - ( 21 Sep 2021 15:20 )   PT: 13.20 sec;   INR: 1.15 ratio      PTT - ( 21 Sep 2021 15:20 )  PTT:21.6 sec    Trop 0.20, CKMB --, CK --, 09-22-21 @ 08:39    Culture - Blood (collected 21 Sep 2021 16:55)  Source: .Blood Blood-Peripheral  Gram Stain (23 Sep 2021 05:26):    Growth in aerobic bottle: Gram Positive Cocci in Clusters    Growth in anaerobic bottle: Gram Positive Cocci in Clusters  Preliminary Report (23 Sep 2021 05:26):    Growth in anaerobic bottle: Gram Positive Cocci in Clusters    Growth in aerobic bottle: Gram Positive Cocci in Clusters    ***Blood Panel PCR results on this specimen are available    approximately 3 hours after the Gram stain result.***    Gram stain, PCR, and/or culture results may not always    correspond due to difference in methodologies.    ************************************************************    This PCR assay was performed by multiplex PCR. This    Assay tests for 66 bacterial and resistance gene targets.    Please refer to the [facility] test directory    at [email or web address] for details.  Organism: Blood Culture PCR (23 Sep 2021 02:24)  Organism: Blood Culture PCR (23 Sep 2021 02:24)    Culture - Blood (collected 21 Sep 2021 16:40)  Source: .Blood Blood-Peripheral  Preliminary Report (22 Sep 2021 23:02):    No growth to date.    RADIOLOGY & ADDITIONAL TESTS:  Imaging or report Personally Reviewed:  [x] YES  [ ] NO  EKG reviewed: [x] YES  [ ] NO    Medications:  Standing  ascorbic acid 500 milliGRAM(s) Oral daily  aspirin enteric coated 81 milliGRAM(s) Oral daily  atorvastatin 40 milliGRAM(s) Oral at bedtime  calcium acetate 667 milliGRAM(s) Oral three times a day with meals  chlorhexidine 4% Liquid 1 Application(s) Topical <User Schedule>  dextrose 40% Gel 15 Gram(s) Oral once  dextrose 5%. 1000 milliLiter(s) IV Continuous <Continuous>  dextrose 5%. 1000 milliLiter(s) IV Continuous <Continuous>  dextrose 50% Injectable 25 Gram(s) IV Push once  dextrose 50% Injectable 12.5 Gram(s) IV Push once  dextrose 50% Injectable 25 Gram(s) IV Push once  donepezil 5 milliGRAM(s) Oral at bedtime  epoetin graham-epbx (RETACRIT) Injectable 5000 Unit(s) IV Push <User Schedule>  glucagon  Injectable 1 milliGRAM(s) IntraMuscular once  heparin SubCutaneous Injection - Peds 5000 Unit(s) SubCutaneous every 12 hours  insulin lispro (ADMELOG) corrective regimen sliding scale   SubCutaneous three times a day before meals  levoFLOXacin IVPB 250 milliGRAM(s) IV Intermittent every 24 hours  midodrine. 10 milliGRAM(s) Oral three times a day  mirtazapine 15 milliGRAM(s) Oral at bedtime  multivitamin Oral Tab/Cap - Peds 1 Tablet(s) Oral daily  pantoprazole    Tablet 40 milliGRAM(s) Oral before breakfast  polyethylene glycol 3350 17 Gram(s) Oral two times a day  senna 2 Tablet(s) Oral at bedtime  sertraline 25 milliGRAM(s) Oral daily  tamsulosin 0.4 milliGRAM(s) Oral at bedtime    PRN Meds  acetaminophen   Tablet .. 650 milliGRAM(s) Oral every 6 hours PRN  ALBUTerol  90 MICROgram(s) HFA Inhaler - Peds 2 Puff(s) Inhalation every 4 hours PRN

## 2021-09-24 NOTE — PROGRESS NOTE ADULT - SUBJECTIVE AND OBJECTIVE BOX
VIC, ISMAIL  81y, Male    All available historical data reviewed    OVERNIGHT EVENTS:    tesio removed 9/24  ROS:  General: Denies rigors, nightsweats  HEENT: Denies headache, rhinorrhea, sore throat, eye pain  CV: Denies CP, palpitations  PULM: Denies wheezing, hemoptysis  GI: Denies hematemesis, hematochezia, melena  : Denies discharge, hematuria  MSK: Denies arthralgias, myalgias  SKIN: Denies rash, lesions  NEURO: Denies paresthesias, weakness  PSYCH: Denies depression, anxiety    VITALS:  T(F): 97.9, Max: 98 (09-23-21 @ 16:00)  HR: 73  BP: 99/55  RR: 18Vital Signs Last 24 Hrs  T(C): 36.6 (24 Sep 2021 08:28), Max: 36.7 (23 Sep 2021 16:00)  T(F): 97.9 (24 Sep 2021 08:28), Max: 98 (23 Sep 2021 16:00)  HR: 73 (24 Sep 2021 08:28) (57 - 73)  BP: 99/55 (24 Sep 2021 08:28) (99/55 - 168/79)  BP(mean): --  RR: 18 (24 Sep 2021 08:28) (18 - 18)  SpO2: 96% (24 Sep 2021 08:28) (96% - 100%)    TESTS & MEASUREMENTS:                        10.9   11.48 )-----------( 288      ( 24 Sep 2021 04:30 )             36.4     09-24    135  |  98  |  61<HH>  ----------------------------<  112<H>  4.5   |  20  |  7.1<HH>    Ca    8.9      24 Sep 2021 04:30  Phos  3.2     09-23  Mg     2.0     09-24    TPro  5.9<L>  /  Alb  3.1<L>  /  TBili  <0.2  /  DBili  x   /  AST  18  /  ALT  13  /  AlkPhos  92  09-24    LIVER FUNCTIONS - ( 24 Sep 2021 04:30 )  Alb: 3.1 g/dL / Pro: 5.9 g/dL / ALK PHOS: 92 U/L / ALT: 13 U/L / AST: 18 U/L / GGT: x             Culture - Urine (collected 09-21-21 @ 23:20)  Source: Clean Catch Clean Catch (Midstream)  Preliminary Report (09-24-21 @ 05:57):    >100,000 CFU/ml Proteus mirabilis    Culture - Blood (collected 09-21-21 @ 16:55)  Source: .Blood Blood-Peripheral  Gram Stain (09-23-21 @ 05:26):    Growth in aerobic bottle: Gram Positive Cocci in Clusters    Growth in anaerobic bottle: Gram Positive Cocci in Clusters  Preliminary Report (09-24-21 @ 00:08):    Growth in aerobic bottle: Staphylococcus epidermidis    Growth in aerobic bottle: Gram Positive Cocci in Clusters    ***Blood Panel PCR results on this specimen are available    approximately 3 hours after the Gram stain result.***    Gram stain, PCR, and/or culture results may not always    correspond due to difference in methodologies.    ************************************************************    This PCR assay was performed by multiplex PCR. This    Assay tests for 66 bacterial and resistance gene targets.    Please refer to the [facility] test directory    at [email or web address] for details.  Organism: Blood Culture PCR (09-23-21 @ 02:24)  Organism: Blood Culture PCR (09-23-21 @ 02:24)      -  Staphylococcus epidermidis, Methicillin resistant: Detec      Method Type: PCR    Culture - Blood (collected 09-21-21 @ 16:40)  Source: .Blood Blood-Peripheral  Preliminary Report (09-22-21 @ 23:02):    No growth to date.            RADIOLOGY & ADDITIONAL TESTS:  Personal review of radiological diagnostics performed  Echo and EKG results noted when applicable.     MEDICATIONS:  acetaminophen   Tablet .. 650 milliGRAM(s) Oral every 6 hours PRN  ALBUTerol  90 MICROgram(s) HFA Inhaler - Peds 2 Puff(s) Inhalation every 4 hours PRN  ascorbic acid 500 milliGRAM(s) Oral daily  aspirin enteric coated 81 milliGRAM(s) Oral daily  atorvastatin 40 milliGRAM(s) Oral at bedtime  calcium acetate 667 milliGRAM(s) Oral three times a day with meals  chlorhexidine 4% Liquid 1 Application(s) Topical <User Schedule>  dextrose 40% Gel 15 Gram(s) Oral once  dextrose 5%. 1000 milliLiter(s) IV Continuous <Continuous>  dextrose 5%. 1000 milliLiter(s) IV Continuous <Continuous>  dextrose 50% Injectable 25 Gram(s) IV Push once  dextrose 50% Injectable 12.5 Gram(s) IV Push once  dextrose 50% Injectable 25 Gram(s) IV Push once  donepezil 5 milliGRAM(s) Oral at bedtime  epoetin graham-epbx (RETACRIT) Injectable 5000 Unit(s) IV Push <User Schedule>  glucagon  Injectable 1 milliGRAM(s) IntraMuscular once  heparin SubCutaneous Injection - Peds 5000 Unit(s) SubCutaneous every 12 hours  insulin lispro (ADMELOG) corrective regimen sliding scale   SubCutaneous three times a day before meals  levoFLOXacin IVPB 250 milliGRAM(s) IV Intermittent every 24 hours  midodrine. 10 milliGRAM(s) Oral three times a day  mirtazapine 15 milliGRAM(s) Oral at bedtime  multivitamin Oral Tab/Cap - Peds 1 Tablet(s) Oral daily  pantoprazole    Tablet 40 milliGRAM(s) Oral before breakfast  polyethylene glycol 3350 17 Gram(s) Oral two times a day  senna 2 Tablet(s) Oral at bedtime  sertraline 25 milliGRAM(s) Oral daily  tamsulosin 0.4 milliGRAM(s) Oral at bedtime  vancomycin  IVPB 1000 milliGRAM(s) IV Intermittent <User Schedule>      ANTIBIOTICS:  levoFLOXacin IVPB 250 milliGRAM(s) IV Intermittent every 24 hours  vancomycin  IVPB 1000 milliGRAM(s) IV Intermittent <User Schedule>

## 2021-09-24 NOTE — PROGRESS NOTE ADULT - SUBJECTIVE AND OBJECTIVE BOX
[facility] FOLLOW UP NOTE  --------------------------------------------------------------------------------  24 hour events/subjective: Patient examined. Appears comfortable.    PAST HISTORY  --------------------------------------------------------------------------------  No significant changes to PMH, PSH, FHx, SHx, unless otherwise noted    ALLERGIES & MEDICATIONS  --------------------------------------------------------------------------------  Allergies    No Known Allergies    Standing Inpatient Medications  ascorbic acid 500 milliGRAM(s) Oral daily  aspirin enteric coated 81 milliGRAM(s) Oral daily  atorvastatin 40 milliGRAM(s) Oral at bedtime  calcium acetate 667 milliGRAM(s) Oral three times a day with meals  chlorhexidine 4% Liquid 1 Application(s) Topical <User Schedule>  dextrose 40% Gel 15 Gram(s) Oral once  dextrose 5%. 1000 milliLiter(s) IV Continuous <Continuous>  dextrose 5%. 1000 milliLiter(s) IV Continuous <Continuous>  dextrose 50% Injectable 25 Gram(s) IV Push once  dextrose 50% Injectable 12.5 Gram(s) IV Push once  dextrose 50% Injectable 25 Gram(s) IV Push once  donepezil 5 milliGRAM(s) Oral at bedtime  epoetin graham-epbx (RETACRIT) Injectable 5000 Unit(s) IV Push <User Schedule>  glucagon  Injectable 1 milliGRAM(s) IntraMuscular once  heparin SubCutaneous Injection - Peds 5000 Unit(s) SubCutaneous every 12 hours  insulin lispro (ADMELOG) corrective regimen sliding scale   SubCutaneous three times a day before meals  levoFLOXacin IVPB 250 milliGRAM(s) IV Intermittent every 24 hours  midodrine. 10 milliGRAM(s) Oral three times a day  mirtazapine 15 milliGRAM(s) Oral at bedtime  multivitamin Oral Tab/Cap - Peds 1 Tablet(s) Oral daily  pantoprazole    Tablet 40 milliGRAM(s) Oral before breakfast  polyethylene glycol 3350 17 Gram(s) Oral two times a day  senna 2 Tablet(s) Oral at bedtime  sertraline 25 milliGRAM(s) Oral daily  tamsulosin 0.4 milliGRAM(s) Oral at bedtime  vancomycin  IVPB 1000 milliGRAM(s) IV Intermittent <User Schedule>    PRN Inpatient Medications  acetaminophen   Tablet .. 650 milliGRAM(s) Oral every 6 hours PRN  ALBUTerol  90 MICROgram(s) HFA Inhaler - Peds 2 Puff(s) Inhalation every 4 hours PRN    VITALS/PHYSICAL EXAM  --------------------------------------------------------------------------------  T(C): 36.6 (09-24-21 @ 08:28), Max: 36.7 (09-23-21 @ 16:00)  HR: 73 (09-24-21 @ 08:28) (57 - 73)  BP: 99/55 (09-24-21 @ 08:28) (99/55 - 168/79)  RR: 18 (09-24-21 @ 08:28) (18 - 18)  SpO2: 96% (09-24-21 @ 08:28) (96% - 100%)    09-23-21 @ 07:01  -  09-24-21 @ 07:00  --------------------------------------------------------  IN: 340 mL / OUT: 100 mL / NET: 240 mL    Physical Exam:  	Gen: NAD  	Pulm: CTA B/L  	CV: RRR, S1S2  	Abd: +BS, soft, nontender/nondistended  	: No suprapubic tenderness  	LE: Warm,  no edema    LABS/STUDIES  --------------------------------------------------------------------------------              10.1   10.25 >-----------<  290      [09-23-21 @ 04:30]              33.3     135  |  98  |  40  ----------------------------<  184      [09-23-21 @ 04:30]  3.8   |  22  |  5.4        Ca     8.6     [09-23-21 @ 04:30]      Mg     1.9     [09-23-21 @ 04:30]      Phos  3.2     [09-23-21 @ 04:30]    TPro  5.9  /  Alb  3.1  /  TBili  <0.2  /  DBili  x   /  AST  24  /  ALT  15  /  AlkPhos  102  [09-23-21 @ 04:30]    Creatinine Trend:  SCr 5.4 [09-23 @ 04:30]  SCr 6.5 [09-22 @ 08:39]  SCr 6.0 [09-21 @ 15:20]  SCr 5.1 [09-02 @ 11:00]  SCr 7.6 [09-01 @ 05:52]    Urinalysis - [09-22-21 @ 02:00]      Color Yellow / Appearance Turbid / SG 1.015 / pH 6.5      Gluc Negative / Ketone Small  / Bili Negative / Urobili <2 mg/dL       Blood Large / Protein 300 mg/dL / Leuk Est Large / Nitrite Negative      RBC >720 / WBC >720 / Hyaline  / Gran  / Sq Epi  / Non Sq Epi  / Bacteria     Urine Osmolality 395      [09-22-21 @ 02:00]    Iron 10, TIBC 119, %sat 8      [09-22-21 @ 08:39]  Ferritin 938      [09-22-21 @ 08:39]  PTH -- (Ca 8.9)      [12-24-20 @ 04:30]   54  Lipid: chol 114, , HDL 38, LDL --      [08-27-21 @ 04:30]    HBsAg Nonreact      [09-22-21 @ 08:39]  HCV 0.17, Nonreact      [09-22-21 @ 08:39]

## 2021-09-24 NOTE — CONSULT NOTE ADULT - SUBJECTIVE AND OBJECTIVE BOX
Podiatry Consult Note    Subjective:  DRAKE DOUGLAS is a pleasant well-nourished, well developed 81y Male in no acute distress, alert awake, and oriented to person, place and time.   Patient is a 81y old  Male who presents with a chief complaint of Hypotension and Fever (24 Sep 2021 11:12)    HPI:  History of Present Illness    Mr. Douglas is an 81 year old DNR/DNI male patient known to have:  - Alzheimer Dementia. [facility]. Home med Donepezil 5mg QD, Sertraline 25mg QD, Mirtazepine 15mg QD  - ESRD and Anemia of chronic Disease. On HD every MWF via [facility] at [facility] complicated by hypotension s/p initiation of Midodrine 10mg in AM. Receives ROMELIA 5000 units every MWF with HD. Follows with Dr Saba and Dr Vasquez. Home med calcium acetate 667mg TID, Midodrine 10mg in AM  - COPD on 4LPM NC at NH. Home med Albuterol 2 puffs Q6h and Breo Ellipta 200-25mg QD  - DM Type II. Last Hba1c 5.0 07/13/2021. Not on medication/ Diet controlled  - HFrEF and severe AS. Last TTE 07/18/21 EF 40%, mild-mod MR, mod TR, severe AS (Area 0.58cm2), WMA. Home med Toprol 25mg QD  - BPH. Home med Tamsulosin 0.4mg QD  - Dyslipidemia. Last lipid profile 08/27/21 , Chol 114, LDL 52, HDL 38. Home med Atorvastatin 40mg QD, Aspirin 81mg QD  - Recent admission for multilobar pneumonia with effusion s/p palliative thoracocentesis and removal of 1L fluids with cytopathology being indicative of reactive effusion positive for CK7, CK5,6, calretin, and CD 68 s/p discharge on 09/02 on levaquin. During that admission, GOC were discussed and palliative was on board: daughter refused Hospice and opted to continued with HD sessions      History was obtained from the daughter (Ms Vidal) over the phone [phone] since patient seems lethargic and is not cooperative.  [facility] for evaluation of hypotension and fever.  History goes back to Saturday when the patient spiked a fever of 100.1 degrees.  This was associated with lethargy, reduced PO intake, and confusion.  Daughter notes that patient was complaining of mild epigastric discomfort in the absence of any nausea, vomiting, shortness of breath, increased cough, increased oxygen requirements, change in bowel movements (diarrhea or constipation), or urinary symptoms (no much urine output at baseline).  Today, in the AM of 09/21, patient spiked a T of 100.5 at [facility] and was found to have a low SBP in 80's mmHg along with low pulse.  NH staff noted that patient has not voided since 06:00 AM.  EMS was contacted and patient was brought to the ED after the administration of 250 cc NS bolus.  No sick contacts.  No recent travel or exposure to recent travelers.      Upon presentation to the ED, the patient's Vital Signs in ED were as follows  - /75 mmHg --> dropped to 75/42 mmHg at 17:30 PM s/p 1L LR bolus in ED followed by Levophed initiation at a rate of 0.04 right now  -  bpm  - RR 18  bpm  - T 37.8  - SaO2 97% on RA      Investigations in ED   - CBC  --> WBC 14.19, Hb 11.8, Plt 287    - Chemistry  --> Na 131, K 4.8, BUN 51, Cr 6.0, AG 17, eGFR 8-9  --> VBG pH 7.25, pCO2 56, pO2 35, HCO3 25, %61.1, LA venous 1.7    - Imaging  --> Chest X Ray: CM, improved left sided effusion, improved interstitial opacification     - Microbiology  --> COVID received  --> Blood cultures x2 sets received  --> Urinalysis and urine culture pending collection (if possible)      In the setting of drop in BP from 133/75 to 75/42 mmHg at 17:30PM, 1L LR bolus was administered followed by levophed at 0.04.  Patient was given a dose of IV Cefepime 2g and IV Vancomycin 1g in ED  He will be admitted to the floor as a case of septic shock with Unclear Infectious Etiology for management with broad coverage antibiotics and pressors and for further investigations and monitoring.   (21 Sep 2021 20:17)      Past Medical History and Surgical History  PAST MEDICAL & SURGICAL HISTORY:  Diabetes mellitus    COPD (chronic obstructive pulmonary disease)    Lymphedema of both lower extremities    CHF (congestive heart failure)    ESRD on dialysis    H/O right nephrectomy  20 yrs ago         Review of Systems:  [X] Ten point review of systems is otherwise negative except as noted     Objective:  Vital Signs Last 24 Hrs  T(C): 36.6 (24 Sep 2021 08:28), Max: 36.7 (23 Sep 2021 16:00)  T(F): 97.9 (24 Sep 2021 08:28), Max: 98 (23 Sep 2021 16:00)  HR: 73 (24 Sep 2021 08:28) (57 - 73)  BP: 99/55 (24 Sep 2021 08:28) (99/55 - 168/79)  BP(mean): --  RR: 18 (24 Sep 2021 08:28) (18 - 18)  SpO2: 96% (24 Sep 2021 08:28) (96% - 100%)                        10.9   11.48 )-----------( 288      ( 24 Sep 2021 04:30 )             36.4                 09-24    135  |  98  |  61<HH>  ----------------------------<  112<H>  4.5   |  20  |  7.1<HH>    Ca    8.9      24 Sep 2021 04:30  Phos  3.2     09-23  Mg     2.0     09-24    TPro  5.9<L>  /  Alb  3.1<L>  /  TBili  <0.2  /  DBili  x   /  AST  18  /  ALT  13  /  AlkPhos  92  09-24        Physical Exam - Lower Extremity Focused:   Derm:  Left heel ulceration, Probe to deep tissue/ bone, fibrotic base, serous drainage. no purulence.   Vascular: DP and PT Pulses Diminished; Foot is Warm to Warm to the touch; Capillary Refill Time < 3 Seconds;    Neuro: Protective Sensation Diminished / Moderately Neuropathic   MSK: Pain On Palpation at Wound Site     Assessment:  Left DFU  Plan:  Chart reviewed and Patient evaluated. All Questions and Concerns Addressed and Answered  Please order XR Imaging left Foot   Local Wound Care; Wound Flushed w/ NS; Wound Packed w/ Betadine Soaked Gauze / DSD / Kerlix   Weight Bearing Status; WBAT w/ Heel Touch w/ Surgical Shoe;   Recommend; Lower Extremity Arterial Duplex B/L; if abnormal please consult Vascular.   Possible Surgical Debridement; Pending Arterial Duplex, ESR/CRP and XR Imaging  Discussed Plan w/ Dr. Ragland.    Podiatry

## 2021-09-24 NOTE — PROGRESS NOTE ADULT - ASSESSMENT
82 y/o gentleman with a past medical history of Alzheimer's dementia, ESRD on HD (oliguric), Anemia of chronic disease, COPD on 4L O2, DM II, Hfw/REF and Severe AS, HLD, and recently discharge for multi-lobar pneumonia admitted for septic shock from [facility]. Began having fevers on Sunday and complaining of abdominal pain, without any other significant symptoms (n/v/d, SOB, CP, cough). The following day he was noted to be febrile and hypotensive, and anuric.   Still found to be hypotensive in the ED after 1L IVF and started on peripheral levophed, WBC 14, CXR appears improved from last on 9/2.     Septic Shock due to Legionella PNA and ORSA bacteremia, shock resolved  Metabolic Encephalopathy, improved  Ervin pus in maldonado, remains on low dose levophed  < from: CT Abdomen and Pelvis w/ IV Cont (09.22.21 @ 13:24) >  .  Findings compatible with acute cystitis and left-sided ascending urinary tract infection; partially distended urinary bladder despite presence of a Maldonado catheter. Correlate clinically for catheter function.  2.  Large diffuse colonic stool burden with moderately distended rectum and mild presacral edema.  3.  Incompletely characterized cystic lesion in the pancreatic head. This can be further evaluated with an outpatient contrast-enhanced MRI/MRCP.  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     ESRD on HD (oliguric):  HAGMA  Hyponatremia   -Nephro following, for HD tomorrow  -Cont ca-acetate and midodrine    Alzheimer's dementia  -as per family, patient's mentation improved   -Cont donepezil, sertraline, and mirtazepine     #Progress Note Handoff  Pending (specify):   clinical improvement   Disposition:  from NH    Debbie Ortega MD  s. [phone]   80 y/o gentleman with a past medical history of Alzheimer's dementia, ESRD on HD (oliguric), Anemia of chronic disease, COPD on 4L O2, DM II, Hfw/REF and Severe AS, HLD, and recently discharge for multi-lobar pneumonia admitted for septic shock from [facility]. Began having fevers on Sunday and complaining of abdominal pain, without any other significant symptoms (n/v/d, SOB, CP, cough). The following day he was noted to be febrile and hypotensive, and anuric.   Still found to be hypotensive in the ED after 1L IVF and started on peripheral levophed, WBC 14, CXR appears improved from last on 9/2.     Septic Shock due to Legionella PNA and ORSA bacteremia, shock resolved  Metabolic Encephalopathy, improved  Ervin pus in maldonado, remains on low dose levophed  < from: CT Abdomen and Pelvis w/ IV Cont (09.22.21 @ 13:24) >  .  Findings compatible with acute cystitis and left-sided ascending urinary tract infection; partially distended urinary bladder despite presence of a Maldonado catheter. Correlate clinically for catheter function.  2.  Large diffuse colonic stool burden with moderately distended rectum and mild presacral edema.  3.  Incompletely characterized cystic lesion in the pancreatic head. This can be further evaluated with an outpatient contrast-enhanced MRI/MRCP.  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     ESRD on HD (oliguric):  HAGMA  Hyponatremia   -Nephro following, for HD tomorrow  -Cont ca-acetate and midodrine    Alzheimer's dementia  -as per family, patient's mentation improved   -Cont donepezil, sertraline, and mirtazepine     #Progress Note Handoff  Pending (specify):   clinical improvement   Disposition:  from NH

## 2021-09-24 NOTE — CONSULT NOTE ADULT - REASON FOR ADMISSION
Hypotension and Fever

## 2021-09-24 NOTE — PROGRESS NOTE ADULT - PROBLEM SELECTOR PLAN 1
DNR/DNI  Continue current med mgmt  Will re-address GOC as appropriate with family   Will follow
DNR/DNI  Continue current med mgmt  Will re-address GOC as appropriate with family   Will follow

## 2021-09-24 NOTE — PROGRESS NOTE ADULT - ASSESSMENT
ESRD on HD   - access via TDC   - fluid removal during HD has been limited due to intradialytic hypotension  Bacteremia  UTI  COPD on home O2  HFrEF  dementia   HTN  anemia  right nephrectomy    plan:    Removed Tesio  Temp HD catheter to be placed today  Abx per ID  Continue midodrine  HD today: 3 hours, opti 160 dialyzer, 2K bath, 1-2L UF  EPO with HD  Renal diet

## 2021-09-24 NOTE — PROGRESS NOTE ADULT - ASSESSMENT
80 y/o gentleman with a past medical history of Alzheimer's dementia, ESRD on HD (oliguric), Anemia of chronic disease, COPD on 4L O2, DM II, Hfw/REF and Severe AS, HLD, and recently discharge for multi-lobar pneumonia admitted for septic shock from [facility]. Began having fevers on Sunday and complaining of abdominal pain, without any other significant symptoms (n/v/d, SOB, CP, cough). The following day he was noted to be febrile and hypotensive, and anuric.   Still found to be hypotensive in the ED after 1L IVF and started on peripheral levophed, WBC 14, CXR appears improved from last on 9/2.     Septic Shock due to Legionella PNA and Proteus UTI, shock resolved  Metabolic Encephalopathy, improved  Ervin pus in maldonado, remains on low dose levophed, Ucx with proteus, pending sensitivities  < from: CT Abdomen and Pelvis w/ IV Cont (09.22.21 @ 13:24) >  .  Findings compatible with acute cystitis and left-sided ascending urinary tract infection; partially distended urinary bladder despite presence of a Maldonado catheter. Correlate clinically for catheter function.  2.  Large diffuse colonic stool burden with moderately distended rectum and mild presacral edema.  3.  Incompletely characterized cystic lesion in the pancreatic head. This can be further evaluated with an outpatient contrast-enhanced MRI/MRCP.  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     ESRD on HD (oliguric):  HAGMA  Hyponatremia   -Nephro following, for HD as per schedule, vascular to place [place]   -Cont ca-acetate and midodrine    Alzheimer's dementia  -as per family, patient's mentation improved   -Cont donepezil, sertraline, and mirtazepine     #Progress Note Handoff  Pending (specify):   clinical improvement   Disposition:  from NH    Debbie Ortega MD  s. [phone]

## 2021-09-24 NOTE — PROGRESS NOTE ADULT - SUBJECTIVE AND OBJECTIVE BOX
Patient is a 81y old  Male who presents with a chief complaint of Hypotension and Fever (23 Sep 2021 13:24)        Over Night Events:        ROS:     All ROS are negative except HPI         PHYSICAL EXAM    ICU Vital Signs Last 24 Hrs  T(C): 36.6 (24 Sep 2021 04:00), Max: 36.7 (23 Sep 2021 16:00)  T(F): 97.9 (24 Sep 2021 04:00), Max: 98 (23 Sep 2021 16:00)  HR: 69 (24 Sep 2021 04:00) (57 - 92)  BP: 133/63 (24 Sep 2021 04:00) (102/54 - 168/79)  BP(mean): --  ABP: --  ABP(mean): --  RR: 18 (24 Sep 2021 04:00) (18 - 18)  SpO2: 99% (24 Sep 2021 04:00) (97% - 100%)      CONSTITUTIONAL:  Well nourished.  NAD    ENT:   Airway patent,   Mouth with normal mucosa.   No thrush    EYES:   Pupils equal,   Round and reactive to light.    CARDIAC:   Normal rate,   Regular rhythm.    No edema      Vascular:  Normal systolic impulse  No Carotid bruits    RESPIRATORY:   No wheezing  Bilateral BS  Normal chest expansion  Not tachypneic,  No use of accessory muscles    GASTROINTESTINAL:  Abdomen soft,   Non-tender,   No guarding,   + BS    MUSCULOSKELETAL:   Range of motion is not limited,  No clubbing, cyanosis    NEUROLOGICAL:   Alert and oriented   No motor  deficits.    SKIN:   Skin normal color for race,   Warm and dry and intact.   No evidence of rash.    PSYCHIATRIC:   Normal mood and affect.   No apparent risk to self or others.    HEMATOLOGICAL:  No cervical  lymphadenopathy.  no inguinal lymphadenopathy      09-23-21 @ 07:01  -  09-24-21 @ 07:00  --------------------------------------------------------  IN:    Oral Fluid: 340 mL  Total IN: 340 mL    OUT:    Indwelling Catheter - Urethral (mL): 30 mL    Voided (mL): 70 mL  Total OUT: 100 mL    Total NET: 240 mL          LABS:                            10.1   10.25 )-----------( 290      ( 23 Sep 2021 04:30 )             33.3                                               09-23    135  |  98  |  40<H>  ----------------------------<  184<H>  3.8   |  22  |  5.4<HH>    Ca    8.6      23 Sep 2021 04:30  Phos  3.2     09-23  Mg     1.9     09-23    TPro  5.9<L>  /  Alb  3.1<L>  /  TBili  <0.2  /  DBili  x   /  AST  24  /  ALT  15  /  AlkPhos  102  09-23                                                 CARDIAC MARKERS ( 22 Sep 2021 08:39 )  x     / 0.20 ng/mL / x     / x     / x                                                LIVER FUNCTIONS - ( 23 Sep 2021 04:30 )  Alb: 3.1 g/dL / Pro: 5.9 g/dL / ALK PHOS: 102 U/L / ALT: 15 U/L / AST: 24 U/L / GGT: x                                                  Culture - Urine (collected 21 Sep 2021 23:20)  Source: Clean Catch Clean Catch (Midstream)  Preliminary Report (24 Sep 2021 05:57):    >100,000 CFU/ml Proteus mirabilis    Culture - Blood (collected 21 Sep 2021 16:55)  Source: .Blood Blood-Peripheral  Gram Stain (23 Sep 2021 05:26):    Growth in aerobic bottle: Gram Positive Cocci in Clusters    Growth in anaerobic bottle: Gram Positive Cocci in Clusters  Preliminary Report (24 Sep 2021 00:08):    Growth in aerobic bottle: Staphylococcus epidermidis    Growth in aerobic bottle: Gram Positive Cocci in Clusters    ***Blood Panel PCR results on this specimen are available    approximately 3 hours after the Gram stain result.***    Gram stain, PCR, and/or culture results may not always    correspond due to difference in methodologies.    ************************************************************    This PCR assay was performed by multiplex PCR. This    Assay tests for 66 bacterial and resistance gene targets.    Please refer to the [facility] test directory    at [email or web address] for details.  Organism: Blood Culture PCR (23 Sep 2021 02:24)  Organism: Blood Culture PCR (23 Sep 2021 02:24)    Culture - Blood (collected 21 Sep 2021 16:40)  Source: .Blood Blood-Peripheral  Preliminary Report (22 Sep 2021 23:02):    No growth to date.                                                                                           MEDICATIONS  (STANDING):  ascorbic acid 500 milliGRAM(s) Oral daily  aspirin enteric coated 81 milliGRAM(s) Oral daily  atorvastatin 40 milliGRAM(s) Oral at bedtime  calcium acetate 667 milliGRAM(s) Oral three times a day with meals  chlorhexidine 4% Liquid 1 Application(s) Topical <User Schedule>  dextrose 40% Gel 15 Gram(s) Oral once  dextrose 5%. 1000 milliLiter(s) (50 mL/Hr) IV Continuous <Continuous>  dextrose 5%. 1000 milliLiter(s) (100 mL/Hr) IV Continuous <Continuous>  dextrose 50% Injectable 25 Gram(s) IV Push once  dextrose 50% Injectable 12.5 Gram(s) IV Push once  dextrose 50% Injectable 25 Gram(s) IV Push once  donepezil 5 milliGRAM(s) Oral at bedtime  epoetin graham-epbx (RETACRIT) Injectable 5000 Unit(s) IV Push <User Schedule>  glucagon  Injectable 1 milliGRAM(s) IntraMuscular once  heparin SubCutaneous Injection - Peds 5000 Unit(s) SubCutaneous every 12 hours  insulin lispro (ADMELOG) corrective regimen sliding scale   SubCutaneous three times a day before meals  levoFLOXacin IVPB 250 milliGRAM(s) IV Intermittent every 24 hours  midodrine. 10 milliGRAM(s) Oral three times a day  mirtazapine 15 milliGRAM(s) Oral at bedtime  multivitamin Oral Tab/Cap - Peds 1 Tablet(s) Oral daily  pantoprazole    Tablet 40 milliGRAM(s) Oral before breakfast  polyethylene glycol 3350 17 Gram(s) Oral two times a day  senna 2 Tablet(s) Oral at bedtime  sertraline 25 milliGRAM(s) Oral daily  tamsulosin 0.4 milliGRAM(s) Oral at bedtime  vancomycin  IVPB 1000 milliGRAM(s) IV Intermittent once    MEDICATIONS  (PRN):  acetaminophen   Tablet .. 650 milliGRAM(s) Oral every 6 hours PRN Temp greater or equal to 38C (100.4F), Mild Pain (1 - 3), Moderate Pain (4 - 6)  ALBUTerol  90 MICROgram(s) HFA Inhaler - Peds 2 Puff(s) Inhalation every 4 hours PRN Bronchospasm      New X-rays reviewed:                                                                                  ECHO    CXR interpreted by me:

## 2021-09-25 NOTE — PROGRESS NOTE ADULT - ASSESSMENT
IMPRESSION:    Sepsis present on admission   Septic Shock resolved   Staph epi bacteremia  UTI/Pyelonephritis proteus mirabilis   RLL Pneumonia.  Urine Legionella positive   HO Alzheimer's Dementia  ESRD on HD     PLAN:    CNS: Avoid sedation    HEENT: Oral care.      PULMONARY:  HOB @ 45 degrees.  Aspiration precautions.  Wean O2 as tolerated       CARDIOVASCULAR:  Keep MAP > 60.  Avoid volume overload    GI: GI prophylaxis.  Feeding per speech.  Bowel regimen.  Enemas     RENAL:  Follow up lytes.  Correct as needed. HD per renal      INFECTIOUS DISEASE: Follow up final cultures.  ABX per ID.  FU Nasal MRSA.  FU Vanc trough.     HEMATOLOGICAL:  DVT prophylaxis.  Dimer noted.  FU LE duplex.      ENDOCRINE:  Follow up FS.     MUSCULOSKELETAL: Bed rest     Downgrade to floor     Prognosis poor     GOC

## 2021-09-25 NOTE — CHART NOTE - NSCHARTNOTEFT_GEN_A_CORE
82 y/o gentleman with a past medical history of Alzheimer's dementia, ESRD on HD (oliguric), Anemia of chronic disease, COPD on 4L O2, DM II, Hfw/REF and Severe AS, HLD, and recently discharge for multi-lobar pneumonia admitted for septic shock from [facility]. Began having fevers on Sunday and complaining of abdominal pain, without any other significant symptoms (n/v/d, SOB, CP, cough). The following day he was noted to be febrile and hypotensive, and anuric.   Still found to be hypotensive in the ED after 1L IVF and started on peripheral levophed, WBC 14, CXR appears improved from last on 9/2.     Septic Shock due to Legionella PNA and ORSA bacteremia, shock resolved  Metabolic Encephalopathy, improved  Ervin pus in maldonado, remains on low dose levophed  < from: CT Abdomen and Pelvis w/ IV Cont (09.22.21 @ 13:24) >  .  Findings compatible with acute cystitis and left-sided ascending urinary tract infection; partially distended urinary bladder despite presence of a Maldonado catheter. Correlate clinically for catheter function.  2.  Large diffuse colonic stool burden with moderately distended rectum and mild presacral edema.  3.  Incompletely characterized cystic lesion in the pancreatic head. This can be further evaluated with an outpatient contrast-enhanced MRI/MRCP.  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     ESRD on HD (oliguric):  HAGMA  Hyponatremia   -Nephro following, for HD tomorrow  -Cont ca-acetate and midodrine    Alzheimer's dementia  -as per family, patient's mentation improved   -Cont donepezil, sertraline, and mirtazepine 82 y/o gentleman with a past medical history of Alzheimer's dementia, ESRD on HD (oliguric), Anemia of chronic disease, COPD on 4L O2, DM II, Hfw/REF and Severe AS, HLD, and recently discharge for multi-lobar pneumonia admitted for septic shock from [facility]. Began having fevers on Sunday and complaining of abdominal pain, without any other significant symptoms (n/v/d, SOB, CP, cough). The following day he was noted to be febrile and hypotensive, and anuric.   Still found to be hypotensive in the ED after 1L IVF and started on peripheral levophed, WBC 14, CXR appears improved from last on 9/2.     Septic Shock due to Legionella PNA and ORSA bacteremia, shock resolved  Metabolic Encephalopathy, improved  Ervin pus in maldonado, remains on low dose levophed  < from: CT Abdomen and Pelvis w/ IV Cont (09.22.21 @ 13:24) >  .  Findings compatible with acute cystitis and left-sided ascending urinary tract infection; partially distended urinary bladder despite presence of a Maldonado catheter. Correlate clinically for catheter function.  2.  Large diffuse colonic stool burden with moderately distended rectum and mild presacral edema.  3.  Incompletely characterized cystic lesion in the pancreatic head. This can be further evaluated with an outpatient contrast-enhanced MRI/MRCP.  9/21 Blood cx : ORSA  Urine Legionella +  ID following, recs vancomycin and levaquin  dc tesio catheter, Vascular placed [place] for HD on 9/24  echo: Left ventricular ejection fraction, by visual estimation, is 45 to 50%.  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     ESRD on HD (oliguric):  HAGMA  Hyponatremia   -Nephro following, for HD  -Cont ca-acetate and midodrine    Alzheimer's dementia  -as per family, patient's mentation improved   -Cont donepezil, sertraline, and mirtazepine        FOLLOW UP    -blood cx 9/24  -xray foot Transfer from CEU to general medicine floor    Continue work-up as follows:  -HD today, has uldall  -f/u blood cx 9/24  -f/u foot xray & LE arterial duplex for LLE ulcer  -DNR/DNI, daughter wants all medical interventions        80 y/o Kazakh-speaking male from [facility] with a past medical history of Alzheimer's dementia, ESRD on HD (oliguric), Anemia of chronic disease, COPD on 4L O2, DM II, Hfw/REF and Severe AS, HLD, and recently discharge for multi-lobar pneumonia admitted for septic shock.    Septic Shock due to Legionella PNA and Proteus UTI, shock resolved  Metabolic Encephalopathy, improved  Ervin pus in maldonado, remains on low dose levophed, Ucx with proteus, pending sensitivities  < from: CT Abdomen and Pelvis w/ IV Cont (09.22.21 @ 13:24) >  .  Findings compatible with acute cystitis and left-sided ascending urinary tract infection; partially distended urinary bladder despite presence of a Maldonado catheter. Correlate clinically for catheter function.  2.  Large diffuse colonic stool burden with moderately distended rectum and mild presacral edema.  3.  Incompletely characterized cystic lesion in the pancreatic head. This can be further evaluated with an outpatient contrast-enhanced MRI/MRCP.  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     Left heel ulcer  f/u foot xray:  f/u LE arterial duplex:  wound care following; podiatry following    ESRD on HD (oliguric):  HAGMA  Hyponatremia   -HD today; will receive vanc and epo  -nephro following  -Cont ca-acetate and midodrine    Alzheimer's dementia  -as per family, patient's mentation improved   -Cont donepezil, sertraline, and mirtazepine     #Progress Note Handoff  Pending (specify):   clinical improvement   Disposition:  from NH Transfer from CEU to general medicine floor    Continue work-up as follows:  -had HD today with uldall; MWF and will need tesio eventually  -f/u blood cx 9/24  -f/u foot xray & LE arterial duplex for LLE ulcer  -DNR/DNI  -pain control  -urinary retention/constipation; f/u kidney bladder ultrasound        82 y/o Norwegian-speaking male from [facility] with a past medical history of Alzheimer's dementia, ESRD on HD (oliguric), Anemia of chronic disease, COPD on 4L O2, DM II, Hfw/REF and Severe AS, HLD, and recently discharge for multi-lobar pneumonia admitted for septic shock.    Septic Shock due to Legionella PNA and Proteus UTI, shock resolved  Metabolic Encephalopathy, improved  Ervin pus in maldonado, remains on low dose levophed, Ucx with proteus, pending sensitivities  < from: CT Abdomen and Pelvis w/ IV Cont (09.22.21 @ 13:24) >  .  Findings compatible with acute cystitis and left-sided ascending urinary tract infection; partially distended urinary bladder despite presence of a Maldonado catheter. Correlate clinically for catheter function.  2.  Large diffuse colonic stool burden with moderately distended rectum and mild presacral edema.  3.  Incompletely characterized cystic lesion in the pancreatic head. This can be further evaluated with an outpatient contrast-enhanced MRI/MRCP.  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     Left heel ulcer  f/u foot xray:  f/u LE arterial duplex:  wound care following; podiatry following    ESRD on HD (oliguric):  HAGMA  Hyponatremia   -HD today; will receive vanc and epo  -nephro following  -Cont ca-acetate and midodrine    Alzheimer's dementia  -as per family, patient's mentation improved   -Cont donepezil, sertraline, and mirtazepine     #Progress Note Handoff  Pending (specify):   clinical improvement   Disposition:  from NH

## 2021-09-25 NOTE — PROGRESS NOTE ADULT - ASSESSMENT
80 y/o gentleman with a past medical history of Alzheimer's dementia, ESRD on HD (oliguric), Anemia of chronic disease, COPD on 4L O2, DM II, Hfw/REF and Severe AS, HLD, and recently discharge for multi-lobar pneumonia admitted for septic shock from [facility]. Began having fevers on Sunday and complaining of abdominal pain, without any other significant symptoms (n/v/d, SOB, CP, cough). The following day he was noted to be febrile and hypotensive, and anuric.   Still found to be hypotensive in the ED after 1L IVF and started on peripheral levophed, WBC 14, CXR appears improved from last on 9/2.     Septic Shock due to Legionella PNA and Proteus UTI, shock resolved  Metabolic Encephalopathy, improved  Ervin pus in maldonado, remains off levophed, Ucx with proteus, pending sensitivities  < from: CT Abdomen and Pelvis w/ IV Cont (09.22.21 @ 13:24) >  .  Findings compatible with acute cystitis and left-sided ascending urinary tract infection; partially distended urinary bladder despite presence of a Maldonado catheter. Correlate clinically for catheter function.  2.  Large diffuse colonic stool burden with moderately distended rectum and mild presacral edema.  3.  Incompletely characterized cystic lesion in the pancreatic head. This can be further evaluated with an outpatient contrast-enhanced MRI/MRCP.  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     ESRD on HD (oliguric):  HAGMA  Hyponatremia   -Nephro following, for HD as per schedule via [facility]  -Cont ca-acetate and midodrine    Alzheimer's dementia  -as per family, patient's mentation improved   -Cont donepezil, sertraline, and mirtazepine     #Progress Note Handoff  Pending (specify):   clinical improvement, urine sensitivities, blood cx, possible tesio next week   Disposition:  from NH    Debbie Ortega MD  s. [id number]

## 2021-09-25 NOTE — PROGRESS NOTE ADULT - SUBJECTIVE AND OBJECTIVE BOX
DRAKE GJONBALAJ 81y Male  MRN#: [id number]   CODE STATUS: DNR/DNI    Hospital Day: 4d  Pt is currently admitted with the primary diagnosis of sepsis    SUBJECTIVE  Overnight/Interval Events: Overnight patient complained of abdominal pain. Bessie placed successfully yesterday and ready for HD today.                                             ----------------------------------------------------------  OBJECTIVE  PAST MEDICAL & SURGICAL HISTORY  Diabetes mellitus  COPD (chronic obstructive pulmonary disease)  Lymphedema of both lower extremities  CHF (congestive heart failure)  ESRD on dialysis  H/O right nephrectomy  20 yrs ago                                          -----------------------------------------------------------  ALLERGIES:  No Known Allergies                                          ------------------------------------------------------------    HOME MEDICATIONS  Home Medications:  albuterol 90 mcg/inh inhalation aerosol: 2 puff(s) inhaled every 6 hours (20 Aug 2021 09:36)  ascorbic acid 500 mg oral tablet: 1 tab(s) orally once a day (20 Aug 2021 09:36)  aspirin 81 mg oral delayed release tablet: 1 tab(s) orally once a day (20 Aug 2021 09:36)  atorvastatin 40 mg oral tablet: 1 tab(s) orally once a day (at bedtime) (20 Aug 2021 09:36)  Breo Ellipta 200 mcg-25 mcg/inh inhalation powder: 1 puff(s) inhaled once a day (2021 10:25)  calcium acetate 667 mg oral tablet: 1 tab(s) orally 3 times a day (with meals) (20 Aug 2021 13:54)  donepezil 5 mg oral tablet: 1 tab(s) orally once a day (at bedtime) (20 Aug 2021 09:36)  epoetin graham: 5000 unit(s) injectable Monday, Wednesday, and Friday (20 Aug 2021 11:05)  heparin: 5000 unit(s) subcutaneous every 12 hours (01 Sep 2021 13:34)  HYDROmorphone 2 mg oral tablet: 1 tab(s) orally every 4 hours, As needed, Moderate Pain (4 - 6) (01 Sep 2021 12:01)  midodrine 10 mg oral tablet: 1 tab(s) orally  (01 Sep 2021 12:01)  mirtazapine 15 mg oral tablet: 1 tab(s) orally once a day (at bedtime) (20 Aug 2021 09:36)  multivitamin: 1 tab(s) orally once a day (20 Aug 2021 13:54)  pantoprazole 40 mg oral delayed release tablet: 1 tab(s) orally once a day (before a meal) (20 Aug 2021 09:36)  povidone iodine 10% topical ointment: 1 application topically  (01 Sep 2021 12:01)  Senna 8.6 mg oral tablet: 2 tab(s) orally once a day (at bedtime), As Needed (2021 10:25)  sertraline 25 mg oral tablet: 1 tab(s) orally once a day (20 Aug 2021 09:36)  tamsulosin 0.4 mg oral capsule: 1 cap(s) orally once a day (at bedtime) (20 Aug 2021 09:36)  Toprol-XL 25 mg oral tablet, extended release: 1 tab(s) orally once a day (at bedtime) (01 Sep 2021 12:01)                           MEDICATIONS:  STANDING MEDICATIONS  ascorbic acid 500 milliGRAM(s) Oral daily  aspirin enteric coated 81 milliGRAM(s) Oral daily  atorvastatin 40 milliGRAM(s) Oral at bedtime  calcium acetate 667 milliGRAM(s) Oral three times a day with meals  cefepime   IVPB 1000 milliGRAM(s) IV Intermittent daily  chlorhexidine 4% Liquid 1 Application(s) Topical <User Schedule>  dextrose 40% Gel 15 Gram(s) Oral once  dextrose 5%. 1000 milliLiter(s) IV Continuous <Continuous>  dextrose 5%. 1000 milliLiter(s) IV Continuous <Continuous>  dextrose 50% Injectable 25 Gram(s) IV Push once  dextrose 50% Injectable 12.5 Gram(s) IV Push once  dextrose 50% Injectable 25 Gram(s) IV Push once  donepezil 5 milliGRAM(s) Oral at bedtime  glucagon  Injectable 1 milliGRAM(s) IntraMuscular once  heparin SubCutaneous Injection - Peds 5000 Unit(s) SubCutaneous every 12 hours  insulin lispro (ADMELOG) corrective regimen sliding scale   SubCutaneous three times a day before meals  metroNIDAZOLE  IVPB 500 milliGRAM(s) IV Intermittent every 8 hours  midodrine. 10 milliGRAM(s) Oral three times a day  mirtazapine 15 milliGRAM(s) Oral at bedtime  multivitamin Oral Tab/Cap - Peds 1 Tablet(s) Oral daily  norepinephrine Infusion 0.05 MICROgram(s)/kG/Min IV Continuous <Continuous>  pantoprazole    Tablet 40 milliGRAM(s) Oral before breakfast  sertraline 25 milliGRAM(s) Oral daily  tamsulosin 0.4 milliGRAM(s) Oral at bedtime  vancomycin  IVPB 750 milliGRAM(s) IV Intermittent <User Schedule>    PRN MEDICATIONS  acetaminophen   Tablet .. 650 milliGRAM(s) Oral every 6 hours PRN  ALBUTerol  90 MICROgram(s) HFA Inhaler - Peds 2 Puff(s) Inhalation every 4 hours PRN                                            ------------------------------------------------------------  VITAL SIGNS: Last 24 Hours  T(C): 37 (22 Sep 2021 07:00), Max: 38.2 (21 Sep 2021 15:45)  T(F): 98.6 (22 Sep 2021 07:00), Max: 100.8 (21 Sep 2021 15:45)  HR: 61 (22 Sep 2021 09:00) (61 - 108)  BP: 112/51 (22 Sep 2021 09:00) (75/42 - 133/75)  BP(mean): 73 (22 Sep 2021 09:00) (60 - 90)  RR: 18 (22 Sep 2021 09:00) (16 - 18)  SpO2: 99% (22 Sep 2021 09:00) (95% - 100%)      21 @ 07:01  -  -22-21 @ 07:00  --------------------------------------------------------  IN: 22.4 mL / OUT: 50 mL / NET: -27.6 mL    21 @ 07:01  -  21 @ 09:39  --------------------------------------------------------  IN: 5.3 mL / OUT: 100 mL / NET: -94.7 mL                                             --------------------------------------------------------------  LABS:                        10.1   12.06 )-----------( 294      ( 22 Sep 2021 08:39 )             33.0         131<L>  |  94<L>  |  51<H>  ----------------------------<  140<H>  4.8   |  20  |  6.0<HH>    Ca    9.0      21 Sep 2021 15:20    TPro  6.8  /  Alb  3.6  /  TBili  0.3  /  DBili  x   /  AST  24  /  ALT  15  /  AlkPhos  116<H>      PT/INR - ( 21 Sep 2021 15:20 )   PT: 13.20 sec;   INR: 1.15 ratio         PTT - ( 21 Sep 2021 15:20 )  PTT:21.6 sec  Urinalysis Basic - ( 22 Sep 2021 02:00 )    Color: Yellow / Appearance: Turbid / S.015 / pH: x  Gluc: x / Ketone: Small  / Bili: Negative / Urobili: <2 mg/dL   Blood: x / Protein: 300 mg/dL / Nitrite: Negative   Leuk Esterase: Large / RBC: >720 /HPF / WBC >720 /HPF   Sq Epi: x / Non Sq Epi: x / Bacteria: x                                            -------------------------------------------------------------  RADIOLOGY:    CXR : Stable bilateral opacities. No pneumothorax    CTA&P w/IV contrast :     1.  Findings compatible with acute cystitis and left-sided ascending urinary tract infection; partially distended urinary bladder despite presence of a Maldonado catheter. Correlate clinically for catheter function.  2.  Large diffuse colonic stool burden with moderately distended rectum and mild presacral edema.  3.  Incompletely characterized cystic lesion in the pancreatic head. This can be further evaluated with an outpatient contrast-enhanced MRI/MRCP.    CT chest w/IV contrast :    Right lower lobar consolidation likely combination of atelectasis and infection with obliteration of interspersed airways and proximal bronchial debris.    Bilateral left greater than right pleural effusions have slightly decreased on the prior exam.    Unchanged right upper lobe nodule.                                          --------------------------------------------------------------    PHYSICAL EXAM:    General: no acute distress, lying in bed  HEENT: normocephalic, atraumatic, PERRLA, EOMi, no thyromegaly  Lungs: on 4L NC, diffuse rhonchi, bilateral crackles, no wheezes  Heart: regular rate and rhythm, normal S1 and S2  Abdomen: soft, nontender, +bowel sounds  Neuro: awake, alert, does not follow commands  Extremities: no lower extremity edema  Skin: no rash or lesion                                         --------------------------------------------------------------    ASSESSMENT & PLAN    81 year old male patient from [facility] with PMH Alzheimer Dementia, ESRD and 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 He was recently admitted for multilobar pneumonia and treated with levofloxacin.     80 y/o gentleman with a past medical history of Alzheimer's dementia, ESRD on HD (oliguric), Anemia of chronic disease, COPD on 4L O2, DM II, Hfw/REF and Severe AS, HLD, and recently discharge for multi-lobar pneumonia admitted for septic shock from [facility].      Septic Shock due to Legionella PNA and Proteus UTI, shock resolved  Metabolic Encephalopathy, improved  Ervin pus in maldonado, remains on low dose levophed, Ucx with proteus, pending sensitivities  < from: CT Abdomen and Pelvis w/ IV Cont (21 @ 13:24) >  .  Findings compatible with acute cystitis and left-sided ascending urinary tract infection; partially distended urinary bladder despite presence of a Maldonado catheter. Correlate clinically for catheter function.  2.  Large diffuse colonic stool burden with moderately distended rectum and mild presacral edema.  3.  Incompletely characterized cystic lesion in the pancreatic head. This can be further evaluated with an outpatient contrast-enhanced MRI/MRCP.   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     Left heel ulcer  foot xray:  LE arterial duplex:  wound care following  podiatry following    ESRD on HD (oliguric):  HAGMA  Hyponatremia   -HD today; will receive vanc and epo  -nephro following  -Cont ca-acetate and midodrine    Alzheimer's dementia  -as per family, patient's mentation improved   -Cont donepezil, sertraline, and mirtazepine     #Progress Note Handoff  Pending (specify):   clinical improvement   Disposition:  from NH                            f DRAKE GJONBALAJ 81y Male  MRN#: [id number]   CODE STATUS: DNR/DNI    Hospital Day: 4d  Pt is currently admitted with the primary diagnosis of sepsis    SUBJECTIVE  Overnight/Interval Events: Overnight patient complained of abdominal pain. Bessie placed successfully yesterday and ready for HD today.                                             ----------------------------------------------------------  OBJECTIVE  PAST MEDICAL & SURGICAL HISTORY  Diabetes mellitus  COPD (chronic obstructive pulmonary disease)  Lymphedema of both lower extremities  CHF (congestive heart failure)  ESRD on dialysis  H/O right nephrectomy  20 yrs ago                                          -----------------------------------------------------------  ALLERGIES:  No Known Allergies                                          ------------------------------------------------------------    HOME MEDICATIONS  Home Medications:  albuterol 90 mcg/inh inhalation aerosol: 2 puff(s) inhaled every 6 hours (20 Aug 2021 09:36)  ascorbic acid 500 mg oral tablet: 1 tab(s) orally once a day (20 Aug 2021 09:36)  aspirin 81 mg oral delayed release tablet: 1 tab(s) orally once a day (20 Aug 2021 09:36)  atorvastatin 40 mg oral tablet: 1 tab(s) orally once a day (at bedtime) (20 Aug 2021 09:36)  Breo Ellipta 200 mcg-25 mcg/inh inhalation powder: 1 puff(s) inhaled once a day (2021 10:25)  calcium acetate 667 mg oral tablet: 1 tab(s) orally 3 times a day (with meals) (20 Aug 2021 13:54)  donepezil 5 mg oral tablet: 1 tab(s) orally once a day (at bedtime) (20 Aug 2021 09:36)  epoetin graham: 5000 unit(s) injectable Monday, Wednesday, and Friday (20 Aug 2021 11:05)  heparin: 5000 unit(s) subcutaneous every 12 hours (01 Sep 2021 13:34)  HYDROmorphone 2 mg oral tablet: 1 tab(s) orally every 4 hours, As needed, Moderate Pain (4 - 6) (01 Sep 2021 12:01)  midodrine 10 mg oral tablet: 1 tab(s) orally  (01 Sep 2021 12:01)  mirtazapine 15 mg oral tablet: 1 tab(s) orally once a day (at bedtime) (20 Aug 2021 09:36)  multivitamin: 1 tab(s) orally once a day (20 Aug 2021 13:54)  pantoprazole 40 mg oral delayed release tablet: 1 tab(s) orally once a day (before a meal) (20 Aug 2021 09:36)  povidone iodine 10% topical ointment: 1 application topically  (01 Sep 2021 12:01)  Senna 8.6 mg oral tablet: 2 tab(s) orally once a day (at bedtime), As Needed (2021 10:25)  sertraline 25 mg oral tablet: 1 tab(s) orally once a day (20 Aug 2021 09:36)  tamsulosin 0.4 mg oral capsule: 1 cap(s) orally once a day (at bedtime) (20 Aug 2021 09:36)  Toprol-XL 25 mg oral tablet, extended release: 1 tab(s) orally once a day (at bedtime) (01 Sep 2021 12:01)                           MEDICATIONS:  STANDING MEDICATIONS  ascorbic acid 500 milliGRAM(s) Oral daily  aspirin enteric coated 81 milliGRAM(s) Oral daily  atorvastatin 40 milliGRAM(s) Oral at bedtime  calcium acetate 667 milliGRAM(s) Oral three times a day with meals  cefepime   IVPB 1000 milliGRAM(s) IV Intermittent daily  chlorhexidine 4% Liquid 1 Application(s) Topical <User Schedule>  dextrose 40% Gel 15 Gram(s) Oral once  dextrose 5%. 1000 milliLiter(s) IV Continuous <Continuous>  dextrose 5%. 1000 milliLiter(s) IV Continuous <Continuous>  dextrose 50% Injectable 25 Gram(s) IV Push once  dextrose 50% Injectable 12.5 Gram(s) IV Push once  dextrose 50% Injectable 25 Gram(s) IV Push once  donepezil 5 milliGRAM(s) Oral at bedtime  glucagon  Injectable 1 milliGRAM(s) IntraMuscular once  heparin SubCutaneous Injection - Peds 5000 Unit(s) SubCutaneous every 12 hours  insulin lispro (ADMELOG) corrective regimen sliding scale   SubCutaneous three times a day before meals  metroNIDAZOLE  IVPB 500 milliGRAM(s) IV Intermittent every 8 hours  midodrine. 10 milliGRAM(s) Oral three times a day  mirtazapine 15 milliGRAM(s) Oral at bedtime  multivitamin Oral Tab/Cap - Peds 1 Tablet(s) Oral daily  norepinephrine Infusion 0.05 MICROgram(s)/kG/Min IV Continuous <Continuous>  pantoprazole    Tablet 40 milliGRAM(s) Oral before breakfast  sertraline 25 milliGRAM(s) Oral daily  tamsulosin 0.4 milliGRAM(s) Oral at bedtime  vancomycin  IVPB 750 milliGRAM(s) IV Intermittent <User Schedule>    PRN MEDICATIONS  acetaminophen   Tablet .. 650 milliGRAM(s) Oral every 6 hours PRN  ALBUTerol  90 MICROgram(s) HFA Inhaler - Peds 2 Puff(s) Inhalation every 4 hours PRN                                            ------------------------------------------------------------  VITAL SIGNS: Last 24 Hours  T(C): 37 (22 Sep 2021 07:00), Max: 38.2 (21 Sep 2021 15:45)  T(F): 98.6 (22 Sep 2021 07:00), Max: 100.8 (21 Sep 2021 15:45)  HR: 61 (22 Sep 2021 09:00) (61 - 108)  BP: 112/51 (22 Sep 2021 09:00) (75/42 - 133/75)  BP(mean): 73 (22 Sep 2021 09:00) (60 - 90)  RR: 18 (22 Sep 2021 09:00) (16 - 18)  SpO2: 99% (22 Sep 2021 09:00) (95% - 100%)      21 @ 07:01  -  -22-21 @ 07:00  --------------------------------------------------------  IN: 22.4 mL / OUT: 50 mL / NET: -27.6 mL    21 @ 07:01  -  21 @ 09:39  --------------------------------------------------------  IN: 5.3 mL / OUT: 100 mL / NET: -94.7 mL                                             --------------------------------------------------------------  LABS:                        10.1   12.06 )-----------( 294      ( 22 Sep 2021 08:39 )             33.0         131<L>  |  94<L>  |  51<H>  ----------------------------<  140<H>  4.8   |  20  |  6.0<HH>    Ca    9.0      21 Sep 2021 15:20    TPro  6.8  /  Alb  3.6  /  TBili  0.3  /  DBili  x   /  AST  24  /  ALT  15  /  AlkPhos  116<H>      PT/INR - ( 21 Sep 2021 15:20 )   PT: 13.20 sec;   INR: 1.15 ratio         PTT - ( 21 Sep 2021 15:20 )  PTT:21.6 sec  Urinalysis Basic - ( 22 Sep 2021 02:00 )    Color: Yellow / Appearance: Turbid / S.015 / pH: x  Gluc: x / Ketone: Small  / Bili: Negative / Urobili: <2 mg/dL   Blood: x / Protein: 300 mg/dL / Nitrite: Negative   Leuk Esterase: Large / RBC: >720 /HPF / WBC >720 /HPF   Sq Epi: x / Non Sq Epi: x / Bacteria: x                                            -------------------------------------------------------------  RADIOLOGY:    CXR : Stable bilateral opacities. No pneumothorax    CTA&P w/IV contrast :     1.  Findings compatible with acute cystitis and left-sided ascending urinary tract infection; partially distended urinary bladder despite presence of a Maldonado catheter. Correlate clinically for catheter function.  2.  Large diffuse colonic stool burden with moderately distended rectum and mild presacral edema.  3.  Incompletely characterized cystic lesion in the pancreatic head. This can be further evaluated with an outpatient contrast-enhanced MRI/MRCP.    CT chest w/IV contrast :    Right lower lobar consolidation likely combination of atelectasis and infection with obliteration of interspersed airways and proximal bronchial debris.    Bilateral left greater than right pleural effusions have slightly decreased on the prior exam.    Unchanged right upper lobe nodule.                                          --------------------------------------------------------------    PHYSICAL EXAM:    General: no acute distress, lying in bed  HEENT: normocephalic, atraumatic, PERRLA, EOMi, no thyromegaly  Lungs: on 4L NC, diffuse rhonchi, bilateral crackles, no wheezes  Heart: regular rate and rhythm, normal S1 and S2  Abdomen: soft, nontender, +bowel sounds  Neuro: awake, alert, does not follow commands  Extremities: no lower extremity edema  Skin: no rash or lesion                                         --------------------------------------------------------------    ASSESSMENT & PLAN    80 y/o gentleman from [facility] with a past medical history of Alzheimer's dementia, ESRD on HD (oliguric), Anemia of chronic disease, COPD on 4L O2, DM II, Hfw/REF and Severe AS, HLD, and recently discharge for multi-lobar pneumonia admitted for septic shock.      Septic Shock due to Legionella PNA and Proteus UTI, shock resolved  Metabolic Encephalopathy, improved  Ervin pus in maldonado, remains on low dose levophed, Ucx with proteus, pending sensitivities  < from: CT Abdomen and Pelvis w/ IV Cont (21 @ 13:24) >  .  Findings compatible with acute cystitis and left-sided ascending urinary tract infection; partially distended urinary bladder despite presence of a Maldonado catheter. Correlate clinically for catheter function.  2.  Large diffuse colonic stool burden with moderately distended rectum and mild presacral edema.  3.  Incompletely characterized cystic lesion in the pancreatic head. This can be further evaluated with an outpatient contrast-enhanced MRI/MRCP.   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     Left heel ulcer  f/u foot xray:  f/u LE arterial duplex:  wound care following; podiatry following    ESRD on HD (oliguric):  HAGMA  Hyponatremia   -HD today; will receive vanc and epo  -nephro following  -Cont ca-acetate and midodrine    Alzheimer's dementia  -as per family, patient's mentation improved   -Cont donepezil, sertraline, and mirtazepine     #Progress Note Handoff  Pending (specify):   clinical improvement   Disposition:  from NH                            f

## 2021-09-25 NOTE — PROGRESS NOTE ADULT - SUBJECTIVE AND OBJECTIVE BOX
patient seen after HMD today.  patient tolerated dialysis and was able to maintain his BP without pressors.  patient had 2 L removed.  patient awake when I saw him, alert, and actually recognized me and knew my name (has not seen me for months)    P/e  ICU Vital Signs Last 24 Hrs  T(C): 36.9 (25 Sep 2021 08:00), Max: 36.9 (25 Sep 2021 08:00)  T(F): 98.5 (25 Sep 2021 08:00), Max: 98.5 (25 Sep 2021 08:00)  HR: 86 (25 Sep 2021 12:30) (63 - 86)  BP: 119/59 (25 Sep 2021 12:30) (100/49 - 165/72)  BP(mean): 103 (24 Sep 2021 20:05) (103 - 103)  ABP: --  ABP(mean): --  RR: 18 (25 Sep 2021 12:30) (18 - 20)  SpO2: 100% (25 Sep 2021 12:30) (98% - 100%)      conj pink, no jaundice  neck pos. transmitted murmur.  no JVD. supple.  lungs clear to auscultation.  good air entry bilaterally  heart regular rhythm. pos. 3/6 SHEN at base going into carotids  abd. BS pos. soft.  some lower abd. tenderness.  bladder not felt.  maldonado in place  extremities right femoral temporary dialysis catheter.  no swelling, redness or DC  extremities no edema noted  skin turgor good.  Labs                  10.6   13.83 )-----------( 264      ( 25 Sep 2021 04:30 )             35.9     09-25    134<L>  |  97<L>  |  74<HH>  ----------------------------<  114<H>  4.9   |  16<L>  |  7.7<HH>    Ca    8.8      25 Sep 2021 04:30  Mg     2.0     09-25    CAPILLARY BLOOD GLUCOSE      POCT Blood Glucose.: 242 mg/dL (25 Sep 2021 16:41)  POCT Blood Glucose.: 157 mg/dL (25 Sep 2021 11:19)  POCT Blood Glucose.: 139 mg/dL (25 Sep 2021 07:28)  POCT Blood Glucose.: 149 mg/dL (24 Sep 2021 21:30)      TPro  5.9<L>  /  Alb  3.1<L>  /  TBili  <0.2  /  DBili  x   /  AST  18  /  ALT  13  /  AlkPhos  92  09-24

## 2021-09-25 NOTE — PROGRESS NOTE ADULT - ASSESSMENT
ESRD - patient with ESRD secondary to diabetes with some element obstruction  recent pneumonia - pos. legionella in the urine  UTI - proteus  bacteremia/sepsis - staph epi likely related to dialysis catheter  recurrent pericardial effusion - ?infective vs. uremic  COPD - on home oxygen  dementia  IDDM - BS mostly at target      PLan:  HMD 3-4 x/week  monitor for recurrence of pericardial effusion  patient on cefipime/vancomycin - follow vancomycin levels  monitor bs maintain at target  next HMD will likely be Mon.

## 2021-09-25 NOTE — PROGRESS NOTE ADULT - SUBJECTIVE AND OBJECTIVE BOX
DRAKE HO  81y Male    CHIEF COMPLAINT:    Patient is a 81y old  Male who presents with a chief complaint of Hypotension and Fever (25 Sep 2021 07:52)    INTERVAL HPI/OVERNIGHT EVENTS:    Patient seen and examined. No acute events overnight. Awake and alert    ROS: All other systems are negative.    Vital Signs:    T(F): 98.5 (09-25-21 @ 08:00), Max: 98.5 (09-25-21 @ 08:00)  HR: 63 (09-25-21 @ 09:30) (63 - 85)  BP: 100/49 (09-25-21 @ 09:30) (100/49 - 165/72)  RR: 18 (09-25-21 @ 09:30) (18 - 20)  SpO2: 100% (09-25-21 @ 09:30) (98% - 100%)    POCT Blood Glucose.: 157 mg/dL (25 Sep 2021 11:19)  POCT Blood Glucose.: 139 mg/dL (25 Sep 2021 07:28)  POCT Blood Glucose.: 149 mg/dL (24 Sep 2021 21:30)  POCT Blood Glucose.: 148 mg/dL (24 Sep 2021 17:08)    PHYSICAL EXAM:    GENERAL:  NAD  SKIN: No rashes or lesions  HEENT: Atraumatic. Normocephalic.   NECK: Supple, No JVD.    PULMONARY: CTA B/L. No wheezing. No rales  CVS: Normal S1, S2. Rate and Rhythm are regular.    ABDOMEN/GI: Soft, Nontender, Nondistended; BS present  MSK:  No clubbing or cyanosis   NEUROLOGIC:  moves all extremities  PSYCH: Alert & oriented x 2    Consultant(s) Notes Reviewed:  [x ] YES  [ ] NO  Care Discussed with Consultants/Other Providers [ x] YES  [ ] NO    LABS:                        10.6   13.83 )-----------( 264      ( 25 Sep 2021 04:30 )             35.9     134<L>  |  97<L>  |  74<HH>  ----------------------------<  114<H>  4.9   |  16<L>  |  7.7<HH>    Ca    8.8      25 Sep 2021 04:30  Mg     2.0     09-25    TPro  5.9<L>  /  Alb  3.1<L>  /  TBili  <0.2  /  DBili  x   /  AST  18  /  ALT  13  /  AlkPhos  92  09-24    PT/INR - ( 24 Sep 2021 12:05 )   PT: 12.50 sec;   INR: 1.09 ratio      PTT - ( 24 Sep 2021 12:05 )  PTT:20.8 sec    RADIOLOGY & ADDITIONAL TESTS:  Imaging or report Personally Reviewed:  [x] YES  [ ] NO  EKG reviewed: [x] YES  [ ] NO    Medications:  Standing  ascorbic acid 500 milliGRAM(s) Oral daily  aspirin enteric coated 81 milliGRAM(s) Oral daily  atorvastatin 40 milliGRAM(s) Oral at bedtime  calcium acetate 667 milliGRAM(s) Oral three times a day with meals  cefepime   IVPB 1000 milliGRAM(s) IV Intermittent every 24 hours  chlorhexidine 4% Liquid 1 Application(s) Topical <User Schedule>  dextrose 40% Gel 15 Gram(s) Oral once  dextrose 5%. 1000 milliLiter(s) IV Continuous <Continuous>  dextrose 5%. 1000 milliLiter(s) IV Continuous <Continuous>  dextrose 50% Injectable 25 Gram(s) IV Push once  dextrose 50% Injectable 12.5 Gram(s) IV Push once  dextrose 50% Injectable 25 Gram(s) IV Push once  donepezil 5 milliGRAM(s) Oral at bedtime  epoetin graham-epbx (RETACRIT) Injectable 5000 Unit(s) IV Push once  epoetin graham-epbx (RETACRIT) Injectable 5000 Unit(s) IV Push <User Schedule>  glucagon  Injectable 1 milliGRAM(s) IntraMuscular once  heparin SubCutaneous Injection - Peds 5000 Unit(s) SubCutaneous every 12 hours  insulin lispro (ADMELOG) corrective regimen sliding scale   SubCutaneous three times a day before meals  levoFLOXacin IVPB 250 milliGRAM(s) IV Intermittent every 24 hours  midodrine. 10 milliGRAM(s) Oral three times a day  mineral oil enema 133 milliLiter(s) Rectal once  mirtazapine 15 milliGRAM(s) Oral at bedtime  multivitamin Oral Tab/Cap - Peds 1 Tablet(s) Oral daily  pantoprazole    Tablet 40 milliGRAM(s) Oral before breakfast  polyethylene glycol 3350 17 Gram(s) Oral two times a day  senna 2 Tablet(s) Oral at bedtime  sertraline 25 milliGRAM(s) Oral daily  tamsulosin 0.4 milliGRAM(s) Oral at bedtime  vancomycin  IVPB 1000 milliGRAM(s) IV Intermittent <User Schedule>  vancomycin  IVPB 1000 milliGRAM(s) IV Intermittent once    PRN Meds  acetaminophen   Tablet .. 650 milliGRAM(s) Oral every 6 hours PRN  ALBUTerol  90 MICROgram(s) HFA Inhaler - Peds 2 Puff(s) Inhalation every 4 hours PRN

## 2021-09-25 NOTE — PROGRESS NOTE ADULT - ASSESSMENT
80 y/o gentleman with a past medical history of Alzheimer's dementia, ESRD on HD (oliguric), Anemia of chronic disease, COPD on 4L O2, DM II, Hfw/REF and Severe AS, HLD, and recently discharge for multi-lobar pneumonia admitted for septic shock from [facility]. Began having fevers on Sunday and complaining of abdominal pain, without any other significant symptoms (n/v/d, SOB, CP, cough). The following day he was noted to be febrile and hypotensive, and anuric.   Still found to be hypotensive in the ED after 1L IVF and started on peripheral levophed, WBC 14, CXR appears improved from last on 9/2.     Septic Shock due to Legionella PNA and ORSA bacteremia, shock resolved  Metabolic Encephalopathy, improved  Ervin pus in maldonado, remains on low dose levophed  < from: CT Abdomen and Pelvis w/ IV Cont (09.22.21 @ 13:24) >  .  Findings compatible with acute cystitis and left-sided ascending urinary tract infection; partially distended urinary bladder despite presence of a Maldonado catheter. Correlate clinically for catheter function.  2.  Large diffuse colonic stool burden with moderately distended rectum and mild presacral edema.  3.  Incompletely characterized cystic lesion in the pancreatic head. This can be further evaluated with an outpatient contrast-enhanced MRI/MRCP.  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     ESRD on HD (oliguric):  HAGMA  Hyponatremia   -Nephro following, for HD tomorrow  -Cont ca-acetate and midodrine    Alzheimer's dementia  -as per family, patient's mentation improved   -Cont donepezil, sertraline, and mirtazepine     #Progress Note Handoff  Pending (specify):   clinical improvement   Disposition:  from NH    Debbie Ortega MD  s. [phone]

## 2021-09-25 NOTE — PROGRESS NOTE ADULT - SUBJECTIVE AND OBJECTIVE BOX
Patient is a 81y old  Male who presents with a chief complaint of Hypotension and Fever (24 Sep 2021 13:25)        Over Night Events:  Off pressors.          ROS:     All ROS are negative except HPI         PHYSICAL EXAM    ICU Vital Signs Last 24 Hrs  T(C): 36.8 (25 Sep 2021 03:57), Max: 36.8 (24 Sep 2021 16:20)  T(F): 98.2 (25 Sep 2021 03:57), Max: 98.3 (24 Sep 2021 16:20)  HR: 80 (25 Sep 2021 03:57) (64 - 85)  BP: 130/70 (25 Sep 2021 03:57) (99/55 - 165/72)  BP(mean): 103 (24 Sep 2021 20:05) (92 - 103)  ABP: --  ABP(mean): --  RR: 20 (25 Sep 2021 03:57) (18 - 20)  SpO2: 98% (25 Sep 2021 03:57) (96% - 98%)      CONSTITUTIONAL:  Ill appearing.  In  NAD    ENT:   Airway patent,   Mouth with normal mucosa.   No thrush    EYES:   Pupils equal,   Round and reactive to light.    CARDIAC:   Normal rate,   Regular rhythm.    No edema      Vascular:  Normal systolic impulse  No Carotid bruits    RESPIRATORY:   No wheezing  Bilateral BS  Normal chest expansion  Not tachypneic,  No use of accessory muscles    GASTROINTESTINAL:  Abdomen soft,   Non-tender,   No guarding,   + BS    MUSCULOSKELETAL:   Range of motion is not limited,  No clubbing, cyanosis    NEUROLOGICAL:   Alert  No motor  deficits.    SKIN:   Skin normal color for race,   Warm and dry   No evidence of rash.    PSYCHIATRIC:   Normal mood and affect.   No apparent risk to self or others.    HEMATOLOGICAL:  No cervical  lymphadenopathy.  no inguinal lymphadenopathy        LABS:                            10.9   11.48 )-----------( 288      ( 24 Sep 2021 04:30 )             36.4                                               09-24    135  |  98  |  61<HH>  ----------------------------<  112<H>  4.5   |  20  |  7.1<HH>    Ca    8.9      24 Sep 2021 04:30  Mg     2.0     09-24    TPro  5.9<L>  /  Alb  3.1<L>  /  TBili  <0.2  /  DBili  x   /  AST  18  /  ALT  13  /  AlkPhos  92  09-24      PT/INR - ( 24 Sep 2021 12:05 )   PT: 12.50 sec;   INR: 1.09 ratio         PTT - ( 24 Sep 2021 12:05 )  PTT:20.8 sec                                                                                     LIVER FUNCTIONS - ( 24 Sep 2021 04:30 )  Alb: 3.1 g/dL / Pro: 5.9 g/dL / ALK PHOS: 92 U/L / ALT: 13 U/L / AST: 18 U/L / GGT: x                                                                                                                                       MEDICATIONS  (STANDING):  ascorbic acid 500 milliGRAM(s) Oral daily  aspirin enteric coated 81 milliGRAM(s) Oral daily  atorvastatin 40 milliGRAM(s) Oral at bedtime  calcium acetate 667 milliGRAM(s) Oral three times a day with meals  cefepime   IVPB 1000 milliGRAM(s) IV Intermittent every 24 hours  chlorhexidine 4% Liquid 1 Application(s) Topical <User Schedule>  dextrose 40% Gel 15 Gram(s) Oral once  dextrose 5%. 1000 milliLiter(s) (50 mL/Hr) IV Continuous <Continuous>  dextrose 5%. 1000 milliLiter(s) (100 mL/Hr) IV Continuous <Continuous>  dextrose 50% Injectable 25 Gram(s) IV Push once  dextrose 50% Injectable 12.5 Gram(s) IV Push once  dextrose 50% Injectable 25 Gram(s) IV Push once  donepezil 5 milliGRAM(s) Oral at bedtime  epoetin graham-epbx (RETACRIT) Injectable 5000 Unit(s) IV Push <User Schedule>  glucagon  Injectable 1 milliGRAM(s) IntraMuscular once  heparin SubCutaneous Injection - Peds 5000 Unit(s) SubCutaneous every 12 hours  insulin lispro (ADMELOG) corrective regimen sliding scale   SubCutaneous three times a day before meals  levoFLOXacin IVPB 250 milliGRAM(s) IV Intermittent every 24 hours  midodrine. 10 milliGRAM(s) Oral three times a day  mirtazapine 15 milliGRAM(s) Oral at bedtime  multivitamin Oral Tab/Cap - Peds 1 Tablet(s) Oral daily  pantoprazole    Tablet 40 milliGRAM(s) Oral before breakfast  polyethylene glycol 3350 17 Gram(s) Oral two times a day  senna 2 Tablet(s) Oral at bedtime  sertraline 25 milliGRAM(s) Oral daily  tamsulosin 0.4 milliGRAM(s) Oral at bedtime  vancomycin  IVPB 1000 milliGRAM(s) IV Intermittent <User Schedule>    MEDICATIONS  (PRN):  acetaminophen   Tablet .. 650 milliGRAM(s) Oral every 6 hours PRN Temp greater or equal to 38C (100.4F), Mild Pain (1 - 3), Moderate Pain (4 - 6)  ALBUTerol  90 MICROgram(s) HFA Inhaler - Peds 2 Puff(s) Inhalation every 4 hours PRN Bronchospasm      New X-rays reviewed:                                                                                  ECHO    CXR interpreted by me:

## 2021-09-26 NOTE — PROGRESS NOTE ADULT - SUBJECTIVE AND OBJECTIVE BOX
DRAKE GJONBALAJ 81y Male  MRN#: [id number]   Hospital Day: 5d    SUBJECTIVE  Patient is a 81y old Male who presents with a chief complaint of Hypotension and Fever (25 Sep 2021 17:52)  Currently admitted to medicine with the primary diagnosis of Septic shock      INTERVAL HPI AND OVERNIGHT EVENTS:  Patient was examined and seen at bedside. This morning he is resting comfortably in bed. No issues or overnight events.    REVIEW OF SYMPTOMS:  CONSTITUTIONAL: No weakness, fevers or chills; No headaches  EYES: No visual changes, eye pain, or discharge  ENT: No vertigo; No ear pain or change in hearing; No sore throat or difficulty swallowing  NECK: No pain or stiffness  RESPIRATORY: No cough, wheezing, or hemoptysis; No shortness of breath  CARDIOVASCULAR: No chest pain or palpitations  GASTROINTESTINAL: No abdominal or epigastric pain; No nausea, vomiting, or hematemesis; No diarrhea or constipation; No melena or hematochezia  GENITOURINARY: No dysuria, frequency or hematuria  MUSCULOSKELETAL: No joint pain, no muscle pain, no weakness  NEUROLOGICAL: No numbness or weakness  SKIN: No itching or rashes    OBJECTIVE  PAST MEDICAL & SURGICAL HISTORY  Diabetes mellitus    COPD (chronic obstructive pulmonary disease)    Lymphedema of both lower extremities    CHF (congestive heart failure)    ESRD on dialysis    H/O right nephrectomy  20 yrs ago      ALLERGIES:  No Known Allergies    MEDICATIONS:  STANDING MEDICATIONS  ascorbic acid 500 milliGRAM(s) Oral daily  aspirin enteric coated 81 milliGRAM(s) Oral daily  atorvastatin 40 milliGRAM(s) Oral at bedtime  calcium acetate 667 milliGRAM(s) Oral three times a day with meals  cefepime   IVPB 1000 milliGRAM(s) IV Intermittent every 24 hours  chlorhexidine 4% Liquid 1 Application(s) Topical <User Schedule>  dextrose 40% Gel 15 Gram(s) Oral once  dextrose 5%. 1000 milliLiter(s) IV Continuous <Continuous>  dextrose 5%. 1000 milliLiter(s) IV Continuous <Continuous>  dextrose 50% Injectable 25 Gram(s) IV Push once  dextrose 50% Injectable 25 Gram(s) IV Push once  dextrose 50% Injectable 12.5 Gram(s) IV Push once  donepezil 5 milliGRAM(s) Oral at bedtime  epoetin graham-epbx (RETACRIT) Injectable 5000 Unit(s) IV Push <User Schedule>  glucagon  Injectable 1 milliGRAM(s) IntraMuscular once  heparin SubCutaneous Injection - Peds 5000 Unit(s) SubCutaneous every 12 hours  insulin lispro (ADMELOG) corrective regimen sliding scale   SubCutaneous three times a day before meals  levoFLOXacin IVPB 250 milliGRAM(s) IV Intermittent every 24 hours  midodrine. 10 milliGRAM(s) Oral three times a day  mirtazapine 15 milliGRAM(s) Oral at bedtime  multivitamin Oral Tab/Cap - Peds 1 Tablet(s) Oral daily  pantoprazole    Tablet 40 milliGRAM(s) Oral before breakfast  polyethylene glycol 3350 17 Gram(s) Oral two times a day  senna 2 Tablet(s) Oral at bedtime  sertraline 25 milliGRAM(s) Oral daily  tamsulosin 0.4 milliGRAM(s) Oral at bedtime  vancomycin  IVPB 1000 milliGRAM(s) IV Intermittent <User Schedule>    PRN MEDICATIONS  acetaminophen   Tablet .. 650 milliGRAM(s) Oral every 6 hours PRN  ALBUTerol  90 MICROgram(s) HFA Inhaler - Peds 2 Puff(s) Inhalation every 4 hours PRN      VITAL SIGNS: Last 24 Hours  T(C): 36.1 (26 Sep 2021 02:45), Max: 36.9 (25 Sep 2021 08:00)  T(F): 97 (26 Sep 2021 02:45), Max: 98.5 (25 Sep 2021 08:00)  HR: 70 (26 Sep 2021 02:45) (63 - 86)  BP: 127/58 (26 Sep 2021 02:45) (100/49 - 127/58)  BP(mean): --  RR: 18 (26 Sep 2021 02:45) (18 - 19)  SpO2: 94% (26 Sep 2021 02:45) (94% - 100%)    LABS:                        10.6   13.83 )-----------( 264      ( 25 Sep 2021 04:30 )             35.9     09-25    134<L>  |  97<L>  |  74<HH>  ----------------------------<  114<H>  4.9   |  16<L>  |  7.7<HH>    Ca    8.8      25 Sep 2021 04:30  Mg     2.0     09-25      PT/INR - ( 24 Sep 2021 12:05 )   PT: 12.50 sec;   INR: 1.09 ratio         PTT - ( 24 Sep 2021 12:05 )  PTT:20.8 sec          Culture - Blood (collected 24 Sep 2021 12:06)  Source: .Blood None  Preliminary Report (25 Sep 2021 23:01):    No growth to date.    Culture - Blood (collected 24 Sep 2021 12:06)  Source: .Blood None  Preliminary Report (25 Sep 2021 23:01):    No growth to date.    Culture - Catheter (collected 23 Sep 2021 13:48)  Source: .Catheter IV Dialysis Perm Cath  Preliminary Report (25 Sep 2021 14:05):    No growth          RADIOLOGY:      PHYSICAL EXAM:  General: no acute distress, lying in bed  HEENT: normocephalic, atraumatic, PERRLA, EOMi, no thyromegaly  Lungs: on 4L NC, diffuse rhonchi, bilateral crackles, no wheezes  Heart: regular rate and rhythm, normal S1 and S2  Abdomen: soft, nontender, +bowel sounds  Neuro: awake, alert, does not follow commands  Extremities: no lower extremity edema  Skin: no rash or lesion, dry

## 2021-09-26 NOTE — PROGRESS NOTE ADULT - ASSESSMENT
82 y/o gentleman from [facility] with a past medical history of Alzheimer's dementia, ESRD on HD (oliguric), Anemia of chronic disease, COPD on 4L O2, DM II, Hfw/REF and Severe AS, HLD, and recently discharge for multi-lobar pneumonia admitted for septic shock.      #Septic Shock due to Legionella PNA and Proteus UTI, shock resolved  Metabolic Encephalopathy, improved  Ervin pus in maldonado, remains on low dose levophed, Ucx with proteus, pending sensitivities  < from: CT Abdomen and Pelvis w/ IV Cont (09.22.21 @ 13:24) >  .  Findings compatible with acute cystitis and left-sided ascending urinary tract infection; partially distended urinary bladder despite presence of a Maldonado catheter. Correlate clinically for catheter function.  2.  Large diffuse colonic stool burden with moderately distended rectum and mild presacral edema.  3.  Incompletely characterized cystic lesion in the pancreatic head. This can be further evaluated with an outpatient contrast-enhanced MRI/MRCP.  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     #Left heel ulcer  f/u foot xray:  f/u LE arterial duplex:  wound care following; podiatry following    #ESRD on HD (oliguric):  #HAGMA  #Hyponatremia   -HD today; will receive vanc and epo  -nephro following  -Cont ca-acetate and midodrine    #Alzheimer's dementia  -as per family, patient's mentation improved   -Cont donepezil, sertraline, and mirtazepine     #Progress Note Handoff  Pending (specify):   clinical improvement   Disposition:  from NH   80 y/o gentleman from [facility] with a past medical history of Alzheimer's dementia, ESRD on HD (oliguric), Anemia of chronic disease, COPD on 4L O2, DM II, Hfw/REF and Severe AS, HLD, and recently discharge for multi-lobar pneumonia admitted for septic shock.      #Septic Shock due to Legionella PNA and Proteus UTI, shock resolved  Metabolic Encephalopathy, improved  Ervin pus in maldonado, remains on low dose levophed, Ucx with proteus, pending sensitivities  < from: CT Abdomen and Pelvis w/ IV Cont (09.22.21 @ 13:24) >  .  Findings compatible with acute cystitis and left-sided ascending urinary tract infection; partially distended urinary bladder despite presence of a Maldonado catheter. Correlate clinically for catheter function.  2.  Large diffuse colonic stool burden with moderately distended rectum and mild presacral edema.  3.  Incompletely characterized cystic lesion in the pancreatic head. This can be further evaluated with an outpatient contrast-enhanced MRI/MRCP.  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     #Left heel ulcer  f/u foot xray:  f/u LE arterial duplex:  wound care following; podiatry following    #ESRD on HD (oliguric):  #HAGMA  #Hyponatremia   -HD today; will receive vanc and epo  -nephro following  -Cont ca-acetate and midodrine    #Alzheimer's dementia  -as per family, patient's mentation improved   -Cont donepezil, sertraline, and mirtazepine     #Progress Note Handoff  Pending (specify):   clinical improvement   Disposition:  from NH

## 2021-09-26 NOTE — PROGRESS NOTE ADULT - SUBJECTIVE AND OBJECTIVE BOX
DRAKE HO  81y Male    CHIEF COMPLAINT:    Patient is a 81y old  Male who presents with a chief complaint of Hypotension and Fever (26 Sep 2021 07:28)      INTERVAL HPI/OVERNIGHT EVENTS:    Patient seen and examined. No acute events overnight. No active complaints     ROS: All other systems are negative.    Vital Signs:    T(F): 97 (09-26-21 @ 07:40), Max: 98 (09-25-21 @ 21:11)  HR: 61 (09-26-21 @ 07:40) (61 - 79)  BP: 121/68 (09-26-21 @ 07:40) (119/88 - 127/58)  RR: 16 (09-26-21 @ 07:40) (16 - 19)  SpO2: 94% (09-26-21 @ 07:40) (94% - 99%)    25 Sep 2021 07:01  -  26 Sep 2021 07:00  --------------------------------------------------------  IN: 300 mL / OUT: 2300 mL / NET: -2000 mL    26 Sep 2021 07:01  -  26 Sep 2021 14:08  --------------------------------------------------------  IN: 570 mL / OUT: 0 mL / NET: 570 mL    POCT Blood Glucose.: 130 mg/dL (26 Sep 2021 11:26)  POCT Blood Glucose.: 112 mg/dL (26 Sep 2021 07:23)  POCT Blood Glucose.: 92 mg/dL (25 Sep 2021 20:49)  POCT Blood Glucose.: 242 mg/dL (25 Sep 2021 16:41)    PHYSICAL EXAM:    GENERAL:  NAD  SKIN: No rashes or lesions  HEENT: Atraumatic. Normocephalic   NECK: Supple, No JVD.   PULMONARY: coarse breath sounds b/l. No wheezing. No rales  CVS: Normal S1, S2. Rate and Rhythm are regular.   ABDOMEN/GI: Soft, Nontender, Nondistended; BS present  MSK:  No clubbing or cyanosis   NEUROLOGIC: moves all extremities  PSYCH: Awake and alert, confused    Consultant(s) Notes Reviewed:  [x ] YES  [ ] NO  Care Discussed with Consultants/Other Providers [ x] YES  [ ] NO    LABS:                        11.0   14.14 )-----------( 238      ( 26 Sep 2021 06:23 )             36.7     134<L>  |  97<L>  |  66<HH>  ----------------------------<  93  5.4<H>   |  20  |  7.0<HH>    Ca    8.9      26 Sep 2021 06:23  Mg     1.9     09-26    Culture - Blood (collected 24 Sep 2021 12:06)  Source: .Blood None  Preliminary Report (25 Sep 2021 23:01):    No growth to date.    Culture - Blood (collected 24 Sep 2021 12:06)  Source: .Blood None  Preliminary Report (25 Sep 2021 23:01):    No growth to date.    RADIOLOGY & ADDITIONAL TESTS:  Imaging or report Personally Reviewed:  [x] YES  [ ] NO  EKG reviewed: [x] YES  [ ] NO    Medications:  Standing  ascorbic acid 500 milliGRAM(s) Oral daily  aspirin enteric coated 81 milliGRAM(s) Oral daily  atorvastatin 40 milliGRAM(s) Oral at bedtime  calcium acetate 667 milliGRAM(s) Oral three times a day with meals  cefepime   IVPB 1000 milliGRAM(s) IV Intermittent every 24 hours  chlorhexidine 4% Liquid 1 Application(s) Topical <User Schedule>  dextrose 40% Gel 15 Gram(s) Oral once  dextrose 5%. 1000 milliLiter(s) IV Continuous <Continuous>  dextrose 5%. 1000 milliLiter(s) IV Continuous <Continuous>  dextrose 50% Injectable 25 Gram(s) IV Push once  dextrose 50% Injectable 12.5 Gram(s) IV Push once  dextrose 50% Injectable 25 Gram(s) IV Push once  donepezil 5 milliGRAM(s) Oral at bedtime  epoetin graham-epbx (RETACRIT) Injectable 5000 Unit(s) IV Push <User Schedule>  glucagon  Injectable 1 milliGRAM(s) IntraMuscular once  heparin SubCutaneous Injection - Peds 5000 Unit(s) SubCutaneous every 12 hours  insulin lispro (ADMELOG) corrective regimen sliding scale   SubCutaneous three times a day before meals  levoFLOXacin IVPB 250 milliGRAM(s) IV Intermittent every 24 hours  midodrine. 10 milliGRAM(s) Oral three times a day  mirtazapine 15 milliGRAM(s) Oral at bedtime  multivitamin Oral Tab/Cap - Peds 1 Tablet(s) Oral daily  pantoprazole    Tablet 40 milliGRAM(s) Oral before breakfast  polyethylene glycol 3350 17 Gram(s) Oral two times a day  senna 2 Tablet(s) Oral at bedtime  sertraline 25 milliGRAM(s) Oral daily  tamsulosin 0.4 milliGRAM(s) Oral at bedtime  vancomycin  IVPB 1000 milliGRAM(s) IV Intermittent <User Schedule>    PRN Meds  acetaminophen   Tablet .. 650 milliGRAM(s) Oral every 6 hours PRN  ALBUTerol  90 MICROgram(s) HFA Inhaler - Peds 2 Puff(s) Inhalation every 4 hours PRN

## 2021-09-27 NOTE — PROGRESS NOTE ADULT - ASSESSMENT
· Assessment	  82 y/o gentleman with a past medical history of Alzheimer's dementia, ESRD on HD (oliguric), Anemia of chronic disease, COPD on 4L O2, DM II, Hfw/REF and Severe AS, HLD, and recently discharge for multi-lobar pneumonia admitted for septic shock from [facility]. Began having fevers on Sunday and complaining of abdominal pain, without any other significant symptoms (n/v/d, SOB, CP, cough). Today he was noted to be febrile and hypotensive, and anuric.   Still found to be hypotensive in the ED after 1L IVF and started on peripheral levophed, WBC 14, CXR appears improved from last on 9/2.   Easily arousable but confused. He reports pain when pressing his abdomen, otherwise is he does not offer much help other than asking for help.   Laura checked with 10cc of grossly purulent and opaque urinary output.     IMPRESSION;  Resolvd sepsis secondary to ORSEbacteremia/legionella PNA  9/24 Tesio removed  9/23 ECHO : no vegetations  9/21 BCx ORSE  9/24 BCX NG  9/22 urine positive fo legionella ag  9/21 UC P mirabilis    RECOMMENDATIONS;  Could proceed with Tesio placement from ID standpoint  Meropenem 500 mg iv q24h for 7 days starting 9/27  Vancomycin 1 gm iv post HD  for 4 more dosis starting 9/27  Levo 250 mg iv q24h. Could change to po 250 mg q24h on discharge for 14 days starting 9/23  recall prn please

## 2021-09-27 NOTE — CHART NOTE - NSCHARTNOTEFT_GEN_A_CORE
Palliative following for GOC.   Spoke with family, known to palliative service from prior admissions.   For now, goals are clear, family does not want to stop HD for hospice services. Wants DNR/DNI and ongoing med mgmt.   Family has palliative # in the event they would like to re-address GOC at anytime.   Please re-consult PRN.     [phone] PRN.

## 2021-09-27 NOTE — PROGRESS NOTE ADULT - SUBJECTIVE AND OBJECTIVE BOX
[place] NEPHROLOGY FOLLOW UP NOTE  --------------------------------------------------------------------------------  24 hour events/subjective: Patient examined. Appears comfortable.    PAST HISTORY  --------------------------------------------------------------------------------  No significant changes to PMH, PSH, FHx, SHx, unless otherwise noted    ALLERGIES & MEDICATIONS  --------------------------------------------------------------------------------  Allergies    No Known Allergies    Standing Inpatient Medications  ascorbic acid 500 milliGRAM(s) Oral daily  aspirin enteric coated 81 milliGRAM(s) Oral daily  atorvastatin 40 milliGRAM(s) Oral at bedtime  budesonide  80 MICROgram(s)/formoterol 4.5 MICROgram(s) Inhaler 2 Puff(s) Inhalation two times a day  calcium acetate 667 milliGRAM(s) Oral three times a day with meals  chlorhexidine 4% Liquid 1 Application(s) Topical <User Schedule>  dextrose 40% Gel 15 Gram(s) Oral once  dextrose 5%. 1000 milliLiter(s) IV Continuous <Continuous>  dextrose 5%. 1000 milliLiter(s) IV Continuous <Continuous>  dextrose 50% Injectable 25 Gram(s) IV Push once  dextrose 50% Injectable 12.5 Gram(s) IV Push once  dextrose 50% Injectable 25 Gram(s) IV Push once  donepezil 5 milliGRAM(s) Oral at bedtime  epoetin graham-epbx (RETACRIT) Injectable 5000 Unit(s) IV Push <User Schedule>  glucagon  Injectable 1 milliGRAM(s) IntraMuscular once  heparin SubCutaneous Injection - Peds 5000 Unit(s) SubCutaneous every 12 hours  insulin lispro (ADMELOG) corrective regimen sliding scale   SubCutaneous three times a day before meals  levoFLOXacin IVPB 250 milliGRAM(s) IV Intermittent every 24 hours  meropenem  IVPB      midodrine 10 milliGRAM(s) Oral three times a day  mirtazapine 15 milliGRAM(s) Oral at bedtime  multivitamin Oral Tab/Cap - Peds 1 Tablet(s) Oral daily  pantoprazole    Tablet 40 milliGRAM(s) Oral before breakfast  polyethylene glycol 3350 17 Gram(s) Oral two times a day  senna 2 Tablet(s) Oral at bedtime  sertraline 25 milliGRAM(s) Oral daily  tamsulosin 0.4 milliGRAM(s) Oral at bedtime  vancomycin  IVPB 1000 milliGRAM(s) IV Intermittent <User Schedule>    PRN Inpatient Medications  acetaminophen   Tablet .. 650 milliGRAM(s) Oral every 6 hours PRN  ALBUTerol  90 MICROgram(s) HFA Inhaler - Peds 2 Puff(s) Inhalation every 4 hours PRN    VITALS/PHYSICAL EXAM  --------------------------------------------------------------------------------  T(C): 36.3 (09-27-21 @ 08:00), Max: 36.3 (09-27-21 @ 08:00)  HR: 73 (09-27-21 @ 08:00) (62 - 73)  BP: 138/63 (09-27-21 @ 08:00) (138/63 - 155/73)  RR: 18 (09-27-21 @ 08:00) (18 - 19)  SpO2: 96% (09-27-21 @ 08:00) (94% - 98%)    09-26-21 @ 07:01  -  09-27-21 @ 07:00  --------------------------------------------------------  IN: 810 mL / OUT: 150 mL / NET: 660 mL      Physical Exam:  	Gen: NAD  	Pulm: CTA B/L  	CV: RRR, S1S2  	Abd: +BS, soft, nontender/nondistended  	: No suprapubic tenderness  	LE: Warm,  edema  	Vascular access: temp HD cath    LABS/STUDIES  --------------------------------------------------------------------------------              10.5   14.66 >-----------<  279      [09-27-21 @ 05:42]              34.7     133  |  93  |  83  ----------------------------<  109      [09-27-21 @ 05:42]  5.4   |  19  |  8.0        Ca     9.1     [09-27-21 @ 05:42]      Mg     1.9     [09-27-21 @ 05:42]    Creatinine Trend:  SCr 8.0 [09-27 @ 05:42]  SCr 7.0 [09-26 @ 06:23]  SCr 7.7 [09-25 @ 04:30]  SCr 7.1 [09-24 @ 04:30]  SCr 5.4 [09-23 @ 04:30]    Urinalysis - [09-22-21 @ 02:00]      Color Yellow / Appearance Turbid / SG 1.015 / pH 6.5      Gluc Negative / Ketone Small  / Bili Negative / Urobili <2 mg/dL       Blood Large / Protein 300 mg/dL / Leuk Est Large / Nitrite Negative      RBC >720 / WBC >720 / Hyaline  / Gran  / Sq Epi  / Non Sq Epi  / Bacteria     Urine Osmolality 395      [09-22-21 @ 02:00]    Iron 10, TIBC 119, %sat 8      [09-22-21 @ 08:39]  Ferritin 938      [09-22-21 @ 08:39]  PTH -- (Ca 8.9)      [12-24-20 @ 04:30]   54  Lipid: chol 114, , HDL 38, LDL --      [08-27-21 @ 04:30]    HBsAg Nonreact      [09-22-21 @ 08:39]  HCV 0.17, Nonreact      [09-22-21 @ 08:39]

## 2021-09-27 NOTE — PROGRESS NOTE ADULT - SUBJECTIVE AND OBJECTIVE BOX
DRAKE HO 81y Male  MRN#: [id number]   CODE STATUS: DNR    Hospital Day: 6d    Pt is currently admitted with the primary diagnosis of septic shock    SUBJECTIVE  Hospital Course  80 y/o gentleman with a past medical history of Alzheimer's dementia, ESRD on HD (oliguric), Anemia of chronic disease, COPD on 4L O2, DM II, Hfw/REF and Severe AS, HLD, and recently discharge for multi-lobar pneumonia admitted for septic shock from [facility]. Began having fevers on Sunday and complaining of abdominal pain, without any other significant symptoms (n/v/d, SOB, CP, cough). Today he was noted to be febrile and hypotensive, and anuric.   Still found to be hypotensive in the ED after 1L IVF and started on peripheral levophed, WBC 14, CXR appears improved from last on 9/2.    Overnight events   None    Subjective complaints   Endorses abd pain. No headache, dizziness, chest pain, nausea, vomiting    Present Today:   - Laura:  No [  ], Yes [   ] : Indication:     - Type of IV Access:       .. CVC/Piccline:  No [  ], Yes [   ] : Indication:       .. Midline: No [  ], Yes [   ] : Indication:                               OBJECTIVE  PAST MEDICAL & SURGICAL HISTORY  Diabetes mellitus    COPD (chronic obstructive pulmonary disease)    Lymphedema of both lower extremities    CHF (congestive heart failure)    ESRD on dialysis    H/O right nephrectomy  20 yrs ago                                             ALLERGIES:  No Known Allergies                                        HOME MEDICATIONS  Home Medications:  albuterol 90 mcg/inh inhalation aerosol: 2 puff(s) inhaled every 6 hours (20 Aug 2021 09:36)  ascorbic acid 500 mg oral tablet: 1 tab(s) orally once a day (20 Aug 2021 09:36)  aspirin 81 mg oral delayed release tablet: 1 tab(s) orally once a day (20 Aug 2021 09:36)  atorvastatin 40 mg oral tablet: 1 tab(s) orally once a day (at bedtime) (20 Aug 2021 09:36)  Breo Ellipta 200 mcg-25 mcg/inh inhalation powder: 1 puff(s) inhaled once a day (26 Jul 2021 10:25)  calcium acetate 667 mg oral tablet: 1 tab(s) orally 3 times a day (with meals) (20 Aug 2021 13:54)  donepezil 5 mg oral tablet: 1 tab(s) orally once a day (at bedtime) (20 Aug 2021 09:36)  epoetin graham: 5000 unit(s) injectable Monday, Wednesday, and Friday (20 Aug 2021 11:05)  heparin: 5000 unit(s) subcutaneous every 12 hours (01 Sep 2021 13:34)  HYDROmorphone 2 mg oral tablet: 1 tab(s) orally every 4 hours, As needed, Moderate Pain (4 - 6) (01 Sep 2021 12:01)  midodrine 10 mg oral tablet: 1 tab(s) orally  (01 Sep 2021 12:01)  mirtazapine 15 mg oral tablet: 1 tab(s) orally once a day (at bedtime) (20 Aug 2021 09:36)  multivitamin: 1 tab(s) orally once a day (20 Aug 2021 13:54)  pantoprazole 40 mg oral delayed release tablet: 1 tab(s) orally once a day (before a meal) (20 Aug 2021 09:36)  povidone iodine 10% topical ointment: 1 application topically  (01 Sep 2021 12:01)  Senna 8.6 mg oral tablet: 2 tab(s) orally once a day (at bedtime), As Needed (26 Jul 2021 10:25)  sertraline 25 mg oral tablet: 1 tab(s) orally once a day (20 Aug 2021 09:36)  tamsulosin 0.4 mg oral capsule: 1 cap(s) orally once a day (at bedtime) (20 Aug 2021 09:36)  Toprol-XL 25 mg oral tablet, extended release: 1 tab(s) orally once a day (at bedtime) (01 Sep 2021 12:01)                           MEDICATIONS:  STANDING MEDICATIONS  ascorbic acid 500 milliGRAM(s) Oral daily  aspirin enteric coated 81 milliGRAM(s) Oral daily  atorvastatin 40 milliGRAM(s) Oral at bedtime  budesonide  80 MICROgram(s)/formoterol 4.5 MICROgram(s) Inhaler 2 Puff(s) Inhalation two times a day  calcium acetate 667 milliGRAM(s) Oral three times a day with meals  cefepime   IVPB 1000 milliGRAM(s) IV Intermittent every 24 hours  chlorhexidine 4% Liquid 1 Application(s) Topical <User Schedule>  dextrose 40% Gel 15 Gram(s) Oral once  dextrose 5%. 1000 milliLiter(s) IV Continuous <Continuous>  dextrose 5%. 1000 milliLiter(s) IV Continuous <Continuous>  dextrose 50% Injectable 25 Gram(s) IV Push once  dextrose 50% Injectable 12.5 Gram(s) IV Push once  dextrose 50% Injectable 25 Gram(s) IV Push once  donepezil 5 milliGRAM(s) Oral at bedtime  epoetin graham-epbx (RETACRIT) Injectable 5000 Unit(s) IV Push <User Schedule>  glucagon  Injectable 1 milliGRAM(s) IntraMuscular once  heparin SubCutaneous Injection - Peds 5000 Unit(s) SubCutaneous every 12 hours  insulin lispro (ADMELOG) corrective regimen sliding scale   SubCutaneous three times a day before meals  levoFLOXacin IVPB 250 milliGRAM(s) IV Intermittent every 24 hours  midodrine. 10 milliGRAM(s) Oral three times a day  mirtazapine 15 milliGRAM(s) Oral at bedtime  multivitamin Oral Tab/Cap - Peds 1 Tablet(s) Oral daily  pantoprazole    Tablet 40 milliGRAM(s) Oral before breakfast  polyethylene glycol 3350 17 Gram(s) Oral two times a day  senna 2 Tablet(s) Oral at bedtime  sertraline 25 milliGRAM(s) Oral daily  tamsulosin 0.4 milliGRAM(s) Oral at bedtime  vancomycin  IVPB 1000 milliGRAM(s) IV Intermittent <User Schedule>    PRN MEDICATIONS  acetaminophen   Tablet .. 650 milliGRAM(s) Oral every 6 hours PRN  ALBUTerol  90 MICROgram(s) HFA Inhaler - Peds 2 Puff(s) Inhalation every 4 hours PRN                                            VITAL SIGNS: Last 24 Hours  T(C): 36.3 (27 Sep 2021 08:00), Max: 36.3 (27 Sep 2021 08:00)  T(F): 97.4 (27 Sep 2021 08:00), Max: 97.4 (27 Sep 2021 08:00)  HR: 73 (27 Sep 2021 08:00) (62 - 73)  BP: 138/63 (27 Sep 2021 08:00) (138/63 - 155/73)  BP(mean): --  RR: 18 (27 Sep 2021 08:00) (18 - 19)  SpO2: 96% (27 Sep 2021 08:00) (94% - 98%)      09-26-21 @ 07:01  -  09-27-21 @ 07:00  --------------------------------------------------------  IN: 810 mL / OUT: 150 mL / NET: 660 mL                                               LABS:                        10.5   14.66 )-----------( 279      ( 27 Sep 2021 05:42 )             34.7     09-27    133<L>  |  93<L>  |  83<HH>  ----------------------------<  109<H>  5.4<H>   |  19  |  8.0<HH>    Ca    9.1      27 Sep 2021 05:42  Mg     1.9     09-27                  Culture - Blood (collected 24 Sep 2021 12:06)  Source: .Blood None  Preliminary Report (25 Sep 2021 23:01):    No growth to date.    Culture - Blood (collected 24 Sep 2021 12:06)  Source: .Blood None  Preliminary Report (25 Sep 2021 23:01):    No growth to date.                                                    -------------------------------------------------------------  RADIOLOGY:  < from: US Urinary Bladder (09.26.21 @ 08:23) >  IMPRESSION:  Extremely limited examination: The urinary bladder appears only mildly distended, containing the distended balloon of a urethral Laura catheter.  Uncooperative, combative patient precluded completion of the examination.    < from: Xray Abdomen 1 View PORTABLE -Urgent (Xray Abdomen 1 View PORTABLE -Urgent .) (09.25.21 @ 05:46) >  IMPRESSION:    Moderate volume of stool.    < from: VA Duplex Lower Extrem Arterial, Bilat (09.24.21 @ 18:35) >  Impression:    Bilateral moderate to severe atherosclerotic disease.  The left posterior tibial and peroneal arteries are occluded.  Vascular surgical consultation is recommended if clinically indicated.                                              --------------------------------------------------------------    PHYSICAL EXAM:  General: NAD, laying in bed comfortably  HEENT: normocephalic, atraumatic,   LUNGS: mild rhonchi, on 2L NC  HEART: S1S2+, regular rate and rhythnm  ABDOMEN: BS+, soft, nontender, nondistended  EXT: no clubbing, cyanosis, or edema. Venous stasis changes b/l LE.   SKIN: no rashes or lesions

## 2021-09-27 NOTE — PHARMACOTHERAPY INTERVENTION NOTE - COMMENTS
Patient is on LABA/ICS and prn albuterol for COPD management. Currently there is no active order for LABA/ICS- recommended to order symbicort 80- 4.5 mcg/ actuation inhaler. Spoke to MD Franklin Manzano who will place order 80 y/o gentleman with a past medical history of Alzheimer's dementia, ESRD on HD (oliguric), Anemia of chronic disease, COPD on 4L O2, DM II, Hfw/REF and Severe AS, HLD, and recently discharge for multi-lobar pneumonia admitted for septic shock from [facility]. Began having fevers on Sunday and complaining of abdominal pain, without any other significant symptoms (n/v/d, SOB, CP, cough).     COPD Management:  Current regimen: LABA/ICS and prn albuterol at home for COPD management    Currently there is no active order for LABA/ICS- recommended to order symbicort 80- 4.5 mcg/ actuation inhaler as it is covered inpatient. Spoke to MD Reid [phone] who will place order

## 2021-09-27 NOTE — PROGRESS NOTE ADULT - ASSESSMENT
ESRD on HD, needs TDC  left heel wound with tibial disease on art dplx    Will d/w Dr. Rossi on the timing of the procedures    Please, send one more blood culture    SPECTRA [phone]

## 2021-09-27 NOTE — PROGRESS NOTE ADULT - ASSESSMENT
ESRD on HD   - access via TDC   - fluid removal during HD has been limited due to intradialytic hypotension  Bacteremia  UTI  COPD on home O2  HFrEF  dementia   HTN  anemia  right nephrectomy    plan:    Abx per ID  Needs tunneled catheter once cleared by ID  Continue midodrine  HD tomorrow: 3 hours, opti 160 dialyzer, 2K bath, 2L UF  EPO with HD  Renal diet

## 2021-09-27 NOTE — PROGRESS NOTE ADULT - SUBJECTIVE AND OBJECTIVE BOX
VASCULAR SURGERY PROGRESS NOTE    CC: fever        Events of past 24 hours: re-called for TDC placement and to evaluate left foot for underlying PAD considering the non-healing wound          ROS otherwise negative except per subjective and HPI      PAST MEDICAL & SURGICAL HISTORY:  Diabetes mellitus    COPD (chronic obstructive pulmonary disease)    Lymphedema of both lower extremities    CHF (congestive heart failure)    ESRD on dialysis    H/O right nephrectomy  20 yrs ago        Vital Signs Last 24 Hrs  T(C): 36.3 (27 Sep 2021 08:00), Max: 36.3 (27 Sep 2021 08:00)  T(F): 97.4 (27 Sep 2021 08:00), Max: 97.4 (27 Sep 2021 08:00)  HR: 73 (27 Sep 2021 08:00) (62 - 73)  BP: 138/63 (27 Sep 2021 08:00) (138/63 - 155/73)  BP(mean): --  RR: 18 (27 Sep 2021 08:00) (18 - 19)  SpO2: 96% (27 Sep 2021 08:00) (94% - 98%)    Pain (0-10):            Pain Control Adequate: [] YES [] N    Diet:    I&O's Detail    26 Sep 2021 07:01  -  27 Sep 2021 07:00  --------------------------------------------------------  IN:    Oral Fluid: 810 mL  Total IN: 810 mL    OUT:    Indwelling Catheter - Urethral (mL): 150 mL  Total OUT: 150 mL    Total NET: 660 mL      27 Sep 2021 07:01  -  27 Sep 2021 14:02  --------------------------------------------------------  IN:    Oral Fluid: 515 mL  Total IN: 515 mL    OUT:  Total OUT: 0 mL    Total NET: 515 mL        PHYSICAL EXAM    Appearance: Normal	  HEENT:   Normal oral mucosa, PERRL, EOMI	  Neck: Supple, - JVD;   Cardiovascular: Normal S1 S2, No JVD, No murmurs,   Respiratory: Lungs clear to auscultation/No Rales, Rhonchi, Wheezing	  Gastrointestinal:  Soft, Non-tender, + BS	  Skin: No rashes, No ecchymoses, No cyanosis  Extremities: Normal range of motion, No clubbing, cyanosis or edema  left heel: wound, necrotic edges  Neurologic: Non-focal  Psychiatry: A & O x 3, Mood & affect appropriate    PULSES:  Femoral:  Popliteal:  Dorsal Pedal: dopplerable b/l  Posterior Tibial: dopplerable b/l  Capillary:      MEDICATIONS:   MEDICATIONS  (STANDING):  ascorbic acid 500 milliGRAM(s) Oral daily  aspirin enteric coated 81 milliGRAM(s) Oral daily  atorvastatin 40 milliGRAM(s) Oral at bedtime  budesonide  80 MICROgram(s)/formoterol 4.5 MICROgram(s) Inhaler 2 Puff(s) Inhalation two times a day  calcium acetate 667 milliGRAM(s) Oral three times a day with meals  chlorhexidine 4% Liquid 1 Application(s) Topical <User Schedule>  dextrose 40% Gel 15 Gram(s) Oral once  dextrose 5%. 1000 milliLiter(s) (50 mL/Hr) IV Continuous <Continuous>  dextrose 5%. 1000 milliLiter(s) (100 mL/Hr) IV Continuous <Continuous>  dextrose 50% Injectable 25 Gram(s) IV Push once  dextrose 50% Injectable 12.5 Gram(s) IV Push once  dextrose 50% Injectable 25 Gram(s) IV Push once  donepezil 5 milliGRAM(s) Oral at bedtime  epoetin graham-epbx (RETACRIT) Injectable 5000 Unit(s) IV Push <User Schedule>  glucagon  Injectable 1 milliGRAM(s) IntraMuscular once  heparin SubCutaneous Injection - Peds 5000 Unit(s) SubCutaneous every 12 hours  insulin lispro (ADMELOG) corrective regimen sliding scale   SubCutaneous three times a day before meals  levoFLOXacin IVPB 250 milliGRAM(s) IV Intermittent every 24 hours  meropenem  IVPB      midodrine. 10 milliGRAM(s) Oral three times a day  mirtazapine 15 milliGRAM(s) Oral at bedtime  multivitamin Oral Tab/Cap - Peds 1 Tablet(s) Oral daily  pantoprazole    Tablet 40 milliGRAM(s) Oral before breakfast  polyethylene glycol 3350 17 Gram(s) Oral two times a day  senna 2 Tablet(s) Oral at bedtime  sertraline 25 milliGRAM(s) Oral daily  tamsulosin 0.4 milliGRAM(s) Oral at bedtime  vancomycin  IVPB 1000 milliGRAM(s) IV Intermittent <User Schedule>    MEDICATIONS  (PRN):  acetaminophen   Tablet .. 650 milliGRAM(s) Oral every 6 hours PRN Temp greater or equal to 38C (100.4F), Mild Pain (1 - 3), Moderate Pain (4 - 6)  ALBUTerol  90 MICROgram(s) HFA Inhaler - Peds 2 Puff(s) Inhalation every 4 hours PRN Bronchospasm          LAB/STUDIES:                        10.5   14.66 )-----------( 279      ( 27 Sep 2021 05:42 )             34.7     09-27    133<L>  |  93<L>  |  83<HH>  ----------------------------<  109<H>  5.4<H>   |  19  |  8.0<HH>    Ca    9.1      27 Sep 2021 05:42  Mg     1.9     09-27              IMAGING:    < from: VA Duplex Lower Extrem Arterial, Bilat (09.24.21 @ 18:35) >  Bilateral moderate to severe atherosclerotic disease.  The left posterior tibial and peroneal arteries are occluded.  Vascular surgical consultation is recommended if clinically indicated.

## 2021-09-27 NOTE — PROGRESS NOTE ADULT - ASSESSMENT
80 y/o gentleman with a past medical history of Alzheimer's dementia, ESRD on HD (oliguric), Anemia of chronic disease, COPD on 4L O2, DM II, Hfw/REF and Severe AS, HLD, and recently discharge for multi-lobar pneumonia admitted for septic shock from [facility]. Began having fevers on Sunday and complaining of abdominal pain, without any other significant symptoms (n/v/d, SOB, CP, cough). The following day he was noted to be febrile and hypotensive, and anuric.   Still found to be hypotensive in the ED after 1L IVF and started on peripheral levophed, WBC 14, CXR appears improved from last on 9/2.     Septic Shock due to Legionella PNA and Proteus UTI, shock resolved  Metabolic Encephalopathy, improved   remains off levophed, Ucx with ESBL proteus  < from: CT Abdomen and Pelvis w/ IV Cont (09.22.21 @ 13:24) >  .  Findings compatible with acute cystitis and left-sided ascending urinary tract infection; partially distended urinary bladder despite presence of a Laura catheter. Correlate clinically for catheter function.  2.  Large diffuse colonic stool burden with moderately distended rectum and mild presacral edema.  3.  Incompletely characterized cystic lesion in the pancreatic head. This can be further evaluated with an outpatient contrast-enhanced MRI/MRCP.  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     ESRD on HD (oliguric):  HAGMA  Hyponatremia   -Nephro following, for HD as per schedule via [place], vascular f/u to replace Tesio  -Cont ca-acetate and midodrine    Alzheimer's dementia  -as per family, patient's mentation improved   -Cont donepezil, sertraline, and mirtazepine     #Progress Note Handoff  Pending (specify):   clinical improvement, vascular f/u for tesio, podiatry f/u for possible L DFU debriedment, will need midline on dc for abx   Disposition:  from NH    Debbie Ortega MD  s. [id number]

## 2021-09-27 NOTE — PROGRESS NOTE ADULT - SUBJECTIVE AND OBJECTIVE BOX
VIC, DRAKE  81y, Male    All available historical data reviewed    OVERNIGHT EVENTS:  no fevers  hungry  no abdominal pain  no  complaints    ROS:  General: Denies rigors, nightsweats  HEENT: Denies headache, rhinorrhea, sore throat, eye pain  CV: Denies CP, palpitations  PULM: Denies wheezing, hemoptysis  GI: Denies hematemesis, hematochezia, melena  : Denies discharge, hematuria  MSK: Denies arthralgias, myalgias  SKIN: Denies rash, lesions  NEURO: Denies paresthesias, weakness  PSYCH: Denies depression, anxiety    VITALS:  T(F): 96.9, Max: 97.1 (09-26-21 @ 15:23)  HR: 68  BP: 140/68  RR: 19Vital Signs Last 24 Hrs  T(C): 36.1 (26 Sep 2021 21:32), Max: 36.2 (26 Sep 2021 15:23)  T(F): 96.9 (26 Sep 2021 21:32), Max: 97.1 (26 Sep 2021 15:23)  HR: 68 (27 Sep 2021 06:34) (62 - 68)  BP: 140/68 (27 Sep 2021 06:34) (140/68 - 155/73)  BP(mean): --  RR: 19 (26 Sep 2021 21:32) (19 - 19)  SpO2: 98% (26 Sep 2021 21:32) (94% - 98%)    TESTS & MEASUREMENTS:                        10.5   14.66 )-----------( 279      ( 27 Sep 2021 05:42 )             34.7     09-27    133<L>  |  93<L>  |  83<HH>  ----------------------------<  109<H>  5.4<H>   |  19  |  8.0<HH>    Ca    9.1      27 Sep 2021 05:42  Mg     1.9     09-27          Culture - Blood (collected 09-24-21 @ 12:06)  Source: .Blood None  Preliminary Report (09-25-21 @ 23:01):    No growth to date.    Culture - Blood (collected 09-24-21 @ 12:06)  Source: .Blood None  Preliminary Report (09-25-21 @ 23:01):    No growth to date.    Culture - Catheter (collected 09-23-21 @ 13:48)  Source: .Catheter IV Dialysis Perm Cath  Preliminary Report (09-25-21 @ 14:05):    No growth    Culture - Urine (collected 09-21-21 @ 23:20)  Source: Clean Catch Clean Catch (Midstream)  Final Report (09-25-21 @ 11:54):    >100,000 CFU/ml Proteus mirabilis ESBL  Organism: Proteus mirabilis ESBL (09-25-21 @ 11:54)  Organism: Proteus mirabilis ESBL (09-25-21 @ 11:54)      -  Amikacin: S <=16      -  Amoxicillin/Clavulanic Acid: S <=8/4      -  Ampicillin: R >16 These ampicillin results predict results for amoxicillin      -  Ampicillin/Sulbactam: S <=4/2 Enterobacter, Citrobacter, and Serratia may develop resistance during prolonged therapy (3-4 days)      -  Aztreonam: R 16      -  Cefazolin: R >16 (MIC_CL_COM_ENTERIC_[id number]) For uncomplicated UTI with K. pneumoniae, E. coli, or P. mirablis: LILIANA <=16 is sensitive and LILIANA >=32 is resistant. This also predicts results for oral agents cefaclor, cefdinir, cefpodoxime, cefprozil, cefuroxime axetil, cephalexin and locarbef for uncomplicated UTI. Note that some isolates may be susceptible to these agents while testing resistant to cefazolin.      -  Cefepime: R >16      -  Cefoxitin: S <=8      -  Ceftriaxone: R >32 Enterobacter, Citrobacter, and Serratia may develop resistance during prolonged therapy      -  Ciprofloxacin: R >2      -  Ertapenem: S <=0.5      -  Gentamicin: S <=2      -  Levofloxacin: R 4      -  Meropenem: S <=1      -  Nitrofurantoin: R >64 Should not be used to treat pyelonephritis      -  Piperacillin/Tazobactam: S <=8      -  Tobramycin: S <=2      -  Trimethoprim/Sulfamethoxazole: R >2/38      Method Type: LILIANA    Culture - Blood (collected 09-21-21 @ 16:55)  Source: .Blood Blood-Peripheral  Gram Stain (09-23-21 @ 05:26):    Growth in aerobic bottle: Gram Positive Cocci in Clusters    Growth in anaerobic bottle: Gram Positive Cocci in Clusters  Final Report (09-24-21 @ 13:45):    Growth in aerobic and anaerobic bottles: Staphylococcus epidermidis    Coag Negative Staphylococcus    Single set isolate, possible contaminant. Contact    Microbiology if susceptibility testing clinically    indicated.    ***Blood Panel PCR results on this specimen are available    approximately 3 hours after the Gram stain result.***    Gram stain, PCR, and/or culture results may not always    correspond due to difference in methodologies.    ************************************************************    This PCR assay was performed by multiplex PCR. This    Assay tests for 66 bacterial and resistance gene targets.    Please refer to the [facility] test directory    at [email or web address] for details.  Organism: Blood Culture PCR (09-24-21 @ 13:45)  Organism: Blood Culture PCR (09-24-21 @ 13:45)      -  Staphylococcus epidermidis, Methicillin resistant: Detec      Method Type: PCR    Culture - Blood (collected 09-21-21 @ 16:40)  Source: .Blood Blood-Peripheral  Final Report (09-26-21 @ 23:00):    No Growth Final            RADIOLOGY & ADDITIONAL TESTS:  Personal review of radiological diagnostics performed  Echo and EKG results noted when applicable.     MEDICATIONS:  acetaminophen   Tablet .. 650 milliGRAM(s) Oral every 6 hours PRN  ALBUTerol  90 MICROgram(s) HFA Inhaler - Peds 2 Puff(s) Inhalation every 4 hours PRN  ascorbic acid 500 milliGRAM(s) Oral daily  aspirin enteric coated 81 milliGRAM(s) Oral daily  atorvastatin 40 milliGRAM(s) Oral at bedtime  calcium acetate 667 milliGRAM(s) Oral three times a day with meals  cefepime   IVPB 1000 milliGRAM(s) IV Intermittent every 24 hours  chlorhexidine 4% Liquid 1 Application(s) Topical <User Schedule>  dextrose 40% Gel 15 Gram(s) Oral once  dextrose 5%. 1000 milliLiter(s) IV Continuous <Continuous>  dextrose 5%. 1000 milliLiter(s) IV Continuous <Continuous>  dextrose 50% Injectable 25 Gram(s) IV Push once  dextrose 50% Injectable 12.5 Gram(s) IV Push once  dextrose 50% Injectable 25 Gram(s) IV Push once  donepezil 5 milliGRAM(s) Oral at bedtime  epoetin graham-epbx (RETACRIT) Injectable 5000 Unit(s) IV Push <User Schedule>  glucagon  Injectable 1 milliGRAM(s) IntraMuscular once  heparin SubCutaneous Injection - Peds 5000 Unit(s) SubCutaneous every 12 hours  insulin lispro (ADMELOG) corrective regimen sliding scale   SubCutaneous three times a day before meals  levoFLOXacin IVPB 250 milliGRAM(s) IV Intermittent every 24 hours  midodrine. 10 milliGRAM(s) Oral three times a day  mirtazapine 15 milliGRAM(s) Oral at bedtime  multivitamin Oral Tab/Cap - Peds 1 Tablet(s) Oral daily  pantoprazole    Tablet 40 milliGRAM(s) Oral before breakfast  polyethylene glycol 3350 17 Gram(s) Oral two times a day  senna 2 Tablet(s) Oral at bedtime  sertraline 25 milliGRAM(s) Oral daily  tamsulosin 0.4 milliGRAM(s) Oral at bedtime  vancomycin  IVPB 1000 milliGRAM(s) IV Intermittent <User Schedule>      ANTIBIOTICS:  cefepime   IVPB 1000 milliGRAM(s) IV Intermittent every 24 hours  levoFLOXacin IVPB 250 milliGRAM(s) IV Intermittent every 24 hours  vancomycin  IVPB 1000 milliGRAM(s) IV Intermittent <User Schedule>

## 2021-09-27 NOTE — PROGRESS NOTE ADULT - ASSESSMENT
80 y/o gentleman from [facility] with a past medical history of Alzheimer's dementia, ESRD on HD (oliguric), Anemia of chronic disease, COPD on 4L O2, DM II, Hfw/REF and Severe AS, HLD, and recently discharge for multi-lobar pneumonia admitted for septic shock.    #Septic Shock due to Legionella PNA and Proteus UTI, shock resolved  Metabolic Encephalopathy, improved  Ervin pus in maldonado, remains on low dose levophed, Ucx with proteus, pending sensitivities  < from: CT Abdomen and Pelvis w/ IV Cont (09.22.21 @ 13:24) >  .  Findings compatible with acute cystitis and left-sided ascending urinary tract infection; partially distended urinary bladder despite presence of a Maldonado catheter. Correlate clinically for catheter function.  2.  Large diffuse colonic stool burden with moderately distended rectum and mild presacral edema.  3.  Incompletely characterized cystic lesion in the pancreatic head. This can be further evaluated with an outpatient contrast-enhanced MRI/MRCP.  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     #Left heel ulcer  wound care following; podiatry following  - podiatry recs- studies done. ESR-29 CRP- 67  - B/L LE arterial duplex- Bilateral moderate to severe atherosclerotic disease. The left posterior tibial and peroneal arteries are occluded.  Vascular surgical consultation is recommended if clinically indicated.  - XRay: Diffuse osteopenia. Plantar heel ulcer with suggestion of loss of cortical distinctiveness at the plantar calcaneus, suggestive of osteomyelitis. Degenerative changes of the hindfoot, midfoot and forefoot joints. Vascular calcifications.  - F/U podiatry    #ESRD on HD (oliguric):  #HAGMA  #Hyponatremia   -HD today; will receive vanc and epo  -nephro following  -Cont ca-acetate and midodrine    #Alzheimer's dementia  -as per family, patient's mentation improved   -Cont donepezil, sertraline, and mirtazepine     #Progress Note Handoff  Pending (specify):   clinical improvement   Disposition:  from NH    Handoff: pending vascular placement of new tesio, addressing duplex results, removal of femoral udall access. Podiatry for foot ulcer. On Abx, D/c with midline if needed for ertapenem

## 2021-09-27 NOTE — PROGRESS NOTE ADULT - SUBJECTIVE AND OBJECTIVE BOX
DRAKE HO  81y Male    CHIEF COMPLAINT:    Patient is a 81y old  Male who presents with a chief complaint of Hypotension and Fever (27 Sep 2021 09:55)      INTERVAL HPI/OVERNIGHT EVENTS:    Patient seen and examined. No acute events overnight. states he feels hungry     ROS: All other systems are negative.    Vital Signs:    T(F): 97.4 (09-27-21 @ 08:00), Max: 97.4 (09-27-21 @ 08:00)  HR: 73 (09-27-21 @ 08:00) (62 - 73)  BP: 138/63 (09-27-21 @ 08:00) (138/63 - 155/73)  RR: 18 (09-27-21 @ 08:00) (18 - 19)  SpO2: 96% (09-27-21 @ 08:00) (94% - 98%)    26 Sep 2021 07:01  -  27 Sep 2021 07:00  --------------------------------------------------------  IN: 810 mL / OUT: 150 mL / NET: 660 mL    POCT Blood Glucose.: 108 mg/dL (27 Sep 2021 07:21)  POCT Blood Glucose.: 154 mg/dL (26 Sep 2021 21:27)  POCT Blood Glucose.: 137 mg/dL (26 Sep 2021 16:38)  POCT Blood Glucose.: 130 mg/dL (26 Sep 2021 11:26)    PHYSICAL EXAM:    GENERAL:  NAD  SKIN: No rashes or lesions  HEENT: Atraumatic. Normocephalic.    NECK: Supple, No JVD.    PULMONARY: coarse breath sounds b/l . No wheezing. No rales  CVS: Normal S1, S2. Rate and Rhythm are regular.    ABDOMEN/GI: Soft, Nontender, Nondistended; BS present  MSK:  No clubbing or cyanosis. LLE heel dressing dry/intact   NEUROLOGIC: moves all extremities  PSYCH: Awake and alert, confused     Consultant(s) Notes Reviewed:  [x ] YES  [ ] NO  Care Discussed with Consultants/Other Providers [ x] YES  [ ] NO    LABS:                        10.5   14.66 )-----------( 279      ( 27 Sep 2021 05:42 )             34.7     133<L>  |  93<L>  |  83<HH>  ----------------------------<  109<H>  5.4<H>   |  19  |  8.0<HH>    Ca    9.1      27 Sep 2021 05:42  Mg     1.9     09-27    Culture - Blood (collected 24 Sep 2021 12:06)  Source: .Blood None  Preliminary Report (25 Sep 2021 23:01):    No growth to date.    Culture - Blood (collected 24 Sep 2021 12:06)  Source: .Blood None  Preliminary Report (25 Sep 2021 23:01):    No growth to date.    RADIOLOGY & ADDITIONAL TESTS:  Imaging or report Personally Reviewed:  [x] YES  [ ] NO  EKG reviewed: [x] YES  [ ] NO    Medications:  Standing  ascorbic acid 500 milliGRAM(s) Oral daily  aspirin enteric coated 81 milliGRAM(s) Oral daily  atorvastatin 40 milliGRAM(s) Oral at bedtime  budesonide  80 MICROgram(s)/formoterol 4.5 MICROgram(s) Inhaler 2 Puff(s) Inhalation two times a day  calcium acetate 667 milliGRAM(s) Oral three times a day with meals  chlorhexidine 4% Liquid 1 Application(s) Topical <User Schedule>  dextrose 40% Gel 15 Gram(s) Oral once  dextrose 5%. 1000 milliLiter(s) IV Continuous <Continuous>  dextrose 5%. 1000 milliLiter(s) IV Continuous <Continuous>  dextrose 50% Injectable 25 Gram(s) IV Push once  dextrose 50% Injectable 12.5 Gram(s) IV Push once  dextrose 50% Injectable 25 Gram(s) IV Push once  donepezil 5 milliGRAM(s) Oral at bedtime  epoetin graham-epbx (RETACRIT) Injectable 5000 Unit(s) IV Push <User Schedule>  glucagon  Injectable 1 milliGRAM(s) IntraMuscular once  heparin SubCutaneous Injection - Peds 5000 Unit(s) SubCutaneous every 12 hours  insulin lispro (ADMELOG) corrective regimen sliding scale   SubCutaneous three times a day before meals  levoFLOXacin IVPB 250 milliGRAM(s) IV Intermittent every 24 hours  meropenem  IVPB 500 milliGRAM(s) IV Intermittent once  meropenem  IVPB      midodrine. 10 milliGRAM(s) Oral three times a day  mirtazapine 15 milliGRAM(s) Oral at bedtime  multivitamin Oral Tab/Cap - Peds 1 Tablet(s) Oral daily  pantoprazole    Tablet 40 milliGRAM(s) Oral before breakfast  polyethylene glycol 3350 17 Gram(s) Oral two times a day  senna 2 Tablet(s) Oral at bedtime  sertraline 25 milliGRAM(s) Oral daily  tamsulosin 0.4 milliGRAM(s) Oral at bedtime  vancomycin  IVPB 1000 milliGRAM(s) IV Intermittent <User Schedule>    PRN Meds  acetaminophen   Tablet .. 650 milliGRAM(s) Oral every 6 hours PRN  ALBUTerol  90 MICROgram(s) HFA Inhaler - Peds 2 Puff(s) Inhalation every 4 hours PRN

## 2021-09-28 NOTE — PROGRESS NOTE ADULT - ASSESSMENT
82 y/o gentleman from [facility] with a past medical history of Alzheimer's dementia, ESRD on HD (oliguric), Anemia of chronic disease, COPD on 4L O2, DM II, Hfw/REF and Severe AS, HLD, and recently discharge for multi-lobar pneumonia admitted for septic shock.    #Septic Shock due to Legionella PNA and Proteus UTI, shock resolved  Metabolic Encephalopathy, improved  Ervin pus in maldonado, remains on low dose levophed, Ucx with proteus, pending sensitivities  < from: CT Abdomen and Pelvis w/ IV Cont (09.22.21 @ 13:24) >  .  Findings compatible with acute cystitis and left-sided ascending urinary tract infection; partially distended urinary bladder despite presence of a Maldonado catheter. Correlate clinically for catheter function.  2.  Large diffuse colonic stool burden with moderately distended rectum and mild presacral edema.  3.  Incompletely characterized cystic lesion in the pancreatic head. This can be further evaluated with an outpatient contrast-enhanced MRI/MRCP.  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     #Left heel ulcer  wound care following; podiatry following  - podiatry recs- studies done. ESR-29 CRP- 67  - B/L LE arterial duplex- Bilateral moderate to severe atherosclerotic disease. The left posterior tibial and peroneal arteries are occluded. (Vascular surgery notifited)  - XRay: Diffuse osteopenia. Plantar heel ulcer with suggestion of loss of cortical distinctiveness at the plantar calcaneus, suggestive of osteomyelitis. Degenerative changes of the hindfoot, midfoot and forefoot joints. Vascular calcifications.  - F/U podiatry  - Wound care- betadine    #ESRD on HD (oliguric):  #HAGMA  #Hyponatremia   -HD today; will receive vanc and epo  -nephro following  -Cont ca-acetate and midodrine    #Alzheimer's dementia  -as per family, patient's mentation improved   -Cont donepezil, sertraline, and mirtazepine     #Progress Note Handoff  Pending (specify):   clinical improvement   Disposition:  from NH    Handoff: pending vascular placement of new tesio, addressing duplex results, removal of femoral udall access. Podiatry for foot ulcer. On Abx, D/c with midline if needed for ertapenem. Confirm MOLST 82 y/o gentleman from [facility] with a past medical history of Alzheimer's dementia, ESRD on HD (oliguric), Anemia of chronic disease, COPD on 4L O2, DM II, Hfw/REF and Severe AS, HLD, and recently discharge for multi-lobar pneumonia admitted for septic shock.    #Septic Shock due to Legionella PNA and Proteus UTI, shock resolved  Metabolic Encephalopathy, improved  Ervin pus in maldonado, remains on low dose levophed, Ucx with proteus, pending sensitivities  < from: CT Abdomen and Pelvis w/ IV Cont (09.22.21 @ 13:24) >  .  Findings compatible with acute cystitis and left-sided ascending urinary tract infection; partially distended urinary bladder despite presence of a Maldonado catheter. Correlate clinically for catheter function.  2.  Large diffuse colonic stool burden with moderately distended rectum and mild presacral edema.  3.  Incompletely characterized cystic lesion in the pancreatic head. This can be further evaluated with an outpatient contrast-enhanced MRI/MRCP.  9/21 Blood cx : staph epi  Urine Legionella +  ID following, recs meropenem 500 q24 7 days (9/27), vanc 1g IV post HD 4 more doses (9/27), Levo 250mg IV q24h for 14 days (9/23 start)  - may need midline on dc for abx (ertapenem)  tesio removed per ID recs, Vascular placed udall for HD (femoral)  2d echo EF 45-50%, severe AS  repeat blood cx  Miralax Q12H and senna; mineral oil enema today  - maldonado removed due to pus, TOV performed  - KUB shows moderate stool burden, continue w/ bowel regimen   - Repeat KUB ordered for 9/29 AM, F/U    #Chronic hypoxic respiratory failure   #COPD on 4L home O2  #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     #Left heel ulcer  wound care following; podiatry following  - podiatry recs- studies done. ESR-29 CRP- 67  - B/L LE arterial duplex- Bilateral moderate to severe atherosclerotic disease. The left posterior tibial and peroneal arteries are occluded. (Vascular surgery notifited)  - XRay: Diffuse osteopenia. Plantar heel ulcer with suggestion of loss of cortical distinctiveness at the plantar calcaneus, suggestive of osteomyelitis. Degenerative changes of the hindfoot, midfoot and forefoot joints. Vascular calcifications.  - F/U podiatry  - Vascular- Scheduled for insertion of TDC Friday with Dr. Alamo. Pt should follow up with Dr. Rossi as outpt in 1-2 weeks to re-evaluate left foot, PAD, possible angiogram  - Wound care- betadine    #ESRD on HD (oliguric):  #HAGMA  #Hyponatremia   -HD today; will receive vanc and epo  -nephro following  -Cont ca-acetate and midodrine    #Alzheimer's dementia  -as per family, patient's mentation improved   -Cont donepezil, sertraline, and mirtazepine     #Progress Note Handoff  Pending (specify):   clinical improvement   Disposition:  from NH    Handoff: KUB in AM, pending vascular placement of new tesio on Friday, addressing angiogram needs outpt. Podiatry for foot ulcer. On Abx, D/c with midline if needed for ertapenem. Confirm MOLST

## 2021-09-28 NOTE — PROGRESS NOTE ADULT - ASSESSMENT
82 y/o gentleman with a past medical history of Alzheimer's dementia, ESRD on HD (oliguric), Anemia of chronic disease, COPD on 4L O2, DM II, Hfw/REF and Severe AS, HLD, and recently discharge for multi-lobar pneumonia admitted for septic shock from [facility]. Began having fevers on Sunday and complaining of abdominal pain, without any other significant symptoms (n/v/d, SOB, CP, cough). The following day he was noted to be febrile and hypotensive, and anuric.   Still found to be hypotensive in the ED after 1L IVF and started on peripheral levophed, WBC 14, CXR appears improved from last on 9/2.     Septic Shock due to Legionella PNA and Proteus UTI, shock resolved  Metabolic Encephalopathy, improved   remains off levophed, Ucx with ESBL proteus  < from: CT Abdomen and Pelvis w/ IV Cont (09.22.21 @ 13:24) >  .  Findings compatible with acute cystitis and left-sided ascending urinary tract infection; partially distended urinary bladder despite presence of a Laura catheter. Correlate clinically for catheter function.  2.  Large diffuse colonic stool burden with moderately distended rectum and mild presacral edema.  3.  Incompletely characterized cystic lesion in the pancreatic head. This can be further evaluated with an outpatient contrast-enhanced MRI/MRCP.  9/21 Blood cx : staph epi   Urine Legionella +  ID following, recs vancomycin post HD x 4 doses from 9/27, Meropenem 500 Q24H x 7 days from 9/27 and levaquin 250 q24H for 14 days from 9/23  will need midline placed prior to discharge to complete course of meropenem   tesio removed per ID recs, Dow[place] placed by vascular, cleared to replace tesio catheter, which is scheduled for Friday with vascular  2d echo EF 45-50%, severe AS  repeat blood cx NGTD  Miralax Q12H and senna  Monitor BM, check AM KUB    Left DFU  Arterial duplex noted, possible outpatient angiogram with vascular  Xray suggestive of OM  f/u podiatry re: need for surgical intervention     Chronic hypoxic respiratory failure   COPD on 4L home O2  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%)  Severe AS  DLD  Recent hx of multilobar pneumonia s/p therapeutic thoracocentesis  Doesn't appear in an exacerbation  Cont with nebs PRN, not on standing ICS or LABA/LAMA    ESRD on HD (oliguric):  HAGMA  Hyperkalemia  -Nephro following, for HD as per schedule via Dow[place]iac, vascular to replace Tesio on friday   -Cont ca-acetate and midodrine    Alzheimer's dementia  -as per family, patient's mentation improved   -Cont donepezil, sertraline, and mirtazepine     #Progress Note Handoff  Pending (specify):   clinical improvement, tesio placement on friday, podiatry f/u for possible L DFU debriedment, will need midline on dc for abx   Disposition:  from NH    Debbie Ortega MD  s. [phone]

## 2021-09-28 NOTE — DIETITIAN INITIAL EVALUATION ADULT. - PERTINENT LABORATORY DATA
(9/28) H/H: 10.4/33.9, Na: 133, K: 5.9, BUN: 96, Cr: 9.5, Glucose: 130, eGFR: 5  (9/22) A1c: 5.2     CAPILLARY BLOOD GLUCOSE  POCT Blood Glucose.: 128 mg/dL (28 Sep 2021 08:05)  POCT Blood Glucose.: 153 mg/dL (27 Sep 2021 21:05)  POCT Blood Glucose.: 109 mg/dL (27 Sep 2021 16:42)  POCT Blood Glucose.: 175 mg/dL (27 Sep 2021 11:34)

## 2021-09-28 NOTE — PROGRESS NOTE ADULT - SUBJECTIVE AND OBJECTIVE BOX
VASCULAR SURGERY PROGRESS NOTE    CC: bacteremia      Procedure: + uldall    Events of past 24 hours: seeing for HD access          ROS otherwise negative except per subjective and HPI      PAST MEDICAL & SURGICAL HISTORY:  Diabetes mellitus    COPD (chronic obstructive pulmonary disease)    Lymphedema of both lower extremities    CHF (congestive heart failure)    ESRD on dialysis    H/O right nephrectomy  20 yrs ago        Vital Signs Last 24 Hrs  T(C): 35.9 (28 Sep 2021 07:16), Max: 37.2 (27 Sep 2021 15:19)  T(F): 96.6 (28 Sep 2021 07:16), Max: 99 (27 Sep 2021 15:19)  HR: 82 (28 Sep 2021 11:25) (78 - 100)  BP: 143/65 (28 Sep 2021 11:25) (124/70 - 183/79)  BP(mean): --  RR: 18 (28 Sep 2021 11:25) (18 - 19)  SpO2: 99% (28 Sep 2021 11:25) (97% - 99%)    Pain (0-10):            Pain Control Adequate: [] YES [] N    Diet:    I&O's Detail    27 Sep 2021 07:01  -  28 Sep 2021 07:00  --------------------------------------------------------  IN:    Oral Fluid: 755 mL  Total IN: 755 mL    OUT:    Voided (mL): 0 mL  Total OUT: 0 mL    Total NET: 755 mL      28 Sep 2021 07:01  -  28 Sep 2021 12:39  --------------------------------------------------------  IN:  Total IN: 0 mL    OUT:    Other (mL): 2000 mL  Total OUT: 2000 mL    Total NET: -2000 mL          PHYSICAL EXAM    Appearance: Normal	  HEENT:   Normal oral mucosa, PERRL, EOMI	  Neck: Supple, - JVD; Carotid Bruit   Cardiovascular: Normal S1 S2, No JVD, No murmurs,   Respiratory: Lungs clear to auscultation/No Rales, Rhonchi, Wheezing	  Gastrointestinal:  Soft, Non-tender, + BS	  Skin: No rashes, No ecchymoses, No cyanosis  Extremities: Normal range of motion, No clubbing, cyanosis or edema  left heel wound  Neurologic: Non-focal  Psychiatry: A & O x 3, Mood & affect appropriate    PULSES:  Femoral:  Popliteal:  Dorsal Pedal: dopplerable b/l  Posterior Tibial: dopplerable b/l  Capillary:      MEDICATIONS:   MEDICATIONS  (STANDING):  ascorbic acid 500 milliGRAM(s) Oral daily  aspirin enteric coated 81 milliGRAM(s) Oral daily  atorvastatin 40 milliGRAM(s) Oral at bedtime  budesonide  80 MICROgram(s)/formoterol 4.5 MICROgram(s) Inhaler 2 Puff(s) Inhalation two times a day  calcium acetate 667 milliGRAM(s) Oral three times a day with meals  chlorhexidine 4% Liquid 1 Application(s) Topical <User Schedule>  dextrose 40% Gel 15 Gram(s) Oral once  dextrose 5%. 1000 milliLiter(s) (50 mL/Hr) IV Continuous <Continuous>  dextrose 5%. 1000 milliLiter(s) (100 mL/Hr) IV Continuous <Continuous>  dextrose 50% Injectable 25 Gram(s) IV Push once  dextrose 50% Injectable 12.5 Gram(s) IV Push once  dextrose 50% Injectable 25 Gram(s) IV Push once  donepezil 5 milliGRAM(s) Oral at bedtime  epoetin graham-epbx (RETACRIT) Injectable 5000 Unit(s) IV Push <User Schedule>  glucagon  Injectable 1 milliGRAM(s) IntraMuscular once  heparin SubCutaneous Injection - Peds 5000 Unit(s) SubCutaneous every 12 hours  insulin lispro (ADMELOG) corrective regimen sliding scale   SubCutaneous three times a day before meals  levoFLOXacin IVPB 250 milliGRAM(s) IV Intermittent every 24 hours  meropenem  IVPB 500 milliGRAM(s) IV Intermittent every 24 hours  meropenem  IVPB      mirtazapine 15 milliGRAM(s) Oral at bedtime  multivitamin Oral Tab/Cap - Peds 1 Tablet(s) Oral daily  pantoprazole    Tablet 40 milliGRAM(s) Oral before breakfast  polyethylene glycol 3350 17 Gram(s) Oral two times a day  povidone iodine 10% Solution 1 Application(s) Topical two times a day  senna 2 Tablet(s) Oral at bedtime  sertraline 25 milliGRAM(s) Oral daily  tamsulosin 0.4 milliGRAM(s) Oral at bedtime  vancomycin  IVPB 1000 milliGRAM(s) IV Intermittent <User Schedule>    MEDICATIONS  (PRN):  acetaminophen   Tablet .. 650 milliGRAM(s) Oral every 6 hours PRN Temp greater or equal to 38C (100.4F), Mild Pain (1 - 3), Moderate Pain (4 - 6)  ALBUTerol  90 MICROgram(s) HFA Inhaler - Peds 2 Puff(s) Inhalation every 4 hours PRN Bronchospasm          LAB/STUDIES:                        10.4   15.51 )-----------( 314      ( 28 Sep 2021 09:50 )             33.9     09-28    133<L>  |  95<L>  |  96<HH>  ----------------------------<  130<H>  5.9<H>   |  19  |  9.5<HH>    Ca    9.3      28 Sep 2021 09:50  Mg     2.0     09-28    TPro  5.8<L>  /  Alb  3.1<L>  /  TBili  0.3  /  DBili  x   /  AST  24  /  ALT  14  /  AlkPhos  86  09-28      LIVER FUNCTIONS - ( 28 Sep 2021 09:50 )  Alb: 3.1 g/dL / Pro: 5.8 g/dL / ALK PHOS: 86 U/L / ALT: 14 U/L / AST: 24 U/L / GGT: x

## 2021-09-28 NOTE — DIETITIAN INITIAL EVALUATION ADULT. - ORAL INTAKE PTA/DIET HISTORY
Attempted to call [facility] -- however, could not find pt in their system. Called daughter Shin (#[phone]) -- reports PO/appetite optimum at NH, eats 2-3 meals a day. Pt has tried nutrition supplements (ensure) in the past when his appetite was affected, but not recently. UBW: 69-69.5kg, however, not sure how long ago that was. No wt loss reported. NKFA. NO MVI. No cul/rel or personal food rest/prefs noted.

## 2021-09-28 NOTE — PROGRESS NOTE ADULT - ASSESSMENT
Scheduled for insertion of TDC Friday with Dr. Alamo    Pt should follow up with Dr. Rossi as outpt in 1-2 weeks to re-evaluate left foot, PAD, possible angiogram    SPECTRA [id number]

## 2021-09-28 NOTE — DIETITIAN INITIAL EVALUATION ADULT. - PHYSCIAL ASSESSMENT
Confused/disoriented to situation; 9/23: 1+ L foot edema noted; GI: fecal incontinence, LBM 9/27; skin: L heel stage 4 PU per wound care RN; no other wts in EMR

## 2021-09-28 NOTE — DIETITIAN INITIAL EVALUATION ADULT. - OTHER CALCULATIONS
using ABW 70kg; energy: 1650-2063kcal/day (MSJ 1.2-1.5 AF -- d/t PU + ESRD on HD vs. age considered); Protein: 84- 105g/day (1.2-1.5g/kg); Fluid: per LIP

## 2021-09-28 NOTE — DIETITIAN INITIAL EVALUATION ADULT. - PERTINENT MEDS FT
MEDICATIONS  (STANDING):  ascorbic acid 500 milliGRAM(s) Oral daily  aspirin enteric coated 81 milliGRAM(s) Oral daily  atorvastatin 40 milliGRAM(s) Oral at bedtime  budesonide  80 MICROgram(s)/formoterol 4.5 MICROgram(s) Inhaler 2 Puff(s) Inhalation two times a day  calcium acetate 667 milliGRAM(s) Oral three times a day with meals  dextrose 40% Gel 15 Gram(s) Oral once  dextrose 5%. 1000 milliLiter(s) (50 mL/Hr) IV Continuous <Continuous>  dextrose 5%. 1000 milliLiter(s) (100 mL/Hr) IV Continuous <Continuous>  dextrose 50% Injectable 25 Gram(s) IV Push once  dextrose 50% Injectable 12.5 Gram(s) IV Push once  dextrose 50% Injectable 25 Gram(s) IV Push once  donepezil 5 milliGRAM(s) Oral at bedtime  epoetin graham-epbx (RETACRIT) Injectable 5000 Unit(s) IV Push <User Schedule>  glucagon  Injectable 1 milliGRAM(s) IntraMuscular once  heparin SubCutaneous Injection - Peds 5000 Unit(s) SubCutaneous every 12 hours  insulin lispro (ADMELOG) corrective regimen sliding scale   SubCutaneous three times a day before meals  levoFLOXacin IVPB 250 milliGRAM(s) IV Intermittent every 24 hours  meropenem  IVPB 500 milliGRAM(s) IV Intermittent every 24 hours  meropenem  IVPB      mirtazapine 15 milliGRAM(s) Oral at bedtime  multivitamin Oral Tab/Cap - Peds 1 Tablet(s) Oral daily  pantoprazole    Tablet 40 milliGRAM(s) Oral before breakfast  polyethylene glycol 3350 17 Gram(s) Oral two times a day  senna 2 Tablet(s) Oral at bedtime  sertraline 25 milliGRAM(s) Oral daily  vancomycin  IVPB 1000 milliGRAM(s) IV Intermittent <User Schedule>    MEDICATIONS  (PRN):  ALBUTerol  90 MICROgram(s) HFA Inhaler - Peds 2 Puff(s) Inhalation every 4 hours PRN Bronchospasm

## 2021-09-28 NOTE — DIETITIAN INITIAL EVALUATION ADULT. - OTHER INFO
pertinent medical information:   --80 y/o gentleman from [facility] with a past medical history of Alzheimer's dementia, ESRD on HD (oliguric), Anemia of chronic disease, COPD on 4L O2, DM II, Hfw/REF and Severe AS, HLD, and recently discharge for multi-lobar pneumonia admitted for septic shock.  #Septic Shock due to Legionella PNA and Proteus UTI, shock resolved  #Metabolic Encephalopathy, improved  - maldonado removed due to pus, TOV performed  - KUB shows moderate stool burden, continue w/ bowel regimen   #Left heel ulcer - wound care following; podiatry following  #ESRD on HD- access via TDC   - fluid removal during HD has been limited due to intradialytic hypotension  Needs tunneled catheter - cleared by ID - vascular surgery following    pertinent subjective information: Pt eating okay per discussion in rounds w/ RN and team. EMR PO doc: 9/26-9/27: 50-75%. No chewing/swallowing difficulty reported per daughter. Discussed nutritional supplements w/ daughter -- agreeable to add.

## 2021-09-28 NOTE — DIETITIAN INITIAL EVALUATION ADULT. - NUTRITIONGOAL OUTCOME1
pt to consume/tolerate >75% of meals/snacks and PO supplements in 4 days. pt to consume/tolerate >75% of meals/snacks and PO supplements in 4-6 days.

## 2021-09-28 NOTE — PROGRESS NOTE ADULT - SUBJECTIVE AND OBJECTIVE BOX
DRAKE HO  81y Male    CHIEF COMPLAINT:    Patient is a 81y old  Male who presents with a chief complaint of Hypotension and Fever (28 Sep 2021 12:38)      INTERVAL HPI/OVERNIGHT EVENTS:    Patient seen and examined. No acute events overnight. Remains confused     ROS: All other systems are negative.    Vital Signs:    T(F): 96.6 (09-28-21 @ 07:16), Max: 99 (09-27-21 @ 15:19)  HR: 82 (09-28-21 @ 11:25) (78 - 100)  BP: 143/65 (09-28-21 @ 11:25) (124/70 - 183/79)  RR: 18 (09-28-21 @ 11:25) (18 - 19)  SpO2: 99% (09-28-21 @ 11:25) (97% - 99%)    27 Sep 2021 07:01  -  28 Sep 2021 07:00  --------------------------------------------------------  IN: 755 mL / OUT: 0 mL / NET: 755 mL    28 Sep 2021 07:01  -  28 Sep 2021 15:10  --------------------------------------------------------  IN: 0 mL / OUT: 2000 mL / NET: -2000 mL    Daily Height in cm: 172.7 (28 Sep 2021 11:25)      POCT Blood Glucose.: 161 mg/dL (28 Sep 2021 12:03)  POCT Blood Glucose.: 128 mg/dL (28 Sep 2021 08:05)  POCT Blood Glucose.: 153 mg/dL (27 Sep 2021 21:05)  POCT Blood Glucose.: 109 mg/dL (27 Sep 2021 16:42)    PHYSICAL EXAM:    GENERAL:  NAD  SKIN: No rashes or lesions  HEENT: Atraumatic. Normocephalic.    NECK: Supple, No JVD.    PULMONARY: CTA B/L. No wheezing. No rales  CVS: Normal S1, S2. Rate and Rhythm are regular.    ABDOMEN/GI: Soft, Nontender, Nondistended; BS present  MSK:  No clubbing or cyanosis   NEUROLOGIC:  moves all extremities  PSYCH: awake, confused     Consultant(s) Notes Reviewed:  [x ] YES  [ ] NO  Care Discussed with Consultants/Other Providers [ x] YES  [ ] NO    LABS:                        10.4   15.51 )-----------( 314      ( 28 Sep 2021 09:50 )             33.9     133<L>  |  95<L>  |  96<HH>  ----------------------------<  130<H>  5.9<H>   |  19  |  9.5<HH>    Ca    9.3      28 Sep 2021 09:50  Mg     2.0     09-28    TPro  5.8<L>  /  Alb  3.1<L>  /  TBili  0.3  /  DBili  x   /  AST  24  /  ALT  14  /  AlkPhos  86  09-28    RADIOLOGY & ADDITIONAL TESTS:  Imaging or report Personally Reviewed:  [x] YES  [ ] NO  EKG reviewed: [x] YES  [ ] NO    Medications:  Standing  ascorbic acid 500 milliGRAM(s) Oral daily  aspirin enteric coated 81 milliGRAM(s) Oral daily  atorvastatin 40 milliGRAM(s) Oral at bedtime  budesonide  80 MICROgram(s)/formoterol 4.5 MICROgram(s) Inhaler 2 Puff(s) Inhalation two times a day  calcium acetate 667 milliGRAM(s) Oral three times a day with meals  chlorhexidine 4% Liquid 1 Application(s) Topical <User Schedule>  dextrose 40% Gel 15 Gram(s) Oral once  dextrose 5%. 1000 milliLiter(s) IV Continuous <Continuous>  dextrose 5%. 1000 milliLiter(s) IV Continuous <Continuous>  dextrose 50% Injectable 25 Gram(s) IV Push once  dextrose 50% Injectable 12.5 Gram(s) IV Push once  dextrose 50% Injectable 25 Gram(s) IV Push once  donepezil 5 milliGRAM(s) Oral at bedtime  epoetin graham-epbx (RETACRIT) Injectable 5000 Unit(s) IV Push <User Schedule>  glucagon  Injectable 1 milliGRAM(s) IntraMuscular once  heparin SubCutaneous Injection - Peds 5000 Unit(s) SubCutaneous every 12 hours  insulin lispro (ADMELOG) corrective regimen sliding scale   SubCutaneous three times a day before meals  levoFLOXacin IVPB 250 milliGRAM(s) IV Intermittent every 24 hours  meropenem  IVPB 500 milliGRAM(s) IV Intermittent every 24 hours  meropenem  IVPB      mirtazapine 15 milliGRAM(s) Oral at bedtime  multivitamin Oral Tab/Cap - Peds 1 Tablet(s) Oral daily  pantoprazole    Tablet 40 milliGRAM(s) Oral before breakfast  polyethylene glycol 3350 17 Gram(s) Oral two times a day  povidone iodine 10% Solution 1 Application(s) Topical two times a day  senna 2 Tablet(s) Oral at bedtime  sertraline 25 milliGRAM(s) Oral daily  tamsulosin 0.4 milliGRAM(s) Oral at bedtime  vancomycin  IVPB 1000 milliGRAM(s) IV Intermittent <User Schedule>    PRN Meds  acetaminophen   Tablet .. 650 milliGRAM(s) Oral every 6 hours PRN  ALBUTerol  90 MICROgram(s) HFA Inhaler - Peds 2 Puff(s) Inhalation every 4 hours PRN

## 2021-09-28 NOTE — PROGRESS NOTE ADULT - SUBJECTIVE AND OBJECTIVE BOX
DRAKE HO 81y Male  MRN#: [id number]   CODE STATUS: DNR/DNI    Hospital Day: 7d    Pt is currently admitted with the primary diagnosis of hypotension and fever    SUBJECTIVE  Hospital Course  80 y/o gentleman with a past medical history of Alzheimer's dementia, ESRD on HD (oliguric), Anemia of chronic disease, COPD on 4L O2, DM II, Hfw/REF and Severe AS, HLD, and recently discharge for multi-lobar pneumonia admitted for septic shock from [facility]. Began having fevers on Sunday and complaining of abdominal pain, without any other significant symptoms (n/v/d, SOB, CP, cough). Today he was noted to be febrile and hypotensive, and anuric.   Still found to be hypotensive in the ED after 1L IVF and started on peripheral levophed, WBC 14, CXR appears improved from last on 9/2.    Overnight events   Rash on back yesterday, given benadryl    Subjective complaints       Present Today:   - Laura:  No [  ], Yes [   ] : Indication:     - Type of IV Access:       .. CVC/Piccline:  No [  ], Yes [   ] : Indication:       .. Midline: No [  ], Yes [   ] : Indication:                               OBJECTIVE  PAST MEDICAL & SURGICAL HISTORY  Diabetes mellitus    COPD (chronic obstructive pulmonary disease)    Lymphedema of both lower extremities    CHF (congestive heart failure)    ESRD on dialysis    H/O right nephrectomy  20 yrs ago                                             ALLERGIES:  No Known Allergies                                        HOME MEDICATIONS  Home Medications:  albuterol 90 mcg/inh inhalation aerosol: 2 puff(s) inhaled every 6 hours (20 Aug 2021 09:36)  ascorbic acid 500 mg oral tablet: 1 tab(s) orally once a day (20 Aug 2021 09:36)  aspirin 81 mg oral delayed release tablet: 1 tab(s) orally once a day (20 Aug 2021 09:36)  atorvastatin 40 mg oral tablet: 1 tab(s) orally once a day (at bedtime) (20 Aug 2021 09:36)  Breo Ellipta 200 mcg-25 mcg/inh inhalation powder: 1 puff(s) inhaled once a day (26 Jul 2021 10:25)  calcium acetate 667 mg oral tablet: 1 tab(s) orally 3 times a day (with meals) (20 Aug 2021 13:54)  donepezil 5 mg oral tablet: 1 tab(s) orally once a day (at bedtime) (20 Aug 2021 09:36)  epoetin graham: 5000 unit(s) injectable Monday, Wednesday, and Friday (20 Aug 2021 11:05)  heparin: 5000 unit(s) subcutaneous every 12 hours (01 Sep 2021 13:34)  HYDROmorphone 2 mg oral tablet: 1 tab(s) orally every 4 hours, As needed, Moderate Pain (4 - 6) (01 Sep 2021 12:01)  midodrine 10 mg oral tablet: 1 tab(s) orally  (01 Sep 2021 12:01)  mirtazapine 15 mg oral tablet: 1 tab(s) orally once a day (at bedtime) (20 Aug 2021 09:36)  multivitamin: 1 tab(s) orally once a day (20 Aug 2021 13:54)  pantoprazole 40 mg oral delayed release tablet: 1 tab(s) orally once a day (before a meal) (20 Aug 2021 09:36)  povidone iodine 10% topical ointment: 1 application topically  (01 Sep 2021 12:01)  Senna 8.6 mg oral tablet: 2 tab(s) orally once a day (at bedtime), As Needed (26 Jul 2021 10:25)  sertraline 25 mg oral tablet: 1 tab(s) orally once a day (20 Aug 2021 09:36)  tamsulosin 0.4 mg oral capsule: 1 cap(s) orally once a day (at bedtime) (20 Aug 2021 09:36)  Toprol-XL 25 mg oral tablet, extended release: 1 tab(s) orally once a day (at bedtime) (01 Sep 2021 12:01)                           MEDICATIONS:  STANDING MEDICATIONS  ascorbic acid 500 milliGRAM(s) Oral daily  aspirin enteric coated 81 milliGRAM(s) Oral daily  atorvastatin 40 milliGRAM(s) Oral at bedtime  budesonide  80 MICROgram(s)/formoterol 4.5 MICROgram(s) Inhaler 2 Puff(s) Inhalation two times a day  calcium acetate 667 milliGRAM(s) Oral three times a day with meals  chlorhexidine 4% Liquid 1 Application(s) Topical <User Schedule>  dextrose 40% Gel 15 Gram(s) Oral once  dextrose 5%. 1000 milliLiter(s) IV Continuous <Continuous>  dextrose 5%. 1000 milliLiter(s) IV Continuous <Continuous>  dextrose 50% Injectable 25 Gram(s) IV Push once  dextrose 50% Injectable 12.5 Gram(s) IV Push once  dextrose 50% Injectable 25 Gram(s) IV Push once  donepezil 5 milliGRAM(s) Oral at bedtime  epoetin graham-epbx (RETACRIT) Injectable 5000 Unit(s) IV Push <User Schedule>  glucagon  Injectable 1 milliGRAM(s) IntraMuscular once  heparin SubCutaneous Injection - Peds 5000 Unit(s) SubCutaneous every 12 hours  insulin lispro (ADMELOG) corrective regimen sliding scale   SubCutaneous three times a day before meals  levoFLOXacin IVPB 250 milliGRAM(s) IV Intermittent every 24 hours  meropenem  IVPB 500 milliGRAM(s) IV Intermittent every 24 hours  meropenem  IVPB      mirtazapine 15 milliGRAM(s) Oral at bedtime  multivitamin Oral Tab/Cap - Peds 1 Tablet(s) Oral daily  pantoprazole    Tablet 40 milliGRAM(s) Oral before breakfast  polyethylene glycol 3350 17 Gram(s) Oral two times a day  senna 2 Tablet(s) Oral at bedtime  sertraline 25 milliGRAM(s) Oral daily  tamsulosin 0.4 milliGRAM(s) Oral at bedtime  vancomycin  IVPB 1000 milliGRAM(s) IV Intermittent <User Schedule>    PRN MEDICATIONS  acetaminophen   Tablet .. 650 milliGRAM(s) Oral every 6 hours PRN  ALBUTerol  90 MICROgram(s) HFA Inhaler - Peds 2 Puff(s) Inhalation every 4 hours PRN                                            VITAL SIGNS: Last 24 Hours  T(C): 35.9 (28 Sep 2021 07:16), Max: 37.2 (27 Sep 2021 15:19)  T(F): 96.6 (28 Sep 2021 07:16), Max: 99 (27 Sep 2021 15:19)  HR: 100 (28 Sep 2021 07:16) (78 - 100)  BP: 126/56 (28 Sep 2021 08:20) (124/70 - 183/79)  BP(mean): --  RR: 18 (28 Sep 2021 08:20) (18 - 19)  SpO2: 98% (28 Sep 2021 07:16) (97% - 98%)      09-27-21 @ 07:01 - 09-28-21 @ 07:00  --------------------------------------------------------  IN: 755 mL / OUT: 0 mL / NET: 755 mL                                               LABS:                        10.4   15.51 )-----------( 314      ( 28 Sep 2021 09:50 )             33.9     09-28    133<L>  |  95<L>  |  96<HH>  ----------------------------<  130<H>  5.9<H>   |  19  |  9.5<HH>    Ca    9.3      28 Sep 2021 09:50  Mg     2.0     09-28    TPro  5.8<L>  /  Alb  3.1<L>  /  TBili  0.3  /  DBili  x   /  AST  24  /  ALT  14  /  AlkPhos  86  09-28                                                              -------------------------------------------------------------  RADIOLOGY:  < from:  Urinary Bladder (09.26.21 @ 08:23) >    IMPRESSION:  Extremely limited examination: The urinary bladder appears only mildly distended, containing the distended balloon of a urethral Laura catheter.  Uncooperative, combative patient precluded completion of the examination.        --- End of Report ---      < end of copied text >                                            --------------------------------------------------------------    PHYSICAL EXAM:  General:   HEENT:  LUNGS:  HEART:  ABDOMEN:  EXT:  NEURO:  SKIN:   DRAKE HO 81y Male  MRN#: [id number]   CODE STATUS: DNR/DNI    Hospital Day: 7d    Pt is currently admitted with the primary diagnosis of hypotension and fever    SUBJECTIVE  Hospital Course  80 y/o gentleman with a past medical history of Alzheimer's dementia, ESRD on HD (oliguric), Anemia of chronic disease, COPD on 4L O2, DM II, Hfw/REF and Severe AS, HLD, and recently discharge for multi-lobar pneumonia admitted for septic shock from [facility]. Began having fevers on Sunday and complaining of abdominal pain, without any other significant symptoms (n/v/d, SOB, CP, cough). Today he was noted to be febrile and hypotensive, and anuric.   Still found to be hypotensive in the ED after 1L IVF and started on peripheral levophed, WBC 14, CXR appears improved from last on 9/2.    Overnight events   Rash on back yesterday, given benadryl    Subjective complaints       Present Today:   - Laura:  No [  ], Yes [   ] : Indication:     - Type of IV Access:       .. CVC/Piccline:  No [  ], Yes [   ] : Indication:       .. Midline: No [  ], Yes [   ] : Indication:                               OBJECTIVE  PAST MEDICAL & SURGICAL HISTORY  Diabetes mellitus    COPD (chronic obstructive pulmonary disease)    Lymphedema of both lower extremities    CHF (congestive heart failure)    ESRD on dialysis    H/O right nephrectomy  20 yrs ago                                             ALLERGIES:  No Known Allergies                                        HOME MEDICATIONS  Home Medications:  albuterol 90 mcg/inh inhalation aerosol: 2 puff(s) inhaled every 6 hours (20 Aug 2021 09:36)  ascorbic acid 500 mg oral tablet: 1 tab(s) orally once a day (20 Aug 2021 09:36)  aspirin 81 mg oral delayed release tablet: 1 tab(s) orally once a day (20 Aug 2021 09:36)  atorvastatin 40 mg oral tablet: 1 tab(s) orally once a day (at bedtime) (20 Aug 2021 09:36)  Breo Ellipta 200 mcg-25 mcg/inh inhalation powder: 1 puff(s) inhaled once a day (26 Jul 2021 10:25)  calcium acetate 667 mg oral tablet: 1 tab(s) orally 3 times a day (with meals) (20 Aug 2021 13:54)  donepezil 5 mg oral tablet: 1 tab(s) orally once a day (at bedtime) (20 Aug 2021 09:36)  epoetin graham: 5000 unit(s) injectable Monday, Wednesday, and Friday (20 Aug 2021 11:05)  heparin: 5000 unit(s) subcutaneous every 12 hours (01 Sep 2021 13:34)  HYDROmorphone 2 mg oral tablet: 1 tab(s) orally every 4 hours, As needed, Moderate Pain (4 - 6) (01 Sep 2021 12:01)  midodrine 10 mg oral tablet: 1 tab(s) orally  (01 Sep 2021 12:01)  mirtazapine 15 mg oral tablet: 1 tab(s) orally once a day (at bedtime) (20 Aug 2021 09:36)  multivitamin: 1 tab(s) orally once a day (20 Aug 2021 13:54)  pantoprazole 40 mg oral delayed release tablet: 1 tab(s) orally once a day (before a meal) (20 Aug 2021 09:36)  povidone iodine 10% topical ointment: 1 application topically  (01 Sep 2021 12:01)  Senna 8.6 mg oral tablet: 2 tab(s) orally once a day (at bedtime), As Needed (26 Jul 2021 10:25)  sertraline 25 mg oral tablet: 1 tab(s) orally once a day (20 Aug 2021 09:36)  tamsulosin 0.4 mg oral capsule: 1 cap(s) orally once a day (at bedtime) (20 Aug 2021 09:36)  Toprol-XL 25 mg oral tablet, extended release: 1 tab(s) orally once a day (at bedtime) (01 Sep 2021 12:01)                           MEDICATIONS:  STANDING MEDICATIONS  ascorbic acid 500 milliGRAM(s) Oral daily  aspirin enteric coated 81 milliGRAM(s) Oral daily  atorvastatin 40 milliGRAM(s) Oral at bedtime  budesonide  80 MICROgram(s)/formoterol 4.5 MICROgram(s) Inhaler 2 Puff(s) Inhalation two times a day  calcium acetate 667 milliGRAM(s) Oral three times a day with meals  chlorhexidine 4% Liquid 1 Application(s) Topical <User Schedule>  dextrose 40% Gel 15 Gram(s) Oral once  dextrose 5%. 1000 milliLiter(s) IV Continuous <Continuous>  dextrose 5%. 1000 milliLiter(s) IV Continuous <Continuous>  dextrose 50% Injectable 25 Gram(s) IV Push once  dextrose 50% Injectable 12.5 Gram(s) IV Push once  dextrose 50% Injectable 25 Gram(s) IV Push once  donepezil 5 milliGRAM(s) Oral at bedtime  epoetin graham-epbx (RETACRIT) Injectable 5000 Unit(s) IV Push <User Schedule>  glucagon  Injectable 1 milliGRAM(s) IntraMuscular once  heparin SubCutaneous Injection - Peds 5000 Unit(s) SubCutaneous every 12 hours  insulin lispro (ADMELOG) corrective regimen sliding scale   SubCutaneous three times a day before meals  levoFLOXacin IVPB 250 milliGRAM(s) IV Intermittent every 24 hours  meropenem  IVPB 500 milliGRAM(s) IV Intermittent every 24 hours  meropenem  IVPB      mirtazapine 15 milliGRAM(s) Oral at bedtime  multivitamin Oral Tab/Cap - Peds 1 Tablet(s) Oral daily  pantoprazole    Tablet 40 milliGRAM(s) Oral before breakfast  polyethylene glycol 3350 17 Gram(s) Oral two times a day  senna 2 Tablet(s) Oral at bedtime  sertraline 25 milliGRAM(s) Oral daily  tamsulosin 0.4 milliGRAM(s) Oral at bedtime  vancomycin  IVPB 1000 milliGRAM(s) IV Intermittent <User Schedule>    PRN MEDICATIONS  acetaminophen   Tablet .. 650 milliGRAM(s) Oral every 6 hours PRN  ALBUTerol  90 MICROgram(s) HFA Inhaler - Peds 2 Puff(s) Inhalation every 4 hours PRN                                            VITAL SIGNS: Last 24 Hours  T(C): 35.9 (28 Sep 2021 07:16), Max: 37.2 (27 Sep 2021 15:19)  T(F): 96.6 (28 Sep 2021 07:16), Max: 99 (27 Sep 2021 15:19)  HR: 100 (28 Sep 2021 07:16) (78 - 100)  BP: 126/56 (28 Sep 2021 08:20) (124/70 - 183/79)  BP(mean): --  RR: 18 (28 Sep 2021 08:20) (18 - 19)  SpO2: 98% (28 Sep 2021 07:16) (97% - 98%)      09-27-21 @ 07:01 - 09-28-21 @ 07:00  --------------------------------------------------------  IN: 755 mL / OUT: 0 mL / NET: 755 mL                                               LABS:                        10.4   15.51 )-----------( 314      ( 28 Sep 2021 09:50 )             33.9     09-28    133<L>  |  95<L>  |  96<HH>  ----------------------------<  130<H>  5.9<H>   |  19  |  9.5<HH>    Ca    9.3      28 Sep 2021 09:50  Mg     2.0     09-28    TPro  5.8<L>  /  Alb  3.1<L>  /  TBili  0.3  /  DBili  x   /  AST  24  /  ALT  14  /  AlkPhos  86  09-28                                                              -------------------------------------------------------------  RADIOLOGY:  < from:  Urinary Bladder (09.26.21 @ 08:23) >    IMPRESSION:  Extremely limited examination: The urinary bladder appears only mildly distended, containing the distended balloon of a urethral Laura catheter.  Uncooperative, combative patient precluded completion of the examination.        --- End of Report ---      < end of copied text >                                            --------------------------------------------------------------    PHYSICAL EXAM:  General: NAD, laying in bed comfortably. Confused  HEENT: normocephalic, atraumatic,   LUNGS: mild rhonchi, on 2L NC  HEART: S1S2+, regular rate and rhythm  ABDOMEN: BS+, soft, nontender, nondistended  EXT: no clubbing, cyanosis, or edema. Venous stasis changes b/l LE.   SKIN: no rashes or lesions

## 2021-09-28 NOTE — PROGRESS NOTE ADULT - ASSESSMENT
ESRD on HD   - access via TDC   - fluid removal during HD has been limited due to intradialytic hypotension  Bacteremia  UTI  COPD on home O2  HFrEF  dementia   HTN  anemia  right nephrectomy    plan:    Abx per ID  Needs tunneled catheter - cleared by ID - vascular surgery following  Continue midodrine  HD today: 3 hours, opti 160 dialyzer, 2K bath, 2L UF  EPO with HD  Renal diet

## 2021-09-28 NOTE — PROGRESS NOTE ADULT - SUBJECTIVE AND OBJECTIVE BOX
[place] NEPHROLOGY FOLLOW UP NOTE  --------------------------------------------------------------------------------  24 hour events/subjective: Patient examined during HD. Appears comfortable.    PAST HISTORY  --------------------------------------------------------------------------------  No significant changes to PMH, PSH, FHx, SHx, unless otherwise noted    ALLERGIES & MEDICATIONS  --------------------------------------------------------------------------------  Allergies    No Known Allergies    Standing Inpatient Medications  ascorbic acid 500 milliGRAM(s) Oral daily  aspirin enteric coated 81 milliGRAM(s) Oral daily  atorvastatin 40 milliGRAM(s) Oral at bedtime  budesonide  80 MICROgram(s)/formoterol 4.5 MICROgram(s) Inhaler 2 Puff(s) Inhalation two times a day  calcium acetate 667 milliGRAM(s) Oral three times a day with meals  chlorhexidine 4% Liquid 1 Application(s) Topical <User Schedule>  dextrose 40% Gel 15 Gram(s) Oral once  dextrose 5% 1000 milliLiter(s) IV Continuous <Continuous>  dextrose 5% 1000 milliLiter(s) IV Continuous <Continuous>  dextrose 50% Injectable 25 Gram(s) IV Push once  dextrose 50% Injectable 12.5 Gram(s) IV Push once  dextrose 50% Injectable 25 Gram(s) IV Push once  donepezil 5 milliGRAM(s) Oral at bedtime  epoetin graham-epbx (RETACRIT) Injectable 5000 Unit(s) IV Push <User Schedule>  glucagon  Injectable 1 milliGRAM(s) IntraMuscular once  heparin SubCutaneous Injection - Peds 5000 Unit(s) SubCutaneous every 12 hours  insulin lispro (ADMELOG) corrective regimen sliding scale   SubCutaneous three times a day before meals  levoFLOXacin IVPB 250 milliGRAM(s) IV Intermittent every 24 hours  meropenem  IVPB 500 milliGRAM(s) IV Intermittent every 24 hours  meropenem  IVPB      mirtazapine 15 milliGRAM(s) Oral at bedtime  multivitamin Oral Tab/Cap - Peds 1 Tablet(s) Oral daily  pantoprazole    Tablet 40 milliGRAM(s) Oral before breakfast  polyethylene glycol 3350 17 Gram(s) Oral two times a day  senna 2 Tablet(s) Oral at bedtime  sertraline 25 milliGRAM(s) Oral daily  tamsulosin 0.4 milliGRAM(s) Oral at bedtime  vancomycin  IVPB 1000 milliGRAM(s) IV Intermittent <User Schedule>    PRN Inpatient Medications  acetaminophen   Tablet .. 650 milliGRAM(s) Oral every 6 hours PRN  ALBUTerol  90 MICROgram(s) HFA Inhaler - Peds 2 Puff(s) Inhalation every 4 hours PRN    VITALS/PHYSICAL EXAM  --------------------------------------------------------------------------------  T(C): 35.9 (09-28-21 @ 07:16), Max: 37.2 (09-27-21 @ 15:19)  HR: 100 (09-28-21 @ 07:16) (78 - 100)  BP: 126/56 (09-28-21 @ 08:20) (124/70 - 183/79)  RR: 18 (09-28-21 @ 08:20) (18 - 19)  SpO2: 98% (09-28-21 @ 07:16) (97% - 98%)    09-27-21 @ 07:01  -  09-28-21 @ 07:00  --------------------------------------------------------  IN: 755 mL / OUT: 0 mL / NET: 755 mL    Physical Exam:  	Gen: NAD  	Pulm: CTA B/L  	CV: RRR, S1S2  	Abd: +BS, soft, nontender/nondistended  	: No suprapubic tenderness  	LE: Warm,  edema  	Vascular access: temp HD cath    LABS/STUDIES  --------------------------------------------------------------------------------              10.4   15.51 >-----------<  314      [09-28-21 @ 09:50]              33.9     133  |  95  |  96  ----------------------------<  130      [09-28-21 @ 09:50]  5.9   |  19  |  9.5        Ca     9.3     [09-28-21 @ 09:50]      Mg     2.0     [09-28-21 @ 09:50]    TPro  5.8  /  Alb  3.1  /  TBili  0.3  /  DBili  x   /  AST  24  /  ALT  14  /  AlkPhos  86  [09-28-21 @ 09:50]    Creatinine Trend:  SCr 9.5 [09-28 @ 09:50]  SCr 8.0 [09-27 @ 05:42]  SCr 7.0 [09-26 @ 06:23]  SCr 7.7 [09-25 @ 04:30]  SCr 7.1 [09-24 @ 04:30]    Urinalysis - [09-22-21 @ 02:00]      Color Yellow / Appearance Turbid / SG 1.015 / pH 6.5      Gluc Negative / Ketone Small  / Bili Negative / Urobili <2 mg/dL       Blood Large / Protein 300 mg/dL / Leuk Est Large / Nitrite Negative      RBC >720 / WBC >720 / Hyaline  / Gran  / Sq Epi  / Non Sq Epi  / Bacteria     Urine Osmolality 395      [09-22-21 @ 02:00]    Iron 10, TIBC 119, %sat 8      [09-22-21 @ 08:39]  Ferritin 938      [09-22-21 @ 08:39]  PTH -- (Ca 8.9)      [12-24-20 @ 04:30]   54  Lipid: chol 114, , HDL 38, LDL --      [08-27-21 @ 04:30]    HBsAg Nonreact      [09-22-21 @ 08:39]  HCV 0.17, Nonreact      [09-22-21 @ 08:39]

## 2021-09-28 NOTE — DIETITIAN INITIAL EVALUATION ADULT. - ADD RECOMMEND
1. add Nepro BID. 2. Cont w/ multivitamins w/o minerals. 3. Add CHO consistent diet modifier to current diet order.

## 2021-09-28 NOTE — DIETITIAN INITIAL EVALUATION ADULT. - NAME AND PHONE
Nutrition Intervention: meals and snacks,medical food supplements, vitamin and mineral supplements; Nutrition Monitoring:diet order,energy intake,body composition,NFPF, renal/glucose/electrolyte profile

## 2021-09-29 NOTE — PROGRESS NOTE ADULT - SUBJECTIVE AND OBJECTIVE BOX
DRAKE HO 81y Male  MRN#: [id number]   CODE STATUS: DNR/DNI    Hospital Day: 8d    Pt is currently admitted with the primary diagnosis of hypotension and fever    SUBJECTIVE  Hospital Course  80 y/o gentleman with a past medical history of Alzheimer's dementia, ESRD on HD (oliguric), Anemia of chronic disease, COPD on 4L O2, DM II, Hfw/REF and Severe AS, HLD, and recently discharge for multi-lobar pneumonia admitted for septic shock from [facility]. Began having fevers on Sunday and complaining of abdominal pain, without any other significant symptoms (n/v/d, SOB, CP, cough). Today he was noted to be febrile and hypotensive, and anuric.   Still found to be hypotensive in the ED after 1L IVF and started on peripheral levophed, WBC 14, CXR appears improved from last on 9/2.    Overnight events   Rash on back yesterday, given benadryl    Subjective complaints   Could not assess patient, was not oriented. Patient was not in distress.       Present Today:   - Laura:  No [  ], Yes [   ] : Indication:     - Type of IV Access:       .. CVC/Piccline:  No [  ], Yes [   ] : Indication:       .. Midline: No [  ], Yes [   ] : Indication:                               OBJECTIVE  PAST MEDICAL & SURGICAL HISTORY  Diabetes mellitus    COPD (chronic obstructive pulmonary disease)    Lymphedema of both lower extremities    CHF (congestive heart failure)    ESRD on dialysis    H/O right nephrectomy  20 yrs ago                                             ALLERGIES:  No Known Allergies                                        HOME MEDICATIONS  Home Medications:  albuterol 90 mcg/inh inhalation aerosol: 2 puff(s) inhaled every 6 hours (20 Aug 2021 09:36)  ascorbic acid 500 mg oral tablet: 1 tab(s) orally once a day (20 Aug 2021 09:36)  aspirin 81 mg oral delayed release tablet: 1 tab(s) orally once a day (20 Aug 2021 09:36)  atorvastatin 40 mg oral tablet: 1 tab(s) orally once a day (at bedtime) (20 Aug 2021 09:36)  Breo Ellipta 200 mcg-25 mcg/inh inhalation powder: 1 puff(s) inhaled once a day (26 Jul 2021 10:25)  calcium acetate 667 mg oral tablet: 1 tab(s) orally 3 times a day (with meals) (20 Aug 2021 13:54)  donepezil 5 mg oral tablet: 1 tab(s) orally once a day (at bedtime) (20 Aug 2021 09:36)  epoetin graham: 5000 unit(s) injectable Monday, Wednesday, and Friday (20 Aug 2021 11:05)  heparin: 5000 unit(s) subcutaneous every 12 hours (01 Sep 2021 13:34)  HYDROmorphone 2 mg oral tablet: 1 tab(s) orally every 4 hours, As needed, Moderate Pain (4 - 6) (01 Sep 2021 12:01)  midodrine 10 mg oral tablet: 1 tab(s) orally  (01 Sep 2021 12:01)  mirtazapine 15 mg oral tablet: 1 tab(s) orally once a day (at bedtime) (20 Aug 2021 09:36)  multivitamin: 1 tab(s) orally once a day (20 Aug 2021 13:54)  pantoprazole 40 mg oral delayed release tablet: 1 tab(s) orally once a day (before a meal) (20 Aug 2021 09:36)  povidone iodine 10% topical ointment: 1 application topically  (01 Sep 2021 12:01)  Senna 8.6 mg oral tablet: 2 tab(s) orally once a day (at bedtime), As Needed (26 Jul 2021 10:25)  sertraline 25 mg oral tablet: 1 tab(s) orally once a day (20 Aug 2021 09:36)  tamsulosin 0.4 mg oral capsule: 1 cap(s) orally once a day (at bedtime) (20 Aug 2021 09:36)  Toprol-XL 25 mg oral tablet, extended release: 1 tab(s) orally once a day (at bedtime) (01 Sep 2021 12:01)                           MEDICATIONS:  STANDING MEDICATIONS  ascorbic acid 500 milliGRAM(s) Oral daily  aspirin enteric coated 81 milliGRAM(s) Oral daily  atorvastatin 40 milliGRAM(s) Oral at bedtime  budesonide  80 MICROgram(s)/formoterol 4.5 MICROgram(s) Inhaler 2 Puff(s) Inhalation two times a day  calcium acetate 667 milliGRAM(s) Oral three times a day with meals  chlorhexidine 4% Liquid 1 Application(s) Topical <User Schedule>  dextrose 40% Gel 15 Gram(s) Oral once  dextrose 5%. 1000 milliLiter(s) IV Continuous <Continuous>  dextrose 5%. 1000 milliLiter(s) IV Continuous <Continuous>  dextrose 50% Injectable 25 Gram(s) IV Push once  dextrose 50% Injectable 12.5 Gram(s) IV Push once  dextrose 50% Injectable 25 Gram(s) IV Push once  donepezil 5 milliGRAM(s) Oral at bedtime  epoetin graham-epbx (RETACRIT) Injectable 5000 Unit(s) IV Push <User Schedule>  glucagon  Injectable 1 milliGRAM(s) IntraMuscular once  heparin SubCutaneous Injection - Peds 5000 Unit(s) SubCutaneous every 12 hours  insulin lispro (ADMELOG) corrective regimen sliding scale   SubCutaneous three times a day before meals  levoFLOXacin IVPB 250 milliGRAM(s) IV Intermittent every 24 hours  meropenem  IVPB 500 milliGRAM(s) IV Intermittent every 24 hours  meropenem  IVPB      mirtazapine 15 milliGRAM(s) Oral at bedtime  multivitamin Oral Tab/Cap - Peds 1 Tablet(s) Oral daily  pantoprazole    Tablet 40 milliGRAM(s) Oral before breakfast  polyethylene glycol 3350 17 Gram(s) Oral two times a day  povidone iodine 10% Solution 1 Application(s) Topical two times a day  senna 2 Tablet(s) Oral at bedtime  sertraline 25 milliGRAM(s) Oral daily  tamsulosin 0.4 milliGRAM(s) Oral at bedtime  vancomycin  IVPB 1000 milliGRAM(s) IV Intermittent <User Schedule>    PRN MEDICATIONS  acetaminophen   Tablet .. 650 milliGRAM(s) Oral every 6 hours PRN  ALBUTerol  90 MICROgram(s) HFA Inhaler - Peds 2 Puff(s) Inhalation every 4 hours PRN                                            VITAL SIGNS: Last 24 Hours  T(C): 36.9 (29 Sep 2021 07:54), Max: 37.5 (28 Sep 2021 15:43)  T(F): 98.5 (29 Sep 2021 07:54), Max: 99.5 (28 Sep 2021 15:43)  HR: 91 (29 Sep 2021 07:54) (82 - 91)  BP: 124/58 (29 Sep 2021 07:54) (124/58 - 157/70)  BP(mean): --  RR: 18 (29 Sep 2021 07:54) (18 - 19)  SpO2: 95% (29 Sep 2021 07:54) (95% - 99%)      09-28-21 @ 07:01  -  09-29-21 @ 07:00  --------------------------------------------------------  IN: 240 mL / OUT: 2000 mL / NET: -1760 mL    09-29-21 @ 07:01  -  09-29-21 @ 11:00  --------------------------------------------------------  IN: 360 mL / OUT: 0 mL / NET: 360 mL                                               LABS:                        10.4   15.51 )-----------( 314      ( 28 Sep 2021 09:50 )             33.9     09-28    133<L>  |  95<L>  |  96<HH>  ----------------------------<  130<H>  5.9<H>   |  19  |  9.5<HH>    Ca    9.3      28 Sep 2021 09:50  Mg     2.0     09-28    TPro  5.8<L>  /  Alb  3.1<L>  /  TBili  0.3  /  DBili  x   /  AST  24  /  ALT  14  /  AlkPhos  86  09-28      Culture - Blood (collected 27 Sep 2021 18:27)  Source: .Blood None  Preliminary Report (28 Sep 2021 23:01):    No growth to date.                                                    -------------------------------------------------------------  RADIOLOGY:  < from: US Urinary Bladder (09.26.21 @ 08:23) >  IMPRESSION:  Extremely limited examination: The urinary bladder appears only mildly distended, containing the distended balloon of a urethral Laura catheter.  Uncooperative, combative patient precluded completion of the examination.        --- End of Report ---                                              --------------------------------------------------------------    PHYSICAL EXAM:  General: NAD, laying in bed comfortably. Confused  HEENT: normocephalic, atraumatic,   LUNGS: CTAB, on 2L NC  HEART: S1S2+, regular rate and rhythm  ABDOMEN: BS+, soft, nontender, nondistended  EXT: no clubbing, cyanosis, or edema. Venous stasis changes b/l LE.   SKIN: no rashes or lesions

## 2021-09-29 NOTE — PROGRESS NOTE ADULT - ASSESSMENT
82 y/o gentleman from [facility] with a past medical history of Alzheimer's dementia, ESRD on HD (oliguric), Anemia of chronic disease, COPD on 4L O2, DM II, Hfw/REF and Severe AS, HLD, and recently discharge for multi-lobar pneumonia admitted for septic shock.    #Septic Shock due to Legionella PNA and Proteus UTI, shock resolved  Metabolic Encephalopathy, improved  Ervin pus in maldonado, remains on low dose levophed, Ucx with proteus, pending sensitivities  < from: CT Abdomen and Pelvis w/ IV Cont (09.22.21 @ 13:24) >  .  Findings compatible with acute cystitis and left-sided ascending urinary tract infection; partially distended urinary bladder despite presence of a Maldonado catheter. Correlate clinically for catheter function.  2.  Large diffuse colonic stool burden with moderately distended rectum and mild presacral edema.  3.  Incompletely characterized cystic lesion in the pancreatic head. This can be further evaluated with an outpatient contrast-enhanced MRI/MRCP.  9/21 Blood cx : staph epi  Urine Legionella +  ID following, recs meropenem 500 q24 7 days (9/27-10/3), vanc 1g IV post HD 4 more doses (9/27), Levo 250mg IV q24h for 14 days (9/23 start)  - may need midline on dc for abx (ertapenem)  tesio removed per ID recs, Vascular placed udall for HD (femoral)  2d echo EF 45-50%, severe AS  repeat blood cx  Miralax Q12H and senna; mineral oil enema today  - maldonado removed due to pus, TOV performed  - KUB shows moderate stool burden, continue w/ bowel regimen   - Repeat KUB ordered for 9/29 AM, low stool burden    #Chronic hypoxic respiratory failure   #COPD on 4L home O2  #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     #Left heel ulcer  wound care following; podiatry following  - podiatry recs- studies done. ESR-29 CRP- 67  - B/L LE arterial duplex- Bilateral moderate to severe atherosclerotic disease. The left posterior tibial and peroneal arteries are occluded. (Vascular surgery notifited)  - XRay: Diffuse osteopenia. Plantar heel ulcer with suggestion of loss of cortical distinctiveness at the plantar calcaneus, suggestive of osteomyelitis. Degenerative changes of the hindfoot, midfoot and forefoot joints. Vascular calcifications.  - F/U podiatry- will follow   - Vascular- Scheduled for removal of femoral udall insertion of TDC Friday with Dr. Alamo. Pt should follow up with Dr. Rossi as outpt in 1-2 weeks to re-evaluate left foot, PAD, possible angiogram  - Wound care- betadine    #ESRD on HD (oliguric):  #HAGMA  #Hyponatremia   -HD today; will receive vanc and epo  -nephro following  -Cont ca-acetate and midodrine    #Alzheimer's dementia  -as per family, patient's mentation improved   -Cont donepezil, sertraline, and mirtazepine     #Progress Note Handoff  Pending (specify): New Tesio  Disposition:  from NH    Handoff: vascular placement of new tesio on Friday, addressing angiogram needs outpt. Podiatry called to f/u for foot ulcer. On Abx, D/c with midline if needed for ertapenem. Confirm MOLST

## 2021-09-29 NOTE — PROGRESS NOTE ADULT - SUBJECTIVE AND OBJECTIVE BOX
Podiatry Progress Note    Subjective:   Pt seen at bedside. Dressing intact to left foot.    PAST MEDICAL & SURGICAL HISTORY:  Diabetes mellitus    COPD (chronic obstructive pulmonary disease)    Lymphedema of both lower extremities    CHF (congestive heart failure)    ESRD on dialysis    H/O right nephrectomy  20 yrs ago         Objective:  Vital Signs Last 24 Hrs  T(C): 36.9 (29 Sep 2021 07:54), Max: 37.5 (28 Sep 2021 15:43)  T(F): 98.5 (29 Sep 2021 07:54), Max: 99.5 (28 Sep 2021 15:43)  HR: 91 (29 Sep 2021 07:54) (85 - 91)  BP: 124/58 (29 Sep 2021 07:54) (124/58 - 157/70)  BP(mean): --  RR: 18 (29 Sep 2021 07:54) (18 - 19)  SpO2: 95% (29 Sep 2021 07:54) (95% - 95%)                        10.3   15.16 )-----------( 311      ( 29 Sep 2021 11:31 )             34.7                 09-28    133<L>  |  95<L>  |  96<HH>  ----------------------------<  130<H>  5.9<H>   |  19  |  9.5<HH>    Ca    9.3      28 Sep 2021 09:50  Mg     2.0     09-28    TPro  5.8<L>  /  Alb  3.1<L>  /  TBili  0.3  /  DBili  x   /  AST  24  /  ALT  14  /  AlkPhos  86  09-28          Physical Exam - Lower Extremity Focused:   Derm: Left Heel Ulcer; Fibrogranular wound base (80% granular); No active drainage or bleeding, No malodor  Vascular: DP and PT Pulses Diminished; Foot is Warm to Warm to the touch; Capillary Refill Time < 3 Seconds;    Neuro: Protective Sensation Diminished     Assessment:  Left Heel Pressure Ulcer     Plan:  Chart reviewed and Patient evaluated.   Local Wound Care; Wound Flushed w/ NS; Wound Packed w/ santyl / DSD / Kerlix   Vascular on-board; No intervention during this admission; F/u as outpt  No intervention from Podiatry  Cont LW; Q24  Pt can f/u with Dr Ragland at [address] [place], [place] [address] upon d/c  Discussed Plan w/ Attending    Podiatry

## 2021-09-29 NOTE — CHART NOTE - NSCHARTNOTEFT_GEN_A_CORE
Discussed goals of care with patients' daughter who is the HCP, Marcy Sommer ([phone]) today @ 8:10 am. Code status is to remain DNR/DNI.

## 2021-09-29 NOTE — PROGRESS NOTE ADULT - SUBJECTIVE AND OBJECTIVE BOX
[facility]PHROLOGY FOLLOW UP NOTE  --------------------------------------------------------------------------------  24 hour events/subjective: Patient examined. Appears comfortable.    PAST HISTORY  --------------------------------------------------------------------------------  No significant changes to PMH, PSH, FHx, SHx, unless otherwise noted    ALLERGIES & MEDICATIONS  --------------------------------------------------------------------------------  Allergies    No Known Allergies    Standing Inpatient Medications  ascorbic acid 500 milliGRAM(s) Oral daily  aspirin enteric coated 81 milliGRAM(s) Oral daily  atorvastatin 40 milliGRAM(s) Oral at bedtime  budesonide  80 MICROgram(s)/formoterol 4.5 MICROgram(s) Inhaler 2 Puff(s) Inhalation two times a day  calcium acetate 667 milliGRAM(s) Oral three times a day with meals  chlorhexidine 4% Liquid 1 Application(s) Topical <User Schedule>  dextrose 40% Gel 15 Gram(s) Oral once  dextrose 5%. 1000 milliLiter(s) IV Continuous <Continuous>  dextrose 5%. 1000 milliLiter(s) IV Continuous <Continuous>  dextrose 50% Injectable 25 Gram(s) IV Push once  dextrose 50% Injectable 12.5 Gram(s) IV Push once  dextrose 50% Injectable 25 Gram(s) IV Push once  donepezil 5 milliGRAM(s) Oral at bedtime  epoetin graham-epbx (RETACRIT) Injectable 5000 Unit(s) IV Push <User Schedule>  glucagon  Injectable 1 milliGRAM(s) IntraMuscular once  heparin SubCutaneous Injection - Peds 5000 Unit(s) SubCutaneous every 12 hours  insulin lispro (ADMELOG) corrective regimen sliding scale   SubCutaneous three times a day before meals  levoFLOXacin IVPB 250 milliGRAM(s) IV Intermittent every 24 hours  meropenem  IVPB 500 milliGRAM(s) IV Intermittent every 24 hours  meropenem  IVPB      mirtazapine 15 milliGRAM(s) Oral at bedtime  multivitamin Oral Tab/Cap - Peds 1 Tablet(s) Oral daily  pantoprazole    Tablet 40 milliGRAM(s) Oral before breakfast  polyethylene glycol 3350 17 Gram(s) Oral two times a day  povidone iodine 10% Solution 1 Application(s) Topical two times a day  senna 2 Tablet(s) Oral at bedtime  sertraline 25 milliGRAM(s) Oral daily  tamsulosin 0.4 milliGRAM(s) Oral at bedtime  vancomycin  IVPB 1000 milliGRAM(s) IV Intermittent <User Schedule>    PRN Inpatient Medications  acetaminophen   Tablet .. 650 milliGRAM(s) Oral every 6 hours PRN  ALBUTerol  90 MICROgram(s) HFA Inhaler - Peds 2 Puff(s) Inhalation every 4 hours PRN    VITALS/PHYSICAL EXAM  --------------------------------------------------------------------------------  T(C): 36.9 (09-29-21 @ 07:54), Max: 37.5 (09-28-21 @ 15:43)  HR: 91 (09-29-21 @ 07:54) (85 - 91)  BP: 124/58 (09-29-21 @ 07:54) (124/58 - 157/70)  RR: 18 (09-29-21 @ 07:54) (18 - 19)  SpO2: 95% (09-29-21 @ 07:54) (95% - 95%)  Height (cm): 172.7 (09-28-21 @ 11:25)    09-28-21 @ 07:01  -  09-29-21 @ 07:00  --------------------------------------------------------  IN: 240 mL / OUT: 2000 mL / NET: -1760 mL    09-29-21 @ 07:01  -  09-29-21 @ 11:47  --------------------------------------------------------  IN: 360 mL / OUT: 0 mL / NET: 360 mL      Physical Exam:  	Gen: NAD  	Pulm: CTA B/L  	CV: RRR, S1S2  	Abd: +BS, soft, nontender/nondistended  	: No suprapubic tenderness  	LE: Warm, edema  	Vascular access: temp HD cath    LABS/STUDIES  --------------------------------------------------------------------------------              10.4   15.51 >-----------<  314      [09-28-21 @ 09:50]              33.9     133  |  95  |  96  ----------------------------<  130      [09-28-21 @ 09:50]  5.9   |  19  |  9.5        Ca     9.3     [09-28-21 @ 09:50]      Mg     2.0     [09-28-21 @ 09:50]    TPro  5.8  /  Alb  3.1  /  TBili  0.3  /  DBili  x   /  AST  24  /  ALT  14  /  AlkPhos  86  [09-28-21 @ 09:50]    Creatinine Trend:  SCr 9.5 [09-28 @ 09:50]  SCr 8.0 [09-27 @ 05:42]  SCr 7.0 [09-26 @ 06:23]  SCr 7.7 [09-25 @ 04:30]  SCr 7.1 [09-24 @ 04:30]    Urinalysis - [09-22-21 @ 02:00]      Color Yellow / Appearance Turbid / SG 1.015 / pH 6.5      Gluc Negative / Ketone Small  / Bili Negative / Urobili <2 mg/dL       Blood Large / Protein 300 mg/dL / Leuk Est Large / Nitrite Negative      RBC >720 / WBC >720 / Hyaline  / Gran  / Sq Epi  / Non Sq Epi  / Bacteria       Iron 10, TIBC 119, %sat 8      [09-22-21 @ 08:39]  Ferritin 938      [09-22-21 @ 08:39]  PTH -- (Ca 8.9)      [12-24-20 @ 04:30]   54  Lipid: chol 114, , HDL 38, LDL --      [08-27-21 @ 04:30]    HBsAg Nonreact      [09-22-21 @ 08:39]  HCV 0.17, Nonreact      [09-22-21 @ 08:39]

## 2021-09-29 NOTE — PROGRESS NOTE ADULT - ASSESSMENT
ESRD on HD   - access via TDC   - fluid removal during HD has been limited due to intradialytic hypotension  Bacteremia  UTI  COPD on home O2  HFrEF  dementia   HTN  anemia  right nephrectomy    plan:    Abx per ID  Tunneled catheter Friday  Continue midodrine  HD tomorrow: 3 hours, opti 160 dialyzer, 2K bath, 2L UF  EPO with HD  Renal diet

## 2021-09-29 NOTE — PROGRESS NOTE ADULT - SUBJECTIVE AND OBJECTIVE BOX
Patient is a 81y old  Male who presents with a chief complaint of Hypotension and Fever (29 Sep 2021 12:51)    Patient was seen and examined.    Denies any other complaints.  All systems reviewed positive history as mentioned above.    PAST MEDICAL & SURGICAL HISTORY:  Diabetes mellitus    COPD (chronic obstructive pulmonary disease)    Lymphedema of both lower extremities    CHF (congestive heart failure)    ESRD on dialysis    H/O right nephrectomy  20 yrs ago        Allergies    No Known Allergies    Intolerances      MEDICATIONS  (STANDING):  ascorbic acid 500 milliGRAM(s) Oral daily  aspirin enteric coated 81 milliGRAM(s) Oral daily  atorvastatin 40 milliGRAM(s) Oral at bedtime  budesonide  80 MICROgram(s)/formoterol 4.5 MICROgram(s) Inhaler 2 Puff(s) Inhalation two times a day  calcium acetate 667 milliGRAM(s) Oral three times a day with meals  chlorhexidine 4% Liquid 1 Application(s) Topical <User Schedule>  dextrose 40% Gel 15 Gram(s) Oral once  dextrose 5%. 1000 milliLiter(s) (50 mL/Hr) IV Continuous <Continuous>  dextrose 5%. 1000 milliLiter(s) (100 mL/Hr) IV Continuous <Continuous>  dextrose 50% Injectable 25 Gram(s) IV Push once  dextrose 50% Injectable 12.5 Gram(s) IV Push once  dextrose 50% Injectable 25 Gram(s) IV Push once  donepezil 5 milliGRAM(s) Oral at bedtime  epoetin graham-epbx (RETACRIT) Injectable 5000 Unit(s) IV Push <User Schedule>  glucagon  Injectable 1 milliGRAM(s) IntraMuscular once  heparin SubCutaneous Injection - Peds 5000 Unit(s) SubCutaneous every 12 hours  insulin lispro (ADMELOG) corrective regimen sliding scale   SubCutaneous three times a day before meals  levoFLOXacin IVPB 250 milliGRAM(s) IV Intermittent every 24 hours  meropenem  IVPB 500 milliGRAM(s) IV Intermittent every 24 hours  meropenem  IVPB      mirtazapine 15 milliGRAM(s) Oral at bedtime  multivitamin Oral Tab/Cap - Peds 1 Tablet(s) Oral daily  pantoprazole    Tablet 40 milliGRAM(s) Oral before breakfast  polyethylene glycol 3350 17 Gram(s) Oral two times a day  povidone iodine 10% Solution 1 Application(s) Topical two times a day  senna 2 Tablet(s) Oral at bedtime  sertraline 25 milliGRAM(s) Oral daily  tamsulosin 0.4 milliGRAM(s) Oral at bedtime  vancomycin  IVPB 1000 milliGRAM(s) IV Intermittent <User Schedule>    MEDICATIONS  (PRN):  acetaminophen   Tablet .. 650 milliGRAM(s) Oral every 6 hours PRN Temp greater or equal to 38C (100.4F), Mild Pain (1 - 3), Moderate Pain (4 - 6)  ALBUTerol  90 MICROgram(s) HFA Inhaler - Peds 2 Puff(s) Inhalation every 4 hours PRN Bronchospasm    Vital Signs Last 24 Hrs  T(C): 36.9  T(F): 98.5  HR: 91  BP: 124/58  BP(mean): --  RR: 18  SpO2: 97%  O/E:  Awake, alert, not in distress.  HEENT: atraumatic, EOMI.  Chest: clear.  CVS: SIS2 +, no murmur.  P/A: Soft, BS+  CNS: non focal.  Ext: no edema feet.  Skin: no rash, no ulcers.  All systems reviewed positive findings as above.  CAPILLARY BLOOD GLUCOSE      POCT Blood Glucose.: 197 mg/dL (29 Sep 2021 11:26)  POCT Blood Glucose.: 127 mg/dL (29 Sep 2021 07:23)  POCT Blood Glucose.: 127 mg/dL (28 Sep 2021 21:09)  POCT Blood Glucose.: 113 mg/dL (28 Sep 2021 16:29)  CAPILLARY BLOOD GLUCOSE      POCT Blood Glucose.: 197 mg/dL (29 Sep 2021 11:26)                          10.3<L>  15.16<H> )-----------( 311      ( 29 Sep 2021 11:31 )             34.7<L>                        10.4<L>  15.51<H> )-----------( 314      ( 28 Sep 2021 09:50 )             33.9<L>    09-29    138  |  100  |  54<H>  ----------------------------<  192<H>  5.1<H>   |  24  |  6.4<HH>  09-28    133<L>  |  95<L>  |  96<HH>  ----------------------------<  130<H>  5.9<H>   |  19  |  9.5<HH>    Ca    9.0      29 Sep 2021 11:31  Ca    9.3      28 Sep 2021 09:50  Mg     2.0     09-29    TPro  5.7<L>  /  Alb  3.2<L>  /  TBili  <0.2  /  DBili  x   /  AST  22  /  ALT  15  /  AlkPhos  82  09-29  TPro  5.8<L>  /  Alb  3.1<L>  /  TBili  0.3  /  DBili  x   /  AST  24  /  ALT  14  /  AlkPhos  86  09-28                Culture - Blood (collected 27 Sep 2021 18:27)  Source: .Blood None  Preliminary Report (28 Sep 2021 23:01):    No growth to date.          HPI:  History of Present Illness    Mr. Douglas is an 81 year old DNR/DNI male patient known to have:  - Alzheimer Dementia. From [facility]. Home med Donepezil 5mg QD, Sertraline 25mg QD, Mirtazepine 15mg QD  - ESRD and Anemia of chronic Disease. On HD every MWF via Tessio at [facility] complicated by hypotension s/p initiation of Midodrine 10mg in AM. Receives ROMELIA 5000 units every MWF with HD. Follows with Dr Saba and Dr Vasquez. Home med calcium acetate 667mg TID, Midodrine 10mg in AM  - COPD on 4LPM NC at NH. Home med Albuterol 2 puffs Q6h and Breo Ellipta 200-25mg QD  - DM Type II. Last Hba1c 5.0 07/13/2021. Not on medication/ Diet controlled  - HFrEF and severe AS. Last TTE 07/18/21 EF 40%, mild-mod MR, mod TR, severe AS (Area 0.58cm2), WMA. Home med Toprol 25mg QD  - BPH. Home med Tamsulosin 0.4mg QD  - Dyslipidemia. Last lipid profile 08/27/21 , Chol 114, LDL 52, HDL 38. Home med Atorvastatin 40mg QD, Aspirin 81mg QD  - Recent admission for multilobar pneumonia with effusion s/p palliative thoracocentesis and removal of 1L fluids with cytopathology being indicative of reactive effusion positive for CK7, CK5,6, calretin, and CD 68 s/p discharge on 09/02 on levaquin. During that admission, GOC were discussed and palliative was on board: daughter refused Hospice and opted to continued with HD sessions      History was obtained from the daughter (Ms Vidal) over the phone [phone] since patient seems lethargic and is not cooperative.  He was brought to the ED from [facility] by EMS on 09/21 for evaluation of hypotension and fever.  History goes back to Saturday when the patient spiked a fever of 100.1 degrees.  This was associated with lethargy, reduced PO intake, and confusion.  Daughter notes that patient was complaining of mild epigastric discomfort in the absence of any nausea, vomiting, shortness of breath, increased cough, increased oxygen requirements, change in bowel movements (diarrhea or constipation), or urinary symptoms (no much urine output at baseline).  Today, in the AM of 09/21, patient spiked a T of 100.5 at [facility] and was found to have a low SBP in 80's mmHg along with low pulse.  NH staff noted that patient has not voided since 06:00 AM.  EMS was contacted and patient was brought to the ED after the administration of 250 cc NS bolus.  No sick contacts.  No recent travel or exposure to recent travelers.      Upon presentation to the ED, the patient's Vital Signs in ED were as follows  - /75 mmHg --> dropped to 75/42 mmHg at 17:30 PM s/p 1L LR bolus in ED followed by Levophed initiation at a rate of 0.04 right now  -  bpm  - RR 18  bpm  - T 37.8  - SaO2 97% on RA      Investigations in ED   - CBC  --> WBC 14.19, Hb 11.8, Plt 287    - Chemistry  --> Na 131, K 4.8, BUN 51, Cr 6.0, AG 17, eGFR 8-9  --> VBG pH 7.25, pCO2 56, pO2 35, HCO3 25, %61.1, LA venous 1.7    - Imaging  --> Chest X Ray: CM, improved left sided effusion, improved interstitial opacification     - Microbiology  --> COVID received  --> Blood cultures x2 sets received  --> Urinalysis and urine culture pending collection (if possible)      In the setting of drop in BP from 133/75 to 75/42 mmHg at 17:30PM, 1L LR bolus was administered followed by levophed at 0.04.  Patient was given a dose of IV Cefepime 2g and IV Vancomycin 1g in ED  He will be admitted to the floor as a case of septic shock with Unclear Infectious Etiology for management with broad coverage antibiotics and pressors and for further investigations and monitoring.   (21 Sep 2021 20:17)   Patient is a 81y old  Male who presents with a chief complaint of Hypotension and Fever (29 Sep 2021 12:51)    Patient was seen and examined.  no new events    PAST MEDICAL & SURGICAL HISTORY:  Diabetes mellitus  COPD (chronic obstructive pulmonary disease)  Lymphedema of both lower extremities  CHF (congestive heart failure)  ESRD on dialysis  H/O right nephrectomy, 20 yrs ago    Allergies  No Known Allergies    MEDICATIONS  (STANDING):  ascorbic acid 500 milliGRAM(s) Oral daily  aspirin enteric coated 81 milliGRAM(s) Oral daily  atorvastatin 40 milliGRAM(s) Oral at bedtime  budesonide  80 MICROgram(s)/formoterol 4.5 MICROgram(s) Inhaler 2 Puff(s) Inhalation two times a day  calcium acetate 667 milliGRAM(s) Oral three times a day with meals  donepezil 5 milliGRAM(s) Oral at bedtime  epoetin graham-epbx (RETACRIT) Injectable 5000 Unit(s) IV Push <User Schedule>  glucagon  Injectable 1 milliGRAM(s) IntraMuscular once  heparin SubCutaneous Injection - Peds 5000 Unit(s) SubCutaneous every 12 hours  insulin lispro (ADMELOG) corrective regimen sliding scale   SubCutaneous three times a day before meals  levoFLOXacin IVPB 250 milliGRAM(s) IV Intermittent every 24 hours  meropenem  IVPB 500 milliGRAM(s) IV Intermittent every 24 hours  meropenem  IVPB      mirtazapine 15 milliGRAM(s) Oral at bedtime  multivitamin Oral Tab/Cap - Peds 1 Tablet(s) Oral daily  pantoprazole    Tablet 40 milliGRAM(s) Oral before breakfast  polyethylene glycol 3350 17 Gram(s) Oral two times a day  povidone iodine 10% Solution 1 Application(s) Topical two times a day  senna 2 Tablet(s) Oral at bedtime  sertraline 25 milliGRAM(s) Oral daily  tamsulosin 0.4 milliGRAM(s) Oral at bedtime  vancomycin  IVPB 1000 milliGRAM(s) IV Intermittent <User Schedule>    MEDICATIONS  (PRN):  acetaminophen   Tablet .. 650 milliGRAM(s) Oral every 6 hours PRN Temp greater or equal to 38C (100.4F), Mild Pain (1 - 3), Moderate Pain (4 - 6)  ALBUTerol  90 MICROgram(s) HFA Inhaler - Peds 2 Puff(s) Inhalation every 4 hours PRN Bronchospasm    Vital Signs Last 24 Hrs  T(C): 36.9  T(F): 98.5  HR: 91  BP: 124/58  BP(mean): --  RR: 18  SpO2: 97%    O/E:  Awake, alert, not in distress.  HEENT: atraumatic, EOMI.  Chest: decreased breath sounds at bases  CVS: SIS2 +, no murmur.  P/A: Soft, BS+  CNS: awake , alert  Ext: lower ext edema+  All systems reviewed positive findings as above.    POCT Blood Glucose.: 197 mg/dL (29 Sep 2021 11:26)  POCT Blood Glucose.: 127 mg/dL (29 Sep 2021 07:23)  POCT Blood Glucose.: 127 mg/dL (28 Sep 2021 21:09)  POCT Blood Glucose.: 113 mg/dL (28 Sep 2021 16:29)                            10.3<L>  15.16<H> )-----------( 311      ( 29 Sep 2021 11:31 )             34.7<L>                        10.4<L>  15.51<H> )-----------( 314      ( 28 Sep 2021 09:50 )             33.9<L>    09-29    138  |  100  |  54<H>  ----------------------------<  192<H>  5.1<H>   |  24  |  6.4<HH>  09-28    133<L>  |  95<L>  |  96<HH>  ----------------------------<  130<H>  5.9<H>   |  19  |  9.5<HH>    Ca    9.0      29 Sep 2021 11:31  Ca    9.3      28 Sep 2021 09:50  Mg     2.0     09-29    TPro  5.7<L>  /  Alb  3.2<L>  /  TBili  <0.2  /  DBili  x   /  AST  22  /  ALT  15  /  AlkPhos  82  09-29  TPro  5.8<L>  /  Alb  3.1<L>  /  TBili  0.3  /  DBili  x   /  AST  24  /  ALT  14  /  AlkPhos  86  09-28                Culture - Blood (collected 27 Sep 2021 18:27)  Source: .Blood None  Preliminary Report (28 Sep 2021 23:01):    No growth to date.

## 2021-09-29 NOTE — PROGRESS NOTE ADULT - ASSESSMENT
Mr. Douglas is an 81 year old DNR/DNI male patient known to have: Alzheimer Dementia, ESRD and Anemia of chronic Disease. On HD every MWF via Tessio at [facility], COPD on 4LPM NC at NH, DM Type II, HFrEF and severe AS, BPH, Dyslipidemia.   He was brought to the ED from [facility] by EMS on 09/21 for evaluation of hypotension and fever.  This was associated with lethargy, reduced PO intake, and confusion.    # Metabolic encephalopathy resolved  # Septic Shock sec to ORSE, bacteremia legionella pneumonia and proteus UTI  -  CT Abdomen and Pelvis w/ IV Cont (09.22.21 @ 13:24) >Findings compatible with acute cystitis and left-sided ascending urinary tract infection; partially distended urinary bladder despite presence of a Laura catheter. Large diffuse colonic stool burden with moderately distended rectum and mild presacral edema. Incompletely characterized cystic lesion in the pancreatic head. This can be further evaluated with an outpatient contrast-enhanced MRI/MRCP.  - blood Culture Results: Growth in aerobic and anaerobic bottles: Staphylococcus epidermidis (09.21.21 @ 16:55)  - blood Culture Results: No growth to date. (09.27.21 @ 18:27)  - urine Culture Results: >100,000 CFU/ml Proteus mirabilis ESBL (09.21.21 @ 23:20)  - Legionella Antigen, Urine: Positive (09.22.21 @ 02:00)  - TTE Echo Complete w/o Contrast w/ Doppler (09.24.21 @ 08:30) >Severe aortic valve stenosis.no vegetations  - 9/24 Tesio removed  - ID eval: Meropenem 500 mg iv q24h for 7 days starting 9/27, Vancomycin 1 gm iv post HD  for 4 more dosis starting 9/27, Levo 250 mg iv q24h. Could change to po 250 mg q24h on discharge for 14 days starting 9/23    # Left heel ulcer  # PAD  - VA Duplex Lower Extrem Arterial, Bilat (09.24.21 @ 18:35) >Bilateral moderate to severe atherosclerotic disease. The left posterior tibial and peroneal arteries are occluded.  - Xray Foot AP + Lateral, Left (09.24.21 @ 18:08) >Diffuse osteopenia. Plantar heel ulcer with suggestion of loss of cortical distinctiveness at the plantar calcaneus, suggestive of osteomyelitis. Degenerative changes of the hindfoot, midfoot and forefoot joints. Vascular calcifications.  - Vascular eval: Pt should follow up with Dr. Rossi as outpt in 1-2 weeks to re-evaluate left foot, PAD, possible angiogram  - Podiatry F/u : [facility]; Wound Flushed w/ NS; Wound Packed w/ santyl / DSD / Kerlix . No intervention from Podiatry  - Cont LWC; Q24. Pt can f/u with Dr Ragland at [address] upon d/c  - c/w ASA, statin    # ESRD on HD   # Hyperkalemia  # HAGMA-resolved  #Hyponatremia - resolved  # Anemia sec to CKD  - c/w EPO with HD  - c/w midodrine  - c/w phoslo  - Scheduled for insertion of TDC Friday with Dr. Alamo    # DM type 2  - A1C with Estimated Average Glucose Result: 5.2% (09.22.21 @ 08:39)  - c/w  lispro    # Chronic hypoxic respiratory failure - stable  - c/w budesonide, albuterol    # Chronic systolic CHF  # Severe AS    # H/o BPH  -c/w flomax    # Alzheimer's dementia  -c/w donepezil, sertraline, mirtazepine     # Constipation  -  Xray Kidney Ureter Bladder (09.29.21 @ 06:29) >Nonobstructive bowel gas pattern. Mild to moderate colonic stool.  - c/w miralax, senna    # DVT prophylaxis    # DNR/DNI    # Pending:  Scheduled for insertion of TDC Friday   # Disposition: jc ESPINO when stable

## 2021-09-30 NOTE — PROGRESS NOTE ADULT - SUBJECTIVE AND OBJECTIVE BOX
Patient is a 81y old  Male who presents with a chief complaint of Hypotension and Fever (29 Sep 2021 12:51)    Patient was seen and examined.  C/o sob today  Planned for dialysis today    PAST MEDICAL & SURGICAL HISTORY:  Diabetes mellitus  COPD (chronic obstructive pulmonary disease)  Lymphedema of both lower extremities  CHF (congestive heart failure)  ESRD on dialysis  H/O right nephrectomy, 20 yrs ago    Allergies  No Known Allergies    MEDICATIONS  (STANDING):  ascorbic acid 500 milliGRAM(s) Oral daily  aspirin enteric coated 81 milliGRAM(s) Oral daily  atorvastatin 40 milliGRAM(s) Oral at bedtime  budesonide  80 MICROgram(s)/formoterol 4.5 MICROgram(s) Inhaler 2 Puff(s) Inhalation two times a day  calcium acetate 667 milliGRAM(s) Oral three times a day with meals  donepezil 5 milliGRAM(s) Oral at bedtime  epoetin graham-epbx (RETACRIT) Injectable 5000 Unit(s) IV Push <User Schedule>  glucagon  Injectable 1 milliGRAM(s) IntraMuscular once  heparin SubCutaneous Injection - Peds 5000 Unit(s) SubCutaneous every 12 hours  insulin lispro (ADMELOG) corrective regimen sliding scale   SubCutaneous three times a day before meals  levoFLOXacin IVPB 250 milliGRAM(s) IV Intermittent every 24 hours  meropenem  IVPB 500 milliGRAM(s) IV Intermittent every 24 hours  meropenem  IVPB      mirtazapine 15 milliGRAM(s) Oral at bedtime  multivitamin Oral Tab/Cap - Peds 1 Tablet(s) Oral daily  pantoprazole    Tablet 40 milliGRAM(s) Oral before breakfast  polyethylene glycol 3350 17 Gram(s) Oral two times a day  povidone iodine 10% Solution 1 Application(s) Topical two times a day  senna 2 Tablet(s) Oral at bedtime  sertraline 25 milliGRAM(s) Oral daily  tamsulosin 0.4 milliGRAM(s) Oral at bedtime  vancomycin  IVPB 1000 milliGRAM(s) IV Intermittent <User Schedule>    MEDICATIONS  (PRN):  acetaminophen   Tablet .. 650 milliGRAM(s) Oral every 6 hours PRN Temp greater or equal to 38C (100.4F), Mild Pain (1 - 3), Moderate Pain (4 - 6)  ALBUTerol  90 MICROgram(s) HFA Inhaler - Peds 2 Puff(s) Inhalation every 4 hours PRN Bronchospasm    T(C): 36.7 (09-30-21 @ 08:00), Max: 36.7 (09-30-21 @ 08:00)  HR: 76 (09-30-21 @ 11:30) (70 - 88)  BP: 146/74 (09-30-21 @ 11:30) (144/67 - 176/79)  RR: 20 (09-30-21 @ 11:30) (18 - 22)  SpO2: 98% (09-30-21 @ 00:00) (97% - 98%)    O/E:  Awake, alert, not in distress.  HEENT: atraumatic, EOMI.  Chest: decreased breath sounds at bases  CVS: SIS2 +, no murmur.  P/A: Soft, BS+  CNS: awake , alert  Ext: lower ext edema+  All systems reviewed positive findings as above.                            10.9<L>  17.64<H> )-----------( 314      ( 30 Sep 2021 05:29 )             36.7<L>                        10.3<L>  15.16<H> )-----------( 311      ( 29 Sep 2021 11:31 )             34.7<L>  09-30    140  |  101  |  67<HH>  ----------------------------<  95  5.9<H>   |  21  |  8.3<HH>  09-29    138  |  100  |  54<H>  ----------------------------<  192<H>  5.1<H>   |  24  |  6.4<HH>    Ca    9.3      30 Sep 2021 05:29  Ca    9.0      29 Sep 2021 11:31  Mg     2.0     09-30    TPro  6.0  /  Alb  3.2<L>  /  TBili  <0.2  /  DBili  x   /  AST  23  /  ALT  14  /  AlkPhos  86  09-30  TPro  5.7<L>  /  Alb  3.2<L>  /  TBili  <0.2  /  DBili  x   /  AST  22  /  ALT  15  /  AlkPhos  82  09-29

## 2021-09-30 NOTE — PROGRESS NOTE ADULT - SUBJECTIVE AND OBJECTIVE BOX
[facility] FOLLOW UP NOTE  --------------------------------------------------------------------------------  24 hour events/subjective: Patient examined during HD. Appears comfortable.    PAST HISTORY  --------------------------------------------------------------------------------  No significant changes to PMH, PSH, FHx, SHx, unless otherwise noted    ALLERGIES & MEDICATIONS  --------------------------------------------------------------------------------  Allergies    No Known Allergies    Standing Inpatient Medications  ascorbic acid 500 milliGRAM(s) Oral daily  aspirin enteric coated 81 milliGRAM(s) Oral daily  atorvastatin 40 milliGRAM(s) Oral at bedtime  budesonide  80 MICROgram(s)/formoterol 4.5 MICROgram(s) Inhaler 2 Puff(s) Inhalation two times a day  calcium acetate 667 milliGRAM(s) Oral three times a day with meals  chlorhexidine 4% Liquid 1 Application(s) Topical <User Schedule>  dextrose 40% Gel 15 Gram(s) Oral once  dextrose 5%. 1000 milliLiter(s) IV Continuous <Continuous>  dextrose 5%. 1000 milliLiter(s) IV Continuous <Continuous>  dextrose 50% Injectable 25 Gram(s) IV Push once  dextrose 50% Injectable 12.5 Gram(s) IV Push once  dextrose 50% Injectable 25 Gram(s) IV Push once  donepezil 5 milliGRAM(s) Oral at bedtime  epoetin graham-epbx (RETACRIT) Injectable 5000 Unit(s) IV Push <User Schedule>  glucagon  Injectable 1 milliGRAM(s) IntraMuscular once  heparin SubCutaneous Injection - Peds 5000 Unit(s) SubCutaneous every 12 hours  insulin lispro (ADMELOG) corrective regimen sliding scale   SubCutaneous three times a day before meals  levoFLOXacin IVPB 250 milliGRAM(s) IV Intermittent every 24 hours  meropenem  IVPB 500 milliGRAM(s) IV Intermittent every 24 hours  meropenem  IVPB      mirtazapine 15 milliGRAM(s) Oral at bedtime  multivitamin Oral Tab/Cap - Peds 1 Tablet(s) Oral daily  pantoprazole    Tablet 40 milliGRAM(s) Oral before breakfast  polyethylene glycol 3350 17 Gram(s) Oral two times a day  povidone iodine 10% Solution 1 Application(s) Topical two times a day  senna 2 Tablet(s) Oral at bedtime  sertraline 25 milliGRAM(s) Oral daily  tamsulosin 0.4 milliGRAM(s) Oral at bedtime  vancomycin  IVPB 1000 milliGRAM(s) IV Intermittent <User Schedule>    PRN Inpatient Medications  acetaminophen   Tablet  650 milliGRAM(s) Oral every 6 hours PRN  ALBUTerol  90 MICROgram(s) HFA Inhaler - Peds 2 Puff(s) Inhalation every 4 hours PRN    VITALS/PHYSICAL EXAM  --------------------------------------------------------------------------------  T(C): 36.7 (09-30-21 @ 08:00), Max: 36.7 (09-30-21 @ 08:00)  HR: 76 (09-30-21 @ 11:30) (70 - 88)  BP: 146/74 (09-30-21 @ 11:30) (144/67 - 176/79)  RR: 20 (09-30-21 @ 11:30) (18 - 22)  SpO2: 98% (09-30-21 @ 00:00) (97% - 98%)    09-29-21 @ 07:01  -  09-30-21 @ 07:00  --------------------------------------------------------  IN: 720 mL / OUT: 25 mL / NET: 695 mL    09-30-21 @ 07:01  -  09-30-21 @ 12:01  --------------------------------------------------------  IN: 300 mL / OUT: 2500 mL / NET: -2200 mL    Physical Exam:  	Gen: NAD  	Pulm: CTA B/L  	CV: RRR, S1S2  	Abd: +BS, soft, nontender/nondistended  	: No suprapubic tenderness  	LE: Warm, no edema    LABS/STUDIES  --------------------------------------------------------------------------------              10.9   17.64 >-----------<  314      [09-30-21 @ 05:29]              36.7     140  |  101  |  67  ----------------------------<  95      [09-30-21 @ 05:29]  5.9   |  21  |  8.3        Ca     9.3     [09-30-21 @ 05:29]      Mg     2.0     [09-30-21 @ 05:29]    TPro  6.0  /  Alb  3.2  /  TBili  <0.2  /  DBili  x   /  AST  23  /  ALT  14  /  AlkPhos  86  [09-30-21 @ 05:29]    PT/INR: PT 12.60, INR 1.10       [09-30-21 @ 05:29]  PTT: 30.9       [09-30-21 @ 05:29]    Creatinine Trend:  SCr 8.3 [09-30 @ 05:29]  SCr 6.4 [09-29 @ 11:31]  SCr 9.5 [09-28 @ 09:50]  SCr 8.0 [09-27 @ 05:42]  SCr 7.0 [09-26 @ 06:23]    Urinalysis - [09-22-21 @ 02:00]      Color Yellow / Appearance Turbid / SG 1.015 / pH 6.5      Gluc Negative / Ketone Small  / Bili Negative / Urobili <2 mg/dL       Blood Large / Protein 300 mg/dL / Leuk Est Large / Nitrite Negative      RBC >720 / WBC >720 / Hyaline  / Gran  / Sq Epi  / Non Sq Epi  / Bacteria     Iron 10, TIBC 119, %sat 8      [09-22-21 @ 08:39]  Ferritin 938      [09-22-21 @ 08:39]  PTH -- (Ca 8.9)      [12-24-20 @ 04:30]   54  Lipid: chol 114, , HDL 38, LDL --      [08-27-21 @ 04:30]    HBsAg Nonreact      [09-22-21 @ 08:39]  HCV 0.17, Nonreact      [09-22-21 @ 08:39]

## 2021-09-30 NOTE — PROGRESS NOTE ADULT - ASSESSMENT
ESRD on HD   - access via TDC   - fluid removal during HD has been limited due to intradialytic hypotension  Bacteremia  UTI  COPD on home O2  HFrEF  dementia   HTN  anemia  right nephrectomy    plan:    Abx per ID  Tunneled catheter Friday  Continue midodrine  HD today: 3 hours, opti 160 dialyzer, 2K bath, 2L UF  EPO with HD  Renal diet

## 2021-09-30 NOTE — PRE-ANESTHESIA EVALUATION ADULT - NSANTHPMHFT_GEN_ALL_CORE
Mr. Douglas is an 81 year old DNR/DNI male patient known to have:  - Alzheimer Dementia.   - ESRD and Anemia of chronic Dis/ease. On HD every MWF   - COPD on 4LPM NC at NH.   - DM Type II.   - HFrEF and severe AS. Last TTE 9/24/21 EF 45%, mild-mod MR, mod TR, severe AS (Area 0.58cm2),   - BPH  - Dyslipidemia  - Recent admission for multilobar pneumonia with effusion s/p palliative thoracocentesis and removal of 1L fluids with cytopathology being indicative of reactive effusion positive for CK7,

## 2021-09-30 NOTE — CHART NOTE - NSCHARTNOTEFT_GEN_A_CORE
Vascular Surgery Pre-op Note    Patient is a 81y old  Male who presents with a chief complaint of Hypotension and Fever (29 Sep 2021 15:32)      Procedure: insertion of TDC  Surgeon: Dr. Alamo    Vitals/Labs:  Vital Signs Last 24 Hrs  T(C): 36.7 (30 Sep 2021 08:00), Max: 36.7 (30 Sep 2021 08:00)  T(F): 98 (30 Sep 2021 08:00), Max: 98 (30 Sep 2021 08:00)  HR: 70 (30 Sep 2021 08:30) (70 - 88)  BP: 176/79 (30 Sep 2021 08:30) (144/67 - 176/79)  BP(mean): --  RR: 20 (30 Sep 2021 08:30) (18 - 22)  SpO2: 98% (30 Sep 2021 00:00) (97% - 98%)    I&O's Detail    29 Sep 2021 07:01  -  30 Sep 2021 07:00  --------------------------------------------------------  IN:    Oral Fluid: 720 mL  Total IN: 720 mL    OUT:    Voided (mL): 25 mL  Total OUT: 25 mL    Total NET: 695 mL                                10.9   17.64 )-----------( 314      ( 30 Sep 2021 05:29 )             36.7       09-30    140  |  101  |  67<HH>  ----------------------------<  95  5.9<H>   |  21  |  8.3<HH>    Ca    9.3      30 Sep 2021 05:29  Mg     2.0     09-30    TPro  6.0  /  Alb  3.2<L>  /  TBili  <0.2  /  DBili  x   /  AST  23  /  ALT  14  /  AlkPhos  86  09-30      CAPILLARY BLOOD GLUCOSE      POCT Blood Glucose.: 102 mg/dL (30 Sep 2021 07:50)  POCT Blood Glucose.: 97 mg/dL (29 Sep 2021 22:00)  POCT Blood Glucose.: 226 mg/dL (29 Sep 2021 16:37)  POCT Blood Glucose.: 197 mg/dL (29 Sep 2021 11:26)      LIVER FUNCTIONS - ( 30 Sep 2021 05:29 )  Alb: 3.2 g/dL / Pro: 6.0 g/dL / ALK PHOS: 86 U/L / ALT: 14 U/L / AST: 23 U/L / GGT: x             PT/INR - ( 30 Sep 2021 05:29 )   PT: 12.60 sec;   INR: 1.10 ratio         PTT - ( 30 Sep 2021 05:29 )  PTT:30.9 sec        T&S:     < from: Xray Chest 1 View- PORTABLE-Routine (Xray Chest 1 View- PORTABLE-Routine in AM.) (21 @ 05:38) >    Interval removal of the dialysis catheter. No pneumothorax. Unchanged left basal opacities.    Stable cardiomediastinal silhouette. Unchanged osseous structures.      EK/19    Assessment & Plan:  ISMAIL KRYSTINAJ 81y Male    - NPO after midnight  - DVT ppx  -  f/u 16: 00 BMP post HD     MEDICATIONS  (STANDING):  ascorbic acid 500 milliGRAM(s) Oral daily  aspirin enteric coated 81 milliGRAM(s) Oral daily  atorvastatin 40 milliGRAM(s) Oral at bedtime  budesonide  80 MICROgram(s)/formoterol 4.5 MICROgram(s) Inhaler 2 Puff(s) Inhalation two times a day  calcium acetate 667 milliGRAM(s) Oral three times a day with meals  chlorhexidine 4% Liquid 1 Application(s) Topical <User Schedule>  dextrose 40% Gel 15 Gram(s) Oral once  dextrose 5%. 1000 milliLiter(s) (50 mL/Hr) IV Continuous <Continuous>  dextrose 5%. 1000 milliLiter(s) (100 mL/Hr) IV Continuous <Continuous>  dextrose 50% Injectable 25 Gram(s) IV Push once  dextrose 50% Injectable 12.5 Gram(s) IV Push once  dextrose 50% Injectable 25 Gram(s) IV Push once  donepezil 5 milliGRAM(s) Oral at bedtime  epoetin graham-epbx (RETACRIT) Injectable 5000 Unit(s) IV Push <User Schedule>  glucagon  Injectable 1 milliGRAM(s) IntraMuscular once  heparin SubCutaneous Injection - Peds 5000 Unit(s) SubCutaneous every 12 hours  insulin lispro (ADMELOG) corrective regimen sliding scale   SubCutaneous three times a day before meals  levoFLOXacin IVPB 250 milliGRAM(s) IV Intermittent every 24 hours  meropenem  IVPB 500 milliGRAM(s) IV Intermittent every 24 hours  meropenem  IVPB      mirtazapine 15 milliGRAM(s) Oral at bedtime  multivitamin Oral Tab/Cap - Peds 1 Tablet(s) Oral daily  pantoprazole    Tablet 40 milliGRAM(s) Oral before breakfast  polyethylene glycol 3350 17 Gram(s) Oral two times a day  povidone iodine 10% Solution 1 Application(s) Topical two times a day  senna 2 Tablet(s) Oral at bedtime  sertraline 25 milliGRAM(s) Oral daily  tamsulosin 0.4 milliGRAM(s) Oral at bedtime  vancomycin  IVPB 1000 milliGRAM(s) IV Intermittent <User Schedule>    MEDICATIONS  (PRN):  acetaminophen   Tablet .. 650 milliGRAM(s) Oral every 6 hours PRN Temp greater or equal to 38C (100.4F), Mild Pain (1 - 3), Moderate Pain (4 - 6)  ALBUTerol  90 MICROgram(s) HFA Inhaler - Peds 2 Puff(s) Inhalation every 4 hours PRN Bronchospasm

## 2021-09-30 NOTE — PROGRESS NOTE ADULT - ASSESSMENT
82 y/o gentleman from [facility] with a past medical history of Alzheimer's dementia, ESRD on HD (oliguric), Anemia of chronic disease, COPD on 4L O2, DM II, Hfw/REF and Severe AS, HLD, and recently discharge for multi-lobar pneumonia admitted for septic shock.    #Septic Shock due to Legionella PNA and Proteus UTI, shock resolved  Metabolic Encephalopathy, improved  Ervin pus in maldonado, remains on low dose levophed, Ucx with proteus, pending sensitivities  < from: CT Abdomen and Pelvis w/ IV Cont (09.22.21 @ 13:24) >  .  Findings compatible with acute cystitis and left-sided ascending urinary tract infection; partially distended urinary bladder despite presence of a Maldonado catheter. Correlate clinically for catheter function.  2.  Large diffuse colonic stool burden with moderately distended rectum and mild presacral edema.  3.  Incompletely characterized cystic lesion in the pancreatic head. This can be further evaluated with an outpatient contrast-enhanced MRI/MRCP.  9/21 Blood cx : staph epi  Urine Legionella +  ID following, recs meropenem 500 q24 7 days (9/27-10/3), vanc 1g IV post HD 4 more doses (9/27), Levo 250mg IV q24h for 14 days (9/23 start)  - may need midline on dc for abx (ertapenem)  tesio removed per ID recs, Vascular placed udall for HD (femoral)  2d echo EF 45-50%, severe AS  repeat blood cx  Miralax Q12H and senna; mineral oil enema today  - maldonado removed due to pus, TOV performed  - KUB shows moderate stool burden, continue w/ bowel regimen   - Repeat KUB ordered for 9/29 AM, low stool burden    #Chronic hypoxic respiratory failure   #COPD on 4L home O2  #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     #Left heel ulcer  wound care following; podiatry following  - podiatry recs- studies done. ESR-29 CRP- 67  - B/L LE arterial duplex- Bilateral moderate to severe atherosclerotic disease. The left posterior tibial and peroneal arteries are occluded. (Vascular surgery notifited)  - XRay: Diffuse osteopenia. Plantar heel ulcer with suggestion of loss of cortical distinctiveness at the plantar calcaneus, suggestive of osteomyelitis. Degenerative changes of the hindfoot, midfoot and forefoot joints. Vascular calcifications.  - Podiatry- will follow up outpatient with Dr. Ragland  - Vascular- Scheduled for removal of femoral udall insertion of TDC Friday with Dr. Alamo. Pt should follow up with Dr. Rossi as outpt in 1-2 weeks to re-evaluate left foot, PAD, possible angiogram  - Wound care- betadine    #ESRD on HD (oliguric):  #HAGMA  #Hyponatremia   -HD today; will receive vanc and epo  -nephro following  -Cont ca-acetate and midodrine    #Alzheimer's dementia  -as per family, patient's mentation improved   -Cont donepezil, sertraline, and mirtazepine     #Progress Note Handoff  Pending (specify): New Tesio  Disposition:  from NH    Handoff: vascular placement of new tesio on Friday, addressing angiogram needs outpt. Podiatry F/U outpatient. On Abx, D/c with midline if needed

## 2021-09-30 NOTE — PROGRESS NOTE ADULT - SUBJECTIVE AND OBJECTIVE BOX
DRAKE HO 81y Male  MRN#: [id number]   CODE STATUS: DNR/DNI  Hospital Day: 9d    Pt is currently admitted with the primary diagnosis of hypotension and fever    SUBJECTIVE  Hospital Course  82 y/o gentleman with a past medical history of Alzheimer's dementia, ESRD on HD (oliguric), Anemia of chronic disease, COPD on 4L O2, DM II, Hfw/REF and Severe AS, HLD, and recently discharge for multi-lobar pneumonia admitted for septic shock from [facility]. Began having fevers on Sunday and complaining of abdominal pain, without any other significant symptoms (n/v/d, SOB, CP, cough). Today he was noted to be febrile and hypotensive, and anuric.   Still found to be hypotensive in the ED after 1L IVF and started on peripheral levophed, WBC 14, CXR appears improved from last on 9/2.    Overnight events   None    Subjective complaints   Patient calmer today. Denied headache, dizziness, chest pain, SOB, abd pain, nausea, vomiting. Was coughing during exam    Present Today:   - Laura:  No [  ], Yes [   ] : Indication:     - Type of IV Access:       .. CVC/Piccline:  No [  ], Yes [   ] : Indication:       .. Midline: No [  ], Yes [   ] : Indication:                               OBJECTIVE  PAST MEDICAL & SURGICAL HISTORY  Diabetes mellitus    COPD (chronic obstructive pulmonary disease)    Lymphedema of both lower extremities    CHF (congestive heart failure)    ESRD on dialysis    H/O right nephrectomy  20 yrs ago                                             ALLERGIES:  No Known Allergies                                        HOME MEDICATIONS  Home Medications:  albuterol 90 mcg/inh inhalation aerosol: 2 puff(s) inhaled every 6 hours (20 Aug 2021 09:36)  ascorbic acid 500 mg oral tablet: 1 tab(s) orally once a day (20 Aug 2021 09:36)  aspirin 81 mg oral delayed release tablet: 1 tab(s) orally once a day (20 Aug 2021 09:36)  atorvastatin 40 mg oral tablet: 1 tab(s) orally once a day (at bedtime) (20 Aug 2021 09:36)  Breo Ellipta 200 mcg-25 mcg/inh inhalation powder: 1 puff(s) inhaled once a day (26 Jul 2021 10:25)  calcium acetate 667 mg oral tablet: 1 tab(s) orally 3 times a day (with meals) (20 Aug 2021 13:54)  donepezil 5 mg oral tablet: 1 tab(s) orally once a day (at bedtime) (20 Aug 2021 09:36)  epoetin graham: 5000 unit(s) injectable Monday, Wednesday, and Friday (20 Aug 2021 11:05)  heparin: 5000 unit(s) subcutaneous every 12 hours (01 Sep 2021 13:34)  HYDROmorphone 2 mg oral tablet: 1 tab(s) orally every 4 hours, As needed, Moderate Pain (4 - 6) (01 Sep 2021 12:01)  midodrine 10 mg oral tablet: 1 tab(s) orally  (01 Sep 2021 12:01)  mirtazapine 15 mg oral tablet: 1 tab(s) orally once a day (at bedtime) (20 Aug 2021 09:36)  multivitamin: 1 tab(s) orally once a day (20 Aug 2021 13:54)  pantoprazole 40 mg oral delayed release tablet: 1 tab(s) orally once a day (before a meal) (20 Aug 2021 09:36)  povidone iodine 10% topical ointment: 1 application topically  (01 Sep 2021 12:01)  Senna 8.6 mg oral tablet: 2 tab(s) orally once a day (at bedtime), As Needed (26 Jul 2021 10:25)  sertraline 25 mg oral tablet: 1 tab(s) orally once a day (20 Aug 2021 09:36)  tamsulosin 0.4 mg oral capsule: 1 cap(s) orally once a day (at bedtime) (20 Aug 2021 09:36)  Toprol-XL 25 mg oral tablet, extended release: 1 tab(s) orally once a day (at bedtime) (01 Sep 2021 12:01)                           MEDICATIONS:  STANDING MEDICATIONS  ascorbic acid 500 milliGRAM(s) Oral daily  aspirin enteric coated 81 milliGRAM(s) Oral daily  atorvastatin 40 milliGRAM(s) Oral at bedtime  budesonide  80 MICROgram(s)/formoterol 4.5 MICROgram(s) Inhaler 2 Puff(s) Inhalation two times a day  calcium acetate 667 milliGRAM(s) Oral three times a day with meals  chlorhexidine 4% Liquid 1 Application(s) Topical <User Schedule>  dextrose 40% Gel 15 Gram(s) Oral once  dextrose 5%. 1000 milliLiter(s) IV Continuous <Continuous>  dextrose 5%. 1000 milliLiter(s) IV Continuous <Continuous>  dextrose 50% Injectable 25 Gram(s) IV Push once  dextrose 50% Injectable 12.5 Gram(s) IV Push once  dextrose 50% Injectable 25 Gram(s) IV Push once  donepezil 5 milliGRAM(s) Oral at bedtime  epoetin graham-epbx (RETACRIT) Injectable 5000 Unit(s) IV Push <User Schedule>  glucagon  Injectable 1 milliGRAM(s) IntraMuscular once  heparin SubCutaneous Injection - Peds 5000 Unit(s) SubCutaneous every 12 hours  insulin lispro (ADMELOG) corrective regimen sliding scale   SubCutaneous three times a day before meals  levoFLOXacin IVPB 250 milliGRAM(s) IV Intermittent every 24 hours  meropenem  IVPB 500 milliGRAM(s) IV Intermittent every 24 hours  meropenem  IVPB      mirtazapine 15 milliGRAM(s) Oral at bedtime  multivitamin Oral Tab/Cap - Peds 1 Tablet(s) Oral daily  pantoprazole    Tablet 40 milliGRAM(s) Oral before breakfast  polyethylene glycol 3350 17 Gram(s) Oral two times a day  povidone iodine 10% Solution 1 Application(s) Topical two times a day  senna 2 Tablet(s) Oral at bedtime  sertraline 25 milliGRAM(s) Oral daily  tamsulosin 0.4 milliGRAM(s) Oral at bedtime  vancomycin  IVPB 1000 milliGRAM(s) IV Intermittent <User Schedule>    PRN MEDICATIONS  acetaminophen   Tablet .. 650 milliGRAM(s) Oral every 6 hours PRN  ALBUTerol  90 MICROgram(s) HFA Inhaler - Peds 2 Puff(s) Inhalation every 4 hours PRN                                            VITAL SIGNS: Last 24 Hours  T(C): 36.7 (30 Sep 2021 08:00), Max: 36.7 (30 Sep 2021 08:00)  T(F): 98 (30 Sep 2021 08:00), Max: 98 (30 Sep 2021 08:00)  HR: 70 (30 Sep 2021 08:30) (70 - 88)  BP: 176/79 (30 Sep 2021 08:30) (144/67 - 176/79)  BP(mean): --  RR: 20 (30 Sep 2021 11:30) (18 - 22)  SpO2: 98% (30 Sep 2021 00:00) (97% - 98%)      09-29-21 @ 07:01  -  09-30-21 @ 07:00  --------------------------------------------------------  IN: 720 mL / OUT: 25 mL / NET: 695 mL    09-30-21 @ 07:01  -  09-30-21 @ 11:09  --------------------------------------------------------  IN: 300 mL / OUT: 0 mL / NET: 300 mL                                               LABS:                        10.9   17.64 )-----------( 314      ( 30 Sep 2021 05:29 )             36.7     09-30    140  |  101  |  67<HH>  ----------------------------<  95  5.9<H>   |  21  |  8.3<HH>    Ca    9.3      30 Sep 2021 05:29  Mg     2.0     09-30    TPro  6.0  /  Alb  3.2<L>  /  TBili  <0.2  /  DBili  x   /  AST  23  /  ALT  14  /  AlkPhos  86  09-30    PT/INR - ( 30 Sep 2021 05:29 )   PT: 12.60 sec;   INR: 1.10 ratio         PTT - ( 30 Sep 2021 05:29 )  PTT:30.9 sec            Culture - Blood (collected 27 Sep 2021 18:27)  Source: .Blood None  Preliminary Report (28 Sep 2021 23:01):    No growth to date.                                                    -------------------------------------------------------------  RADIOLOGY:                                            --------------------------------------------------------------    PHYSICAL EXAM:  General: NAD, laying in bed comfortably. Confused  HEENT: normocephalic, atraumatic,   LUNGS: Mild crackles throughout, on 2L NC  HEART: S1S2+, regular rate and rhythm, diastolic murmur  ABDOMEN: BS+, soft, nontender, nondistended  EXT: no clubbing, cyanosis, or edema. Venous stasis changes b/l LE.   SKIN: no rashes or lesions

## 2021-09-30 NOTE — PROGRESS NOTE ADULT - ASSESSMENT
Mr. Douglas is an 81 year old DNR/DNI male patient known to have: Alzheimer Dementia, ESRD and Anemia of chronic Disease. On HD every MWF via Tessio at [facility], COPD on 4LPM NC at NH, DM Type II, HFrEF and severe AS, BPH, Dyslipidemia.   He was brought to the ED from [facility] by EMS on 09/21 for evaluation of hypotension and fever.  This was associated with lethargy, reduced PO intake, and confusion.    # Metabolic encephalopathy resolved  # Septic Shock sec to ORSE, bacteremia legionella pneumonia and proteus UTI  -  CT Abdomen and Pelvis w/ IV Cont (09.22.21 @ 13:24) >Findings compatible with acute cystitis and left-sided ascending urinary tract infection; partially distended urinary bladder despite presence of a Laura catheter. Large diffuse colonic stool burden with moderately distended rectum and mild presacral edema. Incompletely characterized cystic lesion in the pancreatic head. This can be further evaluated with an outpatient contrast-enhanced MRI/MRCP.  - blood Culture Results: Growth in aerobic and anaerobic bottles: Staphylococcus epidermidis (09.21.21 @ 16:55)  - blood Culture Results: No growth to date. (09.27.21 @ 18:27)  - urine Culture Results: >100,000 CFU/ml Proteus mirabilis ESBL (09.21.21 @ 23:20)  - Legionella Antigen, Urine: Positive (09.22.21 @ 02:00)  - TTE Echo Complete w/o Contrast w/ Doppler (09.24.21 @ 08:30) >Severe aortic valve stenosis.no vegetations  - 9/24 Tesio removed  - ID eval: Meropenem 500 mg iv q24h for 7 days starting 9/27, Vancomycin 1 gm iv post HD  for 4 more doses starting 9/27, Levo 250 mg iv q24h. Could change to po 250 mg q24h on discharge for 14 days starting 9/23      # Left heel ulcer  # PAD  - VA Duplex Lower Extrem Arterial, Bilat (09.24.21 @ 18:35) >Bilateral moderate to severe atherosclerotic disease. The left posterior tibial and peroneal arteries are occluded.  - Xray Foot AP + Lateral, Left (09.24.21 @ 18:08) >Diffuse osteopenia. Plantar heel ulcer with suggestion of loss of cortical distinctiveness at the plantar calcaneus, suggestive of osteomyelitis. Degenerative changes of the hindfoot, midfoot and forefoot joints. Vascular calcifications.  - Vascular eval: Pt should follow up with Dr. Rossi as outpt in 1-2 weeks to re-evaluate left foot, PAD, possible angiogram  - Podiatry F/u : Local Wound Care; Wound Flushed w/ NS; Wound Packed w/ santyl / DSD / Kerlix . No intervention from Podiatry  - Cont LWC; Q24. Pt can f/u with Dr Ragland at [address] upon d/c  - c/w ASA, statin    # ESRD on HD   # Hyperkalemia  # HAGMA-resolved  #Hyponatremia - resolved  # Anemia sec to CKD  - c/w EPO with HD  - c/w midodrine  - c/w phoslo  - Scheduled for insertion of TDC Friday with Dr. Alamo    # DM type 2  - A1C with Estimated Average Glucose Result: 5.2% (09.22.21 @ 08:39)  - c/w  lispro    # Chronic hypoxic respiratory failure - stable  - c/w budesonide, albuterol    # Chronic systolic CHF  # Severe AS    # H/o BPH  -c/w flomax    # Alzheimer's dementia  -c/w donepezil, sertraline, mirtazepine     # Constipation-resolved  -  Xray Kidney Ureter Bladder (09.29.21 @ 06:29) >Nonobstructive bowel gas pattern. Mild to moderate colonic stool.  - c/w miralax, senna    # DVT prophylaxis    # DNR/DNI ( GOC discussed)    # Pending:  Scheduled for insertion of TDC in AM  # Disposition: [facility] when stable

## 2021-10-01 NOTE — PROGRESS NOTE ADULT - ASSESSMENT
Mr. Douglas is an 81 year old DNR/DNI male patient known to have: Alzheimer Dementia, ESRD and Anemia of chronic Disease. On HD every MWF via Tessio at [facility], COPD on 4LPM NC at NH, DM Type II, HFrEF and severe AS, BPH, Dyslipidemia.   He was brought to the ED from [facility] by EMS on 09/21 for evaluation of hypotension and fever.  This was associated with lethargy, reduced PO intake, and confusion.    # Metabolic encephalopathy resolved  # Septic Shock sec to ORSE, bacteremia legionella pneumonia and proteus UTI  -  CT Abdomen and Pelvis w/ IV Cont (09.22.21 @ 13:24) >Findings compatible with acute cystitis and left-sided ascending urinary tract infection; partially distended urinary bladder despite presence of a Laura catheter. Large diffuse colonic stool burden with moderately distended rectum and mild presacral edema. Incompletely characterized cystic lesion in the pancreatic head. This can be further evaluated with an outpatient contrast-enhanced MRI/MRCP.  - blood Culture Results: Growth in aerobic and anaerobic bottles: Staphylococcus epidermidis (09.21.21 @ 16:55)  - blood Culture Results: No growth to date. (09.27.21 @ 18:27)  - urine Culture Results: >100,000 CFU/ml Proteus mirabilis ESBL (09.21.21 @ 23:20)  - Legionella Antigen, Urine: Positive (09.22.21 @ 02:00)  - TTE Echo Complete w/o Contrast w/ Doppler (09.24.21 @ 08:30) >Severe aortic valve stenosis.no vegetations  - 9/24 Tesio removed  - ID eval: Meropenem 500 mg iv q24h for 7 days starting 9/27, Vancomycin 1 gm iv post HD  for 4 more doses starting 9/27, Levo 250 mg iv q24h. Could change to po 250 mg q24h on discharge for 14 days starting 9/23  - repeat blood cultures, xray chesy  - ID F/u    # Left heel ulcer  # PAD  - VA Duplex Lower Extrem Arterial, Bilat (09.24.21 @ 18:35) >Bilateral moderate to severe atherosclerotic disease. The left posterior tibial and peroneal arteries are occluded.  - Xray Foot AP + Lateral, Left (09.24.21 @ 18:08) >Diffuse osteopenia. Plantar heel ulcer with suggestion of loss of cortical distinctiveness at the plantar calcaneus, suggestive of osteomyelitis. Degenerative changes of the hindfoot, midfoot and forefoot joints. Vascular calcifications.  - Vascular eval: Pt should follow up with Dr. Rossi as outpt in 1-2 weeks to re-evaluate left foot, PAD, possible angiogram  - Podiatry F/u : Local Wound Care; Wound Flushed w/ NS; Wound Packed w/ santyl / DSD / Kerlix . No intervention from Podiatry  - Cont LWC; Q24. Pt can f/u with Dr Ragland at [address] upon d/c  - c/w ASA, statin    # ESRD on HD   # Hyperkalemia  # HAGMA-resolved  #Hyponatremia - resolved  # Anemia sec to CKD  - c/w EPO with HD  - c/w midodrine  - c/w phoslo  - Scheduled for insertion of TDC today with Dr. Alamo    # DM type 2  - A1C with Estimated Average Glucose Result: 5.2% (09.22.21 @ 08:39)  - c/w  lispro    # Chronic hypoxic respiratory failure - stable  - c/w budesonide, albuterol    # Chronic systolic CHF  # Severe AS    # H/o BPH  -c/w flomax    # Alzheimer's dementia  -c/w donepezil, sertraline, mirtazepine     # Constipation-resolved  -  Xray Kidney Ureter Bladder (09.29.21 @ 06:29) >Nonobstructive bowel gas pattern. Mild to moderate colonic stool.  - c/w miralax, senna    # DVT prophylaxis    # DNR/DNI ( GOC discussed)    # Pending:  Planned for insertion of TDC today, repeat blood culture, xray chest, ID eval  # Discussed plan of care with patient  # Disposition: [facility] when stable

## 2021-10-01 NOTE — PROGRESS NOTE ADULT - SUBJECTIVE AND OBJECTIVE BOX
DRAKE BELTRANJ 81y Male  MRN#: [id number]   CODE STATUS:________    Hospital Day: 10d    Pt is currently admitted with the primary diagnosis of     SUBJECTIVE  Hospital Course  82 y/o gentleman with a past medical history of Alzheimer's dementia, ESRD on HD (oliguric), Anemia of chronic disease, COPD on 4L O2, DM II, Hfw/REF and Severe AS, HLD, and recently discharge for multi-lobar pneumonia admitted for septic shock from [facility]. Began having fevers on Sunday and complaining of abdominal pain, without any other significant symptoms (n/v/d, SOB, CP, cough). Today he was noted to be febrile and hypotensive, and anuric.   Still found to be hypotensive in the ED after 1L IVF and started on peripheral levophed, WBC 14, CXR appears improved from last on 9/2.    Overnight events   None. NPO since midnight for vascular procedure    Subjective complaints   Denied headache, dizziness, chest pain, SOB, abd pain, nausea, vomiting. Went for procedure with vascular, removal of femoral udall, placement of new TDC.     Present Today:   - Laura:  No [  ], Yes [   ] : Indication:     - Type of IV Access:       .. CVC/Piccline:  No [  ], Yes [   ] : Indication:       .. Midline: No [  ], Yes [   ] : Indication:                               OBJECTIVE  PAST MEDICAL & SURGICAL HISTORY  Diabetes mellitus    COPD (chronic obstructive pulmonary disease)    Lymphedema of both lower extremities    CHF (congestive heart failure)    ESRD on dialysis    H/O right nephrectomy  20 yrs ago                                             ALLERGIES:  No Known Allergies                                        HOME MEDICATIONS  Home Medications:  albuterol 90 mcg/inh inhalation aerosol: 2 puff(s) inhaled every 6 hours (20 Aug 2021 09:36)  ascorbic acid 500 mg oral tablet: 1 tab(s) orally once a day (20 Aug 2021 09:36)  aspirin 81 mg oral delayed release tablet: 1 tab(s) orally once a day (20 Aug 2021 09:36)  atorvastatin 40 mg oral tablet: 1 tab(s) orally once a day (at bedtime) (20 Aug 2021 09:36)  Breo Ellipta 200 mcg-25 mcg/inh inhalation powder: 1 puff(s) inhaled once a day (26 Jul 2021 10:25)  calcium acetate 667 mg oral tablet: 1 tab(s) orally 3 times a day (with meals) (20 Aug 2021 13:54)  donepezil 5 mg oral tablet: 1 tab(s) orally once a day (at bedtime) (20 Aug 2021 09:36)  epoetin graham: 5000 unit(s) injectable Monday, Wednesday, and Friday (20 Aug 2021 11:05)  heparin: 5000 unit(s) subcutaneous every 12 hours (01 Sep 2021 13:34)  HYDROmorphone 2 mg oral tablet: 1 tab(s) orally every 4 hours, As needed, Moderate Pain (4 - 6) (01 Sep 2021 12:01)  midodrine 10 mg oral tablet: 1 tab(s) orally  (01 Sep 2021 12:01)  mirtazapine 15 mg oral tablet: 1 tab(s) orally once a day (at bedtime) (20 Aug 2021 09:36)  multivitamin: 1 tab(s) orally once a day (20 Aug 2021 13:54)  pantoprazole 40 mg oral delayed release tablet: 1 tab(s) orally once a day (before a meal) (20 Aug 2021 09:36)  povidone iodine 10% topical ointment: 1 application topically  (01 Sep 2021 12:01)  Senna 8.6 mg oral tablet: 2 tab(s) orally once a day (at bedtime), As Needed (26 Jul 2021 10:25)  sertraline 25 mg oral tablet: 1 tab(s) orally once a day (20 Aug 2021 09:36)  tamsulosin 0.4 mg oral capsule: 1 cap(s) orally once a day (at bedtime) (20 Aug 2021 09:36)  Toprol-XL 25 mg oral tablet, extended release: 1 tab(s) orally once a day (at bedtime) (01 Sep 2021 12:01)                           MEDICATIONS:  STANDING MEDICATIONS  ALBUTerol  90 MICROgram(s) HFA Inhaler - Peds 4 Puff(s) Inhalation every 4 hours  ascorbic acid 500 milliGRAM(s) Oral daily  aspirin enteric coated 81 milliGRAM(s) Oral daily  budesonide  80 MICROgram(s)/formoterol 4.5 MICROgram(s) Inhaler 2 Puff(s) Inhalation two times a day  calcium acetate 667 milliGRAM(s) Oral three times a day with meals  chlorhexidine 4% Liquid 1 Application(s) Topical <User Schedule>  dextrose 5%. 1000 milliLiter(s) IV Continuous <Continuous>  dextrose 50% Injectable 25 Gram(s) IV Push once  dextrose 50% Injectable 12.5 Gram(s) IV Push once  dextrose 50% Injectable 25 Gram(s) IV Push once  donepezil 5 milliGRAM(s) Oral at bedtime  epoetin graham-epbx (RETACRIT) Injectable 5000 Unit(s) IV Push <User Schedule>  glucagon  Injectable 1 milliGRAM(s) IntraMuscular once  heparin SubCutaneous Injection - Peds 5000 Unit(s) SubCutaneous every 12 hours  insulin lispro (ADMELOG) corrective regimen sliding scale   SubCutaneous three times a day before meals  levoFLOXacin IVPB 250 milliGRAM(s) IV Intermittent every 24 hours  mirtazapine 15 milliGRAM(s) Oral at bedtime  multivitamin Oral Tab/Cap - Peds 1 Tablet(s) Oral daily  pantoprazole    Tablet 40 milliGRAM(s) Oral before breakfast  polyethylene glycol 3350 17 Gram(s) Oral two times a day  senna 2 Tablet(s) Oral at bedtime  sertraline 25 milliGRAM(s) Oral daily  tamsulosin 0.4 milliGRAM(s) Oral at bedtime    PRN MEDICATIONS  acetaminophen   Tablet .. 650 milliGRAM(s) Oral every 6 hours PRN                                            VITAL SIGNS: Last 24 Hours  T(C): 36.6 (01 Oct 2021 12:00), Max: 37.5 (30 Sep 2021 22:00)  T(F): 97.8 (01 Oct 2021 12:00), Max: 99.5 (30 Sep 2021 22:00)  HR: 80 (01 Oct 2021 12:00) (75 - 97)  BP: 107/55 (01 Oct 2021 12:00) (99/50 - 140/62)  BP(mean): 79 (01 Oct 2021 08:00) (79 - 79)  RR: 18 (01 Oct 2021 12:00) (13 - 20)  SpO2: 100% (01 Oct 2021 12:00) (95% - 100%)      09-30-21 @ 07:01  -  10-01-21 @ 07:00  --------------------------------------------------------  IN: 600 mL / OUT: 3500 mL / NET: -2900 mL    10-01-21 @ 07:01  -  10-01-21 @ 14:58  --------------------------------------------------------  IN: 240 mL / OUT: 0 mL / NET: 240 mL                                               LABS:                        10.5   18.65 )-----------( 315      ( 01 Oct 2021 04:30 )             34.9     10-01    140  |  99  |  49<H>  ----------------------------<  103<H>  5.1<H>   |  24  |  6.1<HH>    Ca    9.0      01 Oct 2021 04:30  Mg     1.9     10-01    TPro  5.9<L>  /  Alb  3.2<L>  /  TBili  0.2  /  DBili  x   /  AST  19  /  ALT  12  /  AlkPhos  81  10-01    PT/INR - ( 30 Sep 2021 05:29 )   PT: 12.60 sec;   INR: 1.10 ratio         PTT - ( 30 Sep 2021 05:29 )  PTT:30.9 sec                                              -------------------------------------------------------------  RADIOLOGY:                                            --------------------------------------------------------------    PHYSICAL EXAM:  General: NAD, laying in bed comfortably. Confused  HEENT: normocephalic, atraumatic,   LUNGS: CTAB, on 2L NC  HEART: S1S2+, regular rate and rhythm, diastolic murmur  ABDOMEN: BS+, soft, nontender, nondistended  EXT: no clubbing, cyanosis, or edema. Venous stasis changes b/l LE.   SKIN: no rashes or lesions DRAKE HO 81y Male  MRN#: [id number]   CODE STATUS: DNR/DNI    Hospital Day: 10d    Pt is currently admitted with the primary diagnosis of hypotension and fever    SUBJECTIVE  Hospital Course  82 y/o gentleman with a past medical history of Alzheimer's dementia, ESRD on HD (oliguric), Anemia of chronic disease, COPD on 4L O2, DM II, Hfw/REF and Severe AS, HLD, and recently discharge for multi-lobar pneumonia admitted for septic shock from [facility]. Began having fevers on Sunday and complaining of abdominal pain, without any other significant symptoms (n/v/d, SOB, CP, cough). Today he was noted to be febrile and hypotensive, and anuric.   Still found to be hypotensive in the ED after 1L IVF and started on peripheral levophed, WBC 14, CXR appears improved from last on 9/2.    Overnight events   None. NPO since midnight for vascular procedure    Subjective complaints   Denied headache, dizziness, chest pain, SOB, abd pain, nausea, vomiting. Went for procedure with vascular, removal of femoral udall, placement of new TDC.     Present Today:   - Laura:  No [  ], Yes [   ] : Indication:     - Type of IV Access:       .. CVC/Piccline:  No [  ], Yes [   ] : Indication:       .. Midline: No [  ], Yes [   ] : Indication:                               OBJECTIVE  PAST MEDICAL & SURGICAL HISTORY  Diabetes mellitus    COPD (chronic obstructive pulmonary disease)    Lymphedema of both lower extremities    CHF (congestive heart failure)    ESRD on dialysis    H/O right nephrectomy  20 yrs ago                                             ALLERGIES:  No Known Allergies                                        HOME MEDICATIONS  Home Medications:  albuterol 90 mcg/inh inhalation aerosol: 2 puff(s) inhaled every 6 hours (20 Aug 2021 09:36)  ascorbic acid 500 mg oral tablet: 1 tab(s) orally once a day (20 Aug 2021 09:36)  aspirin 81 mg oral delayed release tablet: 1 tab(s) orally once a day (20 Aug 2021 09:36)  atorvastatin 40 mg oral tablet: 1 tab(s) orally once a day (at bedtime) (20 Aug 2021 09:36)  Breo Ellipta 200 mcg-25 mcg/inh inhalation powder: 1 puff(s) inhaled once a day (26 Jul 2021 10:25)  calcium acetate 667 mg oral tablet: 1 tab(s) orally 3 times a day (with meals) (20 Aug 2021 13:54)  donepezil 5 mg oral tablet: 1 tab(s) orally once a day (at bedtime) (20 Aug 2021 09:36)  epoetin graham: 5000 unit(s) injectable Monday, Wednesday, and Friday (20 Aug 2021 11:05)  heparin: 5000 unit(s) subcutaneous every 12 hours (01 Sep 2021 13:34)  HYDROmorphone 2 mg oral tablet: 1 tab(s) orally every 4 hours, As needed, Moderate Pain (4 - 6) (01 Sep 2021 12:01)  midodrine 10 mg oral tablet: 1 tab(s) orally  (01 Sep 2021 12:01)  mirtazapine 15 mg oral tablet: 1 tab(s) orally once a day (at bedtime) (20 Aug 2021 09:36)  multivitamin: 1 tab(s) orally once a day (20 Aug 2021 13:54)  pantoprazole 40 mg oral delayed release tablet: 1 tab(s) orally once a day (before a meal) (20 Aug 2021 09:36)  povidone iodine 10% topical ointment: 1 application topically  (01 Sep 2021 12:01)  Senna 8.6 mg oral tablet: 2 tab(s) orally once a day (at bedtime), As Needed (26 Jul 2021 10:25)  sertraline 25 mg oral tablet: 1 tab(s) orally once a day (20 Aug 2021 09:36)  tamsulosin 0.4 mg oral capsule: 1 cap(s) orally once a day (at bedtime) (20 Aug 2021 09:36)  Toprol-XL 25 mg oral tablet, extended release: 1 tab(s) orally once a day (at bedtime) (01 Sep 2021 12:01)                           MEDICATIONS:  STANDING MEDICATIONS  ALBUTerol  90 MICROgram(s) HFA Inhaler - Peds 4 Puff(s) Inhalation every 4 hours  ascorbic acid 500 milliGRAM(s) Oral daily  aspirin enteric coated 81 milliGRAM(s) Oral daily  budesonide  80 MICROgram(s)/formoterol 4.5 MICROgram(s) Inhaler 2 Puff(s) Inhalation two times a day  calcium acetate 667 milliGRAM(s) Oral three times a day with meals  chlorhexidine 4% Liquid 1 Application(s) Topical <User Schedule>  dextrose 5%. 1000 milliLiter(s) IV Continuous <Continuous>  dextrose 50% Injectable 25 Gram(s) IV Push once  dextrose 50% Injectable 12.5 Gram(s) IV Push once  dextrose 50% Injectable 25 Gram(s) IV Push once  donepezil 5 milliGRAM(s) Oral at bedtime  epoetin graham-epbx (RETACRIT) Injectable 5000 Unit(s) IV Push <User Schedule>  glucagon  Injectable 1 milliGRAM(s) IntraMuscular once  heparin SubCutaneous Injection - Peds 5000 Unit(s) SubCutaneous every 12 hours  insulin lispro (ADMELOG) corrective regimen sliding scale   SubCutaneous three times a day before meals  levoFLOXacin IVPB 250 milliGRAM(s) IV Intermittent every 24 hours  mirtazapine 15 milliGRAM(s) Oral at bedtime  multivitamin Oral Tab/Cap - Peds 1 Tablet(s) Oral daily  pantoprazole    Tablet 40 milliGRAM(s) Oral before breakfast  polyethylene glycol 3350 17 Gram(s) Oral two times a day  senna 2 Tablet(s) Oral at bedtime  sertraline 25 milliGRAM(s) Oral daily  tamsulosin 0.4 milliGRAM(s) Oral at bedtime    PRN MEDICATIONS  acetaminophen   Tablet .. 650 milliGRAM(s) Oral every 6 hours PRN                                            VITAL SIGNS: Last 24 Hours  T(C): 36.6 (01 Oct 2021 12:00), Max: 37.5 (30 Sep 2021 22:00)  T(F): 97.8 (01 Oct 2021 12:00), Max: 99.5 (30 Sep 2021 22:00)  HR: 80 (01 Oct 2021 12:00) (75 - 97)  BP: 107/55 (01 Oct 2021 12:00) (99/50 - 140/62)  BP(mean): 79 (01 Oct 2021 08:00) (79 - 79)  RR: 18 (01 Oct 2021 12:00) (13 - 20)  SpO2: 100% (01 Oct 2021 12:00) (95% - 100%)      09-30-21 @ 07:01  -  10-01-21 @ 07:00  --------------------------------------------------------  IN: 600 mL / OUT: 3500 mL / NET: -2900 mL    10-01-21 @ 07:01  -  10-01-21 @ 14:58  --------------------------------------------------------  IN: 240 mL / OUT: 0 mL / NET: 240 mL                                               LABS:                        10.5   18.65 )-----------( 315      ( 01 Oct 2021 04:30 )             34.9     10-01    140  |  99  |  49<H>  ----------------------------<  103<H>  5.1<H>   |  24  |  6.1<HH>    Ca    9.0      01 Oct 2021 04:30  Mg     1.9     10-01    TPro  5.9<L>  /  Alb  3.2<L>  /  TBili  0.2  /  DBili  x   /  AST  19  /  ALT  12  /  AlkPhos  81  10-01    PT/INR - ( 30 Sep 2021 05:29 )   PT: 12.60 sec;   INR: 1.10 ratio         PTT - ( 30 Sep 2021 05:29 )  PTT:30.9 sec                                              -------------------------------------------------------------  RADIOLOGY:                                            --------------------------------------------------------------    PHYSICAL EXAM:  General: NAD, laying in bed comfortably. Confused  HEENT: normocephalic, atraumatic,   LUNGS: CTAB, on 2L NC  HEART: S1S2+, regular rate and rhythm, diastolic murmur  ABDOMEN: BS+, soft, nontender, nondistended  EXT: no clubbing, cyanosis, or edema. Venous stasis changes b/l LE.   SKIN: no rashes or lesions

## 2021-10-01 NOTE — PROGRESS NOTE ADULT - TIME BILLING
Discussed on rounds with Housestaff
Counseled patient about diagnostic testing and treatment plan. All questions answered.
Counseled patient about diagnostic testing and treatment plan. All questions answered.

## 2021-10-01 NOTE — PROGRESS NOTE ADULT - ASSESSMENT
82 y/o gentleman from [facility] with a past medical history of Alzheimer's dementia, ESRD on HD (oliguric), Anemia of chronic disease, COPD on 4L O2, DM II, Hfw/REF and Severe AS, HLD, and recently discharge for multi-lobar pneumonia admitted for septic shock.    #Septic Shock due to Legionella PNA and Proteus UTI, shock resolved  Metabolic Encephalopathy, improved  Ervin pus in maldonado, remains on low dose levophed, Ucx with proteus, pending sensitivities  < from: CT Abdomen and Pelvis w/ IV Cont (09.22.21 @ 13:24) >  .  Findings compatible with acute cystitis and left-sided ascending urinary tract infection; partially distended urinary bladder despite presence of a Maldonado catheter. Correlate clinically for catheter function.  2.  Large diffuse colonic stool burden with moderately distended rectum and mild presacral edema.  3.  Incompletely characterized cystic lesion in the pancreatic head. This can be further evaluated with an outpatient contrast-enhanced MRI/MRCP.  9/21 Blood cx : staph epi  Urine Legionella +  ID following, recs meropenem 500 q24 7 days (9/27-10/3), vanc 1g IV post HD 4 more doses (9/27), Levo 250mg IV q24h for 14 days (9/23 start)  - may need midline on dc for abx (ertapenem)  tesio removed per ID recs, Vascular placed udall for HD (femoral)  2d echo EF 45-50%, severe AS  repeat blood cx  Miralax Q12H and senna; mineral oil enema today  - maldonado removed due to pus, TOV performed  - KUB shows moderate stool burden, continue w/ bowel regimen   - Repeat KUB ordered for 9/29 AM, low stool burden    #Chronic hypoxic respiratory failure   #COPD on 4L home O2  #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     #Left heel ulcer  wound care following; podiatry following  - podiatry recs- studies done. ESR-29 CRP- 67  - B/L LE arterial duplex- Bilateral moderate to severe atherosclerotic disease. The left posterior tibial and peroneal arteries are occluded. (Vascular surgery notifited)  - XRay: Diffuse osteopenia. Plantar heel ulcer with suggestion of loss of cortical distinctiveness at the plantar calcaneus, suggestive of osteomyelitis. Degenerative changes of the hindfoot, midfoot and forefoot joints. Vascular calcifications.  - Podiatry- will follow up outpatient with Dr. Ragland  - Vascular- Scheduled for removal of femoral udall insertion of TDC Friday with Dr. Alamo. Pt should follow up with Dr. Rossi as outpt in 1-2 weeks to re-evaluate left foot, PAD, possible angiogram  - Wound care- betadine    #ESRD on HD (oliguric):  #HAGMA  #Hyponatremia   -HD today; will receive vanc and epo  -nephro following  -Cont ca-acetate and midodrine    #Alzheimer's dementia  -as per family, patient's mentation improved   -Cont donepezil, sertraline, and mirtazepine     #Progress Note Handoff  Pending (specify): New Tesio  Disposition:  from NH    Handoff: vascular placement of new tesio on Friday, addressing angiogram needs outpt. Podiatry F/U outpatient. On Abx, D/c with midline if needed 80 y/o gentleman from [facility] with a past medical history of Alzheimer's dementia, ESRD on HD (oliguric), Anemia of chronic disease, COPD on 4L O2, DM II, Hfw/REF and Severe AS, HLD, and recently discharge for multi-lobar pneumonia admitted for septic shock.    #Septic Shock due to Legionella PNA and Proteus UTI, shock resolved  Metabolic Encephalopathy, improved  Ervin pus in maldonado, remains on low dose levophed, Ucx with proteus, pending sensitivities  < from: CT Abdomen and Pelvis w/ IV Cont (09.22.21 @ 13:24) >  .  Findings compatible with acute cystitis and left-sided ascending urinary tract infection; partially distended urinary bladder despite presence of a Maldonado catheter. Correlate clinically for catheter function.  2.  Large diffuse colonic stool burden with moderately distended rectum and mild presacral edema.  3.  Incompletely characterized cystic lesion in the pancreatic head. This can be further evaluated with an outpatient contrast-enhanced MRI/MRCP.  9/21 Blood cx : staph epi  Urine Legionella +  ID following, recs meropenem 500 q24 7 days (9/27-10/3), vanc 1g IV post HD 4 more doses (9/27), Levo 250mg IV q24h for 14 days (9/23 start)  - may need midline on dc for abx (ertapenem)  tesio removed per ID recs, Vascular placed udall for HD (femoral)  2d echo EF 45-50%, severe AS  repeat blood cx  Miralax Q12H and senna; mineral oil enema today  - maldonado removed due to pus, TOV performed  - KUB shows moderate stool burden, continue w/ bowel regimen   - Repeat KUB ordered for 9/29 AM, low stool burden    #Chronic hypoxic respiratory failure   #COPD on 4L home O2  #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     #Left heel ulcer  wound care following; podiatry following  - podiatry recs- studies done. ESR-29 CRP- 67  - B/L LE arterial duplex- Bilateral moderate to severe atherosclerotic disease. The left posterior tibial and peroneal arteries are occluded. (Vascular surgery notifited)  - XRay: Diffuse osteopenia. Plantar heel ulcer with suggestion of loss of cortical distinctiveness at the plantar calcaneus, suggestive of osteomyelitis. Degenerative changes of the hindfoot, midfoot and forefoot joints. Vascular calcifications.  - Podiatry- will follow up outpatient with Dr. Ragland  - Vascular- Removal of femoral udall and placement of new TDC Friday 10/1 with Dr. Alamo.   - Pt should follow up with Dr. Rossi as outpt in 1-2 weeks to re-evaluate left foot, PAD, possible angiogram  - Wound care- betadine    #ESRD on HD (oliguric):  #HAGMA  #Hyponatremia   -HD days; will receive vanc and epo  -nephro following  -Cont ca-acetate and midodrine    #Alzheimer's dementia  -as per family, patient's mentation improved   -Cont donepezil, sertraline, and mirtazepine       Handoff: vascular placement of new tesio today, HD to test tomorrow 10/2, addressing angiogram needs outpt. Podiatry F/U outpatient. On Abx, D/c with midline if needed.   Anticipate for discharge tomorrow

## 2021-10-01 NOTE — PROGRESS NOTE ADULT - SUBJECTIVE AND OBJECTIVE BOX
Patient is a 81y old  Male who presents with a chief complaint of Hypotension and Fever (29 Sep 2021 12:51)    Patient was seen and examined.  Planned for insertion of TDC today  no new complaints    PAST MEDICAL & SURGICAL HISTORY:  Diabetes mellitus  COPD (chronic obstructive pulmonary disease)  Lymphedema of both lower extremities  CHF (congestive heart failure)  ESRD on dialysis  H/O right nephrectomy, 20 yrs ago    Allergies  No Known Allergies    MEDICATIONS  (STANDING):  ALBUTerol  90 MICROgram(s) HFA Inhaler - Peds 4 Puff(s) Inhalation every 4 hours  ascorbic acid 500 milliGRAM(s) Oral daily  aspirin enteric coated 81 milliGRAM(s) Oral daily  budesonide  80 MICROgram(s)/formoterol 4.5 MICROgram(s) Inhaler 2 Puff(s) Inhalation two times a day  calcium acetate 667 milliGRAM(s) Oral three times a day with meals  donepezil 5 milliGRAM(s) Oral at bedtime  epoetin graham-epbx (RETACRIT) Injectable 5000 Unit(s) IV Push <User Schedule>  glucagon  Injectable 1 milliGRAM(s) IntraMuscular once  heparin SubCutaneous Injection - Peds 5000 Unit(s) SubCutaneous every 12 hours  insulin lispro (ADMELOG) corrective regimen sliding scale   SubCutaneous three times a day before meals  levoFLOXacin IVPB 250 milliGRAM(s) IV Intermittent every 24 hours  mirtazapine 15 milliGRAM(s) Oral at bedtime  multivitamin Oral Tab/Cap - Peds 1 Tablet(s) Oral daily  pantoprazole    Tablet 40 milliGRAM(s) Oral before breakfast  polyethylene glycol 3350 17 Gram(s) Oral two times a day  senna 2 Tablet(s) Oral at bedtime  sertraline 25 milliGRAM(s) Oral daily  tamsulosin 0.4 milliGRAM(s) Oral at bedtime    MEDICATIONS  (PRN):  acetaminophen   Tablet .. 650 milliGRAM(s) Oral every 6 hours PRN Temp greater or equal to 38C (100.4F), Mild Pain (1 - 3), Moderate Pain (4 - 6)    T(C): 36.6 (10-01-21 @ 12:00), Max: 37.5 (09-30-21 @ 22:00)  HR: 80 (10-01-21 @ 12:00) (75 - 97)  BP: 107/55 (10-01-21 @ 12:00) (99/50 - 140/62)  RR: 18 (10-01-21 @ 12:00) (13 - 20)  SpO2: 100% (10-01-21 @ 12:00) (95% - 100%)    O/E:  Awake, alert, not in distress.  HEENT: atraumatic, EOMI.  Chest: decreased breath sounds at bases  CVS: SIS2 +, no murmur.  P/A: Soft, BS+  CNS: awake , alert  Ext: lower ext edema+  All systems reviewed positive findings as above.                            10.5<L>  18.65<H> )-----------( 315      ( 01 Oct 2021 04:30 )             34.9<L>                        10.9<L>  17.64<H> )-----------( 314      ( 30 Sep 2021 05:29 )             36.7<L>  10-01    140  |  99  |  49<H>  ----------------------------<  103<H>  5.1<H>   |  24  |  6.1<HH>  09-30    140  |  99  |  35<H>  ----------------------------<  136<H>  5.3<H>   |  22  |  5.2<HH>    Ca    9.0      01 Oct 2021 04:30  Ca    8.8      30 Sep 2021 17:17  Ca    9.3      30 Sep 2021 05:29  Mg     1.9     10-01    TPro  5.9<L>  /  Alb  3.2<L>  /  TBili  0.2  /  DBili  x   /  AST  19  /  ALT  12  /  AlkPhos  81  10-01  TPro  6.0  /  Alb  3.2<L>  /  TBili  <0.2  /  DBili  x   /  AST  23  /  ALT  14  /  AlkPhos  86  09-30
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
## 2021-10-01 NOTE — PRE-OP CHECKLIST - SELECT TESTS ORDERED
BMP/CBC/CMP/PT/PTT/INR/Hepatic Function/Type and Cross/Type and Screen/Urinalysis/POCT Blood Glucose/COVID-19

## 2021-10-01 NOTE — PROGRESS NOTE ADULT - ASSESSMENT
ESRD on HD   - access via TDC   - fluid removal during HD has been limited due to intradialytic hypotension  Bacteremia  UTI  COPD on home O2  HFrEF  dementia   HTN  anemia  right nephrectomy    plan:    Abx per ID  Tunneled catheter placed today  Continue midodrine  HD tomorrow: 3 hours, opti 160 dialyzer, 2K bath, 2.5L UF  EPO with HD  Renal diet

## 2021-10-01 NOTE — PROGRESS NOTE ADULT - SUBJECTIVE AND OBJECTIVE BOX
[facility] NEPHROLOGY FOLLOW UP NOTE  --------------------------------------------------------------------------------  24 hour events/subjective: Patient examined. Appears comfortable.    PAST HISTORY  --------------------------------------------------------------------------------  No significant changes to PMH, PSH, FHx, SHx, unless otherwise noted    ALLERGIES & MEDICATIONS  --------------------------------------------------------------------------------  Allergies    No Known Allergies    Intolerances      Standing Inpatient Medications  ALBUTerol  90 MICROgram(s) HFA Inhaler - Peds 4 Puff(s) Inhalation every 4 hours  ascorbic acid 500 milliGRAM(s) Oral daily  aspirin enteric coated 81 milliGRAM(s) Oral daily  budesonide  80 MICROgram(s)/formoterol 4.5 MICROgram(s) Inhaler 2 Puff(s) Inhalation two times a day  calcium acetate 667 milliGRAM(s) Oral three times a day with meals  chlorhexidine 4% Liquid 1 Application(s) Topical <User Schedule>  dextrose 5%. 1000 milliLiter(s) IV Continuous <Continuous>  dextrose 50% Injectable 25 Gram(s) IV Push once  dextrose 50% Injectable 12.5 Gram(s) IV Push once  dextrose 50% Injectable 25 Gram(s) IV Push once  donepezil 5 milliGRAM(s) Oral at bedtime  epoetin graham-epbx (RETACRIT) Injectable 5000 Unit(s) IV Push <User Schedule>  glucagon  Injectable 1 milliGRAM(s) IntraMuscular once  heparin SubCutaneous Injection - Peds 5000 Unit(s) SubCutaneous every 12 hours  insulin lispro (ADMELOG) corrective regimen sliding scale   SubCutaneous three times a day before meals  levoFLOXacin IVPB 250 milliGRAM(s) IV Intermittent every 24 hours  mirtazapine 15 milliGRAM(s) Oral at bedtime  multivitamin Oral Tab/Cap - Peds 1 Tablet(s) Oral daily  pantoprazole    Tablet 40 milliGRAM(s) Oral before breakfast  polyethylene glycol 3350 17 Gram(s) Oral two times a day  senna 2 Tablet(s) Oral at bedtime  sertraline 25 milliGRAM(s) Oral daily  tamsulosin 0.4 milliGRAM(s) Oral at bedtime    PRN Inpatient Medications  acetaminophen   Tablet .. 650 milliGRAM(s) Oral every 6 hours PRN      VITALS/PHYSICAL EXAM  --------------------------------------------------------------------------------  T(C): 36.6 (10-01-21 @ 15:28), Max: 37.5 (09-30-21 @ 22:00)  HR: 77 (10-01-21 @ 15:28) (75 - 97)  BP: 141/63 (10-01-21 @ 15:28) (99/50 - 141/63)  RR: 19 (10-01-21 @ 15:28) (13 - 20)  SpO2: 100% (10-01-21 @ 12:00) (95% - 100%)  Wt(kg): --  Height (cm): 172.7 (10-01-21 @ 07:58)  Weight (kg): 70 (10-01-21 @ 07:58)  BMI (kg/m2): 23.5 (10-01-21 @ 07:58)  BSA (m2): 1.83 (10-01-21 @ 07:58)      09-30-21 @ 07:01  -  10-01-21 @ 07:00  --------------------------------------------------------  IN: 600 mL / OUT: 3500 mL / NET: -2900 mL    10-01-21 @ 07:01  -  10-01-21 @ 15:37  --------------------------------------------------------  IN: 240 mL / OUT: 0 mL / NET: 240 mL      Physical Exam:  	Gen: NAD  	Pulm: CTA B/L  	CV: RRR, S1S2  	Abd: +BS, soft, nontender/nondistended  	: No suprapubic tenderness  	LE: Warm,  edema  	Vascular access: TDC    LABS/STUDIES  --------------------------------------------------------------------------------              10.5   18.65 >-----------<  315      [10-01-21 @ 04:30]              34.9     140  |  99  |  49  ----------------------------<  103      [10-01-21 @ 04:30]  5.1   |  24  |  6.1        Ca     9.0     [10-01-21 @ 04:30]      Mg     1.9     [10-01-21 @ 04:30]    TPro  5.9  /  Alb  3.2  /  TBili  0.2  /  DBili  x   /  AST  19  /  ALT  12  /  AlkPhos  81  [10-01-21 @ 04:30]    PT/INR: PT 12.60, INR 1.10       [09-30-21 @ 05:29]  PTT: 30.9       [09-30-21 @ 05:29]      Creatinine Trend:  SCr 6.1 [10-01 @ 04:30]  SCr 5.2 [09-30 @ 17:17]  SCr 8.3 [09-30 @ 05:29]  SCr 6.4 [09-29 @ 11:31]  SCr 9.5 [09-28 @ 09:50]    Urinalysis - [09-22-21 @ 02:00]      Color Yellow / Appearance Turbid / SG 1.015 / pH 6.5      Gluc Negative / Ketone Small  / Bili Negative / Urobili <2 mg/dL       Blood Large / Protein 300 mg/dL / Leuk Est Large / Nitrite Negative      RBC >720 / WBC >720 / Hyaline  / Gran  / Sq Epi  / Non Sq Epi  / Bacteria       Iron 10, TIBC 119, %sat 8      [09-22-21 @ 08:39]  Ferritin 938      [09-22-21 @ 08:39]  PTH -- (Ca 8.9)      [12-24-20 @ 04:30]   54  Lipid: chol 114, , HDL 38, LDL --      [08-27-21 @ 04:30]    HBsAg Nonreact      [09-22-21 @ 08:39]  HCV 0.17, Nonreact      [09-22-21 @ 08:39]

## 2021-10-01 NOTE — CHART NOTE - NSCHARTNOTEFT_GEN_A_CORE
Post Operative Note  Patient: DRAKE HO 81y (1940) Male   MRN: [id number]  Location: [address]  Visit: 09-21-21 Inpatient  Date: 10-01-21 @ 16:11    Procedure:  S/P XR fluoro guided insertion of tunnelled dialysis catheter    Subjective:   Nausea: no, Vomiting: no, Ambulating: yes, Flatus: no  Pain Assessment: Patient is complaining of mild pain that is appropriate for post-operative course.     Objective:  Vitals: T(F): 97.8 (10-01-21 @ 15:28), Max: 99.5 (09-30-21 @ 22:00)  HR: 77 (10-01-21 @ 15:28)  BP: 141/63 (10-01-21 @ 15:28) (99/50 - 141/63)  RR: 19 (10-01-21 @ 15:28)  SpO2: 100% (10-01-21 @ 12:00)  Vent Settings:     In:   09-30-21 @ 07:01  -  10-01-21 @ 07:00  --------------------------------------------------------  IN: 600 mL    10-01-21 @ 07:01  -  10-01-21 @ 16:11  --------------------------------------------------------  IN: 240 mL      IV Fluids: ascorbic acid 500 milliGRAM(s) Oral daily  calcium acetate 667 milliGRAM(s) Oral three times a day with meals  dextrose 5%. 1000 milliLiter(s) (50 mL/Hr) IV Continuous <Continuous>  multivitamin Oral Tab/Cap - Peds 1 Tablet(s) Oral daily      Out:   09-30-21 @ 07:01  -  10-01-21 @ 07:00  --------------------------------------------------------  OUT: 3500 mL    10-01-21 @ 07:01  -  10-01-21 @ 16:11  --------------------------------------------------------  OUT: 0 mL      EBL:     Voided Urine:   09-30-21 @ 07:01  -  10-01-21 @ 07:00  --------------------------------------------------------  OUT: 3500 mL    10-01-21 @ 07:01  -  10-01-21 @ 16:11  --------------------------------------------------------  OUT: 0 mL      Laura Catheter: yes no   Drains:   TIFFANIE:    ,   Chest Tube:      NG Tube:       Physical Examination:  General Appearance: NAD,   Neck: Left TDC in place no saturation of dressing, clean, dry, intact, no hematoma  HEENT: EOMI, sclera non-icteric.  Heart: RRR  Lungs: CTABL  Abdomen:  Soft, non-tender, appropriate for post-op course, nondistended. No rigidity, guarding, or rebound tenderness.   MSK/Extremities: Warm & well-perfused. Peripheral pulses intact.  Skin: Warm, dry. No jaundice.     Medications: [Standing]  acetaminophen   Tablet .. 650 milliGRAM(s) Oral every 6 hours PRN  ALBUTerol  90 MICROgram(s) HFA Inhaler - Peds 4 Puff(s) Inhalation every 4 hours  ascorbic acid 500 milliGRAM(s) Oral daily  aspirin enteric coated 81 milliGRAM(s) Oral daily  budesonide  80 MICROgram(s)/formoterol 4.5 MICROgram(s) Inhaler 2 Puff(s) Inhalation two times a day  calcium acetate 667 milliGRAM(s) Oral three times a day with meals  chlorhexidine 4% Liquid 1 Application(s) Topical <User Schedule>  dextrose 5%. 1000 milliLiter(s) IV Continuous <Continuous>  dextrose 50% Injectable 25 Gram(s) IV Push once  dextrose 50% Injectable 12.5 Gram(s) IV Push once  dextrose 50% Injectable 25 Gram(s) IV Push once  donepezil 5 milliGRAM(s) Oral at bedtime  epoetin graham-epbx (RETACRIT) Injectable 5000 Unit(s) IV Push <User Schedule>  glucagon  Injectable 1 milliGRAM(s) IntraMuscular once  heparin SubCutaneous Injection - Peds 5000 Unit(s) SubCutaneous every 12 hours  insulin lispro (ADMELOG) corrective regimen sliding scale   SubCutaneous three times a day before meals  levoFLOXacin IVPB 250 milliGRAM(s) IV Intermittent every 24 hours  mirtazapine 15 milliGRAM(s) Oral at bedtime  multivitamin Oral Tab/Cap - Peds 1 Tablet(s) Oral daily  pantoprazole    Tablet 40 milliGRAM(s) Oral before breakfast  polyethylene glycol 3350 17 Gram(s) Oral two times a day  senna 2 Tablet(s) Oral at bedtime  sertraline 25 milliGRAM(s) Oral daily  tamsulosin 0.4 milliGRAM(s) Oral at bedtime    Medications: [PRN]  acetaminophen   Tablet .. 650 milliGRAM(s) Oral every 6 hours PRN  ALBUTerol  90 MICROgram(s) HFA Inhaler - Peds 4 Puff(s) Inhalation every 4 hours  ascorbic acid 500 milliGRAM(s) Oral daily  aspirin enteric coated 81 milliGRAM(s) Oral daily  budesonide  80 MICROgram(s)/formoterol 4.5 MICROgram(s) Inhaler 2 Puff(s) Inhalation two times a day  calcium acetate 667 milliGRAM(s) Oral three times a day with meals  chlorhexidine 4% Liquid 1 Application(s) Topical <User Schedule>  dextrose 5%. 1000 milliLiter(s) IV Continuous <Continuous>  dextrose 50% Injectable 25 Gram(s) IV Push once  dextrose 50% Injectable 12.5 Gram(s) IV Push once  dextrose 50% Injectable 25 Gram(s) IV Push once  donepezil 5 milliGRAM(s) Oral at bedtime  epoetin graham-epbx (RETACRIT) Injectable 5000 Unit(s) IV Push <User Schedule>  glucagon  Injectable 1 milliGRAM(s) IntraMuscular once  heparin SubCutaneous Injection - Peds 5000 Unit(s) SubCutaneous every 12 hours  insulin lispro (ADMELOG) corrective regimen sliding scale   SubCutaneous three times a day before meals  levoFLOXacin IVPB 250 milliGRAM(s) IV Intermittent every 24 hours  mirtazapine 15 milliGRAM(s) Oral at bedtime  multivitamin Oral Tab/Cap - Peds 1 Tablet(s) Oral daily  pantoprazole    Tablet 40 milliGRAM(s) Oral before breakfast  polyethylene glycol 3350 17 Gram(s) Oral two times a day  senna 2 Tablet(s) Oral at bedtime  sertraline 25 milliGRAM(s) Oral daily  tamsulosin 0.4 milliGRAM(s) Oral at bedtime      DVT PROPHYLAXIS: heparin SubCutaneous Injection - Peds 5000 Unit(s) SubCutaneous every 12 hours    GI PROPHYLAXIS: pantoprazole    Tablet 40 milliGRAM(s) Oral before breakfast    ANTICOAGULATION:   ANTIBIOTICS:  levoFLOXacin IVPB 250 milliGRAM(s)    Labs:                        10.5   18.65 )-----------( 315      ( 01 Oct 2021 04:30 )             34.9     10-01    140  |  99  |  49<H>  ----------------------------<  103<H>  5.1<H>   |  24  |  6.1<HH>    Ca    9.0      01 Oct 2021 04:30  Mg     1.9     10-01    TPro  5.9<L>  /  Alb  3.2<L>  /  TBili  0.2  /  DBili  x   /  AST  19  /  ALT  12  /  AlkPhos  81  10-01    PT/INR - ( 30 Sep 2021 05:29 )   PT: 12.60 sec;   INR: 1.10 ratio         PTT - ( 30 Sep 2021 05:29 )  PTT:30.9 sec    Imaging:  No post-op imaging studies    Assessment:  81yM s/p     Plan:  - Monitor vitals  - Monitor post-op labs and replete as necessary  - Monitor for bowel function  - Continue Pain Medications if necessary  - Continue Antibiotics if necessary  - Encourage ambulation as tolerated  - Monitor urine output and trial of void once Laura removed  - DVT and GI Prophylaxis  - Monitor wound and dressing for changes, redress as needed.    Date/Time: 10-01-21 @ 16:11

## 2021-10-01 NOTE — PROGRESS NOTE ADULT - ASSESSMENT
ASSESSMENT:  81M for OR today for tesio.    PLAN:  -NPO for OR today  -Pre-op labs reviewed, post dialysis potassium improved  -F/u repeat COVID  -Consent in chart  -DVT ppx  -C/w medical management as per primary team    SPECTRA [id number]

## 2021-10-01 NOTE — CHART NOTE - NSCHARTNOTESELECT_GEN_ALL_CORE
GOC/Event Note
Post Op check/Event Note
Signoff/Event Note
Transfer Note
Hypotension/Event Note
Vascular surgery - pre-op note/Event Note
Vascular/Event Note

## 2021-10-01 NOTE — PHARMACOTHERAPY INTERVENTION NOTE - COMMENTS
Patient becomes orthostatic when he undergoes dialysis and has been receiving midodrine 30 mins before on those days. No current active order- but patient should continue to receive  it as per team. Spoke to MD Reid [phone]- will reorder with directions specifying 30 mins before dialysis.

## 2021-10-01 NOTE — PROGRESS NOTE ADULT - SUBJECTIVE AND OBJECTIVE BOX
Patient: DRAKE HO , 81y ([birth year])Male   MRN: [id number]  Location: [place] [id number]  Visit: 09-21-21 Inpatient  Date: 10-01-21 @ 03:08    Hospital Day #: 11    Procedure/Dx/Injuries: c/s for insertion of TDC    Events of past 24 hours: No acute events overnight. Patient pre-op'd for OR today.     PAST MEDICAL & SURGICAL HISTORY:  Diabetes mellitus  COPD (chronic obstructive pulmonary disease)  Lymphedema of both lower extremities  CHF (congestive heart failure)  ESRD on dialysis  H/O right nephrectomy 20 yrs ago    Vitals:   T(F): 99.5 (09-30-21 @ 22:00), Max: 99.5 (09-30-21 @ 22:00)  HR: 86 (09-30-21 @ 22:00)  BP: 140/62 (09-30-21 @ 22:00)  RR: 20 (09-30-21 @ 22:00)  SpO2: 99% (09-30-21 @ 22:00)    Diet, NPO after Midnight:      NPO Start Date: 30-Sep-2021,   NPO Start Time: 23:59  Except Medications  Diet, Renal Restrictions:   For patients receiving Renal Replacement - No Protein Restr, No Conc K, No Conc Phos, Low Sodium    Fluids:     I & O's:  09-29-21 @ 07:01  -  09-30-21 @ 07:00  --------------------------------------------------------  IN:    Oral Fluid: 720 mL  Total IN: 720 mL    OUT:    Voided (mL): 25 mL  Total OUT: 25 mL    Total NET: 695 mL    PHYSICAL EXAM:  General: NAD, calm and cooperative  Cardiac: RRR S1, S2, no Murmurs, rubs or gallops  Respiratory: CTAB, normal respiratory effort  Abdomen: Soft, non-distended, non-tender, no rebound, no guarding.  Musculoskeletal: ROM intact, compartments soft. Left heel wound.  Vascular: Dopplerable signals bilateral DP/PTs.  Skin: Warm/dry, normal color, no jaundice    MEDICATIONS  (STANDING):  ascorbic acid 500 milliGRAM(s) Oral daily  aspirin enteric coated 81 milliGRAM(s) Oral daily  atorvastatin 40 milliGRAM(s) Oral at bedtime  budesonide  80 MICROgram(s)/formoterol 4.5 MICROgram(s) Inhaler 2 Puff(s) Inhalation two times a day  calcium acetate 667 milliGRAM(s) Oral three times a day with meals  chlorhexidine 4% Liquid 1 Application(s) Topical <User Schedule>  dextrose 40% Gel 15 Gram(s) Oral once  dextrose 5%. 1000 milliLiter(s) (50 mL/Hr) IV Continuous <Continuous>  dextrose 5%. 1000 milliLiter(s) (100 mL/Hr) IV Continuous <Continuous>  dextrose 50% Injectable 25 Gram(s) IV Push once  dextrose 50% Injectable 12.5 Gram(s) IV Push once  dextrose 50% Injectable 25 Gram(s) IV Push once  donepezil 5 milliGRAM(s) Oral at bedtime  epoetin graham-epbx (RETACRIT) Injectable 5000 Unit(s) IV Push <User Schedule>  glucagon  Injectable 1 milliGRAM(s) IntraMuscular once  heparin SubCutaneous Injection - Peds 5000 Unit(s) SubCutaneous every 12 hours  insulin lispro (ADMELOG) corrective regimen sliding scale   SubCutaneous three times a day before meals  levoFLOXacin IVPB 250 milliGRAM(s) IV Intermittent every 24 hours  meropenem  IVPB 500 milliGRAM(s) IV Intermittent every 24 hours  meropenem  IVPB      mirtazapine 15 milliGRAM(s) Oral at bedtime  multivitamin Oral Tab/Cap - Peds 1 Tablet(s) Oral daily  pantoprazole    Tablet 40 milliGRAM(s) Oral before breakfast  polyethylene glycol 3350 17 Gram(s) Oral two times a day  povidone iodine 10% Solution 1 Application(s) Topical two times a day  senna 2 Tablet(s) Oral at bedtime  sertraline 25 milliGRAM(s) Oral daily  tamsulosin 0.4 milliGRAM(s) Oral at bedtime  vancomycin  IVPB 1000 milliGRAM(s) IV Intermittent <User Schedule>    MEDICATIONS  (PRN):  acetaminophen   Tablet .. 650 milliGRAM(s) Oral every 6 hours PRN Temp greater or equal to 38C (100.4F), Mild Pain (1 - 3), Moderate Pain (4 - 6)  ALBUTerol  90 MICROgram(s) HFA Inhaler - Peds 2 Puff(s) Inhalation every 4 hours PRN Bronchospasm    DVT PROPHYLAXIS: heparin SubCutaneous Injection - Peds 5000 Unit(s) SubCutaneous every 12 hours  GI PROPHYLAXIS: pantoprazole    Tablet 40 milliGRAM(s) Oral before breakfast    ANTICOAGULATION:   ANTIBIOTICS:  levoFLOXacin IVPB 250 milliGRAM(s)  meropenem  IVPB 500 milliGRAM(s)  meropenem  IVPB    vancomycin  IVPB 1000 milliGRAM(s)    LAB/STUDIES:  CAPILLARY BLOOD GLUCOSE  POCT Blood Glucose.: 141 mg/dL (30 Sep 2021 21:20)  POCT Blood Glucose.: 133 mg/dL (30 Sep 2021 16:15)  POCT Blood Glucose.: 215 mg/dL (30 Sep 2021 12:28)  POCT Blood Glucose.: 102 mg/dL (30 Sep 2021 07:50)                        10.9   17.64 )-----------( 314      ( 30 Sep 2021 05:29 )             36.7       Auto Neutrophil %: 53.0 % (09-30-21 @ 05:29)  Auto Immature Granulocyte %: 6.0 % (09-30-21 @ 05:29)    09-30  140  |  99  |  35<H>  ----------------------------<  136<H>  5.3<H>   |  22  |  5.2<HH>    Calcium, Total Serum: 8.8 mg/dL (09-30-21 @ 17:17)    LFTs:     6.0  | <0.2 | 23       ------------------[86      ( 30 Sep 2021 05:29 )  3.2  | x    | 14          Coags:  12.60  ----< 1.10    ( 30 Sep 2021 05:29 )     30.9    IMAGING:  None resulted within 24h

## 2021-10-01 NOTE — BRIEF OPERATIVE NOTE - OPERATION/FINDINGS
preop; ESRD on dialysis   Operation; left insertion of Tunnelled dialysis catheter using US guidance and fluro guidance preop; ESRD on dialysis   Operation; left insertion of Tunnelled dialysis catheter using US guidance and fluoro guidance

## 2021-10-01 NOTE — BRIEF OPERATIVE NOTE - NSICDXBRIEFPROCEDURE_GEN_ALL_CORE_FT
PROCEDURES:  XR fluoro guided insertion of tunnelled dialysis catheter 01-Oct-2021 10:47:49  Yulissa Davis

## 2021-10-02 NOTE — DISCHARGE NOTE PROVIDER - NSDCCPCAREPLAN_GEN_ALL_CORE_FT
PRINCIPAL DISCHARGE DIAGNOSIS  Diagnosis: Septic shock  Assessment and Plan of Treatment: Sepsis is a serious condition that occurs when the body overreacts to an infection. It is also called systemic inflammatory response syndrome (SIRS) with infection. An infection is usually caused by bacteria that attack the body. The body's defense system normally fights off infection within the affected body part. With sepsis, the body overreacts and causes symptoms to occur throughout the body. This leads to uncontrolled and widespread inflammation and clotting in small blood vessels. Blood flow to different body parts decreases and may lead to organ failure. Sepsis requires immediate treatment.  You were treated in the hospital with IV antibiotics, and your symptoms resolved.   Please seek immediate medical attention if you develop fever >100.4 F, feel dizzy, have nausea or vomiting, coughing blood, have sudden trouble breathing or chest pain, severe weakness, or your skin or lips are turning blue.        SECONDARY DISCHARGE DIAGNOSES  Diagnosis: Acute UTI  Assessment and Plan of Treatment: You were noted either on arrival or during this hospitalization to have a Urinary Tract Infection. You may have already been treated and completed the antibiotics, please refer to the list of medications present on your discharge paperwork. If you notice that there are antibiotics listed, these may be to treat your infection, be sure to complete taking the full course, whether you have symptoms or not, as prescribed.  While taking antibiotics, you may benefit from taking a probiotic such as florastore to help to try and prevent an infectious type of diarrhea known as C Diff. If you notice that you begin having severe watery diarrhea, more than 4-5 episodes a day, please see your Primary Care Doctor or come to the ER to have your stool tested for this infection.   It is not necessary to repeat a urine test to see if the infection is gone, it is assumed that after treatment it should have resolved. However, if you continue to have symptoms, please see your Primary Care doctor or return to the ER.      Diagnosis: Legionella pneumonia  Assessment and Plan of Treatment:   Please take your medications as directed. Don’t skip doses. Follow up with your primary care physician within 3 days. Continue taking your antibiotics as directed until they are all gone—even if you start to feel better. This will prevent the pneumonia from  Coughing up mucus is normal. Don’t use medicines to suppress your cough unless your cough is dry, painful, or interferes with your sleep. Get plenty of rest until your fever, shortness of breath, and chest pain go away. Plan to get a flu shot every year. Ask your primary care doctor about pneumonia vaccines.  Seek immediate medical attention if you experience chest pain, trouble breathing, blue lips or fingernails, fever of 100.4°F  (38°C) or higher, yellow, green, bloody, or smelly sputum, more than normal mucus production, vomiting or diarrhea.

## 2021-10-02 NOTE — DISCHARGE NOTE NURSING/CASE MANAGEMENT/SOCIAL WORK - PATIENT PORTAL LINK FT
Pt w/ abdominal pain beginning tonight. pt w/ vomiting and diarrhea over past 3 days. Endorse tolerating PO w/ normal urinations. Pt w/ epigastric pain noted. no fevers noted.  No PMH IUTD NKA You can access the FollowMyHealth Patient Portal offered by [facility] by registering at the following website: [email or web address]. By joining [facility]’s FollowMyHealth portal, you will also be able to view your health information using other applications (apps) compatible with our system.

## 2021-10-02 NOTE — DISCHARGE NOTE PROVIDER - NSDCMRMEDTOKEN_GEN_ALL_CORE_FT
acetaminophen 325 mg oral tablet: 2 tab(s) orally every 6 hours, As needed, Temp greater or equal to 38C (100.4F), Mild Pain (1 - 3), Moderate Pain (4 - 6)  albuterol 90 mcg/inh inhalation aerosol: 2 puff(s) inhaled every 6 hours  ascorbic acid 500 mg oral tablet: 1 tab(s) orally once a day  aspirin 81 mg oral delayed release tablet: 1 tab(s) orally once a day  atorvastatin 40 mg oral tablet: 1 tab(s) orally once a day (at bedtime)  Breo Ellipta 200 mcg-25 mcg/inh inhalation powder: 1 puff(s) inhaled once a day  calcium acetate 667 mg oral tablet: 1 tab(s) orally 3 times a day (with meals)  donepezil 5 mg oral tablet: 1 tab(s) orally once a day (at bedtime)  epoetin graham: 5000 unit(s) injectable Monday, Wednesday, and Friday  heparin: 5000 unit(s) subcutaneous every 12 hours  HYDROmorphone 2 mg oral tablet: 1 tab(s) orally every 4 hours, As needed, Moderate Pain (4 - 6)  midodrine 10 mg oral tablet: 1 tab(s) orally prior to hemodialysis  mirtazapine 15 mg oral tablet: 1 tab(s) orally once a day (at bedtime)  multivitamin: 1 tab(s) orally once a day  pantoprazole 40 mg oral delayed release tablet: 1 tab(s) orally once a day (before a meal)  povidone iodine 10% topical ointment: 1 application topically   Senna 8.6 mg oral tablet: 2 tab(s) orally once a day (at bedtime), As Needed  sertraline 25 mg oral tablet: 1 tab(s) orally once a day  tamsulosin 0.4 mg oral capsule: 1 cap(s) orally once a day (at bedtime)  Toprol-XL 25 mg oral tablet, extended release: 1 tab(s) orally once a day (at bedtime)   acetaminophen 325 mg oral tablet: 2 tab(s) orally every 6 hours, As needed, Temp greater or equal to 38C (100.4F), Mild Pain (1 - 3), Moderate Pain (4 - 6)  albuterol 90 mcg/inh inhalation aerosol: 2 puff(s) inhaled every 6 hours  ascorbic acid 500 mg oral tablet: 1 tab(s) orally once a day  aspirin 81 mg oral delayed release tablet: 1 tab(s) orally once a day  atorvastatin 40 mg oral tablet: 1 tab(s) orally once a day (at bedtime)  Breo Ellipta 200 mcg-25 mcg/inh inhalation powder: 1 puff(s) inhaled once a day  calcium acetate 667 mg oral tablet: 1 tab(s) orally 3 times a day (with meals)  donepezil 5 mg oral tablet: 1 tab(s) orally once a day (at bedtime)  epoetin graham: 5000 unit(s) injectable Monday, Wednesday, and Friday  heparin: 5000 unit(s) subcutaneous every 12 hours  levoFLOXacin 250 mg oral tablet: 1 tab(s) orally every 24 hours, last dose on 10/7/21  meropenem: 500 milligram(s) intravenous once a day, doses to be given on 10/3/21 and 10/4/21  midodrine 10 mg oral tablet: 1 tab(s) orally prior to hemodialysis  mirtazapine 15 mg oral tablet: 1 tab(s) orally once a day (at bedtime)  multivitamin: 1 tab(s) orally once a day  pantoprazole 40 mg oral delayed release tablet: 1 tab(s) orally once a day (before a meal)  povidone iodine 10% topical ointment: 1 application topically   Senna 8.6 mg oral tablet: 2 tab(s) orally once a day (at bedtime), As Needed  sertraline 25 mg oral tablet: 1 tab(s) orally once a day  tamsulosin 0.4 mg oral capsule: 1 cap(s) orally once a day (at bedtime)  Toprol-XL 25 mg oral tablet, extended release: 1 tab(s) orally once a day (at bedtime)  vancomycin 1 g intravenous injection: 1 gram(s) intravenous once after next dialysis on 10/4/21

## 2021-10-02 NOTE — PROGRESS NOTE ADULT - SUBJECTIVE AND OBJECTIVE BOX
[facility] FOLLOW UP NOTE  --------------------------------------------------------------------------------  Chief Complaint/24 hour events/subjective:    pt seen in HD, no SOB    PAST HISTORY  --------------------------------------------------------------------------------  No significant changes to PMH, PSH, FHx, SHx, unless otherwise noted    ALLERGIES & MEDICATIONS  --------------------------------------------------------------------------------  Allergies    No Known Allergies    Intolerances      Standing Inpatient Medications  ALBUTerol  90 MICROgram(s) HFA Inhaler - Peds 4 Puff(s) Inhalation every 4 hours  ascorbic acid 500 milliGRAM(s) Oral daily  aspirin enteric coated 81 milliGRAM(s) Oral daily  budesonide  80 MICROgram(s)/formoterol 4.5 MICROgram(s) Inhaler 2 Puff(s) Inhalation two times a day  calcium acetate 667 milliGRAM(s) Oral three times a day with meals  chlorhexidine 4% Liquid 1 Application(s) Topical <User Schedule>  dextrose 5%. 1000 milliLiter(s) IV Continuous <Continuous>  dextrose 50% Injectable 25 Gram(s) IV Push once  dextrose 50% Injectable 12.5 Gram(s) IV Push once  dextrose 50% Injectable 25 Gram(s) IV Push once  donepezil 5 milliGRAM(s) Oral at bedtime  epoetin graham-epbx (RETACRIT) Injectable 5000 Unit(s) IV Push <User Schedule>  glucagon  Injectable 1 milliGRAM(s) IntraMuscular once  heparin SubCutaneous Injection - Peds 5000 Unit(s) SubCutaneous every 12 hours  insulin lispro (ADMELOG) corrective regimen sliding scale   SubCutaneous three times a day before meals  levoFLOXacin IVPB 250 milliGRAM(s) IV Intermittent every 24 hours  mirtazapine 15 milliGRAM(s) Oral at bedtime  multivitamin Oral Tab/Cap - Peds 1 Tablet(s) Oral daily  pantoprazole    Tablet 40 milliGRAM(s) Oral before breakfast  polyethylene glycol 3350 17 Gram(s) Oral two times a day  senna 2 Tablet(s) Oral at bedtime  sertraline 25 milliGRAM(s) Oral daily  tamsulosin 0.4 milliGRAM(s) Oral at bedtime    PRN Inpatient Medications  acetaminophen   Tablet .. 650 milliGRAM(s) Oral every 6 hours PRN      REVIEW OF SYSTEMS  --------------------------------------------------------------------------------    All other systems were reviewed and are negative, except as noted.    VITALS/PHYSICAL EXAM  --------------------------------------------------------------------------------  T(C): 36.2 (10-02-21 @ 07:42), Max: 36.9 (10-02-21 @ 00:26)  HR: 89 (10-02-21 @ 09:20) (77 - 97)  BP: 135/63 (10-02-21 @ 09:20) (99/50 - 154/65)  RR: 18 (10-02-21 @ 09:20) (17 - 19)  SpO2: 98% (10-02-21 @ 09:20) (98% - 100%)  Wt(kg): --  Height (cm): 172.7 (10-01-21 @ 07:58)  Weight (kg): 70 (10-01-21 @ 07:58)  BMI (kg/m2): 23.5 (10-01-21 @ 07:58)  BSA (m2): 1.83 (10-01-21 @ 07:58)      10-01-21 @ 07:01  -  10-02-21 @ 07:00  --------------------------------------------------------  IN: 480 mL / OUT: 0 mL / NET: 480 mL      Physical Exam:    	Gen: no distress   	Pulm: CTA B/L  	CV: S1S2; no rub  	Abd: +BS, soft, nontender/nondistended  	LE:  no  edema      LABS/STUDIES  --------------------------------------------------------------------------------              10.5   14.32 >-----------<  311      [10-02-21 @ 04:30]              35.2     137  |  97  |  71  ----------------------------<  79      [10-02-21 @ 04:30]  6.5   |  19  |  7.3        Ca     8.9     [10-02-21 @ 04:30]      Mg     1.9     [10-02-21 @ 04:30]      Phos  6.9     [10-02-21 @ 04:30]    TPro  6.0  /  Alb  3.2  /  TBili  0.2  /  DBili  x   /  AST  24  /  ALT  7   /  AlkPhos  85  [10-02-21 @ 04:30]          Creatinine Trend:  SCr 7.3 [10-02 @ 04:30]  SCr 6.1 [10-01 @ 04:30]  SCr 5.2 [09-30 @ 17:17]  SCr 8.3 [09-30 @ 05:29]  SCr 6.4 [09-29 @ 11:31]    Urinalysis - [09-22-21 @ 02:00]      Color Yellow / Appearance Turbid / SG 1.015 / pH 6.5      Gluc Negative / Ketone Small  / Bili Negative / Urobili <2 mg/dL       Blood Large / Protein 300 mg/dL / Leuk Est Large / Nitrite Negative      RBC >720 / WBC >720 / Hyaline  / Gran  / Sq Epi  / Non Sq Epi  / Bacteria       Iron 10, TIBC 119, %sat 8      [09-22-21 @ 08:39]  Ferritin 938      [09-22-21 @ 08:39]  PTH -- (Ca 8.9)      [12-24-20 @ 04:30]   54  Lipid: chol 114, , HDL 38, LDL --      [08-27-21 @ 04:30]    HBsAg Nonreact      [09-22-21 @ 08:39]  HCV 0.17, Nonreact      [09-22-21 @ 08:39]

## 2021-10-02 NOTE — DISCHARGE NOTE PROVIDER - PROVIDER TOKENS
PROVIDER:[TOKEN:[[id number]],FOLLOWUP:[2 weeks]],PROVIDER:[TOKEN:[[id number]],FOLLOWUP:[1 month]],PROVIDER:[TOKEN:[[id number]],FOLLOWUP:[1 week]],PROVIDER:[TOKEN:[[id number]],FOLLOWUP:[1 month]]

## 2021-10-02 NOTE — DISCHARGE NOTE PROVIDER - CARE PROVIDERS DIRECT ADDRESSES
,DirectAddress_Unknown,DirectAddress_Unknown,[email or web address],DirectAddress_Unknown

## 2021-10-02 NOTE — DISCHARGE NOTE PROVIDER - HOSPITAL COURSE
80 y/o gentleman with a past medical history of Alzheimer's dementia, ESRD on HD (oliguric), Anemia of chronic disease, COPD on 4L O2, DM II, Hfw/REF and Severe AS, HLD, and recently discharge for multi-lobar pneumonia admitted for septic shock from [facility]. Began having fevers on Sunday before admission and complaining of abdominal pain, without any other significant symptoms (n/v/d, SOB, CP, cough). On the day of admission, he was noted to be febrile and hypotensive, and anuric.   Still found to be hypotensive in the ED after 1L IVF and started on peripheral levophed, WBC 14, CXR appears improved from last on 9/2.  Patient was found to be in septic shock. Tesio catheter removed as possible source as blood cultures were positive for staph epi, replaced with femoral [place] catheter to be used for dialysis. Patient also found to have legionella PNA and Proteus UTI, which is being managed with ABx treatment, to continue as outpatient. Prior to discharge, patient had new tesio catheter replaced and femoral [place] removed.   Patient was followed by Nephrology to manage Hemodialysis.    Patient was followed by Podiatry for a left heel ulcer with poor wound healing secondary to vascular occlusions. Patient will address these issues with vascular surgery as an outpatient then follow up with podiatry    #Septic Shock due to Legionella PNA and Proteus UTI, shock resolved  Metabolic Encephalopathy, improved  Ervin pus in maldonado, remains on low dose levophed, Ucx with proteus, pending sensitivities  < from: CT Abdomen and Pelvis w/ IV Cont (09.22.21 @ 13:24) >  .  Findings compatible with acute cystitis and left-sided ascending urinary tract infection; partially distended urinary bladder despite presence of a [place] catheter. Correlate clinically for catheter function.  2.  Large diffuse colonic stool burden with moderately distended rectum and mild presacral edema.  3.  Incompletely characterized cystic lesion in the pancreatic head. This can be further evaluated with an outpatient contrast-enhanced MRI/MRCP.  9/21 Blood cx : staph epi  Urine Legionella +  ID following, recs meropenem 500 q24 7 days (9/27-10/3), vanc 1g IV post HD 4 more doses (9/27), Levo 250mg IV q24h for 14 days (9/23 start)  - may need midline on dc for abx (ertapenem)  tesio removed per ID recs, Vascular placed udall for HD (femoral)  2d echo EF 45-50%, severe AS  repeat blood cx  Miralax Q12H and senna; mineral oil enema today  - maldonado removed due to pus, TOV performed  - KUB shows moderate stool burden, continue w/ bowel regimen   - Repeat KUB ordered for 9/29 AM, low stool burden    #Chronic hypoxic respiratory failure   #COPD on 4L home O2  #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     #Left heel ulcer  wound care following; podiatry following  - podiatry recs- studies done. ESR-29 CRP- 67  - B/L LE arterial duplex- Bilateral moderate to severe atherosclerotic disease. The left posterior tibial and peroneal arteries are occluded. (Vascular surgery notifited)  - XRay: Diffuse osteopenia. Plantar heel ulcer with suggestion of loss of cortical distinctiveness at the plantar calcaneus, suggestive of osteomyelitis. Degenerative changes of the hindfoot, midfoot and forefoot joints. Vascular calcifications.  - Podiatry- will follow up outpatient with Dr. Ragland  - Vascular- placement of new TDC Friday 10/1 with Dr. Alamo, removal of femoral UDall after successful HD  - Pt should follow up with Dr. Rossi as outpt in 1-2 weeks to re-evaluate left foot, PAD, possible angiogram  - Wound care- betadine    #ESRD on HD (oliguric):  #HAGMA  #Hyponatremia   -HD days; will receive vanc and epo  -nephro following  -Cont ca-acetate and midodrine    #Alzheimer's dementia  -as per family, patient's mentation improved   -Cont donepezil, sertraline, and mirtazepine    80 y/o gentleman with a past medical history of Alzheimer's dementia, ESRD on HD (oliguric), Anemia of chronic disease, COPD on 4L O2, DM II, Hfw/REF and Severe AS, HLD, and recently discharge for multi-lobar pneumonia admitted for septic shock from [facility]. Began having fevers on Sunday before admission and complaining of abdominal pain, without any other significant symptoms (n/v/d, SOB, CP, cough). On the day of admission, he was noted to be febrile and hypotensive, and anuric.   Still found to be hypotensive in the ED after 1L IVF and started on peripheral levophed, WBC 14, CXR appears improved from last on 9/2.  Patient was found to be in septic shock. Tesio catheter removed as possible source as blood cultures were positive for staph epi, replaced with femoral [place] catheter to be used for dialysis. Patient also found to have legionella PNA and Proteus UTI, which is being managed with ABx treatment, to continue as outpatient. Prior to discharge, patient had new tesio catheter replaced and femoral [place] removed.   Patient was followed by Nephrology to manage Hemodialysis.    Patient was followed by Podiatry for a left heel ulcer with poor wound healing secondary to vascular occlusions. Patient will address these issues with vascular surgery as an outpatient then follow up with podiatry    # Metabolic encephalopathy resolved  # Septic Shock sec to ORSE, bacteremia legionella pneumonia and proteus UTI  -  CT Abdomen and Pelvis w/ IV Cont (09.22.21 @ 13:24) >Findings compatible with acute cystitis and left-sided ascending urinary tract infection; partially distended urinary bladder despite presence of a [place] catheter. Large diffuse colonic stool burden with moderately distended rectum and mild presacral edema. Incompletely characterized cystic lesion in the pancreatic head. This can be further evaluated with an outpatient contrast-enhanced MRI/MRCP.  - blood Culture Results: Growth in aerobic and anaerobic bottles: Staphylococcus epidermidis (09.21.21 @ 16:55)  - blood Culture Results: No growth to date. (09.27.21 @ 18:27)  - urine Culture Results: >100,000 CFU/ml Proteus mirabilis ESBL (09.21.21 @ 23:20)  - Legionella Antigen, Urine: Positive (09.22.21 @ 02:00)  - TTE Echo Complete w/o Contrast w/ Doppler (09.24.21 @ 08:30) >Severe aortic valve stenosis.no vegetations  - 9/24 Tesio removed  - ID eval: Meropenem 500 mg iv q24h for 7 days starting 9/27, Vancomycin 1 gm iv post HD  for 4 more doses starting 9/27, Levo 250 mg iv q24h. Could change to po 250 mg q24h on discharge for 14 days starting 9/23    # Left heel ulcer  # PAD  - VA Duplex Lower Extrem Arterial, Bilat (09.24.21 @ 18:35) >Bilateral moderate to severe atherosclerotic disease. The left posterior tibial and peroneal arteries are occluded.  - Xray Foot AP + Lateral, Left (09.24.21 @ 18:08) >Diffuse osteopenia. Plantar heel ulcer with suggestion of loss of cortical distinctiveness at the plantar calcaneus, suggestive of osteomyelitis. Degenerative changes of the hindfoot, midfoot and forefoot joints. Vascular calcifications.  - Vascular eval: Pt should follow up with Dr. Rossi as outpt in 1-2 weeks to re-evaluate left foot, PAD, possible angiogram  - Podiatry F/u : Local Wound Care; Wound Flushed w/ NS; Wound Packed w/ santyl / DSD / Kerlix . No intervention from Podiatry  - Cont LWC; Q24. Pt can f/u with Dr Ragland at [address] upon d/c  - c/w ASA, statin    # ESRD on HD   # Hyperkalemia  # HAGMA-resolved  #Hyponatremia - resolved  # Anemia sec to CKD  - c/w EPO with HD  - c/w midodrine with HD  - c/w phoslo  - S/p  insertion of  new TDC  on 10/1/21    # Chronic hypoxic respiratory failure - stable  - c/w  home inhalers    # Chronic systolic CHF  # Severe AS  - c/w toprol    # H/o BPH  -c/w flomax    # Alzheimer's dementia  -c/w donepezil, sertraline, mirtazepine     # Constipation-resolved  -  Xray Kidney Ureter Bladder (09.29.21 @ 06:29) >Nonobstructive bowel gas pattern. Mild to moderate colonic stool.  - c/w miralax, senna    # DVT prophylaxis    # DNR/DNI ( GOC discussed)      --> time spent> 39 minutes

## 2021-10-02 NOTE — DISCHARGE NOTE PROVIDER - CARE PROVIDER_API CALL
TAE BUCKLEY  Internal Medicine  [address]  [place] [address]  Phone: [phone]  Fax: [phone]  Follow Up Time: 2 weeks    Robel Ragland)  Podiatric Medicine and Surgery  [address], [address]  [place] [address]  Phone: [phone]  Fax: [phone]  Follow Up Time: 1 month    Kishor Rossi)  Surgery; Vascular Surgery  [address] [place] [address]  Phone: [phone]  Fax: [phone]  Follow Up Time: 1 week    Bowen Saba  INTERNAL MEDICINE  [address] [place] [address]  Phone: [phone]  Fax: [phone]  Follow Up Time: 1 month

## 2021-10-02 NOTE — DISCHARGE NOTE PROVIDER - NSDCFUADDINST_GEN_ALL_CORE_FT
Please make an appointment to follow up with Dr. Rossi in Vascular surgery to address your vascular disease in 1-2 weeks  Please follow up with your primary care provider in the next two weeks  Please follow up with Dr. Ragland in podiatry in 2-4 weeks Please make an appointment to follow up with Dr. Rossi in Vascular surgery to address your vascular disease in 1-2 weeks  Please follow up with your primary care provider in the next two weeks  Please follow up with Dr. Ragland in podiatry in 2-4 weeks    After discharge, you will receive two more doses of meropenem on 10/3 and 10/4 to complete your course, you will receive one dose of vancomycin after your next dialysis session on 10/4 to complete your course, and you will continue levofloxacin daily until 10/7 to complete your course

## 2021-10-02 NOTE — PROGRESS NOTE ADULT - PROVIDER SPECIALTY LIST ADULT
Internal Medicine
Internal Medicine
Nephrology
Hospitalist
Hospitalist
Internal Medicine
Nephrology
Podiatry
Vascular Surgery
Critical Care
Internal Medicine
Nephrology
Vascular Surgery
Vascular Surgery
Critical Care
Critical Care
Infectious Disease
Internal Medicine
Nephrology
Vascular Surgery
Hospitalist
Palliative Care
Infectious Disease
Infectious Disease
Palliative Care

## 2021-10-02 NOTE — PROGRESS NOTE ADULT - REASON FOR ADMISSION
Hypotension and Fever

## 2021-10-02 NOTE — DISCHARGE NOTE PROVIDER - INSTRUCTIONS
Nepro cans or servings per day: 1, Frequency: Two times a day Nepro cans or servings per day: 1, Frequency: Two times a day  meropenem to be given at bedtime.

## 2021-10-02 NOTE — PROGRESS NOTE ADULT - ASSESSMENT
ASSESSMENT:  81M s/p insertion of left tdc    PLAN:  -May use tesio for dialysis  -Will remove udall after successful dialysis session  -C/w medical management as per primary team    SPECTRA [id number]

## 2021-10-02 NOTE — DISCHARGE NOTE PROVIDER - NSDCCAREPROVSEEN_GEN_ALL_CORE_FT
[facility] Medicine, [facility] Nephrology, [facility] Vascular Surgery [facility] Medicine, [facility] Nephrology, [facility] Vascular Surgery, Podiatry

## 2021-10-02 NOTE — PROGRESS NOTE ADULT - ASSESSMENT
ESRD on HD   - access via TDC   - fluid removal during HD has been limited due to intradialytic hypotension  hyperkalemia   Bacteremia  UTI  COPD on home O2  HFrEF  dementia   HTN  anemia  right nephrectomy    plan:    HD ongoing, 1 k bath, UF 2-3 kg  new Tunneled catheter working well  Continue midodrine  continue abxs per team  low k diet  EPO with HD   d/w HD nurse and HS ESRD on HD   - access via TDC   - fluid removal during HD has been limited due to intradialytic hypotension  hyperkalemia   Bacteremia  UTI  COPD on home O2  HFrEF  dementia   HTN  anemia  right nephrectomy    plan:    HD ongoing, 1 k bath, UF 2-3 kg  new Tunneled catheter working well  Continue midodrine  continue abxs per team  low k diet  EPO with HD   d/w HD nurse and HS      pt seen 2nd time to eval pt, BP stable, to increase UF 2.8 kg, 1 k bath last hour d/w  HD nurse

## 2021-10-02 NOTE — PROGRESS NOTE ADULT - SUBJECTIVE AND OBJECTIVE BOX
GENERAL SURGERY PROGRESS NOTE    Patient: DRAKE HO , 81y ([birth year])Male   MRN: [id number]  Location: [address]  Visit: 09-21-21 Inpatient  Date: 10-02-21 @ 02:52    Hospital Day #: 12  Post-op Day #: 1    Procedure/Dx/Injuries: s/p L IJ TDC    Events of past 24 hours: No acute events overnight. Patient without complaints at this time.    PAST MEDICAL & SURGICAL HISTORY:  Diabetes mellitus  COPD (chronic obstructive pulmonary disease)  Lymphedema of both lower extremities  CHF (congestive heart failure)  ESRD on dialysis  H/O right nephrectomy 20 yrs ago    Vitals:   T(F): 98.5 (10-02-21 @ 00:26), Max: 98.5 (10-02-21 @ 00:26)  HR: 89 (10-02-21 @ 00:26)  BP: 139/62 (10-02-21 @ 00:26)  RR: 19 (10-01-21 @ 15:28)  SpO2: 98% (10-02-21 @ 00:26)    Diet, Renal Restrictions:   For patients receiving Renal Replacement - No Protein Restr, No Conc K, No Conc Phos, Low Sodium  Consistent Carbohydrate Evening Snack  Supplement Feeding Modality:  Oral  Nepro Cans or Servings Per Day:  1       Frequency:  Two Times a day    Fluids:     I & O's:  09-30-21 @ 07:01  -  10-01-21 @ 07:00  --------------------------------------------------------  IN:    Oral Fluid: 600 mL  Total IN: 600 mL    OUT:    Indwelling Catheter - Urethral (mL): 500 mL    Other (mL): 2500 mL    Voided (mL): 500 mL  Total OUT: 3500 mL    Total NET: -2900 mL    PHYSICAL EXAM:  General: NAD, calm and cooperative  HEENT: Left TDC without hematoma  Cardiac: RRR S1, S2, no Murmurs, rubs or gallops  Respiratory: CTAB, normal respiratory effort  Abdomen: Soft, non-distended, non-tender, no rebound, no guarding.  Musculoskeletal: ROM intact, compartments soft. Left heel wound.  Vascular: Dopplerable signals bilateral DP/PTs.  Skin: Warm/dry, normal color, no jaundice.    MEDICATIONS  (STANDING):  ALBUTerol  90 MICROgram(s) HFA Inhaler - Peds 4 Puff(s) Inhalation every 4 hours  ascorbic acid 500 milliGRAM(s) Oral daily  aspirin enteric coated 81 milliGRAM(s) Oral daily  budesonide  80 MICROgram(s)/formoterol 4.5 MICROgram(s) Inhaler 2 Puff(s) Inhalation two times a day  calcium acetate 667 milliGRAM(s) Oral three times a day with meals  chlorhexidine 4% Liquid 1 Application(s) Topical <User Schedule>  dextrose 5%. 1000 milliLiter(s) (50 mL/Hr) IV Continuous <Continuous>  dextrose 50% Injectable 25 Gram(s) IV Push once  dextrose 50% Injectable 12.5 Gram(s) IV Push once  dextrose 50% Injectable 25 Gram(s) IV Push once  donepezil 5 milliGRAM(s) Oral at bedtime  epoetin graham-epbx (RETACRIT) Injectable 5000 Unit(s) IV Push <User Schedule>  glucagon  Injectable 1 milliGRAM(s) IntraMuscular once  heparin SubCutaneous Injection - Peds 5000 Unit(s) SubCutaneous every 12 hours  insulin lispro (ADMELOG) corrective regimen sliding scale   SubCutaneous three times a day before meals  levoFLOXacin IVPB 250 milliGRAM(s) IV Intermittent every 24 hours  mirtazapine 15 milliGRAM(s) Oral at bedtime  multivitamin Oral Tab/Cap - Peds 1 Tablet(s) Oral daily  pantoprazole    Tablet 40 milliGRAM(s) Oral before breakfast  polyethylene glycol 3350 17 Gram(s) Oral two times a day  senna 2 Tablet(s) Oral at bedtime  sertraline 25 milliGRAM(s) Oral daily  tamsulosin 0.4 milliGRAM(s) Oral at bedtime    MEDICATIONS  (PRN):  acetaminophen   Tablet .. 650 milliGRAM(s) Oral every 6 hours PRN Temp greater or equal to 38C (100.4F), Mild Pain (1 - 3), Moderate Pain (4 - 6)    DVT PROPHYLAXIS: heparin SubCutaneous Injection - Peds 5000 Unit(s) SubCutaneous every 12 hours  GI PROPHYLAXIS: pantoprazole    Tablet 40 milliGRAM(s) Oral before breakfast    ANTICOAGULATION:   ANTIBIOTICS:  levoFLOXacin IVPB 250 milliGRAM(s)    LAB/STUDIES:  CAPILLARY BLOOD GLUCOSE  POCT Blood Glucose.: 112 mg/dL (01 Oct 2021 21:15)  POCT Blood Glucose.: 182 mg/dL (01 Oct 2021 16:13)  POCT Blood Glucose.: 109 mg/dL (01 Oct 2021 07:21)                        10.5   18.65 )-----------( 315      ( 01 Oct 2021 04:30 )             34.9       Auto Neutrophil %: 53.3 % (10-01-21 @ 04:30)  Auto Immature Granulocyte %: 5.0 % (10-01-21 @ 04:30)    10-01  140  |  99  |  49<H>  ----------------------------<  103<H>  5.1<H>   |  24  |  6.1<HH>    Calcium, Total Serum: 9.0 mg/dL (10-01-21 @ 04:30)    LFTs:       5.9  | 0.2  | 19       ------------------[81      ( 01 Oct 2021 04:30 )  3.2  | x    | 12          Coags:  12.60  ----< 1.10    ( 30 Sep 2021 05:29 )     30.9     IMAGING:  None resulted within 24h

## 2021-10-07 NOTE — CDI QUERY NOTE - NSCDIOTHERTXTBX_GEN_ALL_CORE_HH
Documentation  8/8/2021 >H and P Hospitalist: 81M w/ Alzheimer's dementia, COPD on home 4L O2, anxiety brought in by EMS with complaint of SOB and desat at NH. Admitted for acute respiratory failure due to mucous plug / PNA     8/8 Radiologic Finding:  Impression:  1. New near complete opacification of the right hemithorax, possibly related to mucous plug.  2. Unchanged left sided interstitial opacities/edema and small left pleural effusion     8/8 Event Note Bronchoscopy Fellow  ..CXR revealing of suspected right sided mucous plugging, consent for bronchoscopy with conscious sedation obtained from patient's daughter, Shin, over the phone, gave total of 4mg Versed, bronchoscope advanced with copious thick secretions suctioned, f/u repeat CXR and ABG.    Based on your professional judgment and above clinical Findings please further clarify  site of where  copious secretions/mucous plug was suctioned:     -Mucous plug suctioned from bronchus (specify laterality)    -Mucous plug suctioned from lung (specify laterality)    -Other (please specify)

## 2022-01-01 ENCOUNTER — INPATIENT (INPATIENT)
Facility: HOSPITAL | Age: 82
LOS: 0 days | End: 2022-01-13
Attending: INTERNAL MEDICINE | Admitting: INTERNAL MEDICINE
Payer: MEDICARE

## 2022-01-01 VITALS
OXYGEN SATURATION: 92 % | HEART RATE: 125 BPM | SYSTOLIC BLOOD PRESSURE: 70 MMHG | TEMPERATURE: 101 F | RESPIRATION RATE: 16 BRPM | DIASTOLIC BLOOD PRESSURE: 43 MMHG | HEIGHT: 68 IN

## 2022-01-01 VITALS
DIASTOLIC BLOOD PRESSURE: 87 MMHG | OXYGEN SATURATION: 99 % | HEART RATE: 85 BPM | RESPIRATION RATE: 20 BRPM | SYSTOLIC BLOOD PRESSURE: 108 MMHG

## 2022-01-01 DIAGNOSIS — K59.00 CONSTIPATION, UNSPECIFIED: ICD-10-CM

## 2022-01-01 DIAGNOSIS — Z66 DO NOT RESUSCITATE: ICD-10-CM

## 2022-01-01 DIAGNOSIS — U07.1 COVID-19: ICD-10-CM

## 2022-01-01 DIAGNOSIS — N40.0 BENIGN PROSTATIC HYPERPLASIA WITHOUT LOWER URINARY TRACT SYMPTOMS: ICD-10-CM

## 2022-01-01 DIAGNOSIS — E87.1 HYPO-OSMOLALITY AND HYPONATREMIA: ICD-10-CM

## 2022-01-01 DIAGNOSIS — Z90.5 ACQUIRED ABSENCE OF KIDNEY: ICD-10-CM

## 2022-01-01 DIAGNOSIS — N18.6 END STAGE RENAL DISEASE: ICD-10-CM

## 2022-01-01 DIAGNOSIS — I89.0 LYMPHEDEMA, NOT ELSEWHERE CLASSIFIED: ICD-10-CM

## 2022-01-01 DIAGNOSIS — Z79.51 LONG TERM (CURRENT) USE OF INHALED STEROIDS: ICD-10-CM

## 2022-01-01 DIAGNOSIS — F02.80 DEMENTIA IN OTHER DISEASES CLASSIFIED ELSEWHERE, UNSPECIFIED SEVERITY, WITHOUT BEHAVIORAL DISTURBANCE, PSYCHOTIC DISTURBANCE, MOOD DISTURBANCE, AND ANXIETY: ICD-10-CM

## 2022-01-01 DIAGNOSIS — Z79.82 LONG TERM (CURRENT) USE OF ASPIRIN: ICD-10-CM

## 2022-01-01 DIAGNOSIS — E11.22 TYPE 2 DIABETES MELLITUS WITH DIABETIC CHRONIC KIDNEY DISEASE: ICD-10-CM

## 2022-01-01 DIAGNOSIS — R09.89 OTHER SPECIFIED SYMPTOMS AND SIGNS INVOLVING THE CIRCULATORY AND RESPIRATORY SYSTEMS: ICD-10-CM

## 2022-01-01 DIAGNOSIS — Z99.2 DEPENDENCE ON RENAL DIALYSIS: ICD-10-CM

## 2022-01-01 DIAGNOSIS — J12.82 PNEUMONIA DUE TO CORONAVIRUS DISEASE 2019: ICD-10-CM

## 2022-01-01 DIAGNOSIS — N39.0 URINARY TRACT INFECTION, SITE NOT SPECIFIED: ICD-10-CM

## 2022-01-01 DIAGNOSIS — I21.A1 MYOCARDIAL INFARCTION TYPE 2: ICD-10-CM

## 2022-01-01 DIAGNOSIS — J44.0 CHRONIC OBSTRUCTIVE PULMONARY DISEASE WITH (ACUTE) LOWER RESPIRATORY INFECTION: ICD-10-CM

## 2022-01-01 DIAGNOSIS — J80 ACUTE RESPIRATORY DISTRESS SYNDROME: ICD-10-CM

## 2022-01-01 DIAGNOSIS — Z99.81 DEPENDENCE ON SUPPLEMENTAL OXYGEN: ICD-10-CM

## 2022-01-01 DIAGNOSIS — E78.5 HYPERLIPIDEMIA, UNSPECIFIED: ICD-10-CM

## 2022-01-01 DIAGNOSIS — I35.0 NONRHEUMATIC AORTIC (VALVE) STENOSIS: ICD-10-CM

## 2022-01-01 DIAGNOSIS — I25.10 ATHEROSCLEROTIC HEART DISEASE OF NATIVE CORONARY ARTERY WITHOUT ANGINA PECTORIS: ICD-10-CM

## 2022-01-01 DIAGNOSIS — I13.2 HYPERTENSIVE HEART AND CHRONIC KIDNEY DISEASE WITH HEART FAILURE AND WITH STAGE 5 CHRONIC KIDNEY DISEASE, OR END STAGE RENAL DISEASE: ICD-10-CM

## 2022-01-01 DIAGNOSIS — Z90.5 ACQUIRED ABSENCE OF KIDNEY: Chronic | ICD-10-CM

## 2022-01-01 DIAGNOSIS — D63.1 ANEMIA IN CHRONIC KIDNEY DISEASE: ICD-10-CM

## 2022-01-01 DIAGNOSIS — I50.22 CHRONIC SYSTOLIC (CONGESTIVE) HEART FAILURE: ICD-10-CM

## 2022-01-01 DIAGNOSIS — D63.8 ANEMIA IN OTHER CHRONIC DISEASES CLASSIFIED ELSEWHERE: ICD-10-CM

## 2022-01-01 DIAGNOSIS — A41.9 SEPSIS, UNSPECIFIED ORGANISM: ICD-10-CM

## 2022-01-01 DIAGNOSIS — G30.9 ALZHEIMER'S DISEASE, UNSPECIFIED: ICD-10-CM

## 2022-01-01 DIAGNOSIS — R65.21 SEVERE SEPSIS WITH SEPTIC SHOCK: ICD-10-CM

## 2022-01-01 LAB
ALBUMIN SERPL ELPH-MCNC: 2.9 G/DL — LOW (ref 3.5–5.2)
ALBUMIN SERPL ELPH-MCNC: 3 G/DL — LOW (ref 3.5–5.2)
ALP SERPL-CCNC: 79 U/L — SIGNIFICANT CHANGE UP (ref 30–115)
ALP SERPL-CCNC: 84 U/L — SIGNIFICANT CHANGE UP (ref 30–115)
ALT FLD-CCNC: 22 U/L — SIGNIFICANT CHANGE UP (ref 0–41)
ALT FLD-CCNC: 28 U/L — SIGNIFICANT CHANGE UP (ref 0–41)
ANION GAP SERPL CALC-SCNC: 19 MMOL/L — HIGH (ref 7–14)
ANION GAP SERPL CALC-SCNC: 20 MMOL/L — HIGH (ref 7–14)
APPEARANCE UR: ABNORMAL
APTT BLD: 31 SEC — SIGNIFICANT CHANGE UP (ref 27–39.2)
APTT BLD: 34.8 SEC — SIGNIFICANT CHANGE UP (ref 27–39.2)
AST SERPL-CCNC: 109 U/L — HIGH (ref 0–41)
AST SERPL-CCNC: 48 U/L — HIGH (ref 0–41)
BACTERIA # UR AUTO: ABNORMAL
BASOPHILS # BLD AUTO: 0.04 K/UL — SIGNIFICANT CHANGE UP (ref 0–0.2)
BASOPHILS # BLD AUTO: 0.05 K/UL — SIGNIFICANT CHANGE UP (ref 0–0.2)
BASOPHILS NFR BLD AUTO: 0.2 % — SIGNIFICANT CHANGE UP (ref 0–1)
BASOPHILS NFR BLD AUTO: 0.2 % — SIGNIFICANT CHANGE UP (ref 0–1)
BILIRUB SERPL-MCNC: 0.4 MG/DL — SIGNIFICANT CHANGE UP (ref 0.2–1.2)
BILIRUB SERPL-MCNC: 0.4 MG/DL — SIGNIFICANT CHANGE UP (ref 0.2–1.2)
BILIRUB UR-MCNC: NEGATIVE — SIGNIFICANT CHANGE UP
BUN SERPL-MCNC: 43 MG/DL — HIGH (ref 10–20)
BUN SERPL-MCNC: 50 MG/DL — HIGH (ref 10–20)
CALCIUM SERPL-MCNC: 8.8 MG/DL — SIGNIFICANT CHANGE UP (ref 8.5–10.1)
CALCIUM SERPL-MCNC: 8.9 MG/DL — SIGNIFICANT CHANGE UP (ref 8.5–10.1)
CHLORIDE SERPL-SCNC: 96 MMOL/L — LOW (ref 98–110)
CHLORIDE SERPL-SCNC: 99 MMOL/L — SIGNIFICANT CHANGE UP (ref 98–110)
CO2 SERPL-SCNC: 20 MMOL/L — SIGNIFICANT CHANGE UP (ref 17–32)
CO2 SERPL-SCNC: 20 MMOL/L — SIGNIFICANT CHANGE UP (ref 17–32)
COLOR SPEC: YELLOW — SIGNIFICANT CHANGE UP
CREAT SERPL-MCNC: 6.2 MG/DL — CRITICAL HIGH (ref 0.7–1.5)
CREAT SERPL-MCNC: 6.3 MG/DL — CRITICAL HIGH (ref 0.7–1.5)
D DIMER BLD IA.RAPID-MCNC: 4834 NG/ML DDU — HIGH (ref 0–230)
DIFF PNL FLD: ABNORMAL
EOSINOPHIL # BLD AUTO: 0 K/UL — SIGNIFICANT CHANGE UP (ref 0–0.7)
EOSINOPHIL # BLD AUTO: 0 K/UL — SIGNIFICANT CHANGE UP (ref 0–0.7)
EOSINOPHIL NFR BLD AUTO: 0 % — SIGNIFICANT CHANGE UP (ref 0–8)
EOSINOPHIL NFR BLD AUTO: 0 % — SIGNIFICANT CHANGE UP (ref 0–8)
EPI CELLS # UR: 0 /HPF — SIGNIFICANT CHANGE UP (ref 0–5)
GLUCOSE SERPL-MCNC: 123 MG/DL — HIGH (ref 70–99)
GLUCOSE SERPL-MCNC: 165 MG/DL — HIGH (ref 70–99)
GLUCOSE UR QL: NEGATIVE — SIGNIFICANT CHANGE UP
GRAM STN FLD: SIGNIFICANT CHANGE UP
HCT VFR BLD CALC: 30.3 % — LOW (ref 42–52)
HCT VFR BLD CALC: 32.1 % — LOW (ref 42–52)
HGB BLD-MCNC: 8.9 G/DL — LOW (ref 14–18)
HGB BLD-MCNC: 9.5 G/DL — LOW (ref 14–18)
HYALINE CASTS # UR AUTO: 0 /LPF — SIGNIFICANT CHANGE UP (ref 0–7)
IMM GRANULOCYTES NFR BLD AUTO: 0.8 % — HIGH (ref 0.1–0.3)
IMM GRANULOCYTES NFR BLD AUTO: 0.8 % — HIGH (ref 0.1–0.3)
INR BLD: 1.23 RATIO — SIGNIFICANT CHANGE UP (ref 0.65–1.3)
INR BLD: 1.35 RATIO — HIGH (ref 0.65–1.3)
KETONES UR-MCNC: NEGATIVE — SIGNIFICANT CHANGE UP
LACTATE SERPL-SCNC: 1.6 MMOL/L — SIGNIFICANT CHANGE UP (ref 0.7–2)
LEUKOCYTE ESTERASE UR-ACNC: ABNORMAL
LYMPHOCYTES # BLD AUTO: 1.27 K/UL — SIGNIFICANT CHANGE UP (ref 1.2–3.4)
LYMPHOCYTES # BLD AUTO: 1.5 K/UL — SIGNIFICANT CHANGE UP (ref 1.2–3.4)
LYMPHOCYTES # BLD AUTO: 5.3 % — LOW (ref 20.5–51.1)
LYMPHOCYTES # BLD AUTO: 5.4 % — LOW (ref 20.5–51.1)
MCHC RBC-ENTMCNC: 26.8 PG — LOW (ref 27–31)
MCHC RBC-ENTMCNC: 26.9 PG — LOW (ref 27–31)
MCHC RBC-ENTMCNC: 29.4 G/DL — LOW (ref 32–37)
MCHC RBC-ENTMCNC: 29.6 G/DL — LOW (ref 32–37)
MCV RBC AUTO: 90.7 FL — SIGNIFICANT CHANGE UP (ref 80–94)
MCV RBC AUTO: 91.5 FL — SIGNIFICANT CHANGE UP (ref 80–94)
METHOD TYPE: SIGNIFICANT CHANGE UP
MONOCYTES # BLD AUTO: 0.8 K/UL — HIGH (ref 0.1–0.6)
MONOCYTES # BLD AUTO: 1.61 K/UL — HIGH (ref 0.1–0.6)
MONOCYTES NFR BLD AUTO: 3.4 % — SIGNIFICANT CHANGE UP (ref 1.7–9.3)
MONOCYTES NFR BLD AUTO: 5.7 % — SIGNIFICANT CHANGE UP (ref 1.7–9.3)
MRSA SPEC QL CULT: SIGNIFICANT CHANGE UP
NEUTROPHILS # BLD AUTO: 21.23 K/UL — HIGH (ref 1.4–6.5)
NEUTROPHILS # BLD AUTO: 24.81 K/UL — HIGH (ref 1.4–6.5)
NEUTROPHILS NFR BLD AUTO: 88 % — HIGH (ref 42.2–75.2)
NEUTROPHILS NFR BLD AUTO: 90.2 % — HIGH (ref 42.2–75.2)
NITRITE UR-MCNC: NEGATIVE — SIGNIFICANT CHANGE UP
NRBC # BLD: 0 /100 WBCS — SIGNIFICANT CHANGE UP (ref 0–0)
NRBC # BLD: 0 /100 WBCS — SIGNIFICANT CHANGE UP (ref 0–0)
PH UR: 7.5 — SIGNIFICANT CHANGE UP (ref 5–8)
PLATELET # BLD AUTO: 192 K/UL — SIGNIFICANT CHANGE UP (ref 130–400)
PLATELET # BLD AUTO: 214 K/UL — SIGNIFICANT CHANGE UP (ref 130–400)
POTASSIUM SERPL-MCNC: 4.5 MMOL/L — SIGNIFICANT CHANGE UP (ref 3.5–5)
POTASSIUM SERPL-MCNC: 4.7 MMOL/L — SIGNIFICANT CHANGE UP (ref 3.5–5)
POTASSIUM SERPL-SCNC: 4.5 MMOL/L — SIGNIFICANT CHANGE UP (ref 3.5–5)
POTASSIUM SERPL-SCNC: 4.7 MMOL/L — SIGNIFICANT CHANGE UP (ref 3.5–5)
PROT SERPL-MCNC: 7.6 G/DL — SIGNIFICANT CHANGE UP (ref 6–8)
PROT SERPL-MCNC: 7.9 G/DL — SIGNIFICANT CHANGE UP (ref 6–8)
PROT UR-MCNC: ABNORMAL
PROTHROM AB SERPL-ACNC: 14.1 SEC — HIGH (ref 9.95–12.87)
PROTHROM AB SERPL-ACNC: 15.5 SEC — HIGH (ref 9.95–12.87)
RBC # BLD: 3.31 M/UL — LOW (ref 4.7–6.1)
RBC # BLD: 3.54 M/UL — LOW (ref 4.7–6.1)
RBC # FLD: 17 % — HIGH (ref 11.5–14.5)
RBC # FLD: 17.2 % — HIGH (ref 11.5–14.5)
RBC CASTS # UR COMP ASSIST: 50 /HPF — SIGNIFICANT CHANGE UP (ref 0–4)
SARS-COV-2 RNA SPEC QL NAA+PROBE: DETECTED
SODIUM SERPL-SCNC: 136 MMOL/L — SIGNIFICANT CHANGE UP (ref 135–146)
SODIUM SERPL-SCNC: 138 MMOL/L — SIGNIFICANT CHANGE UP (ref 135–146)
SP GR SPEC: 1.01 — SIGNIFICANT CHANGE UP (ref 1.01–1.03)
SPECIMEN SOURCE: SIGNIFICANT CHANGE UP
SPECIMEN SOURCE: SIGNIFICANT CHANGE UP
TROPONIN T SERPL-MCNC: 0.92 NG/ML — CRITICAL HIGH
TROPONIN T SERPL-MCNC: 3.13 NG/ML — CRITICAL HIGH
UROBILINOGEN FLD QL: SIGNIFICANT CHANGE UP
WBC # BLD: 23.53 K/UL — HIGH (ref 4.8–10.8)
WBC # BLD: 28.19 K/UL — HIGH (ref 4.8–10.8)
WBC # FLD AUTO: 23.53 K/UL — HIGH (ref 4.8–10.8)
WBC # FLD AUTO: 28.19 K/UL — HIGH (ref 4.8–10.8)
WBC UR QL: >720 /HPF — HIGH (ref 0–5)

## 2022-01-01 PROCEDURE — 93010 ELECTROCARDIOGRAM REPORT: CPT

## 2022-01-01 PROCEDURE — 99291 CRITICAL CARE FIRST HOUR: CPT

## 2022-01-01 PROCEDURE — 99239 HOSP IP/OBS DSCHRG MGMT >30: CPT

## 2022-01-01 PROCEDURE — 99285 EMERGENCY DEPT VISIT HI MDM: CPT | Mod: CS,GC

## 2022-01-01 PROCEDURE — 71045 X-RAY EXAM CHEST 1 VIEW: CPT | Mod: 26

## 2022-01-01 RX ORDER — CLOPIDOGREL BISULFATE 75 MG/1
75 TABLET, FILM COATED ORAL DAILY
Refills: 0 | Status: DISCONTINUED | OUTPATIENT
Start: 2022-01-01 | End: 2022-01-01

## 2022-01-01 RX ORDER — PANTOPRAZOLE SODIUM 20 MG/1
40 TABLET, DELAYED RELEASE ORAL
Refills: 0 | Status: DISCONTINUED | OUTPATIENT
Start: 2022-01-01 | End: 2022-01-01

## 2022-01-01 RX ORDER — ACETAMINOPHEN 500 MG
650 TABLET ORAL EVERY 6 HOURS
Refills: 0 | Status: DISCONTINUED | OUTPATIENT
Start: 2022-01-01 | End: 2022-01-01

## 2022-01-01 RX ORDER — CEFEPIME 1 G/1
2000 INJECTION, POWDER, FOR SOLUTION INTRAMUSCULAR; INTRAVENOUS ONCE
Refills: 0 | Status: COMPLETED | OUTPATIENT
Start: 2022-01-01 | End: 2022-01-01

## 2022-01-01 RX ORDER — MIRTAZAPINE 45 MG/1
15 TABLET, ORALLY DISINTEGRATING ORAL AT BEDTIME
Refills: 0 | Status: DISCONTINUED | OUTPATIENT
Start: 2022-01-01 | End: 2022-01-01

## 2022-01-01 RX ORDER — DONEPEZIL HYDROCHLORIDE 10 MG/1
5 TABLET, FILM COATED ORAL AT BEDTIME
Refills: 0 | Status: DISCONTINUED | OUTPATIENT
Start: 2022-01-01 | End: 2022-01-01

## 2022-01-01 RX ORDER — METOPROLOL TARTRATE 50 MG
1 TABLET ORAL
Qty: 0 | Refills: 0 | DISCHARGE

## 2022-01-01 RX ORDER — ATORVASTATIN CALCIUM 80 MG/1
40 TABLET, FILM COATED ORAL AT BEDTIME
Refills: 0 | Status: DISCONTINUED | OUTPATIENT
Start: 2022-01-01 | End: 2022-01-01

## 2022-01-01 RX ORDER — HEPARIN SODIUM 5000 [USP'U]/ML
5000 INJECTION INTRAVENOUS; SUBCUTANEOUS EVERY 12 HOURS
Refills: 0 | Status: DISCONTINUED | OUTPATIENT
Start: 2022-01-01 | End: 2022-01-01

## 2022-01-01 RX ORDER — MIDODRINE HYDROCHLORIDE 2.5 MG/1
10 TABLET ORAL
Refills: 0 | Status: DISCONTINUED | OUTPATIENT
Start: 2022-01-01 | End: 2022-01-01

## 2022-01-01 RX ORDER — ASPIRIN/CALCIUM CARB/MAGNESIUM 324 MG
81 TABLET ORAL DAILY
Refills: 0 | Status: DISCONTINUED | OUTPATIENT
Start: 2022-01-01 | End: 2022-01-01

## 2022-01-01 RX ORDER — NOREPINEPHRINE BITARTRATE/D5W 8 MG/250ML
0.05 PLASTIC BAG, INJECTION (ML) INTRAVENOUS
Qty: 8 | Refills: 0 | Status: DISCONTINUED | OUTPATIENT
Start: 2022-01-01 | End: 2022-01-01

## 2022-01-01 RX ORDER — TAMSULOSIN HYDROCHLORIDE 0.4 MG/1
0.4 CAPSULE ORAL AT BEDTIME
Refills: 0 | Status: DISCONTINUED | OUTPATIENT
Start: 2022-01-01 | End: 2022-01-01

## 2022-01-01 RX ORDER — MEROPENEM 1 G/30ML
500 INJECTION INTRAVENOUS EVERY 24 HOURS
Refills: 0 | Status: DISCONTINUED | OUTPATIENT
Start: 2022-01-01 | End: 2022-01-01

## 2022-01-01 RX ORDER — HEPARIN SODIUM 5000 [USP'U]/ML
1200 INJECTION INTRAVENOUS; SUBCUTANEOUS
Qty: 25000 | Refills: 0 | Status: DISCONTINUED | OUTPATIENT
Start: 2022-01-01 | End: 2022-01-01

## 2022-01-01 RX ORDER — BUDESONIDE AND FORMOTEROL FUMARATE DIHYDRATE 160; 4.5 UG/1; UG/1
2 AEROSOL RESPIRATORY (INHALATION)
Refills: 0 | Status: DISCONTINUED | OUTPATIENT
Start: 2022-01-01 | End: 2022-01-01

## 2022-01-01 RX ORDER — MEROPENEM 1 G/30ML
INJECTION INTRAVENOUS
Refills: 0 | Status: DISCONTINUED | OUTPATIENT
Start: 2022-01-01 | End: 2022-01-01

## 2022-01-01 RX ORDER — CALCIUM ACETATE 667 MG
667 TABLET ORAL
Refills: 0 | Status: DISCONTINUED | OUTPATIENT
Start: 2022-01-01 | End: 2022-01-01

## 2022-01-01 RX ORDER — SERTRALINE 25 MG/1
25 TABLET, FILM COATED ORAL DAILY
Refills: 0 | Status: DISCONTINUED | OUTPATIENT
Start: 2022-01-01 | End: 2022-01-01

## 2022-01-01 RX ORDER — MEROPENEM 1 G/30ML
500 INJECTION INTRAVENOUS ONCE
Refills: 0 | Status: COMPLETED | OUTPATIENT
Start: 2022-01-01 | End: 2022-01-01

## 2022-01-01 RX ORDER — ERYTHROPOIETIN 10000 [IU]/ML
10000 INJECTION, SOLUTION INTRAVENOUS; SUBCUTANEOUS
Refills: 0 | Status: DISCONTINUED | OUTPATIENT
Start: 2022-01-01 | End: 2022-01-01

## 2022-01-01 RX ORDER — SENNA PLUS 8.6 MG/1
2 TABLET ORAL AT BEDTIME
Refills: 0 | Status: DISCONTINUED | OUTPATIENT
Start: 2022-01-01 | End: 2022-01-01

## 2022-01-01 RX ORDER — LANOLIN ALCOHOL/MO/W.PET/CERES
3 CREAM (GRAM) TOPICAL AT BEDTIME
Refills: 0 | Status: DISCONTINUED | OUTPATIENT
Start: 2022-01-01 | End: 2022-01-01

## 2022-01-01 RX ORDER — VANCOMYCIN HCL 1 G
500 VIAL (EA) INTRAVENOUS ONCE
Refills: 0 | Status: COMPLETED | OUTPATIENT
Start: 2022-01-01 | End: 2022-01-01

## 2022-01-01 RX ORDER — FLUTICASONE FUROATE AND VILANTEROL TRIFENATATE 100; 25 UG/1; UG/1
1 POWDER RESPIRATORY (INHALATION)
Qty: 0 | Refills: 0 | DISCHARGE

## 2022-01-01 RX ORDER — SENNA PLUS 8.6 MG/1
2 TABLET ORAL
Qty: 0 | Refills: 0 | DISCHARGE

## 2022-01-01 RX ORDER — ONDANSETRON 8 MG/1
4 TABLET, FILM COATED ORAL EVERY 8 HOURS
Refills: 0 | Status: DISCONTINUED | OUTPATIENT
Start: 2022-01-01 | End: 2022-01-01

## 2022-01-01 RX ORDER — ALBUTEROL 90 UG/1
4 AEROSOL, METERED ORAL EVERY 4 HOURS
Refills: 0 | Status: DISCONTINUED | OUTPATIENT
Start: 2022-01-01 | End: 2022-01-01

## 2022-01-01 RX ORDER — DEXAMETHASONE 0.5 MG/5ML
6 ELIXIR ORAL DAILY
Refills: 0 | Status: DISCONTINUED | OUTPATIENT
Start: 2022-01-01 | End: 2022-01-01

## 2022-01-01 RX ORDER — SODIUM CHLORIDE 9 MG/ML
1000 INJECTION, SOLUTION INTRAVENOUS
Refills: 0 | Status: DISCONTINUED | OUTPATIENT
Start: 2022-01-01 | End: 2022-01-01

## 2022-01-01 RX ORDER — SODIUM CHLORIDE 9 MG/ML
2100 INJECTION, SOLUTION INTRAVENOUS ONCE
Refills: 0 | Status: COMPLETED | OUTPATIENT
Start: 2022-01-01 | End: 2022-01-01

## 2022-01-01 RX ADMIN — HEPARIN SODIUM 12 UNIT(S)/HR: 5000 INJECTION INTRAVENOUS; SUBCUTANEOUS at 12:04

## 2022-01-01 RX ADMIN — Medication 100 MILLIGRAM(S): at 04:07

## 2022-01-01 RX ADMIN — ERYTHROPOIETIN 10000 UNIT(S): 10000 INJECTION, SOLUTION INTRAVENOUS; SUBCUTANEOUS at 14:42

## 2022-01-01 RX ADMIN — SODIUM CHLORIDE 2100 MILLILITER(S): 9 INJECTION, SOLUTION INTRAVENOUS at 21:58

## 2022-01-01 RX ADMIN — Medication 650 MILLIGRAM(S): at 04:40

## 2022-01-01 RX ADMIN — CEFEPIME 100 MILLIGRAM(S): 1 INJECTION, POWDER, FOR SOLUTION INTRAMUSCULAR; INTRAVENOUS at 21:58

## 2022-01-01 RX ADMIN — MEROPENEM 100 MILLIGRAM(S): 1 INJECTION INTRAVENOUS at 04:07

## 2022-01-01 RX ADMIN — HEPARIN SODIUM 5000 UNIT(S): 5000 INJECTION INTRAVENOUS; SUBCUTANEOUS at 05:00

## 2022-01-01 RX ADMIN — Medication 6.56 MICROGRAM(S)/KG/MIN: at 22:06

## 2022-01-01 RX ADMIN — Medication 650 MILLIGRAM(S): at 08:08

## 2022-01-01 RX ADMIN — PANTOPRAZOLE SODIUM 40 MILLIGRAM(S): 20 TABLET, DELAYED RELEASE ORAL at 05:00

## 2022-01-01 RX ADMIN — Medication 6 MILLIGRAM(S): at 05:01

## 2022-01-01 RX ADMIN — SODIUM CHLORIDE 75 MILLILITER(S): 9 INJECTION, SOLUTION INTRAVENOUS at 09:30

## 2022-01-10 NOTE — ED PROVIDER NOTE - NS ED MD DISPO SPECIAL CONSIDERATION1
Internal Medicine   Marilyn Goldman | PGY-1    OVERNIGHT EVENTS: BOB      SUBJECTIVE: Patient was seen and examined at bedside this morning. Continues to complain of cough (but states it has improved) and distal extremity numbness. Denies SOB, CP, nausea/vomiting/diarrhea, headache, abdominal pain. Patient responding appropriately to questions and able to make needs known.       MEDICATIONS  (STANDING):  acetaminophen 325 mG/butalbital 50 mG/caffeine 40 mG 1 Tablet(s) Oral once  benzonatate 100 milliGRAM(s) Oral every 8 hours  chlorhexidine 2% Cloths 1 Application(s) Topical daily  enoxaparin Injectable 40 milliGRAM(s) SubCutaneous daily  influenza  Vaccine (HIGH DOSE) 0.7 milliLiter(s) IntraMuscular once  nafcillin  IVPB      nafcillin  IVPB 2 Gram(s) IV Intermittent every 4 hours  pantoprazole    Tablet 40 milliGRAM(s) Oral before breakfast  senna 2 Tablet(s) Oral at bedtime    MEDICATIONS  (PRN):  acetaminophen     Tablet .. 650 milliGRAM(s) Oral every 6 hours PRN Temp greater or equal to 38C (100.4F), Mild Pain (1 - 3)  cyclobenzaprine 5 milliGRAM(s) Oral every 8 hours PRN Muscle Spasm        T(F): --  HR: --  BP: --  BP(mean): --  RR: --  SpO2: --    PHYSICAL EXAM:     GENERAL: NAD, lying in bed comfortably.  HEAD:  Atraumatic, normocephalic.  CHEST/LUNG: CTAB. No rales, rhonchi, wheezing, or rubs. Unlabored respirations.  HEART: RRR, no M/R/G, normal S1/S2.  ABDOMEN: Soft, nontender, nondistended. Normoactive bowel sounds.  EXTREMITIES:  2+ peripheral pulses b/l. No clubbing, cyanosis, or edema.  NEURO:  AAOx3, no focal deficits.   SKIN: No rashes or lesions.  PSYCH: Normal mood, affect.                  Creatinine Trend: 0.71<--, 0.77<--, 0.73<--, 0.72<--, 0.63<--, 0.72<--  I&O's Summary    09 Jan 2022 07:01  -  10 Tone 2022 07:00  --------------------------------------------------------  IN: 0 mL / OUT: 550 mL / NET: -550 mL                 Low Suspicion of COVID-19

## 2022-01-12 NOTE — ED PROVIDER NOTE - CLINICAL SUMMARY MEDICAL DECISION MAKING FREE TEXT BOX
81-year-old man with history of end-stage renal disease on hemodialysis,  COPD, CHF lymphedema and frequent UTIs presents from [facility] with worsening fever low blood pressure altered mental status.  Patient recently had a UA checked out at the SNF and was noted to have an positive UTI.  On arrival patient appears to have septic shock.  Patient is DNR/DNI.  Daughter at bedside.  Understands the poor prognosis.  Will start on IV fluids.  IV antibiotics also given.  Patient will need IV vasopressors.  However after long discussion with family they do not want any invasive procedures.  Spoke to ICU who is okay with stepdown for patient.  Patient admitted in critical condition to stepdown unit.

## 2022-01-12 NOTE — ED PROVIDER NOTE - PROGRESS NOTE DETAILS
TD: Titrating down patient's levophed, currently at 0.2, BP 100s/50s, holding up thus far as we titrate down. Pt endorsed to MAR for admission.

## 2022-01-12 NOTE — ED ADULT NURSE NOTE - NSIMPLEMENTINTERV_GEN_ALL_ED
Implemented All Fall with Harm Risk Interventions:  Helen to call system. Call bell, personal items and telephone within reach. Instruct patient to call for assistance. Room bathroom lighting operational. Non-slip footwear when patient is off stretcher. Physically safe environment: no spills, clutter or unnecessary equipment. Stretcher in lowest position, wheels locked, appropriate side rails in place. Provide visual cue, wrist band, yellow gown, etc. Monitor gait and stability. Monitor for mental status changes and reorient to person, place, and time. Review medications for side effects contributing to fall risk. Reinforce activity limits and safety measures with patient and family. Provide visual clues: red socks.

## 2022-01-12 NOTE — ED ADULT NURSE NOTE - OBJECTIVE STATEMENT
Pt BIBA from NH covid +  r/o sepsis. pt had an increased white count. Pt BIBA from NH covid +  r/o sepsis. pt had an increased white count. As per daughter pt was given tylenol around 08:00pm for temp 103

## 2022-01-12 NOTE — ED PROVIDER NOTE - PHYSICAL EXAMINATION
CONSTITUTIONAL:  NAD, ill appearing  SKIN:  warm, dry no diaphoresis  HEAD:  NCAT  EYES:  NL inspection  ENT:  dry mucous membranes  NECK:  supple; non tender  CARD:  tachycardic to 120s-130s, regular rhythm, pulses equal and palpable x4 extremities  RESP:  No increased WOB  ABD:  + suprapubic TTP, otherwise abd S/NT/ND, no R/G  MSK:  + pedal edema b/l  NEURO:  nonverbal, moves all 4 extremities

## 2022-01-12 NOTE — ED PROVIDER NOTE - OBJECTIVE STATEMENT
Pt is an 81M with a pmhx of ESRD on dialysis (T, TH, SAT), COPD, CHF, b/l LE lymphedema p/w fevers, UTI found on UA at [facility], and AMS. Per pt's daughter he typically has AMS consisting of making groaning sounds when he has a UTI. Pt additionally tested positive for covid 10d ago, has been asymptomatic. Pt last had dialysis yesterday.

## 2022-01-13 NOTE — CONSULT NOTE ADULT - SUBJECTIVE AND OBJECTIVE BOX
Patient is a 81y old  Male who presents with a chief complaint of altered mental status (13 Jan 2022 07:12)      HPI: 82 yo M with PMHx of  Alzheimer's dementia, ESRD on HD MWF (oliguric), Anemia of chronic disease, COPD on 4L O2, DM II, HFrEF, Severe AS, HLD, recently admitted in September for sepsis secondary presented with complaint of fever and SOB, sent in from [facility] for AMS as well, was  tested positive for COVID 10 days ago, has been asymptomatic. Pt last had dialysis yesterday.    PAST MEDICAL & SURGICAL HISTORY:  Diabetes mellitus    COPD (chronic obstructive pulmonary disease)    Lymphedema of both lower extremities    CHF (congestive heart failure)    ESRD on dialysis    H/O right nephrectomy  20 yrs ago        SOCIAL HX:   Smoking                         ETOH                            Other    FAMILY HISTORY:  :  No known cardiovascular family history     Review Of Systems:     All ROS are negative except per HPI       Allergies    No Known Allergies    Intolerances          PHYSICAL EXAM    ICU Vital Signs Last 24 Hrs  T(C): 38.3 (13 Jan 2022 08:35), Max: 40.6 (12 Jan 2022 22:20)  T(F): 101 (13 Jan 2022 08:35), Max: 105.1 (12 Jan 2022 22:20)  HR: 108 (13 Jan 2022 08:35) (102 - 125)  BP: 92/50 (13 Jan 2022 08:35) (64/50 - 124/56)  BP(mean): 65 (13 Jan 2022 08:35) (57 - 82)  ABP: --  ABP(mean): --  RR: 20 (13 Jan 2022 08:35) (16 - 20)  SpO2: 97% (13 Jan 2022 08:35) (92% - 99%)      CONSTITUTIONAL:  NAD    ENT:   Airway patent,   Mouth with normal mucosa.   No thrush      CARDIAC:   Normal rate,   Regular rhythm.    No edema      Vascular:   normal systolic impulse  no bruits    RESPIRATORY:   No wheezing  Bilateral BS   Not tachypneic,  No use of accessory muscles    GASTROINTESTINAL:  Abdomen soft,   Non-tender,   No guarding,   + BS      NEUROLOGICAL:   Alert and oriented   No motor deficits.    SKIN:   Skin normal color for race,   No evidence of rash.      HEME LYMPH: .  No cervical  lymphadenopathy.  No inguinal lymphadenopathy              LABS:                          9.5    28.19 )-----------( 214      ( 12 Jan 2022 21:40 )             32.1                                               01-12    136  |  96<L>  |  43<H>  ----------------------------<  123<H>  4.5   |  20  |  6.3<HH>    Ca    8.9      12 Jan 2022 21:40    TPro  7.9  /  Alb  2.9<L>  /  TBili  0.4  /  DBili  x   /  AST  48<H>  /  ALT  22  /  AlkPhos  84  01-12      PT/INR - ( 12 Jan 2022 21:40 )   PT: 15.50 sec;   INR: 1.35 ratio         PTT - ( 12 Jan 2022 21:40 )  PTT:34.8 sec                                           CARDIAC MARKERS ( 12 Jan 2022 21:40 )  x     / 0.92 ng/mL / x     / x     / x                                                LIVER FUNCTIONS - ( 12 Jan 2022 21:40 )  Alb: 2.9 g/dL / Pro: 7.9 g/dL / ALK PHOS: 84 U/L / ALT: 22 U/L / AST: 48 U/L / GGT: x                                                                                                   MEDICATIONS  (STANDING):  ALBUTerol  90 MICROgram(s) HFA Inhaler - Peds 4 Puff(s) Inhalation every 4 hours  aspirin enteric coated 81 milliGRAM(s) Oral daily  atorvastatin 40 milliGRAM(s) Oral at bedtime  budesonide 160 MICROgram(s)/formoterol 4.5 MICROgram(s) Inhaler 2 Puff(s) Inhalation two times a day  calcium acetate 667 milliGRAM(s) Oral three times a day with meals  dexAMETHasone  Injectable 6 milliGRAM(s) IV Push daily  donepezil 5 milliGRAM(s) Oral at bedtime  heparin   Injectable 5000 Unit(s) SubCutaneous every 12 hours  lactated ringers. 1000 milliLiter(s) (75 mL/Hr) IV Continuous <Continuous>  meropenem  IVPB 500 milliGRAM(s) IV Intermittent every 24 hours  midodrine. 10 milliGRAM(s) Oral <User Schedule>  mirtazapine 15 milliGRAM(s) Oral at bedtime  multivitamin Oral Tab/Cap - Peds 1 Tablet(s) Oral daily  norepinephrine Infusion 0.05 MICROgram(s)/kG/Min (6.56 mL/Hr) IV Continuous <Continuous>  pantoprazole    Tablet 40 milliGRAM(s) Oral before breakfast  sertraline 25 milliGRAM(s) Oral daily  tamsulosin 0.4 milliGRAM(s) Oral at bedtime    MEDICATIONS  (PRN):  acetaminophen  Suppository .. 650 milliGRAM(s) Rectal every 6 hours PRN Temp greater or equal to 38C (100.4F)  aluminum hydroxide/magnesium hydroxide/simethicone Suspension 30 milliLiter(s) Oral every 4 hours PRN Dyspepsia  melatonin 3 milliGRAM(s) Oral at bedtime PRN Insomnia  ondansetron Injectable 4 milliGRAM(s) IV Push every 8 hours PRN Nausea and/or Vomiting  senna 8.6 milliGRAM(s) Oral Tablet - Peds 2 Tablet(s) Oral at bedtime PRN Constipation    CXR: no focal consolidation, right sided pleural effusion, pulmonary congestion          Patient is a 81y old  Male who presents with a chief complaint of altered mental status (13 Jan 2022 07:12)      HPI: 82 yo M with PMHx of  Alzheimer's dementia, ESRD on HD MWF (oliguric), Anemia of chronic disease, COPD on 4L O2, DM II, HFrEF, Severe AS, HLD, recently admitted in September for sepsis secondary presented with complaint of fever and SOB, sent in from [facility] for AMS as well, was  tested positive for COVID 10 days ago, has been asymptomatic. Pt last had dialysis yesterday.    PAST MEDICAL & SURGICAL HISTORY:  Diabetes mellitus    COPD (chronic obstructive pulmonary disease)    Lymphedema of both lower extremities    CHF (congestive heart failure)    ESRD on dialysis    H/O right nephrectomy  20 yrs ago        SOCIAL HX:   Smoking                         ETOH                            Other    FAMILY HISTORY:  :  No known cardiovascular family history     Review Of Systems:     All ROS are negative except per HPI       Allergies    No Known Allergies    Intolerances          PHYSICAL EXAM    ICU Vital Signs Last 24 Hrs  T(C): 38.3 (13 Jan 2022 08:35), Max: 40.6 (12 Jan 2022 22:20)  T(F): 101 (13 Jan 2022 08:35), Max: 105.1 (12 Jan 2022 22:20)  HR: 108 (13 Jan 2022 08:35) (102 - 125)  BP: 92/50 (13 Jan 2022 08:35) (64/50 - 124/56)  BP(mean): 65 (13 Jan 2022 08:35) (57 - 82)  ABP: --  ABP(mean): --  RR: 20 (13 Jan 2022 08:35) (16 - 20)  SpO2: 97% (13 Jan 2022 08:35) (92% - 99%)      CONSTITUTIONAL:  In mild respiratory distress     ENT:   Airway patent,   Mouth with normal mucosa.   No thrush      CARDIAC:   Normal rate,   Regular rhythm.    No edema      Vascular:   normal systolic impulse  no bruits    RESPIRATORY:   No wheezing  Bilateral BS   Not tachypneic,  No use of accessory muscles    GASTROINTESTINAL:  Abdomen soft,   Non-tender,   No guarding,   + BS      NEUROLOGICAL:   Alert and oriented X 0  No motor deficits.    SKIN:   Skin normal color for race,   No evidence of rash.      HEME LYMPH: .  No cervical  lymphadenopathy.  No inguinal lymphadenopathy              LABS:                          9.5    28.19 )-----------( 214      ( 12 Jan 2022 21:40 )             32.1                                               01-12    136  |  96<L>  |  43<H>  ----------------------------<  123<H>  4.5   |  20  |  6.3<HH>    Ca    8.9      12 Jan 2022 21:40    TPro  7.9  /  Alb  2.9<L>  /  TBili  0.4  /  DBili  x   /  AST  48<H>  /  ALT  22  /  AlkPhos  84  01-12      PT/INR - ( 12 Jan 2022 21:40 )   PT: 15.50 sec;   INR: 1.35 ratio         PTT - ( 12 Jan 2022 21:40 )  PTT:34.8 sec                                           CARDIAC MARKERS ( 12 Jan 2022 21:40 )  x     / 0.92 ng/mL / x     / x     / x                                                LIVER FUNCTIONS - ( 12 Jan 2022 21:40 )  Alb: 2.9 g/dL / Pro: 7.9 g/dL / ALK PHOS: 84 U/L / ALT: 22 U/L / AST: 48 U/L / GGT: x                                                                                                   MEDICATIONS  (STANDING):  ALBUTerol  90 MICROgram(s) HFA Inhaler - Peds 4 Puff(s) Inhalation every 4 hours  aspirin enteric coated 81 milliGRAM(s) Oral daily  atorvastatin 40 milliGRAM(s) Oral at bedtime  budesonide 160 MICROgram(s)/formoterol 4.5 MICROgram(s) Inhaler 2 Puff(s) Inhalation two times a day  calcium acetate 667 milliGRAM(s) Oral three times a day with meals  dexAMETHasone  Injectable 6 milliGRAM(s) IV Push daily  donepezil 5 milliGRAM(s) Oral at bedtime  heparin   Injectable 5000 Unit(s) SubCutaneous every 12 hours  lactated ringers. 1000 milliLiter(s) (75 mL/Hr) IV Continuous <Continuous>  meropenem  IVPB 500 milliGRAM(s) IV Intermittent every 24 hours  midodrine. 10 milliGRAM(s) Oral <User Schedule>  mirtazapine 15 milliGRAM(s) Oral at bedtime  multivitamin Oral Tab/Cap - Peds 1 Tablet(s) Oral daily  norepinephrine Infusion 0.05 MICROgram(s)/kG/Min (6.56 mL/Hr) IV Continuous <Continuous>  pantoprazole    Tablet 40 milliGRAM(s) Oral before breakfast  sertraline 25 milliGRAM(s) Oral daily  tamsulosin 0.4 milliGRAM(s) Oral at bedtime    MEDICATIONS  (PRN):  acetaminophen  Suppository .. 650 milliGRAM(s) Rectal every 6 hours PRN Temp greater or equal to 38C (100.4F)  aluminum hydroxide/magnesium hydroxide/simethicone Suspension 30 milliLiter(s) Oral every 4 hours PRN Dyspepsia  melatonin 3 milliGRAM(s) Oral at bedtime PRN Insomnia  ondansetron Injectable 4 milliGRAM(s) IV Push every 8 hours PRN Nausea and/or Vomiting  senna 8.6 milliGRAM(s) Oral Tablet - Peds 2 Tablet(s) Oral at bedtime PRN Constipation    CXR: Left sided infiltrate

## 2022-01-13 NOTE — CONSULT NOTE ADULT - ASSESSMENT
ESRD on HD TTS  UTI / sepsis  COVID PNA  COPD on home O2  HFrEF  dementia   HTN  anemia  right nephrectomy    plan:    Followup blood and urine cultures  Renally dose Abx  Check procalcitonin  Continue midodrine  HD today: 3 hours, opti 160 dialyzer, 2K bath, minimal UF  EPO with HD  Renal diet

## 2022-01-13 NOTE — PROGRESS NOTE ADULT - SUBJECTIVE AND OBJECTIVE BOX
Patient was examined during HD treatment.    Hemodialysis Prescription:  	Access: TDC  	Dialyzer: Opti 160  	Blood Flow (mL/Min): 400  	Dialysate Flow (mL/Min): 500  	Target UF (Liters): 1  	Treatment Time: 3 hours  	Potassium: 2K  	Calcium: 2.5

## 2022-01-13 NOTE — H&P ADULT - NSHPPHYSICALEXAM_GEN_ALL_CORE
T(C): 38.3 (01-13-22 @ 01:23), Max: 40.6 (01-12-22 @ 22:20)  HR: 107 (01-13-22 @ 02:42) (102 - 125)  BP: 95/52 (01-13-22 @ 02:42) (64/50 - 113/58)  RR: 18 (01-13-22 @ 02:42) (16 - 18)  SpO2: 97% (01-13-22 @ 02:42) (92% - 99%)    PHYSICAL EXAM:  GENERAL: altered mental status, on 4 L NC.   HEAD:  Atraumatic, Normocephalic  EYES: EOMI, PERRLA, conjunctiva and sclera clear  ENT:No nasal obstruction or discharge. No tonsillar exudate, swelling or erythema.  NECK: Supple, No JVD  CHEST/LUNG: Clear to auscultation bilaterally; No wheeze  HEART: Regular rate and rhythm; No murmurs, rubs, or gallops  ABDOMEN: Soft, Nontender, Nondistended; Bowel sounds present  EXTREMITIES:  2+ Peripheral Pulses, No clubbing, cyanosis, or edema  PSYCH: AAOx3  NEUROLOGY: non-focal  SKIN: No rashes or lesions

## 2022-01-13 NOTE — DISCHARGE NOTE FOR THE EXPIRED PATIENT - HOSPITAL COURSE
82 y/o gentleman with a past medical history of Alzheimer's dementia, ESRD on HD MWF (oliguric), Anemia of chronic disease, COPD on 4L O2, DM II, Hfw/REF and Severe AS, HLD, and admission in Sep 2021 for  multi-lobar pneumonia septic Shock sec to ORSE, bacteremia legionella pneumonia and proteus UTI, presenting today for fevers,shortness of breath requiring oxygen,   UTI found on UA at [facility], and AMS. Per pt's daughter he typically has AMS when he has a UTI. Pt additionally tested positive for covid 10d ago, has been asymptomatic. Pt last had dialysis yesterday.    in the ED,pt was hypotensive, tachycardic, hypoxic, had pus in the maldonado pt's VS T(C): 38.3 (01-13-22 @ 01:23), Max: 40.6 (01-12-22 @ 22:20)  HR: 107 (01-13-22 @ 02:42) (102 - 125)  BP: 95/52 (01-13-22 @ 02:42) (64/50 - 113/58)  RR: 18 (01-13-22 @ 02:42) (16 - 18)  SpO2: 97% (01-13-22 @ 02:42) (92% - 99%), labs done showed WBC 28K,  N 88%, hb 9.5, plt 214, INR 1.35, LACTAte 1.6, trop 0.92 (previously 0.2), covid positive.   pt received 2 L NS, was placed on 4 L NC, cefepime. pt was placed on levophed.     In the morning patient remained on levophed did not tolerate weaning. Patient had significant pus draining from the maldonado. differential included urosepsis and was requiring IV abx. Patient troponins trended up as high as 3.3 and was started on plavix and heparin. Patient was DNR/DNI and was found pulseless and breathless in the HD suite. Time of death was called at 3:31.    82 y/o gentleman with a past medical history of Alzheimer's dementia, ESRD on HD MWF (oliguric), Anemia of chronic disease, COPD on 4L O2, DM II, Hfw/REF and Severe AS, HLD, and admission in Sep 2021 for  multi-lobar pneumonia septic Shock sec to ORSE, bacteremia legionella pneumonia and proteus UTI, presenting today for fevers,shortness of breath requiring oxygen,   UTI found on UA at [facility], and AMS. Per pt's daughter he typically has AMS when he has a UTI. Pt additionally tested positive for covid 10d ago, has been asymptomatic. Pt last had dialysis yesterday.    in the ED,pt was hypotensive, tachycardic, hypoxic, had pus in the maldonado pt's VS T(C): 38.3 (01-13-22 @ 01:23), Max: 40.6 (01-12-22 @ 22:20)  HR: 107 (01-13-22 @ 02:42) (102 - 125)  BP: 95/52 (01-13-22 @ 02:42) (64/50 - 113/58)  RR: 18 (01-13-22 @ 02:42) (16 - 18)  SpO2: 97% (01-13-22 @ 02:42) (92% - 99%), labs done showed WBC 28K,  N 88%, hb 9.5, plt 214, INR 1.35, LACTAte 1.6, trop 0.92 (previously 0.2), covid positive.   pt received 2 L NS, was placed on 4 L NC, cefepime. pt was placed on levophed.     In the morning patient remained on levophed did not tolerate weaning. Patient had significant pus draining from the maldonado. differential included urosepsis and was requiring IV abx. Patient troponins trended up as high as 3.3 and was started on plavix and heparin. Patient was DNR/DNI and was found pulseless and breathless in the HD suite. Time of death was called at 3:31.

## 2022-01-13 NOTE — H&P ADULT - ASSESSMENT
80 y/o gentleman with a past medical history of Alzheimer's dementia, ESRD on HD (oliguric), Anemia of chronic disease, COPD on 4L O2, DM II, Hfw/REF and Severe AS, HLD, and admission in Sep 2021 for  multi-lobar pneumonia septic Shock sec to ORSE, bacteremia legionella pneumonia and proteus UTI, presenting today for fevers, UTI found on UA at [facility], and AMS. Per pt's daughter he typically has AMS consisting of making groaning sounds when he has a UTI. Pt additionally tested positive for covid 10d ago, has been asymptomatic. Pt last had dialysis yesterday.    in the ED,pt's VS T(C): 38.3 (01-13-22 @ 01:23), Max: 40.6 (01-12-22 @ 22:20)  HR: 107 (01-13-22 @ 02:42) (102 - 125)  BP: 95/52 (01-13-22 @ 02:42) (64/50 - 113/58)  RR: 18 (01-13-22 @ 02:42) (16 - 18)  SpO2: 97% (01-13-22 @ 02:42) (92% - 99%), labs done showed WBC 28K,  N 88%, hb 9.5, plt 214, INR 1.35, LACTAte 1.6, trop 0.92 (previously 0.2), covid positive.   pt received 2 L NS, was placed on 4 L NC, cefepime. pt was placed on levophed.   pt is admitted for workup/management   80 y/o gentleman with a past medical history of Alzheimer's dementia, ESRD on HD MWF (oliguric), Anemia of chronic disease, COPD on 4L O2, DM II, Hfw/REF and Severe AS, HLD, and admission in Sep 2021 for  multi-lobar pneumonia septic Shock sec to ORSE, bacteremia legionella pneumonia and proteus UTI, presenting today for fevers,shortness of breath requiring oxygen,   UTI found on UA at [facility], and AMS. Per pt's daughter he typically has AMS when he has a UTI. Pt additionally tested positive for covid 10d ago, has been asymptomatic. Pt last had dialysis yesterday.    #Septic shock  likely 2/2 UTI.   hx of ORSE bacteremia  hx of severe AS  -pt was hypotensive, tachycardic, hypoxic, had pus in the maldonado   - labs done showed WBC 28K,  N 88%, hb 9.5, plt 214, INR 1.35, LACTAte 1.6, trop 0.92 (previously 0.2), covid positive.   - received 2 L NS, was placed on 4 L NC, cefepime. pt was placed on levophed.   - fu bcx, ucx, sputum cx, legionella, strep, procal, CRP.   - pt has a hx of ESBL,, will give Meropenem  - will give vancomycin x1    #Acute hypoxemic respiratory failure secondary to COVID-19 pneumonia  - COVID-19 PCR positive  - EKG: ST depression   - Chest X-ray:B/L opacities  - Patient is saturating 98 % on 3 L NC   - Baseline CBC with diff, CMP, LDH, procalcitonin, creatine kinase, troponin, CRP, ferritin, D-dimer, PTT/PT/INR, ECG, and chest X-ray  - Repeat inflammatory markers (D-dimer, CRP, ferritin) Q48-72H if clinically worsening  - Incentive spirometry at bedside  - Start dexamethasone 6mg IV QD for 10 days  - DVT prophylaxis per protocol  - Titrate supplemental O2 to maintain SpO2 92-96%  - supportive measures : antipyretics, antitussives  - Isolation precautions (contact, droplet, airborne)    #Elevated troponins  #likely type II demand ischemia.  - TROP 0.9 ( previously 0.2)  - trend trops  - ECG showed NSTEMI.  - Cardio consult.     # ESRD on HD   # HAGMA-resolved  #Hyponatremia - resolved  # Anemia sec to CKD  - c/w EPO with HD  - c/w midodrine with HD  - c/w phoslo    # Chronic hypoxic respiratory failure - stable  - c/w  home inhalers    # Chronic systolic CHF  # Severe AS  - hold oprol    # H/o BPH  -c/w flomax    # Alzheimer's dementia  -c/w donepezil, sertraline, mirtazepine     # Constipation-resolved  -  Xray Kidney Ureter Bladder (09.29.21 @ 06:29) >Nonobstructive bowel gas pattern. Mild to moderate colonic stool.  - c/w  senna    # DVT prophylaxis    # DNR/DNI ( GOC discussed)

## 2022-01-13 NOTE — CONSULT NOTE ADULT - ATTENDING COMMENTS
IMPRESSION:    Sepsis POA  Acute Hypoxemic Respiratory Failure   Severe COVID-19 Infection  HO HFmEF, Severe AS  HO ESBL Proteus UTI  HO MRSE Bacteremia  HO Legionella Pneumonia  HO Alzheimer's Dementia  HO ESRD on HD     Plan as outlined above

## 2022-01-13 NOTE — H&P ADULT - ATTENDING COMMENTS
Patient seen and examined  Discussed with resident team    Patient with gurgling breath sound. requesting to call daughter. Not feeling well    < from: Xray Chest 1 View- PORTABLE-Urgent (01.13.22 @ 01:53) >  Increased bilateral opacities.  < end of copied text       # septic shock  patient received 2 L bolus; on levophed  patient had dialysis today with 1 L UF  keep NPO, decrease fluids to 50 ml/her; considering ESRD, severe AS- monitor for respiratory status  monitor culture; procal level  continue vanco and meropenem- ID consulted    #Acute hypoxemic respiratory failure secondary to COVID-19 pneumonia    # elevated D dimer  check LE duplex- pending results  start on heparin - per protocol    # EKG showing diffuse ST depression and TWI and increase in trop from 0.9-->0.3 --> from demand ischemia- Type 2 MI  cardio following- recommended dual antiplatelet and conservative management    # severe AS    patient with critical condition with guarded prognosis

## 2022-01-13 NOTE — SWALLOW BEDSIDE ASSESSMENT ADULT - SLP PERTINENT HISTORY OF CURRENT PROBLEM
pt covid + 82 y/o gentleman with a past medical history of Alzheimer's dementia, ESRD on HD MWF (oliguric), Anemia of chronic disease, COPD on 4L O2, DM II, Hfw/REF and Severe AS, HLD, and admission in Sep 2021 for  multi-lobar pneumonia septic Shock sec to ORSE, bacteremia legionella pneumonia and proteus UTI, presenting today for fevers,shortness of breath requiring oxygen,   UTI found on UA at [facility], and AMS. Per pt's daughter he typically has AMS when he has a UTI. Pt additionally tested positive for covid 10d ago, has been asymptomatic. Pt last had dialysis yesterday.

## 2022-01-13 NOTE — CONSULT NOTE ADULT - SUBJECTIVE AND OBJECTIVE BOX
HISTORY OF PRESENT ILLNESS:   82 y/o gentleman with a past medical history of Alzheimer's dementia, ESRD on HD MWF (oliguric), Anemia of chronic disease, COPD on 4L O2, DM II, Hfw/REF and Severe AS, HLD, and admission in Sep 2021 for  multi-lobar pneumonia septic Shock sec to ORSE, bacteremia legionella pneumonia and proteus UTI, presenting today for fevers,shortness of breath requiring oxygen,   UTI found on UA at [facility], and AMS. Pt additionally tested positive for covid 10d ago, has been asymptomatic. Pt last had dialysis yesterday. In the ED, patient was found to have trop elevation 0.9. No active chest pain.    PAST MEDICAL & SURGICAL HISTORY  Diabetes mellitus    COPD (chronic obstructive pulmonary disease)    Lymphedema of both lower extremities    CHF (congestive heart failure)    ESRD on dialysis    H/O right nephrectomy  20 yrs ago        FAMILY HISTORY:  FAMILY HISTORY:      SOCIAL HISTORY:  []smoker  []Alcohol  []Drug    ROS:  Negative except as mentioned in HPI    ALLERGIES:  No Known Allergies      MEDICATIONS:  MEDICATIONS  (STANDING):  ALBUTerol  90 MICROgram(s) HFA Inhaler - Peds 4 Puff(s) Inhalation every 4 hours  aspirin enteric coated 81 milliGRAM(s) Oral daily  atorvastatin 40 milliGRAM(s) Oral at bedtime  budesonide 160 MICROgram(s)/formoterol 4.5 MICROgram(s) Inhaler 2 Puff(s) Inhalation two times a day  calcium acetate 667 milliGRAM(s) Oral three times a day with meals  dexAMETHasone  Injectable 6 milliGRAM(s) IV Push daily  donepezil 5 milliGRAM(s) Oral at bedtime  heparin   Injectable 5000 Unit(s) SubCutaneous every 12 hours  meropenem  IVPB 500 milliGRAM(s) IV Intermittent every 24 hours  midodrine. 10 milliGRAM(s) Oral <User Schedule>  mirtazapine 15 milliGRAM(s) Oral at bedtime  multivitamin Oral Tab/Cap - Peds 1 Tablet(s) Oral daily  norepinephrine Infusion 0.05 MICROgram(s)/kG/Min (6.56 mL/Hr) IV Continuous <Continuous>  pantoprazole    Tablet 40 milliGRAM(s) Oral before breakfast  sertraline 25 milliGRAM(s) Oral daily  tamsulosin 0.4 milliGRAM(s) Oral at bedtime    MEDICATIONS  (PRN):  acetaminophen     Tablet .. 650 milliGRAM(s) Oral every 6 hours PRN Temp greater or equal to 38C (100.4F), Mild Pain (1 - 3)  aluminum hydroxide/magnesium hydroxide/simethicone Suspension 30 milliLiter(s) Oral every 4 hours PRN Dyspepsia  melatonin 3 milliGRAM(s) Oral at bedtime PRN Insomnia  ondansetron Injectable 4 milliGRAM(s) IV Push every 8 hours PRN Nausea and/or Vomiting  senna 8.6 milliGRAM(s) Oral Tablet - Peds 2 Tablet(s) Oral at bedtime PRN Constipation      HOME MEDICATIONS:  Home Medications:  acetaminophen 325 mg oral tablet: 2 tab(s) orally every 6 hours, As needed, Temp greater or equal to 38C (100.4F), Mild Pain (1 - 3), Moderate Pain (4 - 6) (13 Jan 2022 03:46)  albuterol 90 mcg/inh inhalation aerosol: 2 puff(s) inhaled every 6 hours (13 Jan 2022 03:46)  ascorbic acid 500 mg oral tablet: 1 tab(s) orally once a day (13 Jan 2022 03:46)  aspirin 81 mg oral delayed release tablet: 1 tab(s) orally once a day (13 Jan 2022 03:46)  atorvastatin 40 mg oral tablet: 1 tab(s) orally once a day (at bedtime) (13 Jan 2022 03:46)  Breo Ellipta 200 mcg-25 mcg/inh inhalation powder: 1 puff(s) inhaled once a day (13 Jan 2022 03:46)  calcium acetate 667 mg oral tablet: 1 tab(s) orally 3 times a day (with meals) (13 Jan 2022 03:46)  donepezil 5 mg oral tablet: 1 tab(s) orally once a day (at bedtime) (13 Jan 2022 03:46)  epoetin graham: 5000 unit(s) injectable Monday, Wednesday, and Friday (13 Jan 2022 03:46)  heparin: 5000 unit(s) subcutaneous every 12 hours (13 Jan 2022 03:46)  midodrine 10 mg oral tablet: 1 tab(s) orally prior to hemodialysis (13 Jan 2022 03:46)  mirtazapine 15 mg oral tablet: 1 tab(s) orally once a day (at bedtime) (13 Jan 2022 03:46)  multivitamin: 1 tab(s) orally once a day (13 Jan 2022 03:46)  pantoprazole 40 mg oral delayed release tablet: 1 tab(s) orally once a day (before a meal) (13 Jan 2022 03:46)  povidone iodine 10% topical ointment: 1 application topically  (13 Jan 2022 03:46)  Senna 8.6 mg oral tablet: 2 tab(s) orally once a day (at bedtime), As Needed (13 Jan 2022 03:46)  sertraline 25 mg oral tablet: 1 tab(s) orally once a day (13 Jan 2022 03:46)  tamsulosin 0.4 mg oral capsule: 1 cap(s) orally once a day (at bedtime) (13 Jan 2022 03:46)  Toprol-XL 25 mg oral tablet, extended release: 1 tab(s) orally once a day (13 Jan 2022 03:46)      VITALS:   T(F): 101 (01-13 @ 07:02), Max: 105.1 (01-12 @ 22:20)  HR: 115 (01-13 @ 07:02) (102 - 125)  BP: 95/54 (01-13 @ 07:02) (64/50 - 124/56)  BP(mean): 71 (01-13 @ 07:02) (57 - 82)  RR: 18 (01-13 @ 04:22) (16 - 18)  SpO2: 97% (01-13 @ 04:55) (92% - 99%)    I&O's Summary      PHYSICAL EXAM:  GEN: mild resp distress  HEENT: NCAT, PERRL, EOMI  LUNGS: coarse lung sounds  CARDIOVASCULAR: tachycardia  ABD: Soft, non-tender, non-distended  EXT: No RUBY  SKIN: Intact  NEURO: AAOx1 to self    LABS:                        9.5    28.19 )-----------( 214      ( 12 Jan 2022 21:40 )             32.1     01-12    136  |  96<L>  |  43<H>  ----------------------------<  123<H>  4.5   |  20  |  6.3<HH>    Ca    8.9      12 Jan 2022 21:40    TPro  7.9  /  Alb  2.9<L>  /  TBili  0.4  /  DBili  x   /  AST  48<H>  /  ALT  22  /  AlkPhos  84  01-12    PT/INR - ( 12 Jan 2022 21:40 )   PT: 15.50 sec;   INR: 1.35 ratio         PTT - ( 12 Jan 2022 21:40 )  PTT:34.8 sec  Lactate, Blood: 1.6 mmol/L (01-12-22 @ 21:40)  Troponin T, Serum: 0.92 ng/mL *HH* (01-12-22 @ 21:40)    Troponin 0.92, CKMB --, CK --/ 01-12-22 @ 21:40        Hemoglobin A1C   Thyroid    EKG: sinus tachy, ST depression in inf and precordial leads (unchanged from 07/12/21 EKG)    CXR: bilateral opacities    TTE: 07/21 EF 45%, WMA, moderate to severe AS

## 2022-01-13 NOTE — H&P ADULT - HISTORY OF PRESENT ILLNESS
80 y/o gentleman with a past medical history of Alzheimer's dementia, ESRD on HD (oliguric), Anemia of chronic disease, COPD on 4L O2, DM II, Hfw/REF and Severe AS, HLD, and admission in Sep 2021 for  multi-lobar pneumonia septic Shock sec to ORSE, bacteremia legionella pneumonia and proteus UTI, presenting today for fevers, UTI found on UA at [facility], and AMS. Per pt's daughter he typically has AMS consisting of making groaning sounds when he has a UTI. Pt additionally tested positive for covid 10d ago, has been asymptomatic. Pt last had dialysis yesterday.    in the ED,pt's VS T(C): 38.3 (01-13-22 @ 01:23), Max: 40.6 (01-12-22 @ 22:20)  HR: 107 (01-13-22 @ 02:42) (102 - 125)  BP: 95/52 (01-13-22 @ 02:42) (64/50 - 113/58)  RR: 18 (01-13-22 @ 02:42) (16 - 18)  SpO2: 97% (01-13-22 @ 02:42) (92% - 99%), labs done showed WBC 28K,  N 88%, hb 9.5, plt 214, INR 1.35, LACTAte 1.6, trop 0.92 (previously 0.2), covid positive.   pt received 2 L NS, was placed on 4 L NC, cefepime. pt was placed on levophed.   pt is admitted for workup/management 82 y/o gentleman with a past medical history of Alzheimer's dementia, ESRD on HD MWF (oliguric), Anemia of chronic disease, COPD on 4L O2, DM II, Hfw/REF and Severe AS, HLD, and admission in Sep 2021 for  multi-lobar pneumonia septic Shock sec to ORSE, bacteremia legionella pneumonia and proteus UTI, presenting today for fevers,shortness of breath requiring oxygen,   UTI found on UA at [facility], and AMS. Per pt's daughter he typically has AMS when he has a UTI. Pt additionally tested positive for covid 10d ago, has been asymptomatic. Pt last had dialysis yesterday.    in the ED,pt was hypotensive, tachycardic, hypoxic, had pus in the maldonado pt's VS T(C): 38.3 (01-13-22 @ 01:23), Max: 40.6 (01-12-22 @ 22:20)  HR: 107 (01-13-22 @ 02:42) (102 - 125)  BP: 95/52 (01-13-22 @ 02:42) (64/50 - 113/58)  RR: 18 (01-13-22 @ 02:42) (16 - 18)  SpO2: 97% (01-13-22 @ 02:42) (92% - 99%), labs done showed WBC 28K,  N 88%, hb 9.5, plt 214, INR 1.35, LACTAte 1.6, trop 0.92 (previously 0.2), covid positive.   pt received 2 L NS, was placed on 4 L NC, cefepime. pt was placed on levophed.   pt is admitted for workup/management

## 2022-01-13 NOTE — CONSULT NOTE ADULT - ASSESSMENT
IMPRESSION:    Sepsis POA  Acute Hypoxemic Respiratory Failure   Severe COVID-19 Infection  HO HFmEF, Severe AS  HO ESBL Proteus UTI  HO MRSE Bacteremia  HO Legionella Pneumonia  HO Alzheimer's Dementia  HO ESRD on HD     PLAN:    CNS: avoid depressants    HEENT: Oral care.      PULMONARY:  HOB @ 45 degrees.  Aspiration precautions.  Wean O2 as tolerated   97% on 4L NC, target SaO2 >92%, continue Dexamethasone 6mg Q24H    CARDIOVASCULAR: goal directed fluid resusciated, Keep MAP > 60.  Avoid volume overload, wean down pressors, strict intake and outputs     GI: GI prophylaxis.  Feeding as per speech and swallow.  Bowel regimen.     RENAL:  Follow up lytes.  Correct as needed. HD per renal      INFECTIOUS DISEASE: Follow up cultures.  Continue Merrem, Vancomycin, check nasal MRSA, fungitell, procalcitionin, f/u vanc trough, ID evaluation     HEMATOLOGICAL:  DVT prophylaxis.  check D-Dimer noted.  LE duplex    ENDOCRINE:  Follow up FS. insulin protocol if needed    MUSCULOSKELETAL: Bed rest     Goals of care    SDU monitoring    IMPRESSION:    Sepsis POA  Acute Hypoxemic Respiratory Failure   Severe COVID-19 Infection  HO HFmEF, Severe AS  HO ESBL Proteus UTI  HO MRSE Bacteremia  HO Legionella Pneumonia  HO Alzheimer's Dementia  HO ESRD on HD     PLAN:    CNS: avoid depressants    HEENT: Oral care.      PULMONARY:  HOB @ 45 degrees.  Aspiration precautions.  Wean O2 as tolerated 97% on 4L NC.  Target SaO2 >92%, continue Dexamethasone 6mg Q24H.  Aggressive pulmonary toilet.  NIV for now      CARDIOVASCULAR: goal directed fluid resuscitated Keep MAP > 60.  Avoid volume overload, wean down pressors, strict intake and outputs     GI: GI prophylaxis.  NPO for now.  Bowel regimen.     RENAL:  Follow up lytes.  Correct as needed. HD per renal      INFECTIOUS DISEASE: Follow up cultures.  Continue Merrem, Vancomycin, check nasal MRSA,  fungitell, procalcitionin, f/u vanc trough, ID evaluation     HEMATOLOGICAL:  DVT prophylaxis.  check D-Dimer noted.  LE duplex    ENDOCRINE:  Follow up FS. Insulin protocol if needed    MUSCULOSKELETAL: Bed rest     Goals of care    Prognosis extremely poor     SDU monitoring

## 2022-01-13 NOTE — CONSULT NOTE ADULT - ASSESSMENT
Demand ischemia  CAD  HFmEF  mod-severe AS  COVID  sepsis    - Treat underlying infections.  - No acute cardiac intervention at this time. Patient has signs of demand ischemia. Also noted significant comorbidities.  - Optimize medical management for CAD. C/w ASA and lipitor.  - Monitor hemodynamic status as patient has underlying HFmEF and AS.  - GOC with family.  - Will discuss plan with attending cardiologist.

## 2022-01-13 NOTE — CONSULT NOTE ADULT - SUBJECTIVE AND OBJECTIVE BOX
[facility] INITIAL CONSULT NOTE  --------------------------------------------------------------------------------  HPI: obtained from daughter and records  82 y/o gentleman with a past medical history of Alzheimer's dementia, ESRD on HD TTS (oliguric) at [facility], Anemia of chronic disease, COPD on 4L O2, DM II, Hfw/REF and Severe AS, HLD, and admission in Sep 2021 for  multi-lobar pneumonia septic Shock sec to ORSE, bacteremia legionella pneumonia and proteus UTI, presenting today for fevers, shortness of breath requiring oxygen, UTI found on UA at [facility], and AMS. Per pt's daughter he typically has AMS when he has a UTI. Pt additionally tested positive for covid 10d ago, has been asymptomatic. Pt last had dialysis 1/11/2022.  In the ED pt was hypotensive, tachycardic, hypoxic, had pus in the maldonado. Pt received 2 L NS, was placed on 4 L NC, cefepime. Pt was placed on levophed.     PAST HISTORY  --------------------------------------------------------------------------------  PAST MEDICAL & SURGICAL HISTORY:  Diabetes mellitus  COPD (chronic obstructive pulmonary disease)  Lymphedema of both lower extremities  CHF (congestive heart failure)  ESRD on dialysis  H/O right nephrectomy 20 yrs ago    FAMILY HISTORY:  Negative for kidney disease    SOCIAL HISTORY:  No current ETOH, tobacco, or illicit drug use    ALLERGIES & MEDICATIONS  --------------------------------------------------------------------------------  Allergies    No Known Allergies    Standing Inpatient Medications  ALBUTerol  90 MICROgram(s) HFA Inhaler - Peds 4 Puff(s) Inhalation every 4 hours  aspirin enteric coated 81 milliGRAM(s) Oral daily  atorvastatin 40 milliGRAM(s) Oral at bedtime  budesonide 160 MICROgram(s)/formoterol 4.5 MICROgram(s) Inhaler 2 Puff(s) Inhalation two times a day  calcium acetate 667 milliGRAM(s) Oral three times a day with meals  clopidogrel Tablet 75 milliGRAM(s) Oral daily  dexAMETHasone  Injectable 6 milliGRAM(s) IV Push daily  donepezil 5 milliGRAM(s) Oral at bedtime  heparin  Infusion 1200 Unit(s)/Hr IV Continuous <Continuous>  lactated ringers. 1000 milliLiter(s) IV Continuous <Continuous>  meropenem  IVPB 500 milliGRAM(s) IV Intermittent every 24 hours  midodrine.10 milliGRAM(s) Oral <User Schedule>  mirtazapine 15 milliGRAM(s) Oral at bedtime  multivitamin Oral Tab/Cap - Peds 1 Tablet(s) Oral daily  norepinephrine Infusion 0.05 MICROgram(s)/kG/Min IV Continuous <Continuous>  pantoprazole    Tablet 40 milliGRAM(s) Oral before breakfast  sertraline 25 milliGRAM(s) Oral daily  tamsulosin 0.4 milliGRAM(s) Oral at bedtime    PRN Inpatient Medications  acetaminophen  Suppository .. 650 milliGRAM(s) Rectal every 6 hours PRN  aluminum hydroxide/magnesium hydroxide/simethicone Suspension 30 milliLiter(s) Oral every 4 hours PRN  melatonin 3 milliGRAM(s) Oral at bedtime PRN  ondansetron Injectable 4 milliGRAM(s) IV Push every 8 hours PRN  senna 8.6 milliGRAM(s) Oral Tablet - Peds 2 Tablet(s) Oral at bedtime PRN    REVIEW OF SYSTEMS  --------------------------------------------------------------------------------  Unable to obtain    VITALS/PHYSICAL EXAM  --------------------------------------------------------------------------------  T(C): 38.3 (01-13-22 @ 12:45), Max: 40.6 (01-12-22 @ 22:20)  HR: 102 (01-13-22 @ 12:45) (94 - 125)  BP: 103/58 (01-13-22 @ 12:45) (64/50 - 124/56)  RR: 20 (01-13-22 @ 12:45) (16 - 20)  SpO2: 95% (01-13-22 @ 12:45) (92% - 99%)  Height (cm): 172.7 (01-12-22 @ 21:17)  Weight (kg): 70 (01-12-22 @ 21:58)  BMI (kg/m2): 23.5 (01-12-22 @ 21:58)  BSA (m2): 1.83 (01-12-22 @ 21:58)    Physical Exam:  	Gen: somnolent  	Pulm:  B/L rales  	CV: RRR, S1S2  	Back: No CVA tenderness; no sacral edema  	Abd: +BS, soft, nontender/nondistended  	: Maldonado with turbid urine  	LE: Warm,  edema  	Vascular access: TDC    LABS/STUDIES  --------------------------------------------------------------------------------              8.9    23.53 >-----------<  192      [01-13-22 @ 08:43]              30.3     138  |  99  |  50  ----------------------------<  165      [01-13-22 @ 08:43]  4.7   |  20  |  6.2        Ca     8.8     [01-13-22 @ 08:43]    TPro  7.6  /  Alb  3.0  /  TBili  0.4  /  DBili  x   /  AST  109  /  ALT  28  /  AlkPhos  79  [01-13-22 @ 08:43]    PT/INR: PT 14.10, INR 1.23       [01-13-22 @ 08:43]  PTT: 31.0       [01-13-22 @ 08:43]    Troponin 3.13      [01-13-22 @ 08:43]    Creatinine Trend:  SCr 6.2 [01-13 @ 08:43]  SCr 6.3 [01-12 @ 21:40]    Urinalysis - [01-13-22 @ 10:24]      Color Yellow / Appearance Turbid / SG 1.015 / pH 7.5      Gluc Negative / Ketone Negative  / Bili Negative / Urobili <2 mg/dL       Blood Large / Protein 100 mg/dL / Leuk Est Large / Nitrite Negative      RBC 50 / WBC >720 / Hyaline 0 / Gran  / Sq Epi  / Non Sq Epi 0 / Bacteria Few    Iron 10, TIBC 119, %sat 8      [09-22-21 @ 08:39]  Ferritin 938      [09-22-21 @ 08:39]  Lipid: chol 114, , HDL 38, LDL --      [08-27-21 @ 04:30]    HBsAb Nonreact      [07-12-21 @ 18:05]  HBsAg Nonreact      [09-22-21 @ 08:39]  HCV 0.17, Nonreact      [09-22-21 @ 08:39]

## 2022-01-13 NOTE — H&P ADULT - NSHPLABSRESULTS_GEN_ALL_CORE
9.5    28.19 )-----------( 214      ( 12 Jan 2022 21:40 )             32.1       01-12    136  |  96<L>  |  43<H>  ----------------------------<  123<H>  4.5   |  20  |  6.3<HH>    Ca    8.9      12 Jan 2022 21:40    TPro  7.9  /  Alb  2.9<L>  /  TBili  0.4  /  DBili  x   /  AST  48<H>  /  ALT  22  /  AlkPhos  84  01-12                  PT/INR - ( 12 Jan 2022 21:40 )   PT: 15.50 sec;   INR: 1.35 ratio         PTT - ( 12 Jan 2022 21:40 )  PTT:34.8 sec    Lactate Trend  01-12 @ 21:40 Lactate:1.6       CARDIAC MARKERS ( 12 Jan 2022 21:40 )  x     / 0.92 ng/mL / x     / x     / x            CAPILLARY BLOOD GLUCOSE

## 2022-01-15 LAB
-  AMPICILLIN/SULBACTAM: SIGNIFICANT CHANGE UP
-  CEFAZOLIN: SIGNIFICANT CHANGE UP
-  CLINDAMYCIN: SIGNIFICANT CHANGE UP
-  DAPTOMYCIN: SIGNIFICANT CHANGE UP
-  ERYTHROMYCIN: SIGNIFICANT CHANGE UP
-  GENTAMICIN: SIGNIFICANT CHANGE UP
-  LINEZOLID: SIGNIFICANT CHANGE UP
-  OXACILLIN: SIGNIFICANT CHANGE UP
-  PENICILLIN: SIGNIFICANT CHANGE UP
-  RIFAMPIN: SIGNIFICANT CHANGE UP
-  TETRACYCLINE: SIGNIFICANT CHANGE UP
-  TRIMETHOPRIM/SULFAMETHOXAZOLE: SIGNIFICANT CHANGE UP
-  VANCOMYCIN: SIGNIFICANT CHANGE UP
CULTURE RESULTS: SIGNIFICANT CHANGE UP
CULTURE RESULTS: SIGNIFICANT CHANGE UP
METHOD TYPE: SIGNIFICANT CHANGE UP
ORGANISM # SPEC MICROSCOPIC CNT: SIGNIFICANT CHANGE UP
SPECIMEN SOURCE: SIGNIFICANT CHANGE UP
SPECIMEN SOURCE: SIGNIFICANT CHANGE UP

## 2022-01-16 LAB
-  AMIKACIN: SIGNIFICANT CHANGE UP
-  AMOXICILLIN/CLAVULANIC ACID: SIGNIFICANT CHANGE UP
-  AMPICILLIN/SULBACTAM: SIGNIFICANT CHANGE UP
-  AMPICILLIN: SIGNIFICANT CHANGE UP
-  AZTREONAM: SIGNIFICANT CHANGE UP
-  CEFAZOLIN: SIGNIFICANT CHANGE UP
-  CEFEPIME: SIGNIFICANT CHANGE UP
-  CEFOXITIN: SIGNIFICANT CHANGE UP
-  CEFTRIAXONE: SIGNIFICANT CHANGE UP
-  CIPROFLOXACIN: SIGNIFICANT CHANGE UP
-  ERTAPENEM: SIGNIFICANT CHANGE UP
-  GENTAMICIN: SIGNIFICANT CHANGE UP
-  LEVOFLOXACIN: SIGNIFICANT CHANGE UP
-  MEROPENEM: SIGNIFICANT CHANGE UP
-  NITROFURANTOIN: SIGNIFICANT CHANGE UP
-  PIPERACILLIN/TAZOBACTAM: SIGNIFICANT CHANGE UP
-  TOBRAMYCIN: SIGNIFICANT CHANGE UP
-  TRIMETHOPRIM/SULFAMETHOXAZOLE: SIGNIFICANT CHANGE UP
CULTURE RESULTS: SIGNIFICANT CHANGE UP
METHOD TYPE: SIGNIFICANT CHANGE UP
ORGANISM # SPEC MICROSCOPIC CNT: SIGNIFICANT CHANGE UP
ORGANISM # SPEC MICROSCOPIC CNT: SIGNIFICANT CHANGE UP
SPECIMEN SOURCE: SIGNIFICANT CHANGE UP

## 2022-06-29 NOTE — PRE-ANESTHESIA EVALUATION ADULT - BSA (M2)
[de-identified] : Constitutional:Well nourished , well developed and in no acute distress\par Psychiatric: Alert and oriented to time place and person.Appropriate affect \par Skin:Head, neck, arms and lower extremities:no lesions or discoloration\par HEENT: Normocephalic, EOM intact, Nasal septum midline,\par Respiratory: Unlabored respirations,no audible wheezing ,no tachypnea, no cyanosis\par Cardiovascular: no leg swelling  no ankle edema no JVD, pulse regular\par Vascular: no calf or thigh tenderness, \par Peripheral pulses; intact\par Lymphatics:No groin adenopathy,no lymphedema lower  or upper extremities\par Right knee satisfactory alignment no effusion motion full pain at the extreme tender medial joint line positive medial Jammie
[de-identified] : X-rays 3 views of right knee of March 25, 2022 within normal limits
1.82
1.82

## 2023-01-03 NOTE — ED ADULT NURSE NOTE - NS ED NURSE IV DC DT

## 2023-02-08 NOTE — ED PROVIDER NOTE - NS_EDPROVIDERDISPOUSERTYPE_ED_A_ED
Addended by: Anabell Baeza on: 2/8/2023 02:05 PM     Modules accepted: Orders Attending Attestation (For Attendings USE Only)...

## 2023-04-24 NOTE — DISCHARGE NOTE NURSING/CASE MANAGEMENT/SOCIAL WORK - NURSING SECTION COMPLETE
[facility]  OTOLARYNGOLOGY  [id number] [place] [address]  Phone: [phone]  Fax: [phone]  Follow Up Time:   
Patient/Caregiver provided printed discharge information.

## 2023-05-03 NOTE — PROCEDURE NOTE - NSINDICATIONS_GEN_A_CORE
Ortho Navigator Note      Pre-op Date 5/2/23 [facility]      Medical Clearance  Cleared     Labs WNR      COVID Test Date No longer indicated      Skin  Intact      Activity: Ambulates independently without assistive device      Equipment Need Patient will likely need a walker for discharge. Defer to PT/OT for recs.       Meds to Hold Held all supplements 14 days prior to surgery  Hold ASA for 7 days prior to surgery   Hold ibuprofen 24 hr prior to surgery   * Medication recommendations are not intended to be exhaustive; they are limited to common medications that are potentially dangerous if incorrectly managed   NPO Instructions  Defer to PAN RN     Pre-op Joint Education Complete? Complete   Discharge Plan Patient has plan to discharge to daughter's home on morning of POD 1. She lives in the [facility]. Daughters boyfriend will arrive at hospital at 10 am to transport patient home. He will stay with family for a few days before discharging back to his home in [facility]. He is currently living in a large motor home while his home is being renovated. He has a portable bathroom rented and it sits just outside motor home. His steps into motor home have railings in place. The shower has a bench.    /Transportation Daughter will be  for the first few days and them wife will be  when patient returns home.  is physically able to care for patient.      Barriers to Discharge No barriers to discharge.      Additional Info/   Special Needs : Patient had no unanswered questions or concerns.            05/03/23 1306   Discharge Planning   Patient/Family Anticipates Transition to home with family   Concerns to be Addressed no discharge needs identified   Living Arrangements   People in Home significant other   Type of Residence Private Residence   Is your private residence a single family home or apartment?   (5th Wheel trailer temporarily)   Number of Stairs, Within Home, Primary four   Stair Railings,  Within Home, Primary railings safe and in good condition   Once home, are you able to live on one level? Yes   Bathroom Shower/Tub Walk-in shower   Equipment Currently Used at Home none   Support System   Support Systems Spouse/Significant Other   Medical Clearance   Date of Physical 05/02/23   [facility] Name [facility]   It is recommended that you call and check with any specialty providers before surgery to see if you need surgical clearance.  Do you see any specialty providers outside of your primary care provider? No   Blood   Known Bleeding Disorder or Coagulopathy No   Does the patient have any Hindu/cultural preferences related to blood products? No   Education   Has the patient scheduled or completed pre-op total joint education, either in class or online, in the last 12 months? Yes   Patient attended total joint pre-op class/received pre-op teaching  online   Relationship/Living Environment   Name(s) of People in Home Steph (spouse), Jacquie (daughter)        dialysis/CRRT

## 2024-04-05 NOTE — PATIENT PROFILE ADULT - FALLEN IN THE PAST
Left message on machine for patient to call office. Patient is due for Annual Wellness Visit.  Chelle Turner    
Unable to determine - Left Above hip bruise/yes

## 2024-04-29 NOTE — CHART NOTE - NSCHARTNOTEFT_GEN_A_CORE
Blood pressure 88/44. MAP 60. Weened off of levo around 5pm. Per nephro notes, fluid removal during HD has been limited due to intradialytic hypotension earlier today. Currently on midodrine 10mg TID as well.     Plan: Restart Levo. Detail Level: Detailed Blood pressure 88/44. MAP 60. Weened off of levo around 5pm. Per nephro notes, fluid removal during HD has been limited due to intradialytic hypotension earlier today. Currently on midodrine 10mg TID as well.     Plan: Restart Levo. MAP goal >60 per Crit notes. 02-Nov-2018 20:30

## 2024-05-20 NOTE — PROGRESS NOTE ADULT - SUBJECTIVE AND OBJECTIVE BOX
Operative Report    Patient: Kaiser Miles MRN: [id number]      YOB: 1972  Age: 52 y.o.  Sex: male            Indications:  Screening    Preoperative Evaluation: The patient was evaluated prior to the procedure in the GI lab admission area, the patient ASA was recorded .  Consent was obtained from the patient with the risk of perforation bleeding and aspiration.    Anesthesia: NAKIA-per anesthesia    Complications: None; patient tolerated the procedure well.    EBL -insignificant      Procedure: The patient was sedated in the left lateral decubitus position.  Scope was advanced from the rectum to the cecum.  Per gastroenterology society guideline recommendations, right side of the colon was examined twice. The scope was withdrawn to the rectum, retroflexed view was performed.  The rectal exam was normal.  Preparation was suboptimal. Mazon score of 6 .    Findings:    3 polyps throughout the colon, 2 to 3 mm in size, removed via jumbo biopsy forceps.  1 polyp located in the sigmoid, 15 mm in size, removed via hot snare polypectomy.  Small internal hemorrhoids    Postoperative Diagnosis:   4 polyps    Recommendations:   Repeat colonoscopy in 3 years  Await for biopsy results, you will receive a pathology letter within 2 weeks.     Signed By:  Cherie Vallejo MD     May 20, 2024                         [facility] FOLLOW UP NOTE  --------------------------------------------------------------------------------  24 hour events/subjective: Patient examined. Appears comfortable.    PAST HISTORY  --------------------------------------------------------------------------------  No significant changes to PMH, PSH, FHx, SHx, unless otherwise noted    ALLERGIES & MEDICATIONS  --------------------------------------------------------------------------------  Allergies    No Known Allergies    Standing Inpatient Medications  ascorbic acid 500 milliGRAM(s) Oral daily  budesonide 160 MICROgram(s)/formoterol 4.5 MICROgram(s) Inhaler 2 Puff(s) Inhalation two times a day  busPIRone 5 milliGRAM(s) Oral <User Schedule>  chlorhexidine 4% Liquid 1 Application(s) Topical <User Schedule>  donepezil 5 milliGRAM(s) Oral at bedtime  hydrALAZINE 50 milliGRAM(s) Oral every 12 hours  insulin glargine Injectable (LANTUS) 8 Unit(s) SubCutaneous at bedtime  melatonin 3 milliGRAM(s) Oral at bedtime  montelukast 10 milliGRAM(s) Oral daily  nystatin Cream 1 Application(s) Topical two times a day  pantoprazole Infusion 8 mG/Hr IV Continuous <Continuous>  polyethylene glycol 3350 17 Gram(s) Oral two times a day  polyethylene glycol/electrolyte Solution. 4000 milliLiter(s) Oral once  senna 2 Tablet(s) Oral at bedtime  sodium bicarbonate 650 milliGRAM(s) Oral every 12 hours    PRN Inpatient Medications  acetaminophen   Tablet .. 650 milliGRAM(s) Oral every 6 hours PRN  ALBUTerol    90 MICROgram(s) HFA Inhaler 1 Puff(s) Inhalation every 8 hours PRN      VITALS/PHYSICAL EXAM  --------------------------------------------------------------------------------  T(C): 37.6 (01-05-21 @ 09:34), Max: 37.6 (01-05-21 @ 09:34)  HR: 104 (01-05-21 @ 09:34) (83 - 104)  BP: 136/71 (01-05-21 @ 09:34) (136/71 - 176/77)  RR: 18 (01-05-21 @ 09:34) (17 - 20)  SpO2: 97% (01-05-21 @ 06:51) (97% - 98%)    01-04-21 @ 07:01  -  01-05-21 @ 07:00  --------------------------------------------------------  IN: 1310 mL / OUT: 1200 mL / NET: 110 mL      Physical Exam:  	Gen: NAD  	Pulm: CTA B/L  	CV: RRR, S1S2  	Abd: +BS, soft, nontender/nondistended  	: No suprapubic tenderness  	LE: Warm,  no edema    LABS/STUDIES  --------------------------------------------------------------------------------              8.6    8.29  >-----------<  287      [01-05-21 @ 06:46]              27.0     137  |  101  |  44  ----------------------------<  118      [01-05-21 @ 06:46]  4.1   |  25  |  3.3        Ca     8.8     [01-05-21 @ 06:46]      Mg     2.5     [01-04-21 @ 06:40]    TPro  6.4  /  Alb  3.1  /  TBili  0.2  /  DBili  x   /  AST  11  /  ALT  <5  /  AlkPhos  59  [01-05-21 @ 06:46]    Creatinine Trend:  SCr 3.3 [01-05 @ 06:46]  SCr 3.7 [01-04 @ 06:40]  SCr 3.7 [01-02 @ 07:22]  SCr 3.7 [01-01 @ 07:25]  SCr 3.7 [12-31 @ 11:26]    Iron 34, TIBC 178, %sat 19      [01-04-21 @ 06:40]  Ferritin 332      [01-04-21 @ 06:40]  PTH -- (Ca 8.9)      [12-24-20 @ 04:30]   54

## 2024-06-26 NOTE — PROGRESS NOTE ADULT - ASSESSMENT
RECORDS READY FOR P/U   80 yo man hx of COPD on Home O2 4L, HFrEF (EF 40% in July 2021), severe AS, T2DM, hx right nephrectomy, ESRD on HD, HTN, anxiety, Alzheimer's dementia, anemia of chronic disease on ROMELIA therapy BIBA from NH due to hypoxia and SOB during HD session.     # Acute on Chronic (O2 dependent COPD) Hypoxic resp failure 2/2 vol overload and worsened b/l pleural opacities/effusions 2/2 ESRD, HFrEF exac, Pulm HTN, sev AS, HTN  usually on 4 liters O2 - cont NC O2 as needed  BNP always very high  CXR: bibasilar opacities/effusions   On prior admission was found to have mucus Plug, s/p bronchoscopy 08/08/21  pulm noted  c/w Albuterol PRN, Duonebs Q6H  can d/c lasix and zaroxylyn (d/w Dr Saba, these are not working)  strict fluid restriction to 1 L/day  Renal will try to remove more fluid at HD (will use midodrine 10mg pre HD to help hold BP up during HD)  pt on DAPT -> not sure why (can prob d/c plavix - to d/w med team and family)  c/w BB - consider holding AM dose on HD days vs Toprol XL 25-50mg po qhs  can see Dr Martinez as outpt re AS, but I think pt is a very poor TAVR candidate    # ESRD on HD  Follows Dr. NANCY Saba and Dr. STEPHANIA Vasquez OP    # DMII   diabetic/renal diet  FS 2x/day only  A1C 5.0 in July 2021  start low-dose basal insulin if FS >180    # DLD  on statin  F/u lipid profile in AM    # anemia of chronic disease  ROMELIA per renal  got iv iron    # Dementia Alzheimer's and likely vasc dementia  seems severe  poor baseline  max asst ADLs  c/w donepezil, remeron and zoloft    # BPH on flomax    # can cont Vit C    # GI PPx - Protonix    # DVT PPx - Heparin 5000 mg SubQ  q8    # Activity: basically bedbound    # Code Status-  DNR / DNI (previously verified with daughter)    # Dispo: try to keep BP higher heading into HD to remove more vol at HD; f/u renal; try to d/c plavix; FS 2x/day; d/c diuretics (not helpful)  will eventually d/c back to NH (there LTC) - f/u renal re d/c planning

## 2024-09-10 NOTE — ASU PATIENT PROFILE, ADULT - BILL OF RIGHTS/ADMISSION INFORMATION PROVIDED TO:
Anesthesia Type: 1% lidocaine with 1:500,000 epinephrine and a 1:10 solution of 8.4% sodium bicarbonate Anesthesia Volume In Cc: 1 Consent: Written consent obtained and the risks of skin tag removal was reviewed with the patient including but not limited to bleeding, pigmentary change, infection, pain, and remote possibility of scarring. Price (Use Numbers Only, No Special Characters Or $): 25 Detail Level: Detailed Removed With: scissors Patient

## 2024-09-17 NOTE — ED PROVIDER NOTE - NSTOBACCOUNKNOWNSMK_GEN_A_CORE_RD
----- Message from Duncan Freeman DO sent at 9/17/2024  7:25 AM CDT -----  Labs show elevated creatinine/decreased GFR, elevated potassium.  Discussed with nephrology who confirms patient should not be on valsartan at this time and will continue with current plan.  A1c well-controlled and stable at 6.4%.  Continue with current diabetes medication plan.  We should repeat his lab in 2 weeks.  I have ordered the repeat CMP.    RN team-can we please call the patient and share the results and confirm the patient is not taking his valsartan.  Recommend he take in 75-80 ounces of water daily.    Duncan SANTAMARIA) DO [facility]  9/17/2024     Cognitively Impaired

## 2024-10-01 NOTE — SWALLOW BEDSIDE ASSESSMENT ADULT - H & P REVIEW
attempted to give pt medications, pt states he cannot take his medication until 9 am. MD Barreto aware. yes

## 2024-10-14 NOTE — SWALLOW BEDSIDE ASSESSMENT ADULT - MUCOSAL QUALITY
WFL Detail Level: Detailed Quality 226: Preventive Care And Screening: Tobacco Use: Screening And Cessation Intervention: Patient screened for tobacco use and is an ex/non-smoker

## 2025-04-17 NOTE — PATIENT PROFILE ADULT - BILL PAYMENT
Post Radial Cath Discharge Instructions  Keep puncture site covered with current bandage for 24 hours.  You may shower in 24 hours. Do not take a tub bath or submerge the puncture site in water for 72 hours.   You may cover the site with a Band-Aid. Keep Band-Aid clean and dry at all times.    No lifting over 5 pounds with the arm your puncture site is on for 72 hours.  No strenuous activity such as bowling, tennis, etc for 72 hours  Do not operate lawnmower, motorcycle, chainsaw, or all terrain vehicle for 72 hours.  No blood pressure or tourniquets on affected arm for 72 hours.   The puncture site may be slightly bruised and sore following your procedure.   Drink 6-8 glasses of clear liquids during the next 24 hours to flush the dye out.     If Bleeding Occurs, DO NOT PANIC  Place 1 or 2 fingers over the puncture site and hold firm pressure to stop bleeding. You may be able to feel your pulse as you hold pressure  Lift you fingers after 5 minutes to see if the bleeding stopped.  Once has stopped, gently wipe the wrist area clean and cover with a bandage.  If the bleeding does not stop after 30 minutes, or if there is a large amount of bleeding or spurting, call 911! Do not drive yourself to the hospital.     Should any of the following occur, Contact your Physician Immediately:  Redness, inflamation, swelling, chills or fever, or discolred drainage at procedure site   Coldness, discoloration, ongoing numbness, severe pain, or swelling. Expect mild tingling of hand and tenderness at the puncture site for up to 3 days. If this persists or other symptoms develop, notify your physician    no